# Patient Record
Sex: FEMALE | Race: WHITE | Employment: OTHER | URBAN - METROPOLITAN AREA
[De-identification: names, ages, dates, MRNs, and addresses within clinical notes are randomized per-mention and may not be internally consistent; named-entity substitution may affect disease eponyms.]

---

## 2017-05-11 ENCOUNTER — HOSPITAL ENCOUNTER (OUTPATIENT)
Age: 62
Discharge: HOME | End: 2017-05-11
Payer: MEDICAID

## 2017-05-11 LAB
ABSOLUTE EOS #: 0.1 K/UL (ref 0–0.4)
ABSOLUTE LYMPH #: 2.3 K/UL (ref 1–4.8)
ABSOLUTE MONO #: 0.4 K/UL (ref 0.2–0.8)
ALBUMIN SERPL-MCNC: 4.3 G/DL (ref 3.5–5.2)
ALBUMIN/GLOBULIN RATIO: ABNORMAL (ref 1–2.5)
ALP BLD-CCNC: 65 U/L (ref 35–104)
ALT SERPL-CCNC: 13 U/L (ref 5–33)
ANION GAP SERPL CALCULATED.3IONS-SCNC: 15 MMOL/L (ref 9–17)
AST SERPL-CCNC: 16 U/L
BASOPHILS # BLD: 1 %
BASOPHILS ABSOLUTE: 0.1 K/UL (ref 0–0.2)
BILIRUB SERPL-MCNC: 0.56 MG/DL (ref 0.3–1.2)
BUN BLDV-MCNC: 13 MG/DL (ref 6–20)
BUN/CREAT BLD: 21 (ref 9–20)
CALCIUM SERPL-MCNC: 9.4 MG/DL (ref 8.6–10.4)
CHLORIDE BLD-SCNC: 99 MMOL/L (ref 98–107)
CHOLESTEROL, FASTING: 238 MG/DL
CHOLESTEROL/HDL RATIO: 3.8
CO2: 26 MMOL/L (ref 20–31)
CREAT SERPL-MCNC: 0.62 MG/DL (ref 0.5–0.9)
DIFFERENTIAL TYPE: ABNORMAL
EOSINOPHILS RELATIVE PERCENT: 1 %
GFR AFRICAN AMERICAN: >60 ML/MIN
GFR NON-AFRICAN AMERICAN: >60 ML/MIN
GFR SERPL CREATININE-BSD FRML MDRD: ABNORMAL ML/MIN/{1.73_M2}
GFR SERPL CREATININE-BSD FRML MDRD: ABNORMAL ML/MIN/{1.73_M2}
GLUCOSE FASTING: 110 MG/DL (ref 70–99)
HCT VFR BLD CALC: 46.5 % (ref 36–46)
HDLC SERPL-MCNC: 63 MG/DL
HEMOGLOBIN: 15.8 G/DL (ref 12–16)
LDL CHOLESTEROL: 141 MG/DL (ref 0–130)
LYMPHOCYTES # BLD: 32 %
MCH RBC QN AUTO: 30.8 PG (ref 26–34)
MCHC RBC AUTO-ENTMCNC: 34 G/DL (ref 31–37)
MCV RBC AUTO: 90.6 FL (ref 80–100)
MONOCYTES # BLD: 6 %
PDW BLD-RTO: 12.9 % (ref 11.5–14.5)
PLATELET # BLD: 258 K/UL (ref 130–400)
PLATELET ESTIMATE: ABNORMAL
PMV BLD AUTO: ABNORMAL FL (ref 6–12)
POTASSIUM SERPL-SCNC: 4 MMOL/L (ref 3.7–5.3)
RBC # BLD: 5.13 M/UL (ref 4–5.2)
RBC # BLD: ABNORMAL 10*6/UL
SEG NEUTROPHILS: 60 %
SEGMENTED NEUTROPHILS ABSOLUTE COUNT: 4.3 K/UL (ref 1.8–7.7)
SODIUM BLD-SCNC: 140 MMOL/L (ref 135–144)
TOTAL PROTEIN: 7.1 G/DL (ref 6.4–8.3)
TRIGLYCERIDE, FASTING: 169 MG/DL
VLDLC SERPL CALC-MCNC: ABNORMAL MG/DL (ref 1–30)
WBC # BLD: 7.2 K/UL (ref 3.5–11)
WBC # BLD: ABNORMAL 10*3/UL

## 2017-05-11 PROCEDURE — 80061 LIPID PANEL: CPT

## 2017-05-11 PROCEDURE — 85025 COMPLETE CBC W/AUTO DIFF WBC: CPT

## 2017-05-11 PROCEDURE — 80053 COMPREHEN METABOLIC PANEL: CPT

## 2017-05-11 PROCEDURE — 36415 COLL VENOUS BLD VENIPUNCTURE: CPT

## 2018-03-06 ENCOUNTER — APPOINTMENT (OUTPATIENT)
Dept: GENERAL RADIOLOGY | Facility: CLINIC | Age: 63
End: 2018-03-06
Payer: MEDICAID

## 2018-03-06 ENCOUNTER — HOSPITAL ENCOUNTER (EMERGENCY)
Facility: CLINIC | Age: 63
Discharge: HOME | End: 2018-03-06
Attending: EMERGENCY MEDICINE
Payer: MEDICAID

## 2018-03-06 VITALS
WEIGHT: 148 LBS | HEART RATE: 68 BPM | BODY MASS INDEX: 29.84 KG/M2 | OXYGEN SATURATION: 95 % | SYSTOLIC BLOOD PRESSURE: 137 MMHG | RESPIRATION RATE: 18 BRPM | DIASTOLIC BLOOD PRESSURE: 79 MMHG | TEMPERATURE: 98.5 F | HEIGHT: 59 IN

## 2018-03-06 DIAGNOSIS — J40 BRONCHITIS: Primary | ICD-10-CM

## 2018-03-06 DIAGNOSIS — R91.1 LUNG NODULE: ICD-10-CM

## 2018-03-06 DIAGNOSIS — K12.0 APHTHOUS STOMATITIS: ICD-10-CM

## 2018-03-06 PROCEDURE — 71046 X-RAY EXAM CHEST 2 VIEWS: CPT

## 2018-03-06 PROCEDURE — 99283 EMERGENCY DEPT VISIT LOW MDM: CPT

## 2018-03-06 RX ORDER — AZITHROMYCIN 250 MG/1
TABLET, FILM COATED ORAL
Qty: 1 PACKET | Refills: 0 | Status: SHIPPED | OUTPATIENT
Start: 2018-03-06 | End: 2018-09-25 | Stop reason: ALTCHOICE

## 2018-03-06 RX ORDER — ALBUTEROL SULFATE 90 UG/1
2 AEROSOL, METERED RESPIRATORY (INHALATION) 4 TIMES DAILY
Qty: 1 INHALER | Refills: 0 | Status: SHIPPED | OUTPATIENT
Start: 2018-03-06 | End: 2018-10-31 | Stop reason: SDUPTHER

## 2018-03-06 RX ORDER — LISINOPRIL 2.5 MG/1
2.5 TABLET ORAL DAILY
COMMUNITY
End: 2018-09-25 | Stop reason: ALTCHOICE

## 2018-03-06 RX ORDER — LOVASTATIN 10 MG/1
10 TABLET ORAL NIGHTLY
COMMUNITY
End: 2019-07-16 | Stop reason: SDUPTHER

## 2018-03-06 NOTE — ED NOTES
To ED c/o cough x approx 1 week. Denies chest pain, shortness of breath. Denies fever, vomiting. Also relates to noting while patch in her mouth. Denies any sore throat. Is alert, oriented. Skin warm, dry, pink. Resp nonlabored, reg. Harsh, loose cough noted. Small ulceration noted inner lower lip at gum line. Lungs with faint scattered wheezing throughout.        Margaret Schmidt, CHRIS  03/06/18 [id number]

## 2018-03-06 NOTE — ED PROVIDER NOTES
lungs 4 times daily    AZITHROMYCIN (ZITHROMAX) 250 MG TABLET    Take 2 tablets (500 mg) on Day 1, followed by 1 tablet (250 mg) once daily on Days 2 through 5.  3/6/2018       (Please note that portions of this note were completed with a voice recognition program.  Efforts were made to edit the dictations but occasionally words are mis-transcribed.)    Herbert DO  Attending Emergency Physician       Gerard Alexander DO  03/06/18 [id number]

## 2018-06-28 ENCOUNTER — HOSPITAL ENCOUNTER (OUTPATIENT)
Age: 63
Discharge: HOME | End: 2018-06-28
Payer: MEDICAID

## 2018-06-28 LAB
ABSOLUTE EOS #: 0 K/UL (ref 0–0.4)
ABSOLUTE IMMATURE GRANULOCYTE: NORMAL K/UL (ref 0–0.3)
ABSOLUTE LYMPH #: 2 K/UL (ref 1–4.8)
ABSOLUTE MONO #: 0.3 K/UL (ref 0.2–0.8)
ALBUMIN SERPL-MCNC: 4.1 G/DL (ref 3.5–5.2)
ALBUMIN/GLOBULIN RATIO: ABNORMAL (ref 1–2.5)
ALP BLD-CCNC: 61 U/L (ref 35–104)
ALT SERPL-CCNC: 15 U/L (ref 5–33)
ANION GAP SERPL CALCULATED.3IONS-SCNC: 12 MMOL/L (ref 9–17)
AST SERPL-CCNC: 15 U/L
BASOPHILS # BLD: 1 % (ref 0–2)
BASOPHILS ABSOLUTE: 0 K/UL (ref 0–0.2)
BILIRUB SERPL-MCNC: 0.42 MG/DL (ref 0.3–1.2)
BUN BLDV-MCNC: 16 MG/DL (ref 8–23)
BUN/CREAT BLD: 24 (ref 9–20)
CALCIUM SERPL-MCNC: 9.1 MG/DL (ref 8.6–10.4)
CHLORIDE BLD-SCNC: 103 MMOL/L (ref 98–107)
CHOLESTEROL, FASTING: 191 MG/DL
CHOLESTEROL/HDL RATIO: 2.7
CO2: 26 MMOL/L (ref 20–31)
CREAT SERPL-MCNC: 0.67 MG/DL (ref 0.5–0.9)
DIFFERENTIAL TYPE: NORMAL
EOSINOPHILS RELATIVE PERCENT: 1 % (ref 1–4)
ESTIMATED AVERAGE GLUCOSE: 97 MG/DL
GFR AFRICAN AMERICAN: >60 ML/MIN
GFR NON-AFRICAN AMERICAN: >60 ML/MIN
GFR SERPL CREATININE-BSD FRML MDRD: ABNORMAL ML/MIN/{1.73_M2}
GFR SERPL CREATININE-BSD FRML MDRD: ABNORMAL ML/MIN/{1.73_M2}
GLUCOSE BLD-MCNC: 99 MG/DL (ref 70–99)
HBA1C MFR BLD: 5 % (ref 4–6)
HCT VFR BLD CALC: 42 % (ref 36–46)
HDLC SERPL-MCNC: 72 MG/DL
HEMOGLOBIN: 14.3 G/DL (ref 12–16)
IMMATURE GRANULOCYTES: NORMAL %
LDL CHOLESTEROL: 102 MG/DL (ref 0–130)
LYMPHOCYTES # BLD: 34 % (ref 24–44)
MCH RBC QN AUTO: 31 PG (ref 26–34)
MCHC RBC AUTO-ENTMCNC: 34.1 G/DL (ref 31–37)
MCV RBC AUTO: 90.9 FL (ref 80–100)
MONOCYTES # BLD: 4 % (ref 1–7)
NRBC AUTOMATED: NORMAL PER 100 WBC
PDW BLD-RTO: 13.1 % (ref 11.5–14.5)
PLATELET # BLD: 223 K/UL (ref 130–400)
PLATELET ESTIMATE: NORMAL
PMV BLD AUTO: 8.6 FL (ref 6–12)
POTASSIUM SERPL-SCNC: 4.1 MMOL/L (ref 3.7–5.3)
RBC # BLD: 4.62 M/UL (ref 4–5.2)
RBC # BLD: NORMAL 10*6/UL
SEG NEUTROPHILS: 60 % (ref 36–66)
SEGMENTED NEUTROPHILS ABSOLUTE COUNT: 3.5 K/UL (ref 1.8–7.7)
SODIUM BLD-SCNC: 141 MMOL/L (ref 135–144)
TOTAL PROTEIN: 6.9 G/DL (ref 6.4–8.3)
TRIGLYCERIDE, FASTING: 87 MG/DL
VLDLC SERPL CALC-MCNC: NORMAL MG/DL (ref 1–30)
WBC # BLD: 5.8 K/UL (ref 3.5–11)
WBC # BLD: NORMAL 10*3/UL

## 2018-06-28 PROCEDURE — 83036 HEMOGLOBIN GLYCOSYLATED A1C: CPT

## 2018-06-28 PROCEDURE — 36415 COLL VENOUS BLD VENIPUNCTURE: CPT

## 2018-06-28 PROCEDURE — 85025 COMPLETE CBC W/AUTO DIFF WBC: CPT

## 2018-06-28 PROCEDURE — 80061 LIPID PANEL: CPT

## 2018-06-28 PROCEDURE — 80053 COMPREHEN METABOLIC PANEL: CPT

## 2018-07-09 ENCOUNTER — HOSPITAL ENCOUNTER (OUTPATIENT)
Dept: CT IMAGING | Age: 63
Discharge: HOME | End: 2018-07-11
Payer: MEDICAID

## 2018-07-09 DIAGNOSIS — R91.1 LUNG NODULE: ICD-10-CM

## 2018-07-09 PROCEDURE — 71260 CT THORAX DX C+: CPT

## 2018-07-09 PROCEDURE — 6360000004 HC RX CONTRAST MEDICATION: Performed by: INTERNAL MEDICINE

## 2018-07-09 PROCEDURE — 2580000003 HC RX 258: Performed by: INTERNAL MEDICINE

## 2018-07-09 RX ORDER — SODIUM CHLORIDE 0.9 % (FLUSH) 0.9 %
10 SYRINGE (ML) INJECTION
Status: COMPLETED | OUTPATIENT
Start: 2018-07-09 | End: 2018-07-09

## 2018-07-09 RX ORDER — 0.9 % SODIUM CHLORIDE 0.9 %
50 INTRAVENOUS SOLUTION INTRAVENOUS ONCE
Status: COMPLETED | OUTPATIENT
Start: 2018-07-09 | End: 2018-07-09

## 2018-07-09 RX ADMIN — SODIUM CHLORIDE 80 ML: 9 INJECTION, SOLUTION INTRAVENOUS at 08:31

## 2018-07-09 RX ADMIN — IOPAMIDOL 75 ML: 755 INJECTION, SOLUTION INTRAVENOUS at 08:32

## 2018-07-09 RX ADMIN — Medication 10 ML: at 08:32

## 2018-07-25 ENCOUNTER — APPOINTMENT (OUTPATIENT)
Dept: CT IMAGING | Age: 63
End: 2018-07-25
Payer: MEDICAID

## 2018-07-25 ENCOUNTER — HOSPITAL ENCOUNTER (OUTPATIENT)
Dept: ULTRASOUND IMAGING | Age: 63
Discharge: HOME | End: 2018-07-27
Payer: MEDICAID

## 2018-07-25 DIAGNOSIS — K76.89 LIVER NODULE: ICD-10-CM

## 2018-07-25 PROCEDURE — 76705 ECHO EXAM OF ABDOMEN: CPT

## 2018-07-27 ENCOUNTER — HOSPITAL ENCOUNTER (OUTPATIENT)
Dept: NUCLEAR MEDICINE | Age: 63
Discharge: HOME | End: 2018-07-29
Payer: MEDICAID

## 2018-07-27 DIAGNOSIS — R91.1 LUNG NODULE: ICD-10-CM

## 2018-07-27 PROCEDURE — 3430000000 HC RX DIAGNOSTIC RADIOPHARMACEUTICAL: Performed by: INTERNAL MEDICINE

## 2018-07-27 PROCEDURE — 78815 PET IMAGE W/CT SKULL-THIGH: CPT

## 2018-07-27 PROCEDURE — A9552 F18 FDG: HCPCS | Performed by: INTERNAL MEDICINE

## 2018-07-27 RX ORDER — FLUDEOXYGLUCOSE F 18 200 MCI/ML
17.54 INJECTION, SOLUTION INTRAVENOUS
Status: COMPLETED | OUTPATIENT
Start: 2018-07-27 | End: 2018-07-27

## 2018-07-27 RX ADMIN — FLUDEOXYGLUCOSE F 18 17.54 MILLICURIE: 200 INJECTION, SOLUTION INTRAVENOUS at 07:35

## 2018-08-03 ENCOUNTER — HOSPITAL ENCOUNTER (OUTPATIENT)
Dept: CT IMAGING | Age: 63
Discharge: HOME | End: 2018-08-05
Payer: MEDICAID

## 2018-08-03 ENCOUNTER — APPOINTMENT (OUTPATIENT)
Dept: CT IMAGING | Age: 63
End: 2018-08-03
Payer: MEDICAID

## 2018-08-03 ENCOUNTER — HOSPITAL ENCOUNTER (OUTPATIENT)
Dept: NUCLEAR MEDICINE | Age: 63
Discharge: HOME | End: 2018-08-05
Payer: MEDICAID

## 2018-08-03 DIAGNOSIS — R91.1 COIN LESION: ICD-10-CM

## 2018-08-03 DIAGNOSIS — R91.1 PULMONARY NODULE, RIGHT: ICD-10-CM

## 2018-08-03 LAB
CREAT SERPL-MCNC: 0.6 MG/DL (ref 0.5–0.9)
GFR AFRICAN AMERICAN: >60 ML/MIN
GFR NON-AFRICAN AMERICAN: >60 ML/MIN
GFR SERPL CREATININE-BSD FRML MDRD: NORMAL ML/MIN/{1.73_M2}
GFR SERPL CREATININE-BSD FRML MDRD: NORMAL ML/MIN/{1.73_M2}

## 2018-08-03 PROCEDURE — 78306 BONE IMAGING WHOLE BODY: CPT

## 2018-08-03 PROCEDURE — 6360000004 HC RX CONTRAST MEDICATION: Performed by: INTERNAL MEDICINE

## 2018-08-03 PROCEDURE — 36415 COLL VENOUS BLD VENIPUNCTURE: CPT

## 2018-08-03 PROCEDURE — 2580000003 HC RX 258: Performed by: INTERNAL MEDICINE

## 2018-08-03 PROCEDURE — 3430000000 HC RX DIAGNOSTIC RADIOPHARMACEUTICAL: Performed by: INTERNAL MEDICINE

## 2018-08-03 PROCEDURE — 70470 CT HEAD/BRAIN W/O & W/DYE: CPT

## 2018-08-03 PROCEDURE — 82565 ASSAY OF CREATININE: CPT

## 2018-08-03 PROCEDURE — A9503 TC99M MEDRONATE: HCPCS | Performed by: INTERNAL MEDICINE

## 2018-08-03 RX ORDER — SODIUM CHLORIDE 0.9 % (FLUSH) 0.9 %
10 SYRINGE (ML) INJECTION
Status: COMPLETED | OUTPATIENT
Start: 2018-08-03 | End: 2018-08-03

## 2018-08-03 RX ORDER — TC 99M MEDRONATE 20 MG/10ML
25 INJECTION, POWDER, LYOPHILIZED, FOR SOLUTION INTRAVENOUS
Status: COMPLETED | OUTPATIENT
Start: 2018-08-03 | End: 2018-08-03

## 2018-08-03 RX ORDER — 0.9 % SODIUM CHLORIDE 0.9 %
80 INTRAVENOUS SOLUTION INTRAVENOUS ONCE
Status: COMPLETED | OUTPATIENT
Start: 2018-08-03 | End: 2018-08-03

## 2018-08-03 RX ADMIN — Medication 10 ML: at 08:17

## 2018-08-03 RX ADMIN — Medication 24.6 MILLICURIE: at 07:25

## 2018-08-03 RX ADMIN — SODIUM CHLORIDE 80 ML: 9 INJECTION, SOLUTION INTRAVENOUS at 08:16

## 2018-08-03 RX ADMIN — IOPAMIDOL 75 ML: 755 INJECTION, SOLUTION INTRAVENOUS at 08:03

## 2018-08-21 RX ORDER — SODIUM CHLORIDE 9 MG/ML
INJECTION, SOLUTION INTRAVENOUS CONTINUOUS
Status: CANCELLED | OUTPATIENT
Start: 2018-08-21 | End: 2019-08-21

## 2018-08-22 ENCOUNTER — HOSPITAL ENCOUNTER (OUTPATIENT)
Dept: GENERAL RADIOLOGY | Age: 63
Discharge: HOME | End: 2018-08-24
Payer: MEDICAID

## 2018-08-22 ENCOUNTER — HOSPITAL ENCOUNTER (OUTPATIENT)
Dept: CT IMAGING | Age: 63
Discharge: HOME | End: 2018-08-24
Payer: MEDICAID

## 2018-08-22 VITALS
HEART RATE: 72 BPM | BODY MASS INDEX: 31.25 KG/M2 | WEIGHT: 155 LBS | SYSTOLIC BLOOD PRESSURE: 89 MMHG | TEMPERATURE: 96.4 F | HEIGHT: 59 IN | OXYGEN SATURATION: 99 % | RESPIRATION RATE: 18 BRPM | DIASTOLIC BLOOD PRESSURE: 70 MMHG

## 2018-08-22 DIAGNOSIS — R91.1 LUNG NODULE: ICD-10-CM

## 2018-08-22 LAB
INR BLD: 1
PARTIAL THROMBOPLASTIN TIME: 24.1 SEC (ref 20.5–30.5)
PLATELET # BLD: 215 K/UL (ref 138–453)
PROTHROMBIN TIME: 10.5 SEC (ref 9–12)

## 2018-08-22 PROCEDURE — 85730 THROMBOPLASTIN TIME PARTIAL: CPT

## 2018-08-22 PROCEDURE — 2580000003 HC RX 258: Performed by: RADIOLOGY

## 2018-08-22 PROCEDURE — 71045 X-RAY EXAM CHEST 1 VIEW: CPT

## 2018-08-22 PROCEDURE — 85049 AUTOMATED PLATELET COUNT: CPT

## 2018-08-22 PROCEDURE — 88305 TISSUE EXAM BY PATHOLOGIST: CPT

## 2018-08-22 PROCEDURE — 6360000002 HC RX W HCPCS: Performed by: RADIOLOGY

## 2018-08-22 PROCEDURE — 85610 PROTHROMBIN TIME: CPT

## 2018-08-22 PROCEDURE — 7100000011 HC PHASE II RECOVERY - ADDTL 15 MIN

## 2018-08-22 PROCEDURE — 88342 IMHCHEM/IMCYTCHM 1ST ANTB: CPT

## 2018-08-22 PROCEDURE — 32405 CT NEEDLE BIOPSY LUNG PERCUTANEOUS: CPT

## 2018-08-22 PROCEDURE — 2709999900 CT GUIDED NEEDLE PLACEMENT

## 2018-08-22 PROCEDURE — 88333 PATH CONSLTJ SURG CYTO XM 1: CPT

## 2018-08-22 PROCEDURE — 7100000010 HC PHASE II RECOVERY - FIRST 15 MIN

## 2018-08-22 RX ORDER — SODIUM CHLORIDE 9 MG/ML
INJECTION, SOLUTION INTRAVENOUS CONTINUOUS
Status: DISCONTINUED | OUTPATIENT
Start: 2018-08-22 | End: 2018-08-25 | Stop reason: HOSPADM

## 2018-08-22 RX ORDER — MIDAZOLAM HYDROCHLORIDE 1 MG/ML
INJECTION INTRAMUSCULAR; INTRAVENOUS
Status: COMPLETED | OUTPATIENT
Start: 2018-08-22 | End: 2018-08-22

## 2018-08-22 RX ORDER — FENTANYL CITRATE 50 UG/ML
INJECTION, SOLUTION INTRAMUSCULAR; INTRAVENOUS
Status: COMPLETED | OUTPATIENT
Start: 2018-08-22 | End: 2018-08-22

## 2018-08-22 RX ORDER — ALPRAZOLAM 0.25 MG/1
0.25 TABLET ORAL 3 TIMES DAILY PRN
COMMUNITY
End: 2018-10-31 | Stop reason: SDUPTHER

## 2018-08-22 RX ORDER — ACETAMINOPHEN 325 MG/1
650 TABLET ORAL EVERY 4 HOURS PRN
Status: DISCONTINUED | OUTPATIENT
Start: 2018-08-22 | End: 2018-08-25 | Stop reason: HOSPADM

## 2018-08-22 RX ADMIN — MIDAZOLAM HYDROCHLORIDE 0.5 MG: 1 INJECTION, SOLUTION INTRAMUSCULAR; INTRAVENOUS at 09:31

## 2018-08-22 RX ADMIN — MIDAZOLAM HYDROCHLORIDE 1 MG: 1 INJECTION, SOLUTION INTRAMUSCULAR; INTRAVENOUS at 09:17

## 2018-08-22 RX ADMIN — SODIUM CHLORIDE 20 ML: 9 INJECTION, SOLUTION INTRAVENOUS at 07:55

## 2018-08-22 RX ADMIN — FENTANYL CITRATE 50 MCG: 50 INJECTION INTRAMUSCULAR; INTRAVENOUS at 09:23

## 2018-08-22 RX ADMIN — FENTANYL CITRATE 50 MCG: 50 INJECTION INTRAMUSCULAR; INTRAVENOUS at 09:52

## 2018-08-22 ASSESSMENT — PAIN DESCRIPTION - LOCATION
LOCATION: CHEST

## 2018-08-22 ASSESSMENT — PAIN SCALES - GENERAL
PAINLEVEL_OUTOF10: 2
PAINLEVEL_OUTOF10: 3
PAINLEVEL_OUTOF10: 0
PAINLEVEL_OUTOF10: 0
PAINLEVEL_OUTOF10: 3
PAINLEVEL_OUTOF10: 3
PAINLEVEL_OUTOF10: 1

## 2018-08-22 ASSESSMENT — PAIN DESCRIPTION - ORIENTATION
ORIENTATION: LEFT

## 2018-08-22 ASSESSMENT — PAIN DESCRIPTION - ONSET
ONSET: ON-GOING

## 2018-08-22 ASSESSMENT — PAIN DESCRIPTION - FREQUENCY
FREQUENCY: INTERMITTENT
FREQUENCY: INTERMITTENT
FREQUENCY: CONTINUOUS
FREQUENCY: INTERMITTENT
FREQUENCY: CONTINUOUS

## 2018-08-22 ASSESSMENT — PAIN DESCRIPTION - PAIN TYPE
TYPE: ACUTE PAIN

## 2018-08-22 ASSESSMENT — PAIN DESCRIPTION - DESCRIPTORS
DESCRIPTORS: ACHING

## 2018-08-22 ASSESSMENT — PAIN - FUNCTIONAL ASSESSMENT: PAIN_FUNCTIONAL_ASSESSMENT: 0-10

## 2018-08-22 NOTE — H&P
History and Physical    Pt Name: Davonte Weiner  MRN: [id number]  YOB: 1955  Date of evaluation: 8/22/2018    SUBJECTIVE:   History of Chief Complaint:    Patient complains of lung nodule. She says that she had bronchitis in March, had a CXR that had a new nodule. She has been scheduled for lung biopsy today. She was a smoker of 1-2 PPD for 30 years, prior to quitting in 2000. Past Medical History    has a past medical history of Anxiety; Benign polyp of large intestine; Hyperlipidemia; Hypertension; Liver hemangioma; Lung nodule; Snores; Uterine fibroid; and Wears glasses. Past Surgical History   has a past surgical history that includes Hysterectomy and hernia repair. Medications  Prior to Admission medications    Medication Sig Start Date End Date Taking? Authorizing Provider   ALPRAZolam (XANAX) 0.25 MG tablet Take 0.25 mg by mouth 3 times daily as needed for Sleep. .   Yes Historical Provider, MD   lisinopril (PRINIVIL;ZESTRIL) 2.5 MG tablet Take 2.5 mg by mouth daily   Yes Historical Provider, MD   lovastatin (MEVACOR) 10 MG tablet Take 10 mg by mouth nightly   Yes Historical Provider, MD   albuterol sulfate HFA (VENTOLIN HFA) 108 (90 Base) MCG/ACT inhaler Inhale 2 puffs into the lungs 4 times daily 3/6/18  Yes Kenneth Alfredo DO   azithromycin (ZITHROMAX) 250 MG tablet Take 2 tablets (500 mg) on Day 1, followed by 1 tablet (250 mg) once daily on Days 2 through 5.  3/6/2018 3/6/18   Kenneth Alfredo DO     Allergies  is allergic to codeine. Family History  family history includes Cancer in her mother and sister. Social History   reports that she has quit smoking. She has never used smokeless tobacco.  1-2 PPD for 30 years, quit 2000. reports that she drinks alcohol. Occasionally. reports that she does not use drugs. Marital Status   Occupation retired    OBJECTIVE:   VITALS:  height is 4' 11\" (1.499 m) and weight is 155 lb (70.3 kg). Her oral temperature is 97.5 °F (36.4 °C).

## 2018-08-22 NOTE — PROGRESS NOTES
1300 called by Dr Cyn Ragland aware pt w/o c/o chest pain, bx site pain and no shortness of breath.  Informed ok to discharge pt Leif PEREZ

## 2018-08-22 NOTE — PROGRESS NOTES
1020  Am  left chest dressing clean, dry and intact. c/o pain 3 out 10, ache, c/o shortness of breath, o2 at 2 liters continued,  sao2 98 %, rr 8-12, hr 66-58. skin warm and dry to touch.  Marjorie Goodpasture RN

## 2018-08-22 NOTE — BRIEF OP NOTE
Brief Postoperative Note    Bertrand Lo  YOB: 1955  [id number]    Pre-operative Diagnosis: BARAK nodule    Post-operative Diagnosis: Same    Procedure: CT guided BARAK nodule core biopsy    Anesthesia: Moderate Sedation    Surgeons/Assistants: Jamel    Estimated Blood Loss: less than 50     Complications: Other: tiny nonexpanding left pneumothorax    Specimens: Was Obtained: 3 core samples using 20 G needle    Findings: Nonexpanding postprocedural pneumothorax. This will be followed with serial CXRs.     Electronically signed by Perla Reynolds MD on 8/22/2018 at 10:30 AM

## 2018-08-22 NOTE — PRE SEDATION
Sedation Pre-Procedure Note    Patient Name: Duc Hardy   YOB: 1955  Room/Bed: Room/bed info not found  Medical Record Number: [id number]  Date: 8/22/2018   Time: 9:12 AM       Indication:  BARAK nodule    Consent: I have discussed with the patient and/or the patient representative the indication, alternatives, and the possible risks and/or complications of the planned procedure and the anesthesia methods. The patient and/or patient representative appear to understand and agree to proceed. Vital Signs:   Vitals:    08/22/18 0742   BP: 130/69   Pulse: 85   Resp: 13   Temp: 97.5 °F (36.4 °C)   SpO2: 96%       Past Medical History:   has a past medical history of Anxiety; Benign polyp of large intestine; Hyperlipidemia; Hypertension; Liver hemangioma; Lung nodule; Snores; Uterine fibroid; and Wears glasses. Past Surgical History:   has a past surgical history that includes Hysterectomy and hernia repair. Medications:   Scheduled Meds:   Continuous Infusions:    sodium chloride 20 mL (08/22/18 0755)     PRN Meds:   Home Meds:   Prior to Admission medications    Medication Sig Start Date End Date Taking? Authorizing Provider   ALPRAZolam (XANAX) 0.25 MG tablet Take 0.25 mg by mouth 3 times daily as needed for Sleep. .   Yes Historical Provider, MD   lisinopril (PRINIVIL;ZESTRIL) 2.5 MG tablet Take 2.5 mg by mouth daily   Yes Historical Provider, MD   lovastatin (MEVACOR) 10 MG tablet Take 10 mg by mouth nightly   Yes Historical Provider, MD   albuterol sulfate HFA (VENTOLIN HFA) 108 (90 Base) MCG/ACT inhaler Inhale 2 puffs into the lungs 4 times daily 3/6/18  Yes Kenneth Alfredo DO   azithromycin (ZITHROMAX) 250 MG tablet Take 2 tablets (500 mg) on Day 1, followed by 1 tablet (250 mg) once daily on Days 2 through 5.  3/6/2018 3/6/18   Kenneth Alfredo DO     Coumadin Use Last 7 Days:  no  Antiplatelet drug therapy use last 7 days: no  Other anticoagulant use last 7 days: no  Additional Medication Information:  See [facility]      Pre-Sedation Documentation and Exam:   I have reviewed the patient's history and review of systems.     Mallampati Airway Assessment:  Mallampati Class II - (soft palate, fauces & uvula are visible)    Prior History of Anesthesia Complications:   none    ASA Classification:  Class 2 - A normal healthy patient with mild systemic disease    Sedation/ Anesthesia Plan:   intravenous sedation    Medications Planned:   midazolam (Versed) intravenously and fentanyl intravenously    Patient is an appropriate candidate for plan of sedation: yes    Electronically signed by Leonor Dejesus MD on 8/22/2018 at 9:12 AM

## 2018-08-23 LAB — SURGICAL PATHOLOGY REPORT: NORMAL

## 2018-09-05 ENCOUNTER — INITIAL CONSULT (OUTPATIENT)
Dept: CARDIOTHORACIC SURGERY | Age: 63
End: 2018-09-05
Payer: MEDICAID

## 2018-09-05 ENCOUNTER — HOSPITAL ENCOUNTER (OUTPATIENT)
Dept: NON INVASIVE DIAGNOSTICS | Age: 63
Discharge: HOME | End: 2018-09-05
Payer: MEDICAID

## 2018-09-05 VITALS
BODY MASS INDEX: 30.84 KG/M2 | HEIGHT: 59 IN | HEART RATE: 92 BPM | OXYGEN SATURATION: 95 % | SYSTOLIC BLOOD PRESSURE: 126 MMHG | TEMPERATURE: 97.9 F | WEIGHT: 153 LBS | DIASTOLIC BLOOD PRESSURE: 85 MMHG

## 2018-09-05 DIAGNOSIS — R06.02 SOB (SHORTNESS OF BREATH): ICD-10-CM

## 2018-09-05 DIAGNOSIS — Z01.818 PRE-OP TESTING: ICD-10-CM

## 2018-09-05 DIAGNOSIS — R91.8 LUNG MASS: Primary | ICD-10-CM

## 2018-09-05 DIAGNOSIS — C34.90 MALIGNANT NEOPLASM OF LUNG, UNSPECIFIED LATERALITY, UNSPECIFIED PART OF LUNG (HCC): ICD-10-CM

## 2018-09-05 DIAGNOSIS — Z01.818 PRE-OP TESTING: Primary | ICD-10-CM

## 2018-09-05 LAB
LV EF: 55 %
LVEF MODALITY: NORMAL

## 2018-09-05 PROCEDURE — 3017F COLORECTAL CA SCREEN DOC REV: CPT | Performed by: THORACIC SURGERY (CARDIOTHORACIC VASCULAR SURGERY)

## 2018-09-05 PROCEDURE — G8427 DOCREV CUR MEDS BY ELIG CLIN: HCPCS | Performed by: THORACIC SURGERY (CARDIOTHORACIC VASCULAR SURGERY)

## 2018-09-05 PROCEDURE — G8417 CALC BMI ABV UP PARAM F/U: HCPCS | Performed by: THORACIC SURGERY (CARDIOTHORACIC VASCULAR SURGERY)

## 2018-09-05 PROCEDURE — 1036F TOBACCO NON-USER: CPT | Performed by: THORACIC SURGERY (CARDIOTHORACIC VASCULAR SURGERY)

## 2018-09-05 PROCEDURE — 99205 OFFICE O/P NEW HI 60 MIN: CPT | Performed by: PHYSICIAN ASSISTANT

## 2018-09-05 PROCEDURE — 93306 TTE W/DOPPLER COMPLETE: CPT

## 2018-09-05 NOTE — PROGRESS NOTES
[facility]  Consultation Note                                 Surgeon: Ulisses Morris MD  Reffering: Randolph Arzate MD      CC: SOB  Reason for evaluation: lung mass    HISTORY OF PRESENT ILLNESS:  Jono Dallas is a 66y.o. year old, ,  female recently diagnosed with LLL lung cancer. Complained of nonproductive cough starting in March of this year. Denied hemoptysis or color to sputum. CXR showed suspicious nodule left lung. Follow up CT showed 2.1cm spiculated mass subsequent PET positive. Had stress test 5 years ago which was negative. Denies weight loss, hoarse voice, recent travel or environmental exposures. We've been asked to evaluate for possible left lower lobectomy. I'm seeing the patient in consult with Dr. Ulisses Morris. All films and records available have been reviewed. Past Medical History:        Diagnosis Date    Anxiety     Benign polyp of large intestine     Hyperlipidemia     Hypertension     Liver hemangioma     Lung nodule     LLL Nodule    Snores     not tested for apnea    Uterine fibroid 09/2010    Wears glasses      Past Surgical History:        Procedure Laterality Date    HERNIA REPAIR      HYSTERECTOMY       Medications:   Current Outpatient Prescriptions on File Prior to Visit   Medication Sig Dispense Refill    ALPRAZolam (XANAX) 0.25 MG tablet Take 0.25 mg by mouth 3 times daily as needed for Sleep. [place] lisinopril (PRINIVIL;ZESTRIL) 2.5 MG tablet Take 2.5 mg by mouth daily      lovastatin (MEVACOR) 10 MG tablet Take 10 mg by mouth nightly      albuterol sulfate HFA (VENTOLIN HFA) 108 (90 Base) MCG/ACT inhaler Inhale 2 puffs into the lungs 4 times daily 1 Inhaler 0    azithromycin (ZITHROMAX) 250 MG tablet Take 2 tablets (500 mg) on Day 1, followed by 1 tablet (250 mg) once daily on Days 2 through 5.  3/6/2018 1 packet 0     No current facility-administered medications on file prior to visit.       Allergies:  Codeine    Social History: History   Smoking Status    Former Smoker   Smokeless Tobacco    Never Used     Comment: Quit 18 years ago     History   Alcohol Use    Yes     Comment: Occassionally     History   Drug Use No      Marital status:  single    Occupation:  Retired, Physic professor    Family History:    Family History   Problem Relation Age of Onset    Cancer Mother     Cancer Sister        REVIEW OF SYSTEMS:  11 PROS negative except for what's mentioned in HPI and PMH    PHYSICAL EXAM:  General: no acute distress, alert, oriented x 3, looks stated age, well nourished, ANNXXXIOUSS  VITALS:  /85 (Site: Left Arm, Position: Sitting, Cuff Size: Medium Adult)   Pulse 92   Temp 97.9 °F (36.6 °C) (Oral)   Ht 4' 11\" (1.499 m)   Wt 153 lb (69.4 kg)   SpO2 95%   BMI 30.90 kg/m²   Eyes:  Pupils equal round reactive to light and accomodation. Extraocular movement intact. Anicteric. Glasses  Head/ENT:  Atraumatic, normocephalic. Hearing grossly intact. Good dentition  Neck: Supple, nontender. Trachea midline. No thyromegaly. No bruits  Lymphatics: cervical no lymphadenopathy, clavicular no lymphadenopathy  Lungs: clear to auscultation posteriorly no wheezing rhinchi or rales  Cardiovascular: Normal S1, S2,  No murmur  Abdomen:  Soft, non-tender, normal bowel sounds. No bruits, organomegaly or masses. Musculoskeletal:  5/5 muscle strength throughout. Extremities: none edema  PV:  peripheral pulses 2+ and symmetric  Skin: No jaundice or eczema. Tan  Neurological: Normal sensation throughout. Moves all extremities to command  Psychiatric: Oriented to person place and time, no evidence of depression.     Data:  CBC:   Lab Results   Component Value Date    WBC 5.8 06/28/2018    RBC 4.62 06/28/2018    HGB 14.3 06/28/2018    HCT 42.0 06/28/2018    MCV 90.9 06/28/2018    MCH 31.0 06/28/2018    MCHC 34.1 06/28/2018    RDW 13.1 06/28/2018     08/22/2018    MPV 8.6 06/28/2018     BMP:    Lab Results   Component Value Date Xanax provided for anxiety. Will call once timing of surgery is confirmed.       Fred Confer

## 2018-09-25 ENCOUNTER — HOSPITAL ENCOUNTER (OUTPATIENT)
Dept: PREADMISSION TESTING | Age: 63
Discharge: HOME | DRG: 164 | End: 2018-09-29
Payer: COMMERCIAL

## 2018-09-25 ENCOUNTER — HOSPITAL ENCOUNTER (OUTPATIENT)
Dept: GENERAL RADIOLOGY | Age: 63
Discharge: HOME | DRG: 164 | End: 2018-09-27
Attending: THORACIC SURGERY (CARDIOTHORACIC VASCULAR SURGERY)
Payer: COMMERCIAL

## 2018-09-25 VITALS
WEIGHT: 152.2 LBS | SYSTOLIC BLOOD PRESSURE: 117 MMHG | OXYGEN SATURATION: 97 % | TEMPERATURE: 98.1 F | DIASTOLIC BLOOD PRESSURE: 72 MMHG | HEIGHT: 59 IN | HEART RATE: 75 BPM | BODY MASS INDEX: 30.68 KG/M2 | RESPIRATION RATE: 20 BRPM

## 2018-09-25 LAB
ABSOLUTE EOS #: 0.07 K/UL (ref 0–0.44)
ABSOLUTE IMMATURE GRANULOCYTE: <0.03 K/UL (ref 0–0.3)
ABSOLUTE LYMPH #: 2.53 K/UL (ref 1.1–3.7)
ABSOLUTE MONO #: 0.39 K/UL (ref 0.1–1.2)
ALBUMIN SERPL-MCNC: 4.3 G/DL (ref 3.5–5.2)
ALBUMIN/GLOBULIN RATIO: 1.5 (ref 1–2.5)
ALLEN TEST: POSITIVE
ALP BLD-CCNC: 77 U/L (ref 35–104)
ALT SERPL-CCNC: 16 U/L (ref 5–33)
ANION GAP SERPL CALCULATED.3IONS-SCNC: 14 MMOL/L (ref 9–17)
AST SERPL-CCNC: 16 U/L
BASOPHILS # BLD: 1 % (ref 0–2)
BASOPHILS ABSOLUTE: 0.05 K/UL (ref 0–0.2)
BILIRUB SERPL-MCNC: 0.28 MG/DL (ref 0.3–1.2)
BILIRUBIN URINE: NEGATIVE
BUN BLDV-MCNC: 13 MG/DL (ref 8–23)
BUN/CREAT BLD: ABNORMAL (ref 9–20)
CALCIUM SERPL-MCNC: 9.3 MG/DL (ref 8.6–10.4)
CHLORIDE BLD-SCNC: 101 MMOL/L (ref 98–107)
CO2: 25 MMOL/L (ref 20–31)
COLOR: YELLOW
COMMENT UA: NORMAL
CREAT SERPL-MCNC: 0.58 MG/DL (ref 0.5–0.9)
DIFFERENTIAL TYPE: NORMAL
EOSINOPHILS RELATIVE PERCENT: 1 % (ref 1–4)
FIO2: ABNORMAL
GFR AFRICAN AMERICAN: >60 ML/MIN
GFR NON-AFRICAN AMERICAN: >60 ML/MIN
GFR SERPL CREATININE-BSD FRML MDRD: ABNORMAL ML/MIN/{1.73_M2}
GFR SERPL CREATININE-BSD FRML MDRD: ABNORMAL ML/MIN/{1.73_M2}
GLUCOSE BLD-MCNC: 89 MG/DL (ref 70–99)
GLUCOSE URINE: NEGATIVE
HCT VFR BLD CALC: 44.5 % (ref 36.3–47.1)
HEMOGLOBIN: 14.3 G/DL (ref 11.9–15.1)
IMMATURE GRANULOCYTES: 0 %
INR BLD: 0.9
KETONES, URINE: NEGATIVE
LEUKOCYTE ESTERASE, URINE: NEGATIVE
LYMPHOCYTES # BLD: 37 % (ref 24–43)
MCH RBC QN AUTO: 31 PG (ref 25.2–33.5)
MCHC RBC AUTO-ENTMCNC: 32.1 G/DL (ref 28.4–34.8)
MCV RBC AUTO: 96.3 FL (ref 82.6–102.9)
MODE: ABNORMAL
MONOCYTES # BLD: 6 % (ref 3–12)
MRSA, DNA, NASAL: NORMAL
NEGATIVE BASE EXCESS, ART: ABNORMAL (ref 0–2)
NITRITE, URINE: NEGATIVE
NRBC AUTOMATED: 0 PER 100 WBC
O2 DEVICE/FLOW/%: ABNORMAL
PARTIAL THROMBOPLASTIN TIME: 25.1 SEC (ref 20.5–30.5)
PATIENT TEMP: ABNORMAL
PDW BLD-RTO: 12.5 % (ref 11.8–14.4)
PH UA: 7.5 (ref 5–8)
PLATELET # BLD: 255 K/UL (ref 138–453)
PLATELET ESTIMATE: NORMAL
PMV BLD AUTO: 10.6 FL (ref 8.1–13.5)
POC HCO3: 24.5 MMOL/L (ref 21–28)
POC O2 SATURATION: 97 % (ref 94–98)
POC PCO2 TEMP: ABNORMAL MM HG
POC PCO2: 35 MM HG (ref 35–48)
POC PH TEMP: ABNORMAL
POC PH: 7.45 (ref 7.35–7.45)
POC PO2 TEMP: ABNORMAL MM HG
POC PO2: 84.8 MM HG (ref 83–108)
POSITIVE BASE EXCESS, ART: 1 (ref 0–3)
POTASSIUM SERPL-SCNC: 3.9 MMOL/L (ref 3.7–5.3)
PROTEIN UA: NEGATIVE
PROTHROMBIN TIME: 10.2 SEC (ref 9–12)
RBC # BLD: 4.62 M/UL (ref 3.95–5.11)
RBC # BLD: NORMAL 10*6/UL
SAMPLE SITE: ABNORMAL
SEG NEUTROPHILS: 55 % (ref 36–65)
SEGMENTED NEUTROPHILS ABSOLUTE COUNT: 3.75 K/UL (ref 1.5–8.1)
SODIUM BLD-SCNC: 140 MMOL/L (ref 135–144)
SPECIFIC GRAVITY UA: 1.01 (ref 1–1.03)
SPECIMEN DESCRIPTION: NORMAL
TCO2 (CALC), ART: 26 MMOL/L (ref 22–29)
TOTAL PROTEIN: 7.2 G/DL (ref 6.4–8.3)
TURBIDITY: CLEAR
URINE HGB: NEGATIVE
UROBILINOGEN, URINE: NORMAL
WBC # BLD: 6.8 K/UL (ref 3.5–11.3)
WBC # BLD: NORMAL 10*3/UL

## 2018-09-25 PROCEDURE — 93005 ELECTROCARDIOGRAM TRACING: CPT

## 2018-09-25 PROCEDURE — 86901 BLOOD TYPING SEROLOGIC RH(D): CPT

## 2018-09-25 PROCEDURE — 71046 X-RAY EXAM CHEST 2 VIEWS: CPT

## 2018-09-25 PROCEDURE — 36600 WITHDRAWAL OF ARTERIAL BLOOD: CPT

## 2018-09-25 PROCEDURE — 81003 URINALYSIS AUTO W/O SCOPE: CPT

## 2018-09-25 PROCEDURE — 85610 PROTHROMBIN TIME: CPT

## 2018-09-25 PROCEDURE — 85025 COMPLETE CBC W/AUTO DIFF WBC: CPT

## 2018-09-25 PROCEDURE — 86900 BLOOD TYPING SEROLOGIC ABO: CPT

## 2018-09-25 PROCEDURE — 87086 URINE CULTURE/COLONY COUNT: CPT

## 2018-09-25 PROCEDURE — 87641 MR-STAPH DNA AMP PROBE: CPT

## 2018-09-25 PROCEDURE — 86850 RBC ANTIBODY SCREEN: CPT

## 2018-09-25 PROCEDURE — 36415 COLL VENOUS BLD VENIPUNCTURE: CPT

## 2018-09-25 PROCEDURE — 82803 BLOOD GASES ANY COMBINATION: CPT

## 2018-09-25 PROCEDURE — 85730 THROMBOPLASTIN TIME PARTIAL: CPT

## 2018-09-25 PROCEDURE — 80053 COMPREHEN METABOLIC PANEL: CPT

## 2018-09-25 RX ORDER — SODIUM CHLORIDE, SODIUM LACTATE, POTASSIUM CHLORIDE, CALCIUM CHLORIDE 600; 310; 30; 20 MG/100ML; MG/100ML; MG/100ML; MG/100ML
1000 INJECTION, SOLUTION INTRAVENOUS CONTINUOUS
Status: CANCELLED | OUTPATIENT
Start: 2018-09-25

## 2018-09-25 RX ORDER — LISINOPRIL AND HYDROCHLOROTHIAZIDE 12.5; 1 MG/1; MG/1
1 TABLET ORAL DAILY
COMMUNITY
End: 2018-11-09 | Stop reason: SDUPTHER

## 2018-09-25 RX ORDER — CHLORAL HYDRATE 500 MG
1000 CAPSULE ORAL DAILY
COMMUNITY
End: 2018-11-14

## 2018-09-25 RX ORDER — BIOTIN 1 MG
1 TABLET ORAL DAILY
COMMUNITY
End: 2018-11-14

## 2018-09-26 LAB
CULTURE: NORMAL
EKG ATRIAL RATE: 74 BPM
EKG P AXIS: 59 DEGREES
EKG P-R INTERVAL: 164 MS
EKG Q-T INTERVAL: 398 MS
EKG QRS DURATION: 84 MS
EKG QTC CALCULATION (BAZETT): 441 MS
EKG R AXIS: 7 DEGREES
EKG T AXIS: 49 DEGREES
EKG VENTRICULAR RATE: 74 BPM
Lab: NORMAL
SPECIMEN DESCRIPTION: NORMAL
STATUS: NORMAL

## 2018-09-27 ENCOUNTER — ANESTHESIA EVENT (OUTPATIENT)
Dept: OPERATING ROOM | Age: 63
DRG: 164 | End: 2018-09-27
Payer: COMMERCIAL

## 2018-09-28 ENCOUNTER — APPOINTMENT (OUTPATIENT)
Dept: GENERAL RADIOLOGY | Age: 63
DRG: 164 | End: 2018-09-28
Attending: THORACIC SURGERY (CARDIOTHORACIC VASCULAR SURGERY)
Payer: COMMERCIAL

## 2018-09-28 ENCOUNTER — HOSPITAL ENCOUNTER (INPATIENT)
Age: 63
LOS: 5 days | Discharge: HOME | DRG: 164 | End: 2018-10-03
Attending: THORACIC SURGERY (CARDIOTHORACIC VASCULAR SURGERY) | Admitting: THORACIC SURGERY (CARDIOTHORACIC VASCULAR SURGERY)
Payer: COMMERCIAL

## 2018-09-28 ENCOUNTER — ANESTHESIA (OUTPATIENT)
Dept: OPERATING ROOM | Age: 63
DRG: 164 | End: 2018-09-28
Payer: COMMERCIAL

## 2018-09-28 VITALS — SYSTOLIC BLOOD PRESSURE: 101 MMHG | TEMPERATURE: 96.4 F | OXYGEN SATURATION: 98 % | DIASTOLIC BLOOD PRESSURE: 62 MMHG

## 2018-09-28 DIAGNOSIS — Z90.2 STATUS POST LOBECTOMY OF LUNG: Primary | ICD-10-CM

## 2018-09-28 LAB
ALLEN TEST: ABNORMAL
ALLEN TEST: ABNORMAL
CARBOXYHEMOGLOBIN: 1.4 % (ref 0–5)
CARBOXYHEMOGLOBIN: 1.5 % (ref 0–5)
CHLORIDE, WHOLE BLOOD: 109 MMOL/L (ref 98–110)
FIBRINOGEN: 195 MG/DL (ref 140–420)
FIO2: ABNORMAL
FIO2: ABNORMAL
GLUCOSE BLD-MCNC: 160 MG/DL (ref 65–105)
HCO3 ARTERIAL: 23.1 MMOL/L (ref 22–27)
HCO3 ARTERIAL: 24.2 MMOL/L (ref 22–27)
HCT VFR BLD CALC: 27.8 % (ref 36.3–47.1)
HCT VFR BLD CALC: 28.9 % (ref 36.3–47.1)
HCT VFR BLD CALC: 33.4 % (ref 36.3–47.1)
HCT VFR BLD CALC: 38.8 %
HEMOGLOBIN: 11.1 G/DL (ref 11.9–15.1)
HEMOGLOBIN: 12.6 GM/DL
HEMOGLOBIN: 9.3 G/DL (ref 11.9–15.1)
HEMOGLOBIN: 9.8 G/DL (ref 11.9–15.1)
INR BLD: 1.1
METHEMOGLOBIN: ABNORMAL % (ref 0–1.5)
METHEMOGLOBIN: ABNORMAL % (ref 0–1.5)
MODE: ABNORMAL
MODE: ABNORMAL
NEGATIVE BASE EXCESS, ART: 0.3 MMOL/L (ref 0–2)
NEGATIVE BASE EXCESS, ART: 0.7 MMOL/L (ref 0–2)
NOTIFICATION TIME: ABNORMAL
NOTIFICATION TIME: ABNORMAL
NOTIFICATION: ABNORMAL
NOTIFICATION: ABNORMAL
O2 DEVICE/FLOW/%: ABNORMAL
O2 DEVICE/FLOW/%: ABNORMAL
O2 SAT, ARTERIAL: 98.7 % (ref 94–100)
O2 SAT, ARTERIAL: 99.5 % (ref 94–100)
OXYHEMOGLOBIN: ABNORMAL % (ref 95–98)
OXYHEMOGLOBIN: ABNORMAL % (ref 95–98)
PARTIAL THROMBOPLASTIN TIME: 22.2 SEC (ref 20.5–30.5)
PARTIAL THROMBOPLASTIN TIME: 22.2 SEC (ref 20.5–30.5)
PATIENT TEMP: 37
PATIENT TEMP: 37
PCO2 ARTERIAL: 37.1 MMHG (ref 32–45)
PCO2 ARTERIAL: 41.2 MMHG (ref 32–45)
PCO2, ART, TEMP ADJ: ABNORMAL (ref 32–45)
PCO2, ART, TEMP ADJ: ABNORMAL (ref 32–45)
PEEP/CPAP: ABNORMAL
PEEP/CPAP: ABNORMAL
PH ARTERIAL: 7.39 (ref 7.35–7.45)
PH ARTERIAL: 7.41 (ref 7.35–7.45)
PH, ART, TEMP ADJ: ABNORMAL (ref 7.35–7.45)
PH, ART, TEMP ADJ: ABNORMAL (ref 7.35–7.45)
PLATELET # BLD: 241 K/UL (ref 138–453)
PO2 ARTERIAL: 112 MMHG (ref 75–95)
PO2 ARTERIAL: 328 MMHG (ref 75–95)
PO2, ART, TEMP ADJ: ABNORMAL MMHG (ref 75–95)
PO2, ART, TEMP ADJ: ABNORMAL MMHG (ref 75–95)
POC POTASSIUM: 3.9 MMOL/L (ref 3.5–4.5)
POSITIVE BASE EXCESS, ART: ABNORMAL MMOL/L (ref 0–2)
POSITIVE BASE EXCESS, ART: ABNORMAL MMOL/L (ref 0–2)
POTASSIUM, WHOLE BLOOD: 3.5 MMOL/L (ref 3.6–5)
PROTHROMBIN TIME: 11.3 SEC (ref 9–12)
PSV: ABNORMAL
PSV: ABNORMAL
PT. POSITION: ABNORMAL
PT. POSITION: ABNORMAL
RESPIRATORY RATE: ABNORMAL
RESPIRATORY RATE: ABNORMAL
SAMPLE SITE: ABNORMAL
SAMPLE SITE: ABNORMAL
SET RATE: ABNORMAL
SET RATE: ABNORMAL
SODIUM, WHOLE BLOOD: 139 MMOL/L (ref 136–145)
TEXT FOR RESPIRATORY: ABNORMAL
TEXT FOR RESPIRATORY: ABNORMAL
TOTAL HB: ABNORMAL G/DL (ref 12–16)
TOTAL HB: ABNORMAL G/DL (ref 12–16)
TOTAL RATE: ABNORMAL
TOTAL RATE: ABNORMAL
VT: ABNORMAL
VT: ABNORMAL

## 2018-09-28 PROCEDURE — C9290 INJ, BUPIVACAINE LIPOSOME: HCPCS | Performed by: THORACIC SURGERY (CARDIOTHORACIC VASCULAR SURGERY)

## 2018-09-28 PROCEDURE — 85014 HEMATOCRIT: CPT

## 2018-09-28 PROCEDURE — 3700000000 HC ANESTHESIA ATTENDED CARE: Performed by: THORACIC SURGERY (CARDIOTHORACIC VASCULAR SURGERY)

## 2018-09-28 PROCEDURE — 2720000010 HC SURG SUPPLY STERILE: Performed by: THORACIC SURGERY (CARDIOTHORACIC VASCULAR SURGERY)

## 2018-09-28 PROCEDURE — 2500000003 HC RX 250 WO HCPCS: Performed by: NURSE ANESTHETIST, CERTIFIED REGISTERED

## 2018-09-28 PROCEDURE — 36415 COLL VENOUS BLD VENIPUNCTURE: CPT

## 2018-09-28 PROCEDURE — 84295 ASSAY OF SERUM SODIUM: CPT

## 2018-09-28 PROCEDURE — 2780000010 HC IMPLANT OTHER: Performed by: THORACIC SURGERY (CARDIOTHORACIC VASCULAR SURGERY)

## 2018-09-28 PROCEDURE — 7100000000 HC PACU RECOVERY - FIRST 15 MIN: Performed by: THORACIC SURGERY (CARDIOTHORACIC VASCULAR SURGERY)

## 2018-09-28 PROCEDURE — 85018 HEMOGLOBIN: CPT

## 2018-09-28 PROCEDURE — 6360000002 HC RX W HCPCS: Performed by: NURSE ANESTHETIST, CERTIFIED REGISTERED

## 2018-09-28 PROCEDURE — 88307 TISSUE EXAM BY PATHOLOGIST: CPT

## 2018-09-28 PROCEDURE — 71045 X-RAY EXAM CHEST 1 VIEW: CPT

## 2018-09-28 PROCEDURE — 85730 THROMBOPLASTIN TIME PARTIAL: CPT

## 2018-09-28 PROCEDURE — 6360000002 HC RX W HCPCS: Performed by: ANESTHESIOLOGY

## 2018-09-28 PROCEDURE — 82435 ASSAY OF BLOOD CHLORIDE: CPT

## 2018-09-28 PROCEDURE — 6360000002 HC RX W HCPCS

## 2018-09-28 PROCEDURE — 84132 ASSAY OF SERUM POTASSIUM: CPT

## 2018-09-28 PROCEDURE — 3700000001 HC ADD 15 MINUTES (ANESTHESIA): Performed by: THORACIC SURGERY (CARDIOTHORACIC VASCULAR SURGERY)

## 2018-09-28 PROCEDURE — 32674 THORACOSCOPY LYMPH NODE EXC: CPT | Performed by: PHYSICIAN ASSISTANT

## 2018-09-28 PROCEDURE — 2580000003 HC RX 258: Performed by: ANESTHESIOLOGY

## 2018-09-28 PROCEDURE — 32663 THORACOSCOPY W/LOBECTOMY: CPT | Performed by: THORACIC SURGERY (CARDIOTHORACIC VASCULAR SURGERY)

## 2018-09-28 PROCEDURE — 88309 TISSUE EXAM BY PATHOLOGIST: CPT

## 2018-09-28 PROCEDURE — 6360000002 HC RX W HCPCS: Performed by: PHYSICIAN ASSISTANT

## 2018-09-28 PROCEDURE — 85049 AUTOMATED PLATELET COUNT: CPT

## 2018-09-28 PROCEDURE — 2000000000 HC ICU R&B

## 2018-09-28 PROCEDURE — 85610 PROTHROMBIN TIME: CPT

## 2018-09-28 PROCEDURE — 82962 GLUCOSE BLOOD TEST: CPT

## 2018-09-28 PROCEDURE — 88305 TISSUE EXAM BY PATHOLOGIST: CPT

## 2018-09-28 PROCEDURE — 6360000002 HC RX W HCPCS: Performed by: THORACIC SURGERY (CARDIOTHORACIC VASCULAR SURGERY)

## 2018-09-28 PROCEDURE — 6370000000 HC RX 637 (ALT 250 FOR IP): Performed by: PHYSICIAN ASSISTANT

## 2018-09-28 PROCEDURE — 87086 URINE CULTURE/COLONY COUNT: CPT

## 2018-09-28 PROCEDURE — P9045 ALBUMIN (HUMAN), 5%, 250 ML: HCPCS

## 2018-09-28 PROCEDURE — 3E0T3BZ INTRODUCTION OF ANESTHETIC AGENT INTO PERIPHERAL NERVES AND PLEXI, PERCUTANEOUS APPROACH: ICD-10-PCS | Performed by: THORACIC SURGERY (CARDIOTHORACIC VASCULAR SURGERY)

## 2018-09-28 PROCEDURE — 2709999900 HC NON-CHARGEABLE SUPPLY: Performed by: THORACIC SURGERY (CARDIOTHORACIC VASCULAR SURGERY)

## 2018-09-28 PROCEDURE — 2580000003 HC RX 258: Performed by: PHYSICIAN ASSISTANT

## 2018-09-28 PROCEDURE — 88313 SPECIAL STAINS GROUP 2: CPT

## 2018-09-28 PROCEDURE — C1773 RET DEV, INSERTABLE: HCPCS | Performed by: THORACIC SURGERY (CARDIOTHORACIC VASCULAR SURGERY)

## 2018-09-28 PROCEDURE — 32663 THORACOSCOPY W/LOBECTOMY: CPT | Performed by: PHYSICIAN ASSISTANT

## 2018-09-28 PROCEDURE — 2580000003 HC RX 258: Performed by: NURSE ANESTHETIST, CERTIFIED REGISTERED

## 2018-09-28 PROCEDURE — 2580000003 HC RX 258: Performed by: THORACIC SURGERY (CARDIOTHORACIC VASCULAR SURGERY)

## 2018-09-28 PROCEDURE — 3600000019 HC SURGERY ROBOT ADDTL 15MIN: Performed by: THORACIC SURGERY (CARDIOTHORACIC VASCULAR SURGERY)

## 2018-09-28 PROCEDURE — 0BTG0ZZ RESECTION OF LEFT UPPER LUNG LOBE, OPEN APPROACH: ICD-10-PCS | Performed by: THORACIC SURGERY (CARDIOTHORACIC VASCULAR SURGERY)

## 2018-09-28 PROCEDURE — 2700000000 HC OXYGEN THERAPY PER DAY

## 2018-09-28 PROCEDURE — 85384 FIBRINOGEN ACTIVITY: CPT

## 2018-09-28 PROCEDURE — S2900 ROBOTIC SURGICAL SYSTEM: HCPCS | Performed by: THORACIC SURGERY (CARDIOTHORACIC VASCULAR SURGERY)

## 2018-09-28 PROCEDURE — C1729 CATH, DRAINAGE: HCPCS | Performed by: THORACIC SURGERY (CARDIOTHORACIC VASCULAR SURGERY)

## 2018-09-28 PROCEDURE — 8E0W0CZ ROBOTIC ASSISTED PROCEDURE OF TRUNK REGION, OPEN APPROACH: ICD-10-PCS | Performed by: THORACIC SURGERY (CARDIOTHORACIC VASCULAR SURGERY)

## 2018-09-28 PROCEDURE — 82805 BLOOD GASES W/O2 SATURATION: CPT

## 2018-09-28 PROCEDURE — 07B70ZZ EXCISION OF THORAX LYMPHATIC, OPEN APPROACH: ICD-10-PCS | Performed by: THORACIC SURGERY (CARDIOTHORACIC VASCULAR SURGERY)

## 2018-09-28 PROCEDURE — 7100000001 HC PACU RECOVERY - ADDTL 15 MIN: Performed by: THORACIC SURGERY (CARDIOTHORACIC VASCULAR SURGERY)

## 2018-09-28 PROCEDURE — 3600000009 HC SURGERY ROBOT BASE: Performed by: THORACIC SURGERY (CARDIOTHORACIC VASCULAR SURGERY)

## 2018-09-28 PROCEDURE — 32674 THORACOSCOPY LYMPH NODE EXC: CPT | Performed by: THORACIC SURGERY (CARDIOTHORACIC VASCULAR SURGERY)

## 2018-09-28 RX ORDER — SODIUM CHLORIDE 0.9 % (FLUSH) 0.9 %
10 SYRINGE (ML) INJECTION PRN
Status: DISCONTINUED | OUTPATIENT
Start: 2018-09-28 | End: 2018-10-03 | Stop reason: HOSPADM

## 2018-09-28 RX ORDER — FENTANYL CITRATE 50 UG/ML
50 INJECTION, SOLUTION INTRAMUSCULAR; INTRAVENOUS EVERY 5 MIN PRN
Status: DISCONTINUED | OUTPATIENT
Start: 2018-09-28 | End: 2018-09-28 | Stop reason: HOSPADM

## 2018-09-28 RX ORDER — ACETAMINOPHEN 10 MG/ML
INJECTION, SOLUTION INTRAVENOUS PRN
Status: DISCONTINUED | OUTPATIENT
Start: 2018-09-28 | End: 2018-09-28 | Stop reason: SDUPTHER

## 2018-09-28 RX ORDER — LIDOCAINE HYDROCHLORIDE 10 MG/ML
INJECTION, SOLUTION EPIDURAL; INFILTRATION; INTRACAUDAL; PERINEURAL PRN
Status: DISCONTINUED | OUTPATIENT
Start: 2018-09-28 | End: 2018-09-28 | Stop reason: SDUPTHER

## 2018-09-28 RX ORDER — ALBUMIN, HUMAN INJ 5% 5 %
SOLUTION INTRAVENOUS
Status: COMPLETED
Start: 2018-09-28 | End: 2018-09-28

## 2018-09-28 RX ORDER — ACETAMINOPHEN 325 MG/1
650 TABLET ORAL EVERY 4 HOURS PRN
Status: DISCONTINUED | OUTPATIENT
Start: 2018-09-28 | End: 2018-10-03 | Stop reason: HOSPADM

## 2018-09-28 RX ORDER — CEFAZOLIN SODIUM 1 G/50ML
1 INJECTION, SOLUTION INTRAVENOUS EVERY 8 HOURS
Status: DISCONTINUED | OUTPATIENT
Start: 2018-09-28 | End: 2018-09-28

## 2018-09-28 RX ORDER — SODIUM CHLORIDE, SODIUM LACTATE, POTASSIUM CHLORIDE, CALCIUM CHLORIDE 600; 310; 30; 20 MG/100ML; MG/100ML; MG/100ML; MG/100ML
INJECTION, SOLUTION INTRAVENOUS CONTINUOUS PRN
Status: DISCONTINUED | OUTPATIENT
Start: 2018-09-28 | End: 2018-09-28 | Stop reason: SDUPTHER

## 2018-09-28 RX ORDER — DEXAMETHASONE SODIUM PHOSPHATE 10 MG/ML
INJECTION INTRAMUSCULAR; INTRAVENOUS PRN
Status: DISCONTINUED | OUTPATIENT
Start: 2018-09-28 | End: 2018-09-28 | Stop reason: SDUPTHER

## 2018-09-28 RX ORDER — FENTANYL CITRATE 50 UG/ML
25 INJECTION, SOLUTION INTRAMUSCULAR; INTRAVENOUS EVERY 5 MIN PRN
Status: DISCONTINUED | OUTPATIENT
Start: 2018-09-28 | End: 2018-09-28 | Stop reason: HOSPADM

## 2018-09-28 RX ORDER — ALBUMIN, HUMAN INJ 5% 5 %
12.5 SOLUTION INTRAVENOUS ONCE
Status: COMPLETED | OUTPATIENT
Start: 2018-09-28 | End: 2018-09-28

## 2018-09-28 RX ORDER — DOCUSATE SODIUM 100 MG/1
100 CAPSULE, LIQUID FILLED ORAL 2 TIMES DAILY
Status: DISCONTINUED | OUTPATIENT
Start: 2018-09-28 | End: 2018-10-03 | Stop reason: HOSPADM

## 2018-09-28 RX ORDER — MIDAZOLAM HYDROCHLORIDE 1 MG/ML
INJECTION INTRAMUSCULAR; INTRAVENOUS PRN
Status: DISCONTINUED | OUTPATIENT
Start: 2018-09-28 | End: 2018-09-28 | Stop reason: SDUPTHER

## 2018-09-28 RX ORDER — SODIUM CHLORIDE 9 MG/ML
INJECTION, SOLUTION INTRAVENOUS CONTINUOUS
Status: DISCONTINUED | OUTPATIENT
Start: 2018-09-28 | End: 2018-09-30

## 2018-09-28 RX ORDER — KETOROLAC TROMETHAMINE 30 MG/ML
INJECTION, SOLUTION INTRAMUSCULAR; INTRAVENOUS PRN
Status: DISCONTINUED | OUTPATIENT
Start: 2018-09-28 | End: 2018-09-28 | Stop reason: SDUPTHER

## 2018-09-28 RX ORDER — CEFAZOLIN SODIUM 1 G/50ML
1 INJECTION, SOLUTION INTRAVENOUS EVERY 8 HOURS
Status: COMPLETED | OUTPATIENT
Start: 2018-09-29 | End: 2018-09-30

## 2018-09-28 RX ORDER — OXYCODONE HYDROCHLORIDE AND ACETAMINOPHEN 5; 325 MG/1; MG/1
2 TABLET ORAL EVERY 4 HOURS PRN
Status: DISCONTINUED | OUTPATIENT
Start: 2018-09-28 | End: 2018-10-03 | Stop reason: HOSPADM

## 2018-09-28 RX ORDER — MEPERIDINE HYDROCHLORIDE 50 MG/ML
12.5 INJECTION INTRAMUSCULAR; INTRAVENOUS; SUBCUTANEOUS EVERY 5 MIN PRN
Status: DISCONTINUED | OUTPATIENT
Start: 2018-09-28 | End: 2018-09-28 | Stop reason: HOSPADM

## 2018-09-28 RX ORDER — POLYETHYLENE GLYCOL 3350 17 G/17G
17 POWDER, FOR SOLUTION ORAL DAILY
Status: DISCONTINUED | OUTPATIENT
Start: 2018-09-28 | End: 2018-10-03 | Stop reason: HOSPADM

## 2018-09-28 RX ORDER — OXYCODONE HYDROCHLORIDE AND ACETAMINOPHEN 5; 325 MG/1; MG/1
1 TABLET ORAL EVERY 4 HOURS PRN
Status: DISCONTINUED | OUTPATIENT
Start: 2018-09-28 | End: 2018-10-03 | Stop reason: HOSPADM

## 2018-09-28 RX ORDER — ONDANSETRON 2 MG/ML
4 INJECTION INTRAMUSCULAR; INTRAVENOUS
Status: DISCONTINUED | OUTPATIENT
Start: 2018-09-28 | End: 2018-09-28 | Stop reason: HOSPADM

## 2018-09-28 RX ORDER — SODIUM CHLORIDE 0.9 % (FLUSH) 0.9 %
10 SYRINGE (ML) INJECTION EVERY 12 HOURS SCHEDULED
Status: DISCONTINUED | OUTPATIENT
Start: 2018-09-28 | End: 2018-10-03 | Stop reason: HOSPADM

## 2018-09-28 RX ORDER — HYDROCHLOROTHIAZIDE 25 MG/1
12.5 TABLET ORAL DAILY
Status: DISCONTINUED | OUTPATIENT
Start: 2018-09-28 | End: 2018-10-03 | Stop reason: HOSPADM

## 2018-09-28 RX ORDER — FENTANYL CITRATE 50 UG/ML
INJECTION, SOLUTION INTRAMUSCULAR; INTRAVENOUS PRN
Status: DISCONTINUED | OUTPATIENT
Start: 2018-09-28 | End: 2018-09-28 | Stop reason: SDUPTHER

## 2018-09-28 RX ORDER — PROPOFOL 10 MG/ML
INJECTION, EMULSION INTRAVENOUS PRN
Status: DISCONTINUED | OUTPATIENT
Start: 2018-09-28 | End: 2018-09-28 | Stop reason: SDUPTHER

## 2018-09-28 RX ORDER — LISINOPRIL 10 MG/1
10 TABLET ORAL DAILY
Status: DISCONTINUED | OUTPATIENT
Start: 2018-09-28 | End: 2018-10-03 | Stop reason: HOSPADM

## 2018-09-28 RX ORDER — LISINOPRIL AND HYDROCHLOROTHIAZIDE 12.5; 1 MG/1; MG/1
1 TABLET ORAL DAILY
Status: DISCONTINUED | OUTPATIENT
Start: 2018-09-28 | End: 2018-09-28

## 2018-09-28 RX ORDER — MORPHINE SULFATE 4 MG/ML
2 INJECTION, SOLUTION INTRAMUSCULAR; INTRAVENOUS
Status: DISCONTINUED | OUTPATIENT
Start: 2018-09-28 | End: 2018-10-03

## 2018-09-28 RX ORDER — ROCURONIUM BROMIDE 10 MG/ML
INJECTION, SOLUTION INTRAVENOUS PRN
Status: DISCONTINUED | OUTPATIENT
Start: 2018-09-28 | End: 2018-09-28 | Stop reason: SDUPTHER

## 2018-09-28 RX ORDER — ONDANSETRON 2 MG/ML
4 INJECTION INTRAMUSCULAR; INTRAVENOUS EVERY 6 HOURS PRN
Status: DISCONTINUED | OUTPATIENT
Start: 2018-09-28 | End: 2018-10-03 | Stop reason: HOSPADM

## 2018-09-28 RX ORDER — CEFAZOLIN SODIUM 1 G/3ML
INJECTION, POWDER, FOR SOLUTION INTRAMUSCULAR; INTRAVENOUS PRN
Status: DISCONTINUED | OUTPATIENT
Start: 2018-09-28 | End: 2018-09-28 | Stop reason: SDUPTHER

## 2018-09-28 RX ORDER — 0.9 % SODIUM CHLORIDE 0.9 %
VIAL (ML) INJECTION PRN
Status: DISCONTINUED | OUTPATIENT
Start: 2018-09-28 | End: 2018-09-28 | Stop reason: HOSPADM

## 2018-09-28 RX ORDER — GLYCOPYRROLATE 1 MG/5 ML
SYRINGE (ML) INTRAVENOUS PRN
Status: DISCONTINUED | OUTPATIENT
Start: 2018-09-28 | End: 2018-09-28 | Stop reason: SDUPTHER

## 2018-09-28 RX ORDER — MORPHINE SULFATE 4 MG/ML
4 INJECTION, SOLUTION INTRAMUSCULAR; INTRAVENOUS
Status: DISCONTINUED | OUTPATIENT
Start: 2018-09-28 | End: 2018-10-03

## 2018-09-28 RX ORDER — SODIUM CHLORIDE, SODIUM LACTATE, POTASSIUM CHLORIDE, CALCIUM CHLORIDE 600; 310; 30; 20 MG/100ML; MG/100ML; MG/100ML; MG/100ML
1000 INJECTION, SOLUTION INTRAVENOUS CONTINUOUS
Status: DISCONTINUED | OUTPATIENT
Start: 2018-09-28 | End: 2018-09-28

## 2018-09-28 RX ORDER — ALPRAZOLAM 0.25 MG/1
0.25 TABLET ORAL 3 TIMES DAILY PRN
Status: DISCONTINUED | OUTPATIENT
Start: 2018-09-28 | End: 2018-10-03 | Stop reason: HOSPADM

## 2018-09-28 RX ORDER — ONDANSETRON 2 MG/ML
INJECTION INTRAMUSCULAR; INTRAVENOUS PRN
Status: DISCONTINUED | OUTPATIENT
Start: 2018-09-28 | End: 2018-09-28 | Stop reason: SDUPTHER

## 2018-09-28 RX ADMIN — Medication 0.1 MG: at 08:28

## 2018-09-28 RX ADMIN — OXYCODONE HYDROCHLORIDE AND ACETAMINOPHEN 2 TABLET: 5; 325 TABLET ORAL at 16:36

## 2018-09-28 RX ADMIN — PHENYLEPHRINE HYDROCHLORIDE 100 MCG: 10 INJECTION INTRAVENOUS at 09:37

## 2018-09-28 RX ADMIN — ROCURONIUM BROMIDE 20 MG: 10 INJECTION INTRAVENOUS at 11:12

## 2018-09-28 RX ADMIN — FENTANYL CITRATE 100 MCG: 50 INJECTION INTRAMUSCULAR; INTRAVENOUS at 13:42

## 2018-09-28 RX ADMIN — MORPHINE SULFATE 4 MG: 4 INJECTION INTRAVENOUS at 18:59

## 2018-09-28 RX ADMIN — ALBUMIN, HUMAN INJ 5% 12.5 G: 5 SOLUTION at 17:17

## 2018-09-28 RX ADMIN — SODIUM CHLORIDE, POTASSIUM CHLORIDE, SODIUM LACTATE AND CALCIUM CHLORIDE: 600; 310; 30; 20 INJECTION, SOLUTION INTRAVENOUS at 12:59

## 2018-09-28 RX ADMIN — MORPHINE SULFATE 2 MG: 4 INJECTION INTRAVENOUS at 22:36

## 2018-09-28 RX ADMIN — FENTANYL CITRATE 150 MCG: 50 INJECTION INTRAMUSCULAR; INTRAVENOUS at 08:21

## 2018-09-28 RX ADMIN — ROCURONIUM BROMIDE 50 MG: 10 INJECTION INTRAVENOUS at 08:21

## 2018-09-28 RX ADMIN — ROCURONIUM BROMIDE 10 MG: 10 INJECTION INTRAVENOUS at 11:24

## 2018-09-28 RX ADMIN — ONDANSETRON 4 MG: 2 INJECTION, SOLUTION INTRAMUSCULAR; INTRAVENOUS at 13:20

## 2018-09-28 RX ADMIN — ROCURONIUM BROMIDE 10 MG: 10 INJECTION INTRAVENOUS at 12:20

## 2018-09-28 RX ADMIN — SODIUM CHLORIDE, POTASSIUM CHLORIDE, SODIUM LACTATE AND CALCIUM CHLORIDE: 600; 310; 30; 20 INJECTION, SOLUTION INTRAVENOUS at 08:38

## 2018-09-28 RX ADMIN — MIDAZOLAM HYDROCHLORIDE 2 MG: 1 INJECTION, SOLUTION INTRAMUSCULAR; INTRAVENOUS at 08:16

## 2018-09-28 RX ADMIN — FENTANYL CITRATE 50 MCG: 50 INJECTION INTRAMUSCULAR; INTRAVENOUS at 13:56

## 2018-09-28 RX ADMIN — ROCURONIUM BROMIDE 20 MG: 10 INJECTION INTRAVENOUS at 09:52

## 2018-09-28 RX ADMIN — ROCURONIUM BROMIDE 10 MG: 10 INJECTION INTRAVENOUS at 10:50

## 2018-09-28 RX ADMIN — CEFAZOLIN 2000 MG: 330 INJECTION, POWDER, FOR SOLUTION INTRAMUSCULAR; INTRAVENOUS at 13:23

## 2018-09-28 RX ADMIN — DEXAMETHASONE SODIUM PHOSPHATE 10 MG: 10 INJECTION INTRAMUSCULAR; INTRAVENOUS at 08:25

## 2018-09-28 RX ADMIN — ONDANSETRON 4 MG: 2 INJECTION INTRAMUSCULAR; INTRAVENOUS at 17:18

## 2018-09-28 RX ADMIN — FENTANYL CITRATE 100 MCG: 50 INJECTION INTRAMUSCULAR; INTRAVENOUS at 08:29

## 2018-09-28 RX ADMIN — ALBUMIN (HUMAN) 12.5 G: 12.5 INJECTION, SOLUTION INTRAVENOUS at 17:17

## 2018-09-28 RX ADMIN — KETOROLAC TROMETHAMINE 30 MG: 30 INJECTION, SOLUTION INTRAMUSCULAR at 13:14

## 2018-09-28 RX ADMIN — PHENYLEPHRINE HYDROCHLORIDE 25 MCG/MIN: 10 INJECTION INTRAVENOUS at 09:38

## 2018-09-28 RX ADMIN — SUGAMMADEX 200 MG: 100 INJECTION, SOLUTION INTRAVENOUS at 13:37

## 2018-09-28 RX ADMIN — FENTANYL CITRATE 25 MCG: 50 INJECTION, SOLUTION INTRAMUSCULAR; INTRAVENOUS at 15:32

## 2018-09-28 RX ADMIN — CEFAZOLIN 2000 MG: 330 INJECTION, POWDER, FOR SOLUTION INTRAMUSCULAR; INTRAVENOUS at 09:18

## 2018-09-28 RX ADMIN — ROCURONIUM BROMIDE 20 MG: 10 INJECTION INTRAVENOUS at 09:34

## 2018-09-28 RX ADMIN — SODIUM CHLORIDE, POTASSIUM CHLORIDE, SODIUM LACTATE AND CALCIUM CHLORIDE: 600; 310; 30; 20 INJECTION, SOLUTION INTRAVENOUS at 09:41

## 2018-09-28 RX ADMIN — PHENYLEPHRINE HYDROCHLORIDE 50 MCG: 10 INJECTION INTRAVENOUS at 09:14

## 2018-09-28 RX ADMIN — FENTANYL CITRATE 50 MCG: 50 INJECTION INTRAMUSCULAR; INTRAVENOUS at 13:27

## 2018-09-28 RX ADMIN — PROPOFOL 200 MG: 10 INJECTION, EMULSION INTRAVENOUS at 08:21

## 2018-09-28 RX ADMIN — ROCURONIUM BROMIDE 10 MG: 10 INJECTION INTRAVENOUS at 12:55

## 2018-09-28 RX ADMIN — SODIUM CHLORIDE: 9 INJECTION, SOLUTION INTRAVENOUS at 16:33

## 2018-09-28 RX ADMIN — FENTANYL CITRATE 50 MCG: 50 INJECTION INTRAMUSCULAR; INTRAVENOUS at 09:55

## 2018-09-28 RX ADMIN — ACETAMINOPHEN 1000 MG: 10 INJECTION, SOLUTION INTRAVENOUS at 13:21

## 2018-09-28 RX ADMIN — FENTANYL CITRATE 50 MCG: 50 INJECTION INTRAMUSCULAR; INTRAVENOUS at 13:48

## 2018-09-28 RX ADMIN — SODIUM CHLORIDE, POTASSIUM CHLORIDE, SODIUM LACTATE AND CALCIUM CHLORIDE 1000 ML: 600; 310; 30; 20 INJECTION, SOLUTION INTRAVENOUS at 06:33

## 2018-09-28 RX ADMIN — FENTANYL CITRATE 25 MCG: 50 INJECTION, SOLUTION INTRAMUSCULAR; INTRAVENOUS at 15:25

## 2018-09-28 RX ADMIN — Medication 0.2 MG: at 10:03

## 2018-09-28 RX ADMIN — PROPOFOL 50 MG: 10 INJECTION, EMULSION INTRAVENOUS at 08:28

## 2018-09-28 RX ADMIN — LIDOCAINE HYDROCHLORIDE 50 MG: 10 INJECTION, SOLUTION EPIDURAL; INFILTRATION; INTRACAUDAL; PERINEURAL at 08:21

## 2018-09-28 RX ADMIN — PHENYLEPHRINE HYDROCHLORIDE 50 MCG: 10 INJECTION INTRAVENOUS at 09:32

## 2018-09-28 RX ADMIN — FENTANYL CITRATE 50 MCG: 50 INJECTION INTRAMUSCULAR; INTRAVENOUS at 09:11

## 2018-09-28 ASSESSMENT — PULMONARY FUNCTION TESTS
PIF_VALUE: 30
PIF_VALUE: 35
PIF_VALUE: 20
PIF_VALUE: 34
PIF_VALUE: 26
PIF_VALUE: 23
PIF_VALUE: 20
PIF_VALUE: 22
PIF_VALUE: 30
PIF_VALUE: 29
PIF_VALUE: 30
PIF_VALUE: 26
PIF_VALUE: 29
PIF_VALUE: 30
PIF_VALUE: 30
PIF_VALUE: 35
PIF_VALUE: 28
PIF_VALUE: 30
PIF_VALUE: 30
PIF_VALUE: 29
PIF_VALUE: 22
PIF_VALUE: 30
PIF_VALUE: 29
PIF_VALUE: 28
PIF_VALUE: 21
PIF_VALUE: 25
PIF_VALUE: 29
PIF_VALUE: 26
PIF_VALUE: 28
PIF_VALUE: 35
PIF_VALUE: 26
PIF_VALUE: 11
PIF_VALUE: 35
PIF_VALUE: 29
PIF_VALUE: 30
PIF_VALUE: 30
PIF_VALUE: 35
PIF_VALUE: 28
PIF_VALUE: 21
PIF_VALUE: 19
PIF_VALUE: 30
PIF_VALUE: 29
PIF_VALUE: 30
PIF_VALUE: 29
PIF_VALUE: 30
PIF_VALUE: 30
PIF_VALUE: 34
PIF_VALUE: 30
PIF_VALUE: 28
PIF_VALUE: 30
PIF_VALUE: 30
PIF_VALUE: 35
PIF_VALUE: 19
PIF_VALUE: 22
PIF_VALUE: 1
PIF_VALUE: 25
PIF_VALUE: 28
PIF_VALUE: 29
PIF_VALUE: 23
PIF_VALUE: 32
PIF_VALUE: 30
PIF_VALUE: 33
PIF_VALUE: 30
PIF_VALUE: 29
PIF_VALUE: 30
PIF_VALUE: 1
PIF_VALUE: 25
PIF_VALUE: 30
PIF_VALUE: 26
PIF_VALUE: 22
PIF_VALUE: 35
PIF_VALUE: 23
PIF_VALUE: 8
PIF_VALUE: 28
PIF_VALUE: 39
PIF_VALUE: 33
PIF_VALUE: 29
PIF_VALUE: 22
PIF_VALUE: 30
PIF_VALUE: 29
PIF_VALUE: 26
PIF_VALUE: 29
PIF_VALUE: 29
PIF_VALUE: 28
PIF_VALUE: 35
PIF_VALUE: 29
PIF_VALUE: 30
PIF_VALUE: 30
PIF_VALUE: 20
PIF_VALUE: 29
PIF_VALUE: 19
PIF_VALUE: 30
PIF_VALUE: 22
PIF_VALUE: 1
PIF_VALUE: 22
PIF_VALUE: 32
PIF_VALUE: 28
PIF_VALUE: 28
PIF_VALUE: 20
PIF_VALUE: 24
PIF_VALUE: 30
PIF_VALUE: 20
PIF_VALUE: 30
PIF_VALUE: 29
PIF_VALUE: 39
PIF_VALUE: 14
PIF_VALUE: 24
PIF_VALUE: 26
PIF_VALUE: 26
PIF_VALUE: 29
PIF_VALUE: 19
PIF_VALUE: 29
PIF_VALUE: 30
PIF_VALUE: 37
PIF_VALUE: 1
PIF_VALUE: 39
PIF_VALUE: 26
PIF_VALUE: 30
PIF_VALUE: 30
PIF_VALUE: 20
PIF_VALUE: 30
PIF_VALUE: 29
PIF_VALUE: 20
PIF_VALUE: 24
PIF_VALUE: 30
PIF_VALUE: 21
PIF_VALUE: 35
PIF_VALUE: 29
PIF_VALUE: 19
PIF_VALUE: 6
PIF_VALUE: 29
PIF_VALUE: 30
PIF_VALUE: 34
PIF_VALUE: 20
PIF_VALUE: 29
PIF_VALUE: 29
PIF_VALUE: 28
PIF_VALUE: 30
PIF_VALUE: 29
PIF_VALUE: 30
PIF_VALUE: 33
PIF_VALUE: 26
PIF_VALUE: 35
PIF_VALUE: 26
PIF_VALUE: 16
PIF_VALUE: 29
PIF_VALUE: 20
PIF_VALUE: 30
PIF_VALUE: 23
PIF_VALUE: 28
PIF_VALUE: 35
PIF_VALUE: 30
PIF_VALUE: 26
PIF_VALUE: 24
PIF_VALUE: 30
PIF_VALUE: 30
PIF_VALUE: 20
PIF_VALUE: 7
PIF_VALUE: 26
PIF_VALUE: 30
PIF_VALUE: 32
PIF_VALUE: 30
PIF_VALUE: 18
PIF_VALUE: 21
PIF_VALUE: 26
PIF_VALUE: 37
PIF_VALUE: 32
PIF_VALUE: 11
PIF_VALUE: 19
PIF_VALUE: 28
PIF_VALUE: 23
PIF_VALUE: 19
PIF_VALUE: 28
PIF_VALUE: 1
PIF_VALUE: 30
PIF_VALUE: 26
PIF_VALUE: 30
PIF_VALUE: 19
PIF_VALUE: 26
PIF_VALUE: 3
PIF_VALUE: 32
PIF_VALUE: 28
PIF_VALUE: 1
PIF_VALUE: 1
PIF_VALUE: 22
PIF_VALUE: 30
PIF_VALUE: 30
PIF_VALUE: 29
PIF_VALUE: 32
PIF_VALUE: 30
PIF_VALUE: 30
PIF_VALUE: 28
PIF_VALUE: 30
PIF_VALUE: 30
PIF_VALUE: 29
PIF_VALUE: 30
PIF_VALUE: 26
PIF_VALUE: 31
PIF_VALUE: 39
PIF_VALUE: 30
PIF_VALUE: 18
PIF_VALUE: 29
PIF_VALUE: 28
PIF_VALUE: 29
PIF_VALUE: 26
PIF_VALUE: 39
PIF_VALUE: 30
PIF_VALUE: 28
PIF_VALUE: 20
PIF_VALUE: 29
PIF_VALUE: 3
PIF_VALUE: 19
PIF_VALUE: 26
PIF_VALUE: 30
PIF_VALUE: 2
PIF_VALUE: 39
PIF_VALUE: 19
PIF_VALUE: 19
PIF_VALUE: 29
PIF_VALUE: 19
PIF_VALUE: 30
PIF_VALUE: 29
PIF_VALUE: 19
PIF_VALUE: 22
PIF_VALUE: 26
PIF_VALUE: 1
PIF_VALUE: 22
PIF_VALUE: 30
PIF_VALUE: 22
PIF_VALUE: 30
PIF_VALUE: 28
PIF_VALUE: 24
PIF_VALUE: 30
PIF_VALUE: 1
PIF_VALUE: 35
PIF_VALUE: 25
PIF_VALUE: 9
PIF_VALUE: 32
PIF_VALUE: 2
PIF_VALUE: 20
PIF_VALUE: 28
PIF_VALUE: 28
PIF_VALUE: 30
PIF_VALUE: 30
PIF_VALUE: 40
PIF_VALUE: 0
PIF_VALUE: 30
PIF_VALUE: 19
PIF_VALUE: 22
PIF_VALUE: 30
PIF_VALUE: 29
PIF_VALUE: 22
PIF_VALUE: 22
PIF_VALUE: 19
PIF_VALUE: 22
PIF_VALUE: 26
PIF_VALUE: 30
PIF_VALUE: 27
PIF_VALUE: 28
PIF_VALUE: 30
PIF_VALUE: 26
PIF_VALUE: 30
PIF_VALUE: 20
PIF_VALUE: 19
PIF_VALUE: 30
PIF_VALUE: 30
PIF_VALUE: 28
PIF_VALUE: 9
PIF_VALUE: 29
PIF_VALUE: 30
PIF_VALUE: 26
PIF_VALUE: 30
PIF_VALUE: 35
PIF_VALUE: 28
PIF_VALUE: 10
PIF_VALUE: 30
PIF_VALUE: 26
PIF_VALUE: 23
PIF_VALUE: 30
PIF_VALUE: 28
PIF_VALUE: 29
PIF_VALUE: 30
PIF_VALUE: 28
PIF_VALUE: 30
PIF_VALUE: 29
PIF_VALUE: 30
PIF_VALUE: 26
PIF_VALUE: 18
PIF_VALUE: 9
PIF_VALUE: 19
PIF_VALUE: 29
PIF_VALUE: 28
PIF_VALUE: 20
PIF_VALUE: 22
PIF_VALUE: 28
PIF_VALUE: 30
PIF_VALUE: 28
PIF_VALUE: 28
PIF_VALUE: 39
PIF_VALUE: 30
PIF_VALUE: 22
PIF_VALUE: 30
PIF_VALUE: 26
PIF_VALUE: 23
PIF_VALUE: 30
PIF_VALUE: 26
PIF_VALUE: 26
PIF_VALUE: 20
PIF_VALUE: 26
PIF_VALUE: 18
PIF_VALUE: 1
PIF_VALUE: 23
PIF_VALUE: 28
PIF_VALUE: 20
PIF_VALUE: 29
PIF_VALUE: 19
PIF_VALUE: 29
PIF_VALUE: 28
PIF_VALUE: 29
PIF_VALUE: 29
PIF_VALUE: 35
PIF_VALUE: 29
PIF_VALUE: 22
PIF_VALUE: 30

## 2018-09-28 ASSESSMENT — PAIN SCALES - GENERAL
PAINLEVEL_OUTOF10: 3
PAINLEVEL_OUTOF10: 3
PAINLEVEL_OUTOF10: 2
PAINLEVEL_OUTOF10: 6
PAINLEVEL_OUTOF10: 0
PAINLEVEL_OUTOF10: 2
PAINLEVEL_OUTOF10: 8
PAINLEVEL_OUTOF10: 3
PAINLEVEL_OUTOF10: 2
PAINLEVEL_OUTOF10: 5
PAINLEVEL_OUTOF10: 9

## 2018-09-28 ASSESSMENT — PAIN DESCRIPTION - PAIN TYPE
TYPE: SURGICAL PAIN
TYPE: SURGICAL PAIN

## 2018-09-28 ASSESSMENT — PAIN - FUNCTIONAL ASSESSMENT: PAIN_FUNCTIONAL_ASSESSMENT: 0-10

## 2018-09-28 ASSESSMENT — PAIN DESCRIPTION - DESCRIPTORS: DESCRIPTORS: ACHING;SHARP

## 2018-09-28 ASSESSMENT — PAIN DESCRIPTION - LOCATION: LOCATION: CHEST

## 2018-09-28 ASSESSMENT — PAIN DESCRIPTION - ORIENTATION: ORIENTATION: LEFT

## 2018-09-28 NOTE — ANESTHESIA PRE PROCEDURE
09/25/2018    CALCIUM 9.3 09/25/2018    BILITOT 0.28 09/25/2018    ALKPHOS 77 09/25/2018    AST 16 09/25/2018    ALT 16 09/25/2018       POC Tests:   Recent Labs      09/28/18   0630   POCK  3.9       Coags:   Lab Results   Component Value Date    PROTIME 10.2 09/25/2018    INR 0.9 09/25/2018    APTT 25.1 09/25/2018       HCG (If Applicable): No results found for: PREGTESTUR, PREGSERUM, HCG, HCGQUANT     ABGs: No results found for: PHART, PO2ART, HLW3RDA, LSQ9KTG, BEART, V9LBUSOZ     Type & Screen (If Applicable):  No results found for: LABABO, [address]      TTE 9/5/2018  Normal left ventricle size, wall thickness and function with an estimated EF  > 55%. Grade I mild diastolic dysfunction. Mild mitral regurgitation. Trivial tricuspid regurgitation. Anesthesia Evaluation  Patient summary reviewed and Nursing notes reviewed  Airway: Mallampati: III  TM distance: >3 FB   Neck ROM: full  Mouth opening: > = 3 FB Dental: normal exam         Pulmonary:   (+) COPD:  decreased breath sounds,                             Cardiovascular:    (+) hypertension:,                   Neuro/Psych:   Negative Neuro/Psych ROS              GI/Hepatic/Renal:   (+) liver disease:,           Endo/Other: Negative Endo/Other ROS                    Abdominal:           Vascular: negative vascular ROS. Anesthesia Plan      general     ASA 3     (ASA 3 for COPD. Anticipate WAYNE placement, 1LV  Anesthesia consent obtained from patient)  Induction: intravenous. arterial line, central line and CVP  MIPS: Postoperative opioids intended. Anesthetic plan and risks discussed with patient. Plan discussed with CRNA.                   Luana Chiang MD   9/28/2018

## 2018-09-28 NOTE — H&P
Pt Name: Ellen Barnes  MRN: [id number]  [place]: 1955  Date of evaluation: 9/28/2018    I have reviewed the patient's consult note completed on 9/6/18 by Diedre Cowden, PA that meets H&P criteria Original copied below) and her history from PAT visit on 9/25/18. No changes to history or on examination today, unless noted below. None. She presents today for robotic assisted left upper lobectomy. VIJI PACHECO CNP  9/28/18  7:26 AM     [facility]  Consultation Note                                   Surgeon: Goldy Pro MD  Reffering: Harris Kumar MD      CC: SOB  Reason for evaluation: lung mass     HISTORY OF PRESENT ILLNESS:  Ellen Barnes is a 66y.o. year old, ,  female recently diagnosed with LLL lung cancer. Complained of nonproductive cough starting in March of this year. Denied hemoptysis or color to sputum. CXR showed suspicious nodule left lung. Follow up CT showed 2.1cm spiculated mass subsequent PET positive. Had stress test 5 years ago which was negative. Denies weight loss, hoarse voice, recent travel or environmental exposures. We've been asked to evaluate for possible left lower lobectomy. I'm seeing the patient in consult with Dr. Godly Pro. All films and records available have been reviewed.     Past Medical History:    Past Medical History             Diagnosis Date    Anxiety      Benign polyp of large intestine      Hyperlipidemia      Hypertension      Liver hemangioma      Lung nodule       LLL Nodule    Snores       not tested for apnea    Uterine fibroid 09/2010    Wears glasses           Past Surgical History:    Past Surgical History             Procedure Laterality Date    HERNIA REPAIR        HYSTERECTOMY             Medications:          Current Outpatient Prescriptions on File Prior to Visit   Medication Sig Dispense Refill    ALPRAZolam (XANAX) 0.25 MG tablet Take 0.25 mg by mouth 3 times daily as needed for Sleep. .

## 2018-09-29 ENCOUNTER — APPOINTMENT (OUTPATIENT)
Dept: GENERAL RADIOLOGY | Age: 63
DRG: 164 | End: 2018-09-29
Attending: THORACIC SURGERY (CARDIOTHORACIC VASCULAR SURGERY)
Payer: COMMERCIAL

## 2018-09-29 LAB
ABO/RH: NORMAL
ABSOLUTE EOS #: <0.03 K/UL (ref 0–0.44)
ABSOLUTE IMMATURE GRANULOCYTE: 0.04 K/UL (ref 0–0.3)
ABSOLUTE LYMPH #: 1.39 K/UL (ref 1.1–3.7)
ABSOLUTE MONO #: 0.69 K/UL (ref 0.1–1.2)
ANION GAP SERPL CALCULATED.3IONS-SCNC: 9 MMOL/L (ref 9–17)
ANTIBODY SCREEN: NEGATIVE
ARM BAND NUMBER: NORMAL
BASOPHILS # BLD: 0 % (ref 0–2)
BASOPHILS ABSOLUTE: <0.03 K/UL (ref 0–0.2)
BLD PROD TYP BPU: NORMAL
BLD PROD TYP BPU: NORMAL
BUN BLDV-MCNC: 11 MG/DL (ref 8–23)
BUN/CREAT BLD: ABNORMAL (ref 9–20)
CALCIUM SERPL-MCNC: 8 MG/DL (ref 8.6–10.4)
CHLORIDE BLD-SCNC: 107 MMOL/L (ref 98–107)
CO2: 24 MMOL/L (ref 20–31)
CREAT SERPL-MCNC: 0.58 MG/DL (ref 0.5–0.9)
CROSSMATCH RESULT: NORMAL
CROSSMATCH RESULT: NORMAL
CULTURE: NO GROWTH
DIFFERENTIAL TYPE: ABNORMAL
DISPENSE STATUS BLOOD BANK: NORMAL
DISPENSE STATUS BLOOD BANK: NORMAL
EOSINOPHILS RELATIVE PERCENT: 0 % (ref 1–4)
EXPIRATION DATE: NORMAL
GFR AFRICAN AMERICAN: >60 ML/MIN
GFR NON-AFRICAN AMERICAN: >60 ML/MIN
GFR SERPL CREATININE-BSD FRML MDRD: ABNORMAL ML/MIN/{1.73_M2}
GFR SERPL CREATININE-BSD FRML MDRD: ABNORMAL ML/MIN/{1.73_M2}
GLUCOSE BLD-MCNC: 111 MG/DL (ref 70–99)
HCT VFR BLD CALC: 26.3 % (ref 36.3–47.1)
HEMOGLOBIN: 8.6 G/DL (ref 11.9–15.1)
IMMATURE GRANULOCYTES: 0 %
LYMPHOCYTES # BLD: 15 % (ref 24–43)
Lab: NORMAL
MCH RBC QN AUTO: 30.8 PG (ref 25.2–33.5)
MCHC RBC AUTO-ENTMCNC: 32.7 G/DL (ref 28.4–34.8)
MCV RBC AUTO: 94.3 FL (ref 82.6–102.9)
MONOCYTES # BLD: 7 % (ref 3–12)
NRBC AUTOMATED: 0 PER 100 WBC
PDW BLD-RTO: 12.8 % (ref 11.8–14.4)
PLATELET # BLD: 214 K/UL (ref 138–453)
PLATELET ESTIMATE: ABNORMAL
PMV BLD AUTO: 11 FL (ref 8.1–13.5)
POTASSIUM SERPL-SCNC: 4 MMOL/L (ref 3.7–5.3)
RBC # BLD: 2.79 M/UL (ref 3.95–5.11)
RBC # BLD: ABNORMAL 10*6/UL
SEG NEUTROPHILS: 78 % (ref 36–65)
SEGMENTED NEUTROPHILS ABSOLUTE COUNT: 7.28 K/UL (ref 1.5–8.1)
SODIUM BLD-SCNC: 140 MMOL/L (ref 135–144)
SPECIMEN DESCRIPTION: NORMAL
STATUS: NORMAL
TRANSFUSION STATUS: NORMAL
TRANSFUSION STATUS: NORMAL
UNIT DIVISION: 0
UNIT DIVISION: 0
UNIT NUMBER: NORMAL
UNIT NUMBER: NORMAL
WBC # BLD: 9.4 K/UL (ref 3.5–11.3)
WBC # BLD: ABNORMAL 10*3/UL

## 2018-09-29 PROCEDURE — G8979 MOBILITY GOAL STATUS: HCPCS

## 2018-09-29 PROCEDURE — 85025 COMPLETE CBC W/AUTO DIFF WBC: CPT

## 2018-09-29 PROCEDURE — 97110 THERAPEUTIC EXERCISES: CPT

## 2018-09-29 PROCEDURE — 97162 PT EVAL MOD COMPLEX 30 MIN: CPT

## 2018-09-29 PROCEDURE — 6360000002 HC RX W HCPCS: Performed by: THORACIC SURGERY (CARDIOTHORACIC VASCULAR SURGERY)

## 2018-09-29 PROCEDURE — 2580000003 HC RX 258: Performed by: PHYSICIAN ASSISTANT

## 2018-09-29 PROCEDURE — 6370000000 HC RX 637 (ALT 250 FOR IP): Performed by: THORACIC SURGERY (CARDIOTHORACIC VASCULAR SURGERY)

## 2018-09-29 PROCEDURE — 97530 THERAPEUTIC ACTIVITIES: CPT

## 2018-09-29 PROCEDURE — 94640 AIRWAY INHALATION TREATMENT: CPT

## 2018-09-29 PROCEDURE — 2140000001 HC CVICU INTERMEDIATE R&B

## 2018-09-29 PROCEDURE — G8988 SELF CARE GOAL STATUS: HCPCS

## 2018-09-29 PROCEDURE — 2700000000 HC OXYGEN THERAPY PER DAY

## 2018-09-29 PROCEDURE — G8978 MOBILITY CURRENT STATUS: HCPCS

## 2018-09-29 PROCEDURE — 80048 BASIC METABOLIC PNL TOTAL CA: CPT

## 2018-09-29 PROCEDURE — 71045 X-RAY EXAM CHEST 1 VIEW: CPT

## 2018-09-29 PROCEDURE — 97166 OT EVAL MOD COMPLEX 45 MIN: CPT

## 2018-09-29 PROCEDURE — G8987 SELF CARE CURRENT STATUS: HCPCS

## 2018-09-29 PROCEDURE — 36415 COLL VENOUS BLD VENIPUNCTURE: CPT

## 2018-09-29 PROCEDURE — 6370000000 HC RX 637 (ALT 250 FOR IP): Performed by: PHYSICIAN ASSISTANT

## 2018-09-29 PROCEDURE — 6360000002 HC RX W HCPCS: Performed by: PHYSICIAN ASSISTANT

## 2018-09-29 RX ORDER — ALBUTEROL SULFATE 2.5 MG/3ML
2.5 SOLUTION RESPIRATORY (INHALATION) EVERY 6 HOURS PRN
Status: DISCONTINUED | OUTPATIENT
Start: 2018-09-29 | End: 2018-10-03 | Stop reason: HOSPADM

## 2018-09-29 RX ADMIN — LISINOPRIL 10 MG: 10 TABLET ORAL at 09:13

## 2018-09-29 RX ADMIN — MORPHINE SULFATE 2 MG: 4 INJECTION INTRAVENOUS at 05:14

## 2018-09-29 RX ADMIN — OXYCODONE HYDROCHLORIDE AND ACETAMINOPHEN 2 TABLET: 5; 325 TABLET ORAL at 11:59

## 2018-09-29 RX ADMIN — DOCUSATE SODIUM 100 MG: 100 CAPSULE, LIQUID FILLED ORAL at 20:22

## 2018-09-29 RX ADMIN — MORPHINE SULFATE 2 MG: 4 INJECTION INTRAVENOUS at 01:02

## 2018-09-29 RX ADMIN — ALBUTEROL SULFATE 2.5 MG: 2.5 SOLUTION RESPIRATORY (INHALATION) at 18:46

## 2018-09-29 RX ADMIN — DOCUSATE SODIUM 100 MG: 100 CAPSULE, LIQUID FILLED ORAL at 09:12

## 2018-09-29 RX ADMIN — OXYCODONE HYDROCHLORIDE AND ACETAMINOPHEN 2 TABLET: 5; 325 TABLET ORAL at 16:18

## 2018-09-29 RX ADMIN — POLYETHYLENE GLYCOL 3350 17 G: 17 POWDER, FOR SOLUTION ORAL at 09:12

## 2018-09-29 RX ADMIN — OXYCODONE HYDROCHLORIDE AND ACETAMINOPHEN 2 TABLET: 5; 325 TABLET ORAL at 07:57

## 2018-09-29 RX ADMIN — CEFAZOLIN SODIUM 1 G: 1 INJECTION, SOLUTION INTRAVENOUS at 20:30

## 2018-09-29 RX ADMIN — HYDROCHLOROTHIAZIDE 12.5 MG: 25 TABLET ORAL at 09:12

## 2018-09-29 RX ADMIN — ENOXAPARIN SODIUM 40 MG: 40 INJECTION SUBCUTANEOUS at 09:12

## 2018-09-29 RX ADMIN — OXYCODONE HYDROCHLORIDE AND ACETAMINOPHEN 2 TABLET: 5; 325 TABLET ORAL at 20:22

## 2018-09-29 RX ADMIN — BENZOCAINE, MENTHOL 1 LOZENGE: 15; 3.6 LOZENGE ORAL at 05:06

## 2018-09-29 RX ADMIN — Medication 10 ML: at 20:23

## 2018-09-29 RX ADMIN — BENZOCAINE, MENTHOL 1 LOZENGE: 15; 3.6 LOZENGE ORAL at 20:22

## 2018-09-29 RX ADMIN — Medication 10 ML: at 09:15

## 2018-09-29 ASSESSMENT — PAIN DESCRIPTION - PAIN TYPE
TYPE: SURGICAL PAIN

## 2018-09-29 ASSESSMENT — PAIN SCALES - GENERAL
PAINLEVEL_OUTOF10: 4
PAINLEVEL_OUTOF10: 0
PAINLEVEL_OUTOF10: 7
PAINLEVEL_OUTOF10: 5
PAINLEVEL_OUTOF10: 5
PAINLEVEL_OUTOF10: 3
PAINLEVEL_OUTOF10: 4
PAINLEVEL_OUTOF10: 5
PAINLEVEL_OUTOF10: 4
PAINLEVEL_OUTOF10: 5
PAINLEVEL_OUTOF10: 5

## 2018-09-29 ASSESSMENT — PAIN DESCRIPTION - LOCATION
LOCATION: INCISION;CHEST
LOCATION: INCISION;CHEST
LOCATION: CHEST
LOCATION: CHEST;INCISION
LOCATION: INCISION
LOCATION: INCISION;CHEST

## 2018-09-29 ASSESSMENT — PAIN DESCRIPTION - ORIENTATION
ORIENTATION: LEFT
ORIENTATION: LEFT
ORIENTATION: LEFT;POSTERIOR
ORIENTATION: LEFT;DISTAL

## 2018-09-29 ASSESSMENT — PAIN DESCRIPTION - DESCRIPTORS: DESCRIPTORS: ACHING;SHARP

## 2018-09-29 ASSESSMENT — PAIN DESCRIPTION - FREQUENCY: FREQUENCY: CONTINUOUS

## 2018-09-29 ASSESSMENT — PAIN DESCRIPTION - PROGRESSION: CLINICAL_PROGRESSION: NOT CHANGED

## 2018-09-29 ASSESSMENT — PAIN DESCRIPTION - ONSET: ONSET: ON-GOING

## 2018-09-29 NOTE — PROGRESS NOTES
Aching; Sharp  Pain Frequency: Continuous  Pain Onset: On-going  Clinical Progression: Not changed  Vital Signs  Patient Currently in Pain: Yes  Pre Treatment Pain Screening  Intervention List: Patient able to continue with treatment    Orientation  Orientation  Overall Orientation Status: Within Normal Limits    Social/Functional History  Social/Functional History  Lives With: Significant other  Type of Home: House  Home Layout: Two level  Home Access: Stairs to enter without rails  Entrance Stairs - Number of Steps: 1  ADL Assistance: Independent  Homemaking Assistance: Independent  Homemaking Responsibilities: Yes  Ambulation Assistance: Independent  Transfer Assistance: Independent  Active : Yes  Mode of Transportation: Car  Occupation: Retired  Additional Comments: spouse is also retired and able to assist prn  Objective     Observation/Palpation  Posture: Good    AROM RLE (degrees)  RLE AROM: WFL  AROM LLE (degrees)  LLE AROM : WFL  AROM RUE (degrees)  RUE AROM : WFL  AROM LUE (degrees)  LUE AROM : WFL  Strength RLE  Strength RLE: WFL  Strength LLE  Strength LLE: WFL  Strength RUE  Strength RUE: WFL  Strength LUE  Strength LUE: WFL  Tone RLE  RLE Tone: Normotonic  Tone LLE  LLE Tone: Normotonic  Motor Control  Gross Motor?: WFL  Sensation  Overall Sensation Status: WFL  Bed mobility  Comment: pt up in chair upon PT arrival and retired to chair at end of PT session; assess next treatment  Transfers  Sit to Stand: Stand by assistance  Stand to sit: Stand by assistance  Stand Pivot Transfers: Contact guard assistance  Lateral Transfers: Contact guard assistance  Ambulation  Ambulation?: Yes  Ambulation 1  Surface: level tile  Device: No Device  Assistance: Contact guard assistance  Quality of Gait: wide RENEE, small, choppy steps  Distance: 120'x1; 90'x1 seated rest break (~5 minutes) in between  B[place]ir [place]?: No     Balance  Posture: Good  Sitting - Static: Good  Sitting - Dynamic: Good  Standing -

## 2018-09-29 NOTE — PROGRESS NOTES
Frequency: Continuous  Clinical Progression: Not changed  Pain Intervention(s): Ambulation/Increased activity, Rest, Distraction, Repositioned, Elevation  Response to Pain Intervention: Asleep with RR greater than 10, Patient Satisfied        Social/Functional History  Social/Functional History  Lives With: Significant other  Type of Home: House  Home Layout: Two level  Home Access: Stairs to enter without rails  Entrance Stairs - Number of Steps: 1  Bathroom Shower/Tub: Tub/Shower unit  Bathroom Toilet: Standard (sink by toilet )  Bathroom Equipment:  (none)  Home Equipment:  (none)  ADL Assistance: Independent  Homemaking Assistance: Independent  Homemaking Responsibilities: Yes  Ambulation Assistance: Independent  Transfer Assistance: Independent  Active : Yes  Mode of Transportation: Car  Occupation: Retired  Additional Comments: spouse is also retired and able to assist prn        Objective   Observation/Palpation  Posture: Good  Balance  Sitting Balance: Independent (seated in chair )  Standing Balance: Contact guard assistance (no device)  Standing Balance  Sit to stand: Stand by assistance  Stand to sit: Stand by assistance  Functional Mobility  Functional - Mobility Device: No device  Activity: To/from bathroom  Assist Level: Contact guard assistance  Functional Mobility Comments: No LOB or SOB noted. Toilet Transfers  Toilet - Technique: Ambulating  Equipment Used: Standard toilet  Toilet Transfer: Supervision  Toilet Transfers Comments: No LOB or SOB.  Pt completed transfer with no use of grab bar  ADL  Feeding: Independent  Grooming: Stand by assistance;Setup (to wash hands standing sinkisde)  UE Bathing: Setup;Stand by assistance  LE Bathing: Minimal assistance;Setup  UE Dressing: Setup  LE Dressing: Minimal assistance;Setup  Toileting: Supervision (to complete toileting in bathroom)  Additional Comments: Pt seated in chair on arrival. Pt completed sit > stand transfer and functional mobility into bathroom for toileting, Pt completed stand > sit transfer onto toilet, toileting and pericare following toileting. Pt transferred to sink to wash hands. Pt compeleted functional mobility back to chair, remained in chair, call light in reach and RN notified on therapist exit. Per RN, pt already completed ADLs this AM  Tone RUE  RUE Tone: Normotonic  Tone LUE  LUE Tone: Normotonic  Coordination  Movements Are Fluid And Coordinated: Yes     Bed mobility  Comment: Pt up in chair on arrival, returned to chair on exit   Transfers  Stand Step Transfers: Contact guard assistance  Sit to stand: Stand by assistance  Stand to sit: Stand by assistance  Transfer Comments: No device, no LOB or SOB     Cognition  Overall Cognitive Status: WFL  Perception  Overall Perceptual Status: WFL     Sensation  Overall Sensation Status: WFL (pt denies numbness/ tingling )        LUE AROM (degrees)  LUE AROM : WFL  RUE AROM (degrees)  RUE AROM : WFL  LUE Strength  Gross LUE Strength: WFL  RUE Strength  Gross RUE Strength: WFL       Assessment   Performance deficits / Impairments: Decreased functional mobility ; Decreased ADL status; Decreased endurance;Decreased high-level IADLs  Assessment: Pt would benefit from continued acute care OT to address minimal deficits in ADL/ functional activities, endurance and functional mobility following sx.  Pt required O2 on this date  Treatment Diagnosis: ROBOTIC ASSISTED LEFT UPPER LOBECTOMY MULTIPLE LYMPH NODE BIOPSY  Prognosis: Good  Decision Making: Medium Complexity  Patient Education: OT POC, discharge planning, safety   REQUIRES OT FOLLOW UP: Yes  Activity Tolerance  Activity Tolerance: Patient Tolerated treatment well  Safety Devices  Safety Devices in place: Yes  Type of devices: Left in chair;Nurse notified;Call light within reach  Restraints  Initially in place: No         Plan   Plan  Times per week: 2-3 total sessions  Times per day: Daily  Current Treatment Recommendations: Functional Mobility Training, Endurance Training, Safety Education & Training, Self-Care / ADL, Patient/Caregiver Education & Training, Equipment Evaluation, Education, & procurement    G-Code  OT G-codes  Functional Assessment Tool Used: [facility]  Score: 19/24  Functional Limitation: Self care  Self Care Current Status (): At least 40 percent but less than 60 percent impaired, limited or restricted  Self Care Goal Status (): At least 20 percent but less than 40 percent impaired, limited or restricted    AM-PAC Score  AM-PAC Inpatient Daily Activity Raw Score: 19  AM-PAC Inpatient ADL T-Scale Score : 40.22  ADL Inpatient CMS 0-100% Score: 42.8  ADL Inpatient CMS G-Code Modifier : CK  How much help from another person does the pt currently need? Unable A Lot A Little None   1. Putting on and taking off regular lower body clothing? 1      2      3       4   2. Bathing (including washing, rinsing, drying)? 1      2      3      4   3. Toileting, which includes using toilet, bedpan, or urinal?      1      2        3      4   4. Putting on and taking off regular upper body clothing? 1      2      3       4   5. Taking care of personal grooming such as brushing teeth? 1      2      3      4   6. Eating meals? 1      2       3       4     1. Unable = Total/Dependent Assist  2. A Lot = Maximum/Moderate Assist  3. A Little = Minimum/Contact Guard Assist/Supervision  4.  None= Modified [place]/Independent    Raw Score Scale Score Scale Score Standard Error Approximate Degree of Functional Impairment     6 17.07 3.74 100%   7 20.13 3.68 92%   8 22.86 3.43 86%   9 25.33 3.17 80%   10 27.31 2.96 75%   11 29.04 2.79 70%   12 30.60 2.68 67%   13 32.03 2.62 63%   14 33.39 2.61 60%   15 34.69 2.65 56%   16 35.96 2.71 53%   17 37.26 2.82 50%   18 38.66 2.97 47%   19 40.22 3.20 43%   20 42.03 3.55 38%   21 44.27 4.08 33%   22 47.10 4.81 26%   23 51.12 5.88 16%   24 57.54 7.36 0%     Goals  Short term goals  Time Frame for Short term goals: by discharge, pt will  Short term goal 1: demo I in UE ADL activities   Short term goal 2: demo MI/I in LE ADL activities with AD as needed  Short term goal 3: demo understanding and I use of EC/WS and fall prevention tech during functional activities   Short term goal 4: demo increased activity tolerance of 40+ min to assist with ADL/ functional activities   Short term goal 5: demo I in functional transfers/ mobility to assist with ADL/ functional activities with LRD  Patient Goals   Patient goals : to go home       Therapy Time   Individual Concurrent Group Co-treatment   Time In 1404         Time Out 1414         Minutes 10          See above for LOF. RN reports patient is medically stable for therapy treatment this date. Chart reviewed prior to treatment and patient is agreeable for therapy. All lines intact and patient positioned comfortably at end of treatment. All patient needs addressed prior to ending therapy session.          Denver Oz, OTR/L

## 2018-09-29 NOTE — ANESTHESIA POSTPROCEDURE EVALUATION
Department of Anesthesiology  Postprocedure Note    Patient: Michael President  MRN: [id number]  YOB: 1955  Date of evaluation: 9/28/2018  Time:  8:51 PM     Procedure Summary     Date:  09/28/18 Room / Location:  [place] [address] / [facility] OR    Anesthesia Start:  0816 Anesthesia Stop:  8368    Procedure:  XI ROBOTIC ASSISTED LEFT UPPER LOBECTOMY MULTIPLE LYMPH NODE BIOPSY, INTERCOSTAL  NERVE BLOCK ABLATION W/EXPAREL (Left ) Diagnosis:  (LEFT UPPER LOBE MASS)    Surgeon:  Hilaria Nolan MD Responsible Provider:  Brock Piper MD    Anesthesia Type:  general ASA Status:  3          Anesthesia Type: general    Palma Phase I: Palma Score: 9    Palma Phase II:      Last vitals: Reviewed and per EMR flowsheets.      POST-OP ANESTHESIA NOTE       BP (!) 102/57   Pulse 84   Temp 97 °F (36.1 °C) (Axillary)   Resp 18   Ht 5' (1.524 m)   Wt 152 lb (68.9 kg)   SpO2 97%   BMI 29.69 kg/m²    Pain Assessment: 0-10  Pain Level: 8       Anesthesia Post Evaluation    Patient location during evaluation: ICU  Patient participation: complete - patient participated  Level of consciousness: awake  Pain score: 8  Airway patency: patent  Nausea & Vomiting: nausea and no vomiting  Complications: no  Cardiovascular status: hemodynamically stable  Respiratory status: acceptable  Hydration status: stable

## 2018-09-29 NOTE — PLAN OF CARE
Problem: Falls - Risk of:  Goal: Will remain free from falls  Will remain free from falls   Outcome: Ongoing  No falls this shift. Problem: Pain:  Goal: Control of acute pain  Control of acute pain   Outcome: Ongoing  Medicated for incisional pain, as ordered.

## 2018-09-30 ENCOUNTER — APPOINTMENT (OUTPATIENT)
Dept: GENERAL RADIOLOGY | Age: 63
DRG: 164 | End: 2018-09-30
Attending: THORACIC SURGERY (CARDIOTHORACIC VASCULAR SURGERY)
Payer: COMMERCIAL

## 2018-09-30 LAB
HCT VFR BLD CALC: 23.9 % (ref 36.3–47.1)
HEMOGLOBIN: 7.9 G/DL (ref 11.9–15.1)
MCH RBC QN AUTO: 31.6 PG (ref 25.2–33.5)
MCHC RBC AUTO-ENTMCNC: 33.1 G/DL (ref 28.4–34.8)
MCV RBC AUTO: 95.6 FL (ref 82.6–102.9)
NRBC AUTOMATED: 0 PER 100 WBC
PDW BLD-RTO: 12.9 % (ref 11.8–14.4)
PLATELET # BLD: 174 K/UL (ref 138–453)
PMV BLD AUTO: 10.9 FL (ref 8.1–13.5)
RBC # BLD: 2.5 M/UL (ref 3.95–5.11)
WBC # BLD: 6.7 K/UL (ref 3.5–11.3)

## 2018-09-30 PROCEDURE — 6370000000 HC RX 637 (ALT 250 FOR IP): Performed by: THORACIC SURGERY (CARDIOTHORACIC VASCULAR SURGERY)

## 2018-09-30 PROCEDURE — 2140000001 HC CVICU INTERMEDIATE R&B

## 2018-09-30 PROCEDURE — 36415 COLL VENOUS BLD VENIPUNCTURE: CPT

## 2018-09-30 PROCEDURE — 6370000000 HC RX 637 (ALT 250 FOR IP): Performed by: PHYSICIAN ASSISTANT

## 2018-09-30 PROCEDURE — 97116 GAIT TRAINING THERAPY: CPT

## 2018-09-30 PROCEDURE — 2580000003 HC RX 258: Performed by: PHYSICIAN ASSISTANT

## 2018-09-30 PROCEDURE — 71045 X-RAY EXAM CHEST 1 VIEW: CPT

## 2018-09-30 PROCEDURE — 97530 THERAPEUTIC ACTIVITIES: CPT

## 2018-09-30 PROCEDURE — 6360000002 HC RX W HCPCS: Performed by: PHYSICIAN ASSISTANT

## 2018-09-30 PROCEDURE — 94640 AIRWAY INHALATION TREATMENT: CPT

## 2018-09-30 PROCEDURE — 6360000002 HC RX W HCPCS: Performed by: THORACIC SURGERY (CARDIOTHORACIC VASCULAR SURGERY)

## 2018-09-30 PROCEDURE — 85027 COMPLETE CBC AUTOMATED: CPT

## 2018-09-30 PROCEDURE — 2700000000 HC OXYGEN THERAPY PER DAY

## 2018-09-30 RX ADMIN — BENZOCAINE, MENTHOL 1 LOZENGE: 15; 3.6 LOZENGE ORAL at 08:56

## 2018-09-30 RX ADMIN — LISINOPRIL 10 MG: 10 TABLET ORAL at 08:54

## 2018-09-30 RX ADMIN — OXYCODONE HYDROCHLORIDE AND ACETAMINOPHEN 2 TABLET: 5; 325 TABLET ORAL at 13:05

## 2018-09-30 RX ADMIN — OXYCODONE HYDROCHLORIDE AND ACETAMINOPHEN 2 TABLET: 5; 325 TABLET ORAL at 00:25

## 2018-09-30 RX ADMIN — OXYCODONE HYDROCHLORIDE AND ACETAMINOPHEN 2 TABLET: 5; 325 TABLET ORAL at 21:13

## 2018-09-30 RX ADMIN — ALBUTEROL SULFATE 2.5 MG: 2.5 SOLUTION RESPIRATORY (INHALATION) at 00:43

## 2018-09-30 RX ADMIN — OXYCODONE HYDROCHLORIDE AND ACETAMINOPHEN 2 TABLET: 5; 325 TABLET ORAL at 04:32

## 2018-09-30 RX ADMIN — ONDANSETRON 4 MG: 2 INJECTION INTRAMUSCULAR; INTRAVENOUS at 03:43

## 2018-09-30 RX ADMIN — OXYCODONE HYDROCHLORIDE AND ACETAMINOPHEN 2 TABLET: 5; 325 TABLET ORAL at 08:51

## 2018-09-30 RX ADMIN — CEFAZOLIN SODIUM 1 G: 1 INJECTION, SOLUTION INTRAVENOUS at 06:41

## 2018-09-30 RX ADMIN — DOCUSATE SODIUM 100 MG: 100 CAPSULE, LIQUID FILLED ORAL at 21:13

## 2018-09-30 RX ADMIN — HYDROCHLOROTHIAZIDE 12.5 MG: 25 TABLET ORAL at 08:54

## 2018-09-30 RX ADMIN — BENZOCAINE, MENTHOL 1 LOZENGE: 15; 3.6 LOZENGE ORAL at 00:34

## 2018-09-30 RX ADMIN — Medication 10 ML: at 21:13

## 2018-09-30 RX ADMIN — Medication 10 ML: at 08:57

## 2018-09-30 RX ADMIN — ENOXAPARIN SODIUM 40 MG: 40 INJECTION SUBCUTANEOUS at 08:56

## 2018-09-30 RX ADMIN — OXYCODONE HYDROCHLORIDE AND ACETAMINOPHEN 2 TABLET: 5; 325 TABLET ORAL at 16:57

## 2018-09-30 RX ADMIN — POLYETHYLENE GLYCOL 3350 17 G: 17 POWDER, FOR SOLUTION ORAL at 08:56

## 2018-09-30 RX ADMIN — DOCUSATE SODIUM 100 MG: 100 CAPSULE, LIQUID FILLED ORAL at 08:54

## 2018-09-30 ASSESSMENT — PAIN SCALES - GENERAL
PAINLEVEL_OUTOF10: 3
PAINLEVEL_OUTOF10: 6
PAINLEVEL_OUTOF10: 4
PAINLEVEL_OUTOF10: 4
PAINLEVEL_OUTOF10: 3
PAINLEVEL_OUTOF10: 3
PAINLEVEL_OUTOF10: 2
PAINLEVEL_OUTOF10: 4

## 2018-09-30 ASSESSMENT — PAIN DESCRIPTION - PAIN TYPE
TYPE: SURGICAL PAIN

## 2018-09-30 ASSESSMENT — PAIN DESCRIPTION - LOCATION
LOCATION: INCISION

## 2018-09-30 ASSESSMENT — PAIN DESCRIPTION - ORIENTATION
ORIENTATION: LEFT;POSTERIOR

## 2018-10-01 ENCOUNTER — APPOINTMENT (OUTPATIENT)
Dept: GENERAL RADIOLOGY | Age: 63
DRG: 164 | End: 2018-10-01
Attending: THORACIC SURGERY (CARDIOTHORACIC VASCULAR SURGERY)
Payer: COMMERCIAL

## 2018-10-01 LAB
ANION GAP SERPL CALCULATED.3IONS-SCNC: 10 MMOL/L (ref 9–17)
BUN BLDV-MCNC: 4 MG/DL (ref 8–23)
BUN/CREAT BLD: ABNORMAL (ref 9–20)
CALCIUM SERPL-MCNC: 8.6 MG/DL (ref 8.6–10.4)
CHLORIDE BLD-SCNC: 104 MMOL/L (ref 98–107)
CO2: 26 MMOL/L (ref 20–31)
CREAT SERPL-MCNC: 0.46 MG/DL (ref 0.5–0.9)
GFR AFRICAN AMERICAN: >60 ML/MIN
GFR NON-AFRICAN AMERICAN: >60 ML/MIN
GFR SERPL CREATININE-BSD FRML MDRD: ABNORMAL ML/MIN/{1.73_M2}
GFR SERPL CREATININE-BSD FRML MDRD: ABNORMAL ML/MIN/{1.73_M2}
GLUCOSE BLD-MCNC: 96 MG/DL (ref 70–99)
HCT VFR BLD CALC: 27.5 % (ref 36.3–47.1)
HEMOGLOBIN: 8.7 G/DL (ref 11.9–15.1)
MCH RBC QN AUTO: 30.9 PG (ref 25.2–33.5)
MCHC RBC AUTO-ENTMCNC: 31.6 G/DL (ref 28.4–34.8)
MCV RBC AUTO: 97.5 FL (ref 82.6–102.9)
NRBC AUTOMATED: 0 PER 100 WBC
PDW BLD-RTO: 12.6 % (ref 11.8–14.4)
PLATELET # BLD: 201 K/UL (ref 138–453)
PMV BLD AUTO: 10.5 FL (ref 8.1–13.5)
POTASSIUM SERPL-SCNC: 3.8 MMOL/L (ref 3.7–5.3)
RBC # BLD: 2.82 M/UL (ref 3.95–5.11)
SODIUM BLD-SCNC: 140 MMOL/L (ref 135–144)
WBC # BLD: 5.7 K/UL (ref 3.5–11.3)

## 2018-10-01 PROCEDURE — 71045 X-RAY EXAM CHEST 1 VIEW: CPT

## 2018-10-01 PROCEDURE — 6360000002 HC RX W HCPCS: Performed by: INTERNAL MEDICINE

## 2018-10-01 PROCEDURE — 0BC88ZZ EXTIRPATION OF MATTER FROM LEFT UPPER LOBE BRONCHUS, VIA NATURAL OR ARTIFICIAL OPENING ENDOSCOPIC: ICD-10-PCS | Performed by: INTERNAL MEDICINE

## 2018-10-01 PROCEDURE — 36415 COLL VENOUS BLD VENIPUNCTURE: CPT

## 2018-10-01 PROCEDURE — 2700000000 HC OXYGEN THERAPY PER DAY

## 2018-10-01 PROCEDURE — 85027 COMPLETE CBC AUTOMATED: CPT

## 2018-10-01 PROCEDURE — 2140000001 HC CVICU INTERMEDIATE R&B

## 2018-10-01 PROCEDURE — 6370000000 HC RX 637 (ALT 250 FOR IP): Performed by: PHYSICIAN ASSISTANT

## 2018-10-01 PROCEDURE — 3609027000 HC BRONCHOSCOPY: Performed by: INTERNAL MEDICINE

## 2018-10-01 PROCEDURE — 99254 IP/OBS CNSLTJ NEW/EST MOD 60: CPT | Performed by: INTERNAL MEDICINE

## 2018-10-01 PROCEDURE — 2580000003 HC RX 258: Performed by: PHYSICIAN ASSISTANT

## 2018-10-01 PROCEDURE — 31622 DX BRONCHOSCOPE/WASH: CPT | Performed by: INTERNAL MEDICINE

## 2018-10-01 PROCEDURE — 2500000003 HC RX 250 WO HCPCS: Performed by: INTERNAL MEDICINE

## 2018-10-01 PROCEDURE — 6360000002 HC RX W HCPCS: Performed by: PHYSICIAN ASSISTANT

## 2018-10-01 PROCEDURE — 99024 POSTOP FOLLOW-UP VISIT: CPT | Performed by: PHYSICIAN ASSISTANT

## 2018-10-01 PROCEDURE — 80048 BASIC METABOLIC PNL TOTAL CA: CPT

## 2018-10-01 PROCEDURE — 6370000000 HC RX 637 (ALT 250 FOR IP): Performed by: THORACIC SURGERY (CARDIOTHORACIC VASCULAR SURGERY)

## 2018-10-01 RX ORDER — LIDOCAINE HYDROCHLORIDE 10 MG/ML
INJECTION, SOLUTION EPIDURAL; INFILTRATION; INTRACAUDAL; PERINEURAL PRN
Status: DISCONTINUED | OUTPATIENT
Start: 2018-10-01 | End: 2018-10-02

## 2018-10-01 RX ORDER — MIDAZOLAM HYDROCHLORIDE 1 MG/ML
5 INJECTION INTRAMUSCULAR; INTRAVENOUS
Status: COMPLETED | OUTPATIENT
Start: 2018-10-01 | End: 2018-10-01

## 2018-10-01 RX ORDER — LIDOCAINE HYDROCHLORIDE 40 MG/ML
4 INJECTION, SOLUTION RETROBULBAR; TOPICAL ONCE
Status: COMPLETED | OUTPATIENT
Start: 2018-10-01 | End: 2018-10-01

## 2018-10-01 RX ORDER — FENTANYL CITRATE 50 UG/ML
50 INJECTION, SOLUTION INTRAMUSCULAR; INTRAVENOUS EVERY 10 MIN PRN
Status: DISCONTINUED | OUTPATIENT
Start: 2018-10-01 | End: 2018-10-02

## 2018-10-01 RX ADMIN — ENOXAPARIN SODIUM 40 MG: 40 INJECTION SUBCUTANEOUS at 17:29

## 2018-10-01 RX ADMIN — OXYCODONE HYDROCHLORIDE AND ACETAMINOPHEN 2 TABLET: 5; 325 TABLET ORAL at 01:17

## 2018-10-01 RX ADMIN — OXYCODONE HYDROCHLORIDE AND ACETAMINOPHEN 2 TABLET: 5; 325 TABLET ORAL at 14:30

## 2018-10-01 RX ADMIN — POLYETHYLENE GLYCOL 3350 17 G: 17 POWDER, FOR SOLUTION ORAL at 21:37

## 2018-10-01 RX ADMIN — Medication 10 ML: at 21:47

## 2018-10-01 RX ADMIN — ALPRAZOLAM 0.25 MG: 0.25 TABLET ORAL at 21:37

## 2018-10-01 RX ADMIN — OXYCODONE HYDROCHLORIDE AND ACETAMINOPHEN 2 TABLET: 5; 325 TABLET ORAL at 19:01

## 2018-10-01 RX ADMIN — ONDANSETRON 4 MG: 2 INJECTION INTRAMUSCULAR; INTRAVENOUS at 07:15

## 2018-10-01 RX ADMIN — OXYCODONE HYDROCHLORIDE AND ACETAMINOPHEN 2 TABLET: 5; 325 TABLET ORAL at 10:04

## 2018-10-01 RX ADMIN — BENZOCAINE, MENTHOL 1 LOZENGE: 15; 3.6 LOZENGE ORAL at 05:27

## 2018-10-01 RX ADMIN — LIDOCAINE HYDROCHLORIDE 4 ML: 40 SOLUTION RETROBULBAR; TOPICAL at 15:30

## 2018-10-01 RX ADMIN — DOCUSATE SODIUM 100 MG: 100 CAPSULE, LIQUID FILLED ORAL at 21:37

## 2018-10-01 RX ADMIN — OXYCODONE HYDROCHLORIDE AND ACETAMINOPHEN 1 TABLET: 5; 325 TABLET ORAL at 23:04

## 2018-10-01 RX ADMIN — LISINOPRIL 10 MG: 10 TABLET ORAL at 09:59

## 2018-10-01 RX ADMIN — OXYCODONE HYDROCHLORIDE AND ACETAMINOPHEN 2 TABLET: 5; 325 TABLET ORAL at 05:26

## 2018-10-01 RX ADMIN — MIDAZOLAM HYDROCHLORIDE 2 MG: 1 INJECTION, SOLUTION INTRAMUSCULAR; INTRAVENOUS at 15:43

## 2018-10-01 RX ADMIN — HYDROCHLOROTHIAZIDE 12.5 MG: 25 TABLET ORAL at 09:58

## 2018-10-01 RX ADMIN — Medication 10 ML: at 09:59

## 2018-10-01 RX ADMIN — DOCUSATE SODIUM 100 MG: 100 CAPSULE, LIQUID FILLED ORAL at 09:58

## 2018-10-01 RX ADMIN — FENTANYL CITRATE 50 MCG: 50 INJECTION INTRAMUSCULAR; INTRAVENOUS at 15:41

## 2018-10-01 ASSESSMENT — PAIN DESCRIPTION - PAIN TYPE
TYPE: SURGICAL PAIN;ACUTE PAIN

## 2018-10-01 ASSESSMENT — PAIN SCALES - GENERAL
PAINLEVEL_OUTOF10: 3
PAINLEVEL_OUTOF10: 4
PAINLEVEL_OUTOF10: 6
PAINLEVEL_OUTOF10: 4
PAINLEVEL_OUTOF10: 2
PAINLEVEL_OUTOF10: 4
PAINLEVEL_OUTOF10: 0

## 2018-10-01 ASSESSMENT — PAIN DESCRIPTION - ORIENTATION
ORIENTATION: LEFT;POSTERIOR

## 2018-10-01 ASSESSMENT — PAIN DESCRIPTION - ONSET
ONSET: PROGRESSIVE
ONSET: PROGRESSIVE

## 2018-10-01 ASSESSMENT — PAIN DESCRIPTION - FREQUENCY
FREQUENCY: CONTINUOUS

## 2018-10-01 ASSESSMENT — PAIN DESCRIPTION - DESCRIPTORS
DESCRIPTORS: SHARP;STABBING
DESCRIPTORS: SHARP;STABBING;THROBBING;BURNING
DESCRIPTORS: SHARP;STABBING
DESCRIPTORS: SHARP;STABBING;THROBBING;BURNING

## 2018-10-01 ASSESSMENT — PAIN DESCRIPTION - LOCATION
LOCATION: CHEST;INCISION
LOCATION: BACK;CHEST
LOCATION: CHEST;BACK;INCISION
LOCATION: CHEST;INCISION;BACK

## 2018-10-01 ASSESSMENT — PAIN DESCRIPTION - PROGRESSION
CLINICAL_PROGRESSION: GRADUALLY WORSENING

## 2018-10-01 NOTE — CONSULTS
 BREAST BIOPSY Left 1983    COLONOSCOPY      HYSTERECTOMY  2010    LUNG BIOPSY      LUNG REMOVAL, PARTIAL Right 09/28/2018    Robotic assisted left lobectomy, upper with lymph node biopsy    ID RMVL LUNG OTHER THAN PNEUMONECTOMY 1 LOBE LOBECT Left 9/28/2018    XI ROBOTIC ASSISTED LEFT UPPER LOBECTOMY MULTIPLE LYMPH NODE BIOPSY, INTERCOSTAL  NERVE BLOCK ABLATION W/EXPAREL performed by Bob Clark MD at [address]:   Family History   Problem Relation Age of Onset    Cancer Mother         LUNG    Cancer Sister         LUNG       SOCIAL HISTORY:   TOBACCO:   reports that she quit smoking about 18 years ago. Her smoking use included Cigarettes. She has a 45.00 pack-year smoking history. She has never used smokeless tobacco.  ETOH:  reports that she drinks alcohol. DRUGS: reports that she does not use drugs. AVOCATION/OCCUPATIONAL EXPOSURE HISTORY:    There is no  history of exposure to asbestos or silica dust.  The patient denies coal, foundry, quarry or [employer] exposure. The patient does not have any new pet dogs, cats, turtles or exotic birds at home. There is no history of TB or TB exposure. There is no exposure to sick contacts. Travel history is not significant history of risk factors for pulmonary disease. ALLERGIES:    Allergies   Allergen Reactions    Codeine Nausea And Vomiting         HOME MEDICATIONS:  Prior to Admission medications    Medication Sig Start Date End Date Taking? Authorizing Provider   lisinopril-hydrochlorothiazide (PRINZIDE;ZESTORETIC) 10-12.5 MG per tablet Take 1 tablet by mouth daily   Yes Historical Provider, MD   ALPRAZolam (XANAX) 0.25 MG tablet Take 0.25 mg by mouth 3 times daily as needed for Sleep. .   Yes Historical Provider, MD   lovastatin (MEVACOR) 10 MG tablet Take 10 mg by mouth nightly   Yes Historical Provider, MD   Garlic 10 MG CAPS Take by mouth daily    Historical Provider, MD   Biotin 1000 MCG TABS Take 1 tablet by mouth daily Historical Provider, MD   Omega-3 Fatty Acids (FISH OIL) 1000 MG CAPS Take 1,000 mg by mouth daily    Historical Provider, MD   aspirin 81 MG tablet 1 tablet    Historical Provider, MD   albuterol sulfate HFA (VENTOLIN HFA) 108 (90 Base) MCG/ACT inhaler Inhale 2 puffs into the lungs 4 times daily  Patient taking differently: Inhale 2 puffs into the lungs every 6 hours as needed  3/6/18   Kenneth Alfredo DO       IMMUNIZATIONS:      There is no immunization history on file for this patient.     REVIEW OF SYSTEMS:   A 10 point review of system was performed and was negative except that described in HPI    PHYSICAL EXAMINATION     VITAL SIGNS:   BP (!) 154/70   Pulse 109   Temp 99.1 °F (37.3 °C) (Oral)   Resp 19   Ht 5' (1.524 m)   Wt 165 lb 2 oz (74.9 kg)   SpO2 100%   BMI 32.25 kg/m²     SYSTEMIC EXAMINATION:   General appearance - alert, well appearing, and in no distress  Mental status - alert, oriented to person, place, and time  Eyes - pupils equal and reactive, extraocular eye movements intact  Mouth - mucous membranes moist, pharynx normal without lesions  Neck - supple, no significant adenopathy  Chest - no tachypnea, retractions or cyanosis, left-sided chest tube in situ, airleak present  Heart - normal rate, regular rhythm, normal S1, S2, no murmurs, rubs, clicks or gallops  Abdomen - soft, nontender, nondistended, no masses or organomegaly  Neurological - alert, oriented, normal speech, no focal findings or movement disorder noted  Extremities - peripheral pulses normal, no pedal edema, no clubbing or cyanosis  Skin - normal coloration and turgor, no rashes, no suspicious skin lesions noted     DATA REVIEW     Medications: Current Inpatient  Scheduled Meds:   sodium chloride flush  10 mL Intravenous 2 times per day    docusate sodium  100 mg Oral BID    polyethylene glycol  17 g Oral Daily    enoxaparin  40 mg Subcutaneous Daily    lisinopril  10 mg Oral Daily    And    hydrochlorothiazide  12.5

## 2018-10-01 NOTE — PROGRESS NOTES
alert and oriented to person, place and time, well-developed and well-nourished, in no acute distress  Heart:Normal S1 and S2.  Regular rhythm. No murmurs, gallops, or rubs. , Pacing wires  No  Lungs: clear to auscultation bilaterally and diminished breath sounds BARAK Equal and symmetric expansion with respiration. Chest tubes to suction +ve leak  Abdomen: soft, non tender, non distended, BSx4  Extremities: Trace edema, intact pulses in all four extremities  Musculoskeletal:  Intact range of motion of peripheral joints. grossly normal  Neurologic:  Non-focal sensory deficits on exam.  Psychiatric: Mood and affect are appropriate. Wounds: clean and dry, healing appropriately, dressing in place       Assessment/Plan:   Patient Active Problem List   Diagnosis    Status post lobectomy of lung       1. S/p robotic LLLobectomy   POD 3  +Ct leak. 160ml ON  Pneumo on CXR - possible mucus plug  Pulmonary consult - STAT, Dr Leach Payment contact  NPO  ABLA - stable hb 8.7    DVT ppx: Lovenox & SCD's while stationary  Plan for discharge home with home care    The above recommendations including medications and orders were discussed and agreed upon with Dexter Lozada the attending on service for the cardiothoracic surgery group today.     Electronically signed by CHUCKY Muniz on 10/1/2018 at 8:55 AM

## 2018-10-02 ENCOUNTER — APPOINTMENT (OUTPATIENT)
Dept: GENERAL RADIOLOGY | Age: 63
DRG: 164 | End: 2018-10-02
Attending: THORACIC SURGERY (CARDIOTHORACIC VASCULAR SURGERY)
Payer: COMMERCIAL

## 2018-10-02 LAB — SURGICAL PATHOLOGY REPORT: NORMAL

## 2018-10-02 PROCEDURE — 94762 N-INVAS EAR/PLS OXIMTRY CONT: CPT

## 2018-10-02 PROCEDURE — 6360000002 HC RX W HCPCS: Performed by: PHYSICIAN ASSISTANT

## 2018-10-02 PROCEDURE — 2140000001 HC CVICU INTERMEDIATE R&B

## 2018-10-02 PROCEDURE — 6370000000 HC RX 637 (ALT 250 FOR IP): Performed by: PHYSICIAN ASSISTANT

## 2018-10-02 PROCEDURE — 2580000003 HC RX 258: Performed by: PHYSICIAN ASSISTANT

## 2018-10-02 PROCEDURE — 2700000000 HC OXYGEN THERAPY PER DAY

## 2018-10-02 PROCEDURE — 99233 SBSQ HOSP IP/OBS HIGH 50: CPT | Performed by: INTERNAL MEDICINE

## 2018-10-02 PROCEDURE — 97110 THERAPEUTIC EXERCISES: CPT

## 2018-10-02 PROCEDURE — 99024 POSTOP FOLLOW-UP VISIT: CPT | Performed by: NURSE PRACTITIONER

## 2018-10-02 PROCEDURE — 71045 X-RAY EXAM CHEST 1 VIEW: CPT

## 2018-10-02 RX ADMIN — LISINOPRIL 10 MG: 10 TABLET ORAL at 09:12

## 2018-10-02 RX ADMIN — OXYCODONE HYDROCHLORIDE AND ACETAMINOPHEN 1 TABLET: 5; 325 TABLET ORAL at 16:15

## 2018-10-02 RX ADMIN — HYDROCHLOROTHIAZIDE 12.5 MG: 25 TABLET ORAL at 09:12

## 2018-10-02 RX ADMIN — POLYETHYLENE GLYCOL 3350 17 G: 17 POWDER, FOR SOLUTION ORAL at 09:12

## 2018-10-02 RX ADMIN — OXYCODONE HYDROCHLORIDE AND ACETAMINOPHEN 2 TABLET: 5; 325 TABLET ORAL at 03:20

## 2018-10-02 RX ADMIN — DOCUSATE SODIUM 100 MG: 100 CAPSULE, LIQUID FILLED ORAL at 09:12

## 2018-10-02 RX ADMIN — OXYCODONE HYDROCHLORIDE AND ACETAMINOPHEN 1 TABLET: 5; 325 TABLET ORAL at 07:38

## 2018-10-02 RX ADMIN — ALPRAZOLAM 0.25 MG: 0.25 TABLET ORAL at 20:23

## 2018-10-02 RX ADMIN — OXYCODONE HYDROCHLORIDE AND ACETAMINOPHEN 1 TABLET: 5; 325 TABLET ORAL at 18:26

## 2018-10-02 RX ADMIN — Medication 10 ML: at 20:28

## 2018-10-02 RX ADMIN — OXYCODONE HYDROCHLORIDE AND ACETAMINOPHEN 2 TABLET: 5; 325 TABLET ORAL at 20:23

## 2018-10-02 RX ADMIN — DOCUSATE SODIUM 100 MG: 100 CAPSULE, LIQUID FILLED ORAL at 20:22

## 2018-10-02 RX ADMIN — ALPRAZOLAM 0.25 MG: 0.25 TABLET ORAL at 09:16

## 2018-10-02 RX ADMIN — OXYCODONE HYDROCHLORIDE AND ACETAMINOPHEN 1 TABLET: 5; 325 TABLET ORAL at 09:16

## 2018-10-02 RX ADMIN — OXYCODONE HYDROCHLORIDE AND ACETAMINOPHEN 1 TABLET: 5; 325 TABLET ORAL at 11:53

## 2018-10-02 RX ADMIN — Medication 10 ML: at 09:14

## 2018-10-02 RX ADMIN — ENOXAPARIN SODIUM 40 MG: 40 INJECTION SUBCUTANEOUS at 09:12

## 2018-10-02 ASSESSMENT — PAIN SCALES - GENERAL
PAINLEVEL_OUTOF10: 5
PAINLEVEL_OUTOF10: 7
PAINLEVEL_OUTOF10: 2
PAINLEVEL_OUTOF10: 7
PAINLEVEL_OUTOF10: 4
PAINLEVEL_OUTOF10: 5
PAINLEVEL_OUTOF10: 7
PAINLEVEL_OUTOF10: 5
PAINLEVEL_OUTOF10: 5
PAINLEVEL_OUTOF10: 4
PAINLEVEL_OUTOF10: 5
PAINLEVEL_OUTOF10: 3
PAINLEVEL_OUTOF10: 5
PAINLEVEL_OUTOF10: 4
PAINLEVEL_OUTOF10: 4

## 2018-10-02 ASSESSMENT — PAIN DESCRIPTION - PROGRESSION
CLINICAL_PROGRESSION: NOT CHANGED
CLINICAL_PROGRESSION: NOT CHANGED
CLINICAL_PROGRESSION: GRADUALLY WORSENING
CLINICAL_PROGRESSION: NOT CHANGED

## 2018-10-02 ASSESSMENT — PAIN DESCRIPTION - FREQUENCY
FREQUENCY: CONTINUOUS

## 2018-10-02 ASSESSMENT — PAIN DESCRIPTION - ONSET
ONSET: ON-GOING

## 2018-10-02 ASSESSMENT — PAIN DESCRIPTION - ORIENTATION
ORIENTATION: LEFT
ORIENTATION: LEFT

## 2018-10-02 ASSESSMENT — PAIN DESCRIPTION - LOCATION
LOCATION: CHEST
LOCATION: CHEST;INCISION

## 2018-10-02 ASSESSMENT — PAIN DESCRIPTION - PAIN TYPE
TYPE: SURGICAL PAIN
TYPE: ACUTE PAIN;SURGICAL PAIN
TYPE: ACUTE PAIN;SURGICAL PAIN

## 2018-10-02 ASSESSMENT — PAIN DESCRIPTION - DESCRIPTORS
DESCRIPTORS: CONSTANT;DISCOMFORT
DESCRIPTORS: DISCOMFORT
DESCRIPTORS: ACHING;CONSTANT

## 2018-10-02 NOTE — PROGRESS NOTES
Physical Therapy  Facility/Department: [facility]  Daily Treatment Note  NAME: Kassidy Agarwal  : 1955  MRN: [id number]    Date of Service: 10/2/2018    Discharge Recommendations:  Home with assist PRN   PT Equipment Recommendations  Equipment Needed: No    Patient Diagnosis(es): There were no encounter diagnoses. has a past medical history of Anxiety; Benign polyp of large intestine; Cancer [facility]); COPD (chronic obstructive pulmonary disease) ([facility] [address] Hx of blood clots; Hyperlipidemia; Hypertension; Liver hemangioma; Lung nodule; Snores; Uterine fibroid; and Wears glasses. has a past surgical history that includes Hysterectomy (); Abdominal hernia repair (); Colonoscopy; Lung biopsy; Breast biopsy (Left, ); Lung removal, partial (Right, 2018); and pr rmvl lung other than pneumonectomy 1 lobe lobect (Left, 2018). Restrictions  Restrictions/Precautions  Restrictions/Precautions: General Precautions, Surgical Protocols, Fall Risk  Position Activity Restriction  Other position/activity restrictions: Ambulate pt   Subjective   General  Chart Reviewed: Yes  Response To Previous Treatment: Patient with no complaints from previous session. Family / Caregiver Present: Yes ()  Subjective  Subjective: Pt seated in chair upon arrival with Dr present in room. Dr states pt chest tube must remain to suction at this time. Pt agreeable to seated exs. Pleasant and cooperative  General Comment  Comments: Pt left seated in chair with call light within reach  Pain Screening  Patient Currently in Pain: Yes  Pain Assessment  Pain Assessment: 0-10  Pain Level: 3  Pain Type: Acute pain;Surgical pain  Pain Location: Chest (at chest tube site)  Pain Orientation: Left  Pain Descriptors: Discomfort  Pain Frequency: Continuous  Pain Onset: On-going  Clinical Progression: Not changed  Pain Intervention(s): Ambulation/Increased activity; Therapeutic presence  Response to Pain Intervention: Patient

## 2018-10-02 NOTE — PROGRESS NOTES
[facility]  Daily Progress Note    Surgeon:  Dr. Evangelist Davis  S/P :  Shilpi Albright Left upper lobectomy   POD#: 4      Subjective:  Ms. Roc Davidson feels well today with no acute complaints. Pain is controlled. OOBTC and ambulating. Physical Exam  Vital Signs: BP (!) 143/77   Pulse 95   Temp 98.6 °F (37 °C) (Oral)   Resp 21   Ht 5' (1.524 m)   Wt 161 lb 6 oz (73.2 kg)   SpO2 98%   BMI 31.52 kg/m²  O2 Flow Rate (L/min): 2 L/min   Admit Weight: Weight: 152 lb (68.9 kg)   WEIGHTWeight: 161 lb 6 oz (73.2 kg)     General: alert and oriented to person, place and time. Up in chair, No apparent distress. Heart:Normal S1 and S2.  Regular rhythm. No murmurs, gallops, or rubs. Pacing Wires No   Lungs: clear to auscultation bilaterally  Abdomen: soft, non tender, non distended, BSx4  Extremities: negative  Wounds: clean and dry, healing appropriately. Scheduled Meds:    sodium chloride flush  10 mL Intravenous 2 times per day    docusate sodium  100 mg Oral BID    polyethylene glycol  17 g Oral Daily    enoxaparin  40 mg Subcutaneous Daily    lisinopril  10 mg Oral Daily    And    hydrochlorothiazide  12.5 mg Oral Daily     Continuous Infusions:     Data:  CBC:   Recent Labs      09/30/18   1017  10/01/18   0555   WBC  6.7  5.7   HGB  7.9*  8.7*   HCT  23.9*  27.5*   MCV  95.6  97.5   PLT  174  201     BMP:   Recent Labs      10/01/18   0555   NA  140   K  3.8   CL  104   CO2  26   BUN  4*   CREATININE  0.46*     PT/INR: No results for input(s): PROTIME, INR in the last 72 hours. APTT: No results for input(s): APTT in the last 72 hours. Chest X-Ray: pending    I/O:  I/O last 3 completed shifts: In: 0 [P.O.:1050]  Out: 1200 [Urine:900; Chest Tube:300]      Assessment & Plan:   Patient Active Problem List   Diagnosis    Status post lobectomy of lung    Pulmonary atelectasis    Pneumothorax     1. S/p Davinci left upper lobectomy   POD 4   CT output 110 overnight.  Will obtain

## 2018-10-03 ENCOUNTER — APPOINTMENT (OUTPATIENT)
Dept: GENERAL RADIOLOGY | Age: 63
DRG: 164 | End: 2018-10-03
Attending: THORACIC SURGERY (CARDIOTHORACIC VASCULAR SURGERY)
Payer: COMMERCIAL

## 2018-10-03 VITALS
TEMPERATURE: 98.8 F | RESPIRATION RATE: 14 BRPM | WEIGHT: 161.38 LBS | DIASTOLIC BLOOD PRESSURE: 69 MMHG | BODY MASS INDEX: 31.68 KG/M2 | HEIGHT: 60 IN | OXYGEN SATURATION: 99 % | HEART RATE: 101 BPM | SYSTOLIC BLOOD PRESSURE: 139 MMHG

## 2018-10-03 PROCEDURE — 71045 X-RAY EXAM CHEST 1 VIEW: CPT

## 2018-10-03 PROCEDURE — 99024 POSTOP FOLLOW-UP VISIT: CPT | Performed by: PHYSICIAN ASSISTANT

## 2018-10-03 PROCEDURE — 99255 IP/OBS CONSLTJ NEW/EST HI 80: CPT | Performed by: INTERNAL MEDICINE

## 2018-10-03 PROCEDURE — 2700000000 HC OXYGEN THERAPY PER DAY

## 2018-10-03 PROCEDURE — 6360000002 HC RX W HCPCS: Performed by: PHYSICIAN ASSISTANT

## 2018-10-03 PROCEDURE — 99233 SBSQ HOSP IP/OBS HIGH 50: CPT | Performed by: INTERNAL MEDICINE

## 2018-10-03 PROCEDURE — 6370000000 HC RX 637 (ALT 250 FOR IP): Performed by: PHYSICIAN ASSISTANT

## 2018-10-03 PROCEDURE — 2580000003 HC RX 258: Performed by: PHYSICIAN ASSISTANT

## 2018-10-03 RX ORDER — PSEUDOEPHEDRINE HCL 30 MG
100 TABLET ORAL 2 TIMES DAILY
Qty: 60 CAPSULE | Refills: 0 | Status: SHIPPED | OUTPATIENT
Start: 2018-10-03 | End: 2018-11-21 | Stop reason: SDUPTHER

## 2018-10-03 RX ORDER — OXYCODONE HYDROCHLORIDE AND ACETAMINOPHEN 5; 325 MG/1; MG/1
1 TABLET ORAL EVERY 6 HOURS PRN
Qty: 28 TABLET | Refills: 0 | Status: SHIPPED | OUTPATIENT
Start: 2018-10-03 | End: 2018-10-10 | Stop reason: SDUPTHER

## 2018-10-03 RX ADMIN — Medication 10 ML: at 09:13

## 2018-10-03 RX ADMIN — OXYCODONE HYDROCHLORIDE AND ACETAMINOPHEN 2 TABLET: 5; 325 TABLET ORAL at 00:28

## 2018-10-03 RX ADMIN — OXYCODONE HYDROCHLORIDE AND ACETAMINOPHEN 2 TABLET: 5; 325 TABLET ORAL at 14:30

## 2018-10-03 RX ADMIN — OXYCODONE HYDROCHLORIDE AND ACETAMINOPHEN 2 TABLET: 5; 325 TABLET ORAL at 18:23

## 2018-10-03 RX ADMIN — DOCUSATE SODIUM 100 MG: 100 CAPSULE, LIQUID FILLED ORAL at 09:11

## 2018-10-03 RX ADMIN — ONDANSETRON 4 MG: 2 INJECTION INTRAMUSCULAR; INTRAVENOUS at 07:01

## 2018-10-03 RX ADMIN — POLYETHYLENE GLYCOL 3350 17 G: 17 POWDER, FOR SOLUTION ORAL at 09:12

## 2018-10-03 RX ADMIN — HYDROCHLOROTHIAZIDE 12.5 MG: 25 TABLET ORAL at 09:11

## 2018-10-03 RX ADMIN — OXYCODONE HYDROCHLORIDE AND ACETAMINOPHEN 2 TABLET: 5; 325 TABLET ORAL at 10:18

## 2018-10-03 RX ADMIN — OXYCODONE HYDROCHLORIDE AND ACETAMINOPHEN 2 TABLET: 5; 325 TABLET ORAL at 05:57

## 2018-10-03 RX ADMIN — LISINOPRIL 10 MG: 10 TABLET ORAL at 09:11

## 2018-10-03 RX ADMIN — ENOXAPARIN SODIUM 40 MG: 40 INJECTION SUBCUTANEOUS at 09:11

## 2018-10-03 ASSESSMENT — PAIN DESCRIPTION - ONSET
ONSET: ON-GOING
ONSET: ON-GOING

## 2018-10-03 ASSESSMENT — PAIN DESCRIPTION - PROGRESSION
CLINICAL_PROGRESSION: GRADUALLY WORSENING
CLINICAL_PROGRESSION: GRADUALLY IMPROVING
CLINICAL_PROGRESSION: GRADUALLY WORSENING

## 2018-10-03 ASSESSMENT — PAIN SCALES - GENERAL
PAINLEVEL_OUTOF10: 7
PAINLEVEL_OUTOF10: 6

## 2018-10-03 ASSESSMENT — PAIN DESCRIPTION - FREQUENCY
FREQUENCY: CONTINUOUS
FREQUENCY: CONTINUOUS

## 2018-10-03 ASSESSMENT — PAIN DESCRIPTION - PAIN TYPE
TYPE: ACUTE PAIN;SURGICAL PAIN
TYPE: ACUTE PAIN;SURGICAL PAIN

## 2018-10-03 ASSESSMENT — PAIN DESCRIPTION - ORIENTATION
ORIENTATION: LEFT
ORIENTATION: LEFT

## 2018-10-03 ASSESSMENT — PAIN DESCRIPTION - DESCRIPTORS
DESCRIPTORS: ACHING;CONSTANT
DESCRIPTORS: ACHING;CONSTANT

## 2018-10-03 ASSESSMENT — PAIN DESCRIPTION - LOCATION
LOCATION: CHEST;INCISION
LOCATION: CHEST;INCISION

## 2018-10-03 NOTE — CONSULTS
Today's Date: 10/3/2018  Patient Name: Manolo Soler  Date of admission: 9/28/2018  5:13 AM  Patient's age: 66 y.o., 1955  Admission Dx: Status post lobectomy of lung [Z90.2]    Reason for Consult: management recommendations  Requesting Physician: Gonzalo Jett MD    CHIEF COMPLAINT:  Left upper lobe invasive lung adenocarcinoma, anemia. Pathologic stage: pT1c, pN2, R1. History Obtained From:  patient, electronic medical record    HISTORY OF PRESENT ILLNESS:      The patient is a 66 y.o.  female who is admitted to the hospital for Left upper lobe mass. Pt has a significant past medical history of Uterine fibroids requiring total abdominal hysterectomy, liver hemangioma, HTN, Hyperlipidemia, DVT Left leg, COPD, and anxiety. Pt was found to have a left upper lung adenocarcinoma and presented to the hospital on 9/28/2018 for a scheduled robotic-assisted left upper lobe lobectomy. Pathology is positive for metastatic adenocarcinoma. There are some lymph nodes positive and some evidence of invasion to surrounding structures seen during surgery. Surgical team is planning on discharging patient this evening from the hospital.  Likely need some adjunctive chemotherapy in the outpatient setting. Past Medical History:   has a past medical history of Anxiety; Benign polyp of large intestine; Cancer [facility]); COPD (chronic obstructive pulmonary disease) ([facility] [address] Hx of blood clots; Hyperlipidemia; Hypertension; Liver hemangioma; Lung nodule; Snores; Uterine fibroid; and Wears glasses. Past Surgical History:   has a past surgical history that includes Hysterectomy (2010); Abdominal hernia repair (2012); Colonoscopy; Lung biopsy; Breast biopsy (Left, 1983); Lung removal, partial (Right, 09/28/2018); pr rmvl lung other than pneumonectomy 1 lobe lobect (Left, 9/28/2018); and bronchoscopy (10/1/2018). Medications:    Prior to Admission medications    Medication Sig Start Date End Date Taking?

## 2018-10-03 NOTE — PROGRESS NOTES
diminished breath sounds BARAK Equal and symmetric expansion with respiration. Chest tubes water seal +ve leak  Abdomen: soft, non tender, non distended, BSx4  Extremities: Trace edema, intact pulses in all four extremities  Musculoskeletal:  Intact range of motion of peripheral joints. grossly normal  Neurologic:  Non-focal sensory deficits on exam.  Psychiatric: Mood and affect are appropriate. Wounds: clean and dry, healing appropriately, dressing in place       Assessment/Plan:   Patient Active Problem List   Diagnosis    Status post lobectomy of lung    Pulmonary atelectasis    Pneumothorax       1. S/p robotic LLLobectomy   POD 5  +Ct leak. 90ml ON WS, CXR at 2pm  Oncology consult   Possible need for port prior to d/c   Patho  -- Diagnosis --   1.  LYMPH NODE, LEVEL 9, BIOPSY:       -  ONE LYMPH NODE, NEGATIVE FOR MALIGNANCY (0/1). 2.  LYMPH NODE, LEVEL 11, DISSECTION:       -  ONE OF 2 LYMPH NODES POSITIVE FOR METASTATIC CARCINOMA (1/2). 3.  LYMPH NODE, LEVEL 5, DISSECTION:       -  ONE OF 2 LYMPH NODES POSITIVE FOR METASTATIC CARCINOMA (1/2).     -  CARCINOMA INVADES LARGE PERIPHERAL NERVE BRANCHES. 4.  LUNG, LEFT UPPER LOBE, LOBECTOMY:            -  WELL-DIFFERENTIATED INVASIVE ADENOCARCINOMA, 2.9 CM   GREATEST DIMENSION.     -  NEGATIVE FOR VISCERAL PLEURAL INVASION.          -  TUMOR INVOLVES SOFT TISSUE OF BRONCHIAL, VASCULAR AND   PARENCHYMAL MARGINS.     -  5 PERIBRONCHIOLAR LYMPH NODES, POSITIVE FOR METASTATIC   ADENOCARCINOMA (5/5).     -  SEPARATE SMALL INTRALOBAR FOCUS ATYPICAL ADENOMATOUS   HYPERPLASIA.           -  PATHOLOGIC STAGE:  pT1c, pN2, R1.     5.  LYMPH NODES, LEVEL 10, DISSECTION:       -  ONE OF 2 LYMPH NODES POSITIVE FOR METASTATIC CARCINOMA (1/2).     DVT ppx: Lovenox & SCD's while stationary  Plan for discharge home with home care    The above recommendations including medications and orders were discussed and agreed upon with Sona Syed the attending on

## 2018-10-03 NOTE — PROGRESS NOTES
findings or movement disorder noted}  Extremities - peripheral pulses normal, no pedal edema, no clubbing or cyanosis  Skin - normal coloration and turgor, no rashes, no suspicious skin lesions noted     DATA REVIEW     Allergies:     Allergies   Allergen Reactions    Codeine Nausea And Vomiting       Medications:  Scheduled Meds:   sodium chloride flush  10 mL Intravenous 2 times per day    docusate sodium  100 mg Oral BID    polyethylene glycol  17 g Oral Daily    enoxaparin  40 mg Subcutaneous Daily    lisinopril  10 mg Oral Daily    And    hydrochlorothiazide  12.5 mg Oral Daily     Continuous Infusions:    LABS:-  ABGs:   Lab Results   Component Value Date    PHART 7.411 09/28/2018    PO2ART 112.0 09/28/2018    PPV2XWK 37.1 09/28/2018    OHM8YOU 23.1 09/28/2018    G5PHIRCK 98.7 09/28/2018     CBC:   Lab Results   Component Value Date    WBC 5.7 10/01/2018    HGB 8.7 (L) 10/01/2018    HCT 27.5 (L) 10/01/2018    MCV 97.5 10/01/2018     10/01/2018    LYMPHOPCT 15 (L) 09/29/2018    RBC 2.82 (L) 10/01/2018    MCH 30.9 10/01/2018    MCHC 31.6 10/01/2018    RDW 12.6 10/01/2018     BMP:   Lab Results   Component Value Date     10/01/2018    K 3.8 10/01/2018     10/01/2018    CO2 26 10/01/2018    BUN 4 10/01/2018    CREATININE 0.46 10/01/2018    GLUCOSE 96 10/01/2018    CALCIUM 8.6 10/01/2018       Liver Function Test:   Lab Results   Component Value Date    ALT 16 09/25/2018    AST 16 09/25/2018    ALKPHOS 77 09/25/2018    BILITOT 0.28 (L) 09/25/2018     Coagulation Profile:   Lab Results   Component Value Date    INR 1.1 09/28/2018    PROTIME 11.3 09/28/2018    APTT 22.2 09/28/2018     Input/Output:    Intake/Output Summary (Last 24 hours) at 10/03/18 1704  Last data filed at 10/03/18 1449   Gross per 24 hour   Intake                0 ml   Output             2420 ml   Net            -2420 ml     MICRO:   no growth to date     PATHOLOGY:  Patient Name: Hayley Valles: [id number]

## 2018-10-04 ENCOUNTER — TELEPHONE (OUTPATIENT)
Dept: ONCOLOGY | Age: 63
End: 2018-10-04

## 2018-10-05 ENCOUNTER — HOSPITAL ENCOUNTER (OUTPATIENT)
Facility: MEDICAL CENTER | Age: 63
End: 2018-10-05
Payer: COMMERCIAL

## 2018-10-08 ASSESSMENT — ENCOUNTER SYMPTOMS
CONSTIPATION: 0
TROUBLE SWALLOWING: 0
CHEST TIGHTNESS: 0
VOMITING: 0
COUGH: 0
NAUSEA: 0
EYE DISCHARGE: 0
EYE REDNESS: 0
SHORTNESS OF BREATH: 0
ABDOMINAL PAIN: 0

## 2018-10-08 NOTE — DISCHARGE SUMMARY
ASSISTED LEFT UPPER LOBECTOMY MULTIPLE LYMPH NODE BIOPSY, INTERCOSTAL  NERVE BLOCK ABLATION W/EXPAREL performed by Bob Clark MD at [facility]   Allergen Reactions    Codeine Nausea And Vomiting     Family History   Problem Relation Age of Onset    Cancer Mother         LUNG    Cancer Sister         LUNG     Social History     Social History    Marital status:      Spouse name: N/A    Number of children: N/A    Years of education: N/A     Occupational History    Not on file. Social History Main Topics    Smoking status: Former Smoker     Packs/day: 1.50     Years: 30.00     Types: Cigarettes     Quit date: 2000    Smokeless tobacco: Never Used    Alcohol use Yes      Comment: Occassionally    Drug use: No    Sexual activity: Not on file     Other Topics Concern    Not on file     Social History Narrative    No narrative on file          Medication List      START taking these medications    docusate 100 MG Caps  Commonly known as:  COLACE, DULCOLAX  Take 100 mg by mouth 2 times daily     oxyCODONE-acetaminophen 5-325 MG per tablet  Commonly known as:  PERCOCET  Take 1 tablet by mouth every 6 hours as needed for Pain for up to 7 days. Opal Belts         CHANGE how you take these medications    albuterol sulfate  (90 Base) MCG/ACT inhaler  Commonly known as:  VENTOLIN HFA  Inhale 2 puffs into the lungs 4 times daily  What changed:  · when to take this  · reasons to take this        CONTINUE taking these medications    ALPRAZolam 0.25 MG tablet  Commonly known as:  XANAX     aspirin 81 MG tablet     Biotin 1000 MCG Tabs     fish oil 3537 MG Caps     Garlic 10 MG Caps     lisinopril-hydrochlorothiazide 10-12.5 MG per tablet  Commonly known as:  PRINZIDE;ZESTORETIC     lovastatin 10 MG tablet  Commonly known as:  MEVACOR           Where to Get Your Medications      These medications were sent to [address] [place] [address] 3, [address] - F [phone] [address]    Phone:  [phone]   · docusate 100 MG Caps  · oxyCODONE-acetaminophen 5-325 MG per tablet           Data:    CBC: No results for input(s): WBC, HGB, HCT, MCV, PLT in the last 72 hours. BMP: No results for input(s): NA, K, CL, CO2, PHOS, BUN, CREATININE, MG in the last 72 hours. Invalid input(s): CA  Accucheck Glucoses:   No results for input(s): POCGLU in the last 72 hours. Cardiac Enzymes: No results for input(s): CKTOTAL, CKMB, CKMBINDEX, TROPONINI in the last 72 hours. PTT/PT/INR:   No results for input(s): PROTIME, INR in the last 72 hours. No results for input(s): APTT in the last 72 hours. Liver Profile:  Lab Results   Component Value Date    AST 16 09/25/2018    ALT 16 09/25/2018    BILITOT 0.28 09/25/2018    ALKPHOS 77 09/25/2018     Lab Results   Component Value Date    CHOL 242 05/11/2016    HDL 72 06/28/2018    TRIG 172 05/11/2016     TSH:   Lab Results   Component Value Date    TSH 1.77 05/11/2016     UA:   Lab Results   Component Value Date    COLORU YELLOW 09/25/2018    PHUR 7.5 09/25/2018    SPECGRAV 1.009 09/25/2018    LEUKOCYTESUR NEGATIVE 09/25/2018    UROBILINOGEN Normal 09/25/2018    BILIRUBINUR NEGATIVE 09/25/2018    GLUCOSEU NEGATIVE 09/25/2018       Problem List Items Addressed This Visit     Status post lobectomy of lung - Primary    Relevant Medications    oxyCODONE-acetaminophen (PERCOCET) 5-325 MG per tablet            Discharge Plan:  Follow up with CT Surgery  in 2 week. Call office at [phone] for any problems. Follow up with PCP  in 1-2 weeks.            Scooby Mills CNP  Phone: [phone]

## 2018-10-10 ENCOUNTER — OFFICE VISIT (OUTPATIENT)
Dept: ONCOLOGY | Age: 63
End: 2018-10-10
Payer: COMMERCIAL

## 2018-10-10 ENCOUNTER — TELEPHONE (OUTPATIENT)
Dept: ONCOLOGY | Age: 63
End: 2018-10-10

## 2018-10-10 VITALS
WEIGHT: 154.8 LBS | RESPIRATION RATE: 18 BRPM | SYSTOLIC BLOOD PRESSURE: 136 MMHG | TEMPERATURE: 98 F | BODY MASS INDEX: 30.23 KG/M2 | HEART RATE: 108 BPM | DIASTOLIC BLOOD PRESSURE: 76 MMHG

## 2018-10-10 DIAGNOSIS — Z90.2 STATUS POST LOBECTOMY OF LUNG: ICD-10-CM

## 2018-10-10 DIAGNOSIS — C34.92 NON-SMALL CELL CANCER OF LEFT LUNG (HCC): Primary | ICD-10-CM

## 2018-10-10 PROCEDURE — G8484 FLU IMMUNIZE NO ADMIN: HCPCS | Performed by: INTERNAL MEDICINE

## 2018-10-10 PROCEDURE — G8417 CALC BMI ABV UP PARAM F/U: HCPCS | Performed by: INTERNAL MEDICINE

## 2018-10-10 PROCEDURE — G8427 DOCREV CUR MEDS BY ELIG CLIN: HCPCS | Performed by: INTERNAL MEDICINE

## 2018-10-10 PROCEDURE — 3017F COLORECTAL CA SCREEN DOC REV: CPT | Performed by: INTERNAL MEDICINE

## 2018-10-10 PROCEDURE — 1111F DSCHRG MED/CURRENT MED MERGE: CPT | Performed by: INTERNAL MEDICINE

## 2018-10-10 PROCEDURE — 99211 OFF/OP EST MAY X REQ PHY/QHP: CPT

## 2018-10-10 PROCEDURE — 1036F TOBACCO NON-USER: CPT | Performed by: INTERNAL MEDICINE

## 2018-10-10 PROCEDURE — 99215 OFFICE O/P EST HI 40 MIN: CPT | Performed by: INTERNAL MEDICINE

## 2018-10-10 RX ORDER — OXYCODONE HYDROCHLORIDE AND ACETAMINOPHEN 5; 325 MG/1; MG/1
1 TABLET ORAL EVERY 6 HOURS PRN
Qty: 60 TABLET | Refills: 0 | Status: SHIPPED | OUTPATIENT
Start: 2018-10-10 | End: 2018-10-25

## 2018-10-10 NOTE — PROGRESS NOTES
Patient ID: Jennifer Hackett, 1955, [id number], 66 y.o. Reason for consultation: Hospital follow up  Left upper lobe invasive lung adenocarcinoma, Status post lobectomy 9/28/18, Pathologic stage: pT1c, pN2, stage IIIa R1 with positive margin,    HISTORY OF PRESENT ILLNESS:    Oncologic History:  The patient is a 66 y.o.  female who is admitted to the hospital for Left upper lobe mass. Pt has a significant past medical history of Uterine fibroids requiring total abdominal hysterectomy, liver hemangioma, HTN, Hyperlipidemia, DVT Left leg, COPD, and anxiety. Pt was found to have a left upper lung adenocarcinoma and presented to the hospital on 9/28/2018 for a scheduled robotic-assisted left upper lobe lobectomy. Pathology is positive for metastatic adenocarcinoma. There are some lymph nodes positive and some evidence of invasion to surrounding structures seen during surgery. Surgical team is planning on discharging patient this evening from the hospital.  She was discharged from the hospital with plan to follow with oncology as outpatient. Interval history:   Patient is returning for follow-up visit. She complains of off and on pain in her left chest and left upper back. She has some numbness and tingling that her. She denied any shortness of breath. No fever or chills. She is here to discuss the pathology results and further recommendations.   During this visit patient's allergy, social, medical, surgical history and medications were reviewed and updated.        Past Medical History:   Diagnosis Date    Anxiety     Benign polyp of large intestine     Cancer ([address]     LT UPPER LOBE    COPD (chronic obstructive pulmonary disease) ([address]     Hx of blood clots     DVT LT LEG    Hyperlipidemia     Hypertension     Liver hemangioma     Lung nodule     BARAK  Nodule    Snores     not tested for apnea    Uterine fibroid 09/2010    Wears glasses        Past Surgical History: Procedure Laterality Date    ABDOMINAL HERNIA REPAIR  2012    BREAST BIOPSY Left 1983    BRONCHOSCOPY  10/1/2018    BRONCHOSCOPY performed by Sylvia Olivo MD at [facility]  2010    LUNG BIOPSY      LUNG REMOVAL, PARTIAL Right 09/28/2018    Robotic assisted left lobectomy, upper with lymph node biopsy    MS RMVL LUNG OTHER THAN PNEUMONECTOMY 1 LOBE LOBECT Left 9/28/2018    XI ROBOTIC ASSISTED LEFT UPPER LOBECTOMY MULTIPLE LYMPH NODE BIOPSY, INTERCOSTAL  NERVE BLOCK ABLATION W/EXPAREL performed by Bhavna Cook MD at [address] [place] [address]   Allergen Reactions    Codeine Nausea And Vomiting       Current Outpatient Prescriptions   Medication Sig Dispense Refill    oxyCODONE-acetaminophen (PERCOCET) 5-325 MG per tablet Take 1 tablet by mouth every 6 hours as needed for Pain for up to 15 days. . 60 tablet 0    docusate sodium (COLACE, DULCOLAX) 100 MG CAPS Take 100 mg by mouth 2 times daily 60 capsule 0    lisinopril-hydrochlorothiazide (PRINZIDE;ZESTORETIC) 10-12.5 MG per tablet Take 1 tablet by mouth daily      Garlic 10 MG CAPS Take by mouth daily      Biotin 1000 MCG TABS Take 1 tablet by mouth daily      Omega-3 Fatty Acids (FISH OIL) 1000 MG CAPS Take 1,000 mg by mouth daily      aspirin 81 MG tablet 1 tablet      ALPRAZolam (XANAX) 0.25 MG tablet Take 0.25 mg by mouth 3 times daily as needed for Sleep. Douglas Stephens lovastatin (MEVACOR) 10 MG tablet Take 10 mg by mouth nightly      albuterol sulfate HFA (VENTOLIN HFA) 108 (90 Base) MCG/ACT inhaler Inhale 2 puffs into the lungs 4 times daily (Patient taking differently: Inhale 2 puffs into the lungs every 6 hours as needed ) 1 Inhaler 0     No current facility-administered medications for this visit. Social History     Social History    Marital status:      Spouse name: N/A    Number of children: N/A    Years of education: N/A     Occupational History    Not on file.      Social History Main NEGATIVE FOR MALIGNANCY (0/1). 2.  LYMPH NODE, LEVEL 11, DISSECTION:       -  ONE OF 2 LYMPH NODES POSITIVE FOR METASTATIC CARCINOMA (1/2). 3.  LYMPH NODE, LEVEL 5, DISSECTION:       -  ONE OF 2 LYMPH NODES POSITIVE FOR METASTATIC CARCINOMA (1/2).     -  CARCINOMA INVADES LARGE PERIPHERAL NERVE BRANCHES. 4.  LUNG, LEFT UPPER LOBE, LOBECTOMY:            -  WELL-DIFFERENTIATED INVASIVE ADENOCARCINOMA, 2.9 CM   GREATEST DIMENSION.     -  NEGATIVE FOR VISCERAL PLEURAL INVASION.          -  TUMOR INVOLVES SOFT TISSUE OF BRONCHIAL, VASCULAR AND PARENCHYMAL MARGINS.     -  5 PERIBRONCHIOLAR LYMPH NODES, POSITIVE FOR METASTATIC   ADENOCARCINOMA (5/5).     -  SEPARATE SMALL INTRALOBAR FOCUS ATYPICAL ADENOMATOUS   HYPERPLASIA.           -  PATHOLOGIC STAGE:  pT1c, pN2, R1.     5.  LYMPH NODES, LEVEL 10, DISSECTION:       -  ONE OF 2 LYMPH NODES POSITIVE FOR METASTATIC CARCINOMA (1/2). IMAGING DATA:    PET/CT 7/27/18:  [address]  Result Impression   1. Re- demonstration of irregular spiculated 2.1 cm nodule in the lingula  which is FDG avid most consistent with primary lung malignancy. 2. Anterior left hilar 8 mm lymph node which is mildly FDG avid suggesting  metastatic disease. 3. Multiple incidental/chronic findings as above including re- demonstration  of right hepatic lobe hemangioma and cyst.   . CCT ([facility]) 7/19/18  *A 2.6 x 2.1 cm spiculated nodule is identified involving the lingula  correlating to the abnormality seen on chest x-ray.  Malignancy is suspected  and further evaluation with tissue sampling and/or PET CT is recommended. *Partially calcified subpleural nodules identified involving the left lower  lobe along the diaphragmatic surface measuring 1.4 cm in greatest axial  dimension.  Finding can also be further characterized by PET-CT. *Centrilobular/paraseptal emphysematous changes with large bulla involving  the right lung apex.   *Multiple low attenuating lesions are identified within the visualized liver  parenchyma, incompletely characterized on today's study, largest measuring  4.1 cm within segment 8 of the liver, most likely representing an hemangioma  given the peripheral puddling of contrast.  Given the lung nodule, if  complete evaluation is needed, CT or MRI utilizing liver mass protocol is  recommended. CT Head 8/3/18:  1. No acute intracranial abnormality or abnormal postcontrast enhancement. 2. Few scattered well-circumscribed subcentimeter lucent areas in the  calvarium largest in the right frontal lesion as measured above which are  favored to be benign however given history of malignancy recommend  correlation with bone scan to assess for possible metastasis. ASSESSMENT:    Valencia Ybarra Is a very pleasant 59-year-old  female with newly diagnosed left upper lobe non-small cell lung cancer, adenocarcinoma, status post Robotically assisted BARAK lobectomy with LN dissection and Intercostal nerve block with experal on 9/28/18. I discussed the surgical pathology, diagnosis, prognosis and treatment options with patient. She has P2sW0B1 disease, stage IIIA, she had R1 disease with positive margins. I discussed the NCCN guidelines with patient. And the recommendation is either reresection if possible or concurrent chemoradiation. Regardless further resection is possible or not she will need systemic chemotherapy. I discussed the risk and benefits. I reviewed the pathology report with thoracic surgery Dr Goldy Pro. He is planning to see patient next week and possible plan for bronchoscopy to see if further resection is possible. I will also refer her to radiation oncology in the meantime and get restaging PET scan as her scan was done in July. Additionally her CT head in August did show some well-circumscribed lucent areas in the calvarium and therefore I will get MRI of brain to evaluate these further.   During today's visit, the patient and the family had

## 2018-10-11 ENCOUNTER — TELEPHONE (OUTPATIENT)
Dept: CASE MANAGEMENT | Age: 63
End: 2018-10-11

## 2018-10-12 DIAGNOSIS — Z90.2 STATUS POST LOBECTOMY OF LUNG: Primary | ICD-10-CM

## 2018-10-15 ENCOUNTER — OFFICE VISIT (OUTPATIENT)
Dept: CARDIOTHORACIC SURGERY | Age: 63
End: 2018-10-15

## 2018-10-15 ENCOUNTER — HOSPITAL ENCOUNTER (OUTPATIENT)
Age: 63
Discharge: HOME | End: 2018-10-17
Payer: COMMERCIAL

## 2018-10-15 ENCOUNTER — HOSPITAL ENCOUNTER (OUTPATIENT)
Dept: GENERAL RADIOLOGY | Age: 63
Discharge: HOME | End: 2018-10-17
Payer: COMMERCIAL

## 2018-10-15 VITALS
HEART RATE: 109 BPM | OXYGEN SATURATION: 97 % | SYSTOLIC BLOOD PRESSURE: 129 MMHG | HEIGHT: 59 IN | BODY MASS INDEX: 31.25 KG/M2 | DIASTOLIC BLOOD PRESSURE: 82 MMHG | RESPIRATION RATE: 18 BRPM | TEMPERATURE: 98.1 F | WEIGHT: 155 LBS

## 2018-10-15 DIAGNOSIS — Z90.2 STATUS POST LOBECTOMY OF LUNG: Primary | ICD-10-CM

## 2018-10-15 DIAGNOSIS — Z90.2 STATUS POST LOBECTOMY OF LUNG: ICD-10-CM

## 2018-10-15 PROCEDURE — 71046 X-RAY EXAM CHEST 2 VIEWS: CPT

## 2018-10-15 PROCEDURE — 99024 POSTOP FOLLOW-UP VISIT: CPT | Performed by: PHYSICIAN ASSISTANT

## 2018-10-16 ENCOUNTER — HOSPITAL ENCOUNTER (OUTPATIENT)
Dept: RADIATION ONCOLOGY | Facility: MEDICAL CENTER | Age: 63
Discharge: HOME | End: 2018-10-16
Payer: COMMERCIAL

## 2018-10-16 PROCEDURE — 99213 OFFICE O/P EST LOW 20 MIN: CPT | Performed by: RADIOLOGY

## 2018-10-18 ENCOUNTER — TELEPHONE (OUTPATIENT)
Dept: CARDIOTHORACIC SURGERY | Age: 63
End: 2018-10-18

## 2018-10-23 ENCOUNTER — TELEPHONE (OUTPATIENT)
Dept: CARDIOTHORACIC SURGERY | Age: 63
End: 2018-10-23

## 2018-10-23 ENCOUNTER — HOSPITAL ENCOUNTER (OUTPATIENT)
Facility: MEDICAL CENTER | Age: 63
End: 2018-10-23
Payer: COMMERCIAL

## 2018-10-24 ENCOUNTER — HOSPITAL ENCOUNTER (OUTPATIENT)
Dept: MRI IMAGING | Age: 63
Discharge: HOME | End: 2018-10-26
Payer: COMMERCIAL

## 2018-10-24 DIAGNOSIS — C34.92 NON-SMALL CELL CANCER OF LEFT LUNG (HCC): ICD-10-CM

## 2018-10-24 PROCEDURE — 70553 MRI BRAIN STEM W/O & W/DYE: CPT

## 2018-10-24 PROCEDURE — 6360000004 HC RX CONTRAST MEDICATION: Performed by: INTERNAL MEDICINE

## 2018-10-24 PROCEDURE — A9579 GAD-BASE MR CONTRAST NOS,1ML: HCPCS | Performed by: INTERNAL MEDICINE

## 2018-10-24 RX ADMIN — GADOTERIDOL 15 ML: 279.3 INJECTION, SOLUTION INTRAVENOUS at 07:35

## 2018-10-24 NOTE — PROGRESS NOTES
Subjective:     Amador Calero returns today status post left robotic upper lobectomy on 9/28/18. She reutrns today to discuss final pathology results. No respiratory or cardiac complaints. Incision minimal discomfort no neuropathic type symptoms. Objective:     /82 (Site: Right Upper Arm, Position: Sitting, Cuff Size: Medium Adult)   Pulse 109   Temp 98.1 °F (36.7 °C) (Oral)   Resp 18   Ht 4' 11\" (1.499 m)   Wt 155 lb (70.3 kg)   SpO2 97%   BMI 31.31 kg/m²   Chest: incision healed   CV: Normal S1S2  Lungs: clear to IPPA posteriorly  Lower extremities: no edema    Medications:     Current Outpatient Prescriptions on File Prior to Visit   Medication Sig Dispense Refill    oxyCODONE-acetaminophen (PERCOCET) 5-325 MG per tablet Take 1 tablet by mouth every 6 hours as needed for Pain for up to 15 days. . 60 tablet 0    docusate sodium (COLACE, DULCOLAX) 100 MG CAPS Take 100 mg by mouth 2 times daily 60 capsule 0    lisinopril-hydrochlorothiazide (PRINZIDE;ZESTORETIC) 10-12.5 MG per tablet Take 1 tablet by mouth daily      ALPRAZolam (XANAX) 0.25 MG tablet Take 0.25 mg by mouth 3 times daily as needed for Sleep. Ova Walton lovastatin (MEVACOR) 10 MG tablet Take 10 mg by mouth nightly      albuterol sulfate HFA (VENTOLIN HFA) 108 (90 Base) MCG/ACT inhaler Inhale 2 puffs into the lungs 4 times daily (Patient taking differently: Inhale 2 puffs into the lungs every 6 hours as needed ) 1 Inhaler 0    Garlic 10 MG CAPS Take by mouth daily      Biotin 1000 MCG TABS Take 1 tablet by mouth daily      Omega-3 Fatty Acids (FISH OIL) 1000 MG CAPS Take 1,000 mg by mouth daily      aspirin 81 MG tablet 1 tablet       No current facility-administered medications on file prior to visit. Assessment:     S/P left robotic left upper lobectomy  1. LYMPH NODE, LEVEL 9, BIOPSY:       -  ONE LYMPH NODE, NEGATIVE FOR MALIGNANCY (0/1).      2.  LYMPH NODE, LEVEL 11, DISSECTION:       -  ONE OF 2 LYMPH NODES POSITIVE FOR METASTATIC CARCINOMA (1/2). 3.  LYMPH NODE, LEVEL 5, DISSECTION:       -  ONE OF 2 LYMPH NODES POSITIVE FOR METASTATIC CARCINOMA (1/2). -  CARCINOMA INVADES LARGE PERIPHERAL NERVE BRANCHES. 4.  LUNG, LEFT UPPER LOBE, LOBECTOMY:            -  WELL-DIFFERENTIATED INVASIVE ADENOCARCINOMA, 2.9 CM   GREATEST DIMENSION. -  NEGATIVE FOR VISCERAL PLEURAL INVASION. -  TUMOR INVOLVES SOFT TISSUE OF BRONCHIAL, VASCULAR AND   PARENCHYMAL MARGINS. -  5 PERIBRONCHIOLAR LYMPH NODES, POSITIVE FOR METASTATIC   ADENOCARCINOMA (5/5). -  SEPARATE SMALL INTRALOBAR FOCUS ATYPICAL ADENOMATOUS   HYPERPLASIA. -  PATHOLOGIC STAGE:  pT1c, pN2, R1.     5.  LYMPH NODES, LEVEL 10, DISSECTION:       -  ONE OF 2 LYMPH NODES POSITIVE FOR METASTATIC CARCINOMA (1/2). Plan:     Referral for bronchoscopy to measure bronchial size. F/U with our office post bronch for possible resection of positive bronchial margins. Continue with Oncology.     Demario Peacock

## 2018-10-29 ENCOUNTER — HOSPITAL ENCOUNTER (OUTPATIENT)
Age: 63
Setting detail: OUTPATIENT SURGERY
Discharge: HOME | End: 2018-10-29
Attending: INTERNAL MEDICINE | Admitting: INTERNAL MEDICINE
Payer: COMMERCIAL

## 2018-10-29 VITALS
SYSTOLIC BLOOD PRESSURE: 130 MMHG | HEART RATE: 107 BPM | OXYGEN SATURATION: 92 % | WEIGHT: 155 LBS | DIASTOLIC BLOOD PRESSURE: 72 MMHG | RESPIRATION RATE: 20 BRPM | BODY MASS INDEX: 31.25 KG/M2 | HEIGHT: 59 IN | TEMPERATURE: 97.7 F

## 2018-10-29 PROCEDURE — 7100000010 HC PHASE II RECOVERY - FIRST 15 MIN: Performed by: INTERNAL MEDICINE

## 2018-10-29 PROCEDURE — 2500000003 HC RX 250 WO HCPCS: Performed by: INTERNAL MEDICINE

## 2018-10-29 PROCEDURE — 99153 MOD SED SAME PHYS/QHP EA: CPT | Performed by: INTERNAL MEDICINE

## 2018-10-29 PROCEDURE — 6360000002 HC RX W HCPCS: Performed by: INTERNAL MEDICINE

## 2018-10-29 PROCEDURE — 2709999900 HC NON-CHARGEABLE SUPPLY: Performed by: INTERNAL MEDICINE

## 2018-10-29 PROCEDURE — 94760 N-INVAS EAR/PLS OXIMETRY 1: CPT

## 2018-10-29 PROCEDURE — 99152 MOD SED SAME PHYS/QHP 5/>YRS: CPT | Performed by: INTERNAL MEDICINE

## 2018-10-29 PROCEDURE — 6370000000 HC RX 637 (ALT 250 FOR IP): Performed by: INTERNAL MEDICINE

## 2018-10-29 PROCEDURE — 2580000003 HC RX 258: Performed by: INTERNAL MEDICINE

## 2018-10-29 PROCEDURE — 3609027000 HC BRONCHOSCOPY: Performed by: INTERNAL MEDICINE

## 2018-10-29 PROCEDURE — 7100000011 HC PHASE II RECOVERY - ADDTL 15 MIN: Performed by: INTERNAL MEDICINE

## 2018-10-29 PROCEDURE — 94640 AIRWAY INHALATION TREATMENT: CPT

## 2018-10-29 RX ORDER — LIDOCAINE HYDROCHLORIDE 40 MG/ML
4 INJECTION, SOLUTION RETROBULBAR; TOPICAL ONCE
Status: COMPLETED | OUTPATIENT
Start: 2018-10-29 | End: 2018-10-29

## 2018-10-29 RX ORDER — MIDAZOLAM HYDROCHLORIDE 1 MG/ML
INJECTION INTRAMUSCULAR; INTRAVENOUS PRN
Status: DISCONTINUED | OUTPATIENT
Start: 2018-10-29 | End: 2018-10-29 | Stop reason: HOSPADM

## 2018-10-29 RX ORDER — SODIUM CHLORIDE, SODIUM LACTATE, POTASSIUM CHLORIDE, CALCIUM CHLORIDE 600; 310; 30; 20 MG/100ML; MG/100ML; MG/100ML; MG/100ML
INJECTION, SOLUTION INTRAVENOUS CONTINUOUS
Status: DISCONTINUED | OUTPATIENT
Start: 2018-10-29 | End: 2018-10-29 | Stop reason: HOSPADM

## 2018-10-29 RX ORDER — OXYCODONE HYDROCHLORIDE AND ACETAMINOPHEN 5; 325 MG/1; MG/1
1 TABLET ORAL EVERY 4 HOURS PRN
COMMUNITY
End: 2018-10-31 | Stop reason: SDUPTHER

## 2018-10-29 RX ORDER — ALBUTEROL SULFATE 2.5 MG/3ML
2.5 SOLUTION RESPIRATORY (INHALATION) ONCE
Status: COMPLETED | OUTPATIENT
Start: 2018-10-29 | End: 2018-10-29

## 2018-10-29 RX ORDER — FENTANYL CITRATE 50 UG/ML
INJECTION, SOLUTION INTRAMUSCULAR; INTRAVENOUS PRN
Status: DISCONTINUED | OUTPATIENT
Start: 2018-10-29 | End: 2018-10-29 | Stop reason: HOSPADM

## 2018-10-29 RX ORDER — LIDOCAINE HYDROCHLORIDE 10 MG/ML
INJECTION, SOLUTION INFILTRATION; PERINEURAL PRN
Status: DISCONTINUED | OUTPATIENT
Start: 2018-10-29 | End: 2018-10-29 | Stop reason: HOSPADM

## 2018-10-29 RX ADMIN — SODIUM CHLORIDE, POTASSIUM CHLORIDE, SODIUM LACTATE AND CALCIUM CHLORIDE: 600; 310; 30; 20 INJECTION, SOLUTION INTRAVENOUS at 11:57

## 2018-10-29 RX ADMIN — LIDOCAINE HYDROCHLORIDE 4 ML: 40 INJECTION, SOLUTION RETROBULBAR; TOPICAL at 12:41

## 2018-10-29 RX ADMIN — ALBUTEROL SULFATE 2.5 MG: 2.5 SOLUTION RESPIRATORY (INHALATION) at 12:41

## 2018-10-29 ASSESSMENT — PAIN DESCRIPTION - PAIN TYPE: TYPE: CHRONIC PAIN

## 2018-10-29 ASSESSMENT — PAIN - FUNCTIONAL ASSESSMENT: PAIN_FUNCTIONAL_ASSESSMENT: 0-10

## 2018-10-29 ASSESSMENT — ENCOUNTER SYMPTOMS
ABDOMINAL PAIN: 0
SPUTUM PRODUCTION: 0
BLURRED VISION: 0
NAUSEA: 0
STRIDOR: 0
WHEEZING: 0
SINUS PAIN: 0

## 2018-10-29 ASSESSMENT — PAIN DESCRIPTION - PROGRESSION: CLINICAL_PROGRESSION: GRADUALLY IMPROVING

## 2018-10-29 ASSESSMENT — PAIN DESCRIPTION - LOCATION: LOCATION: RIB CAGE

## 2018-10-29 ASSESSMENT — PAIN SCALES - GENERAL: PAINLEVEL_OUTOF10: 3

## 2018-10-29 ASSESSMENT — PAIN DESCRIPTION - ORIENTATION: ORIENTATION: LEFT

## 2018-10-29 NOTE — H&P
HISTORY and Danielle Reddy [id number]       NAME:  Manolo Soler  MRN: [id number]   YOB: 1955   Date: 10/29/2018   Age: 66 y.o. Gender: female       COMPLAINT AND PRESENT HISTORY:   Kelly Meng is a 66 yr old having a Bronchoscopy today with Dr Gypsy Arguelles. She had a left  upprer lobectomy done a month ago and still has pain in the left chest wall,     She tearfully states she has been told she has left lower lobe cancer and the she got a report which states it is left upper lobe cancer and she is not certain which she has and she has imagined that the cancer is growing very fast daily while she waits to find out what is true and what the next step is, She expresses how terrified she is. She has pain in the surgical site left chest wall,     PAST MEDICAL HISTORY     Past Medical History:   Diagnosis Date    Anxiety     Benign polyp of large intestine     Cancer (HCC)     LT UPPER LOBE    [facility]) [facility]     Hx of blood clots     DVT LT LEG    Hyperlipidemia     Hypertension     Liver hemangioma     Lung nodule     BARAK  Nodule    Snores     not tested for apnea    Uterine fibroid 09/2010    Wears glasses        Pt denies any history of Diabetes mellitus type 2, hypertension, stroke, heart disease, COPD, Asthma, GERD, HLD, Cancer, Seizures,Thyroid disease, Kidney Disease, Hepatitis, TB, Psychiatric Disorders or Substance abuse.     SURGICAL HISTORY       Past Surgical History:   Procedure Laterality Date    ABDOMINAL HERNIA REPAIR  2012    BREAST BIOPSY Left 1983    BRONCHOSCOPY  10/1/2018    BRONCHOSCOPY performed by Sony Newman MD at [facility]  2010    LUNG BIOPSY      LUNG REMOVAL, PARTIAL Right 09/28/2018    Robotic assisted left lobectomy, upper with lymph node biopsy    [place] RMVL LUNG OTHER THAN PNEUMONECTOMY 1 LOBE LOBECT Left 9/28/2018    XI ROBOTIC ASSISTED LEFT UPPER LOBECTOMY

## 2018-10-31 ENCOUNTER — OFFICE VISIT (OUTPATIENT)
Dept: ONCOLOGY | Age: 63
End: 2018-10-31
Payer: COMMERCIAL

## 2018-10-31 ENCOUNTER — TELEPHONE (OUTPATIENT)
Dept: ONCOLOGY | Age: 63
End: 2018-10-31

## 2018-10-31 ENCOUNTER — TELEPHONE (OUTPATIENT)
Dept: CASE MANAGEMENT | Age: 63
End: 2018-10-31

## 2018-10-31 VITALS
SYSTOLIC BLOOD PRESSURE: 114 MMHG | BODY MASS INDEX: 31.93 KG/M2 | WEIGHT: 158.4 LBS | HEIGHT: 59 IN | HEART RATE: 90 BPM | DIASTOLIC BLOOD PRESSURE: 75 MMHG | RESPIRATION RATE: 18 BRPM | TEMPERATURE: 98.6 F

## 2018-10-31 DIAGNOSIS — C34.92 NON-SMALL CELL CANCER OF LEFT LUNG (HCC): Primary | ICD-10-CM

## 2018-10-31 PROCEDURE — G8417 CALC BMI ABV UP PARAM F/U: HCPCS | Performed by: INTERNAL MEDICINE

## 2018-10-31 PROCEDURE — 1111F DSCHRG MED/CURRENT MED MERGE: CPT | Performed by: INTERNAL MEDICINE

## 2018-10-31 PROCEDURE — G8427 DOCREV CUR MEDS BY ELIG CLIN: HCPCS | Performed by: INTERNAL MEDICINE

## 2018-10-31 PROCEDURE — 99212 OFFICE O/P EST SF 10 MIN: CPT | Performed by: INTERNAL MEDICINE

## 2018-10-31 PROCEDURE — 99215 OFFICE O/P EST HI 40 MIN: CPT | Performed by: INTERNAL MEDICINE

## 2018-10-31 PROCEDURE — 3017F COLORECTAL CA SCREEN DOC REV: CPT | Performed by: INTERNAL MEDICINE

## 2018-10-31 PROCEDURE — 1036F TOBACCO NON-USER: CPT | Performed by: INTERNAL MEDICINE

## 2018-10-31 PROCEDURE — G8484 FLU IMMUNIZE NO ADMIN: HCPCS | Performed by: INTERNAL MEDICINE

## 2018-10-31 RX ORDER — ALBUTEROL SULFATE 90 UG/1
2 AEROSOL, METERED RESPIRATORY (INHALATION) 4 TIMES DAILY
Qty: 1 INHALER | Refills: 0 | Status: SHIPPED | OUTPATIENT
Start: 2018-10-31 | End: 2019-04-03 | Stop reason: SDUPTHER

## 2018-10-31 RX ORDER — OXYCODONE HYDROCHLORIDE AND ACETAMINOPHEN 5; 325 MG/1; MG/1
1 TABLET ORAL EVERY 8 HOURS PRN
Qty: 90 TABLET | Refills: 0 | Status: SHIPPED | OUTPATIENT
Start: 2018-10-31 | End: 2018-11-30

## 2018-10-31 RX ORDER — ALPRAZOLAM 0.25 MG/1
0.25 TABLET ORAL 3 TIMES DAILY PRN
Qty: 90 TABLET | Refills: 0 | Status: SHIPPED | OUTPATIENT
Start: 2018-10-31 | End: 2018-11-30

## 2018-10-31 NOTE — TELEPHONE ENCOUNTER
Navigator met face to face with pt. And POC yet to be discussed. Contact number and packet given to pt. Orders received post visit for port and chemo to begin soon. No chemotherapy orders placed at this time. No additional assistance at this time needed.

## 2018-11-01 ENCOUNTER — TELEPHONE (OUTPATIENT)
Dept: ONCOLOGY | Age: 63
End: 2018-11-01

## 2018-11-01 DIAGNOSIS — C34.92 NON-SMALL CELL CANCER OF LEFT LUNG (HCC): Primary | ICD-10-CM

## 2018-11-01 NOTE — PROGRESS NOTES
Patient ID: Manuel Mota, 1955, [id number], 66 y.o. Reason for consultation: Hospital follow up  Left upper lobe invasive lung adenocarcinoma, Status post lobectomy 9/28/18, Pathologic stage: pT1c, pN2, stage IIIa R1 with positive margin,    Plan for chemo in 1-2 weeks  HISTORY OF PRESENT ILLNESS:    Oncologic History:  The patient is a 66 y.o.  female who is admitted to the hospital for Left upper lobe mass. Pt has a significant past medical history of Uterine fibroids requiring total abdominal hysterectomy, liver hemangioma, HTN, Hyperlipidemia, DVT Left leg, COPD, and anxiety. Pt was found to have a left upper lung adenocarcinoma and presented to the hospital on 9/28/2018 for a scheduled robotic-assisted left upper lobe lobectomy. Pathology is positive for metastatic adenocarcinoma. There are some lymph nodes positive and some evidence of invasion to surrounding structures seen during surgery. Surgical team is planning on discharging patient this evening from the hospital.  She was discharged from the hospital with plan to follow with oncology as outpatient. Interval history:   Patient is returning for follow-up visit. Her pain is better. She is here to discuss the results of MR brain which showed no mets. She has some numbness and tingling that her. She denied any shortness of breath. No fever or chills. During this visit patient's allergy, social, medical, surgical history and medications were reviewed and updated. Current Outpatient Prescriptions   Medication Sig Dispense Refill    oxyCODONE-acetaminophen (PERCOCET) 5-325 MG per tablet Take 1 tablet by mouth every 8 hours as needed for Pain for up to 30 days. . 90 tablet 0    ALPRAZolam (XANAX) 0.25 MG tablet Take 1 tablet by mouth 3 times daily as needed for Sleep for up to 30 days. . 90 tablet 0    albuterol sulfate HFA (VENTOLIN HFA) 108 (90 Base) MCG/ACT inhaler Inhale 2 puffs into the lungs 4 times daily 1 minutes examining, evaluating, reviewing data and counseling the patient. Greater than 50% of that time was spent face-to-face with the patient in counseling and coordinating her care. This note is created with the assistance of a speech recognition program.  While intending to generate a document that actually reflects the content of the visit, the document can still have some errors including those of syntax and sound a like substitutions which may escape proof reading. It such instances, actual meaning can be extrapolated by contextual diversion.

## 2018-11-02 RX ORDER — SODIUM CHLORIDE 9 MG/ML
INJECTION, SOLUTION INTRAVENOUS CONTINUOUS
Status: CANCELLED | OUTPATIENT
Start: 2018-11-02

## 2018-11-05 ENCOUNTER — HOSPITAL ENCOUNTER (OUTPATIENT)
Dept: INTERVENTIONAL RADIOLOGY/VASCULAR | Age: 63
Discharge: HOME | End: 2018-11-07
Payer: COMMERCIAL

## 2018-11-05 ENCOUNTER — TELEPHONE (OUTPATIENT)
Dept: CASE MANAGEMENT | Age: 63
End: 2018-11-05

## 2018-11-05 VITALS
TEMPERATURE: 97.3 F | OXYGEN SATURATION: 97 % | DIASTOLIC BLOOD PRESSURE: 68 MMHG | BODY MASS INDEX: 32 KG/M2 | HEART RATE: 81 BPM | HEIGHT: 59 IN | WEIGHT: 158.73 LBS | SYSTOLIC BLOOD PRESSURE: 142 MMHG | RESPIRATION RATE: 19 BRPM

## 2018-11-05 DIAGNOSIS — C34.92 NON-SMALL CELL CANCER OF LEFT LUNG (HCC): ICD-10-CM

## 2018-11-05 LAB
INR BLD: 1
PARTIAL THROMBOPLASTIN TIME: 28.5 SEC (ref 23–31)
PLATELET # BLD: 356 K/UL (ref 130–400)
PROTHROMBIN TIME: 10.6 SEC (ref 9.7–11.6)

## 2018-11-05 PROCEDURE — 2580000003 HC RX 258: Performed by: RADIOLOGY

## 2018-11-05 PROCEDURE — 6360000002 HC RX W HCPCS: Performed by: RADIOLOGY

## 2018-11-05 PROCEDURE — 7100000011 HC PHASE II RECOVERY - ADDTL 15 MIN

## 2018-11-05 PROCEDURE — C1788 PORT, INDWELLING, IMP: HCPCS

## 2018-11-05 PROCEDURE — 99152 MOD SED SAME PHYS/QHP 5/>YRS: CPT

## 2018-11-05 PROCEDURE — 7100000010 HC PHASE II RECOVERY - FIRST 15 MIN

## 2018-11-05 PROCEDURE — 85730 THROMBOPLASTIN TIME PARTIAL: CPT

## 2018-11-05 PROCEDURE — 77001 FLUOROGUIDE FOR VEIN DEVICE: CPT

## 2018-11-05 PROCEDURE — 99153 MOD SED SAME PHYS/QHP EA: CPT

## 2018-11-05 PROCEDURE — 36561 INSERT TUNNELED CV CATH: CPT

## 2018-11-05 PROCEDURE — 85610 PROTHROMBIN TIME: CPT

## 2018-11-05 PROCEDURE — 85049 AUTOMATED PLATELET COUNT: CPT

## 2018-11-05 PROCEDURE — 76937 US GUIDE VASCULAR ACCESS: CPT

## 2018-11-05 RX ORDER — SODIUM CHLORIDE 9 MG/ML
INJECTION, SOLUTION INTRAVENOUS CONTINUOUS
Status: DISCONTINUED | OUTPATIENT
Start: 2018-11-05 | End: 2018-11-08 | Stop reason: HOSPADM

## 2018-11-05 RX ORDER — FENTANYL CITRATE 50 UG/ML
INJECTION, SOLUTION INTRAMUSCULAR; INTRAVENOUS
Status: COMPLETED | OUTPATIENT
Start: 2018-11-05 | End: 2018-11-05

## 2018-11-05 RX ORDER — MIDAZOLAM HYDROCHLORIDE 1 MG/ML
INJECTION INTRAMUSCULAR; INTRAVENOUS
Status: COMPLETED | OUTPATIENT
Start: 2018-11-05 | End: 2018-11-05

## 2018-11-05 RX ORDER — ACETAMINOPHEN 325 MG/1
650 TABLET ORAL EVERY 4 HOURS PRN
Status: DISCONTINUED | OUTPATIENT
Start: 2018-11-05 | End: 2018-11-08 | Stop reason: HOSPADM

## 2018-11-05 RX ORDER — CEFAZOLIN SODIUM 1 G/50ML
1 INJECTION, SOLUTION INTRAVENOUS
Status: COMPLETED | OUTPATIENT
Start: 2018-11-05 | End: 2018-11-05

## 2018-11-05 RX ADMIN — FENTANYL CITRATE 50 MCG: 50 INJECTION, SOLUTION INTRAMUSCULAR; INTRAVENOUS at 09:33

## 2018-11-05 RX ADMIN — MIDAZOLAM 0.5 MG: 1 INJECTION INTRAMUSCULAR; INTRAVENOUS at 09:40

## 2018-11-05 RX ADMIN — MIDAZOLAM 1 MG: 1 INJECTION INTRAMUSCULAR; INTRAVENOUS at 09:33

## 2018-11-05 RX ADMIN — Medication 500 UNITS: at 09:49

## 2018-11-05 RX ADMIN — SODIUM CHLORIDE: 9 INJECTION, SOLUTION INTRAVENOUS at 08:10

## 2018-11-05 RX ADMIN — CEFAZOLIN SODIUM 1 G: 1 INJECTION, SOLUTION INTRAVENOUS at 09:23

## 2018-11-05 RX ADMIN — FENTANYL CITRATE 50 MCG: 50 INJECTION, SOLUTION INTRAMUSCULAR; INTRAVENOUS at 09:37

## 2018-11-05 ASSESSMENT — PAIN SCALES - GENERAL
PAINLEVEL_OUTOF10: 2
PAINLEVEL_OUTOF10: 0
PAINLEVEL_OUTOF10: 2
PAINLEVEL_OUTOF10: 1
PAINLEVEL_OUTOF10: 0
PAINLEVEL_OUTOF10: 2
PAINLEVEL_OUTOF10: 0

## 2018-11-05 ASSESSMENT — PAIN DESCRIPTION - ORIENTATION
ORIENTATION: RIGHT

## 2018-11-05 ASSESSMENT — PAIN DESCRIPTION - PAIN TYPE
TYPE: SURGICAL PAIN

## 2018-11-05 ASSESSMENT — PAIN DESCRIPTION - DESCRIPTORS
DESCRIPTORS: TENDER;SORE
DESCRIPTORS: TENDER;SORE
DESCRIPTORS: DISCOMFORT
DESCRIPTORS: DISCOMFORT
DESCRIPTORS: TENDER;SORE
DESCRIPTORS: DISCOMFORT

## 2018-11-05 ASSESSMENT — PAIN DESCRIPTION - LOCATION
LOCATION: INCISION

## 2018-11-05 NOTE — PRE SEDATION
Sedation Pre-Procedure Note    Patient Name: Kassidy Agarwal   YOB: 1955  Room/Bed: Room/bed info not found  Medical Record Number: [id number]  Date: 11/5/2018   Time: 9:15 AM       Indication:  Left lung cancer    Consent: I have discussed with the patient and/or the patient representative the indication, alternatives, and the possible risks and/or complications of the planned procedure and the anesthesia methods. The patient and/or patient representative appear to understand and agree to proceed. Vital Signs:   Vitals:    11/05/18 0742   BP: (!) 141/60   Pulse: 80   Resp: 16   Temp: 98.6 °F (37 °C)   SpO2: 97%       Past Medical History:   has a past medical history of Anxiety; Benign polyp of large intestine; Cancer [facility]); COPD (chronic obstructive pulmonary disease) ([facility] Hx of blood clots; Hyperlipidemia; Hypertension; Liver hemangioma; Lung nodule; Snores; Uterine fibroid; and Wears glasses. Past Surgical History:   has a past surgical history that includes Hysterectomy (2010); Abdominal hernia repair (2012); Colonoscopy; Lung biopsy; Breast biopsy (Left, 1983); Lung removal, partial (Right, 09/28/2018); pr rmvl lung other than pneumonectomy 1 lobe lobect (Left, 9/28/2018); bronchoscopy (10/1/2018); and pr [facility] incl fluor gdnce dx w/cell washg spx (N/A, 10/29/2018). Medications:   Scheduled Meds:    ceFAZolin  1 g Intravenous On Call     Continuous Infusions:    sodium chloride 20 mL/hr at 11/05/18 0810     PRN Meds:   Home Meds:   Prior to Admission medications    Medication Sig Start Date End Date Taking? Authorizing Provider   oxyCODONE-acetaminophen (PERCOCET) 5-325 MG per tablet Take 1 tablet by mouth every 8 hours as needed for Pain for up to 30 days. . 10/31/18 11/30/18 Yes Yandel Morgan MD   ALPRAZolam (XANAX) 0.25 MG tablet Take 1 tablet by mouth 3 times daily as needed for Sleep for up to 30 days. . 10/31/18 11/30/18 Yes Yandel Morgan MD   albuterol sulfate HFA

## 2018-11-05 NOTE — H&P
Historical Provider, MD   lovastatin (MEVACOR) 10 MG tablet Take 10 mg by mouth nightly    Historical Provider, MD       This is a 66 y.o. female who is pleasant, cooperative, alert and oriented x3, in no acute distress. Heart: Heart sounds are normal.  HR regular rate and rhythm without murmur, gallop or rub. Lungs: Normal respiratory effort with good air exchange ON THE RIGHT, , unlabored and clear to auscultation without wheezes or rales bilaterally , LEFT LUNG IS VERY DIMINISHED AND WITHOUT SOUND IN THE LEFT BASE. Abdomen: soft, nontender, nondistended with bowel sounds   PEDAL PULSES + 2 BILATERALLY. Labs:  No results for input(s): HGB, HCT, WBC, MCV, PLT, NA, K, CL, CO2, BUN, CREATININE, GLUCOSE, INR, PROTIME, APTT, AST, ALT, LABALBU, HCG in the last 720 hours. JESUS Donovan  Electronically signed 11/5/2018 at 7:42 AM         close encounter   Progress Notes Encounter Date: 10/31/2018     Related encounter: Office Visit from 10/31/2018 in [address]   Copied          Patient ID: Evie Argueta, 1955, [id number], 66 y.o. Reason for consultation: Hospital follow up  Left upper lobe invasive lung adenocarcinoma, Status post lobectomy 9/28/18, Pathologic stage: pT1c, pN2, stage IIIa R1 with positive margin,    HISTORY OF PRESENT ILLNESS:    Oncologic History:  The patient is a 66 y.o.  female who is admitted to the hospital for Left upper lobe mass. Pt has a significant past medical history of Uterine fibroids requiring total abdominal hysterectomy, liver hemangioma, HTN, Hyperlipidemia, DVT Left leg, COPD, and anxiety. Pt was found to have a left upper lung adenocarcinoma and presented to the hospital on 9/28/2018 for a scheduled robotic-assisted left upper lobe lobectomy. Pathology is positive for metastatic adenocarcinoma.   There are some lymph nodes positive and some evidence of invasion to surrounding structures seen during surgery. Surgical team is planning on discharging patient this evening from the hospital.  She was discharged from the hospital with plan to follow with oncology as outpatient. Interval history:   Patient is returning for follow-up visit. She complains of off and on pain in her left chest and left upper back. She has some numbness and tingling that her. She denied any shortness of breath. No fever or chills. She is here to discuss the pathology results and further recommendations. During this visit patient's allergy, social, medical, surgical history and medications were reviewed and updated.         Past Medical History        Past Medical History:   Diagnosis Date    Anxiety      Benign polyp of large intestine      Cancer (HCC)       LT UPPER LOBE    [facility] obstructive [facility] disease) [facility] [address] [address]      Hx of blood clots       DVT LT LEG    Hyperlipidemia      Hypertension      Liver hemangioma      Lung nodule       BARAK  Nodule    Snores       not tested for apnea    Uterine fibroid 09/2010    Wears glasses              Past Surgical History         Past Surgical History:   Procedure Laterality Date    ABDOMINAL HERNIA REPAIR   2012    BREAST BIOPSY Left 1983    BRONCHOSCOPY   10/1/2018     BRONCHOSCOPY performed by Sylvia Olivo MD at [facility]   2010    LUNG BIOPSY        LUNG REMOVAL, PARTIAL Right 09/28/2018     Robotic assisted left lobectomy, upper with lymph node biopsy    [address] INCL FLUOR GDNCE DX W/CELL WASHG SPX N/A 10/29/2018     BRONCHOSCOPY performed by Eliane Curling, MD at [facility] 1 LOBE LOBECT Left 9/28/2018     XI ROBOTIC ASSISTED LEFT UPPER LOBECTOMY MULTIPLE LYMPH NODE BIOPSY, INTERCOSTAL  NERVE BLOCK ABLATION W/EXPAREL performed by Bhavna Cook MD at [facility]   Allergen Reactions    Codeine Nausea And Vomiting deformity or swelling   Extremities - peripheral pulses normal, no pedal edema, no clubbing or cyanosis   Skin - normal coloration and turgor, no rashes, no suspicious skin lesions noted ,  LABORATORY DATA:            Lab Results   Component Value Date     WBC 5.7 10/01/2018     HGB 8.7 (L) 10/01/2018     HCT 27.5 (L) 10/01/2018     MCV 97.5 10/01/2018      10/01/2018     LYMPHOPCT 15 (L) 09/29/2018     RBC 2.82 (L) 10/01/2018     MCH 30.9 10/01/2018     MCHC 31.6 10/01/2018     RDW 12.6 10/01/2018     MONOPCT 7 09/29/2018     BASOPCT 0 09/29/2018     NEUTROABS 7.28 09/29/2018     LYMPHSABS 1.39 09/29/2018     MONOSABS 0.69 09/29/2018     EOSABS <0.03 09/29/2018     BASOSABS <0.03 09/29/2018        Chemistry               Component Value Date/Time      10/01/2018 0555     K 3.8 10/01/2018 0555      10/01/2018 0555     CO2 26 10/01/2018 0555     BUN 4 (L) 10/01/2018 0555     CREATININE 0.46 (L) 10/01/2018 0555               Component Value Date/Time     CALCIUM 8.6 10/01/2018 0555     ALKPHOS 77 09/25/2018 1250     AST 16 09/25/2018 1250     ALT 16 09/25/2018 1250     BILITOT 0.28 (L) 09/25/2018 1250          PATHOLOGY DATA:   Pathology:  CT Guided Biopsy ([facility]) 8/22/18:   LEFT LUNG, UPPER LOBE, CT GUIDED CORE NEEDLE BIOPSIES:  - INVASIVE ADENOCARCINOMA, CONSISTENT WITH LUNG PRIMARY. Collected: 9/28/2018   Received: 10/1/2018   Reported: 10/2/2018 17:47     -- Diagnosis --   1.  LYMPH NODE, LEVEL 9, BIOPSY:       -  ONE LYMPH NODE, NEGATIVE FOR MALIGNANCY (0/1). 2.  LYMPH NODE, LEVEL 11, DISSECTION:       -  ONE OF 2 LYMPH NODES POSITIVE FOR METASTATIC CARCINOMA (1/2). 3.  LYMPH NODE, LEVEL 5, DISSECTION:       -  ONE OF 2 LYMPH NODES POSITIVE FOR METASTATIC CARCINOMA (1/2). -  CARCINOMA INVADES LARGE PERIPHERAL NERVE BRANCHES. 4.  LUNG, LEFT UPPER LOBE, LOBECTOMY:            -  WELL-DIFFERENTIATED INVASIVE ADENOCARCINOMA, 2.9 CM   GREATEST DIMENSION.        -  NEGATIVE FOR

## 2018-11-08 ENCOUNTER — HOSPITAL ENCOUNTER (OUTPATIENT)
Dept: RADIATION ONCOLOGY | Facility: MEDICAL CENTER | Age: 63
Discharge: HOME | End: 2018-11-08
Payer: COMMERCIAL

## 2018-11-08 ENCOUNTER — TELEPHONE (OUTPATIENT)
Dept: INFUSION THERAPY | Facility: MEDICAL CENTER | Age: 63
End: 2018-11-08

## 2018-11-08 PROCEDURE — 99212 OFFICE O/P EST SF 10 MIN: CPT | Performed by: RADIOLOGY

## 2018-11-08 RX ORDER — DEXAMETHASONE 4 MG/1
20 TABLET ORAL PRN
Qty: 40 TABLET | Refills: 0 | OUTPATIENT
Start: 2018-11-08 | End: 2019-06-26 | Stop reason: ALTCHOICE

## 2018-11-08 RX ORDER — PROCHLORPERAZINE MALEATE 10 MG
10 TABLET ORAL EVERY 6 HOURS PRN
Qty: 120 TABLET | Refills: 3 | OUTPATIENT
Start: 2018-11-08 | End: 2019-07-16

## 2018-11-08 RX ORDER — LIDOCAINE AND PRILOCAINE 25; 25 MG/G; MG/G
CREAM TOPICAL
Qty: 1 TUBE | Refills: 3 | Status: SHIPPED | OUTPATIENT
Start: 2018-11-08 | End: 2019-09-25 | Stop reason: ALTCHOICE

## 2018-11-08 RX ORDER — ONDANSETRON 4 MG/1
4 TABLET, FILM COATED ORAL EVERY 8 HOURS PRN
Qty: 60 TABLET | Refills: 3 | OUTPATIENT
Start: 2018-11-08 | End: 2019-07-16

## 2018-11-09 ENCOUNTER — TELEPHONE (OUTPATIENT)
Dept: ONCOLOGY | Age: 63
End: 2018-11-09

## 2018-11-09 ENCOUNTER — HOSPITAL ENCOUNTER (OUTPATIENT)
Facility: MEDICAL CENTER | Age: 63
End: 2018-11-09
Payer: COMMERCIAL

## 2018-11-09 ENCOUNTER — HOSPITAL ENCOUNTER (OUTPATIENT)
Dept: NUCLEAR MEDICINE | Age: 63
Discharge: HOME | End: 2018-11-11
Payer: COMMERCIAL

## 2018-11-09 DIAGNOSIS — C34.92 NON-SMALL CELL CANCER OF LEFT LUNG (HCC): ICD-10-CM

## 2018-11-09 DIAGNOSIS — C34.92 NON-SMALL CELL CANCER OF LEFT LUNG (HCC): Primary | ICD-10-CM

## 2018-11-09 PROCEDURE — 78815 PET IMAGE W/CT SKULL-THIGH: CPT

## 2018-11-09 PROCEDURE — 3430000000 HC RX DIAGNOSTIC RADIOPHARMACEUTICAL: Performed by: INTERNAL MEDICINE

## 2018-11-09 PROCEDURE — A9552 F18 FDG: HCPCS | Performed by: INTERNAL MEDICINE

## 2018-11-09 RX ORDER — LISINOPRIL AND HYDROCHLOROTHIAZIDE 12.5; 1 MG/1; MG/1
1 TABLET ORAL DAILY
Qty: 30 TABLET | Refills: 0 | Status: SHIPPED | OUTPATIENT
Start: 2018-11-09 | End: 2019-07-16 | Stop reason: SDUPTHER

## 2018-11-09 RX ADMIN — FLUDEOXYGLUCOSE F 18 11.54 MILLICURIE: 200 INJECTION, SOLUTION INTRAVENOUS at 16:34

## 2018-11-10 RX ORDER — FLUDEOXYGLUCOSE F 18 200 MCI/ML
11.54 INJECTION, SOLUTION INTRAVENOUS
Status: COMPLETED | OUTPATIENT
Start: 2018-11-10 | End: 2018-11-09

## 2018-11-12 ENCOUNTER — TELEPHONE (OUTPATIENT)
Dept: CASE MANAGEMENT | Age: 63
End: 2018-11-12

## 2018-11-12 ENCOUNTER — TELEPHONE (OUTPATIENT)
Dept: ONCOLOGY | Age: 63
End: 2018-11-12

## 2018-11-14 ENCOUNTER — HOSPITAL ENCOUNTER (OUTPATIENT)
Dept: INFUSION THERAPY | Facility: MEDICAL CENTER | Age: 63
Discharge: HOME | End: 2018-11-14
Payer: COMMERCIAL

## 2018-11-14 ENCOUNTER — TELEPHONE (OUTPATIENT)
Dept: ONCOLOGY | Age: 63
End: 2018-11-14

## 2018-11-14 ENCOUNTER — TELEPHONE (OUTPATIENT)
Dept: CASE MANAGEMENT | Age: 63
End: 2018-11-14

## 2018-11-14 ENCOUNTER — OFFICE VISIT (OUTPATIENT)
Dept: ONCOLOGY | Age: 63
End: 2018-11-14
Payer: COMMERCIAL

## 2018-11-14 VITALS
TEMPERATURE: 98.2 F | BODY MASS INDEX: 32.62 KG/M2 | WEIGHT: 161.5 LBS | HEART RATE: 102 BPM | SYSTOLIC BLOOD PRESSURE: 180 MMHG | DIASTOLIC BLOOD PRESSURE: 103 MMHG | RESPIRATION RATE: 20 BRPM

## 2018-11-14 VITALS
SYSTOLIC BLOOD PRESSURE: 180 MMHG | RESPIRATION RATE: 18 BRPM | HEART RATE: 102 BPM | TEMPERATURE: 98 F | HEIGHT: 59 IN | DIASTOLIC BLOOD PRESSURE: 103 MMHG | WEIGHT: 161.5 LBS | BODY MASS INDEX: 32.56 KG/M2

## 2018-11-14 DIAGNOSIS — C34.92 NON-SMALL CELL CANCER OF LEFT LUNG (HCC): Primary | ICD-10-CM

## 2018-11-14 DIAGNOSIS — C34.92 NON-SMALL CELL CANCER OF LEFT LUNG (HCC): ICD-10-CM

## 2018-11-14 LAB
ABSOLUTE EOS #: 0 K/UL (ref 0–0.4)
ABSOLUTE IMMATURE GRANULOCYTE: ABNORMAL K/UL (ref 0–0.3)
ABSOLUTE LYMPH #: 0.5 K/UL (ref 1–4.8)
ABSOLUTE MONO #: 0.1 K/UL (ref 0.2–0.8)
ALBUMIN SERPL-MCNC: 4.4 G/DL (ref 3.5–5.2)
ALBUMIN/GLOBULIN RATIO: ABNORMAL (ref 1–2.5)
ALP BLD-CCNC: 134 U/L (ref 35–104)
ALT SERPL-CCNC: 19 U/L (ref 5–33)
ANION GAP SERPL CALCULATED.3IONS-SCNC: 14 MMOL/L (ref 9–17)
AST SERPL-CCNC: 18 U/L
BASOPHILS # BLD: 0 % (ref 0–2)
BASOPHILS ABSOLUTE: 0 K/UL (ref 0–0.2)
BILIRUB SERPL-MCNC: 0.19 MG/DL (ref 0.3–1.2)
BUN BLDV-MCNC: 10 MG/DL (ref 8–23)
BUN/CREAT BLD: 20 (ref 9–20)
CALCIUM SERPL-MCNC: 9.5 MG/DL (ref 8.6–10.4)
CHLORIDE BLD-SCNC: 102 MMOL/L (ref 98–107)
CO2: 24 MMOL/L (ref 20–31)
CREAT SERPL-MCNC: 0.49 MG/DL (ref 0.5–0.9)
DIFFERENTIAL TYPE: ABNORMAL
EOSINOPHILS RELATIVE PERCENT: 0 % (ref 1–4)
GFR AFRICAN AMERICAN: >60 ML/MIN
GFR NON-AFRICAN AMERICAN: >60 ML/MIN
GFR SERPL CREATININE-BSD FRML MDRD: ABNORMAL ML/MIN/{1.73_M2}
GFR SERPL CREATININE-BSD FRML MDRD: ABNORMAL ML/MIN/{1.73_M2}
GLUCOSE BLD-MCNC: 145 MG/DL (ref 70–99)
HCT VFR BLD CALC: 34.6 % (ref 36–46)
HEMOGLOBIN: 11.6 G/DL (ref 12–16)
IMMATURE GRANULOCYTES: ABNORMAL %
LYMPHOCYTES # BLD: 7 % (ref 24–44)
MCH RBC QN AUTO: 28.8 PG (ref 26–34)
MCHC RBC AUTO-ENTMCNC: 33.5 G/DL (ref 31–37)
MCV RBC AUTO: 85.8 FL (ref 80–100)
MONOCYTES # BLD: 1 % (ref 1–7)
NRBC AUTOMATED: ABNORMAL PER 100 WBC
PDW BLD-RTO: 14.2 % (ref 11.5–14.5)
PLATELET # BLD: 347 K/UL (ref 130–400)
PLATELET ESTIMATE: ABNORMAL
PMV BLD AUTO: 8.3 FL (ref 6–12)
POTASSIUM SERPL-SCNC: 4.3 MMOL/L (ref 3.7–5.3)
RBC # BLD: 4.03 M/UL (ref 4–5.2)
RBC # BLD: ABNORMAL 10*6/UL
SEG NEUTROPHILS: 92 % (ref 36–66)
SEGMENTED NEUTROPHILS ABSOLUTE COUNT: 6.6 K/UL (ref 1.8–7.7)
SODIUM BLD-SCNC: 140 MMOL/L (ref 135–144)
TOTAL PROTEIN: 7.5 G/DL (ref 6.4–8.3)
WBC # BLD: 7.2 K/UL (ref 3.5–11)
WBC # BLD: ABNORMAL 10*3/UL

## 2018-11-14 PROCEDURE — 96367 TX/PROPH/DG ADDL SEQ IV INF: CPT

## 2018-11-14 PROCEDURE — 2500000003 HC RX 250 WO HCPCS: Performed by: INTERNAL MEDICINE

## 2018-11-14 PROCEDURE — 3017F COLORECTAL CA SCREEN DOC REV: CPT | Performed by: INTERNAL MEDICINE

## 2018-11-14 PROCEDURE — 96415 CHEMO IV INFUSION ADDL HR: CPT

## 2018-11-14 PROCEDURE — 99215 OFFICE O/P EST HI 40 MIN: CPT | Performed by: INTERNAL MEDICINE

## 2018-11-14 PROCEDURE — 99211 OFF/OP EST MAY X REQ PHY/QHP: CPT

## 2018-11-14 PROCEDURE — 2580000003 HC RX 258: Performed by: INTERNAL MEDICINE

## 2018-11-14 PROCEDURE — G8417 CALC BMI ABV UP PARAM F/U: HCPCS | Performed by: INTERNAL MEDICINE

## 2018-11-14 PROCEDURE — G8484 FLU IMMUNIZE NO ADMIN: HCPCS | Performed by: INTERNAL MEDICINE

## 2018-11-14 PROCEDURE — 6360000002 HC RX W HCPCS: Performed by: INTERNAL MEDICINE

## 2018-11-14 PROCEDURE — G8427 DOCREV CUR MEDS BY ELIG CLIN: HCPCS | Performed by: INTERNAL MEDICINE

## 2018-11-14 PROCEDURE — 1036F TOBACCO NON-USER: CPT | Performed by: INTERNAL MEDICINE

## 2018-11-14 PROCEDURE — 96374 THER/PROPH/DIAG INJ IV PUSH: CPT

## 2018-11-14 PROCEDURE — 80053 COMPREHEN METABOLIC PANEL: CPT

## 2018-11-14 PROCEDURE — 96417 CHEMO IV INFUS EACH ADDL SEQ: CPT

## 2018-11-14 PROCEDURE — 96413 CHEMO IV INFUSION 1 HR: CPT

## 2018-11-14 PROCEDURE — S0028 INJECTION, FAMOTIDINE, 20 MG: HCPCS | Performed by: INTERNAL MEDICINE

## 2018-11-14 PROCEDURE — 36591 DRAW BLOOD OFF VENOUS DEVICE: CPT

## 2018-11-14 PROCEDURE — 85025 COMPLETE CBC W/AUTO DIFF WBC: CPT

## 2018-11-14 PROCEDURE — 96375 TX/PRO/DX INJ NEW DRUG ADDON: CPT

## 2018-11-14 RX ORDER — SODIUM CHLORIDE 9 MG/ML
INJECTION, SOLUTION INTRAVENOUS ONCE
Status: DISCONTINUED | OUTPATIENT
Start: 2018-11-14 | End: 2018-11-15 | Stop reason: HOSPADM

## 2018-11-14 RX ORDER — SODIUM CHLORIDE 0.9 % (FLUSH) 0.9 %
5 SYRINGE (ML) INJECTION PRN
Status: CANCELLED | OUTPATIENT
Start: 2018-11-14

## 2018-11-14 RX ORDER — DIPHENHYDRAMINE HYDROCHLORIDE 50 MG/ML
50 INJECTION INTRAMUSCULAR; INTRAVENOUS ONCE
Status: CANCELLED | OUTPATIENT
Start: 2018-11-14 | End: 2018-11-14

## 2018-11-14 RX ORDER — SODIUM CHLORIDE 9 MG/ML
INJECTION, SOLUTION INTRAVENOUS CONTINUOUS
Status: CANCELLED | OUTPATIENT
Start: 2018-11-14

## 2018-11-14 RX ORDER — HEPARIN SODIUM (PORCINE) LOCK FLUSH IV SOLN 100 UNIT/ML 100 UNIT/ML
500 SOLUTION INTRAVENOUS PRN
Status: CANCELLED | OUTPATIENT
Start: 2018-11-14

## 2018-11-14 RX ORDER — METHYLPREDNISOLONE SODIUM SUCCINATE 125 MG/2ML
125 INJECTION, POWDER, LYOPHILIZED, FOR SOLUTION INTRAMUSCULAR; INTRAVENOUS ONCE
Status: CANCELLED | OUTPATIENT
Start: 2018-11-14 | End: 2018-11-14

## 2018-11-14 RX ORDER — MEPERIDINE HYDROCHLORIDE 50 MG/ML
12.5 INJECTION INTRAMUSCULAR; INTRAVENOUS; SUBCUTANEOUS ONCE
Status: CANCELLED | OUTPATIENT
Start: 2018-11-14 | End: 2018-11-14

## 2018-11-14 RX ORDER — 0.9 % SODIUM CHLORIDE 0.9 %
10 VIAL (ML) INJECTION ONCE
Status: CANCELLED | OUTPATIENT
Start: 2018-11-14 | End: 2018-11-14

## 2018-11-14 RX ORDER — 0.9 % SODIUM CHLORIDE 0.9 %
10 VIAL (ML) INJECTION ONCE
Status: DISCONTINUED | OUTPATIENT
Start: 2018-11-14 | End: 2018-11-15 | Stop reason: HOSPADM

## 2018-11-14 RX ORDER — PALONOSETRON 0.05 MG/ML
0.25 INJECTION, SOLUTION INTRAVENOUS ONCE
Status: COMPLETED | OUTPATIENT
Start: 2018-11-14 | End: 2018-11-14

## 2018-11-14 RX ORDER — SODIUM CHLORIDE 0.9 % (FLUSH) 0.9 %
10 SYRINGE (ML) INJECTION PRN
Status: CANCELLED | OUTPATIENT
Start: 2018-11-14

## 2018-11-14 RX ORDER — SODIUM CHLORIDE 0.9 % (FLUSH) 0.9 %
10 SYRINGE (ML) INJECTION PRN
Status: DISCONTINUED | OUTPATIENT
Start: 2018-11-14 | End: 2018-11-15 | Stop reason: HOSPADM

## 2018-11-14 RX ORDER — DIPHENHYDRAMINE HYDROCHLORIDE 50 MG/ML
50 INJECTION INTRAMUSCULAR; INTRAVENOUS ONCE
Status: COMPLETED | OUTPATIENT
Start: 2018-11-14 | End: 2018-11-14

## 2018-11-14 RX ORDER — HEPARIN SODIUM (PORCINE) LOCK FLUSH IV SOLN 100 UNIT/ML 100 UNIT/ML
500 SOLUTION INTRAVENOUS PRN
Status: DISCONTINUED | OUTPATIENT
Start: 2018-11-14 | End: 2018-11-15 | Stop reason: HOSPADM

## 2018-11-14 RX ADMIN — PACLITAXEL 348 MG: 6 INJECTION, SOLUTION INTRAVENOUS at 12:05

## 2018-11-14 RX ADMIN — PALONOSETRON 0.25 MG: 0.05 INJECTION, SOLUTION INTRAVENOUS at 11:44

## 2018-11-14 RX ADMIN — DEXAMETHASONE SODIUM PHOSPHATE 20 MG: 10 INJECTION, SOLUTION INTRAMUSCULAR; INTRAVENOUS at 10:14

## 2018-11-14 RX ADMIN — DIPHENHYDRAMINE HYDROCHLORIDE 50 MG: 50 INJECTION INTRAMUSCULAR; INTRAVENOUS at 11:48

## 2018-11-14 RX ADMIN — SODIUM CHLORIDE 150 MG: 900 INJECTION, SOLUTION INTRAVENOUS at 10:55

## 2018-11-14 RX ADMIN — FAMOTIDINE 20 MG: 10 INJECTION, SOLUTION INTRAVENOUS at 10:08

## 2018-11-14 RX ADMIN — SODIUM CHLORIDE, PRESERVATIVE FREE 500 UNITS: 5 INJECTION INTRAVENOUS at 16:32

## 2018-11-14 RX ADMIN — CARBOPLATIN 650 MG: 10 INJECTION INTRAVENOUS at 15:22

## 2018-11-14 RX ADMIN — SODIUM CHLORIDE: 9 INJECTION, SOLUTION INTRAVENOUS at 10:08

## 2018-11-14 NOTE — PROGRESS NOTES
Patient here for MD visit,labs and chemo. Feels well today. Has not have BP in a few weeks. MD moved and failed to refill her med. Going to  today. Vitals obtained. Weight checked. Port accessed per policy and labs drawn and sent. Labs reviewed. Seen by MD.  Gordan Snellen for treatment today. Premeds given per [facility] Chemo education done by [address] throughout time here today. Drugs receiving, side effects review in detail per checklist.  Questions answered throughout the treatment as she thought of them. Paperwork with binder completed along with survey and needs list.  Tommie Coho given to her to take home. Chemo hung per MAR. No complaints. Sleeping on and off all day. Chemo completed. Tolerated well. Port flushed per policy and gripper removed intact, band aid to site. Has a return visit scheduled. Discharged ambulatory with family.

## 2018-11-14 NOTE — PROGRESS NOTES
Musculoskeletal: negative for myalgias, arthralgias, pain, joint swelling,and bone pain   Neurological: negative for headaches, dizziness, seizures, weakness, numbness       OBJECTIVE:         Vitals:    11/14/18 0837   BP: (!) 180/103   Pulse: 102   Resp: 18   Temp: 98 °F (36.7 °C)   PHYSICAL EXAM:   General appearance - well appearing, no in pain or distress   Mental status - alert and cooperative   Eyes - pupils equal and reactive, extraocular eye movements intact   Ears - bilateral TM's and external ear canals normal   Mouth - mucous membranes moist, pharynx normal without lesions   Neck - supple, no significant adenopathy   Lymphatics - no palpable lymphadenopathy, no hepatosplenomegaly   Chest - clear to auscultation, no wheezes, rales or rhonchi, symmetric air entry   Left chest surgical scar healing well  Heart - normal rate, regular rhythm, normal S1, S2, no murmurs, rubs, clicks or gallops   Abdomen - soft, nontender, nondistended, no masses or organomegaly   Neurological - alert, oriented, normal speech, no focal findings or movement disorder noted   Musculoskeletal - no joint tenderness, deformity or swelling   Extremities - peripheral pulses normal, no pedal edema, no clubbing or cyanosis   Skin - normal coloration and turgor, no rashes, no suspicious skin lesions noted ,  LABORATORY DATA:     Lab Results   Component Value Date    WBC 7.2 11/14/2018    HGB 11.6 (L) 11/14/2018    HCT 34.6 (L) 11/14/2018    MCV 85.8 11/14/2018     11/14/2018    LYMPHOPCT 7 (L) 11/14/2018    RBC 4.03 11/14/2018    MCH 28.8 11/14/2018    MCHC 33.5 11/14/2018    RDW 14.2 11/14/2018    MONOPCT 1 11/14/2018    BASOPCT 0 11/14/2018    NEUTROABS 6.60 11/14/2018    LYMPHSABS 0.50 (L) 11/14/2018    MONOSABS 0.10 (L) 11/14/2018    EOSABS 0.00 11/14/2018    BASOSABS 0.00 11/14/2018       Chemistry        Component Value Date/Time     11/14/2018 0840    K 4.3 11/14/2018 0840     11/14/2018 0840    CO2 24 recognition program.  While intending to generate a document that actually reflects the content of the visit, the document can still have some errors including those of syntax and sound a like substitutions which may escape proof reading. It such instances, actual meaning can be extrapolated by contextual diversion.

## 2018-11-14 NOTE — FLOWSHEET NOTE
Assessment:    greeted and introduced herself to Pt and Pt's partner, Helen Richardson. Pt shared that this was her first day of treatment and that she had surgery a month ago. Pt smiled and expressed desire to get started. Pt identified her partner, her daughter and her friends as sources of support. When asked about her personal strengths, Pt replied that she did not know. Pt laughed and agreed that she has a sense of humor. Pt told  that she is not affiliated with a [facility]. Partner was reading his book during the visit except when addressed by  and patient. Intervention:  Supportive presence, exploration of coping, needs, and resources, active listening, and affirmation. Outcome:  Patient and Partner indicated that they had gotten through the \"hard part,\" meaning Pt's surgery and that they were coping. Pt and Partner thanked  for visit. Plan:   is available for follow up visits and to provide emotional and spiritual support to Pt while she is receiving treatment. 11/14/18 0940   Encounter Summary   Services provided to: Patient and family together   Referral/Consult From: [facility] Family members;Friends/neighbors   Continue Visiting (11/14/18)   Complexity of Encounter Low   Length of Encounter 15 minutes   Routine   Type Initial   Assessment Calm; Approachable;Coping; Hopeful   Intervention Active listening;Explored feelings, thoughts, concerns;Explored coping resources;Sustaining presence/ Ministry of presence   Outcome Expressed gratitude;Coping     Electronically signed by Jon Kahn, Oncology Outpatient [address], [address] [facility] Radiation Oncology  11/14/2018  9:42 AM

## 2018-11-15 ENCOUNTER — HOSPITAL ENCOUNTER (OUTPATIENT)
Facility: MEDICAL CENTER | Age: 63
End: 2018-11-15
Payer: COMMERCIAL

## 2018-11-15 ENCOUNTER — TELEPHONE (OUTPATIENT)
Dept: ONCOLOGY | Age: 63
End: 2018-11-15

## 2018-11-15 NOTE — TELEPHONE ENCOUNTER
BEA reviewed patient's psychosocial form and he has no needs at this time. Laura Wilson.  Ambrose Freemanr

## 2018-11-21 ENCOUNTER — TELEPHONE (OUTPATIENT)
Dept: ONCOLOGY | Age: 63
End: 2018-11-21

## 2018-11-21 ENCOUNTER — OFFICE VISIT (OUTPATIENT)
Dept: ONCOLOGY | Age: 63
End: 2018-11-21
Payer: COMMERCIAL

## 2018-11-21 ENCOUNTER — HOSPITAL ENCOUNTER (OUTPATIENT)
Dept: INFUSION THERAPY | Facility: MEDICAL CENTER | Age: 63
Discharge: HOME | End: 2018-11-21
Payer: COMMERCIAL

## 2018-11-21 VITALS
WEIGHT: 161.4 LBS | TEMPERATURE: 98.1 F | DIASTOLIC BLOOD PRESSURE: 57 MMHG | SYSTOLIC BLOOD PRESSURE: 98 MMHG | HEIGHT: 59 IN | BODY MASS INDEX: 32.54 KG/M2 | RESPIRATION RATE: 18 BRPM | HEART RATE: 72 BPM

## 2018-11-21 VITALS
WEIGHT: 161.4 LBS | BODY MASS INDEX: 32.6 KG/M2 | DIASTOLIC BLOOD PRESSURE: 57 MMHG | HEART RATE: 72 BPM | RESPIRATION RATE: 18 BRPM | TEMPERATURE: 98.1 F | SYSTOLIC BLOOD PRESSURE: 98 MMHG

## 2018-11-21 DIAGNOSIS — C34.92 NON-SMALL CELL CANCER OF LEFT LUNG (HCC): Primary | ICD-10-CM

## 2018-11-21 DIAGNOSIS — C34.92 NON-SMALL CELL CANCER OF LEFT LUNG (HCC): ICD-10-CM

## 2018-11-21 LAB
ABSOLUTE EOS #: 0 K/UL (ref 0–0.4)
ABSOLUTE IMMATURE GRANULOCYTE: ABNORMAL K/UL (ref 0–0.3)
ABSOLUTE LYMPH #: 1.3 K/UL (ref 1–4.8)
ABSOLUTE MONO #: 0.2 K/UL (ref 0.2–0.8)
ALBUMIN SERPL-MCNC: 3.9 G/DL (ref 3.5–5.2)
ALBUMIN/GLOBULIN RATIO: ABNORMAL (ref 1–2.5)
ALP BLD-CCNC: 88 U/L (ref 35–104)
ALT SERPL-CCNC: 18 U/L (ref 5–33)
ANION GAP SERPL CALCULATED.3IONS-SCNC: 11 MMOL/L (ref 9–17)
AST SERPL-CCNC: 15 U/L
BASOPHILS # BLD: 1 % (ref 0–2)
BASOPHILS ABSOLUTE: 0 K/UL (ref 0–0.2)
BILIRUB SERPL-MCNC: 0.1 MG/DL (ref 0.3–1.2)
BUN BLDV-MCNC: 14 MG/DL (ref 8–23)
BUN/CREAT BLD: 25 (ref 9–20)
CALCIUM SERPL-MCNC: 8.8 MG/DL (ref 8.6–10.4)
CHLORIDE BLD-SCNC: 97 MMOL/L (ref 98–107)
CO2: 28 MMOL/L (ref 20–31)
CREAT SERPL-MCNC: 0.57 MG/DL (ref 0.5–0.9)
DIFFERENTIAL TYPE: ABNORMAL
EOSINOPHILS RELATIVE PERCENT: 1 % (ref 1–4)
GFR AFRICAN AMERICAN: >60 ML/MIN
GFR NON-AFRICAN AMERICAN: >60 ML/MIN
GFR SERPL CREATININE-BSD FRML MDRD: ABNORMAL ML/MIN/{1.73_M2}
GFR SERPL CREATININE-BSD FRML MDRD: ABNORMAL ML/MIN/{1.73_M2}
GLUCOSE BLD-MCNC: 99 MG/DL (ref 70–99)
HCT VFR BLD CALC: 31.9 % (ref 36–46)
HEMOGLOBIN: 10.3 G/DL (ref 12–16)
IMMATURE GRANULOCYTES: ABNORMAL %
LYMPHOCYTES # BLD: 35 % (ref 24–44)
MCH RBC QN AUTO: 27.7 PG (ref 26–34)
MCHC RBC AUTO-ENTMCNC: 32.2 G/DL (ref 31–37)
MCV RBC AUTO: 85.9 FL (ref 80–100)
MONOCYTES # BLD: 5 % (ref 1–7)
NRBC AUTOMATED: ABNORMAL PER 100 WBC
PDW BLD-RTO: 13.2 % (ref 11.5–14.5)
PLATELET # BLD: 234 K/UL (ref 130–400)
PLATELET ESTIMATE: ABNORMAL
PMV BLD AUTO: ABNORMAL FL (ref 6–12)
POTASSIUM SERPL-SCNC: 3.8 MMOL/L (ref 3.7–5.3)
RBC # BLD: 3.71 M/UL (ref 4–5.2)
RBC # BLD: ABNORMAL 10*6/UL
SEG NEUTROPHILS: 58 % (ref 36–66)
SEGMENTED NEUTROPHILS ABSOLUTE COUNT: 2.3 K/UL (ref 1.8–7.7)
SODIUM BLD-SCNC: 136 MMOL/L (ref 135–144)
TOTAL PROTEIN: 6.4 G/DL (ref 6.4–8.3)
WBC # BLD: 3.8 K/UL (ref 3.5–11)
WBC # BLD: ABNORMAL 10*3/UL

## 2018-11-21 PROCEDURE — 99215 OFFICE O/P EST HI 40 MIN: CPT | Performed by: INTERNAL MEDICINE

## 2018-11-21 PROCEDURE — 80053 COMPREHEN METABOLIC PANEL: CPT

## 2018-11-21 PROCEDURE — 96361 HYDRATE IV INFUSION ADD-ON: CPT

## 2018-11-21 PROCEDURE — 2580000003 HC RX 258: Performed by: INTERNAL MEDICINE

## 2018-11-21 PROCEDURE — 6360000002 HC RX W HCPCS: Performed by: INTERNAL MEDICINE

## 2018-11-21 PROCEDURE — 96360 HYDRATION IV INFUSION INIT: CPT

## 2018-11-21 PROCEDURE — G8484 FLU IMMUNIZE NO ADMIN: HCPCS | Performed by: INTERNAL MEDICINE

## 2018-11-21 PROCEDURE — 99212 OFFICE O/P EST SF 10 MIN: CPT | Performed by: INTERNAL MEDICINE

## 2018-11-21 PROCEDURE — 3017F COLORECTAL CA SCREEN DOC REV: CPT | Performed by: INTERNAL MEDICINE

## 2018-11-21 PROCEDURE — 36591 DRAW BLOOD OFF VENOUS DEVICE: CPT

## 2018-11-21 PROCEDURE — G8417 CALC BMI ABV UP PARAM F/U: HCPCS | Performed by: INTERNAL MEDICINE

## 2018-11-21 PROCEDURE — 1036F TOBACCO NON-USER: CPT | Performed by: INTERNAL MEDICINE

## 2018-11-21 PROCEDURE — G8427 DOCREV CUR MEDS BY ELIG CLIN: HCPCS | Performed by: INTERNAL MEDICINE

## 2018-11-21 PROCEDURE — 85025 COMPLETE CBC W/AUTO DIFF WBC: CPT

## 2018-11-21 RX ORDER — SODIUM CHLORIDE 0.9 % (FLUSH) 0.9 %
20 SYRINGE (ML) INJECTION PRN
Status: ACTIVE | OUTPATIENT
Start: 2018-11-21 | End: 2018-11-21

## 2018-11-21 RX ORDER — 0.9 % SODIUM CHLORIDE 0.9 %
1000 INTRAVENOUS SOLUTION INTRAVENOUS ONCE
Status: COMPLETED | OUTPATIENT
Start: 2018-11-21 | End: 2018-11-21

## 2018-11-21 RX ORDER — HEPARIN SODIUM (PORCINE) LOCK FLUSH IV SOLN 100 UNIT/ML 100 UNIT/ML
500 SOLUTION INTRAVENOUS PRN
Status: DISPENSED | OUTPATIENT
Start: 2018-11-21 | End: 2018-11-21

## 2018-11-21 RX ORDER — PSEUDOEPHEDRINE HCL 30 MG
100 TABLET ORAL 2 TIMES DAILY
Qty: 60 CAPSULE | Refills: 0 | Status: SHIPPED | OUTPATIENT
Start: 2018-11-21 | End: 2018-12-27 | Stop reason: SDUPTHER

## 2018-11-21 RX ADMIN — SODIUM CHLORIDE, PRESERVATIVE FREE 500 UNITS: 5 INJECTION INTRAVENOUS at 13:41

## 2018-11-21 RX ADMIN — SODIUM CHLORIDE 1000 ML: 9 INJECTION, SOLUTION INTRAVENOUS at 12:45

## 2018-11-21 RX ADMIN — Medication 20 ML: at 13:41

## 2018-11-21 ASSESSMENT — PAIN DESCRIPTION - LOCATION: LOCATION: INCISION

## 2018-11-21 ASSESSMENT — PAIN DESCRIPTION - PAIN TYPE: TYPE: CHRONIC PAIN;ACUTE PAIN

## 2018-11-21 ASSESSMENT — PAIN SCALES - GENERAL: PAINLEVEL_OUTOF10: 2

## 2018-11-21 ASSESSMENT — PAIN DESCRIPTION - DESCRIPTORS: DESCRIPTORS: DISCOMFORT

## 2018-11-21 ASSESSMENT — PAIN DESCRIPTION - ORIENTATION: ORIENTATION: LEFT

## 2018-11-21 NOTE — PROGRESS NOTES
Patient arrive ambulatory with  for RN lab draw from port and physician visit. Patient states she has continued pain to left side post surgery but is reluctant to believe is still surgical pain; this pain is level 2. Further states has pain which is intermittent to mid abdomen and when pain is present is a pain level of 4; states is also constipated. Vitals as charted. Port accessed with brisk blood return; specimen collected. Labs reviewed. Physician met with patient; verbal order to writer for liter of saline to be infused. Patient tolerated infusion well. Port flushed and heparinized with intact kay needle removed per protocol. Patient ambulate off unit with  at discharge; stopping at  to get discharge paperwork/schedule for further testing as instructed.

## 2018-11-21 NOTE — FLOWSHEET NOTE
rounding on unit. Assessment: Patient was present with significant other. Patient was pleasant and stated that she was \"just here for hydration\" and \"I've only had none chemo treatment so far\". SO said, \"We are fine but we'll take all the prayer we can get. \"      Intervention and Outcome:  provided a supportive presence. Patient and SO thanked  for visit. Plan: Chaplains will remain available to offer spiritual and emotional support as needed. 11/21/18 1321   Encounter Summary   Services provided to: Patient and family together   Referral/Consult From: [address] [facility] Significant other   Continue Visiting (11/21/18)   Complexity of Encounter Low   Length of Encounter 15 minutes   Spiritual/Shinto   Type Spiritual support   Assessment Calm; Approachable   Intervention Active listening   Outcome Expressed gratitude;Expressed feelings/needs/concerns     Electronically signed by Alisa Sofia on 11/21/2018 at 1:32 PM

## 2018-11-21 NOTE — PROGRESS NOTES
Hyperlipidemia     Hypertension     Liver hemangioma     Lung nodule     BARAK  Nodule    Snores     not tested for apnea    Uterine fibroid 09/2010    Wears glasses        Past Surgical History:   Procedure Laterality Date    ABDOMINAL HERNIA REPAIR  2012    BREAST BIOPSY Left 1983    BRONCHOSCOPY  10/1/2018    BRONCHOSCOPY performed by Bunny Kehr, MD at \[facility]"" Endoscopy    COLONOSCOPY      HYSTERECTOMY  2010    LUNG BIOPSY      LUNG REMOVAL, PARTIAL Right 09/28/2018    Robotic assisted left lobectomy, upper with lymph node biopsy    OTHER SURGICAL HISTORY Right 11/05/2018    IR PORT PLACEMENT EQUAL OR GREATER THAN 5 YEARS    [address] INCL FLUOR GDNCE DX W/CELL WASHG SPX N/A 10/29/2018    [facility] LOBE LOBECT Left 9/28/2018    XI ROBOTIC ASSISTED LEFT UPPER LOBECTOMY MULTIPLE LYMPH NODE BIOPSY, INTERCOSTAL  NERVE BLOCK ABLATION W/EXPAREL performed by Kaylin Mayers MD at [address]   Allergen Reactions    Codeine Nausea And Vomiting       Current Outpatient Prescriptions   Medication Sig Dispense Refill    lisinopril-hydrochlorothiazide (PRINZIDE;ZESTORETIC) 10-12.5 MG per tablet Take 1 tablet by mouth daily 30 tablet 0    lidocaine-prilocaine (EMLA) 2.5-2.5 % cream Apply topically as needed prior to mediport access. Do not apply more than twice per day 1 Tube 3    dexamethasone (DECADRON) 4 MG tablet Take 5 tablets by mouth as needed (pre Taxol) Take 20 mg or 5 tablets orally 12 hours and repeat 6 hours before each Taxol infusion. 40 tablet 0    ALPRAZolam (XANAX) 0.25 MG tablet Take 1 tablet by mouth 3 times daily as needed for Sleep for up to 30 days. . 90 tablet 0    albuterol sulfate HFA (VENTOLIN HFA) 108 (90 Base) MCG/ACT inhaler Inhale 2 puffs into the lungs 4 times daily 1 Inhaler 0    lovastatin (MEVACOR) 10 MG tablet Take 10 mg by mouth nightly      prochlorperazine may be inflammatory in etiology. Attention to on follow-up is recommended.       Small mildly FDG avid subcarinal lymph node, which also may be reactive. Attention to on follow-up is recommended.       Redemonstration of non FDG avid liver lesions in the right hepatic lobe,   previously characterized as a hemangioma in segment 8 and with findings of a   cyst in segment 5. MRI brain 10/24/18  Impression   1. No convincing evidence of intracranial metastatic disease. 2. No acute intracranial abnormality.  No acute infarct. 3. There is an extra-axial enhancing mass along the right parietal convexity   measuring up to 6 mm in size, which correlates to a partially calcified   lesion seen on the prior CT.  This is most suggestive of a meningioma. 4. The larger lucent lesions within the calvarium on the prior CT demonstrate   intrinsic T1 hyperintensity most suggestive of intraosseous hemangiomas. ASSESSMENT:    Valencia Ybarra Is a very pleasant 59-year-old  female with newly diagnosed left upper lobe non-small cell lung cancer, adenocarcinoma, status post Robotically assisted BARAK lobectomy with LN dissection and Intercostal nerve block with experal on 9/28/18. I discussed the surgical pathology, diagnosis, prognosis and treatment options with patient. She has [id number] disease, stage IIIA, she had R1 disease with positive margins. I discussed the NCCN guidelines with patient. She will be receiving sequential chemoradiation. I discussed the risk and benefits. I reviewed the pathology report with thoracic surgery Dr Goldy Pro. She had received port placement. She received 1 cycle of chemotherapy and tolerated well so far. I will get CT scans to alert the etiology of 4 pain on the left side of the chest/upper abdomen. During today's visit, the patient and the family had a number of reasonable questions which were answered to their satisfaction.   They verbalized understanding of the information

## 2018-11-26 ENCOUNTER — TELEPHONE (OUTPATIENT)
Dept: CASE MANAGEMENT | Age: 63
End: 2018-11-26

## 2018-11-27 ENCOUNTER — HOSPITAL ENCOUNTER (OUTPATIENT)
Dept: CT IMAGING | Facility: CLINIC | Age: 63
Discharge: HOME | End: 2018-11-29
Payer: COMMERCIAL

## 2018-11-27 DIAGNOSIS — C34.92 NON-SMALL CELL CANCER OF LEFT LUNG (HCC): ICD-10-CM

## 2018-11-27 PROCEDURE — 71260 CT THORAX DX C+: CPT

## 2018-11-27 PROCEDURE — 2580000003 HC RX 258: Performed by: INTERNAL MEDICINE

## 2018-11-27 PROCEDURE — 6360000004 HC RX CONTRAST MEDICATION: Performed by: INTERNAL MEDICINE

## 2018-11-27 PROCEDURE — 74177 CT ABD & PELVIS W/CONTRAST: CPT

## 2018-11-27 RX ORDER — 0.9 % SODIUM CHLORIDE 0.9 %
70 INTRAVENOUS SOLUTION INTRAVENOUS ONCE
Status: COMPLETED | OUTPATIENT
Start: 2018-11-27 | End: 2018-11-27

## 2018-11-27 RX ORDER — SODIUM CHLORIDE 0.9 % (FLUSH) 0.9 %
10 SYRINGE (ML) INJECTION PRN
Status: DISCONTINUED | OUTPATIENT
Start: 2018-11-27 | End: 2018-11-30 | Stop reason: HOSPADM

## 2018-11-27 RX ADMIN — SODIUM CHLORIDE 70 ML: 9 INJECTION, SOLUTION INTRAVENOUS at 13:38

## 2018-11-27 RX ADMIN — Medication 10 ML: at 13:35

## 2018-11-27 RX ADMIN — IOHEXOL 50 ML: 240 INJECTION, SOLUTION INTRATHECAL; INTRAVASCULAR; INTRAVENOUS; ORAL at 12:35

## 2018-11-27 RX ADMIN — IOPAMIDOL 100 ML: 755 INJECTION, SOLUTION INTRAVENOUS at 13:38

## 2018-11-28 ENCOUNTER — HOSPITAL ENCOUNTER (OUTPATIENT)
Facility: MEDICAL CENTER | Age: 63
End: 2018-11-28
Payer: COMMERCIAL

## 2018-11-29 ENCOUNTER — TELEPHONE (OUTPATIENT)
Dept: ONCOLOGY | Age: 63
End: 2018-11-29

## 2018-12-05 ENCOUNTER — OFFICE VISIT (OUTPATIENT)
Dept: ONCOLOGY | Age: 63
End: 2018-12-05
Payer: COMMERCIAL

## 2018-12-05 ENCOUNTER — TELEPHONE (OUTPATIENT)
Dept: ONCOLOGY | Age: 63
End: 2018-12-05

## 2018-12-05 ENCOUNTER — HOSPITAL ENCOUNTER (OUTPATIENT)
Dept: INFUSION THERAPY | Facility: MEDICAL CENTER | Age: 63
Discharge: HOME | End: 2018-12-05
Payer: COMMERCIAL

## 2018-12-05 VITALS
TEMPERATURE: 97.9 F | WEIGHT: 161.3 LBS | DIASTOLIC BLOOD PRESSURE: 77 MMHG | BODY MASS INDEX: 32.52 KG/M2 | RESPIRATION RATE: 18 BRPM | SYSTOLIC BLOOD PRESSURE: 132 MMHG | HEART RATE: 96 BPM | HEIGHT: 59 IN

## 2018-12-05 VITALS
TEMPERATURE: 97.9 F | SYSTOLIC BLOOD PRESSURE: 132 MMHG | WEIGHT: 161.3 LBS | HEART RATE: 96 BPM | DIASTOLIC BLOOD PRESSURE: 77 MMHG | RESPIRATION RATE: 18 BRPM | BODY MASS INDEX: 32.58 KG/M2

## 2018-12-05 DIAGNOSIS — C34.92 NON-SMALL CELL CANCER OF LEFT LUNG (HCC): ICD-10-CM

## 2018-12-05 LAB
ABSOLUTE EOS #: 0.1 K/UL (ref 0–0.4)
ABSOLUTE IMMATURE GRANULOCYTE: ABNORMAL K/UL (ref 0–0.3)
ABSOLUTE LYMPH #: 0.6 K/UL (ref 1–4.8)
ABSOLUTE MONO #: 0 K/UL (ref 0.2–0.8)
ALBUMIN SERPL-MCNC: 4.3 G/DL (ref 3.5–5.2)
ALBUMIN/GLOBULIN RATIO: ABNORMAL (ref 1–2.5)
ALP BLD-CCNC: 120 U/L (ref 35–104)
ALT SERPL-CCNC: 23 U/L (ref 5–33)
ANION GAP SERPL CALCULATED.3IONS-SCNC: 16 MMOL/L (ref 9–17)
AST SERPL-CCNC: 17 U/L
BASOPHILS # BLD: 0 % (ref 0–2)
BASOPHILS ABSOLUTE: 0 K/UL (ref 0–0.2)
BILIRUB SERPL-MCNC: 0.17 MG/DL (ref 0.3–1.2)
BUN BLDV-MCNC: 14 MG/DL (ref 8–23)
BUN/CREAT BLD: 24 (ref 9–20)
CALCIUM SERPL-MCNC: 9.5 MG/DL (ref 8.6–10.4)
CHLORIDE BLD-SCNC: 97 MMOL/L (ref 98–107)
CO2: 24 MMOL/L (ref 20–31)
CREAT SERPL-MCNC: 0.58 MG/DL (ref 0.5–0.9)
DIFFERENTIAL TYPE: ABNORMAL
EOSINOPHILS RELATIVE PERCENT: 1 % (ref 1–4)
GFR AFRICAN AMERICAN: >60 ML/MIN
GFR NON-AFRICAN AMERICAN: >60 ML/MIN
GFR SERPL CREATININE-BSD FRML MDRD: ABNORMAL ML/MIN/{1.73_M2}
GFR SERPL CREATININE-BSD FRML MDRD: ABNORMAL ML/MIN/{1.73_M2}
GLUCOSE BLD-MCNC: 221 MG/DL (ref 70–99)
HCT VFR BLD CALC: 35.1 % (ref 36–46)
HEMOGLOBIN: 11.9 G/DL (ref 12–16)
IMMATURE GRANULOCYTES: ABNORMAL %
LYMPHOCYTES # BLD: 10 % (ref 24–44)
MCH RBC QN AUTO: 28.6 PG (ref 26–34)
MCHC RBC AUTO-ENTMCNC: 33.7 G/DL (ref 31–37)
MCV RBC AUTO: 84.9 FL (ref 80–100)
MONOCYTES # BLD: 1 % (ref 1–7)
NRBC AUTOMATED: ABNORMAL PER 100 WBC
PDW BLD-RTO: 15.9 % (ref 11.5–14.5)
PLATELET # BLD: 296 K/UL (ref 130–400)
PLATELET ESTIMATE: ABNORMAL
PMV BLD AUTO: 7.5 FL (ref 6–12)
POTASSIUM SERPL-SCNC: 3.8 MMOL/L (ref 3.7–5.3)
RBC # BLD: 4.14 M/UL (ref 4–5.2)
RBC # BLD: ABNORMAL 10*6/UL
SEG NEUTROPHILS: 88 % (ref 36–66)
SEGMENTED NEUTROPHILS ABSOLUTE COUNT: 5.1 K/UL (ref 1.8–7.7)
SODIUM BLD-SCNC: 137 MMOL/L (ref 135–144)
TOTAL PROTEIN: 7.2 G/DL (ref 6.4–8.3)
WBC # BLD: 5.8 K/UL (ref 3.5–11)
WBC # BLD: ABNORMAL 10*3/UL

## 2018-12-05 PROCEDURE — 96415 CHEMO IV INFUSION ADDL HR: CPT

## 2018-12-05 PROCEDURE — 96367 TX/PROPH/DG ADDL SEQ IV INF: CPT

## 2018-12-05 PROCEDURE — 96375 TX/PRO/DX INJ NEW DRUG ADDON: CPT

## 2018-12-05 PROCEDURE — 99211 OFF/OP EST MAY X REQ PHY/QHP: CPT | Performed by: INTERNAL MEDICINE

## 2018-12-05 PROCEDURE — 6360000002 HC RX W HCPCS: Performed by: INTERNAL MEDICINE

## 2018-12-05 PROCEDURE — 1036F TOBACCO NON-USER: CPT | Performed by: INTERNAL MEDICINE

## 2018-12-05 PROCEDURE — G8484 FLU IMMUNIZE NO ADMIN: HCPCS | Performed by: INTERNAL MEDICINE

## 2018-12-05 PROCEDURE — 2580000003 HC RX 258: Performed by: INTERNAL MEDICINE

## 2018-12-05 PROCEDURE — 96417 CHEMO IV INFUS EACH ADDL SEQ: CPT

## 2018-12-05 PROCEDURE — 36591 DRAW BLOOD OFF VENOUS DEVICE: CPT

## 2018-12-05 PROCEDURE — 3017F COLORECTAL CA SCREEN DOC REV: CPT | Performed by: INTERNAL MEDICINE

## 2018-12-05 PROCEDURE — S0028 INJECTION, FAMOTIDINE, 20 MG: HCPCS | Performed by: INTERNAL MEDICINE

## 2018-12-05 PROCEDURE — 96413 CHEMO IV INFUSION 1 HR: CPT

## 2018-12-05 PROCEDURE — 99215 OFFICE O/P EST HI 40 MIN: CPT | Performed by: INTERNAL MEDICINE

## 2018-12-05 PROCEDURE — G8427 DOCREV CUR MEDS BY ELIG CLIN: HCPCS | Performed by: INTERNAL MEDICINE

## 2018-12-05 PROCEDURE — G8417 CALC BMI ABV UP PARAM F/U: HCPCS | Performed by: INTERNAL MEDICINE

## 2018-12-05 PROCEDURE — 80053 COMPREHEN METABOLIC PANEL: CPT

## 2018-12-05 PROCEDURE — 85025 COMPLETE CBC W/AUTO DIFF WBC: CPT

## 2018-12-05 PROCEDURE — 2500000003 HC RX 250 WO HCPCS: Performed by: INTERNAL MEDICINE

## 2018-12-05 RX ORDER — 0.9 % SODIUM CHLORIDE 0.9 %
10 VIAL (ML) INJECTION ONCE
Status: CANCELLED | OUTPATIENT
Start: 2018-12-26

## 2018-12-05 RX ORDER — DEXAMETHASONE SODIUM PHOSPHATE 10 MG/ML
10 INJECTION INTRAMUSCULAR; INTRAVENOUS ONCE
Status: COMPLETED | OUTPATIENT
Start: 2018-12-05 | End: 2018-12-05

## 2018-12-05 RX ORDER — 0.9 % SODIUM CHLORIDE 0.9 %
10 VIAL (ML) INJECTION ONCE
Status: CANCELLED | OUTPATIENT
Start: 2018-12-26 | End: 2018-12-26

## 2018-12-05 RX ORDER — METHYLPREDNISOLONE SODIUM SUCCINATE 125 MG/2ML
125 INJECTION, POWDER, LYOPHILIZED, FOR SOLUTION INTRAMUSCULAR; INTRAVENOUS ONCE
Status: CANCELLED | OUTPATIENT
Start: 2018-12-05 | End: 2018-12-05

## 2018-12-05 RX ORDER — DIPHENHYDRAMINE HYDROCHLORIDE 50 MG/ML
50 INJECTION INTRAMUSCULAR; INTRAVENOUS ONCE
Status: COMPLETED | OUTPATIENT
Start: 2018-12-05 | End: 2018-12-05

## 2018-12-05 RX ORDER — SODIUM CHLORIDE 0.9 % (FLUSH) 0.9 %
5 SYRINGE (ML) INJECTION PRN
Status: CANCELLED | OUTPATIENT
Start: 2018-12-26

## 2018-12-05 RX ORDER — SODIUM CHLORIDE 0.9 % (FLUSH) 0.9 %
5 SYRINGE (ML) INJECTION PRN
Status: CANCELLED | OUTPATIENT
Start: 2018-12-05

## 2018-12-05 RX ORDER — DIPHENHYDRAMINE HYDROCHLORIDE 50 MG/ML
50 INJECTION INTRAMUSCULAR; INTRAVENOUS ONCE
Status: CANCELLED | OUTPATIENT
Start: 2018-12-05

## 2018-12-05 RX ORDER — SODIUM CHLORIDE 9 MG/ML
INJECTION, SOLUTION INTRAVENOUS ONCE
Status: COMPLETED | OUTPATIENT
Start: 2018-12-05 | End: 2018-12-05

## 2018-12-05 RX ORDER — DIPHENHYDRAMINE HYDROCHLORIDE 50 MG/ML
50 INJECTION INTRAMUSCULAR; INTRAVENOUS ONCE
Status: CANCELLED | OUTPATIENT
Start: 2018-12-05 | End: 2018-12-05

## 2018-12-05 RX ORDER — PALONOSETRON 0.05 MG/ML
0.25 INJECTION, SOLUTION INTRAVENOUS ONCE
Status: CANCELLED | OUTPATIENT
Start: 2018-12-05

## 2018-12-05 RX ORDER — MEPERIDINE HYDROCHLORIDE 50 MG/ML
12.5 INJECTION INTRAMUSCULAR; INTRAVENOUS; SUBCUTANEOUS ONCE
Status: CANCELLED | OUTPATIENT
Start: 2018-12-26 | End: 2018-12-26

## 2018-12-05 RX ORDER — SODIUM CHLORIDE 9 MG/ML
INJECTION, SOLUTION INTRAVENOUS CONTINUOUS
Status: CANCELLED | OUTPATIENT
Start: 2018-12-26

## 2018-12-05 RX ORDER — 0.9 % SODIUM CHLORIDE 0.9 %
10 VIAL (ML) INJECTION ONCE
Status: COMPLETED | OUTPATIENT
Start: 2018-12-05 | End: 2018-12-05

## 2018-12-05 RX ORDER — SODIUM CHLORIDE 9 MG/ML
INJECTION, SOLUTION INTRAVENOUS ONCE
Status: CANCELLED | OUTPATIENT
Start: 2018-12-26 | End: 2018-12-26

## 2018-12-05 RX ORDER — 0.9 % SODIUM CHLORIDE 0.9 %
10 VIAL (ML) INJECTION ONCE
Status: CANCELLED | OUTPATIENT
Start: 2018-12-05

## 2018-12-05 RX ORDER — SODIUM CHLORIDE 0.9 % (FLUSH) 0.9 %
10 SYRINGE (ML) INJECTION PRN
Status: CANCELLED | OUTPATIENT
Start: 2018-12-26

## 2018-12-05 RX ORDER — HEPARIN SODIUM (PORCINE) LOCK FLUSH IV SOLN 100 UNIT/ML 100 UNIT/ML
500 SOLUTION INTRAVENOUS PRN
Status: CANCELLED | OUTPATIENT
Start: 2018-12-26

## 2018-12-05 RX ORDER — DIPHENHYDRAMINE HYDROCHLORIDE 50 MG/ML
50 INJECTION INTRAMUSCULAR; INTRAVENOUS ONCE
Status: CANCELLED | OUTPATIENT
Start: 2018-12-26

## 2018-12-05 RX ORDER — DIPHENHYDRAMINE HYDROCHLORIDE 50 MG/ML
50 INJECTION INTRAMUSCULAR; INTRAVENOUS ONCE
Status: CANCELLED | OUTPATIENT
Start: 2018-12-26 | End: 2018-12-26

## 2018-12-05 RX ORDER — HEPARIN SODIUM (PORCINE) LOCK FLUSH IV SOLN 100 UNIT/ML 100 UNIT/ML
500 SOLUTION INTRAVENOUS PRN
Status: DISCONTINUED | OUTPATIENT
Start: 2018-12-05 | End: 2018-12-06 | Stop reason: HOSPADM

## 2018-12-05 RX ORDER — MEPERIDINE HYDROCHLORIDE 50 MG/ML
12.5 INJECTION INTRAMUSCULAR; INTRAVENOUS; SUBCUTANEOUS ONCE
Status: CANCELLED | OUTPATIENT
Start: 2018-12-05 | End: 2018-12-05

## 2018-12-05 RX ORDER — METHYLPREDNISOLONE SODIUM SUCCINATE 125 MG/2ML
125 INJECTION, POWDER, LYOPHILIZED, FOR SOLUTION INTRAMUSCULAR; INTRAVENOUS ONCE
Status: CANCELLED | OUTPATIENT
Start: 2018-12-26 | End: 2018-12-26

## 2018-12-05 RX ORDER — SODIUM CHLORIDE 0.9 % (FLUSH) 0.9 %
10 SYRINGE (ML) INJECTION PRN
Status: DISCONTINUED | OUTPATIENT
Start: 2018-12-05 | End: 2018-12-06 | Stop reason: HOSPADM

## 2018-12-05 RX ORDER — PALONOSETRON 0.05 MG/ML
0.25 INJECTION, SOLUTION INTRAVENOUS ONCE
Status: COMPLETED | OUTPATIENT
Start: 2018-12-05 | End: 2018-12-05

## 2018-12-05 RX ORDER — PALONOSETRON 0.05 MG/ML
0.25 INJECTION, SOLUTION INTRAVENOUS ONCE
Status: CANCELLED | OUTPATIENT
Start: 2018-12-26

## 2018-12-05 RX ORDER — SODIUM CHLORIDE 9 MG/ML
INJECTION, SOLUTION INTRAVENOUS CONTINUOUS
Status: CANCELLED | OUTPATIENT
Start: 2018-12-05

## 2018-12-05 RX ORDER — 0.9 % SODIUM CHLORIDE 0.9 %
10 VIAL (ML) INJECTION ONCE
Status: CANCELLED | OUTPATIENT
Start: 2018-12-05 | End: 2018-12-05

## 2018-12-05 RX ADMIN — PALONOSETRON 0.25 MG: 0.05 INJECTION, SOLUTION INTRAVENOUS at 09:53

## 2018-12-05 RX ADMIN — DIPHENHYDRAMINE HYDROCHLORIDE 50 MG: 50 INJECTION INTRAMUSCULAR; INTRAVENOUS at 09:56

## 2018-12-05 RX ADMIN — PACLITAXEL 348 MG: 6 INJECTION, SOLUTION INTRAVENOUS at 10:49

## 2018-12-05 RX ADMIN — Medication 10 ML: at 15:25

## 2018-12-05 RX ADMIN — Medication 10 ML: at 09:53

## 2018-12-05 RX ADMIN — SODIUM CHLORIDE: 9 INJECTION, SOLUTION INTRAVENOUS at 08:24

## 2018-12-05 RX ADMIN — SODIUM CHLORIDE, PRESERVATIVE FREE 500 UNITS: 5 INJECTION INTRAVENOUS at 15:25

## 2018-12-05 RX ADMIN — SODIUM CHLORIDE 150 MG: 900 INJECTION, SOLUTION INTRAVENOUS at 10:02

## 2018-12-05 RX ADMIN — CARBOPLATIN 650 MG: 10 INJECTION, SOLUTION INTRAVENOUS at 14:11

## 2018-12-05 RX ADMIN — DEXAMETHASONE SODIUM PHOSPHATE 10 MG: 10 INJECTION INTRAMUSCULAR; INTRAVENOUS at 09:58

## 2018-12-05 RX ADMIN — FAMOTIDINE 20 MG: 10 INJECTION, SOLUTION INTRAVENOUS at 09:53

## 2018-12-05 ASSESSMENT — PAIN SCALES - GENERAL: PAINLEVEL_OUTOF10: 0

## 2018-12-05 NOTE — PROGRESS NOTES
7780:  Pt arrives ambulatory accompanied by her  for RN draw, MD visit and C2D1. Orders reviewed. Toxicity assessment completed. VS and weight obtained. 1616:  Labs drawn and sent to lab pending and then IVF's 0.9% NaCl begun at 20 ml/hr for med line to flush before, during and after any pre meds and chemotherapy drugs. Lab results reviewed. 8801:  Dr. Viridiana Luna in treatment area to see patient. Labs reviewed. MD visit note orders reviewed. Okay to proceed with today's chemotherapy treatment as planned. See MAR for pre-medications given to the patient. 1049:   Taxol 348 mg IV hung. Rate initiated at 50 ml/hr for the first 10 minutes. Pt tolerated well without complaints. No s/s adverse reactions noted. Rate increased to 100 ml/hr for the next 10 minutes. Pt tolerated well without complaints. No s/s adverse reactions noted. Rate increased to and maintained at 186 ml/hr until completion time of 1406. Pt tolerated Taxol well without complaints. No s/s adverse reactions noted. Pt tolerated Carboplatin well without complaints. No s/s adverse reactions noted. Appointment calendar and AVS given to the patient. RTC on 12/26/2018. Pt discharged home ambulatory accompanied by her  without complaints and/or no new concerns voiced.

## 2018-12-06 NOTE — PROGRESS NOTES
Patient ID: Timoteo Toribio, 1955, [id number], 66 y.o. Diagnosis:   Left upper lobe invasive lung adenocarcinoma, Status post lobectomy 9/28/18, Pathologic stage: pT1c, pN2, stage IIIa R1 with positive margin,    Will start chemotherapy followed By RT  Carbo taxol cycle 1 on 11/14/18  HISTORY OF PRESENT ILLNESS:    Oncologic History:  The patient is a 66 y.o.  female who is admitted to the hospital for Left upper lobe mass. Pt has a significant past medical history of Uterine fibroids requiring total abdominal hysterectomy, liver hemangioma, HTN, Hyperlipidemia, DVT Left leg, COPD, and anxiety. Pt was found to have a left upper lung adenocarcinoma and presented to the hospital on 9/28/2018 for a scheduled robotic-assisted left upper lobe lobectomy. Pathology is positive for metastatic adenocarcinoma. There are some lymph nodes positive and some evidence of invasion to surrounding structures seen during surgery. Surgical team is planning on discharging patient this evening from the hospital.  She was discharged from the hospital with plan to follow with oncology as outpatient. Interval history:   Patient is returning for follow-up visit. She is tolerating chemo well. She denied any nausea vomiting. She feels tired and fatigued. She denied any diarrhea, fever chills. She complains of pain in her left side near the surgical scar and also radiating to the left upper quadrant and epigastric region. She is here to discuss the pathology results and further recommendations.   During this visit patient's allergy, social, medical, surgical history and medications were reviewed and updated.        Past Medical History:   Diagnosis Date    Anxiety     Benign polyp of large intestine     Cancer ([facility] [address]     LT UPPER LOBE    COPD (chronic obstructive pulmonary disease) ([facility] [address] [facility]     Hx of blood clots     DVT LT LEG    Hyperlipidemia     Hypertension     Liver hemangioma     Lung nodule suggesting  metastatic disease. 3. Multiple incidental/chronic findings as above including re- demonstration  of right hepatic lobe hemangioma and cyst.   . CCT ([facility]) 7/19/18  *A 2.6 x 2.1 cm spiculated nodule is identified involving the lingula  correlating to the abnormality seen on chest x-ray.  Malignancy is suspected  and further evaluation with tissue sampling and/or PET CT is recommended. *Partially calcified subpleural nodules identified involving the left lower  lobe along the diaphragmatic surface measuring 1.4 cm in greatest axial  dimension.  Finding can also be further characterized by PET-CT. *Centrilobular/paraseptal emphysematous changes with large bulla involving  the right lung apex. *Multiple low attenuating lesions are identified within the visualized liver  parenchyma, incompletely characterized on today's study, largest measuring  4.1 cm within segment 8 of the liver, most likely representing an hemangioma  given the peripheral puddling of contrast.  Given the lung nodule, if  complete evaluation is needed, CT or MRI utilizing liver mass protocol is  recommended. CT Head 8/3/18:  1. No acute intracranial abnormality or abnormal postcontrast enhancement. 2. Few scattered well-circumscribed subcentimeter lucent areas in the  calvarium largest in the right frontal lesion as measured above which are  favored to be benign however given history of malignancy recommend  correlation with bone scan to assess for possible metastasis. PET scan 11/10/18  Impression   Status post left upper lobectomy.  Small left effusion with pleural   thickening that is mildly FDG avid.  This may be inflammatory in etiology. Attention to on follow-up is recommended.       Small mildly FDG avid subcarinal lymph node, which also may be reactive.    Attention to on follow-up is recommended.       Redemonstration of non FDG avid liver lesions in the right hepatic lobe,   previously characterized as a hemangioma

## 2018-12-07 DIAGNOSIS — C34.90 NON-SMALL CELL LUNG CANCER, UNSPECIFIED LATERALITY (HCC): Primary | ICD-10-CM

## 2018-12-09 RX ORDER — ALPRAZOLAM 0.25 MG/1
0.25 TABLET ORAL 3 TIMES DAILY PRN
Qty: 90 TABLET | Refills: 0 | Status: SHIPPED | OUTPATIENT
Start: 2018-12-09 | End: 2019-01-08

## 2018-12-12 ENCOUNTER — HOSPITAL ENCOUNTER (OUTPATIENT)
Facility: MEDICAL CENTER | Age: 63
End: 2018-12-12
Payer: COMMERCIAL

## 2018-12-26 ENCOUNTER — TELEPHONE (OUTPATIENT)
Dept: CASE MANAGEMENT | Age: 63
End: 2018-12-26

## 2018-12-26 ENCOUNTER — HOSPITAL ENCOUNTER (OUTPATIENT)
Dept: INFUSION THERAPY | Facility: MEDICAL CENTER | Age: 63
Discharge: HOME | End: 2018-12-26
Payer: COMMERCIAL

## 2018-12-26 VITALS
TEMPERATURE: 98.1 F | RESPIRATION RATE: 18 BRPM | SYSTOLIC BLOOD PRESSURE: 117 MMHG | DIASTOLIC BLOOD PRESSURE: 75 MMHG | HEART RATE: 92 BPM

## 2018-12-26 DIAGNOSIS — C34.92 NON-SMALL CELL CANCER OF LEFT LUNG (HCC): ICD-10-CM

## 2018-12-26 LAB
ABSOLUTE EOS #: 0 K/UL (ref 0–0.4)
ABSOLUTE IMMATURE GRANULOCYTE: ABNORMAL K/UL (ref 0–0.3)
ABSOLUTE LYMPH #: 0.6 K/UL (ref 1–4.8)
ABSOLUTE MONO #: 0 K/UL (ref 0.2–0.8)
ALBUMIN SERPL-MCNC: 4.2 G/DL (ref 3.5–5.2)
ALBUMIN/GLOBULIN RATIO: ABNORMAL (ref 1–2.5)
ALP BLD-CCNC: 88 U/L (ref 35–104)
ALT SERPL-CCNC: 20 U/L (ref 5–33)
ANION GAP SERPL CALCULATED.3IONS-SCNC: 15 MMOL/L (ref 9–17)
AST SERPL-CCNC: 19 U/L
BASOPHILS # BLD: 0 % (ref 0–2)
BASOPHILS ABSOLUTE: 0 K/UL (ref 0–0.2)
BILIRUB SERPL-MCNC: 0.13 MG/DL (ref 0.3–1.2)
BUN BLDV-MCNC: 13 MG/DL (ref 8–23)
BUN/CREAT BLD: 22 (ref 9–20)
CALCIUM SERPL-MCNC: 9.4 MG/DL (ref 8.6–10.4)
CHLORIDE BLD-SCNC: 101 MMOL/L (ref 98–107)
CO2: 24 MMOL/L (ref 20–31)
CREAT SERPL-MCNC: 0.59 MG/DL (ref 0.5–0.9)
DIFFERENTIAL TYPE: ABNORMAL
EOSINOPHILS RELATIVE PERCENT: 1 % (ref 1–4)
GFR AFRICAN AMERICAN: >60 ML/MIN
GFR NON-AFRICAN AMERICAN: >60 ML/MIN
GFR SERPL CREATININE-BSD FRML MDRD: ABNORMAL ML/MIN/{1.73_M2}
GFR SERPL CREATININE-BSD FRML MDRD: ABNORMAL ML/MIN/{1.73_M2}
GLUCOSE BLD-MCNC: 193 MG/DL (ref 70–99)
HCT VFR BLD CALC: 33.5 % (ref 36–46)
HEMOGLOBIN: 11.6 G/DL (ref 12–16)
IMMATURE GRANULOCYTES: ABNORMAL %
LYMPHOCYTES # BLD: 14 % (ref 24–44)
MCH RBC QN AUTO: 28.8 PG (ref 26–34)
MCHC RBC AUTO-ENTMCNC: 34.5 G/DL (ref 31–37)
MCV RBC AUTO: 83.6 FL (ref 80–100)
MONOCYTES # BLD: 0 % (ref 1–7)
NRBC AUTOMATED: ABNORMAL PER 100 WBC
PDW BLD-RTO: 17.3 % (ref 11.5–14.5)
PLATELET # BLD: 235 K/UL (ref 130–400)
PLATELET ESTIMATE: ABNORMAL
PMV BLD AUTO: 7.1 FL (ref 6–12)
POTASSIUM SERPL-SCNC: 4 MMOL/L (ref 3.7–5.3)
RBC # BLD: 4.02 M/UL (ref 4–5.2)
RBC # BLD: ABNORMAL 10*6/UL
SEG NEUTROPHILS: 85 % (ref 36–66)
SEGMENTED NEUTROPHILS ABSOLUTE COUNT: 3.5 K/UL (ref 1.8–7.7)
SODIUM BLD-SCNC: 140 MMOL/L (ref 135–144)
TOTAL PROTEIN: 7.1 G/DL (ref 6.4–8.3)
WBC # BLD: 4.2 K/UL (ref 3.5–11)
WBC # BLD: ABNORMAL 10*3/UL

## 2018-12-26 PROCEDURE — 85025 COMPLETE CBC W/AUTO DIFF WBC: CPT

## 2018-12-26 PROCEDURE — 36591 DRAW BLOOD OFF VENOUS DEVICE: CPT

## 2018-12-26 PROCEDURE — 96417 CHEMO IV INFUS EACH ADDL SEQ: CPT

## 2018-12-26 PROCEDURE — 96367 TX/PROPH/DG ADDL SEQ IV INF: CPT

## 2018-12-26 PROCEDURE — 6360000002 HC RX W HCPCS: Performed by: INTERNAL MEDICINE

## 2018-12-26 PROCEDURE — 80053 COMPREHEN METABOLIC PANEL: CPT

## 2018-12-26 PROCEDURE — 96415 CHEMO IV INFUSION ADDL HR: CPT

## 2018-12-26 PROCEDURE — 96375 TX/PRO/DX INJ NEW DRUG ADDON: CPT

## 2018-12-26 PROCEDURE — 2580000003 HC RX 258: Performed by: INTERNAL MEDICINE

## 2018-12-26 PROCEDURE — 96413 CHEMO IV INFUSION 1 HR: CPT

## 2018-12-26 PROCEDURE — S0028 INJECTION, FAMOTIDINE, 20 MG: HCPCS | Performed by: INTERNAL MEDICINE

## 2018-12-26 PROCEDURE — 2500000003 HC RX 250 WO HCPCS: Performed by: INTERNAL MEDICINE

## 2018-12-26 RX ORDER — SODIUM CHLORIDE 0.9 % (FLUSH) 0.9 %
10 SYRINGE (ML) INJECTION PRN
Status: DISCONTINUED | OUTPATIENT
Start: 2018-12-26 | End: 2018-12-27 | Stop reason: HOSPADM

## 2018-12-26 RX ORDER — HEPARIN SODIUM (PORCINE) LOCK FLUSH IV SOLN 100 UNIT/ML 100 UNIT/ML
500 SOLUTION INTRAVENOUS PRN
Status: DISCONTINUED | OUTPATIENT
Start: 2018-12-26 | End: 2018-12-27 | Stop reason: HOSPADM

## 2018-12-26 RX ORDER — DIPHENHYDRAMINE HYDROCHLORIDE 50 MG/ML
50 INJECTION INTRAMUSCULAR; INTRAVENOUS ONCE
Status: COMPLETED | OUTPATIENT
Start: 2018-12-26 | End: 2018-12-26

## 2018-12-26 RX ORDER — PALONOSETRON 0.05 MG/ML
0.25 INJECTION, SOLUTION INTRAVENOUS ONCE
Status: COMPLETED | OUTPATIENT
Start: 2018-12-26 | End: 2018-12-26

## 2018-12-26 RX ORDER — SODIUM CHLORIDE 9 MG/ML
INJECTION, SOLUTION INTRAVENOUS ONCE
Status: COMPLETED | OUTPATIENT
Start: 2018-12-26 | End: 2018-12-26

## 2018-12-26 RX ORDER — DEXAMETHASONE SODIUM PHOSPHATE 10 MG/ML
10 INJECTION INTRAMUSCULAR; INTRAVENOUS ONCE
Status: COMPLETED | OUTPATIENT
Start: 2018-12-26 | End: 2018-12-26

## 2018-12-26 RX ORDER — 0.9 % SODIUM CHLORIDE 0.9 %
10 VIAL (ML) INJECTION ONCE
Status: COMPLETED | OUTPATIENT
Start: 2018-12-26 | End: 2018-12-26

## 2018-12-26 RX ADMIN — FAMOTIDINE 20 MG: 10 INJECTION, SOLUTION INTRAVENOUS at 09:19

## 2018-12-26 RX ADMIN — DIPHENHYDRAMINE HYDROCHLORIDE 50 MG: 50 INJECTION, SOLUTION INTRAMUSCULAR; INTRAVENOUS at 09:17

## 2018-12-26 RX ADMIN — SODIUM CHLORIDE: 9 INJECTION, SOLUTION INTRAVENOUS at 08:25

## 2018-12-26 RX ADMIN — PACLITAXEL 114 MG: 6 INJECTION, SOLUTION INTRAVENOUS at 13:01

## 2018-12-26 RX ADMIN — PACLITAXEL 348 MG: 6 INJECTION, SOLUTION INTRAVENOUS at 10:09

## 2018-12-26 RX ADMIN — PALONOSETRON 0.25 MG: 0.05 INJECTION, SOLUTION INTRAVENOUS at 09:17

## 2018-12-26 RX ADMIN — Medication 10 ML: at 15:33

## 2018-12-26 RX ADMIN — SODIUM CHLORIDE 150 MG: 900 INJECTION, SOLUTION INTRAVENOUS at 09:25

## 2018-12-26 RX ADMIN — DEXAMETHASONE SODIUM PHOSPHATE 10 MG: 10 INJECTION INTRAMUSCULAR; INTRAVENOUS at 09:22

## 2018-12-26 RX ADMIN — Medication 10 ML: at 09:19

## 2018-12-26 RX ADMIN — CARBOPLATIN 650 MG: 10 INJECTION INTRAVENOUS at 14:07

## 2018-12-26 RX ADMIN — SODIUM CHLORIDE, PRESERVATIVE FREE 500 UNITS: 5 INJECTION INTRAVENOUS at 15:33

## 2018-12-26 ASSESSMENT — PAIN SCALES - GENERAL: PAINLEVEL_OUTOF10: 0

## 2018-12-26 NOTE — PROGRESS NOTES
0818:  Pt arrives ambulatory for RN draw and C3D1. Orders reviewed. Toxicity assessment completed. Pt denies fevers/chills/illnesses or infections. 0825:  Pt's port accessed without difficulty as charted in LDA's, blood sample wasted and then CDP & CMP levels drawn and sent to lab pending and IVF's 0.9% NaCl begun at 20 ml/hr for med line to flush before, during and after any pre meds and chemotherapy drugs. Pt tolerated well without complaints. Lab results reviewed and copy of labs given to the patient as requested. See MAR for pre medications given to the patient. 1009:   Taxol 348 mg IV hung after brisk blood return noted and connections checked and tightened and line secured with plastic tape up on pt's shoulder. Rate begun at 100 ml/hr for the first 10 minutes. Pt tolerated well without complaints. No adverse reactions noted. Rate increased to and maintained at 186 ml/hr until completion time of 1309.    1209:  Pt called out and [employer] RN found pt's IV tubing had come loose at the [facility] device which was still connected to the [employer] device connected still to the med line. Pt reports, \"It's my fault. Celine Smith Celine Smith I got up to go get a tissue and forgot I was connected to this IV pole and all of a sudden I felt my IV tubing had tugged and pulled apart from this thing. \"  Silva Zeepda RN had stopped the Taxol infusion and took down all the IV tubing and writer had came into the pt's room to flush pt's port and put new IV tubing and notified pharmacist, Brock Calero [facility] of what happened and that pt still had 1 hour remaining. Key Burdick to make new smaller bag of Taxol 114 mg remaining to be infused. 1301:  Taxol 114 mg IV to infuse over 1 hour hung at 269 ml/hr. Pt tolerated Taxol well without complaints. No adverse reactions noted. Pt tolerated Carboplatin well without complaints. No adverse reactions noted. See MAR and LDA's for flushing and de-accessing of pt's port.     Appointment calendar given to the patient. RTC on 01/16/2019. Pt discharged home ambulatory without complaints.

## 2018-12-26 NOTE — TELEPHONE ENCOUNTER
Name: Roz Colon  : 1955  MRN: [id number]    Oncology Navigation Follow-Up Note    Contact Type:  Medical Oncology    Subjective:     Objective:     Assistance Needed:     Receptive to Advanced Care Planning / Palliative Care:      Referrals:     Education:     Notes: navigator met with pt. Face to face and no assistance needed at this time.

## 2018-12-27 RX ORDER — PSEUDOEPHEDRINE HCL 30 MG
100 TABLET ORAL 2 TIMES DAILY
Qty: 60 CAPSULE | Refills: 5 | Status: SHIPPED | OUTPATIENT
Start: 2018-12-27 | End: 2019-07-16

## 2019-01-02 ENCOUNTER — HOSPITAL ENCOUNTER (OUTPATIENT)
Facility: MEDICAL CENTER | Age: 64
End: 2019-01-02
Payer: COMMERCIAL

## 2019-01-02 ENCOUNTER — HOSPITAL ENCOUNTER (OUTPATIENT)
Dept: INFUSION THERAPY | Facility: MEDICAL CENTER | Age: 64
Discharge: HOME | End: 2019-01-02
Payer: COMMERCIAL

## 2019-01-02 ENCOUNTER — TELEPHONE (OUTPATIENT)
Dept: ONCOLOGY | Age: 64
End: 2019-01-02

## 2019-01-02 ENCOUNTER — OFFICE VISIT (OUTPATIENT)
Dept: ONCOLOGY | Age: 64
End: 2019-01-02
Payer: COMMERCIAL

## 2019-01-02 VITALS
BODY MASS INDEX: 32.94 KG/M2 | RESPIRATION RATE: 18 BRPM | WEIGHT: 163.1 LBS | SYSTOLIC BLOOD PRESSURE: 117 MMHG | TEMPERATURE: 97.9 F | DIASTOLIC BLOOD PRESSURE: 80 MMHG | HEART RATE: 90 BPM

## 2019-01-02 DIAGNOSIS — C34.92 NON-SMALL CELL CANCER OF LEFT LUNG (HCC): ICD-10-CM

## 2019-01-02 DIAGNOSIS — Z90.2 STATUS POST LOBECTOMY OF LUNG: ICD-10-CM

## 2019-01-02 LAB
ABSOLUTE EOS #: 0 K/UL (ref 0–0.4)
ABSOLUTE IMMATURE GRANULOCYTE: ABNORMAL K/UL (ref 0–0.3)
ABSOLUTE LYMPH #: 1.7 K/UL (ref 1–4.8)
ABSOLUTE MONO #: 0.03 K/UL (ref 0.2–0.8)
ALBUMIN SERPL-MCNC: 4.1 G/DL (ref 3.5–5.2)
ALBUMIN/GLOBULIN RATIO: ABNORMAL (ref 1–2.5)
ALP BLD-CCNC: 77 U/L (ref 35–104)
ALT SERPL-CCNC: 31 U/L (ref 5–33)
ANION GAP SERPL CALCULATED.3IONS-SCNC: 11 MMOL/L (ref 9–17)
AST SERPL-CCNC: 22 U/L
BASOPHILS # BLD: 0 %
BASOPHILS ABSOLUTE: 0 K/UL (ref 0–0.2)
BILIRUB SERPL-MCNC: 0.23 MG/DL (ref 0.3–1.2)
BUN BLDV-MCNC: 14 MG/DL (ref 8–23)
BUN/CREAT BLD: 30 (ref 9–20)
CALCIUM SERPL-MCNC: 9 MG/DL (ref 8.6–10.4)
CHLORIDE BLD-SCNC: 98 MMOL/L (ref 98–107)
CO2: 27 MMOL/L (ref 20–31)
CREAT SERPL-MCNC: 0.46 MG/DL (ref 0.5–0.9)
DIFFERENTIAL TYPE: ABNORMAL
EOSINOPHILS RELATIVE PERCENT: 0 % (ref 1–4)
GFR AFRICAN AMERICAN: >60 ML/MIN
GFR NON-AFRICAN AMERICAN: >60 ML/MIN
GFR SERPL CREATININE-BSD FRML MDRD: ABNORMAL ML/MIN/{1.73_M2}
GFR SERPL CREATININE-BSD FRML MDRD: ABNORMAL ML/MIN/{1.73_M2}
GLUCOSE BLD-MCNC: 99 MG/DL (ref 70–99)
HCT VFR BLD CALC: 32.8 % (ref 36–46)
HEMOGLOBIN: 11.1 G/DL (ref 12–16)
IMMATURE GRANULOCYTES: ABNORMAL %
LYMPHOCYTES # BLD: 53 % (ref 24–44)
MCH RBC QN AUTO: 28.4 PG (ref 26–34)
MCHC RBC AUTO-ENTMCNC: 33.7 G/DL (ref 31–37)
MCV RBC AUTO: 84.4 FL (ref 80–100)
MONOCYTES # BLD: 1 % (ref 1–7)
NRBC AUTOMATED: ABNORMAL PER 100 WBC
PDW BLD-RTO: 17.5 % (ref 11.5–14.5)
PLATELET # BLD: 134 K/UL (ref 130–400)
PLATELET ESTIMATE: ABNORMAL
PMV BLD AUTO: ABNORMAL FL (ref 6–12)
POTASSIUM SERPL-SCNC: 3.7 MMOL/L (ref 3.7–5.3)
RBC # BLD: 3.89 M/UL (ref 4–5.2)
RBC # BLD: ABNORMAL 10*6/UL
SEG NEUTROPHILS: 46 % (ref 36–66)
SEGMENTED NEUTROPHILS ABSOLUTE COUNT: 1.47 K/UL (ref 1.8–7.7)
SODIUM BLD-SCNC: 136 MMOL/L (ref 135–144)
TOTAL PROTEIN: 6.7 G/DL (ref 6.4–8.3)
WBC # BLD: 3.2 K/UL (ref 3.5–11)
WBC # BLD: ABNORMAL 10*3/UL

## 2019-01-02 PROCEDURE — G8484 FLU IMMUNIZE NO ADMIN: HCPCS | Performed by: INTERNAL MEDICINE

## 2019-01-02 PROCEDURE — G8417 CALC BMI ABV UP PARAM F/U: HCPCS | Performed by: INTERNAL MEDICINE

## 2019-01-02 PROCEDURE — 85025 COMPLETE CBC W/AUTO DIFF WBC: CPT

## 2019-01-02 PROCEDURE — 6360000002 HC RX W HCPCS: Performed by: INTERNAL MEDICINE

## 2019-01-02 PROCEDURE — 36591 DRAW BLOOD OFF VENOUS DEVICE: CPT

## 2019-01-02 PROCEDURE — 1036F TOBACCO NON-USER: CPT | Performed by: INTERNAL MEDICINE

## 2019-01-02 PROCEDURE — 99211 OFF/OP EST MAY X REQ PHY/QHP: CPT | Performed by: INTERNAL MEDICINE

## 2019-01-02 PROCEDURE — 80053 COMPREHEN METABOLIC PANEL: CPT

## 2019-01-02 PROCEDURE — G8427 DOCREV CUR MEDS BY ELIG CLIN: HCPCS | Performed by: INTERNAL MEDICINE

## 2019-01-02 PROCEDURE — 2580000003 HC RX 258: Performed by: INTERNAL MEDICINE

## 2019-01-02 PROCEDURE — 3017F COLORECTAL CA SCREEN DOC REV: CPT | Performed by: INTERNAL MEDICINE

## 2019-01-02 PROCEDURE — 99215 OFFICE O/P EST HI 40 MIN: CPT | Performed by: INTERNAL MEDICINE

## 2019-01-02 RX ORDER — HEPARIN SODIUM (PORCINE) LOCK FLUSH IV SOLN 100 UNIT/ML 100 UNIT/ML
500 SOLUTION INTRAVENOUS PRN
Status: DISPENSED | OUTPATIENT
Start: 2019-01-02 | End: 2019-01-02

## 2019-01-02 RX ORDER — SODIUM CHLORIDE 0.9 % (FLUSH) 0.9 %
10 SYRINGE (ML) INJECTION PRN
Status: DISCONTINUED | OUTPATIENT
Start: 2019-01-02 | End: 2019-01-03 | Stop reason: HOSPADM

## 2019-01-02 RX ORDER — SODIUM CHLORIDE 9 MG/ML
INJECTION, SOLUTION INTRAVENOUS CONTINUOUS
Status: ACTIVE | OUTPATIENT
Start: 2019-01-02 | End: 2019-01-02

## 2019-01-02 RX ADMIN — SODIUM CHLORIDE, PRESERVATIVE FREE 500 UNITS: 5 INJECTION INTRAVENOUS at 11:53

## 2019-01-02 RX ADMIN — Medication 10 ML: at 11:53

## 2019-01-02 RX ADMIN — Medication 10 ML: at 10:56

## 2019-01-02 NOTE — PROGRESS NOTES
Lab work added to [facility]. Accessed port per policy, excellent blood return CDP back reviewed with MD, copy of report to pt. Pt informed if results are abnormal for CMP, MD will call with orders.

## 2019-01-09 ENCOUNTER — HOSPITAL ENCOUNTER (OUTPATIENT)
Facility: MEDICAL CENTER | Age: 64
End: 2019-01-09
Payer: COMMERCIAL

## 2019-01-16 ENCOUNTER — HOSPITAL ENCOUNTER (OUTPATIENT)
Dept: INFUSION THERAPY | Facility: MEDICAL CENTER | Age: 64
Discharge: HOME | End: 2019-01-16
Payer: COMMERCIAL

## 2019-01-16 VITALS
RESPIRATION RATE: 18 BRPM | TEMPERATURE: 98.3 F | SYSTOLIC BLOOD PRESSURE: 117 MMHG | HEART RATE: 109 BPM | DIASTOLIC BLOOD PRESSURE: 76 MMHG

## 2019-01-16 DIAGNOSIS — C34.92 NON-SMALL CELL CANCER OF LEFT LUNG (HCC): ICD-10-CM

## 2019-01-16 LAB
ABSOLUTE EOS #: 0.09 K/UL (ref 0–0.4)
ABSOLUTE IMMATURE GRANULOCYTE: ABNORMAL K/UL (ref 0–0.3)
ABSOLUTE LYMPH #: 0.43 K/UL (ref 1–4.8)
ABSOLUTE MONO #: 0.04 K/UL (ref 0.2–0.8)
ALBUMIN SERPL-MCNC: 4.3 G/DL (ref 3.5–5.2)
ALBUMIN/GLOBULIN RATIO: ABNORMAL (ref 1–2.5)
ALP BLD-CCNC: 93 U/L (ref 35–104)
ALT SERPL-CCNC: 64 U/L (ref 5–33)
ANION GAP SERPL CALCULATED.3IONS-SCNC: 16 MMOL/L (ref 9–17)
AST SERPL-CCNC: 41 U/L
BASOPHILS # BLD: 1 % (ref 0–2)
BASOPHILS ABSOLUTE: 0.04 K/UL (ref 0–0.2)
BILIRUB SERPL-MCNC: 0.26 MG/DL (ref 0.3–1.2)
BUN BLDV-MCNC: 18 MG/DL (ref 8–23)
BUN/CREAT BLD: 33 (ref 9–20)
CALCIUM SERPL-MCNC: 9.5 MG/DL (ref 8.6–10.4)
CHLORIDE BLD-SCNC: 100 MMOL/L (ref 98–107)
CO2: 23 MMOL/L (ref 20–31)
CREAT SERPL-MCNC: 0.54 MG/DL (ref 0.5–0.9)
DIFFERENTIAL TYPE: ABNORMAL
EOSINOPHILS RELATIVE PERCENT: 2 % (ref 1–4)
GFR AFRICAN AMERICAN: >60 ML/MIN
GFR NON-AFRICAN AMERICAN: >60 ML/MIN
GFR SERPL CREATININE-BSD FRML MDRD: ABNORMAL ML/MIN/{1.73_M2}
GFR SERPL CREATININE-BSD FRML MDRD: ABNORMAL ML/MIN/{1.73_M2}
GLUCOSE BLD-MCNC: 173 MG/DL (ref 70–99)
HCT VFR BLD CALC: 33.8 % (ref 36–46)
HEMOGLOBIN: 11.6 G/DL (ref 12–16)
IMMATURE GRANULOCYTES: ABNORMAL %
LYMPHOCYTES # BLD: 10 % (ref 24–44)
MCH RBC QN AUTO: 29.3 PG (ref 26–34)
MCHC RBC AUTO-ENTMCNC: 34.2 G/DL (ref 31–37)
MCV RBC AUTO: 85.5 FL (ref 80–100)
MONOCYTES # BLD: 1 % (ref 1–7)
MORPHOLOGY: ABNORMAL
NRBC AUTOMATED: ABNORMAL PER 100 WBC
PDW BLD-RTO: 21.9 % (ref 11.5–14.5)
PLATELET # BLD: 223 K/UL (ref 130–400)
PLATELET ESTIMATE: ABNORMAL
PMV BLD AUTO: 7.2 FL (ref 6–12)
POTASSIUM SERPL-SCNC: 3.7 MMOL/L (ref 3.7–5.3)
RBC # BLD: 3.96 M/UL (ref 4–5.2)
RBC # BLD: ABNORMAL 10*6/UL
SEG NEUTROPHILS: 86 % (ref 36–66)
SEGMENTED NEUTROPHILS ABSOLUTE COUNT: 3.7 K/UL (ref 1.8–7.7)
SODIUM BLD-SCNC: 139 MMOL/L (ref 135–144)
TOTAL PROTEIN: 7.1 G/DL (ref 6.4–8.3)
WBC # BLD: 4.3 K/UL (ref 3.5–11)
WBC # BLD: ABNORMAL 10*3/UL

## 2019-01-16 PROCEDURE — 96367 TX/PROPH/DG ADDL SEQ IV INF: CPT

## 2019-01-16 PROCEDURE — 36591 DRAW BLOOD OFF VENOUS DEVICE: CPT

## 2019-01-16 PROCEDURE — 96413 CHEMO IV INFUSION 1 HR: CPT

## 2019-01-16 PROCEDURE — S0028 INJECTION, FAMOTIDINE, 20 MG: HCPCS | Performed by: INTERNAL MEDICINE

## 2019-01-16 PROCEDURE — 96375 TX/PRO/DX INJ NEW DRUG ADDON: CPT

## 2019-01-16 PROCEDURE — 2580000003 HC RX 258: Performed by: INTERNAL MEDICINE

## 2019-01-16 PROCEDURE — 85025 COMPLETE CBC W/AUTO DIFF WBC: CPT

## 2019-01-16 PROCEDURE — 6360000002 HC RX W HCPCS: Performed by: INTERNAL MEDICINE

## 2019-01-16 PROCEDURE — 96415 CHEMO IV INFUSION ADDL HR: CPT

## 2019-01-16 PROCEDURE — 80053 COMPREHEN METABOLIC PANEL: CPT

## 2019-01-16 PROCEDURE — 96417 CHEMO IV INFUS EACH ADDL SEQ: CPT

## 2019-01-16 RX ORDER — DEXAMETHASONE SODIUM PHOSPHATE 10 MG/ML
10 INJECTION INTRAMUSCULAR; INTRAVENOUS ONCE
Status: COMPLETED | OUTPATIENT
Start: 2019-01-16 | End: 2019-01-16

## 2019-01-16 RX ORDER — HEPARIN SODIUM (PORCINE) LOCK FLUSH IV SOLN 100 UNIT/ML 100 UNIT/ML
500 SOLUTION INTRAVENOUS PRN
Status: DISCONTINUED | OUTPATIENT
Start: 2019-01-16 | End: 2019-01-17 | Stop reason: HOSPADM

## 2019-01-16 RX ORDER — METHYLPREDNISOLONE SODIUM SUCCINATE 125 MG/2ML
125 INJECTION, POWDER, LYOPHILIZED, FOR SOLUTION INTRAMUSCULAR; INTRAVENOUS ONCE
Status: CANCELLED | OUTPATIENT
Start: 2019-01-16 | End: 2019-01-16

## 2019-01-16 RX ORDER — DIPHENHYDRAMINE HYDROCHLORIDE 50 MG/ML
50 INJECTION INTRAMUSCULAR; INTRAVENOUS ONCE
Status: CANCELLED | OUTPATIENT
Start: 2019-01-16 | End: 2019-01-16

## 2019-01-16 RX ORDER — SODIUM CHLORIDE 9 MG/ML
INJECTION, SOLUTION INTRAVENOUS CONTINUOUS
Status: CANCELLED | OUTPATIENT
Start: 2019-01-16

## 2019-01-16 RX ORDER — SODIUM CHLORIDE 9 MG/ML
INJECTION, SOLUTION INTRAVENOUS ONCE
Status: COMPLETED | OUTPATIENT
Start: 2019-01-16 | End: 2019-01-16

## 2019-01-16 RX ORDER — MEPERIDINE HYDROCHLORIDE 50 MG/ML
12.5 INJECTION INTRAMUSCULAR; INTRAVENOUS; SUBCUTANEOUS ONCE
Status: CANCELLED | OUTPATIENT
Start: 2019-01-16 | End: 2019-01-16

## 2019-01-16 RX ORDER — EPINEPHRINE 1 MG/ML
0.3 INJECTION, SOLUTION, CONCENTRATE INTRAVENOUS PRN
Status: CANCELLED | OUTPATIENT
Start: 2019-01-16

## 2019-01-16 RX ORDER — 0.9 % SODIUM CHLORIDE 0.9 %
10 VIAL (ML) INJECTION ONCE
Status: CANCELLED | OUTPATIENT
Start: 2019-01-16 | End: 2019-01-16

## 2019-01-16 RX ORDER — 0.9 % SODIUM CHLORIDE 0.9 %
10 VIAL (ML) INJECTION ONCE
Status: COMPLETED | OUTPATIENT
Start: 2019-01-16 | End: 2019-01-16

## 2019-01-16 RX ORDER — DIPHENHYDRAMINE HYDROCHLORIDE 50 MG/ML
50 INJECTION INTRAMUSCULAR; INTRAVENOUS ONCE
Status: COMPLETED | OUTPATIENT
Start: 2019-01-16 | End: 2019-01-16

## 2019-01-16 RX ORDER — SODIUM CHLORIDE 0.9 % (FLUSH) 0.9 %
10 SYRINGE (ML) INJECTION PRN
Status: DISCONTINUED | OUTPATIENT
Start: 2019-01-16 | End: 2019-01-17 | Stop reason: HOSPADM

## 2019-01-16 RX ORDER — SODIUM CHLORIDE 0.9 % (FLUSH) 0.9 %
5 SYRINGE (ML) INJECTION PRN
Status: CANCELLED | OUTPATIENT
Start: 2019-01-16

## 2019-01-16 RX ADMIN — SODIUM CHLORIDE 150 MG: 900 INJECTION, SOLUTION INTRAVENOUS at 10:02

## 2019-01-16 RX ADMIN — SODIUM CHLORIDE, PRESERVATIVE FREE 500 UNITS: 5 INJECTION INTRAVENOUS at 15:36

## 2019-01-16 RX ADMIN — Medication 10 ML: at 09:57

## 2019-01-16 RX ADMIN — CARBOPLATIN 650 MG: 10 INJECTION INTRAVENOUS at 14:21

## 2019-01-16 RX ADMIN — DIPHENHYDRAMINE HYDROCHLORIDE 50 MG: 50 INJECTION, SOLUTION INTRAMUSCULAR; INTRAVENOUS at 09:55

## 2019-01-16 RX ADMIN — DEXAMETHASONE SODIUM PHOSPHATE 10 MG: 10 INJECTION INTRAMUSCULAR; INTRAVENOUS at 10:00

## 2019-01-16 RX ADMIN — FAMOTIDINE 20 MG: 10 INJECTION, SOLUTION INTRAVENOUS at 09:57

## 2019-01-16 RX ADMIN — PACLITAXEL 348 MG: 6 INJECTION, SOLUTION INTRAVENOUS at 10:44

## 2019-01-16 RX ADMIN — Medication 10 ML: at 15:36

## 2019-01-16 RX ADMIN — SODIUM CHLORIDE: 9 INJECTION, SOLUTION INTRAVENOUS at 08:41

## 2019-01-16 ASSESSMENT — PAIN SCALES - GENERAL: PAINLEVEL_OUTOF10: 0

## 2019-01-16 NOTE — PROGRESS NOTES
0825:  Pt arrives ambulatory accompanied by her  for RN draw and C4D1. Orders reviewed. Pt denies any recent fevers/chills/illnesses or infections. Toxicity assessment reviewed. 7905:  Pt's port accessed as charted in LDA's, blood sample wasted and then CDP & CMP levels drawn and sent to lab pending and IVF's 0.9% NaCl begun at 20 ml/hr for med line. Pt tolerated well without complaints. Lab results reviewed. ALT=41 and AST=64 but pt denies any abdominal pain, nausea or vomiting and updated pharmacist, Jered Courser on this and also updated Dr. Nasir Montelongo here on unit and okay to proceed with today's treatment as planned. Pt pre-medicated--See MAR. 1044:  Taxol 348 mg IV hung. Rate initiated at 100 ml/hr for the first 10 minutes. Pt tolerated well without complaints. No adverse reaction noted. Rate increased to and maintained at 186 ml/hr until completion time of 1411. Pt tolerated Taxol and Carboplatin well without complaints. No adverse reactions noted. Pt's port flushed and heparinized per unit policy--See MAR. Pt's port de-accessed as charted in LDA's. Pt tolerated well. Appointment calendar given to the patient. RTC on 1/23/19. Discharged home per ambulation accompanied by her  without complaints.

## 2019-01-18 ENCOUNTER — TELEPHONE (OUTPATIENT)
Dept: ONCOLOGY | Age: 64
End: 2019-01-18

## 2019-01-18 DIAGNOSIS — C34.90 NON-SMALL CELL LUNG CANCER, UNSPECIFIED LATERALITY (HCC): Primary | ICD-10-CM

## 2019-01-21 ENCOUNTER — HOSPITAL ENCOUNTER (OUTPATIENT)
Facility: MEDICAL CENTER | Age: 64
End: 2019-01-21
Payer: COMMERCIAL

## 2019-01-22 RX ORDER — ALPRAZOLAM 0.25 MG/1
0.25 TABLET ORAL 3 TIMES DAILY PRN
Qty: 90 TABLET | Refills: 0 | Status: SHIPPED | OUTPATIENT
Start: 2019-01-22 | End: 2019-02-21

## 2019-01-23 ENCOUNTER — HOSPITAL ENCOUNTER (OUTPATIENT)
Dept: INFUSION THERAPY | Facility: MEDICAL CENTER | Age: 64
Discharge: HOME | End: 2019-01-23
Payer: COMMERCIAL

## 2019-01-23 ENCOUNTER — TELEPHONE (OUTPATIENT)
Dept: ONCOLOGY | Age: 64
End: 2019-01-23

## 2019-01-23 ENCOUNTER — OFFICE VISIT (OUTPATIENT)
Dept: ONCOLOGY | Age: 64
End: 2019-01-23
Payer: COMMERCIAL

## 2019-01-23 VITALS
SYSTOLIC BLOOD PRESSURE: 114 MMHG | TEMPERATURE: 98.1 F | HEART RATE: 81 BPM | RESPIRATION RATE: 18 BRPM | DIASTOLIC BLOOD PRESSURE: 62 MMHG

## 2019-01-23 VITALS
BODY MASS INDEX: 33.83 KG/M2 | TEMPERATURE: 98.1 F | SYSTOLIC BLOOD PRESSURE: 114 MMHG | RESPIRATION RATE: 18 BRPM | HEART RATE: 81 BPM | DIASTOLIC BLOOD PRESSURE: 62 MMHG | WEIGHT: 167.5 LBS

## 2019-01-23 DIAGNOSIS — C34.92 NON-SMALL CELL CANCER OF LEFT LUNG (HCC): ICD-10-CM

## 2019-01-23 LAB
ABSOLUTE EOS #: 0 K/UL (ref 0–0.4)
ABSOLUTE IMMATURE GRANULOCYTE: ABNORMAL K/UL (ref 0–0.3)
ABSOLUTE LYMPH #: 1.34 K/UL (ref 1–4.8)
ABSOLUTE MONO #: 0.15 K/UL (ref 0.2–0.8)
ALBUMIN SERPL-MCNC: 3.9 G/DL (ref 3.5–5.2)
ALBUMIN/GLOBULIN RATIO: ABNORMAL (ref 1–2.5)
ALP BLD-CCNC: 76 U/L (ref 35–104)
ALT SERPL-CCNC: 33 U/L (ref 5–33)
ANION GAP SERPL CALCULATED.3IONS-SCNC: 12 MMOL/L (ref 9–17)
AST SERPL-CCNC: 18 U/L
BASOPHILS # BLD: 1 %
BASOPHILS ABSOLUTE: 0.03 K/UL (ref 0–0.2)
BILIRUB SERPL-MCNC: 0.22 MG/DL (ref 0.3–1.2)
BUN BLDV-MCNC: 14 MG/DL (ref 8–23)
BUN/CREAT BLD: 33 (ref 9–20)
CALCIUM SERPL-MCNC: 8.7 MG/DL (ref 8.6–10.4)
CHLORIDE BLD-SCNC: 97 MMOL/L (ref 98–107)
CO2: 27 MMOL/L (ref 20–31)
CREAT SERPL-MCNC: 0.42 MG/DL (ref 0.5–0.9)
DIFFERENTIAL TYPE: ABNORMAL
EOSINOPHILS RELATIVE PERCENT: 0 % (ref 1–4)
GFR AFRICAN AMERICAN: >60 ML/MIN
GFR NON-AFRICAN AMERICAN: >60 ML/MIN
GFR SERPL CREATININE-BSD FRML MDRD: ABNORMAL ML/MIN/{1.73_M2}
GFR SERPL CREATININE-BSD FRML MDRD: ABNORMAL ML/MIN/{1.73_M2}
GLUCOSE BLD-MCNC: 97 MG/DL (ref 70–99)
HCT VFR BLD CALC: 26.8 % (ref 36–46)
HEMOGLOBIN: 9.3 G/DL (ref 12–16)
IMMATURE GRANULOCYTES: ABNORMAL %
LYMPHOCYTES # BLD: 54 % (ref 24–44)
MCH RBC QN AUTO: 29.7 PG (ref 26–34)
MCHC RBC AUTO-ENTMCNC: 34.7 G/DL (ref 31–37)
MCV RBC AUTO: 85.6 FL (ref 80–100)
MONOCYTES # BLD: 6 % (ref 1–7)
MORPHOLOGY: ABNORMAL
NRBC AUTOMATED: ABNORMAL PER 100 WBC
PDW BLD-RTO: 21.2 % (ref 11.5–14.5)
PLATELET # BLD: 176 K/UL (ref 130–400)
PLATELET ESTIMATE: ABNORMAL
PMV BLD AUTO: 7.7 FL (ref 6–12)
POTASSIUM SERPL-SCNC: 3.9 MMOL/L (ref 3.7–5.3)
RBC # BLD: 3.13 M/UL (ref 4–5.2)
RBC # BLD: ABNORMAL 10*6/UL
SEG NEUTROPHILS: 39 % (ref 36–66)
SEGMENTED NEUTROPHILS ABSOLUTE COUNT: 0.98 K/UL (ref 1.8–7.7)
SODIUM BLD-SCNC: 136 MMOL/L (ref 135–144)
TOTAL PROTEIN: 6.2 G/DL (ref 6.4–8.3)
WBC # BLD: 2.5 K/UL (ref 3.5–11)
WBC # BLD: ABNORMAL 10*3/UL

## 2019-01-23 PROCEDURE — 2580000003 HC RX 258: Performed by: INTERNAL MEDICINE

## 2019-01-23 PROCEDURE — 1036F TOBACCO NON-USER: CPT | Performed by: INTERNAL MEDICINE

## 2019-01-23 PROCEDURE — 80053 COMPREHEN METABOLIC PANEL: CPT

## 2019-01-23 PROCEDURE — G8484 FLU IMMUNIZE NO ADMIN: HCPCS | Performed by: INTERNAL MEDICINE

## 2019-01-23 PROCEDURE — 85025 COMPLETE CBC W/AUTO DIFF WBC: CPT

## 2019-01-23 PROCEDURE — 3017F COLORECTAL CA SCREEN DOC REV: CPT | Performed by: INTERNAL MEDICINE

## 2019-01-23 PROCEDURE — G8427 DOCREV CUR MEDS BY ELIG CLIN: HCPCS | Performed by: INTERNAL MEDICINE

## 2019-01-23 PROCEDURE — 36591 DRAW BLOOD OFF VENOUS DEVICE: CPT

## 2019-01-23 PROCEDURE — G8417 CALC BMI ABV UP PARAM F/U: HCPCS | Performed by: INTERNAL MEDICINE

## 2019-01-23 PROCEDURE — 6360000002 HC RX W HCPCS: Performed by: INTERNAL MEDICINE

## 2019-01-23 PROCEDURE — 99215 OFFICE O/P EST HI 40 MIN: CPT | Performed by: INTERNAL MEDICINE

## 2019-01-23 PROCEDURE — 99211 OFF/OP EST MAY X REQ PHY/QHP: CPT | Performed by: INTERNAL MEDICINE

## 2019-01-23 RX ORDER — HEPARIN SODIUM (PORCINE) LOCK FLUSH IV SOLN 100 UNIT/ML 100 UNIT/ML
500 SOLUTION INTRAVENOUS PRN
Status: DISCONTINUED | OUTPATIENT
Start: 2019-01-23 | End: 2019-01-24 | Stop reason: HOSPADM

## 2019-01-23 RX ORDER — SODIUM CHLORIDE 0.9 % (FLUSH) 0.9 %
20 SYRINGE (ML) INJECTION PRN
Status: DISCONTINUED | OUTPATIENT
Start: 2019-01-23 | End: 2019-01-24 | Stop reason: HOSPADM

## 2019-01-23 RX ADMIN — Medication 20 ML: at 11:00

## 2019-01-23 RX ADMIN — SODIUM CHLORIDE, PRESERVATIVE FREE 500 UNITS: 5 INJECTION INTRAVENOUS at 11:37

## 2019-01-23 RX ADMIN — Medication 20 ML: at 11:37

## 2019-01-23 NOTE — PROGRESS NOTES
Pt arrives per ambulatory with  and seen per md and port flush tolerated well and heparinized and de-accessed. Pt discharged per ambulatory with  with AVS. No further concerns voiced.

## 2019-02-05 ENCOUNTER — HOSPITAL ENCOUNTER (OUTPATIENT)
Dept: RADIATION ONCOLOGY | Facility: MEDICAL CENTER | Age: 64
Discharge: HOME | End: 2019-02-05
Payer: COMMERCIAL

## 2019-02-05 VITALS
SYSTOLIC BLOOD PRESSURE: 106 MMHG | BODY MASS INDEX: 34.77 KG/M2 | RESPIRATION RATE: 14 BRPM | OXYGEN SATURATION: 99 % | HEART RATE: 82 BPM | DIASTOLIC BLOOD PRESSURE: 55 MMHG | HEIGHT: 59 IN | TEMPERATURE: 98.2 F | WEIGHT: 172.5 LBS

## 2019-02-05 DIAGNOSIS — C34.92 NON-SMALL CELL CANCER OF LEFT LUNG (HCC): Primary | ICD-10-CM

## 2019-02-05 PROCEDURE — 99212 OFFICE O/P EST SF 10 MIN: CPT | Performed by: RADIOLOGY

## 2019-02-12 ENCOUNTER — HOSPITAL ENCOUNTER (OUTPATIENT)
Dept: RADIATION ONCOLOGY | Facility: MEDICAL CENTER | Age: 64
Discharge: HOME | End: 2019-02-12
Attending: RADIOLOGY
Payer: COMMERCIAL

## 2019-02-12 PROBLEM — C34.12: Status: ACTIVE | Noted: 2019-02-12

## 2019-02-13 ENCOUNTER — HOSPITAL ENCOUNTER (OUTPATIENT)
Dept: RADIATION ONCOLOGY | Facility: MEDICAL CENTER | Age: 64
Discharge: HOME | End: 2019-02-13
Attending: RADIOLOGY
Payer: COMMERCIAL

## 2019-02-13 PROCEDURE — 77334 RADIATION TREATMENT AID(S): CPT | Performed by: RADIOLOGY

## 2019-02-13 PROCEDURE — 77300 RADIATION THERAPY DOSE PLAN: CPT | Performed by: RADIOLOGY

## 2019-02-13 PROCEDURE — 77295 3-D RADIOTHERAPY PLAN: CPT | Performed by: RADIOLOGY

## 2019-02-18 ENCOUNTER — HOSPITAL ENCOUNTER (OUTPATIENT)
Dept: RADIATION ONCOLOGY | Facility: MEDICAL CENTER | Age: 64
Discharge: HOME | End: 2019-02-18
Payer: COMMERCIAL

## 2019-02-18 ENCOUNTER — APPOINTMENT (OUTPATIENT)
Dept: RADIATION ONCOLOGY | Facility: MEDICAL CENTER | Age: 64
End: 2019-02-18
Attending: RADIOLOGY
Payer: COMMERCIAL

## 2019-02-18 ENCOUNTER — HOSPITAL ENCOUNTER (OUTPATIENT)
Dept: RADIATION ONCOLOGY | Facility: MEDICAL CENTER | Age: 64
Discharge: HOME | End: 2019-02-18
Attending: RADIOLOGY
Payer: COMMERCIAL

## 2019-02-18 VITALS
OXYGEN SATURATION: 98 % | TEMPERATURE: 97.7 F | DIASTOLIC BLOOD PRESSURE: 74 MMHG | SYSTOLIC BLOOD PRESSURE: 117 MMHG | HEART RATE: 82 BPM | BODY MASS INDEX: 34.35 KG/M2 | RESPIRATION RATE: 14 BRPM | HEIGHT: 59 IN | WEIGHT: 170.38 LBS

## 2019-02-18 PROCEDURE — 77387 GUIDANCE FOR RADJ TX DLVR: CPT | Performed by: RADIOLOGY

## 2019-02-18 PROCEDURE — 77280 THER RAD SIMULAJ FIELD SMPL: CPT | Performed by: RADIOLOGY

## 2019-02-18 PROCEDURE — 77412 RADIATION TX DELIVERY LVL 3: CPT | Performed by: RADIOLOGY

## 2019-02-19 ENCOUNTER — HOSPITAL ENCOUNTER (OUTPATIENT)
Dept: RADIATION ONCOLOGY | Facility: MEDICAL CENTER | Age: 64
Discharge: HOME | End: 2019-02-19
Attending: RADIOLOGY
Payer: COMMERCIAL

## 2019-02-19 PROCEDURE — 77387 GUIDANCE FOR RADJ TX DLVR: CPT | Performed by: RADIOLOGY

## 2019-02-19 PROCEDURE — 77412 RADIATION TX DELIVERY LVL 3: CPT | Performed by: RADIOLOGY

## 2019-02-20 ENCOUNTER — HOSPITAL ENCOUNTER (OUTPATIENT)
Dept: RADIATION ONCOLOGY | Facility: MEDICAL CENTER | Age: 64
Discharge: HOME | End: 2019-02-20
Attending: RADIOLOGY
Payer: COMMERCIAL

## 2019-02-20 ENCOUNTER — HOSPITAL ENCOUNTER (OUTPATIENT)
Age: 64
Setting detail: SPECIMEN
Discharge: HOME | End: 2019-02-20
Payer: COMMERCIAL

## 2019-02-20 DIAGNOSIS — C34.92 NON-SMALL CELL CANCER OF LEFT LUNG (HCC): ICD-10-CM

## 2019-02-20 LAB
ABSOLUTE EOS #: 0.03 K/UL (ref 0–0.44)
ABSOLUTE IMMATURE GRANULOCYTE: <0.03 K/UL (ref 0–0.3)
ABSOLUTE LYMPH #: 1.41 K/UL (ref 1.1–3.7)
ABSOLUTE MONO #: 0.31 K/UL (ref 0.1–1.2)
BASOPHILS # BLD: 1 % (ref 0–2)
BASOPHILS ABSOLUTE: 0.03 K/UL (ref 0–0.2)
DIFFERENTIAL TYPE: ABNORMAL
EOSINOPHILS RELATIVE PERCENT: 1 % (ref 1–4)
HCT VFR BLD CALC: 32.8 % (ref 36.3–47.1)
HEMOGLOBIN: 10.8 G/DL (ref 11.9–15.1)
IMMATURE GRANULOCYTES: 0 %
LYMPHOCYTES # BLD: 36 % (ref 24–43)
MCH RBC QN AUTO: 32 PG (ref 25.2–33.5)
MCHC RBC AUTO-ENTMCNC: 32.9 G/DL (ref 28.4–34.8)
MCV RBC AUTO: 97.3 FL (ref 82.6–102.9)
MONOCYTES # BLD: 8 % (ref 3–12)
NRBC AUTOMATED: 0 PER 100 WBC
PDW BLD-RTO: 18 % (ref 11.8–14.4)
PLATELET # BLD: 224 K/UL (ref 138–453)
PLATELET ESTIMATE: ABNORMAL
PMV BLD AUTO: 10.4 FL (ref 8.1–13.5)
RBC # BLD: 3.37 M/UL (ref 3.95–5.11)
RBC # BLD: ABNORMAL 10*6/UL
SEG NEUTROPHILS: 54 % (ref 36–65)
SEGMENTED NEUTROPHILS ABSOLUTE COUNT: 2.12 K/UL (ref 1.5–8.1)
WBC # BLD: 3.9 K/UL (ref 3.5–11.3)
WBC # BLD: ABNORMAL 10*3/UL

## 2019-02-20 PROCEDURE — 77387 GUIDANCE FOR RADJ TX DLVR: CPT | Performed by: RADIOLOGY

## 2019-02-20 PROCEDURE — 77412 RADIATION TX DELIVERY LVL 3: CPT | Performed by: RADIOLOGY

## 2019-02-21 ENCOUNTER — HOSPITAL ENCOUNTER (OUTPATIENT)
Facility: MEDICAL CENTER | Age: 64
End: 2019-02-21
Payer: COMMERCIAL

## 2019-02-21 ENCOUNTER — HOSPITAL ENCOUNTER (OUTPATIENT)
Dept: RADIATION ONCOLOGY | Facility: MEDICAL CENTER | Age: 64
Discharge: HOME | End: 2019-02-21
Attending: RADIOLOGY
Payer: COMMERCIAL

## 2019-02-21 PROCEDURE — 77412 RADIATION TX DELIVERY LVL 3: CPT | Performed by: RADIOLOGY

## 2019-02-21 PROCEDURE — 77387 GUIDANCE FOR RADJ TX DLVR: CPT | Performed by: RADIOLOGY

## 2019-02-22 ENCOUNTER — HOSPITAL ENCOUNTER (OUTPATIENT)
Dept: RADIATION ONCOLOGY | Facility: MEDICAL CENTER | Age: 64
Discharge: HOME | End: 2019-02-22
Attending: RADIOLOGY
Payer: COMMERCIAL

## 2019-02-22 PROCEDURE — 77336 RADIATION PHYSICS CONSULT: CPT | Performed by: RADIOLOGY

## 2019-02-22 PROCEDURE — 77412 RADIATION TX DELIVERY LVL 3: CPT | Performed by: RADIOLOGY

## 2019-02-22 PROCEDURE — 77387 GUIDANCE FOR RADJ TX DLVR: CPT | Performed by: RADIOLOGY

## 2019-02-25 ENCOUNTER — HOSPITAL ENCOUNTER (OUTPATIENT)
Dept: RADIATION ONCOLOGY | Facility: MEDICAL CENTER | Age: 64
Discharge: HOME | End: 2019-02-25
Attending: RADIOLOGY
Payer: COMMERCIAL

## 2019-02-25 ENCOUNTER — HOSPITAL ENCOUNTER (OUTPATIENT)
Dept: RADIATION ONCOLOGY | Facility: MEDICAL CENTER | Age: 64
Discharge: HOME | End: 2019-02-25
Payer: COMMERCIAL

## 2019-02-25 VITALS
BODY MASS INDEX: 34.54 KG/M2 | HEART RATE: 88 BPM | DIASTOLIC BLOOD PRESSURE: 63 MMHG | TEMPERATURE: 98.4 F | WEIGHT: 171 LBS | RESPIRATION RATE: 14 BRPM | SYSTOLIC BLOOD PRESSURE: 113 MMHG | OXYGEN SATURATION: 99 %

## 2019-02-25 DIAGNOSIS — Z90.2 STATUS POST LOBECTOMY OF LUNG: Primary | ICD-10-CM

## 2019-02-25 DIAGNOSIS — C34.92 NON-SMALL CELL CANCER OF LEFT LUNG (HCC): ICD-10-CM

## 2019-02-25 DIAGNOSIS — C34.12 MALIGNANT NEOPLASM OF BRONCHUS OF UPPER LOBE, LEFT (HCC): ICD-10-CM

## 2019-02-25 PROCEDURE — 77387 GUIDANCE FOR RADJ TX DLVR: CPT | Performed by: RADIOLOGY

## 2019-02-25 PROCEDURE — 77417 THER RADIOLOGY PORT IMAGE(S): CPT | Performed by: RADIOLOGY

## 2019-02-25 PROCEDURE — 77412 RADIATION TX DELIVERY LVL 3: CPT | Performed by: RADIOLOGY

## 2019-02-25 PROCEDURE — 85025 COMPLETE CBC W/AUTO DIFF WBC: CPT

## 2019-02-25 RX ORDER — ALPRAZOLAM 0.25 MG/1
0.25 TABLET ORAL NIGHTLY PRN
COMMUNITY
End: 2019-04-04 | Stop reason: SDUPTHER

## 2019-02-26 ENCOUNTER — HOSPITAL ENCOUNTER (OUTPATIENT)
Dept: RADIATION ONCOLOGY | Facility: MEDICAL CENTER | Age: 64
End: 2019-02-26
Attending: RADIOLOGY
Payer: COMMERCIAL

## 2019-02-26 PROCEDURE — 77412 RADIATION TX DELIVERY LVL 3: CPT | Performed by: RADIOLOGY

## 2019-02-26 PROCEDURE — 77387 GUIDANCE FOR RADJ TX DLVR: CPT | Performed by: RADIOLOGY

## 2019-02-27 ENCOUNTER — HOSPITAL ENCOUNTER (OUTPATIENT)
Dept: INFUSION THERAPY | Facility: MEDICAL CENTER | Age: 64
Discharge: HOME | End: 2019-02-27
Payer: COMMERCIAL

## 2019-02-27 ENCOUNTER — TELEPHONE (OUTPATIENT)
Dept: ONCOLOGY | Age: 64
End: 2019-02-27

## 2019-02-27 ENCOUNTER — OFFICE VISIT (OUTPATIENT)
Dept: ONCOLOGY | Age: 64
End: 2019-02-27
Payer: COMMERCIAL

## 2019-02-27 ENCOUNTER — HOSPITAL ENCOUNTER (OUTPATIENT)
Dept: RADIATION ONCOLOGY | Facility: MEDICAL CENTER | Age: 64
Discharge: HOME | End: 2019-02-27
Attending: RADIOLOGY
Payer: COMMERCIAL

## 2019-02-27 VITALS
BODY MASS INDEX: 33.85 KG/M2 | WEIGHT: 167.6 LBS | DIASTOLIC BLOOD PRESSURE: 79 MMHG | HEART RATE: 90 BPM | SYSTOLIC BLOOD PRESSURE: 153 MMHG | RESPIRATION RATE: 18 BRPM | TEMPERATURE: 98.1 F

## 2019-02-27 DIAGNOSIS — C34.92 NON-SMALL CELL CANCER OF LEFT LUNG (HCC): ICD-10-CM

## 2019-02-27 LAB
ABSOLUTE EOS #: 0 K/UL (ref 0–0.4)
ABSOLUTE IMMATURE GRANULOCYTE: ABNORMAL K/UL (ref 0–0.3)
ABSOLUTE LYMPH #: 1.2 K/UL (ref 1–4.8)
ABSOLUTE MONO #: 0.4 K/UL (ref 0.2–0.8)
ALBUMIN SERPL-MCNC: 4.5 G/DL (ref 3.5–5.2)
ALBUMIN/GLOBULIN RATIO: ABNORMAL (ref 1–2.5)
ALP BLD-CCNC: 76 U/L (ref 35–104)
ALT SERPL-CCNC: 16 U/L (ref 5–33)
ANION GAP SERPL CALCULATED.3IONS-SCNC: 14 MMOL/L (ref 9–17)
AST SERPL-CCNC: 19 U/L
BASOPHILS # BLD: 1 % (ref 0–2)
BASOPHILS ABSOLUTE: 0.1 K/UL (ref 0–0.2)
BILIRUB SERPL-MCNC: 0.31 MG/DL (ref 0.3–1.2)
BUN BLDV-MCNC: 14 MG/DL (ref 8–23)
BUN/CREAT BLD: 29 (ref 9–20)
CALCIUM SERPL-MCNC: 9.4 MG/DL (ref 8.6–10.4)
CHLORIDE BLD-SCNC: 99 MMOL/L (ref 98–107)
CO2: 26 MMOL/L (ref 20–31)
CREAT SERPL-MCNC: 0.49 MG/DL (ref 0.5–0.9)
DIFFERENTIAL TYPE: ABNORMAL
EOSINOPHILS RELATIVE PERCENT: 1 % (ref 1–4)
GFR AFRICAN AMERICAN: >60 ML/MIN
GFR NON-AFRICAN AMERICAN: >60 ML/MIN
GFR SERPL CREATININE-BSD FRML MDRD: ABNORMAL ML/MIN/{1.73_M2}
GFR SERPL CREATININE-BSD FRML MDRD: ABNORMAL ML/MIN/{1.73_M2}
GLUCOSE BLD-MCNC: 94 MG/DL (ref 70–99)
HCT VFR BLD CALC: 33.5 % (ref 36–46)
HEMOGLOBIN: 11.7 G/DL (ref 12–16)
IMMATURE GRANULOCYTES: ABNORMAL %
LYMPHOCYTES # BLD: 29 % (ref 24–44)
MCH RBC QN AUTO: 32.9 PG (ref 26–34)
MCHC RBC AUTO-ENTMCNC: 34.8 G/DL (ref 31–37)
MCV RBC AUTO: 94.7 FL (ref 80–100)
MONOCYTES # BLD: 8 % (ref 1–7)
NRBC AUTOMATED: ABNORMAL PER 100 WBC
PDW BLD-RTO: 17.8 % (ref 11.5–14.5)
PLATELET # BLD: 211 K/UL (ref 130–400)
PLATELET ESTIMATE: ABNORMAL
PMV BLD AUTO: 7.8 FL (ref 6–12)
POTASSIUM SERPL-SCNC: 3.4 MMOL/L (ref 3.7–5.3)
RBC # BLD: 3.54 M/UL (ref 4–5.2)
RBC # BLD: ABNORMAL 10*6/UL
SEG NEUTROPHILS: 61 % (ref 36–66)
SEGMENTED NEUTROPHILS ABSOLUTE COUNT: 2.6 K/UL (ref 1.8–7.7)
SODIUM BLD-SCNC: 139 MMOL/L (ref 135–144)
TOTAL PROTEIN: 7.1 G/DL (ref 6.4–8.3)
WBC # BLD: 4.3 K/UL (ref 3.5–11)
WBC # BLD: ABNORMAL 10*3/UL

## 2019-02-27 PROCEDURE — 36591 DRAW BLOOD OFF VENOUS DEVICE: CPT

## 2019-02-27 PROCEDURE — 99215 OFFICE O/P EST HI 40 MIN: CPT | Performed by: INTERNAL MEDICINE

## 2019-02-27 PROCEDURE — 77412 RADIATION TX DELIVERY LVL 3: CPT | Performed by: RADIOLOGY

## 2019-02-27 PROCEDURE — 6360000002 HC RX W HCPCS: Performed by: INTERNAL MEDICINE

## 2019-02-27 PROCEDURE — G8484 FLU IMMUNIZE NO ADMIN: HCPCS | Performed by: INTERNAL MEDICINE

## 2019-02-27 PROCEDURE — 80053 COMPREHEN METABOLIC PANEL: CPT

## 2019-02-27 PROCEDURE — 3017F COLORECTAL CA SCREEN DOC REV: CPT | Performed by: INTERNAL MEDICINE

## 2019-02-27 PROCEDURE — 2580000003 HC RX 258: Performed by: INTERNAL MEDICINE

## 2019-02-27 PROCEDURE — 1036F TOBACCO NON-USER: CPT | Performed by: INTERNAL MEDICINE

## 2019-02-27 PROCEDURE — 85025 COMPLETE CBC W/AUTO DIFF WBC: CPT

## 2019-02-27 PROCEDURE — 99211 OFF/OP EST MAY X REQ PHY/QHP: CPT | Performed by: INTERNAL MEDICINE

## 2019-02-27 PROCEDURE — 77387 GUIDANCE FOR RADJ TX DLVR: CPT | Performed by: RADIOLOGY

## 2019-02-27 PROCEDURE — G8427 DOCREV CUR MEDS BY ELIG CLIN: HCPCS | Performed by: INTERNAL MEDICINE

## 2019-02-27 PROCEDURE — G8417 CALC BMI ABV UP PARAM F/U: HCPCS | Performed by: INTERNAL MEDICINE

## 2019-02-27 RX ORDER — ALPRAZOLAM 0.25 MG/1
0.25 TABLET ORAL NIGHTLY PRN
Status: CANCELLED | OUTPATIENT
Start: 2019-02-27

## 2019-02-27 RX ORDER — SODIUM CHLORIDE 9 MG/ML
INJECTION, SOLUTION INTRAVENOUS CONTINUOUS
Status: DISCONTINUED | OUTPATIENT
Start: 2019-02-27 | End: 2019-02-28 | Stop reason: HOSPADM

## 2019-02-27 RX ORDER — HEPARIN SODIUM (PORCINE) LOCK FLUSH IV SOLN 100 UNIT/ML 100 UNIT/ML
500 SOLUTION INTRAVENOUS PRN
Status: DISCONTINUED | OUTPATIENT
Start: 2019-02-27 | End: 2019-02-28 | Stop reason: HOSPADM

## 2019-02-27 RX ORDER — SODIUM CHLORIDE 0.9 % (FLUSH) 0.9 %
10 SYRINGE (ML) INJECTION PRN
Status: DISCONTINUED | OUTPATIENT
Start: 2019-02-27 | End: 2019-02-28 | Stop reason: HOSPADM

## 2019-02-27 RX ADMIN — Medication 10 ML: at 15:31

## 2019-02-27 RX ADMIN — Medication 500 UNITS: at 15:31

## 2019-02-28 ENCOUNTER — HOSPITAL ENCOUNTER (OUTPATIENT)
Dept: RADIATION ONCOLOGY | Facility: MEDICAL CENTER | Age: 64
Discharge: HOME | End: 2019-02-28
Attending: RADIOLOGY
Payer: COMMERCIAL

## 2019-02-28 PROCEDURE — 77387 GUIDANCE FOR RADJ TX DLVR: CPT | Performed by: RADIOLOGY

## 2019-02-28 PROCEDURE — 77412 RADIATION TX DELIVERY LVL 3: CPT | Performed by: RADIOLOGY

## 2019-02-28 NOTE — PROGRESS NOTES
Patient ID: Carlos Eckert, 1955, [id number], 66 y.o. Diagnosis:   Left upper lobe invasive lung adenocarcinoma, Status post lobectomy 9/28/18, Pathologic stage: pT1c, pN2, stage IIIa R1 with positive margin,    Will start chemotherapy followed By RT  Carbo taxol cycle 1 on 11/14/18  Cycle #4 completed on 1/16/19  Started on radiation on 2/18/19  HISTORY OF PRESENT ILLNESS:    Oncologic History:  The patient is a 66 y.o.  female who is admitted to the hospital for Left upper lobe mass. Pt has a significant past medical history of Uterine fibroids requiring total abdominal hysterectomy, liver hemangioma, HTN, Hyperlipidemia, DVT Left leg, COPD, and anxiety. Pt was found to have a left upper lung adenocarcinoma and presented to the hospital on 9/28/2018 for a scheduled robotic-assisted left upper lobe lobectomy. Pathology is positive for metastatic adenocarcinoma. There are some lymph nodes positive and some evidence of invasion to surrounding structures seen during surgery. Surgical team is planning on discharging patient this evening from the hospital.  She was discharged from the hospital with plan to follow with oncology as outpatient. Interval history:   Patient is returning for follow-up visit. She is here to review labs and further recommendations. She is tolerating radiation well. She denied any NV. No fever chills. She has mild chest pain. No tingling or numbness. During this visit patient's allergy, social, medical, surgical history and medications were reviewed and updated. Current Outpatient Prescriptions   Medication Sig Dispense Refill    ALPRAZolam (XANAX) 0.25 MG tablet Take 0.25 mg by mouth nightly as needed for Sleep. Leanora Care docusate (COLACE, DULCOLAX) 100 MG CAPS Take 100 mg by mouth 2 times daily 60 capsule 5    lisinopril-hydrochlorothiazide (PRINZIDE;ZESTORETIC) 10-12.5 MG per tablet Take 1 tablet by mouth daily 30 tablet 0    prochlorperazine (COMPAZINE) 10 MG tablet Take 1 tablet by mouth every 6 hours as needed (nausea / vomiting) 120 tablet 3    ondansetron (ZOFRAN) 4 MG tablet Take 1 tablet by mouth every 8 hours as needed for Nausea or Vomiting 60 tablet 3    lidocaine-prilocaine (EMLA) 2.5-2.5 % cream Apply topically as needed prior to mediport access. Do not apply more than twice per day 1 Tube 3    dexamethasone (DECADRON) 4 MG tablet Take 5 tablets by mouth as needed (pre Taxol) Take 20 mg or 5 tablets orally 12 hours and repeat 6 hours before each Taxol infusion. 40 tablet 0    albuterol sulfate HFA (VENTOLIN HFA) 108 (90 Base) MCG/ACT inhaler Inhale 2 puffs into the lungs 4 times daily 1 Inhaler 0    lovastatin (MEVACOR) 10 MG tablet Take 10 mg by mouth nightly       No current facility-administered medications for this visit. Facility-Administered Medications Ordered in Other Visits   Medication Dose Route Frequency Provider Last Rate Last Dose    0.9 % sodium chloride infusion   Intravenous Continuous Jovanny Avila MD        heparin flush 100 UNIT/ML injection 500 Units  500 Units Intercatheter PRN Jovanny Avila MD   500 Units at 02/27/19 1531    sodium chloride flush 0.9 % injection 10 mL  10 mL Intravenous PRN Jovanny Avila MD   10 mL at 02/27/19 1531       Social History     Social History    Marital status:      Spouse name: N/A    Number of children: N/A    Years of education: N/A     Occupational History    Not on file.      Social History Main Topics    Smoking status: Former Smoker     Packs/day: 1.50     Years: 30.00     Types: Cigarettes     Quit date: 2000    Smokeless tobacco: Never Used    Alcohol use Yes      Comment: Occassionally    Drug use: No    Sexual activity: Not on file     Other Topics Concern    Not on file     Social History Narrative    No narrative on file       Family History   Problem Relation Age of Onset    Cancer Mother         LUNG    Cancer Sister         LUNG REVIEW OF SYSTEM:     Constitutional: No fever or chills. No night sweats, no weight loss   Eyes: No eye discharge, double vision, or eye pain   HEENT: negative for sore mouth, sore throat, hoarseness and voice change   Respiratory: negative for cough , sputum, dyspnea, wheezing, hemoptysis, chest pain   Cardiovascular: negative for chest pain, dyspnea, palpitations, orthopnea, PND   Gastrointestinal: negative for nausea, vomiting, diarrhea, constipation, abdominal pain, Dysphagia, hematemesis and hematochezia   Genitourinary: negative for frequency, dysuria, nocturia, urinary incontinence, and hematuria   Integument: negative for rash, skin lesions, bruises.    Hematologic/Lymphatic: negative for easy bruising, bleeding, lymphadenopathy, petechiae and swelling/edema   Endocrine: negative for heat or cold intolerance, tremor, weight changes, change in bowel habits and hair loss   Musculoskeletal: negative for myalgias, arthralgias, pain, joint swelling,and bone pain   Neurological: negative for headaches, dizziness, seizures, weakness, numbness       OBJECTIVE:         Vitals:    02/27/19 1508   BP: (!) 153/79   Pulse: 90   Resp: 18   Temp: 98.1 °F (36.7 °C)   PHYSICAL EXAM:   General appearance - well appearing, no in pain or distress   Mental status - alert and cooperative   Eyes - pupils equal and reactive, extraocular eye movements intact   Ears - bilateral TM's and external ear canals normal   Mouth - mucous membranes moist, pharynx normal without lesions   Neck - supple, no significant adenopathy   Lymphatics - no palpable lymphadenopathy, no hepatosplenomegaly   Chest - clear to auscultation, no wheezes, rales or rhonchi, symmetric air entry   Left chest surgical scar healing well  Heart - normal rate, regular rhythm, normal S1, S2, no murmurs, rubs, clicks or gallops   Abdomen - soft, nontender, nondistended, no masses or organomegaly   Neurological - alert, oriented, normal speech, no focal findings or movement disorder noted   Musculoskeletal - no joint tenderness, deformity or swelling   Extremities - peripheral pulses normal, no pedal edema, no clubbing or cyanosis   Skin - normal coloration and turgor, no rashes, no suspicious skin lesions noted ,  LABORATORY DATA:     Lab Results   Component Value Date    WBC 4.3 02/27/2019    HGB 11.7 (L) 02/27/2019    HCT 33.5 (L) 02/27/2019    MCV 94.7 02/27/2019     02/27/2019    LYMPHOPCT 29 02/27/2019    RBC 3.54 (L) 02/27/2019    MCH 32.9 02/27/2019    MCHC 34.8 02/27/2019    RDW 17.8 (H) 02/27/2019    MONOPCT 8 (H) 02/27/2019    BASOPCT 1 02/27/2019    NEUTROABS 2.60 02/27/2019    LYMPHSABS 1.20 02/27/2019    MONOSABS 0.40 02/27/2019    EOSABS 0.00 02/27/2019    BASOSABS 0.10 02/27/2019       Chemistry        Component Value Date/Time     02/27/2019 1530    K 3.4 (L) 02/27/2019 1530    CL 99 02/27/2019 1530    CO2 26 02/27/2019 1530    BUN 14 02/27/2019 1530    CREATININE 0.49 (L) 02/27/2019 1530        Component Value Date/Time    CALCIUM 9.4 02/27/2019 1530    ALKPHOS 76 02/27/2019 1530    AST 19 02/27/2019 1530    ALT 16 02/27/2019 1530    BILITOT 0.31 02/27/2019 1530        PATHOLOGY DATA:   Pathology:  CT Guided Biopsy ([facility]) 8/22/18:   LEFT LUNG, UPPER LOBE, CT GUIDED CORE NEEDLE BIOPSIES:  - INVASIVE ADENOCARCINOMA, CONSISTENT WITH LUNG PRIMARY. Collected: 9/28/2018   Received: 10/1/2018   Reported: 10/2/2018 17:47     -- Diagnosis --   1.  LYMPH NODE, LEVEL 9, BIOPSY:       -  ONE LYMPH NODE, NEGATIVE FOR MALIGNANCY (0/1). 2.  LYMPH NODE, LEVEL 11, DISSECTION:       -  ONE OF 2 LYMPH NODES POSITIVE FOR METASTATIC CARCINOMA (1/2). 3.  LYMPH NODE, LEVEL 5, DISSECTION:       -  ONE OF 2 LYMPH NODES POSITIVE FOR METASTATIC CARCINOMA (1/2).     -  CARCINOMA INVADES LARGE PERIPHERAL NERVE BRANCHES. 4.  LUNG, LEFT UPPER LOBE, LOBECTOMY:            -  WELL-DIFFERENTIATED INVASIVE ADENOCARCINOMA, 2.9 CM   GREATEST DIMENSION.        -  NEGATIVE FOR VISCERAL PLEURAL INVASION.          -  TUMOR INVOLVES SOFT TISSUE OF BRONCHIAL, VASCULAR AND PARENCHYMAL MARGINS.     -  5 PERIBRONCHIOLAR LYMPH NODES, POSITIVE FOR METASTATIC   ADENOCARCINOMA (5/5).     -  SEPARATE SMALL INTRALOBAR FOCUS ATYPICAL ADENOMATOUS   HYPERPLASIA.           -  PATHOLOGIC STAGE:  pT1c, pN2, R1.     5.  LYMPH NODES, LEVEL 10, DISSECTION:       -  ONE OF 2 LYMPH NODES POSITIVE FOR METASTATIC CARCINOMA (1/2). IMAGING DATA:    PET/CT 7/27/18:  [facility]  Result Impression   1. Re- demonstration of irregular spiculated 2.1 cm nodule in the lingula  which is FDG avid most consistent with primary lung malignancy. 2. Anterior left hilar 8 mm lymph node which is mildly FDG avid suggesting  metastatic disease. 3. Multiple incidental/chronic findings as above including re- demonstration  of right hepatic lobe hemangioma and cyst.   . CCT ([facility]) 7/19/18  *A 2.6 x 2.1 cm spiculated nodule is identified involving the lingula  correlating to the abnormality seen on chest x-ray.  Malignancy is suspected  and further evaluation with tissue sampling and/or PET CT is recommended. *Partially calcified subpleural nodules identified involving the left lower  lobe along the diaphragmatic surface measuring 1.4 cm in greatest axial  dimension.  Finding can also be further characterized by PET-CT. *Centrilobular/paraseptal emphysematous changes with large bulla involving  the right lung apex. *Multiple low attenuating lesions are identified within the visualized liver  parenchyma, incompletely characterized on today's study, largest measuring  4.1 cm within segment 8 of the liver, most likely representing an hemangioma  given the peripheral puddling of contrast.  Given the lung nodule, if  complete evaluation is needed, CT or MRI utilizing liver mass protocol is  recommended. CT Head 8/3/18:  1. No acute intracranial abnormality or abnormal postcontrast enhancement.   2. Few scattered well-circumscribed subcentimeter lucent areas in the  calvarium largest in the right frontal lesion as measured above which are  favored to be benign however given history of malignancy recommend  correlation with bone scan to assess for possible metastasis. PET scan 11/10/18  Impression   Status post left upper lobectomy.  Small left effusion with pleural   thickening that is mildly FDG avid.  This may be inflammatory in etiology. Attention to on follow-up is recommended.       Small mildly FDG avid subcarinal lymph node, which also may be reactive. Attention to on follow-up is recommended.       Redemonstration of non FDG avid liver lesions in the right hepatic lobe,   previously characterized as a hemangioma in segment 8 and with findings of a   cyst in segment 5. MRI brain 10/24/18  Impression   1. No convincing evidence of intracranial metastatic disease. 2. No acute intracranial abnormality.  No acute infarct. 3. There is an extra-axial enhancing mass along the right parietal convexity   measuring up to 6 mm in size, which correlates to a partially calcified   lesion seen on the prior CT.  This is most suggestive of a meningioma. 4. The larger lucent lesions within the calvarium on the prior CT demonstrate   intrinsic T1 hyperintensity most suggestive of intraosseous hemangiomas. ASSESSMENT:    Noemí Galarza Is a very pleasant 79-year-old  female with newly diagnosed left upper lobe non-small cell lung cancer, adenocarcinoma, status post Robotically assisted BARAK lobectomy with LN dissection and Intercostal nerve block with experal on 9/28/18. I discussed the surgical pathology, diagnosis, prognosis and treatment options with patient. She has [id number] disease, stage IIIA, she had R1 disease with positive margins. I discussed the NCCN guidelines with patient. She will be receiving sequential chemoradiation. She is tolerating chemo well. She completed C4 on 1/16.   Receiving radiation and so far tolerated well. During today's visit, the patient and the family had a number of reasonable questions which were answered to their satisfaction. They verbalized understanding of the information provided and they agreed to proceed as outlined above. PLAN:   She is started on RT 2/18/19  Return to clinic in 4-5 weeks    Jose Teixeira MD  Hematologist/Medical Oncologist    I spent more than 40 minutes examining, evaluating, reviewing data and counseling the patient. Greater than 50% of that time was spent face-to-face with the patient in counseling and coordinating her care. This note is created with the assistance of a speech recognition program.  While intending to generate a document that actually reflects the content of the visit, the document can still have some errors including those of syntax and sound a like substitutions which may escape proof reading. It such instances, actual meaning can be extrapolated by contextual diversion.

## 2019-03-01 ENCOUNTER — HOSPITAL ENCOUNTER (OUTPATIENT)
Dept: RADIATION ONCOLOGY | Facility: MEDICAL CENTER | Age: 64
Discharge: HOME | End: 2019-03-01
Attending: RADIOLOGY
Payer: COMMERCIAL

## 2019-03-01 DIAGNOSIS — C34.12 MALIGNANT NEOPLASM OF BRONCHUS OF UPPER LOBE, LEFT (HCC): Primary | ICD-10-CM

## 2019-03-01 PROCEDURE — 77412 RADIATION TX DELIVERY LVL 3: CPT | Performed by: RADIOLOGY

## 2019-03-01 PROCEDURE — 77336 RADIATION PHYSICS CONSULT: CPT | Performed by: RADIOLOGY

## 2019-03-01 PROCEDURE — 77387 GUIDANCE FOR RADJ TX DLVR: CPT | Performed by: RADIOLOGY

## 2019-03-01 RX ORDER — ALPRAZOLAM 0.25 MG/1
0.25 TABLET ORAL 3 TIMES DAILY PRN
Qty: 90 TABLET | Refills: 0 | Status: SHIPPED | OUTPATIENT
Start: 2019-03-01 | End: 2019-03-31

## 2019-03-04 ENCOUNTER — HOSPITAL ENCOUNTER (OUTPATIENT)
Dept: RADIATION ONCOLOGY | Facility: MEDICAL CENTER | Age: 64
Discharge: HOME | End: 2019-03-04
Attending: RADIOLOGY
Payer: COMMERCIAL

## 2019-03-04 ENCOUNTER — HOSPITAL ENCOUNTER (OUTPATIENT)
Dept: RADIATION ONCOLOGY | Facility: MEDICAL CENTER | Age: 64
Discharge: HOME | End: 2019-03-04
Payer: COMMERCIAL

## 2019-03-04 VITALS
OXYGEN SATURATION: 100 % | HEART RATE: 90 BPM | BODY MASS INDEX: 33.98 KG/M2 | TEMPERATURE: 98.1 F | DIASTOLIC BLOOD PRESSURE: 83 MMHG | SYSTOLIC BLOOD PRESSURE: 141 MMHG | RESPIRATION RATE: 14 BRPM | WEIGHT: 168.25 LBS

## 2019-03-04 PROCEDURE — 77387 GUIDANCE FOR RADJ TX DLVR: CPT | Performed by: RADIOLOGY

## 2019-03-04 PROCEDURE — 77417 THER RADIOLOGY PORT IMAGE(S): CPT | Performed by: RADIOLOGY

## 2019-03-04 PROCEDURE — 77412 RADIATION TX DELIVERY LVL 3: CPT | Performed by: RADIOLOGY

## 2019-03-04 RX ORDER — ASPIRIN 81 MG/1
81 TABLET, CHEWABLE ORAL DAILY
COMMUNITY
End: 2019-11-06 | Stop reason: ALTCHOICE

## 2019-03-05 ENCOUNTER — HOSPITAL ENCOUNTER (OUTPATIENT)
Dept: RADIATION ONCOLOGY | Facility: MEDICAL CENTER | Age: 64
Discharge: HOME | End: 2019-03-05
Attending: RADIOLOGY
Payer: COMMERCIAL

## 2019-03-05 PROCEDURE — 77412 RADIATION TX DELIVERY LVL 3: CPT | Performed by: RADIOLOGY

## 2019-03-05 PROCEDURE — 77387 GUIDANCE FOR RADJ TX DLVR: CPT | Performed by: RADIOLOGY

## 2019-03-06 ENCOUNTER — HOSPITAL ENCOUNTER (OUTPATIENT)
Dept: RADIATION ONCOLOGY | Facility: MEDICAL CENTER | Age: 64
End: 2019-03-06
Attending: RADIOLOGY
Payer: COMMERCIAL

## 2019-03-06 PROCEDURE — 77387 GUIDANCE FOR RADJ TX DLVR: CPT | Performed by: RADIOLOGY

## 2019-03-06 PROCEDURE — 77412 RADIATION TX DELIVERY LVL 3: CPT | Performed by: RADIOLOGY

## 2019-03-07 ENCOUNTER — HOSPITAL ENCOUNTER (OUTPATIENT)
Dept: RADIATION ONCOLOGY | Facility: MEDICAL CENTER | Age: 64
Discharge: HOME | End: 2019-03-07
Attending: RADIOLOGY
Payer: COMMERCIAL

## 2019-03-07 ENCOUNTER — TELEPHONE (OUTPATIENT)
Dept: ONCOLOGY | Age: 64
End: 2019-03-07

## 2019-03-07 PROCEDURE — 77387 GUIDANCE FOR RADJ TX DLVR: CPT | Performed by: RADIOLOGY

## 2019-03-07 PROCEDURE — 77412 RADIATION TX DELIVERY LVL 3: CPT | Performed by: RADIOLOGY

## 2019-03-08 ENCOUNTER — HOSPITAL ENCOUNTER (OUTPATIENT)
Dept: RADIATION ONCOLOGY | Facility: MEDICAL CENTER | Age: 64
Discharge: HOME | End: 2019-03-08
Attending: RADIOLOGY
Payer: COMMERCIAL

## 2019-03-08 PROCEDURE — 77387 GUIDANCE FOR RADJ TX DLVR: CPT | Performed by: RADIOLOGY

## 2019-03-08 PROCEDURE — 77412 RADIATION TX DELIVERY LVL 3: CPT | Performed by: RADIOLOGY

## 2019-03-08 PROCEDURE — 77336 RADIATION PHYSICS CONSULT: CPT | Performed by: RADIOLOGY

## 2019-03-11 ENCOUNTER — HOSPITAL ENCOUNTER (OUTPATIENT)
Dept: RADIATION ONCOLOGY | Facility: MEDICAL CENTER | Age: 64
Discharge: HOME | End: 2019-03-11
Attending: RADIOLOGY
Payer: COMMERCIAL

## 2019-03-11 ENCOUNTER — HOSPITAL ENCOUNTER (OUTPATIENT)
Dept: RADIATION ONCOLOGY | Facility: MEDICAL CENTER | Age: 64
Discharge: HOME | End: 2019-03-11
Payer: COMMERCIAL

## 2019-03-11 VITALS
BODY MASS INDEX: 34.59 KG/M2 | DIASTOLIC BLOOD PRESSURE: 74 MMHG | WEIGHT: 171.25 LBS | OXYGEN SATURATION: 100 % | SYSTOLIC BLOOD PRESSURE: 128 MMHG | HEART RATE: 79 BPM | TEMPERATURE: 98.4 F | RESPIRATION RATE: 16 BRPM

## 2019-03-11 PROCEDURE — 77417 THER RADIOLOGY PORT IMAGE(S): CPT | Performed by: RADIOLOGY

## 2019-03-11 PROCEDURE — 77387 GUIDANCE FOR RADJ TX DLVR: CPT | Performed by: RADIOLOGY

## 2019-03-11 PROCEDURE — 77412 RADIATION TX DELIVERY LVL 3: CPT | Performed by: RADIOLOGY

## 2019-03-12 ENCOUNTER — HOSPITAL ENCOUNTER (OUTPATIENT)
Dept: RADIATION ONCOLOGY | Facility: MEDICAL CENTER | Age: 64
Discharge: HOME | End: 2019-03-12
Attending: RADIOLOGY
Payer: COMMERCIAL

## 2019-03-12 PROCEDURE — 77412 RADIATION TX DELIVERY LVL 3: CPT | Performed by: RADIOLOGY

## 2019-03-12 PROCEDURE — 77387 GUIDANCE FOR RADJ TX DLVR: CPT | Performed by: RADIOLOGY

## 2019-03-13 ENCOUNTER — HOSPITAL ENCOUNTER (OUTPATIENT)
Dept: RADIATION ONCOLOGY | Facility: MEDICAL CENTER | Age: 64
Discharge: HOME | End: 2019-03-13
Attending: RADIOLOGY
Payer: COMMERCIAL

## 2019-03-13 PROCEDURE — 77412 RADIATION TX DELIVERY LVL 3: CPT | Performed by: RADIOLOGY

## 2019-03-13 PROCEDURE — 77387 GUIDANCE FOR RADJ TX DLVR: CPT | Performed by: RADIOLOGY

## 2019-03-14 ENCOUNTER — HOSPITAL ENCOUNTER (OUTPATIENT)
Dept: RADIATION ONCOLOGY | Facility: MEDICAL CENTER | Age: 64
Discharge: HOME | End: 2019-03-14
Attending: RADIOLOGY
Payer: COMMERCIAL

## 2019-03-14 ENCOUNTER — HOSPITAL ENCOUNTER (OUTPATIENT)
Age: 64
Setting detail: SPECIMEN
Discharge: HOME | End: 2019-03-14
Payer: COMMERCIAL

## 2019-03-14 DIAGNOSIS — C34.92 NON-SMALL CELL CANCER OF LEFT LUNG (HCC): ICD-10-CM

## 2019-03-14 DIAGNOSIS — C34.12 MALIGNANT NEOPLASM OF BRONCHUS OF UPPER LOBE, LEFT (HCC): ICD-10-CM

## 2019-03-14 DIAGNOSIS — Z90.2 STATUS POST LOBECTOMY OF LUNG: ICD-10-CM

## 2019-03-14 DIAGNOSIS — Z90.2 STATUS POST LOBECTOMY OF LUNG: Primary | ICD-10-CM

## 2019-03-14 LAB
ABSOLUTE EOS #: 0.03 K/UL (ref 0–0.44)
ABSOLUTE IMMATURE GRANULOCYTE: <0.03 K/UL (ref 0–0.3)
ABSOLUTE LYMPH #: 0.93 K/UL (ref 1.1–3.7)
ABSOLUTE MONO #: 0.34 K/UL (ref 0.1–1.2)
BASOPHILS # BLD: 1 % (ref 0–2)
BASOPHILS ABSOLUTE: <0.03 K/UL (ref 0–0.2)
DIFFERENTIAL TYPE: ABNORMAL
EOSINOPHILS RELATIVE PERCENT: 1 % (ref 1–4)
HCT VFR BLD CALC: 37 % (ref 36.3–47.1)
HEMOGLOBIN: 12.1 G/DL (ref 11.9–15.1)
IMMATURE GRANULOCYTES: 1 %
LYMPHOCYTES # BLD: 25 % (ref 24–43)
MCH RBC QN AUTO: 33.1 PG (ref 25.2–33.5)
MCHC RBC AUTO-ENTMCNC: 32.7 G/DL (ref 28.4–34.8)
MCV RBC AUTO: 101.1 FL (ref 82.6–102.9)
MONOCYTES # BLD: 9 % (ref 3–12)
NRBC AUTOMATED: 0 PER 100 WBC
PDW BLD-RTO: 13.3 % (ref 11.8–14.4)
PLATELET # BLD: 199 K/UL (ref 138–453)
PLATELET ESTIMATE: ABNORMAL
PMV BLD AUTO: 10.2 FL (ref 8.1–13.5)
RBC # BLD: 3.66 M/UL (ref 3.95–5.11)
RBC # BLD: ABNORMAL 10*6/UL
SEG NEUTROPHILS: 63 % (ref 36–65)
SEGMENTED NEUTROPHILS ABSOLUTE COUNT: 2.44 K/UL (ref 1.5–8.1)
WBC # BLD: 3.8 K/UL (ref 3.5–11.3)
WBC # BLD: ABNORMAL 10*3/UL

## 2019-03-14 PROCEDURE — 77387 GUIDANCE FOR RADJ TX DLVR: CPT | Performed by: RADIOLOGY

## 2019-03-14 PROCEDURE — 77412 RADIATION TX DELIVERY LVL 3: CPT | Performed by: RADIOLOGY

## 2019-03-15 ENCOUNTER — HOSPITAL ENCOUNTER (OUTPATIENT)
Dept: RADIATION ONCOLOGY | Facility: MEDICAL CENTER | Age: 64
Discharge: HOME | End: 2019-03-15
Attending: RADIOLOGY
Payer: COMMERCIAL

## 2019-03-15 PROCEDURE — 77336 RADIATION PHYSICS CONSULT: CPT | Performed by: RADIOLOGY

## 2019-03-15 PROCEDURE — 77412 RADIATION TX DELIVERY LVL 3: CPT | Performed by: RADIOLOGY

## 2019-03-15 PROCEDURE — 77387 GUIDANCE FOR RADJ TX DLVR: CPT | Performed by: RADIOLOGY

## 2019-03-18 ENCOUNTER — HOSPITAL ENCOUNTER (OUTPATIENT)
Dept: RADIATION ONCOLOGY | Facility: MEDICAL CENTER | Age: 64
Discharge: HOME | End: 2019-03-18
Attending: RADIOLOGY
Payer: COMMERCIAL

## 2019-03-18 ENCOUNTER — HOSPITAL ENCOUNTER (OUTPATIENT)
Dept: RADIATION ONCOLOGY | Facility: MEDICAL CENTER | Age: 64
Discharge: HOME | End: 2019-03-18
Payer: COMMERCIAL

## 2019-03-18 VITALS
BODY MASS INDEX: 34.36 KG/M2 | OXYGEN SATURATION: 98 % | SYSTOLIC BLOOD PRESSURE: 115 MMHG | DIASTOLIC BLOOD PRESSURE: 71 MMHG | WEIGHT: 170.13 LBS | HEART RATE: 85 BPM | TEMPERATURE: 98.6 F | RESPIRATION RATE: 14 BRPM

## 2019-03-18 PROCEDURE — 77412 RADIATION TX DELIVERY LVL 3: CPT | Performed by: RADIOLOGY

## 2019-03-18 PROCEDURE — 77387 GUIDANCE FOR RADJ TX DLVR: CPT | Performed by: RADIOLOGY

## 2019-03-18 PROCEDURE — 77417 THER RADIOLOGY PORT IMAGE(S): CPT | Performed by: RADIOLOGY

## 2019-03-19 ENCOUNTER — HOSPITAL ENCOUNTER (OUTPATIENT)
Dept: RADIATION ONCOLOGY | Facility: MEDICAL CENTER | Age: 64
Discharge: HOME | End: 2019-03-19
Attending: RADIOLOGY
Payer: COMMERCIAL

## 2019-03-19 PROCEDURE — 77387 GUIDANCE FOR RADJ TX DLVR: CPT | Performed by: RADIOLOGY

## 2019-03-19 PROCEDURE — 77412 RADIATION TX DELIVERY LVL 3: CPT | Performed by: RADIOLOGY

## 2019-03-20 ENCOUNTER — HOSPITAL ENCOUNTER (OUTPATIENT)
Dept: RADIATION ONCOLOGY | Facility: MEDICAL CENTER | Age: 64
Discharge: HOME | End: 2019-03-20
Attending: RADIOLOGY
Payer: COMMERCIAL

## 2019-03-20 PROCEDURE — 77387 GUIDANCE FOR RADJ TX DLVR: CPT | Performed by: RADIOLOGY

## 2019-03-20 PROCEDURE — 77412 RADIATION TX DELIVERY LVL 3: CPT | Performed by: RADIOLOGY

## 2019-03-21 ENCOUNTER — HOSPITAL ENCOUNTER (OUTPATIENT)
Dept: RADIATION ONCOLOGY | Facility: MEDICAL CENTER | Age: 64
Discharge: HOME | End: 2019-03-21
Attending: RADIOLOGY
Payer: COMMERCIAL

## 2019-03-21 PROCEDURE — 77387 GUIDANCE FOR RADJ TX DLVR: CPT | Performed by: RADIOLOGY

## 2019-03-21 PROCEDURE — 77412 RADIATION TX DELIVERY LVL 3: CPT | Performed by: RADIOLOGY

## 2019-03-22 ENCOUNTER — HOSPITAL ENCOUNTER (OUTPATIENT)
Dept: RADIATION ONCOLOGY | Facility: MEDICAL CENTER | Age: 64
Discharge: HOME | End: 2019-03-22
Attending: RADIOLOGY
Payer: COMMERCIAL

## 2019-03-22 PROCEDURE — 77412 RADIATION TX DELIVERY LVL 3: CPT | Performed by: RADIOLOGY

## 2019-03-22 PROCEDURE — 77387 GUIDANCE FOR RADJ TX DLVR: CPT | Performed by: RADIOLOGY

## 2019-03-22 PROCEDURE — 77336 RADIATION PHYSICS CONSULT: CPT | Performed by: RADIOLOGY

## 2019-03-25 ENCOUNTER — HOSPITAL ENCOUNTER (OUTPATIENT)
Dept: RADIATION ONCOLOGY | Facility: MEDICAL CENTER | Age: 64
Discharge: HOME | End: 2019-03-25
Payer: COMMERCIAL

## 2019-03-25 ENCOUNTER — HOSPITAL ENCOUNTER (OUTPATIENT)
Dept: RADIATION ONCOLOGY | Facility: MEDICAL CENTER | Age: 64
Discharge: HOME | End: 2019-03-25
Attending: RADIOLOGY
Payer: COMMERCIAL

## 2019-03-25 VITALS
BODY MASS INDEX: 34.56 KG/M2 | TEMPERATURE: 98.2 F | OXYGEN SATURATION: 100 % | SYSTOLIC BLOOD PRESSURE: 107 MMHG | DIASTOLIC BLOOD PRESSURE: 61 MMHG | RESPIRATION RATE: 14 BRPM | HEART RATE: 73 BPM | WEIGHT: 171.13 LBS

## 2019-03-25 PROCEDURE — 77412 RADIATION TX DELIVERY LVL 3: CPT | Performed by: RADIOLOGY

## 2019-03-25 PROCEDURE — 77387 GUIDANCE FOR RADJ TX DLVR: CPT | Performed by: RADIOLOGY

## 2019-03-25 PROCEDURE — 77417 THER RADIOLOGY PORT IMAGE(S): CPT | Performed by: RADIOLOGY

## 2019-03-26 ENCOUNTER — HOSPITAL ENCOUNTER (OUTPATIENT)
Dept: RADIATION ONCOLOGY | Facility: MEDICAL CENTER | Age: 64
Discharge: HOME | End: 2019-03-26
Attending: RADIOLOGY
Payer: COMMERCIAL

## 2019-03-26 PROCEDURE — 77387 GUIDANCE FOR RADJ TX DLVR: CPT | Performed by: RADIOLOGY

## 2019-03-26 PROCEDURE — 77412 RADIATION TX DELIVERY LVL 3: CPT | Performed by: RADIOLOGY

## 2019-03-27 ENCOUNTER — HOSPITAL ENCOUNTER (OUTPATIENT)
Dept: RADIATION ONCOLOGY | Facility: MEDICAL CENTER | Age: 64
Discharge: HOME | End: 2019-03-27
Attending: RADIOLOGY
Payer: COMMERCIAL

## 2019-03-27 PROCEDURE — 77387 GUIDANCE FOR RADJ TX DLVR: CPT | Performed by: RADIOLOGY

## 2019-03-27 PROCEDURE — 77412 RADIATION TX DELIVERY LVL 3: CPT | Performed by: RADIOLOGY

## 2019-03-28 ENCOUNTER — HOSPITAL ENCOUNTER (OUTPATIENT)
Dept: RADIATION ONCOLOGY | Facility: MEDICAL CENTER | Age: 64
Discharge: HOME | End: 2019-03-28
Attending: RADIOLOGY
Payer: COMMERCIAL

## 2019-03-28 PROCEDURE — 77412 RADIATION TX DELIVERY LVL 3: CPT | Performed by: RADIOLOGY

## 2019-03-28 PROCEDURE — 77387 GUIDANCE FOR RADJ TX DLVR: CPT | Performed by: RADIOLOGY

## 2019-03-29 ENCOUNTER — HOSPITAL ENCOUNTER (OUTPATIENT)
Facility: MEDICAL CENTER | Age: 64
End: 2019-03-29
Payer: COMMERCIAL

## 2019-03-29 ENCOUNTER — HOSPITAL ENCOUNTER (OUTPATIENT)
Dept: RADIATION ONCOLOGY | Facility: MEDICAL CENTER | Age: 64
Discharge: HOME | End: 2019-03-29
Attending: RADIOLOGY
Payer: COMMERCIAL

## 2019-03-29 VITALS
RESPIRATION RATE: 16 BRPM | DIASTOLIC BLOOD PRESSURE: 86 MMHG | WEIGHT: 171 LBS | BODY MASS INDEX: 34.54 KG/M2 | HEART RATE: 89 BPM | OXYGEN SATURATION: 98 % | TEMPERATURE: 98.6 F | SYSTOLIC BLOOD PRESSURE: 136 MMHG

## 2019-03-29 PROCEDURE — 77412 RADIATION TX DELIVERY LVL 3: CPT | Performed by: RADIOLOGY

## 2019-03-29 PROCEDURE — 77387 GUIDANCE FOR RADJ TX DLVR: CPT | Performed by: RADIOLOGY

## 2019-03-29 PROCEDURE — 77336 RADIATION PHYSICS CONSULT: CPT | Performed by: RADIOLOGY

## 2019-04-01 ENCOUNTER — TELEPHONE (OUTPATIENT)
Dept: RADIATION ONCOLOGY | Facility: MEDICAL CENTER | Age: 64
End: 2019-04-01

## 2019-04-01 NOTE — TELEPHONE ENCOUNTER
I called Walter Munoz to schedule post treatment follow up. I scheduled her on 8/1/19 @ 11:15am. Appointment card mailed.

## 2019-04-03 ENCOUNTER — HOSPITAL ENCOUNTER (OUTPATIENT)
Dept: INFUSION THERAPY | Facility: MEDICAL CENTER | Age: 64
Discharge: HOME | End: 2019-04-03
Payer: COMMERCIAL

## 2019-04-03 ENCOUNTER — TELEPHONE (OUTPATIENT)
Dept: ONCOLOGY | Age: 64
End: 2019-04-03

## 2019-04-03 ENCOUNTER — OFFICE VISIT (OUTPATIENT)
Dept: ONCOLOGY | Age: 64
End: 2019-04-03
Payer: COMMERCIAL

## 2019-04-03 VITALS
HEART RATE: 74 BPM | WEIGHT: 169.6 LBS | SYSTOLIC BLOOD PRESSURE: 116 MMHG | RESPIRATION RATE: 18 BRPM | DIASTOLIC BLOOD PRESSURE: 72 MMHG | TEMPERATURE: 97.5 F | BODY MASS INDEX: 34.26 KG/M2

## 2019-04-03 DIAGNOSIS — C34.92 NON-SMALL CELL CANCER OF LEFT LUNG (HCC): ICD-10-CM

## 2019-04-03 DIAGNOSIS — C34.92 NON-SMALL CELL CANCER OF LEFT LUNG (HCC): Primary | ICD-10-CM

## 2019-04-03 LAB
ABSOLUTE EOS #: 0 K/UL (ref 0–0.4)
ABSOLUTE IMMATURE GRANULOCYTE: ABNORMAL K/UL (ref 0–0.3)
ABSOLUTE LYMPH #: 0.7 K/UL (ref 1–4.8)
ABSOLUTE MONO #: 0.3 K/UL (ref 0.2–0.8)
ALBUMIN SERPL-MCNC: 4.2 G/DL (ref 3.5–5.2)
ALBUMIN/GLOBULIN RATIO: ABNORMAL (ref 1–2.5)
ALP BLD-CCNC: 63 U/L (ref 35–104)
ALT SERPL-CCNC: 13 U/L (ref 5–33)
ANION GAP SERPL CALCULATED.3IONS-SCNC: 11 MMOL/L (ref 9–17)
AST SERPL-CCNC: 15 U/L
BASOPHILS # BLD: 1 % (ref 0–2)
BASOPHILS ABSOLUTE: 0 K/UL (ref 0–0.2)
BILIRUB SERPL-MCNC: 0.19 MG/DL (ref 0.3–1.2)
BUN BLDV-MCNC: 10 MG/DL (ref 8–23)
BUN/CREAT BLD: 21 (ref 9–20)
CALCIUM SERPL-MCNC: 9 MG/DL (ref 8.6–10.4)
CHLORIDE BLD-SCNC: 100 MMOL/L (ref 98–107)
CO2: 27 MMOL/L (ref 20–31)
CREAT SERPL-MCNC: 0.48 MG/DL (ref 0.5–0.9)
DIFFERENTIAL TYPE: ABNORMAL
EOSINOPHILS RELATIVE PERCENT: 1 % (ref 1–4)
GFR AFRICAN AMERICAN: >60 ML/MIN
GFR NON-AFRICAN AMERICAN: >60 ML/MIN
GFR SERPL CREATININE-BSD FRML MDRD: ABNORMAL ML/MIN/{1.73_M2}
GFR SERPL CREATININE-BSD FRML MDRD: ABNORMAL ML/MIN/{1.73_M2}
GLUCOSE BLD-MCNC: 94 MG/DL (ref 70–99)
HCT VFR BLD CALC: 31.2 % (ref 36–46)
HEMOGLOBIN: 11.1 G/DL (ref 12–16)
IMMATURE GRANULOCYTES: ABNORMAL %
LYMPHOCYTES # BLD: 22 % (ref 24–44)
MCH RBC QN AUTO: 33.7 PG (ref 26–34)
MCHC RBC AUTO-ENTMCNC: 35.4 G/DL (ref 31–37)
MCV RBC AUTO: 95 FL (ref 80–100)
MONOCYTES # BLD: 10 % (ref 1–7)
NRBC AUTOMATED: ABNORMAL PER 100 WBC
PDW BLD-RTO: 12.9 % (ref 11.5–14.5)
PLATELET # BLD: 188 K/UL (ref 130–400)
PLATELET ESTIMATE: ABNORMAL
PMV BLD AUTO: 7.4 FL (ref 6–12)
POTASSIUM SERPL-SCNC: 3.5 MMOL/L (ref 3.7–5.3)
RBC # BLD: 3.28 M/UL (ref 4–5.2)
RBC # BLD: ABNORMAL 10*6/UL
SEG NEUTROPHILS: 66 % (ref 36–66)
SEGMENTED NEUTROPHILS ABSOLUTE COUNT: 2.1 K/UL (ref 1.8–7.7)
SODIUM BLD-SCNC: 138 MMOL/L (ref 135–144)
TOTAL PROTEIN: 6.7 G/DL (ref 6.4–8.3)
WBC # BLD: 3.2 K/UL (ref 3.5–11)
WBC # BLD: ABNORMAL 10*3/UL

## 2019-04-03 PROCEDURE — 1036F TOBACCO NON-USER: CPT | Performed by: INTERNAL MEDICINE

## 2019-04-03 PROCEDURE — G8417 CALC BMI ABV UP PARAM F/U: HCPCS | Performed by: INTERNAL MEDICINE

## 2019-04-03 PROCEDURE — 6360000002 HC RX W HCPCS: Performed by: INTERNAL MEDICINE

## 2019-04-03 PROCEDURE — 99215 OFFICE O/P EST HI 40 MIN: CPT | Performed by: INTERNAL MEDICINE

## 2019-04-03 PROCEDURE — 3017F COLORECTAL CA SCREEN DOC REV: CPT | Performed by: INTERNAL MEDICINE

## 2019-04-03 PROCEDURE — 80053 COMPREHEN METABOLIC PANEL: CPT

## 2019-04-03 PROCEDURE — 36591 DRAW BLOOD OFF VENOUS DEVICE: CPT

## 2019-04-03 PROCEDURE — 85025 COMPLETE CBC W/AUTO DIFF WBC: CPT

## 2019-04-03 PROCEDURE — 99211 OFF/OP EST MAY X REQ PHY/QHP: CPT | Performed by: INTERNAL MEDICINE

## 2019-04-03 PROCEDURE — G8427 DOCREV CUR MEDS BY ELIG CLIN: HCPCS | Performed by: INTERNAL MEDICINE

## 2019-04-03 PROCEDURE — 2580000003 HC RX 258: Performed by: INTERNAL MEDICINE

## 2019-04-03 RX ORDER — ALBUTEROL SULFATE 90 UG/1
2 AEROSOL, METERED RESPIRATORY (INHALATION) 4 TIMES DAILY
Qty: 1 INHALER | Refills: 0 | Status: SHIPPED | OUTPATIENT
Start: 2019-04-03 | End: 2019-05-15 | Stop reason: SDUPTHER

## 2019-04-03 RX ORDER — SODIUM CHLORIDE 0.9 % (FLUSH) 0.9 %
20 SYRINGE (ML) INJECTION PRN
Status: CANCELLED | OUTPATIENT
Start: 2019-04-03

## 2019-04-03 RX ORDER — SODIUM CHLORIDE 0.9 % (FLUSH) 0.9 %
10 SYRINGE (ML) INJECTION PRN
Status: CANCELLED | OUTPATIENT
Start: 2019-04-03

## 2019-04-03 RX ORDER — HEPARIN SODIUM (PORCINE) LOCK FLUSH IV SOLN 100 UNIT/ML 100 UNIT/ML
500 SOLUTION INTRAVENOUS PRN
Status: DISCONTINUED | OUTPATIENT
Start: 2019-04-03 | End: 2019-04-04 | Stop reason: HOSPADM

## 2019-04-03 RX ORDER — HEPARIN SODIUM (PORCINE) LOCK FLUSH IV SOLN 100 UNIT/ML 100 UNIT/ML
500 SOLUTION INTRAVENOUS PRN
Status: CANCELLED | OUTPATIENT
Start: 2019-04-03

## 2019-04-03 RX ORDER — SODIUM CHLORIDE 0.9 % (FLUSH) 0.9 %
20 SYRINGE (ML) INJECTION PRN
Status: DISCONTINUED | OUTPATIENT
Start: 2019-04-03 | End: 2019-04-04 | Stop reason: HOSPADM

## 2019-04-03 RX ADMIN — Medication 500 UNITS: at 15:08

## 2019-04-03 RX ADMIN — Medication 20 ML: at 15:08

## 2019-04-03 NOTE — PROGRESS NOTES
Patient ID: Po Frazier, 1955, [id number], 66 y.o. Diagnosis:   Left upper lobe invasive lung adenocarcinoma, Status post lobectomy 9/28/18, Pathologic stage: pT1c, pN2, stage IIIa R1 with positive margin,    Will start chemotherapy followed By RT  Carbo taxol cycle 1 on 11/14/18  HISTORY OF PRESENT ILLNESS:    Oncologic History:  The patient is a 66 y.o.  female who is admitted to the hospital for Left upper lobe mass. Pt has a significant past medical history of Uterine fibroids requiring total abdominal hysterectomy, liver hemangioma, HTN, Hyperlipidemia, DVT Left leg, COPD, and anxiety. Pt was found to have a left upper lung adenocarcinoma and presented to the hospital on 9/28/2018 for a scheduled robotic-assisted left upper lobe lobectomy. Pathology is positive for metastatic adenocarcinoma. There are some lymph nodes positive and some evidence of invasion to surrounding structures seen during surgery. Surgical team is planning on discharging patient this evening from the hospital.  She was discharged from the hospital with plan to follow with oncology as outpatient. Interval history:   Patient is returning for follow-up visit. She is here to review labs and further recommendations. She has completed radiation therapy a week ago. She reports esophagitis symptoms. No fever chills. She has mild chest pain. No tingling or numbness. During this visit patient's allergy, social, medical, surgical history and medications were reviewed and updated. Current Outpatient Medications   Medication Sig Dispense Refill    albuterol sulfate HFA (VENTOLIN HFA) 108 (90 Base) MCG/ACT inhaler Inhale 2 puffs into the lungs 4 times daily 1 Inhaler 0    aspirin 81 MG chewable tablet Take 81 mg by mouth daily      ALPRAZolam (XANAX) 0.25 MG tablet Take 0.25 mg by mouth nightly as needed for Sleep. Sandre Rucks docusate (COLACE, DULCOLAX) 100 MG CAPS Take 100 mg by mouth 2 times daily 60 capsule 5 club or organization: Not on file     Attends meetings of clubs or organizations: Not on file     Relationship status: Not on file    Intimate partner violence:     Fear of current or ex partner: Not on file     Emotionally abused: Not on file     Physically abused: Not on file     Forced sexual activity: Not on file   Other Topics Concern    Not on file   Social History Narrative    Not on file       Family History   Problem Relation Age of Onset    Cancer Mother         LUNG    Cancer Sister         LUNG        REVIEW OF SYSTEM:     Constitutional: No fever or chills. No night sweats, no weight loss   Eyes: No eye discharge, double vision, or eye pain   HEENT: negative for sore mouth, sore throat, hoarseness and voice change   Respiratory: negative for cough , sputum, dyspnea, wheezing, hemoptysis, chest pain   Cardiovascular: negative for chest pain, dyspnea, palpitations, orthopnea, PND   Gastrointestinal: negative for nausea, vomiting, diarrhea, constipation, abdominal pain, Dysphagia, hematemesis and hematochezia   Genitourinary: negative for frequency, dysuria, nocturia, urinary incontinence, and hematuria   Integument: negative for rash, skin lesions, bruises.    Hematologic/Lymphatic: negative for easy bruising, bleeding, lymphadenopathy, petechiae and swelling/edema   Endocrine: negative for heat or cold intolerance, tremor, weight changes, change in bowel habits and hair loss   Musculoskeletal: negative for myalgias, arthralgias, pain, joint swelling,and bone pain   Neurological: negative for headaches, dizziness, seizures, weakness, numbness       OBJECTIVE:         Vitals:    04/03/19 1412   BP: 116/72   Pulse: 74   Resp: 18   Temp: 97.5 °F (36.4 °C)   PHYSICAL EXAM:   General appearance - well appearing, no in pain or distress   Mental status - alert and cooperative   Eyes - pupils equal and reactive, extraocular eye movements intact   Ears - bilateral TM's and external ear canals normal   Mouth - mucous membranes moist, pharynx normal without lesions   Neck - supple, no significant adenopathy   Lymphatics - no palpable lymphadenopathy, no hepatosplenomegaly   Chest - clear to auscultation, no wheezes, rales or rhonchi, symmetric air entry   Left chest surgical scar healing well  Heart - normal rate, regular rhythm, normal S1, S2, no murmurs, rubs, clicks or gallops   Abdomen - soft, nontender, nondistended, no masses or organomegaly   Neurological - alert, oriented, normal speech, no focal findings or movement disorder noted   Musculoskeletal - no joint tenderness, deformity or swelling   Extremities - peripheral pulses normal, no pedal edema, no clubbing or cyanosis   Skin - normal coloration and turgor, no rashes, no suspicious skin lesions noted ,  LABORATORY DATA:     Lab Results   Component Value Date    WBC 3.2 (L) 04/03/2019    HGB 11.1 (L) 04/03/2019    HCT 31.2 (L) 04/03/2019    MCV 95.0 04/03/2019     04/03/2019    LYMPHOPCT 22 (L) 04/03/2019    RBC 3.28 (L) 04/03/2019    MCH 33.7 04/03/2019    MCHC 35.4 04/03/2019    RDW 12.9 04/03/2019    MONOPCT 10 (H) 04/03/2019    BASOPCT 1 04/03/2019    NEUTROABS 2.10 04/03/2019    LYMPHSABS 0.70 (L) 04/03/2019    MONOSABS 0.30 04/03/2019    EOSABS 0.00 04/03/2019    BASOSABS 0.00 04/03/2019       Chemistry        Component Value Date/Time     04/03/2019 1415    K 3.5 (L) 04/03/2019 1415     04/03/2019 1415    CO2 27 04/03/2019 1415    BUN 10 04/03/2019 1415    CREATININE 0.48 (L) 04/03/2019 1415        Component Value Date/Time    CALCIUM 9.0 04/03/2019 1415    ALKPHOS 63 04/03/2019 1415    AST 15 04/03/2019 1415    ALT 13 04/03/2019 1415    BILITOT 0.19 (L) 04/03/2019 1415        PATHOLOGY DATA:   Pathology:  CT Guided Biopsy ([facility]) 8/22/18:   LEFT LUNG, UPPER LOBE, CT GUIDED CORE NEEDLE BIOPSIES:  - INVASIVE ADENOCARCINOMA, CONSISTENT WITH LUNG PRIMARY.   Collected: 9/28/2018   Received: 10/1/2018   Reported: 10/2/2018 17:47     -- Diagnosis --   1.  LYMPH NODE, LEVEL 9, BIOPSY:       -  ONE LYMPH NODE, NEGATIVE FOR MALIGNANCY (0/1). 2.  LYMPH NODE, LEVEL 11, DISSECTION:       -  ONE OF 2 LYMPH NODES POSITIVE FOR METASTATIC CARCINOMA (1/2). 3.  LYMPH NODE, LEVEL 5, DISSECTION:       -  ONE OF 2 LYMPH NODES POSITIVE FOR METASTATIC CARCINOMA (1/2).     -  CARCINOMA INVADES LARGE PERIPHERAL NERVE BRANCHES. 4.  LUNG, LEFT UPPER LOBE, LOBECTOMY:            -  WELL-DIFFERENTIATED INVASIVE ADENOCARCINOMA, 2.9 CM   GREATEST DIMENSION.     -  NEGATIVE FOR VISCERAL PLEURAL INVASION.          -  TUMOR INVOLVES SOFT TISSUE OF BRONCHIAL, VASCULAR AND PARENCHYMAL MARGINS.     -  5 PERIBRONCHIOLAR LYMPH NODES, POSITIVE FOR METASTATIC   ADENOCARCINOMA (5/5).     -  SEPARATE SMALL INTRALOBAR FOCUS ATYPICAL ADENOMATOUS   HYPERPLASIA.           -  PATHOLOGIC STAGE:  pT1c, pN2, R1.     5.  LYMPH NODES, LEVEL 10, DISSECTION:       -  ONE OF 2 LYMPH NODES POSITIVE FOR METASTATIC CARCINOMA (1/2). IMAGING DATA:    PET/CT 7/27/18:  [facility] [address]  Result Impression   1. Re- demonstration of irregular spiculated 2.1 cm nodule in the lingula  which is FDG avid most consistent with primary lung malignancy. 2. Anterior left hilar 8 mm lymph node which is mildly FDG avid suggesting  metastatic disease. 3. Multiple incidental/chronic findings as above including re- demonstration  of right hepatic lobe hemangioma and cyst.   . CCT ([facility]) 7/19/18  *A 2.6 x 2.1 cm spiculated nodule is identified involving the lingula  correlating to the abnormality seen on chest x-ray.  Malignancy is suspected  and further evaluation with tissue sampling and/or PET CT is recommended. *Partially calcified subpleural nodules identified involving the left lower  lobe along the diaphragmatic surface measuring 1.4 cm in greatest axial  dimension.  Finding can also be further characterized by PET-CT.   *Centrilobular/paraseptal emphysematous changes with large bulla involving  the right lung apex. *Multiple low attenuating lesions are identified within the visualized liver  parenchyma, incompletely characterized on today's study, largest measuring  4.1 cm within segment 8 of the liver, most likely representing an hemangioma  given the peripheral puddling of contrast.  Given the lung nodule, if  complete evaluation is needed, CT or MRI utilizing liver mass protocol is  recommended. CT Head 8/3/18:  1. No acute intracranial abnormality or abnormal postcontrast enhancement. 2. Few scattered well-circumscribed subcentimeter lucent areas in the  calvarium largest in the right frontal lesion as measured above which are  favored to be benign however given history of malignancy recommend  correlation with bone scan to assess for possible metastasis. PET scan 11/10/18  Impression   Status post left upper lobectomy.  Small left effusion with pleural   thickening that is mildly FDG avid.  This may be inflammatory in etiology. Attention to on follow-up is recommended.       Small mildly FDG avid subcarinal lymph node, which also may be reactive. Attention to on follow-up is recommended.       Redemonstration of non FDG avid liver lesions in the right hepatic lobe,   previously characterized as a hemangioma in segment 8 and with findings of a   cyst in segment 5. MRI brain 10/24/18  Impression   1. No convincing evidence of intracranial metastatic disease. 2. No acute intracranial abnormality.  No acute infarct. 3. There is an extra-axial enhancing mass along the right parietal convexity   measuring up to 6 mm in size, which correlates to a partially calcified   lesion seen on the prior CT.  This is most suggestive of a meningioma. 4. The larger lucent lesions within the calvarium on the prior CT demonstrate   intrinsic T1 hyperintensity most suggestive of intraosseous hemangiomas.      ASSESSMENT:    Sakshi Vernon Is a very pleasant 71-year-old  female with newly diagnosed left upper lobe non-small cell lung cancer, adenocarcinoma, status post Robotically assisted BARAK lobectomy with LN dissection and Intercostal nerve block with experal on 9/28/18. I discussed the surgical pathology, diagnosis, prognosis and treatment options with patient. She has [id number] disease, stage IIIA, she had R1 disease with positive margins. I discussed the NCCN guidelines with patient. She completed chemotherapy and now has completed RT well. During today's visit, the patient and the family had a number of reasonable questions which were answered to their satisfaction. They verbalized understanding of the information provided and they agreed to proceed as outlined above. PLAN:   She completed 4 cycle of chemo on 1/16/19  She completed adjuvant RT a week ago  Return to clinic in 6 weeks    Jose Walker MD  Hematologist/Medical Oncologist    I spent more than 40 minutes examining, evaluating, reviewing data and counseling the patient. Greater than 50% of that time was spent face-to-face with the patient in counseling and coordinating her care. This note is created with the assistance of a speech recognition program.  While intending to generate a document that actually reflects the content of the visit, the document can still have some errors including those of syntax and sound a like substitutions which may escape proof reading. It such instances, actual meaning can be extrapolated by contextual diversion.

## 2019-04-03 NOTE — TELEPHONE ENCOUNTER
Zainab Sears FOR MD VISIT  DR Chiang Furnjohnny IN TO SEE PATIENT ORDER RECEIVED  RV 6 WEEKS W/ LABS  SCRIPTS SENT TO PATIENT'S PHARMACY  RN DRAW CDP CMP 5/15/19 @ 1:30PM  MD VISIT 5/15/19 @ 1:40PM  AVS PRINTED AND GIVEN TO PATIENT W/ INSTRUCTIONS  PATIENT DISCHARGED AMBULATORY

## 2019-04-03 NOTE — PROGRESS NOTES
Patient here for labs and MD visit. No complaints today. Vitals obtained. Port accessed per policy and labs drawn and sent. Seen by MD.  Sakshi Reno flushed per policy and gripper removed intact, band aid to site. Has a return visit scheduled. Discharged ambulatory per self.

## 2019-04-04 DIAGNOSIS — C34.92 NON-SMALL CELL CANCER OF LEFT LUNG (HCC): Primary | ICD-10-CM

## 2019-04-04 NOTE — TELEPHONE ENCOUNTER
RECEIVED MESSAGE INTO TRIAGE FROM RAMÍREZ,  SHE IS NOT VERY HAPPY THAT SHE WAS IN FOR MD EXAM 04/03/19 AND HER XANAX WERE NOT FILLED. \"THIS IS NOT EASY AND IT IS GETTING HARDER EVERY DAY. I HAVE A FEW PILLS LEFT\"  LAST SCRIPT WRITTEN 03/01/19. PENDED TO MD FOR REVIEW.

## 2019-04-05 RX ORDER — ALPRAZOLAM 0.25 MG/1
0.25 TABLET ORAL 3 TIMES DAILY PRN
Qty: 90 TABLET | Refills: 0 | Status: SHIPPED | OUTPATIENT
Start: 2019-04-05 | End: 2019-05-15 | Stop reason: SDUPTHER

## 2019-05-15 ENCOUNTER — TELEPHONE (OUTPATIENT)
Dept: ONCOLOGY | Age: 64
End: 2019-05-15

## 2019-05-15 ENCOUNTER — OFFICE VISIT (OUTPATIENT)
Dept: ONCOLOGY | Age: 64
End: 2019-05-15
Payer: COMMERCIAL

## 2019-05-15 ENCOUNTER — HOSPITAL ENCOUNTER (OUTPATIENT)
Dept: INFUSION THERAPY | Facility: MEDICAL CENTER | Age: 64
Discharge: HOME | End: 2019-05-15
Payer: COMMERCIAL

## 2019-05-15 VITALS
SYSTOLIC BLOOD PRESSURE: 119 MMHG | DIASTOLIC BLOOD PRESSURE: 71 MMHG | HEART RATE: 91 BPM | BODY MASS INDEX: 34.34 KG/M2 | WEIGHT: 170 LBS

## 2019-05-15 DIAGNOSIS — C34.92 NON-SMALL CELL CANCER OF LEFT LUNG (HCC): ICD-10-CM

## 2019-05-15 DIAGNOSIS — C34.92 NON-SMALL CELL CANCER OF LEFT LUNG (HCC): Primary | ICD-10-CM

## 2019-05-15 LAB
ABSOLUTE EOS #: 0.04 K/UL (ref 0–0.44)
ABSOLUTE IMMATURE GRANULOCYTE: 0.02 K/UL (ref 0–0.3)
ABSOLUTE LYMPH #: 0.72 K/UL (ref 1.1–3.7)
ABSOLUTE MONO #: 0.41 K/UL (ref 0.1–1.2)
ALBUMIN SERPL-MCNC: 4.2 G/DL (ref 3.5–5.2)
ALBUMIN/GLOBULIN RATIO: ABNORMAL (ref 1–2.5)
ALP BLD-CCNC: 96 U/L (ref 35–104)
ALT SERPL-CCNC: 15 U/L (ref 5–33)
ANION GAP SERPL CALCULATED.3IONS-SCNC: 14 MMOL/L (ref 9–17)
AST SERPL-CCNC: 17 U/L
BASOPHILS # BLD: 1 % (ref 0–2)
BASOPHILS ABSOLUTE: <0.03 K/UL (ref 0–0.2)
BILIRUB SERPL-MCNC: 0.31 MG/DL (ref 0.3–1.2)
BUN BLDV-MCNC: 11 MG/DL (ref 8–23)
BUN/CREAT BLD: 21 (ref 9–20)
CALCIUM SERPL-MCNC: 9.2 MG/DL (ref 8.6–10.4)
CHLORIDE BLD-SCNC: 100 MMOL/L (ref 98–107)
CO2: 24 MMOL/L (ref 20–31)
CREAT SERPL-MCNC: 0.52 MG/DL (ref 0.5–0.9)
DIFFERENTIAL TYPE: ABNORMAL
EOSINOPHILS RELATIVE PERCENT: 1 % (ref 1–4)
GFR AFRICAN AMERICAN: >60 ML/MIN
GFR NON-AFRICAN AMERICAN: >60 ML/MIN
GFR SERPL CREATININE-BSD FRML MDRD: ABNORMAL ML/MIN/{1.73_M2}
GFR SERPL CREATININE-BSD FRML MDRD: ABNORMAL ML/MIN/{1.73_M2}
GLUCOSE BLD-MCNC: 111 MG/DL (ref 70–99)
HCT VFR BLD CALC: 35 % (ref 36.3–47.1)
HEMOGLOBIN: 11.6 G/DL (ref 11.9–15.1)
IMMATURE GRANULOCYTES: 1 %
LYMPHOCYTES # BLD: 17 % (ref 24–43)
MCH RBC QN AUTO: 30.9 PG (ref 25.2–33.5)
MCHC RBC AUTO-ENTMCNC: 33.1 G/DL (ref 28.4–34.8)
MCV RBC AUTO: 93.1 FL (ref 82.6–102.9)
MONOCYTES # BLD: 10 % (ref 3–12)
NRBC AUTOMATED: 0 PER 100 WBC
PDW BLD-RTO: 11.9 % (ref 11.8–14.4)
PLATELET # BLD: 246 K/UL (ref 138–453)
PLATELET ESTIMATE: ABNORMAL
PMV BLD AUTO: 9.8 FL (ref 8.1–13.5)
POTASSIUM SERPL-SCNC: 3.5 MMOL/L (ref 3.7–5.3)
RBC # BLD: 3.76 M/UL (ref 3.95–5.11)
RBC # BLD: ABNORMAL 10*6/UL
SEG NEUTROPHILS: 70 % (ref 36–65)
SEGMENTED NEUTROPHILS ABSOLUTE COUNT: 3.02 K/UL (ref 1.5–8.1)
SODIUM BLD-SCNC: 138 MMOL/L (ref 135–144)
TOTAL PROTEIN: 7.1 G/DL (ref 6.4–8.3)
WBC # BLD: 4.2 K/UL (ref 3.5–11.3)
WBC # BLD: ABNORMAL 10*3/UL

## 2019-05-15 PROCEDURE — 6360000002 HC RX W HCPCS: Performed by: INTERNAL MEDICINE

## 2019-05-15 PROCEDURE — 99211 OFF/OP EST MAY X REQ PHY/QHP: CPT

## 2019-05-15 PROCEDURE — 99211 OFF/OP EST MAY X REQ PHY/QHP: CPT | Performed by: INTERNAL MEDICINE

## 2019-05-15 PROCEDURE — 80053 COMPREHEN METABOLIC PANEL: CPT

## 2019-05-15 PROCEDURE — 3017F COLORECTAL CA SCREEN DOC REV: CPT | Performed by: INTERNAL MEDICINE

## 2019-05-15 PROCEDURE — G8417 CALC BMI ABV UP PARAM F/U: HCPCS | Performed by: INTERNAL MEDICINE

## 2019-05-15 PROCEDURE — 85025 COMPLETE CBC W/AUTO DIFF WBC: CPT

## 2019-05-15 PROCEDURE — 36591 DRAW BLOOD OFF VENOUS DEVICE: CPT

## 2019-05-15 PROCEDURE — 99214 OFFICE O/P EST MOD 30 MIN: CPT | Performed by: INTERNAL MEDICINE

## 2019-05-15 PROCEDURE — 2580000003 HC RX 258: Performed by: INTERNAL MEDICINE

## 2019-05-15 PROCEDURE — G0463 HOSPITAL OUTPT CLINIC VISIT: HCPCS

## 2019-05-15 PROCEDURE — G8427 DOCREV CUR MEDS BY ELIG CLIN: HCPCS | Performed by: INTERNAL MEDICINE

## 2019-05-15 PROCEDURE — G0463 HOSPITAL OUTPT CLINIC VISIT: HCPCS | Performed by: INTERNAL MEDICINE

## 2019-05-15 PROCEDURE — 1036F TOBACCO NON-USER: CPT | Performed by: INTERNAL MEDICINE

## 2019-05-15 RX ORDER — SODIUM CHLORIDE 0.9 % (FLUSH) 0.9 %
20 SYRINGE (ML) INJECTION PRN
Status: CANCELLED | OUTPATIENT
Start: 2019-05-15

## 2019-05-15 RX ORDER — SODIUM CHLORIDE 0.9 % (FLUSH) 0.9 %
10 SYRINGE (ML) INJECTION PRN
Status: CANCELLED | OUTPATIENT
Start: 2019-05-15

## 2019-05-15 RX ORDER — HEPARIN SODIUM (PORCINE) LOCK FLUSH IV SOLN 100 UNIT/ML 100 UNIT/ML
500 SOLUTION INTRAVENOUS PRN
Status: CANCELLED | OUTPATIENT
Start: 2019-05-15

## 2019-05-15 RX ORDER — SODIUM CHLORIDE 0.9 % (FLUSH) 0.9 %
20 SYRINGE (ML) INJECTION PRN
Status: DISCONTINUED | OUTPATIENT
Start: 2019-05-15 | End: 2019-05-16 | Stop reason: HOSPADM

## 2019-05-15 RX ORDER — ALBUTEROL SULFATE 90 UG/1
2 AEROSOL, METERED RESPIRATORY (INHALATION) 4 TIMES DAILY
Qty: 1 INHALER | Refills: 0 | Status: SHIPPED | OUTPATIENT
Start: 2019-05-15 | End: 2019-06-26 | Stop reason: SDUPTHER

## 2019-05-15 RX ORDER — ALPRAZOLAM 0.25 MG/1
0.25 TABLET ORAL 3 TIMES DAILY PRN
Qty: 90 TABLET | Refills: 0 | Status: SHIPPED | OUTPATIENT
Start: 2019-05-15 | End: 2019-06-26 | Stop reason: SDUPTHER

## 2019-05-15 RX ORDER — HEPARIN SODIUM (PORCINE) LOCK FLUSH IV SOLN 100 UNIT/ML 100 UNIT/ML
500 SOLUTION INTRAVENOUS PRN
Status: DISCONTINUED | OUTPATIENT
Start: 2019-05-15 | End: 2019-05-16 | Stop reason: HOSPADM

## 2019-05-15 RX ADMIN — Medication 500 UNITS: at 13:31

## 2019-05-15 RX ADMIN — Medication 20 ML: at 13:31

## 2019-05-15 NOTE — PROGRESS NOTES
Patient ID: Buddy Esparza, 1955, [id number], 66 y.o. Diagnosis:   Left upper lobe invasive lung adenocarcinoma, Status post lobectomy 9/28/18, Pathologic stage: pT1c, pN2, stage IIIa R1 with positive margin,    Will start chemotherapy followed By RT  Carbo taxol cycle 1 on 11/14/18  RT completed on 3/29/19  HISTORY OF PRESENT ILLNESS:    Oncologic History:  The patient is a 66 y.o.  female who is admitted to the hospital for Left upper lobe mass. Pt has a significant past medical history of Uterine fibroids requiring total abdominal hysterectomy, liver hemangioma, HTN, Hyperlipidemia, DVT Left leg, COPD, and anxiety. Pt was found to have a left upper lung adenocarcinoma and presented to the hospital on 9/28/2018 for a scheduled robotic-assisted left upper lobe lobectomy. Pathology is positive for metastatic adenocarcinoma. There are some lymph nodes positive and some evidence of invasion to surrounding structures seen during surgery. Surgical team is planning on discharging patient this evening from the hospital.  She was discharged from the hospital with plan to follow with oncology as outpatient. Interval history:   Patient is returning for follow-up visit. She is here to review labs and further recommendations. She denied any new chest pain, shortness of breath or fever chills. She is eating and drinking well. No tingling or numbness. During this visit patient's allergy, social, medical, surgical history and medications were reviewed and updated.      Current Outpatient Medications   Medication Sig Dispense Refill    albuterol sulfate HFA (VENTOLIN HFA) 108 (90 Base) MCG/ACT inhaler Inhale 2 puffs into the lungs 4 times daily 1 Inhaler 0    aspirin 81 MG chewable tablet Take 81 mg by mouth daily      docusate (COLACE, DULCOLAX) 100 MG CAPS Take 100 mg by mouth 2 times daily 60 capsule 5    lisinopril-hydrochlorothiazide (PRINZIDE;ZESTORETIC) 10-12.5 MG per tablet Take 1 tablet by mouth daily 30 tablet 0    prochlorperazine (COMPAZINE) 10 MG tablet Take 1 tablet by mouth every 6 hours as needed (nausea / vomiting) 120 tablet 3    ondansetron (ZOFRAN) 4 MG tablet Take 1 tablet by mouth every 8 hours as needed for Nausea or Vomiting 60 tablet 3    lidocaine-prilocaine (EMLA) 2.5-2.5 % cream Apply topically as needed prior to mediport access. Do not apply more than twice per day 1 Tube 3    dexamethasone (DECADRON) 4 MG tablet Take 5 tablets by mouth as needed (pre Taxol) Take 20 mg or 5 tablets orally 12 hours and repeat 6 hours before each Taxol infusion. 40 tablet 0    lovastatin (MEVACOR) 10 MG tablet Take 10 mg by mouth nightly       No current facility-administered medications for this visit.       Facility-Administered Medications Ordered in Other Visits   Medication Dose Route Frequency Provider Last Rate Last Dose    sodium chloride flush 0.9 % injection 20 mL  20 mL Intravenous PRN Evelyn Barnett MD   20 mL at 05/15/19 1331    heparin flush 100 UNIT/ML injection 500 Units  500 Units Intercatheter PRN Evelyn Barnett MD   500 Units at 05/15/19 1331       Social History     Socioeconomic History    Marital status:      Spouse name: Not on file    Number of children: Not on file    Years of education: Not on file    Highest education level: Not on file   Occupational History    Not on file   Social Needs    Financial resource strain: Not on file    Food insecurity:     Worry: Not on file     Inability: Not on file    Transportation needs:     Medical: Not on file     Non-medical: Not on file   Tobacco Use    Smoking status: Former Smoker     Packs/day: 1.50     Years: 30.00     Pack years: 45.00     Types: Cigarettes     Last attempt to quit: 2000     Years since quittin.3    Smokeless tobacco: Never Used   Substance and Sexual Activity    Alcohol use: Yes     Comment: Occassionally    Drug use: No    Sexual activity: Not on file   Lifestyle    Physical activity:     Days per week: Not on file     Minutes per session: Not on file    Stress: Not on file   Relationships    Social connections:     Talks on phone: Not on file     Gets together: Not on file     Attends Anabaptist service: Not on file     Active member of club or organization: Not on file     Attends meetings of clubs or organizations: Not on file     Relationship status: Not on file    Intimate partner violence:     Fear of current or ex partner: Not on file     Emotionally abused: Not on file     Physically abused: Not on file     Forced sexual activity: Not on file   Other Topics Concern    Not on file   Social History Narrative    Not on file       Family History   Problem Relation Age of Onset    Cancer Mother         LUNG    Cancer Sister         LUNG        REVIEW OF SYSTEM:     Constitutional: No fever or chills. No night sweats, no weight loss   Eyes: No eye discharge, double vision, or eye pain   HEENT: negative for sore mouth, sore throat, hoarseness and voice change   Respiratory: negative for cough , sputum, dyspnea, wheezing, hemoptysis, chest pain   Cardiovascular: negative for chest pain, dyspnea, palpitations, orthopnea, PND   Gastrointestinal: negative for nausea, vomiting, diarrhea, constipation, abdominal pain, Dysphagia, hematemesis and hematochezia   Genitourinary: negative for frequency, dysuria, nocturia, urinary incontinence, and hematuria   Integument: negative for rash, skin lesions, bruises.    Hematologic/Lymphatic: negative for easy bruising, bleeding, lymphadenopathy, petechiae and swelling/edema   Endocrine: negative for heat or cold intolerance, tremor, weight changes, change in bowel habits and hair loss   Musculoskeletal: negative for myalgias, arthralgias, pain, joint swelling,and bone pain   Neurological: negative for headaches, dizziness, seizures, weakness, numbness       OBJECTIVE:         Vitals:    05/15/19 1320   BP: 119/71   Pulse: 91   PHYSICAL EXAM:   General appearance - well appearing, no in pain or distress   Mental status - alert and cooperative   Eyes - pupils equal and reactive, extraocular eye movements intact   Ears - bilateral TM's and external ear canals normal   Mouth - mucous membranes moist, pharynx normal without lesions   Neck - supple, no significant adenopathy   Lymphatics - no palpable lymphadenopathy, no hepatosplenomegaly   Chest - clear to auscultation, no wheezes, rales or rhonchi, symmetric air entry   Left chest surgical scar healing well  Heart - normal rate, regular rhythm, normal S1, S2, no murmurs, rubs, clicks or gallops   Abdomen - soft, nontender, nondistended, no masses or organomegaly   Neurological - alert, oriented, normal speech, no focal findings or movement disorder noted   Musculoskeletal - no joint tenderness, deformity or swelling   Extremities - peripheral pulses normal, no pedal edema, no clubbing or cyanosis   Skin - normal coloration and turgor, no rashes, no suspicious skin lesions noted ,  LABORATORY DATA:     Lab Results   Component Value Date    WBC 4.2 05/15/2019    HGB 11.6 (L) 05/15/2019    HCT 35.0 (L) 05/15/2019    MCV 93.1 05/15/2019     05/15/2019    LYMPHOPCT 17 (L) 05/15/2019    RBC 3.76 (L) 05/15/2019    MCH 30.9 05/15/2019    MCHC 33.1 05/15/2019    RDW 11.9 05/15/2019    MONOPCT 10 05/15/2019    BASOPCT 1 05/15/2019    NEUTROABS 3.02 05/15/2019    LYMPHSABS 0.72 (L) 05/15/2019    MONOSABS 0.41 05/15/2019    EOSABS 0.04 05/15/2019    BASOSABS <0.03 05/15/2019       Chemistry        Component Value Date/Time     04/03/2019 1415    K 3.5 (L) 04/03/2019 1415     04/03/2019 1415    CO2 27 04/03/2019 1415    BUN 10 04/03/2019 1415    CREATININE 0.48 (L) 04/03/2019 1415        Component Value Date/Time    CALCIUM 9.0 04/03/2019 1415    ALKPHOS 63 04/03/2019 1415    AST 15 04/03/2019 1415    ALT 13 04/03/2019 1415    BILITOT 0.19 (L) 04/03/2019 1415        PATHOLOGY DATA: Pathology:  CT Guided Biopsy ([facility]) 8/22/18:   LEFT LUNG, UPPER LOBE, CT GUIDED CORE NEEDLE BIOPSIES:  - INVASIVE ADENOCARCINOMA, CONSISTENT WITH LUNG PRIMARY. Collected: 9/28/2018   Received: 10/1/2018   Reported: 10/2/2018 17:47     -- Diagnosis --   1.  LYMPH NODE, LEVEL 9, BIOPSY:       -  ONE LYMPH NODE, NEGATIVE FOR MALIGNANCY (0/1). 2.  LYMPH NODE, LEVEL 11, DISSECTION:       -  ONE OF 2 LYMPH NODES POSITIVE FOR METASTATIC CARCINOMA (1/2). 3.  LYMPH NODE, LEVEL 5, DISSECTION:       -  ONE OF 2 LYMPH NODES POSITIVE FOR METASTATIC CARCINOMA (1/2).     -  CARCINOMA INVADES LARGE PERIPHERAL NERVE BRANCHES. 4.  LUNG, LEFT UPPER LOBE, LOBECTOMY:            -  WELL-DIFFERENTIATED INVASIVE ADENOCARCINOMA, 2.9 CM   GREATEST DIMENSION.     -  NEGATIVE FOR VISCERAL PLEURAL INVASION.          -  TUMOR INVOLVES SOFT TISSUE OF BRONCHIAL, VASCULAR AND PARENCHYMAL MARGINS.     -  5 PERIBRONCHIOLAR LYMPH NODES, POSITIVE FOR METASTATIC   ADENOCARCINOMA (5/5).     -  SEPARATE SMALL INTRALOBAR FOCUS ATYPICAL ADENOMATOUS   HYPERPLASIA.           -  PATHOLOGIC STAGE:  pT1c, pN2, R1.     5.  LYMPH NODES, LEVEL 10, DISSECTION:       -  ONE OF 2 LYMPH NODES POSITIVE FOR METASTATIC CARCINOMA (1/2). IMAGING DATA:    PET/CT 7/27/18:  [address]  Result Impression   1. Re- demonstration of irregular spiculated 2.1 cm nodule in the lingula  which is FDG avid most consistent with primary lung malignancy. 2. Anterior left hilar 8 mm lymph node which is mildly FDG avid suggesting  metastatic disease. 3. Multiple incidental/chronic findings as above including re- demonstration  of right hepatic lobe hemangioma and cyst.   . CCT ([facility]) 7/19/18  *A 2.6 x 2.1 cm spiculated nodule is identified involving the lingula  correlating to the abnormality seen on chest x-ray.  Malignancy is suspected  and further evaluation with tissue sampling and/or PET CT is recommended.   *Partially calcified subpleural

## 2019-06-07 ENCOUNTER — TELEPHONE (OUTPATIENT)
Dept: RADIATION ONCOLOGY | Facility: MEDICAL CENTER | Age: 64
End: 2019-06-07

## 2019-06-17 ENCOUNTER — HOSPITAL ENCOUNTER (OUTPATIENT)
Dept: CT IMAGING | Age: 64
Discharge: HOME | End: 2019-06-19
Payer: COMMERCIAL

## 2019-06-17 DIAGNOSIS — C34.92 NON-SMALL CELL CANCER OF LEFT LUNG (HCC): ICD-10-CM

## 2019-06-17 LAB
CREAT SERPL-MCNC: 0.59 MG/DL (ref 0.5–0.9)
GFR AFRICAN AMERICAN: >60 ML/MIN
GFR NON-AFRICAN AMERICAN: >60 ML/MIN
GFR SERPL CREATININE-BSD FRML MDRD: NORMAL ML/MIN/{1.73_M2}
GFR SERPL CREATININE-BSD FRML MDRD: NORMAL ML/MIN/{1.73_M2}

## 2019-06-17 PROCEDURE — 71260 CT THORAX DX C+: CPT

## 2019-06-17 PROCEDURE — 2580000003 HC RX 258: Performed by: INTERNAL MEDICINE

## 2019-06-17 PROCEDURE — 82565 ASSAY OF CREATININE: CPT

## 2019-06-17 PROCEDURE — 74177 CT ABD & PELVIS W/CONTRAST: CPT

## 2019-06-17 PROCEDURE — 36415 COLL VENOUS BLD VENIPUNCTURE: CPT

## 2019-06-17 PROCEDURE — 6360000004 HC RX CONTRAST MEDICATION: Performed by: INTERNAL MEDICINE

## 2019-06-17 RX ORDER — SODIUM CHLORIDE 0.9 % (FLUSH) 0.9 %
10 SYRINGE (ML) INJECTION PRN
Status: DISCONTINUED | OUTPATIENT
Start: 2019-06-17 | End: 2019-06-20 | Stop reason: HOSPADM

## 2019-06-17 RX ORDER — 0.9 % SODIUM CHLORIDE 0.9 %
80 INTRAVENOUS SOLUTION INTRAVENOUS ONCE
Status: COMPLETED | OUTPATIENT
Start: 2019-06-17 | End: 2019-06-17

## 2019-06-17 RX ADMIN — Medication 10 ML: at 11:15

## 2019-06-17 RX ADMIN — IOHEXOL 50 ML: 300 INJECTION, SOLUTION INTRAVENOUS at 11:14

## 2019-06-17 RX ADMIN — IOPAMIDOL 100 ML: 755 INJECTION, SOLUTION INTRAVENOUS at 11:14

## 2019-06-17 RX ADMIN — SODIUM CHLORIDE 80 ML: 9 INJECTION, SOLUTION INTRAVENOUS at 11:15

## 2019-06-24 ENCOUNTER — HOSPITAL ENCOUNTER (OUTPATIENT)
Facility: MEDICAL CENTER | Age: 64
End: 2019-06-24
Payer: COMMERCIAL

## 2019-06-26 ENCOUNTER — TELEPHONE (OUTPATIENT)
Dept: INFUSION THERAPY | Facility: MEDICAL CENTER | Age: 64
End: 2019-06-26

## 2019-06-26 ENCOUNTER — OFFICE VISIT (OUTPATIENT)
Dept: ONCOLOGY | Age: 64
End: 2019-06-26
Payer: COMMERCIAL

## 2019-06-26 VITALS
HEART RATE: 77 BPM | DIASTOLIC BLOOD PRESSURE: 70 MMHG | RESPIRATION RATE: 18 BRPM | SYSTOLIC BLOOD PRESSURE: 115 MMHG | WEIGHT: 170.1 LBS | BODY MASS INDEX: 34.36 KG/M2 | TEMPERATURE: 97.8 F

## 2019-06-26 DIAGNOSIS — C34.92 NON-SMALL CELL CANCER OF LEFT LUNG (HCC): ICD-10-CM

## 2019-06-26 PROCEDURE — 3017F COLORECTAL CA SCREEN DOC REV: CPT | Performed by: INTERNAL MEDICINE

## 2019-06-26 PROCEDURE — 99212 OFFICE O/P EST SF 10 MIN: CPT | Performed by: INTERNAL MEDICINE

## 2019-06-26 PROCEDURE — 99214 OFFICE O/P EST MOD 30 MIN: CPT | Performed by: INTERNAL MEDICINE

## 2019-06-26 PROCEDURE — G8417 CALC BMI ABV UP PARAM F/U: HCPCS | Performed by: INTERNAL MEDICINE

## 2019-06-26 PROCEDURE — G8427 DOCREV CUR MEDS BY ELIG CLIN: HCPCS | Performed by: INTERNAL MEDICINE

## 2019-06-26 PROCEDURE — 1036F TOBACCO NON-USER: CPT | Performed by: INTERNAL MEDICINE

## 2019-06-26 RX ORDER — ALPRAZOLAM 0.25 MG/1
0.25 TABLET ORAL 3 TIMES DAILY PRN
Qty: 90 TABLET | Refills: 0 | Status: SHIPPED | OUTPATIENT
Start: 2019-06-26 | End: 2019-07-26

## 2019-06-26 RX ORDER — ALBUTEROL SULFATE 90 UG/1
2 AEROSOL, METERED RESPIRATORY (INHALATION) 4 TIMES DAILY
Qty: 1 INHALER | Refills: 0 | Status: SHIPPED | OUTPATIENT
Start: 2019-06-26 | End: 2019-08-09 | Stop reason: SDUPTHER

## 2019-06-26 NOTE — PROGRESS NOTES
1 tablet by mouth daily 30 tablet 0    prochlorperazine (COMPAZINE) 10 MG tablet Take 1 tablet by mouth every 6 hours as needed (nausea / vomiting) 120 tablet 3    ondansetron (ZOFRAN) 4 MG tablet Take 1 tablet by mouth every 8 hours as needed for Nausea or Vomiting 60 tablet 3    lidocaine-prilocaine (EMLA) 2.5-2.5 % cream Apply topically as needed prior to mediport access. Do not apply more than twice per day 1 Tube 3    lovastatin (MEVACOR) 10 MG tablet Take 10 mg by mouth nightly       No current facility-administered medications for this visit.         Social History     Socioeconomic History    Marital status:      Spouse name: Not on file    Number of children: Not on file    Years of education: Not on file    Highest education level: Not on file   Occupational History    Not on file   Social Needs    Financial resource strain: Not on file    Food insecurity:     Worry: Not on file     Inability: Not on file    Transportation needs:     Medical: Not on file     Non-medical: Not on file   Tobacco Use    Smoking status: Former Smoker     Packs/day: 1.50     Years: 30.00     Pack years: 45.00     Types: Cigarettes     Last attempt to quit: 2000     Years since quittin.4    Smokeless tobacco: Never Used   Substance and Sexual Activity    Alcohol use: Yes     Comment: Occassionally    Drug use: No    Sexual activity: Not on file   Lifestyle    Physical activity:     Days per week: Not on file     Minutes per session: Not on file    Stress: Not on file   Relationships    Social connections:     Talks on phone: Not on file     Gets together: Not on file     Attends Sabianist service: Not on file     Active member of club or organization: Not on file     Attends meetings of clubs or organizations: Not on file     Relationship status: Not on file    Intimate partner violence:     Fear of current or ex partner: Not on file     Emotionally abused: Not on file     Physically abused: Not on file     Forced sexual activity: Not on file   Other Topics Concern    Not on file   Social History Narrative    Not on file       Family History   Problem Relation Age of Onset    Cancer Mother         LUNG    Cancer Sister         LUNG        REVIEW OF SYSTEM:     Constitutional: No fever or chills. No night sweats, no weight loss   Eyes: No eye discharge, double vision, or eye pain   HEENT: negative for sore mouth, sore throat, hoarseness and voice change   Respiratory: negative for cough , sputum, dyspnea, wheezing, hemoptysis, chest pain   Cardiovascular: negative for chest pain, dyspnea, palpitations, orthopnea, PND   Gastrointestinal: negative for nausea, vomiting, diarrhea, constipation, abdominal pain, Dysphagia, hematemesis and hematochezia   Genitourinary: negative for frequency, dysuria, nocturia, urinary incontinence, and hematuria   Integument: negative for rash, skin lesions, bruises.    Hematologic/Lymphatic: negative for easy bruising, bleeding, lymphadenopathy, petechiae and swelling/edema   Endocrine: negative for heat or cold intolerance, tremor, weight changes, change in bowel habits and hair loss   Musculoskeletal: negative for myalgias, arthralgias, pain, joint swelling,and bone pain   Neurological: negative for headaches, dizziness, seizures, weakness, numbness       OBJECTIVE:         Vitals:    06/26/19 0952   BP: 115/70   Pulse: 77   Resp: 18   Temp: 97.8 °F (36.6 °C)   PHYSICAL EXAM:   General appearance - well appearing, no in pain or distress   Mental status - alert and cooperative   Eyes - pupils equal and reactive, extraocular eye movements intact   Ears - bilateral TM's and external ear canals normal   Mouth - mucous membranes moist, pharynx normal without lesions   Neck - supple, no significant adenopathy   Lymphatics - no palpable lymphadenopathy, no hepatosplenomegaly   Chest - clear to auscultation, no wheezes, rales or rhonchi, symmetric air entry   Left chest surgical scar healing well  Heart - normal rate, regular rhythm, normal S1, S2, no murmurs, rubs, clicks or gallops   Abdomen - soft, nontender, nondistended, no masses or organomegaly   Neurological - alert, oriented, normal speech, no focal findings or movement disorder noted   Musculoskeletal - no joint tenderness, deformity or swelling   Extremities - peripheral pulses normal, no pedal edema, no clubbing or cyanosis   Skin - normal coloration and turgor, no rashes, no suspicious skin lesions noted ,  LABORATORY DATA:     Lab Results   Component Value Date    WBC 4.2 05/15/2019    HGB 11.6 (L) 05/15/2019    HCT 35.0 (L) 05/15/2019    MCV 93.1 05/15/2019     05/15/2019    LYMPHOPCT 17 (L) 05/15/2019    RBC 3.76 (L) 05/15/2019    MCH 30.9 05/15/2019    MCHC 33.1 05/15/2019    RDW 11.9 05/15/2019    MONOPCT 10 05/15/2019    BASOPCT 1 05/15/2019    NEUTROABS 3.02 05/15/2019    LYMPHSABS 0.72 (L) 05/15/2019    MONOSABS 0.41 05/15/2019    EOSABS 0.04 05/15/2019    BASOSABS <0.03 05/15/2019       Chemistry        Component Value Date/Time     05/15/2019 1330    K 3.5 (L) 05/15/2019 1330     05/15/2019 1330    CO2 24 05/15/2019 1330    BUN 11 05/15/2019 1330    CREATININE 0.59 06/17/2019 1005        Component Value Date/Time    CALCIUM 9.2 05/15/2019 1330    ALKPHOS 96 05/15/2019 1330    AST 17 05/15/2019 1330    ALT 15 05/15/2019 1330    BILITOT 0.31 05/15/2019 1330        PATHOLOGY DATA:   Pathology:  CT Guided Biopsy ([facility]) 8/22/18:   LEFT LUNG, UPPER LOBE, CT GUIDED CORE NEEDLE BIOPSIES:  - INVASIVE ADENOCARCINOMA, CONSISTENT WITH LUNG PRIMARY. Collected: 9/28/2018   Received: 10/1/2018   Reported: 10/2/2018 17:47     -- Diagnosis --   1.  LYMPH NODE, LEVEL 9, BIOPSY:       -  ONE LYMPH NODE, NEGATIVE FOR MALIGNANCY (0/1). 2.  LYMPH NODE, LEVEL 11, DISSECTION:       -  ONE OF 2 LYMPH NODES POSITIVE FOR METASTATIC CARCINOMA (1/2).      3.  LYMPH NODE, LEVEL 5, DISSECTION:       -  ONE OF 2 LYMPH NODES POSITIVE FOR METASTATIC CARCINOMA (1/2).     -  CARCINOMA INVADES LARGE PERIPHERAL NERVE BRANCHES. 4.  LUNG, LEFT UPPER LOBE, LOBECTOMY:            -  WELL-DIFFERENTIATED INVASIVE ADENOCARCINOMA, 2.9 CM   GREATEST DIMENSION.     -  NEGATIVE FOR VISCERAL PLEURAL INVASION.          -  TUMOR INVOLVES SOFT TISSUE OF BRONCHIAL, VASCULAR AND PARENCHYMAL MARGINS.     -  5 PERIBRONCHIOLAR LYMPH NODES, POSITIVE FOR METASTATIC   ADENOCARCINOMA (5/5).     -  SEPARATE SMALL INTRALOBAR FOCUS ATYPICAL ADENOMATOUS   HYPERPLASIA.           -  PATHOLOGIC STAGE:  pT1c, pN2, R1.     5.  LYMPH NODES, LEVEL 10, DISSECTION:       -  ONE OF 2 LYMPH NODES POSITIVE FOR METASTATIC CARCINOMA (1/2). IMAGING DATA:    CT chest abd pelvis 6/17/19  Impression   1. Status post left upper lobectomy.  Interval development of patchy   consolidative extensive parenchymal densities in the central portion of the   residual left lower lobe extending cephalad into the lung base with some   peripheral ground-glass areas. Zach Osier is underlying bronchiolectasis. Findings may represent radiation fibrosis/pneumonitis with underlying   traction bronchiectasis in the appropriate clinical setting.  Component of   underlying disease would be difficult to exclude on the basis of this study. Close follow-up recommended with repeat exam in 3 months. 2. Near complete resolution of previously noted left pleural effusion and   central rim enhancing 4 cm component. 3. Hepatic benign hypodensities representing hemangioma and cysts again noted.       RECOMMENDATIONS:   Follow-up chest CT in 3 months to re-evaluate new left lung findings   described above. ASSESSMENT:    Gil Crowe Is a very pleasant 47-year-old  female with newly diagnosed left upper lobe non-small cell lung cancer, adenocarcinoma, status post Robotically assisted BARAK lobectomy with LN dissection and Intercostal nerve block with experal on 9/28/18.    I discussed the surgical pathology, diagnosis, prognosis and treatment options with patient. She has [id number] disease, stage IIIA, she had R1 disease with positive margins. I discussed the NCCN guidelines with patient. She completed chemotherapy and has completed RT as well. CT scans showed no evidence of recurrence. During today's visit, the patient and the family had a number of reasonable questions which were answered to their satisfaction. They verbalized understanding of the information provided and they agreed to proceed as outlined above. PLAN:   I reviewed recent CT scans. No e/o recurrence  CT chest showing development of patchy consolidative changes possible radiation fibrosis or pneumonitis. Will get CT chest in 3 months  Return to clinic in 3 months with CT chest    Jose Queen MD  Hematologist/Medical Oncologist    I spent more than 40 minutes examining, evaluating, reviewing data and counseling the patient. Greater than 50% of that time was spent face-to-face with the patient in counseling and coordinating her care. This note is created with the assistance of a speech recognition program.  While intending to generate a document that actually reflects the content of the visit, the document can still have some errors including those of syntax and sound a like substitutions which may escape proof reading. It such instances, actual meaning can be extrapolated by contextual diversion.

## 2019-07-08 ENCOUNTER — TELEPHONE (OUTPATIENT)
Dept: ONCOLOGY | Age: 64
End: 2019-07-08

## 2019-07-12 ENCOUNTER — HOSPITAL ENCOUNTER (OUTPATIENT)
Dept: INFUSION THERAPY | Facility: MEDICAL CENTER | Age: 64
Discharge: HOME | End: 2019-07-12
Payer: COMMERCIAL

## 2019-07-12 VITALS
SYSTOLIC BLOOD PRESSURE: 117 MMHG | HEART RATE: 78 BPM | TEMPERATURE: 98.2 F | RESPIRATION RATE: 18 BRPM | DIASTOLIC BLOOD PRESSURE: 66 MMHG

## 2019-07-12 DIAGNOSIS — C34.92 NON-SMALL CELL CANCER OF LEFT LUNG (HCC): Primary | ICD-10-CM

## 2019-07-12 PROCEDURE — 2580000003 HC RX 258: Performed by: INTERNAL MEDICINE

## 2019-07-12 PROCEDURE — 96523 IRRIG DRUG DELIVERY DEVICE: CPT

## 2019-07-12 PROCEDURE — 6360000002 HC RX W HCPCS: Performed by: INTERNAL MEDICINE

## 2019-07-12 RX ORDER — SODIUM CHLORIDE 0.9 % (FLUSH) 0.9 %
20 SYRINGE (ML) INJECTION PRN
Status: DISCONTINUED | OUTPATIENT
Start: 2019-07-12 | End: 2019-07-13 | Stop reason: HOSPADM

## 2019-07-12 RX ORDER — SODIUM CHLORIDE 0.9 % (FLUSH) 0.9 %
20 SYRINGE (ML) INJECTION PRN
Status: CANCELLED | OUTPATIENT
Start: 2019-07-12

## 2019-07-12 RX ORDER — SODIUM CHLORIDE 0.9 % (FLUSH) 0.9 %
10 SYRINGE (ML) INJECTION PRN
Status: CANCELLED | OUTPATIENT
Start: 2019-07-12

## 2019-07-12 RX ORDER — HEPARIN SODIUM (PORCINE) LOCK FLUSH IV SOLN 100 UNIT/ML 100 UNIT/ML
500 SOLUTION INTRAVENOUS PRN
Status: DISCONTINUED | OUTPATIENT
Start: 2019-07-12 | End: 2019-07-13 | Stop reason: HOSPADM

## 2019-07-12 RX ORDER — HEPARIN SODIUM (PORCINE) LOCK FLUSH IV SOLN 100 UNIT/ML 100 UNIT/ML
500 SOLUTION INTRAVENOUS PRN
Status: CANCELLED | OUTPATIENT
Start: 2019-07-12

## 2019-07-12 RX ADMIN — Medication 20 ML: at 10:08

## 2019-07-12 RX ADMIN — Medication 500 UNITS: at 10:08

## 2019-07-12 RX ADMIN — Medication 20 ML: at 10:01

## 2019-07-12 NOTE — PROGRESS NOTES
Pt arrives per ambulatory per self and tolerated port flush well and no problems. Pt given calendar with next appts and pt discharged per ambulatory per self.

## 2019-07-16 ENCOUNTER — OFFICE VISIT (OUTPATIENT)
Dept: FAMILY MEDICINE CLINIC | Age: 64
End: 2019-07-16
Payer: COMMERCIAL

## 2019-07-16 VITALS
OXYGEN SATURATION: 98 % | SYSTOLIC BLOOD PRESSURE: 130 MMHG | WEIGHT: 173.4 LBS | HEART RATE: 96 BPM | DIASTOLIC BLOOD PRESSURE: 78 MMHG | HEIGHT: 59 IN | BODY MASS INDEX: 34.96 KG/M2

## 2019-07-16 DIAGNOSIS — E78.5 HYPERLIPIDEMIA, UNSPECIFIED HYPERLIPIDEMIA TYPE: ICD-10-CM

## 2019-07-16 DIAGNOSIS — Z00.00 ROUTINE ADULT HEALTH MAINTENANCE: ICD-10-CM

## 2019-07-16 DIAGNOSIS — Z12.39 SCREENING FOR BREAST CANCER: ICD-10-CM

## 2019-07-16 DIAGNOSIS — C34.92 NON-SMALL CELL CANCER OF LEFT LUNG (HCC): ICD-10-CM

## 2019-07-16 DIAGNOSIS — I10 ESSENTIAL HYPERTENSION: Primary | ICD-10-CM

## 2019-07-16 PROCEDURE — 3017F COLORECTAL CA SCREEN DOC REV: CPT | Performed by: FAMILY MEDICINE

## 2019-07-16 PROCEDURE — 99203 OFFICE O/P NEW LOW 30 MIN: CPT | Performed by: FAMILY MEDICINE

## 2019-07-16 PROCEDURE — 1036F TOBACCO NON-USER: CPT | Performed by: FAMILY MEDICINE

## 2019-07-16 PROCEDURE — G8417 CALC BMI ABV UP PARAM F/U: HCPCS | Performed by: FAMILY MEDICINE

## 2019-07-16 PROCEDURE — G8427 DOCREV CUR MEDS BY ELIG CLIN: HCPCS | Performed by: FAMILY MEDICINE

## 2019-07-16 RX ORDER — LOVASTATIN 10 MG/1
10 TABLET ORAL NIGHTLY
Qty: 30 TABLET | Refills: 11 | Status: SHIPPED | OUTPATIENT
Start: 2019-07-16 | End: 2020-06-23 | Stop reason: SDUPTHER

## 2019-07-16 RX ORDER — LISINOPRIL AND HYDROCHLOROTHIAZIDE 12.5; 1 MG/1; MG/1
1 TABLET ORAL DAILY
Qty: 30 TABLET | Refills: 11 | Status: SHIPPED | OUTPATIENT
Start: 2019-07-16 | End: 2020-06-23 | Stop reason: SDUPTHER

## 2019-07-16 ASSESSMENT — PATIENT HEALTH QUESTIONNAIRE - PHQ9
SUM OF ALL RESPONSES TO PHQ9 QUESTIONS 1 & 2: 0
1. LITTLE INTEREST OR PLEASURE IN DOING THINGS: 0
SUM OF ALL RESPONSES TO PHQ QUESTIONS 1-9: 0
2. FEELING DOWN, DEPRESSED OR HOPELESS: 0
SUM OF ALL RESPONSES TO PHQ QUESTIONS 1-9: 0

## 2019-07-16 NOTE — PROGRESS NOTES
Subjective:      Patient ID: Efrain Sanchez is a 60 y.o. female. HPI     Here to establish care as a new patient    No new concerns today    Hx HTN, on lisinopril/HCTZ  Hx HLD on mevacor  Hx COPD  She notes a history of an 8 lb tumor (fibroid)  Hx Non-small cell cancer left lung  Hx Blood clot (DVT left leg)  S/p Hysterectomy  Colon March 18    The patient's medical history, surgical history, social history, family history and medications were reviewed    Review of Systems   Except as noted in HPI, ROS negative    Objective:   Physical Exam   Constitutional: She is oriented to person, place, and time. She appears well-developed and well-nourished. No distress. HENT:   Head: Normocephalic and atraumatic. Eyes: Conjunctivae are normal.   Cardiovascular: Normal rate, regular rhythm and normal heart sounds. No murmur heard. Pulmonary/Chest: Effort normal and breath sounds normal. She has no wheezes. Musculoskeletal: She exhibits no edema. Neurological: She is alert and oriented to person, place, and time. She exhibits normal muscle tone. Coordination normal.   Skin: Skin is warm and dry. Psychiatric: She has a normal mood and affect. Her behavior is normal. Judgment and thought content normal.   Vitals reviewed. Assessment:      1. Essential hypertension    2. Hyperlipidemia, unspecified hyperlipidemia type    3. Non-small cell cancer of left lung ([address]    4. Routine adult health maintenance    5.  Screening for breast cancer          Plan:      Well controlled, continue current medications  Check labs  Mammo  FU 6 mo or sooner PRN        Angie Wade, DO

## 2019-07-25 ENCOUNTER — HOSPITAL ENCOUNTER (OUTPATIENT)
Dept: MAMMOGRAPHY | Age: 64
Discharge: HOME | End: 2019-07-27
Payer: COMMERCIAL

## 2019-07-25 DIAGNOSIS — Z12.39 SCREENING FOR BREAST CANCER: ICD-10-CM

## 2019-07-25 PROCEDURE — 77063 BREAST TOMOSYNTHESIS BI: CPT

## 2019-08-01 ENCOUNTER — HOSPITAL ENCOUNTER (OUTPATIENT)
Dept: RADIATION ONCOLOGY | Facility: MEDICAL CENTER | Age: 64
Discharge: HOME | End: 2019-08-01
Attending: RADIOLOGY
Payer: COMMERCIAL

## 2019-08-01 VITALS
RESPIRATION RATE: 16 BRPM | OXYGEN SATURATION: 97 % | SYSTOLIC BLOOD PRESSURE: 131 MMHG | HEART RATE: 79 BPM | WEIGHT: 172.25 LBS | DIASTOLIC BLOOD PRESSURE: 75 MMHG | BODY MASS INDEX: 34.79 KG/M2 | TEMPERATURE: 97.7 F

## 2019-08-01 DIAGNOSIS — C34.12 MALIGNANT NEOPLASM OF BRONCHUS OF UPPER LOBE, LEFT (HCC): Primary | ICD-10-CM

## 2019-08-01 PROCEDURE — 99212 OFFICE O/P EST SF 10 MIN: CPT | Performed by: RADIOLOGY

## 2019-08-01 PROCEDURE — G0463 HOSPITAL OUTPT CLINIC VISIT: HCPCS | Performed by: RADIOLOGY

## 2019-08-01 RX ORDER — ALPRAZOLAM 0.25 MG/1
0.25 TABLET ORAL 3 TIMES DAILY PRN
COMMUNITY
End: 2019-08-09 | Stop reason: SDUPTHER

## 2019-08-01 NOTE — PROGRESS NOTES
Date of Service: 2019     Location:  [facility],   [address], [place], [address]   [address]    Patient ID:   Yulissa Levin  : 1955   MRN: [id number]    DIAGNOSIS:  Cancer Staging  Malignant neoplasm of bronchus of upper lobe, left [facility])  Staging form: Lung, AJCC 8th Edition  - Pathologic stage from 2018: Stage IIIA (pT1b, pN2, cM0) - Signed by Werner Bullock MD on 2019  Staging comments: Left upper lobe biopsy 2018 positive for invasive adenocarcinoma from a lung primary. Left upper lobectomy/mediastinal node dissection 2018 revealed 2.9 cm grade 1 adenocarcinoma with positive bronchial/vascular/parenchymal margins, 1/2 level 5 node positive, 5/5 peribronchial nodes positive, one level 9 node negative, 1/2 level 10 node positive, one level 11 node negative, no lymphovascular invasion. Non-small cell cancer of left lung [facility])  Staging form: Lung, AJCC 8th Edition  - Clinical stage from 10/10/2018: Stage IIIA (ycT1c, cN2, cM0) - Signed by Velasquez Gould MD on 10/10/2018      Patient Active Problem List   Diagnosis    Status post lobectomy of lung    Non-small cell cancer of left lung (HCC)    Malignant neoplasm of bronchus of upper lobe, [facility])         INTERVAL HISTORY:   The patient is a 60year-old female who is seen today in follow-up on 19. the patient has a diagnosis of stage IIIa, T1b N2 M0 adenocarcinoma of the left upper lobe. The patient was treated with left upper lobectomy and lymph node dissection on 18. The patient was found to have positive margins in the bronchial and vascular soft tissue regions. The patient also had mediastinal node involvement. She then received chemotherapy consisting of carboplatin and Taxol under the care of Dr. Marcelo Santoro.   After chemotherapy, she then received radiation therapy to the mediastinum and left upper lobe 3.5 - 11.3 k/uL Final     Hemoglobin   Date Value Ref Range Status   05/15/2019 11.6 (L) 11.9 - 15.1 g/dL Final     Platelets   Date Value Ref Range Status   05/15/2019 246 138 - 453 k/uL Final    No results found for: PSA    REVIEW OF SYSTEMS:  Otherwise negative      PHYSICAL EXAMINATION:    CHAPERONE: Declined    ECO Asymptomatic    VITAL SIGNS: /75   Pulse 79   Temp 97.7 °F (36.5 °C) (Oral)   Resp 16   Wt 172 lb 4 oz (78.1 kg)   SpO2 97%   BMI 34.79 kg/m²   Wt Readings from Last 3 Encounters:   19 172 lb 4 oz (78.1 kg)   19 173 lb 6.4 oz (78.7 kg)   19 170 lb 1.6 oz (77.2 kg)     EOMI. PERRLA. Examination of the oral cavity and oropharynx is unremarkable. There is no palpable cervical, supraclavicular, infraclavicular, axillary, inguinal, or femoral lymphadenopathy. S1 and S2 are present. No murmurs are heard. Regular, rate, and rhythm. The lungs are clear to auscultation. No wheezing or stridor is audible. The abdomen is soft and nontender. There are no palpable masses. There is no rebound tenderness or guarding noted. There is no palpable enlargement of the liver or spleen. No suprapubic tenderness is noted. No peripheral edema is noted. Alert and oriented ×3. Cranial nerves II through XII are intact. Strength and sensation are grossly intact. Gait is intact. Pain score: 0. Pain plan: None. ASSESSMENT AND PLAN:  The patient is a 60year-old female who has a diagnosis of lung cancer as noted above. She was treated with surgery followed by chemotherapy and then post operative radiation therapy. She completed the radiation therapy on 3/29/19. Clinically and radiographically, the patient shows no evidence of recurrent disease. The patient had a CT scan of the chest on 19 that was negative for recurrence. I did review these images with the patient. There are are changes in the left lung that are consistent with radiation treatment.   These areas

## 2019-08-01 NOTE — PROGRESS NOTES
due:   Aromatase Inhibitors []   Medication name:   N/A:  [x]         FALLS RISK SCREEN  Instructions:  Assess the patient and enter the appropriate indicators that are present for fall risk identification. Total the numbers entered and assign a fall risk score from Table 2.  Reassess patient at a minimum every 12 weeks or with status change. Assessment   Date  8/1/2019     1. Mental Ability: confusion/cognitively impaired 0     2. Elimination Issues: incontinence, frequency 0       3. Ambulatory: use of assistive devices (walker, cane, off-loading devices),        attached to equipment (IV pole, oxygen) 0     4. Sensory Limitations: dizziness, vertigo, impaired vision 0     5. Age less than 65        0     6. Age 72 or greater 0     7. Medication: diuretics, strong analgesics, hypnotics, sedatives,        antihypertensive agents 3   8. Falls:  recent history of falls within the last 3 months (not to include slipping or        tripping) 0   TOTAL 3    If score of 4 or greater was education given? No           TABLE 2   Risk Score Risk Level Plan of Care   0-3 Little or  No Risk 1. Provide assistance as indicated for ambulation activities  2. Reorient confused/cognitively impaired patient  3. Chair/bed in low position, stretcher/bed with siderails up except when performing patient care activities  5. Educate patient/family/caregiver on falls prevention  6.  Reassess in 12 weeks or with any noted change in patient condition which places them at a risk for a fall   4-6 Moderate Risk 1. Provide assistance as indicated for ambulation activities  2. Reorient confused/cognitively impaired patient  3. Chair/bed in low position, stretcher/bed with siderails up except when performing patient care activities  4. Educate patient/family/caregiver on falls prevention     7 or   Higher High Risk 1. Place patient in easily observable treatment room  2.   Patient attended at all times by family member or

## 2019-08-09 DIAGNOSIS — C34.92 NON-SMALL CELL CANCER OF LEFT LUNG (HCC): ICD-10-CM

## 2019-08-09 RX ORDER — ALPRAZOLAM 0.25 MG/1
0.25 TABLET ORAL 3 TIMES DAILY PRN
Qty: 90 TABLET | Refills: 0 | Status: SHIPPED | OUTPATIENT
Start: 2019-08-09 | End: 2019-09-25 | Stop reason: SDUPTHER

## 2019-08-09 RX ORDER — ALBUTEROL SULFATE 90 UG/1
2 AEROSOL, METERED RESPIRATORY (INHALATION) 4 TIMES DAILY
Qty: 1 INHALER | Refills: 3 | Status: SHIPPED | OUTPATIENT
Start: 2019-08-09 | End: 2020-01-08

## 2019-08-23 ENCOUNTER — HOSPITAL ENCOUNTER (OUTPATIENT)
Dept: INFUSION THERAPY | Facility: MEDICAL CENTER | Age: 64
Discharge: HOME | End: 2019-08-23
Payer: COMMERCIAL

## 2019-08-23 VITALS
HEART RATE: 76 BPM | RESPIRATION RATE: 20 BRPM | SYSTOLIC BLOOD PRESSURE: 111 MMHG | DIASTOLIC BLOOD PRESSURE: 54 MMHG | TEMPERATURE: 98.7 F

## 2019-08-23 PROCEDURE — 96523 IRRIG DRUG DELIVERY DEVICE: CPT

## 2019-08-23 PROCEDURE — 6360000002 HC RX W HCPCS: Performed by: INTERNAL MEDICINE

## 2019-08-23 PROCEDURE — 2580000003 HC RX 258: Performed by: INTERNAL MEDICINE

## 2019-08-23 RX ORDER — HEPARIN SODIUM (PORCINE) LOCK FLUSH IV SOLN 100 UNIT/ML 100 UNIT/ML
500 SOLUTION INTRAVENOUS PRN
Status: DISCONTINUED | OUTPATIENT
Start: 2019-08-23 | End: 2019-08-24 | Stop reason: HOSPADM

## 2019-08-23 RX ORDER — SODIUM CHLORIDE 0.9 % (FLUSH) 0.9 %
20 SYRINGE (ML) INJECTION PRN
Status: DISCONTINUED | OUTPATIENT
Start: 2019-08-23 | End: 2019-08-24 | Stop reason: HOSPADM

## 2019-08-23 RX ADMIN — Medication 20 ML: at 09:59

## 2019-08-23 RX ADMIN — Medication 500 UNITS: at 09:59

## 2019-08-23 NOTE — PROGRESS NOTES
Patient here for port flush,  No complaints today. Vitals obtained. Port accessed per policy. Port flushed per policy and gripper removed intact, band aid to site. Has a return visit scheduled. Discharged ambulatory per self.

## 2019-09-17 ENCOUNTER — HOSPITAL ENCOUNTER (OUTPATIENT)
Dept: CT IMAGING | Age: 64
Discharge: HOME | End: 2019-09-19
Payer: COMMERCIAL

## 2019-09-17 DIAGNOSIS — C34.92 NON-SMALL CELL CANCER OF LEFT LUNG (HCC): ICD-10-CM

## 2019-09-17 LAB
CREAT SERPL-MCNC: 0.66 MG/DL (ref 0.5–0.9)
GFR AFRICAN AMERICAN: >60 ML/MIN
GFR NON-AFRICAN AMERICAN: >60 ML/MIN
GFR SERPL CREATININE-BSD FRML MDRD: NORMAL ML/MIN/{1.73_M2}
GFR SERPL CREATININE-BSD FRML MDRD: NORMAL ML/MIN/{1.73_M2}

## 2019-09-17 PROCEDURE — 36415 COLL VENOUS BLD VENIPUNCTURE: CPT

## 2019-09-17 PROCEDURE — 2580000003 HC RX 258: Performed by: INTERNAL MEDICINE

## 2019-09-17 PROCEDURE — 71260 CT THORAX DX C+: CPT

## 2019-09-17 PROCEDURE — 82565 ASSAY OF CREATININE: CPT

## 2019-09-17 PROCEDURE — 6360000004 HC RX CONTRAST MEDICATION: Performed by: INTERNAL MEDICINE

## 2019-09-17 PROCEDURE — 6360000002 HC RX W HCPCS: Performed by: INTERNAL MEDICINE

## 2019-09-17 RX ORDER — SODIUM CHLORIDE 0.9 % (FLUSH) 0.9 %
10 SYRINGE (ML) INJECTION
Status: COMPLETED | OUTPATIENT
Start: 2019-09-17 | End: 2019-09-17

## 2019-09-17 RX ORDER — 0.9 % SODIUM CHLORIDE 0.9 %
80 INTRAVENOUS SOLUTION INTRAVENOUS ONCE
Status: COMPLETED | OUTPATIENT
Start: 2019-09-17 | End: 2019-09-17

## 2019-09-17 RX ADMIN — Medication 500 UNITS: at 08:50

## 2019-09-17 RX ADMIN — Medication 10 ML: at 08:45

## 2019-09-17 RX ADMIN — SODIUM CHLORIDE 80 ML: 9 INJECTION, SOLUTION INTRAVENOUS at 08:45

## 2019-09-17 RX ADMIN — IOPAMIDOL 75 ML: 755 INJECTION, SOLUTION INTRAVENOUS at 08:45

## 2019-09-19 ENCOUNTER — HOSPITAL ENCOUNTER (OUTPATIENT)
Facility: MEDICAL CENTER | Age: 64
End: 2019-09-19
Payer: COMMERCIAL

## 2019-09-25 ENCOUNTER — OFFICE VISIT (OUTPATIENT)
Dept: ONCOLOGY | Age: 64
End: 2019-09-25
Payer: COMMERCIAL

## 2019-09-25 ENCOUNTER — TELEPHONE (OUTPATIENT)
Dept: ONCOLOGY | Age: 64
End: 2019-09-25

## 2019-09-25 VITALS
DIASTOLIC BLOOD PRESSURE: 69 MMHG | SYSTOLIC BLOOD PRESSURE: 110 MMHG | RESPIRATION RATE: 19 BRPM | WEIGHT: 173.1 LBS | TEMPERATURE: 98.1 F | HEART RATE: 43 BPM | BODY MASS INDEX: 34.96 KG/M2

## 2019-09-25 DIAGNOSIS — C34.92 NON-SMALL CELL CANCER OF LEFT LUNG (HCC): ICD-10-CM

## 2019-09-25 PROCEDURE — 99214 OFFICE O/P EST MOD 30 MIN: CPT | Performed by: INTERNAL MEDICINE

## 2019-09-25 PROCEDURE — 99211 OFF/OP EST MAY X REQ PHY/QHP: CPT | Performed by: INTERNAL MEDICINE

## 2019-09-25 PROCEDURE — 3017F COLORECTAL CA SCREEN DOC REV: CPT | Performed by: INTERNAL MEDICINE

## 2019-09-25 PROCEDURE — G8427 DOCREV CUR MEDS BY ELIG CLIN: HCPCS | Performed by: INTERNAL MEDICINE

## 2019-09-25 PROCEDURE — 1036F TOBACCO NON-USER: CPT | Performed by: INTERNAL MEDICINE

## 2019-09-25 PROCEDURE — G8417 CALC BMI ABV UP PARAM F/U: HCPCS | Performed by: INTERNAL MEDICINE

## 2019-09-25 RX ORDER — ALPRAZOLAM 0.25 MG/1
0.25 TABLET ORAL 3 TIMES DAILY PRN
Qty: 90 TABLET | Refills: 0 | Status: SHIPPED | OUTPATIENT
Start: 2019-09-25 | End: 2019-10-25

## 2019-09-25 RX ORDER — ALPRAZOLAM 0.25 MG/1
0.25 TABLET ORAL 3 TIMES DAILY PRN
Qty: 90 TABLET | Refills: 0 | Status: CANCELLED | OUTPATIENT
Start: 2019-09-25 | End: 2019-11-11

## 2019-09-25 NOTE — PROGRESS NOTES
(PRINZIDE;ZESTORETIC) 10-12.5 MG per tablet Take 1 tablet by mouth daily 30 tablet 11    lovastatin (MEVACOR) 10 MG tablet Take 1 tablet by mouth nightly 30 tablet 11    aspirin 81 MG chewable tablet Take 81 mg by mouth daily       No current facility-administered medications for this visit.         Social History     Socioeconomic History    Marital status:      Spouse name: Not on file    Number of children: Not on file    Years of education: Not on file    Highest education level: Not on file   Occupational History    Not on file   Social Needs    Financial resource strain: Not on file    Food insecurity:     Worry: Not on file     Inability: Not on file    Transportation needs:     Medical: Not on file     Non-medical: Not on file   Tobacco Use    Smoking status: Former Smoker     Packs/day: 1.50     Years: 30.00     Pack years: 45.00     Types: Cigarettes     Last attempt to quit: 2000     Years since quittin.7    Smokeless tobacco: Never Used   Substance and Sexual Activity    Alcohol use: Yes     Comment: Occassionally    Drug use: No    Sexual activity: Not on file   Lifestyle    Physical activity:     Days per week: Not on file     Minutes per session: Not on file    Stress: Not on file   Relationships    Social connections:     Talks on phone: Not on file     Gets together: Not on file     Attends [employer] service: Not on file     Active member of club or organization: Not on file     Attends meetings of clubs or organizations: Not on file     Relationship status: Not on file    Intimate partner violence:     Fear of current or ex partner: Not on file     Emotionally abused: Not on file     Physically abused: Not on file     Forced sexual activity: Not on file   Other Topics Concern    Not on file   Social History Narrative    Not on file       Family History   Problem Relation Age of Onset    Cancer Mother         LUNG    Cancer Sister         LUNG        REVIEW OF SYSTEM:     Constitutional: No fever or chills. No night sweats, no weight loss   Eyes: No eye discharge, double vision, or eye pain   HEENT: negative for sore mouth, sore throat, hoarseness and voice change   Respiratory: negative for cough , sputum, dyspnea, wheezing, hemoptysis, chest pain   Cardiovascular: negative for chest pain, dyspnea, palpitations, orthopnea, PND   Gastrointestinal: negative for nausea, vomiting, diarrhea, constipation, abdominal pain, Dysphagia, hematemesis and hematochezia   Genitourinary: negative for frequency, dysuria, nocturia, urinary incontinence, and hematuria   Integument: negative for rash, skin lesions, bruises.    Hematologic/Lymphatic: negative for easy bruising, bleeding, lymphadenopathy, petechiae and swelling/edema   Endocrine: negative for heat or cold intolerance, tremor, weight changes, change in bowel habits and hair loss   Musculoskeletal: negative for myalgias, arthralgias, pain, joint swelling,and bone pain   Neurological: negative for headaches, dizziness, seizures, weakness, numbness       OBJECTIVE:         Vitals:    09/25/19 1050   BP: 110/69   Pulse: (!) 43   Resp: 19   Temp: 98.1 °F (36.7 °C)   PHYSICAL EXAM:   General appearance - well appearing, no in pain or distress   Mental status - alert and cooperative   Eyes - pupils equal and reactive, extraocular eye movements intact   Ears - bilateral TM's and external ear canals normal   Mouth - mucous membranes moist, pharynx normal without lesions   Neck - supple, no significant adenopathy   Lymphatics - no palpable lymphadenopathy, no hepatosplenomegaly   Chest - clear to auscultation, no wheezes, rales or rhonchi, symmetric air entry   Left chest surgical scar healing well  Heart - normal rate, regular rhythm, normal S1, S2, no murmurs, rubs, clicks or gallops   Abdomen - soft, nontender, nondistended, no masses or organomegaly   Neurological - alert, oriented, normal speech, no focal findings or movement disorder noted   Musculoskeletal - no joint tenderness, deformity or swelling   Extremities - peripheral pulses normal, no pedal edema, no clubbing or cyanosis   Skin - normal coloration and turgor, no rashes, no suspicious skin lesions noted ,  LABORATORY DATA:     Lab Results   Component Value Date    WBC 4.2 05/15/2019    HGB 11.6 (L) 05/15/2019    HCT 35.0 (L) 05/15/2019    MCV 93.1 05/15/2019     05/15/2019    LYMPHOPCT 17 (L) 05/15/2019    RBC 3.76 (L) 05/15/2019    MCH 30.9 05/15/2019    MCHC 33.1 05/15/2019    RDW 11.9 05/15/2019    MONOPCT 10 05/15/2019    BASOPCT 1 05/15/2019    NEUTROABS 3.02 05/15/2019    LYMPHSABS 0.72 (L) 05/15/2019    MONOSABS 0.41 05/15/2019    EOSABS 0.04 05/15/2019    BASOSABS <0.03 05/15/2019       Chemistry        Component Value Date/Time     05/15/2019 1330    K 3.5 (L) 05/15/2019 1330     05/15/2019 1330    CO2 24 05/15/2019 1330    BUN 11 05/15/2019 1330    CREATININE 0.66 09/17/2019 0727        Component Value Date/Time    CALCIUM 9.2 05/15/2019 1330    ALKPHOS 96 05/15/2019 1330    AST 17 05/15/2019 1330    ALT 15 05/15/2019 1330    BILITOT 0.31 05/15/2019 1330        PATHOLOGY DATA:   Pathology:  CT Guided Biopsy ([facility]) 8/22/18:   LEFT LUNG, UPPER LOBE, CT GUIDED CORE NEEDLE BIOPSIES:  - INVASIVE ADENOCARCINOMA, CONSISTENT WITH LUNG PRIMARY. Collected: 9/28/2018   Received: 10/1/2018   Reported: 10/2/2018 17:47     -- Diagnosis --   1.  LYMPH NODE, LEVEL 9, BIOPSY:       -  ONE LYMPH NODE, NEGATIVE FOR MALIGNANCY (0/1). 2.  LYMPH NODE, LEVEL 11, DISSECTION:       -  ONE OF 2 LYMPH NODES POSITIVE FOR METASTATIC CARCINOMA (1/2). 3.  LYMPH NODE, LEVEL 5, DISSECTION:       -  ONE OF 2 LYMPH NODES POSITIVE FOR METASTATIC CARCINOMA (1/2).     -  CARCINOMA INVADES LARGE PERIPHERAL NERVE BRANCHES. 4.  LUNG, LEFT UPPER LOBE, LOBECTOMY:            -  WELL-DIFFERENTIATED INVASIVE ADENOCARCINOMA, 2.9 CM   GREATEST DIMENSION.        -  NEGATIVE FOR

## 2019-11-06 ENCOUNTER — HOSPITAL ENCOUNTER (OUTPATIENT)
Dept: INFUSION THERAPY | Facility: MEDICAL CENTER | Age: 64
Discharge: HOME | End: 2019-11-06
Payer: COMMERCIAL

## 2019-11-06 VITALS
DIASTOLIC BLOOD PRESSURE: 54 MMHG | HEART RATE: 80 BPM | TEMPERATURE: 98.6 F | RESPIRATION RATE: 16 BRPM | SYSTOLIC BLOOD PRESSURE: 104 MMHG

## 2019-11-06 PROCEDURE — 2580000003 HC RX 258: Performed by: FAMILY MEDICINE

## 2019-11-06 PROCEDURE — 96523 IRRIG DRUG DELIVERY DEVICE: CPT

## 2019-11-06 PROCEDURE — 6360000002 HC RX W HCPCS: Performed by: FAMILY MEDICINE

## 2019-11-06 RX ORDER — SODIUM CHLORIDE 0.9 % (FLUSH) 0.9 %
10 SYRINGE (ML) INJECTION PRN
Status: ACTIVE | OUTPATIENT
Start: 2019-11-06 | End: 2019-11-06

## 2019-11-06 RX ORDER — HEPARIN SODIUM (PORCINE) LOCK FLUSH IV SOLN 100 UNIT/ML 100 UNIT/ML
500 SOLUTION INTRAVENOUS PRN
Status: DISPENSED | OUTPATIENT
Start: 2019-11-06 | End: 2019-11-06

## 2019-11-06 RX ADMIN — HEPARIN 500 UNITS: 100 SYRINGE at 09:48

## 2019-11-06 RX ADMIN — Medication 10 ML: at 09:48

## 2019-11-06 NOTE — PROGRESS NOTES
Prieto Avila arrives ambulatory. Order reviewed for port flush. Right port accessed per policy with 00R 5/4P Aguilera needle. Brisk blood return noted. Port flushed and heparin locked. Aguilera needle removed intact. Band aid applied. Discharged ambulatory.

## 2019-11-13 DIAGNOSIS — F41.9 ANXIETY: Primary | ICD-10-CM

## 2019-11-15 RX ORDER — ALPRAZOLAM 0.25 MG/1
0.25 TABLET ORAL 3 TIMES DAILY PRN
Qty: 90 TABLET | Refills: 0 | Status: SHIPPED | OUTPATIENT
Start: 2019-11-15 | End: 2019-12-15

## 2019-11-26 ENCOUNTER — OFFICE VISIT (OUTPATIENT)
Dept: FAMILY MEDICINE CLINIC | Age: 64
End: 2019-11-26
Payer: COMMERCIAL

## 2019-11-26 VITALS
DIASTOLIC BLOOD PRESSURE: 89 MMHG | OXYGEN SATURATION: 95 % | HEART RATE: 71 BPM | WEIGHT: 174.8 LBS | SYSTOLIC BLOOD PRESSURE: 137 MMHG | BODY MASS INDEX: 35.31 KG/M2 | TEMPERATURE: 97.4 F

## 2019-11-26 DIAGNOSIS — I10 ESSENTIAL HYPERTENSION: ICD-10-CM

## 2019-11-26 DIAGNOSIS — Z76.89 ENCOUNTER TO ESTABLISH CARE WITH NEW DOCTOR: Primary | ICD-10-CM

## 2019-11-26 DIAGNOSIS — Z23 NEED FOR SHINGLES VACCINE: ICD-10-CM

## 2019-11-26 PROCEDURE — 1036F TOBACCO NON-USER: CPT | Performed by: FAMILY MEDICINE

## 2019-11-26 PROCEDURE — 99213 OFFICE O/P EST LOW 20 MIN: CPT | Performed by: FAMILY MEDICINE

## 2019-11-26 PROCEDURE — G8417 CALC BMI ABV UP PARAM F/U: HCPCS | Performed by: FAMILY MEDICINE

## 2019-11-26 PROCEDURE — G8427 DOCREV CUR MEDS BY ELIG CLIN: HCPCS | Performed by: FAMILY MEDICINE

## 2019-11-26 PROCEDURE — G8484 FLU IMMUNIZE NO ADMIN: HCPCS | Performed by: FAMILY MEDICINE

## 2019-11-26 PROCEDURE — 3017F COLORECTAL CA SCREEN DOC REV: CPT | Performed by: FAMILY MEDICINE

## 2019-12-16 ENCOUNTER — TELEPHONE (OUTPATIENT)
Dept: ONCOLOGY | Age: 64
End: 2019-12-16

## 2019-12-18 ENCOUNTER — HOSPITAL ENCOUNTER (OUTPATIENT)
Dept: INFUSION THERAPY | Facility: MEDICAL CENTER | Age: 64
Discharge: HOME | End: 2019-12-18
Payer: COMMERCIAL

## 2019-12-18 VITALS
TEMPERATURE: 97.8 F | DIASTOLIC BLOOD PRESSURE: 59 MMHG | HEART RATE: 70 BPM | SYSTOLIC BLOOD PRESSURE: 111 MMHG | RESPIRATION RATE: 18 BRPM

## 2019-12-18 DIAGNOSIS — C34.92 NON-SMALL CELL CANCER OF LEFT LUNG (HCC): Primary | ICD-10-CM

## 2019-12-18 PROCEDURE — 2580000003 HC RX 258: Performed by: INTERNAL MEDICINE

## 2019-12-18 PROCEDURE — 96523 IRRIG DRUG DELIVERY DEVICE: CPT

## 2019-12-18 PROCEDURE — 6360000002 HC RX W HCPCS: Performed by: INTERNAL MEDICINE

## 2019-12-18 RX ORDER — HEPARIN SODIUM (PORCINE) LOCK FLUSH IV SOLN 100 UNIT/ML 100 UNIT/ML
500 SOLUTION INTRAVENOUS PRN
Status: CANCELLED | OUTPATIENT
Start: 2019-12-18

## 2019-12-18 RX ORDER — SODIUM CHLORIDE 0.9 % (FLUSH) 0.9 %
20 SYRINGE (ML) INJECTION PRN
Status: CANCELLED | OUTPATIENT
Start: 2019-12-18

## 2019-12-18 RX ORDER — SODIUM CHLORIDE 0.9 % (FLUSH) 0.9 %
10 SYRINGE (ML) INJECTION PRN
Status: CANCELLED | OUTPATIENT
Start: 2019-12-18

## 2019-12-18 RX ORDER — SODIUM CHLORIDE 0.9 % (FLUSH) 0.9 %
20 SYRINGE (ML) INJECTION PRN
Status: DISCONTINUED | OUTPATIENT
Start: 2019-12-18 | End: 2019-12-19 | Stop reason: HOSPADM

## 2019-12-18 RX ORDER — HEPARIN SODIUM (PORCINE) LOCK FLUSH IV SOLN 100 UNIT/ML 100 UNIT/ML
500 SOLUTION INTRAVENOUS PRN
Status: DISCONTINUED | OUTPATIENT
Start: 2019-12-18 | End: 2019-12-19 | Stop reason: HOSPADM

## 2019-12-18 RX ADMIN — Medication 20 ML: at 08:32

## 2019-12-18 RX ADMIN — HEPARIN 500 UNITS: 100 SYRINGE at 08:32

## 2019-12-18 NOTE — PROGRESS NOTES
Pt arrives per ambulatory per self and pt state has next appts ant pt tolerated port flush well with very good blood return. Pt discharged per ambulatory per self.

## 2019-12-18 NOTE — PROGRESS NOTES
Pt arrives per ambulatory per self and pt here for port flush and pt has next appts no reactions or complaints and very good blood return present. Pt discharged per ambulatory per self.

## 2020-01-15 ENCOUNTER — HOSPITAL ENCOUNTER (OUTPATIENT)
Facility: MEDICAL CENTER | Age: 65
End: 2020-01-15
Payer: COMMERCIAL

## 2020-01-17 ENCOUNTER — TELEPHONE (OUTPATIENT)
Dept: ONCOLOGY | Age: 65
End: 2020-01-17

## 2020-01-17 ENCOUNTER — OFFICE VISIT (OUTPATIENT)
Dept: ONCOLOGY | Age: 65
End: 2020-01-17
Payer: COMMERCIAL

## 2020-01-17 ENCOUNTER — TELEPHONE (OUTPATIENT)
Dept: RADIATION ONCOLOGY | Facility: MEDICAL CENTER | Age: 65
End: 2020-01-17

## 2020-01-17 ENCOUNTER — HOSPITAL ENCOUNTER (OUTPATIENT)
Dept: INFUSION THERAPY | Facility: MEDICAL CENTER | Age: 65
Discharge: HOME | End: 2020-01-17
Payer: COMMERCIAL

## 2020-01-17 VITALS
SYSTOLIC BLOOD PRESSURE: 142 MMHG | RESPIRATION RATE: 18 BRPM | HEART RATE: 95 BPM | DIASTOLIC BLOOD PRESSURE: 82 MMHG | TEMPERATURE: 98.4 F

## 2020-01-17 VITALS
DIASTOLIC BLOOD PRESSURE: 82 MMHG | BODY MASS INDEX: 35.16 KG/M2 | SYSTOLIC BLOOD PRESSURE: 142 MMHG | RESPIRATION RATE: 18 BRPM | TEMPERATURE: 98.4 F | HEART RATE: 95 BPM | WEIGHT: 174.1 LBS

## 2020-01-17 DIAGNOSIS — C34.92 NON-SMALL CELL CANCER OF LEFT LUNG (HCC): Primary | ICD-10-CM

## 2020-01-17 LAB
ABSOLUTE EOS #: 0.06 K/UL (ref 0–0.44)
ABSOLUTE IMMATURE GRANULOCYTE: 0.01 K/UL (ref 0–0.3)
ABSOLUTE LYMPH #: 1.25 K/UL (ref 1.1–3.7)
ABSOLUTE MONO #: 0.38 K/UL (ref 0.1–1.2)
ALBUMIN SERPL-MCNC: 4.3 G/DL (ref 3.5–5.2)
ALBUMIN/GLOBULIN RATIO: ABNORMAL (ref 1–2.5)
ALP BLD-CCNC: 78 U/L (ref 35–104)
ALT SERPL-CCNC: 14 U/L (ref 5–33)
ANION GAP SERPL CALCULATED.3IONS-SCNC: 11 MMOL/L (ref 9–17)
AST SERPL-CCNC: 16 U/L
BASOPHILS # BLD: 1 % (ref 0–2)
BASOPHILS ABSOLUTE: 0.04 K/UL (ref 0–0.2)
BILIRUB SERPL-MCNC: 0.35 MG/DL (ref 0.3–1.2)
BUN BLDV-MCNC: 15 MG/DL (ref 8–23)
BUN/CREAT BLD: 25 (ref 9–20)
CALCIUM SERPL-MCNC: 9.7 MG/DL (ref 8.6–10.4)
CHLORIDE BLD-SCNC: 100 MMOL/L (ref 98–107)
CO2: 27 MMOL/L (ref 20–31)
CREAT SERPL-MCNC: 0.59 MG/DL (ref 0.5–0.9)
DIFFERENTIAL TYPE: ABNORMAL
EOSINOPHILS RELATIVE PERCENT: 1 % (ref 1–4)
GFR AFRICAN AMERICAN: >60 ML/MIN
GFR NON-AFRICAN AMERICAN: >60 ML/MIN
GFR SERPL CREATININE-BSD FRML MDRD: ABNORMAL ML/MIN/{1.73_M2}
GFR SERPL CREATININE-BSD FRML MDRD: ABNORMAL ML/MIN/{1.73_M2}
GLUCOSE BLD-MCNC: 114 MG/DL (ref 70–99)
HCT VFR BLD CALC: 40.3 % (ref 36.3–47.1)
HEMOGLOBIN: 13.3 G/DL (ref 11.9–15.1)
IMMATURE GRANULOCYTES: 0 %
LYMPHOCYTES # BLD: 24 % (ref 24–43)
MCH RBC QN AUTO: 30.2 PG (ref 25.2–33.5)
MCHC RBC AUTO-ENTMCNC: 33 G/DL (ref 28.4–34.8)
MCV RBC AUTO: 91.6 FL (ref 82.6–102.9)
MONOCYTES # BLD: 7 % (ref 3–12)
NRBC AUTOMATED: 0 PER 100 WBC
PDW BLD-RTO: 12.7 % (ref 11.8–14.4)
PLATELET # BLD: 230 K/UL (ref 138–453)
PLATELET ESTIMATE: ABNORMAL
PMV BLD AUTO: 9.9 FL (ref 8.1–13.5)
POTASSIUM SERPL-SCNC: 3.8 MMOL/L (ref 3.7–5.3)
RBC # BLD: 4.4 M/UL (ref 3.95–5.11)
RBC # BLD: ABNORMAL 10*6/UL
SEG NEUTROPHILS: 67 % (ref 36–65)
SEGMENTED NEUTROPHILS ABSOLUTE COUNT: 3.52 K/UL (ref 1.5–8.1)
SODIUM BLD-SCNC: 138 MMOL/L (ref 135–144)
TOTAL PROTEIN: 7.3 G/DL (ref 6.4–8.3)
WBC # BLD: 5.3 K/UL (ref 3.5–11.3)
WBC # BLD: ABNORMAL 10*3/UL

## 2020-01-17 PROCEDURE — 2580000003 HC RX 258: Performed by: INTERNAL MEDICINE

## 2020-01-17 PROCEDURE — G8427 DOCREV CUR MEDS BY ELIG CLIN: HCPCS | Performed by: INTERNAL MEDICINE

## 2020-01-17 PROCEDURE — 1036F TOBACCO NON-USER: CPT | Performed by: INTERNAL MEDICINE

## 2020-01-17 PROCEDURE — G8482 FLU IMMUNIZE ORDER/ADMIN: HCPCS | Performed by: INTERNAL MEDICINE

## 2020-01-17 PROCEDURE — 36591 DRAW BLOOD OFF VENOUS DEVICE: CPT

## 2020-01-17 PROCEDURE — 6360000002 HC RX W HCPCS: Performed by: INTERNAL MEDICINE

## 2020-01-17 PROCEDURE — 3017F COLORECTAL CA SCREEN DOC REV: CPT | Performed by: INTERNAL MEDICINE

## 2020-01-17 PROCEDURE — 80053 COMPREHEN METABOLIC PANEL: CPT

## 2020-01-17 PROCEDURE — 85025 COMPLETE CBC W/AUTO DIFF WBC: CPT

## 2020-01-17 PROCEDURE — 99212 OFFICE O/P EST SF 10 MIN: CPT | Performed by: INTERNAL MEDICINE

## 2020-01-17 PROCEDURE — G8417 CALC BMI ABV UP PARAM F/U: HCPCS | Performed by: INTERNAL MEDICINE

## 2020-01-17 PROCEDURE — 99214 OFFICE O/P EST MOD 30 MIN: CPT | Performed by: INTERNAL MEDICINE

## 2020-01-17 RX ORDER — HEPARIN SODIUM (PORCINE) LOCK FLUSH IV SOLN 100 UNIT/ML 100 UNIT/ML
500 SOLUTION INTRAVENOUS PRN
Status: CANCELLED | OUTPATIENT
Start: 2020-01-17

## 2020-01-17 RX ORDER — ALPRAZOLAM 0.25 MG/1
0.25 TABLET ORAL NIGHTLY PRN
COMMUNITY
End: 2020-01-17 | Stop reason: SDUPTHER

## 2020-01-17 RX ORDER — SODIUM CHLORIDE 0.9 % (FLUSH) 0.9 %
20 SYRINGE (ML) INJECTION PRN
Status: DISCONTINUED | OUTPATIENT
Start: 2020-01-17 | End: 2020-01-18 | Stop reason: HOSPADM

## 2020-01-17 RX ORDER — SODIUM CHLORIDE 0.9 % (FLUSH) 0.9 %
10 SYRINGE (ML) INJECTION PRN
Status: CANCELLED | OUTPATIENT
Start: 2020-01-17

## 2020-01-17 RX ORDER — SODIUM CHLORIDE 0.9 % (FLUSH) 0.9 %
20 SYRINGE (ML) INJECTION PRN
Status: CANCELLED | OUTPATIENT
Start: 2020-01-17

## 2020-01-17 RX ORDER — ALPRAZOLAM 0.25 MG/1
0.25 TABLET ORAL NIGHTLY PRN
Qty: 30 TABLET | Refills: 0 | Status: SHIPPED | OUTPATIENT
Start: 2020-01-17 | End: 2020-04-22 | Stop reason: SDUPTHER

## 2020-01-17 RX ORDER — HEPARIN SODIUM (PORCINE) LOCK FLUSH IV SOLN 100 UNIT/ML 100 UNIT/ML
500 SOLUTION INTRAVENOUS PRN
Status: DISCONTINUED | OUTPATIENT
Start: 2020-01-17 | End: 2020-01-18 | Stop reason: HOSPADM

## 2020-01-17 RX ADMIN — HEPARIN 500 UNITS: 100 SYRINGE at 09:21

## 2020-01-17 RX ADMIN — Medication 20 ML: at 09:20

## 2020-01-17 RX ADMIN — Medication 20 ML: at 08:35

## 2020-01-17 NOTE — PROGRESS NOTES
Pt arrives per ambulatory per self and pt seen per md and then to  for AVS and pt tolerated port draw, port flush well and pt discharged per ambulatory per self.

## 2020-01-17 NOTE — PROGRESS NOTES
file       Family History   Problem Relation Age of Onset    Cancer Mother         LUNG    Cancer Sister         LUNG        REVIEW OF SYSTEM:     Constitutional: No fever or chills. No night sweats, no weight loss   Eyes: No eye discharge, double vision, or eye pain   HEENT: negative for sore mouth, sore throat, hoarseness and voice change   Respiratory: negative for cough , sputum, dyspnea, wheezing, hemoptysis, chest pain   Cardiovascular: negative for chest pain, dyspnea, palpitations, orthopnea, PND   Gastrointestinal: negative for nausea, vomiting, diarrhea, constipation, abdominal pain, Dysphagia, hematemesis and hematochezia   Genitourinary: negative for frequency, dysuria, nocturia, urinary incontinence, and hematuria   Integument: negative for rash, skin lesions, bruises.    Hematologic/Lymphatic: negative for easy bruising, bleeding, lymphadenopathy, petechiae and swelling/edema   Endocrine: negative for heat or cold intolerance, tremor, weight changes, change in bowel habits and hair loss   Musculoskeletal: negative for myalgias, arthralgias, pain, joint swelling,and bone pain   Neurological: negative for headaches, dizziness, seizures, weakness, numbness       OBJECTIVE:         Vitals:    01/17/20 0845   BP: (!) 142/82   Pulse: 95   Resp: 18   Temp: 98.4 °F (36.9 °C)   PHYSICAL EXAM:   General appearance - well appearing, no in pain or distress   Mental status - alert and cooperative   Eyes - pupils equal and reactive, extraocular eye movements intact   Ears - bilateral TM's and external ear canals normal   Mouth - mucous membranes moist, pharynx normal without lesions   Neck - supple, no significant adenopathy   Lymphatics - no palpable lymphadenopathy, no hepatosplenomegaly   Chest - clear to auscultation, no wheezes, rales or rhonchi, symmetric air entry   Left chest surgical scar healing well  Heart - normal rate, regular rhythm, normal S1, S2, no murmurs, rubs, clicks or gallops   Abdomen - soft,

## 2020-01-17 NOTE — TELEPHONE ENCOUNTER
Haylie Miguel called to cancel her follow up on 2/6/2020. She states she is going to follow with [facility] only.
Home

## 2020-01-17 NOTE — PROGRESS NOTES
Pt arrives per ambulatory per self and labs sent and pt seen per md. Port flushed and heparinized with very good blood return. Pt discharged per ambulatory per self with AVS with next appts.

## 2020-02-28 ENCOUNTER — HOSPITAL ENCOUNTER (OUTPATIENT)
Dept: INFUSION THERAPY | Facility: MEDICAL CENTER | Age: 65
Discharge: HOME | End: 2020-02-28
Payer: COMMERCIAL

## 2020-02-28 VITALS
RESPIRATION RATE: 18 BRPM | SYSTOLIC BLOOD PRESSURE: 103 MMHG | TEMPERATURE: 98 F | DIASTOLIC BLOOD PRESSURE: 65 MMHG | HEART RATE: 79 BPM

## 2020-02-28 PROCEDURE — 2580000003 HC RX 258: Performed by: INTERNAL MEDICINE

## 2020-02-28 PROCEDURE — 96523 IRRIG DRUG DELIVERY DEVICE: CPT

## 2020-02-28 PROCEDURE — 6360000002 HC RX W HCPCS: Performed by: INTERNAL MEDICINE

## 2020-02-28 RX ORDER — ALBUTEROL SULFATE 90 UG/1
2 AEROSOL, METERED RESPIRATORY (INHALATION) 4 TIMES DAILY
Qty: 8.5 G | Refills: 1 | Status: SHIPPED | OUTPATIENT
Start: 2020-02-28 | End: 2020-06-11 | Stop reason: SDUPTHER

## 2020-02-28 RX ORDER — SODIUM CHLORIDE 0.9 % (FLUSH) 0.9 %
10 SYRINGE (ML) INJECTION PRN
Status: ACTIVE | OUTPATIENT
Start: 2020-02-28 | End: 2020-02-28

## 2020-02-28 RX ORDER — HEPARIN SODIUM (PORCINE) LOCK FLUSH IV SOLN 100 UNIT/ML 100 UNIT/ML
500 SOLUTION INTRAVENOUS PRN
Status: DISPENSED | OUTPATIENT
Start: 2020-02-28 | End: 2020-02-28

## 2020-02-28 RX ORDER — ALPRAZOLAM 0.25 MG/1
0.25 TABLET ORAL NIGHTLY PRN
Qty: 30 TABLET | Refills: 0 | Status: SHIPPED | OUTPATIENT
Start: 2020-02-28 | End: 2020-03-29

## 2020-02-28 RX ADMIN — SODIUM CHLORIDE, PRESERVATIVE FREE 10 ML: 5 INJECTION INTRAVENOUS at 08:45

## 2020-02-28 RX ADMIN — HEPARIN 500 UNITS: 100 SYRINGE at 08:45

## 2020-02-28 NOTE — PROGRESS NOTES
Patient arrive ambulatory with  for port flush . Denies complaint or concern. Pt also requested refills for Proair and Xanax, , prescriptions requested and will pend to Dr. Gildardo Hernandez . Port accessed without difficulty  Port flushed and heparinized with intact kay needle removed per protocol. Patient ambulate off unit per self at discharge.

## 2020-03-23 ENCOUNTER — TELEPHONE (OUTPATIENT)
Dept: ONCOLOGY | Age: 65
End: 2020-03-23

## 2020-04-13 ENCOUNTER — HOSPITAL ENCOUNTER (OUTPATIENT)
Dept: CT IMAGING | Age: 65
Discharge: HOME | End: 2020-04-15
Payer: COMMERCIAL

## 2020-04-13 ENCOUNTER — HOSPITAL ENCOUNTER (OUTPATIENT)
Dept: INFUSION THERAPY | Facility: MEDICAL CENTER | Age: 65
Discharge: HOME | End: 2020-04-13
Payer: COMMERCIAL

## 2020-04-13 ENCOUNTER — HOSPITAL ENCOUNTER (OUTPATIENT)
Facility: MEDICAL CENTER | Age: 65
Discharge: HOME | End: 2020-04-13
Payer: COMMERCIAL

## 2020-04-13 VITALS
RESPIRATION RATE: 20 BRPM | HEART RATE: 84 BPM | SYSTOLIC BLOOD PRESSURE: 131 MMHG | TEMPERATURE: 99.2 F | DIASTOLIC BLOOD PRESSURE: 77 MMHG

## 2020-04-13 DIAGNOSIS — C34.92 NON-SMALL CELL CANCER OF LEFT LUNG (HCC): ICD-10-CM

## 2020-04-13 LAB
ABSOLUTE EOS #: 0.04 K/UL (ref 0–0.44)
ABSOLUTE IMMATURE GRANULOCYTE: 0.01 K/UL (ref 0–0.3)
ABSOLUTE LYMPH #: 1.14 K/UL (ref 1.1–3.7)
ABSOLUTE MONO #: 0.31 K/UL (ref 0.1–1.2)
ALBUMIN SERPL-MCNC: 4.3 G/DL (ref 3.5–5.2)
ALBUMIN/GLOBULIN RATIO: ABNORMAL (ref 1–2.5)
ALP BLD-CCNC: 71 U/L (ref 35–104)
ALT SERPL-CCNC: 14 U/L (ref 5–33)
ANION GAP SERPL CALCULATED.3IONS-SCNC: 11 MMOL/L (ref 9–17)
AST SERPL-CCNC: 19 U/L
BASOPHILS # BLD: 1 % (ref 0–2)
BASOPHILS ABSOLUTE: 0.04 K/UL (ref 0–0.2)
BILIRUB SERPL-MCNC: 0.27 MG/DL (ref 0.3–1.2)
BUN BLDV-MCNC: 11 MG/DL (ref 8–23)
BUN/CREAT BLD: 18 (ref 9–20)
CALCIUM SERPL-MCNC: 9.3 MG/DL (ref 8.6–10.4)
CHLORIDE BLD-SCNC: 102 MMOL/L (ref 98–107)
CO2: 27 MMOL/L (ref 20–31)
CREAT SERPL-MCNC: 0.62 MG/DL (ref 0.5–0.9)
CREAT SERPL-MCNC: 0.67 MG/DL (ref 0.5–0.9)
DIFFERENTIAL TYPE: ABNORMAL
EOSINOPHILS RELATIVE PERCENT: 1 % (ref 1–4)
GFR AFRICAN AMERICAN: >60 ML/MIN
GFR AFRICAN AMERICAN: >60 ML/MIN
GFR NON-AFRICAN AMERICAN: >60 ML/MIN
GFR NON-AFRICAN AMERICAN: >60 ML/MIN
GFR SERPL CREATININE-BSD FRML MDRD: ABNORMAL ML/MIN/{1.73_M2}
GFR SERPL CREATININE-BSD FRML MDRD: ABNORMAL ML/MIN/{1.73_M2}
GFR SERPL CREATININE-BSD FRML MDRD: NORMAL ML/MIN/{1.73_M2}
GFR SERPL CREATININE-BSD FRML MDRD: NORMAL ML/MIN/{1.73_M2}
GLUCOSE BLD-MCNC: 105 MG/DL (ref 70–99)
HCT VFR BLD CALC: 37.5 % (ref 36.3–47.1)
HEMOGLOBIN: 12.6 G/DL (ref 11.9–15.1)
IMMATURE GRANULOCYTES: 0 %
LYMPHOCYTES # BLD: 20 % (ref 24–43)
MCH RBC QN AUTO: 31.3 PG (ref 25.2–33.5)
MCHC RBC AUTO-ENTMCNC: 33.6 G/DL (ref 28.4–34.8)
MCV RBC AUTO: 93.1 FL (ref 82.6–102.9)
MONOCYTES # BLD: 5 % (ref 3–12)
NRBC AUTOMATED: 0 PER 100 WBC
PDW BLD-RTO: 13.2 % (ref 11.8–14.4)
PLATELET # BLD: 209 K/UL (ref 138–453)
PLATELET ESTIMATE: ABNORMAL
PMV BLD AUTO: 10.1 FL (ref 8.1–13.5)
POTASSIUM SERPL-SCNC: 3.9 MMOL/L (ref 3.7–5.3)
RBC # BLD: 4.03 M/UL (ref 3.95–5.11)
RBC # BLD: ABNORMAL 10*6/UL
SEG NEUTROPHILS: 73 % (ref 36–65)
SEGMENTED NEUTROPHILS ABSOLUTE COUNT: 4.16 K/UL (ref 1.5–8.1)
SODIUM BLD-SCNC: 140 MMOL/L (ref 135–144)
TOTAL PROTEIN: 6.9 G/DL (ref 6.4–8.3)
WBC # BLD: 5.7 K/UL (ref 3.5–11.3)
WBC # BLD: ABNORMAL 10*3/UL

## 2020-04-13 PROCEDURE — 6360000002 HC RX W HCPCS: Performed by: INTERNAL MEDICINE

## 2020-04-13 PROCEDURE — 2580000003 HC RX 258: Performed by: INTERNAL MEDICINE

## 2020-04-13 PROCEDURE — 71260 CT THORAX DX C+: CPT

## 2020-04-13 PROCEDURE — 36591 DRAW BLOOD OFF VENOUS DEVICE: CPT

## 2020-04-13 PROCEDURE — 82565 ASSAY OF CREATININE: CPT

## 2020-04-13 PROCEDURE — 80053 COMPREHEN METABOLIC PANEL: CPT

## 2020-04-13 PROCEDURE — 85025 COMPLETE CBC W/AUTO DIFF WBC: CPT

## 2020-04-13 PROCEDURE — 6360000004 HC RX CONTRAST MEDICATION: Performed by: INTERNAL MEDICINE

## 2020-04-13 RX ORDER — HEPARIN SODIUM (PORCINE) LOCK FLUSH IV SOLN 100 UNIT/ML 100 UNIT/ML
500 SOLUTION INTRAVENOUS PRN
Status: DISCONTINUED | OUTPATIENT
Start: 2020-04-13 | End: 2020-04-14 | Stop reason: HOSPADM

## 2020-04-13 RX ORDER — 0.9 % SODIUM CHLORIDE 0.9 %
80 INTRAVENOUS SOLUTION INTRAVENOUS ONCE
Status: COMPLETED | OUTPATIENT
Start: 2020-04-13 | End: 2020-04-13

## 2020-04-13 RX ORDER — SODIUM CHLORIDE 0.9 % (FLUSH) 0.9 %
10 SYRINGE (ML) INJECTION ONCE
Status: COMPLETED | OUTPATIENT
Start: 2020-04-13 | End: 2020-04-13

## 2020-04-13 RX ORDER — SODIUM CHLORIDE 0.9 % (FLUSH) 0.9 %
10 SYRINGE (ML) INJECTION PRN
Status: DISCONTINUED | OUTPATIENT
Start: 2020-04-13 | End: 2020-04-14 | Stop reason: HOSPADM

## 2020-04-13 RX ADMIN — Medication 10 ML: at 10:48

## 2020-04-13 RX ADMIN — SODIUM CHLORIDE, PRESERVATIVE FREE 10 ML: 5 INJECTION INTRAVENOUS at 10:53

## 2020-04-13 RX ADMIN — HEPARIN 500 UNITS: 100 SYRINGE at 10:53

## 2020-04-13 RX ADMIN — SODIUM CHLORIDE 80 ML: 9 INJECTION, SOLUTION INTRAVENOUS at 10:48

## 2020-04-13 RX ADMIN — IOPAMIDOL 75 ML: 755 INJECTION, SOLUTION INTRAVENOUS at 10:48

## 2020-04-13 NOTE — PROGRESS NOTES
Patient here for labs, access for CT scan and port flush. No complaints today,  Vitals obtained. Port accessed per policy and labs drawn and sent,  Port flushed and line capped. Sent to CT. Back from CT. Port flushed per policy and gripper removed intact, band aid to site  Has a return visit scheduled. Discharged ambulatory per self.

## 2020-04-21 ENCOUNTER — HOSPITAL ENCOUNTER (OUTPATIENT)
Facility: MEDICAL CENTER | Age: 65
End: 2020-04-21
Payer: COMMERCIAL

## 2020-04-22 ENCOUNTER — TELEPHONE (OUTPATIENT)
Dept: ONCOLOGY | Age: 65
End: 2020-04-22

## 2020-04-22 ENCOUNTER — OFFICE VISIT (OUTPATIENT)
Dept: ONCOLOGY | Age: 65
End: 2020-04-22
Payer: COMMERCIAL

## 2020-04-22 VITALS
HEART RATE: 102 BPM | DIASTOLIC BLOOD PRESSURE: 90 MMHG | WEIGHT: 174.1 LBS | SYSTOLIC BLOOD PRESSURE: 143 MMHG | BODY MASS INDEX: 35.16 KG/M2 | TEMPERATURE: 97.9 F

## 2020-04-22 PROCEDURE — 3017F COLORECTAL CA SCREEN DOC REV: CPT | Performed by: INTERNAL MEDICINE

## 2020-04-22 PROCEDURE — G8427 DOCREV CUR MEDS BY ELIG CLIN: HCPCS | Performed by: INTERNAL MEDICINE

## 2020-04-22 PROCEDURE — 99214 OFFICE O/P EST MOD 30 MIN: CPT | Performed by: INTERNAL MEDICINE

## 2020-04-22 PROCEDURE — 99211 OFF/OP EST MAY X REQ PHY/QHP: CPT | Performed by: INTERNAL MEDICINE

## 2020-04-22 PROCEDURE — G8417 CALC BMI ABV UP PARAM F/U: HCPCS | Performed by: INTERNAL MEDICINE

## 2020-04-22 PROCEDURE — 1036F TOBACCO NON-USER: CPT | Performed by: INTERNAL MEDICINE

## 2020-04-22 RX ORDER — ALPRAZOLAM 0.25 MG/1
0.25 TABLET ORAL NIGHTLY PRN
Qty: 30 TABLET | Refills: 0 | Status: SHIPPED | OUTPATIENT
Start: 2020-04-22 | End: 2020-07-09

## 2020-04-22 NOTE — PROGRESS NOTES
MG tablet Take 1 tablet by mouth nightly 30 tablet 11     No current facility-administered medications for this visit. Social History     Socioeconomic History    Marital status:      Spouse name: Not on file    Number of children: Not on file    Years of education: Not on file    Highest education level: Not on file   Occupational History    Not on file   Social Needs    Financial resource strain: Not on file    Food insecurity     Worry: Not on file     Inability: Not on file    Transportation needs     Medical: Not on file     Non-medical: Not on file   Tobacco Use    Smoking status: Former Smoker     Packs/day: 1.50     Years: 30.00     Pack years: 45.00     Types: Cigarettes     Last attempt to quit: 2000     Years since quittin.3    Smokeless tobacco: Never Used   Substance and Sexual Activity    Alcohol use: Yes     Comment: Occassionally    Drug use: No    Sexual activity: Not on file   Lifestyle    Physical activity     Days per week: Not on file     Minutes per session: Not on file    Stress: Not on file   Relationships    Social connections     Talks on phone: Not on file     Gets together: Not on file     Attends [employer] service: Not on file     Active member of club or organization: Not on file     Attends meetings of clubs or organizations: Not on file     Relationship status: Not on file    Intimate partner violence     Fear of current or ex partner: Not on file     Emotionally abused: Not on file     Physically abused: Not on file     Forced sexual activity: Not on file   Other Topics Concern    Not on file   Social History Narrative    Not on file       Family History   Problem Relation Age of Onset    Cancer Mother         LUNG    Cancer Sister         LUNG        REVIEW OF SYSTEM:     Constitutional: No fever or chills.  No night sweats, no weight loss   Eyes: No eye discharge, double vision, or eye pain   HEENT: negative for sore mouth, sore throat, hoarseness and voice change   Respiratory: negative for cough , sputum, dyspnea, wheezing, hemoptysis, chest pain   Cardiovascular: negative for chest pain, dyspnea, palpitations, orthopnea, PND   Gastrointestinal: negative for nausea, vomiting, diarrhea, constipation, abdominal pain, Dysphagia, hematemesis and hematochezia   Genitourinary: negative for frequency, dysuria, nocturia, urinary incontinence, and hematuria   Integument: negative for rash, skin lesions, bruises. Hematologic/Lymphatic: negative for easy bruising, bleeding, lymphadenopathy, petechiae and swelling/edema   Endocrine: negative for heat or cold intolerance, tremor, weight changes, change in bowel habits and hair loss   Musculoskeletal: negative for myalgias, arthralgias, pain, joint swelling,and bone pain   Neurological: negative for headaches, dizziness, seizures, weakness, numbness       OBJECTIVE:         There were no vitals filed for this visit. PHYSICAL EXAM:   General appearance - well appearing, no in pain or distress   Mental status - alert and cooperative   Eyes - pupils equal and reactive, extraocular eye movements intact   Ears - bilateral TM's and external ear canals normal   Mouth - mucous membranes moist, pharynx normal without lesions   Neck - supple, no significant adenopathy   Lymphatics - no palpable lymphadenopathy, no hepatosplenomegaly   Chest - clear to auscultation, no wheezes, rales or rhonchi, symmetric air entry   Left chest surgical scar healing well  Heart - normal rate, regular rhythm, normal S1, S2, no murmurs, rubs, clicks or gallops   Abdomen - soft, nontender, nondistended, no masses or organomegaly   Neurological - alert, oriented, normal speech, no focal findings or movement disorder noted   Musculoskeletal - no joint tenderness, deformity or swelling   Extremities - peripheral pulses normal, no pedal edema, no clubbing or cyanosis   Skin - normal coloration and turgor, no rashes, no suspicious skin lesions noted ,  LABORATORY DATA:     Lab Results   Component Value Date    WBC 5.7 04/13/2020    HGB 12.6 04/13/2020    HCT 37.5 04/13/2020    MCV 93.1 04/13/2020     04/13/2020    LYMPHOPCT 20 (L) 04/13/2020    RBC 4.03 04/13/2020    MCH 31.3 04/13/2020    MCHC 33.6 04/13/2020    RDW 13.2 04/13/2020    MONOPCT 5 04/13/2020    BASOPCT 1 04/13/2020    NEUTROABS 4.16 04/13/2020    LYMPHSABS 1.14 04/13/2020    MONOSABS 0.31 04/13/2020    EOSABS 0.04 04/13/2020    BASOSABS 0.04 04/13/2020       Chemistry        Component Value Date/Time     04/13/2020 0940    K 3.9 04/13/2020 0940     04/13/2020 0940    CO2 27 04/13/2020 0940    BUN 11 04/13/2020 0940    CREATININE 0.62 04/13/2020 0940    CREATININE 0.67 04/13/2020 0940        Component Value Date/Time    CALCIUM 9.3 04/13/2020 0940    ALKPHOS 71 04/13/2020 0940    AST 19 04/13/2020 0940    ALT 14 04/13/2020 0940    BILITOT 0.27 (L) 04/13/2020 0940        PATHOLOGY DATA:   Pathology:  CT Guided Biopsy ([facility]) 8/22/18:   LEFT LUNG, UPPER LOBE, CT GUIDED CORE NEEDLE BIOPSIES:  - INVASIVE ADENOCARCINOMA, CONSISTENT WITH LUNG PRIMARY. Collected: 9/28/2018   Received: 10/1/2018   Reported: 10/2/2018 17:47     -- Diagnosis --   1.  LYMPH NODE, LEVEL 9, BIOPSY:       -  ONE LYMPH NODE, NEGATIVE FOR MALIGNANCY (0/1). 2.  LYMPH NODE, LEVEL 11, DISSECTION:       -  ONE OF 2 LYMPH NODES POSITIVE FOR METASTATIC CARCINOMA (1/2). 3.  LYMPH NODE, LEVEL 5, DISSECTION:       -  ONE OF 2 LYMPH NODES POSITIVE FOR METASTATIC CARCINOMA (1/2).     -  CARCINOMA INVADES LARGE PERIPHERAL NERVE BRANCHES. 4.  LUNG, LEFT UPPER LOBE, LOBECTOMY:            -  WELL-DIFFERENTIATED INVASIVE ADENOCARCINOMA, 2.9 CM   GREATEST DIMENSION.     -  NEGATIVE FOR VISCERAL PLEURAL INVASION.          -  TUMOR INVOLVES SOFT TISSUE OF BRONCHIAL, VASCULAR AND PARENCHYMAL MARGINS.     -  5 PERIBRONCHIOLAR LYMPH NODES, POSITIVE FOR METASTATIC   ADENOCARCINOMA (5/5).        -  SEPARATE

## 2020-04-22 NOTE — TELEPHONE ENCOUNTER
Kisha Tavarez MD VISIT  DR Ana Cristina Torres IN TO SEE PATIENT  ORDERS RECEIVED  REFERRAL TO IR FOR PORT REMOVAL IN 3 MONTHS  RV 3 MONTHS W/ LABS  LABS CDP CMP 7/22/20  MD VISIT 7/22/20 @ 8AM  PORT FLUSH 6/8/20  REFERRAL TO IR FAXED FOR THEM TO CALL PATIENT TO SCHEDULE WHEN SCHEDULE OPENS UP D/T COVID-19  AVS PRINTED AND GIVEN TO PATIENT W/ INSTRUCTIONS  PATIENT DISCHARGED AMBULATORY

## 2020-05-19 ENCOUNTER — TELEPHONE (OUTPATIENT)
Dept: INTERVENTIONAL RADIOLOGY/VASCULAR | Age: 65
End: 2020-05-19

## 2020-05-28 ENCOUNTER — HOSPITAL ENCOUNTER (OUTPATIENT)
Dept: PREADMISSION TESTING | Age: 65
Setting detail: SPECIMEN
Discharge: HOME | End: 2020-06-01
Payer: COMMERCIAL

## 2020-05-28 PROCEDURE — U0004 COV-19 TEST NON-CDC HGH THRU: HCPCS

## 2020-05-29 LAB
SARS-COV-2, PCR: NOT DETECTED
SARS-COV-2, RAPID: NORMAL
SARS-COV-2: NORMAL
SOURCE: NORMAL

## 2020-06-01 ENCOUNTER — HOSPITAL ENCOUNTER (OUTPATIENT)
Dept: INTERVENTIONAL RADIOLOGY/VASCULAR | Age: 65
Discharge: HOME | End: 2020-06-03
Payer: COMMERCIAL

## 2020-06-01 VITALS
HEART RATE: 81 BPM | OXYGEN SATURATION: 98 % | RESPIRATION RATE: 16 BRPM | DIASTOLIC BLOOD PRESSURE: 70 MMHG | SYSTOLIC BLOOD PRESSURE: 120 MMHG

## 2020-06-01 PROCEDURE — 2709999900 IR REMOVE TUNNELED CVAD WO SQ PORT/PUMP

## 2020-06-01 PROCEDURE — 77001 FLUOROGUIDE FOR VEIN DEVICE: CPT | Performed by: RADIOLOGY

## 2020-06-01 PROCEDURE — 36590 REMOVAL TUNNELED CV CATH: CPT | Performed by: RADIOLOGY

## 2020-06-12 RX ORDER — ALBUTEROL SULFATE 90 UG/1
2 AEROSOL, METERED RESPIRATORY (INHALATION) 4 TIMES DAILY
Qty: 8.5 G | Refills: 3 | Status: SHIPPED | OUTPATIENT
Start: 2020-06-12 | End: 2020-10-21 | Stop reason: SDUPTHER

## 2020-06-23 ENCOUNTER — OFFICE VISIT (OUTPATIENT)
Dept: FAMILY MEDICINE CLINIC | Age: 65
End: 2020-06-23
Payer: COMMERCIAL

## 2020-06-23 ENCOUNTER — TELEPHONE (OUTPATIENT)
Dept: FAMILY MEDICINE CLINIC | Age: 65
End: 2020-06-23

## 2020-06-23 VITALS
TEMPERATURE: 97.1 F | WEIGHT: 172 LBS | OXYGEN SATURATION: 98 % | BODY MASS INDEX: 34.74 KG/M2 | HEART RATE: 85 BPM | DIASTOLIC BLOOD PRESSURE: 80 MMHG | SYSTOLIC BLOOD PRESSURE: 132 MMHG

## 2020-06-23 PROBLEM — F41.9 ANXIETY: Status: ACTIVE | Noted: 2020-06-23

## 2020-06-23 PROBLEM — I10 ESSENTIAL HYPERTENSION: Status: ACTIVE | Noted: 2020-06-23

## 2020-06-23 PROCEDURE — 99213 OFFICE O/P EST LOW 20 MIN: CPT | Performed by: FAMILY MEDICINE

## 2020-06-23 PROCEDURE — G8427 DOCREV CUR MEDS BY ELIG CLIN: HCPCS | Performed by: FAMILY MEDICINE

## 2020-06-23 PROCEDURE — G8417 CALC BMI ABV UP PARAM F/U: HCPCS | Performed by: FAMILY MEDICINE

## 2020-06-23 PROCEDURE — 3017F COLORECTAL CA SCREEN DOC REV: CPT | Performed by: FAMILY MEDICINE

## 2020-06-23 PROCEDURE — 1036F TOBACCO NON-USER: CPT | Performed by: FAMILY MEDICINE

## 2020-06-23 RX ORDER — LISINOPRIL AND HYDROCHLOROTHIAZIDE 12.5; 1 MG/1; MG/1
1 TABLET ORAL DAILY
Qty: 30 TABLET | Refills: 11 | Status: SHIPPED | OUTPATIENT
Start: 2020-06-23 | End: 2021-05-03 | Stop reason: SDUPTHER

## 2020-06-23 RX ORDER — LOVASTATIN 10 MG/1
10 TABLET ORAL NIGHTLY
Qty: 30 TABLET | Refills: 11 | Status: SHIPPED | OUTPATIENT
Start: 2020-06-23 | End: 2021-05-03 | Stop reason: SDUPTHER

## 2020-06-23 ASSESSMENT — PATIENT HEALTH QUESTIONNAIRE - PHQ9
SUM OF ALL RESPONSES TO PHQ QUESTIONS 1-9: 1
SUM OF ALL RESPONSES TO PHQ QUESTIONS 1-9: 1
SUM OF ALL RESPONSES TO PHQ9 QUESTIONS 1 & 2: 1
1. LITTLE INTEREST OR PLEASURE IN DOING THINGS: 0
2. FEELING DOWN, DEPRESSED OR HOPELESS: 1

## 2020-06-23 NOTE — PROGRESS NOTES
General FM note    Morteza Gonzales is a 60 y.o. female who presents today for follow up on her  medical conditions as noted below. Morteza Gonzales is c/o of No chief complaint on file.       Patient Active Problem List:     Status post lobectomy of lung     Non-small cell cancer of left lung ([facility]     Malignant neoplasm of bronchus of upper lobe, left ([address]     Past Medical History:   Diagnosis Date    Anxiety     Benign polyp of large intestine     Cancer ([facility] [facility]     LT UPPER LOBE    COPD (chronic obstructive pulmonary disease) ([facility] [address] [facility]     Hx of blood clots     DVT LT LEG    Hyperlipidemia     Hypertension     Liver hemangioma     Lung nodule     BARAK  Nodule    Snores     not tested for apnea    Uterine fibroid 09/2010    Wears glasses       Past Surgical History:   Procedure Laterality Date    ABDOMINAL HERNIA REPAIR  2012    BREAST BIOPSY Left 1983    BRONCHOSCOPY  10/1/2018    BRONCHOSCOPY performed by Marianna Fulton MD at [address] [facility]  2010    HYSTERECTOMY, TOTAL ABDOMINAL      LUNG BIOPSY      LUNG REMOVAL, PARTIAL Right 09/28/2018    Robotic assisted left lobectomy, upper with lymph node biopsy    OTHER SURGICAL HISTORY Right 11/05/2018    IR PORT PLACEMENT EQUAL OR GREATER THAN 5 YEARS    [facility] [address] INCL FLUOR GDNCE DX W/CELL WASHG SPX N/A 10/29/2018    BRONCHOSCOPY performed by Pamela Pillai MD at [facility] 1 LOBE LOBECT Left 9/28/2018    XI ROBOTIC ASSISTED LEFT UPPER LOBECTOMY MULTIPLE LYMPH NODE BIOPSY, INTERCOSTAL  NERVE BLOCK ABLATION W/EXPAREL performed by Miryam Price MD at [address] [facility] History   Problem Relation Age of Onset    Cancer Mother         LUNG    Cancer Sister         LUNG     Current Outpatient Medications   Medication Sig Dispense Refill    lisinopril-hydroCHLOROthiazide (PRINZIDE;ZESTORETIC) 10-12.5 MG per tablet Take 1 tablet by mouth daily 30 tablet 11    lovastatin Gastrointestinal: Negative for diarrhea, constipation and blood in stool. Musculoskeletal: Negative for back pain and gait problem. Skin: Negative for color change and rash. Objective:   Physical Exam  Constitutional: VS (see above). General appearance: normal development, habitus and attention, no deformities. No distress. Eyes: normal conjunctiva and lids. CAV: RRR, no RMG. No edema lower extremities. Pulmo: CTA bilateral, no CWR. Decreased breathing sounds left. Skin: no rashes, lesions or ulcers. Musculoskeletal: normal gait. Nails: no clubbing or cyanosis. Psychiatric: alert and oriented to place, time and person. Normal mood and affect. Assessment:       Diagnosis Orders   1. Essential hypertension     2. Non-small cell cancer of left lung (HCC)  lisinopril-hydroCHLOROthiazide (PRINZIDE;ZESTORETIC) 10-12.5 MG per tablet   3. Anxiety         Plan:   Blood pressure well controlled. We will continue current care. Follow-up with a oncologist as indicated. Call for any changes. Call or return to clinic prn if these symptoms worsen or fail to improve as anticipated. I have reviewed the instructions with the patient, answering all questions to her satisfaction. No follow-ups on file. No orders of the defined types were placed in this encounter. Orders Placed This Encounter   Medications    lisinopril-hydroCHLOROthiazide (PRINZIDE;ZESTORETIC) 10-12.5 MG per tablet     Sig: Take 1 tablet by mouth daily     Dispense:  30 tablet     Refill:  11    lovastatin (MEVACOR) 10 MG tablet     Sig: Take 1 tablet by mouth nightly     Dispense:  30 tablet     Refill:  6       Cristiane Maloney received counseling on the following healthy behaviors: nutrition, exercise and medication adherence  Reviewed prior labs and health maintenance. Continue current medications, diet and exercise. Discussed use, benefit, and side effects of prescribed medications. Barriers to medication compliance addressed.    Patient

## 2020-07-09 NOTE — TELEPHONE ENCOUNTER
RECEIVED E REQUEST FROM [employer] FOR REFILL OF XANAX. LAST WRITTEN #[phone] 04/22/2020. CURRENT ON APPOINTMENT WITH MD FOLLOW UP 07/22/2020  PENDED FOR REVIEW.

## 2020-07-10 RX ORDER — ALPRAZOLAM 0.25 MG/1
TABLET ORAL
Qty: 30 TABLET | Refills: 0 | Status: SHIPPED | OUTPATIENT
Start: 2020-07-10 | End: 2020-09-09 | Stop reason: SDUPTHER

## 2020-07-16 ENCOUNTER — HOSPITAL ENCOUNTER (OUTPATIENT)
Facility: MEDICAL CENTER | Age: 65
End: 2020-07-16
Payer: COMMERCIAL

## 2020-07-22 ENCOUNTER — TELEPHONE (OUTPATIENT)
Dept: ONCOLOGY | Age: 65
End: 2020-07-22

## 2020-07-22 ENCOUNTER — HOSPITAL ENCOUNTER (OUTPATIENT)
Facility: MEDICAL CENTER | Age: 65
Discharge: HOME | End: 2020-07-22
Payer: COMMERCIAL

## 2020-07-22 ENCOUNTER — OFFICE VISIT (OUTPATIENT)
Dept: ONCOLOGY | Age: 65
End: 2020-07-22
Payer: COMMERCIAL

## 2020-07-22 VITALS
TEMPERATURE: 98.5 F | HEIGHT: 59 IN | HEART RATE: 86 BPM | WEIGHT: 178.8 LBS | BODY MASS INDEX: 36.04 KG/M2 | SYSTOLIC BLOOD PRESSURE: 142 MMHG | DIASTOLIC BLOOD PRESSURE: 81 MMHG

## 2020-07-22 DIAGNOSIS — C34.92 NON-SMALL CELL CANCER OF LEFT LUNG (HCC): ICD-10-CM

## 2020-07-22 LAB
ABSOLUTE EOS #: 0.05 K/UL (ref 0–0.44)
ABSOLUTE IMMATURE GRANULOCYTE: 0.02 K/UL (ref 0–0.3)
ABSOLUTE LYMPH #: 1.49 K/UL (ref 1.1–3.7)
ABSOLUTE MONO #: 0.35 K/UL (ref 0.1–1.2)
ALBUMIN SERPL-MCNC: 4.3 G/DL (ref 3.5–5.2)
ALBUMIN/GLOBULIN RATIO: ABNORMAL (ref 1–2.5)
ALP BLD-CCNC: 73 U/L (ref 35–104)
ALT SERPL-CCNC: 13 U/L (ref 5–33)
ANION GAP SERPL CALCULATED.3IONS-SCNC: 13 MMOL/L (ref 9–17)
AST SERPL-CCNC: 17 U/L
BASOPHILS # BLD: 1 % (ref 0–2)
BASOPHILS ABSOLUTE: 0.04 K/UL (ref 0–0.2)
BILIRUB SERPL-MCNC: 0.34 MG/DL (ref 0.3–1.2)
BUN BLDV-MCNC: 11 MG/DL (ref 8–23)
BUN/CREAT BLD: 15 (ref 9–20)
CALCIUM SERPL-MCNC: 9.6 MG/DL (ref 8.6–10.4)
CHLORIDE BLD-SCNC: 100 MMOL/L (ref 98–107)
CO2: 26 MMOL/L (ref 20–31)
CREAT SERPL-MCNC: 0.72 MG/DL (ref 0.5–0.9)
DIFFERENTIAL TYPE: NORMAL
EOSINOPHILS RELATIVE PERCENT: 1 % (ref 1–4)
GFR AFRICAN AMERICAN: >60 ML/MIN
GFR NON-AFRICAN AMERICAN: >60 ML/MIN
GFR SERPL CREATININE-BSD FRML MDRD: ABNORMAL ML/MIN/{1.73_M2}
GFR SERPL CREATININE-BSD FRML MDRD: ABNORMAL ML/MIN/{1.73_M2}
GLUCOSE BLD-MCNC: 115 MG/DL (ref 70–99)
HCT VFR BLD CALC: 40.8 % (ref 36.3–47.1)
HEMOGLOBIN: 13.7 G/DL (ref 11.9–15.1)
IMMATURE GRANULOCYTES: 0 %
LYMPHOCYTES # BLD: 28 % (ref 24–43)
MCH RBC QN AUTO: 31.1 PG (ref 25.2–33.5)
MCHC RBC AUTO-ENTMCNC: 33.6 G/DL (ref 28.4–34.8)
MCV RBC AUTO: 92.5 FL (ref 82.6–102.9)
MONOCYTES # BLD: 7 % (ref 3–12)
NRBC AUTOMATED: 0 PER 100 WBC
PDW BLD-RTO: 12.7 % (ref 11.8–14.4)
PLATELET # BLD: 240 K/UL (ref 138–453)
PLATELET ESTIMATE: NORMAL
PMV BLD AUTO: 10.1 FL (ref 8.1–13.5)
POTASSIUM SERPL-SCNC: 3.9 MMOL/L (ref 3.7–5.3)
RBC # BLD: 4.41 M/UL (ref 3.95–5.11)
RBC # BLD: NORMAL 10*6/UL
SEG NEUTROPHILS: 63 % (ref 36–65)
SEGMENTED NEUTROPHILS ABSOLUTE COUNT: 3.45 K/UL (ref 1.5–8.1)
SODIUM BLD-SCNC: 139 MMOL/L (ref 135–144)
TOTAL PROTEIN: 7.1 G/DL (ref 6.4–8.3)
WBC # BLD: 5.4 K/UL (ref 3.5–11.3)
WBC # BLD: NORMAL 10*3/UL

## 2020-07-22 PROCEDURE — 99212 OFFICE O/P EST SF 10 MIN: CPT | Performed by: INTERNAL MEDICINE

## 2020-07-22 PROCEDURE — 3017F COLORECTAL CA SCREEN DOC REV: CPT | Performed by: INTERNAL MEDICINE

## 2020-07-22 PROCEDURE — G8417 CALC BMI ABV UP PARAM F/U: HCPCS | Performed by: INTERNAL MEDICINE

## 2020-07-22 PROCEDURE — 85025 COMPLETE CBC W/AUTO DIFF WBC: CPT

## 2020-07-22 PROCEDURE — 80053 COMPREHEN METABOLIC PANEL: CPT

## 2020-07-22 PROCEDURE — G8427 DOCREV CUR MEDS BY ELIG CLIN: HCPCS | Performed by: INTERNAL MEDICINE

## 2020-07-22 PROCEDURE — 99214 OFFICE O/P EST MOD 30 MIN: CPT | Performed by: INTERNAL MEDICINE

## 2020-07-22 PROCEDURE — 1036F TOBACCO NON-USER: CPT | Performed by: INTERNAL MEDICINE

## 2020-07-22 PROCEDURE — 36415 COLL VENOUS BLD VENIPUNCTURE: CPT

## 2020-07-22 NOTE — PROGRESS NOTES
mouth, sore throat, hoarseness and voice change   Respiratory: negative for cough , sputum, dyspnea, wheezing, hemoptysis, chest pain   Cardiovascular: negative for chest pain, dyspnea, palpitations, orthopnea, PND   Gastrointestinal: negative for nausea, vomiting, diarrhea, constipation, abdominal pain, Dysphagia, hematemesis and hematochezia   Genitourinary: negative for frequency, dysuria, nocturia, urinary incontinence, and hematuria   Integument: negative for rash, skin lesions, bruises. Hematologic/Lymphatic: negative for easy bruising, bleeding, lymphadenopathy, petechiae and swelling/edema   Endocrine: negative for heat or cold intolerance, tremor, weight changes, change in bowel habits and hair loss   Musculoskeletal: negative for myalgias, arthralgias, pain, joint swelling,and bone pain   Neurological: negative for headaches, dizziness, seizures, weakness, numbness       OBJECTIVE:         There were no vitals filed for this visit. PHYSICAL EXAM:   General appearance - well appearing, no in pain or distress   Mental status - alert and cooperative   Eyes - pupils equal and reactive, extraocular eye movements intact   Ears - bilateral TM's and external ear canals normal   Mouth - mucous membranes moist, pharynx normal without lesions   Neck - supple, no significant adenopathy   Lymphatics - no palpable lymphadenopathy, no hepatosplenomegaly   Chest - clear to auscultation, no wheezes, rales or rhonchi, symmetric air entry   Left chest surgical scar healing well  Heart - normal rate, regular rhythm, normal S1, S2, no murmurs, rubs, clicks or gallops   Abdomen - soft, nontender, nondistended, no masses or organomegaly   Neurological - alert, oriented, normal speech, no focal findings or movement disorder noted   Musculoskeletal - no joint tenderness, deformity or swelling   Extremities - peripheral pulses normal, no pedal edema, no clubbing or cyanosis   Skin - normal coloration and turgor, no rashes, no suspicious skin lesions noted ,  LABORATORY DATA:     Lab Results   Component Value Date    WBC 5.4 07/22/2020    HGB 13.7 07/22/2020    HCT 40.8 07/22/2020    MCV 92.5 07/22/2020     07/22/2020    LYMPHOPCT 28 07/22/2020    RBC 4.41 07/22/2020    MCH 31.1 07/22/2020    MCHC 33.6 07/22/2020    RDW 12.7 07/22/2020    MONOPCT 7 07/22/2020    BASOPCT 1 07/22/2020    NEUTROABS 3.45 07/22/2020    LYMPHSABS 1.49 07/22/2020    MONOSABS 0.35 07/22/2020    EOSABS 0.05 07/22/2020    BASOSABS 0.04 07/22/2020       Chemistry        Component Value Date/Time     04/13/2020 0940    K 3.9 04/13/2020 0940     04/13/2020 0940    CO2 27 04/13/2020 0940    BUN 11 04/13/2020 0940    CREATININE 0.62 04/13/2020 0940    CREATININE 0.67 04/13/2020 0940        Component Value Date/Time    CALCIUM 9.3 04/13/2020 0940    ALKPHOS 71 04/13/2020 0940    AST 19 04/13/2020 0940    ALT 14 04/13/2020 0940    BILITOT 0.27 (L) 04/13/2020 0940        PATHOLOGY DATA:   Pathology:  CT Guided Biopsy ([facility]) 8/22/18:   LEFT LUNG, UPPER LOBE, CT GUIDED CORE NEEDLE BIOPSIES:  - INVASIVE ADENOCARCINOMA, CONSISTENT WITH LUNG PRIMARY. Collected: 9/28/2018   Received: 10/1/2018   Reported: 10/2/2018 17:47     -- Diagnosis --   1.  LYMPH NODE, LEVEL 9, BIOPSY:       -  ONE LYMPH NODE, NEGATIVE FOR MALIGNANCY (0/1). 2.  LYMPH NODE, LEVEL 11, DISSECTION:       -  ONE OF 2 LYMPH NODES POSITIVE FOR METASTATIC CARCINOMA (1/2). 3.  LYMPH NODE, LEVEL 5, DISSECTION:       -  ONE OF 2 LYMPH NODES POSITIVE FOR METASTATIC CARCINOMA (1/2).     -  CARCINOMA INVADES LARGE PERIPHERAL NERVE BRANCHES. 4.  LUNG, LEFT UPPER LOBE, LOBECTOMY:            -  WELL-DIFFERENTIATED INVASIVE ADENOCARCINOMA, 2.9 CM   GREATEST DIMENSION.     -  NEGATIVE FOR VISCERAL PLEURAL INVASION.          -  TUMOR INVOLVES SOFT TISSUE OF BRONCHIAL, VASCULAR AND PARENCHYMAL MARGINS.     -  5 PERIBRONCHIOLAR LYMPH NODES, POSITIVE FOR METASTATIC   ADENOCARCINOMA (5/5).     -  SEPARATE SMALL INTRALOBAR FOCUS ATYPICAL ADENOMATOUS   HYPERPLASIA.           -  PATHOLOGIC STAGE:  pT1c, pN2, R1.     5.  LYMPH NODES, LEVEL 10, DISSECTION:       -  ONE OF 2 LYMPH NODES POSITIVE FOR METASTATIC CARCINOMA (1/2). IMAGING DATA:    CT chest 4/13/2020  FINDINGS:   Chest wall: No axillary adenopathy.  Right chest wall Port-A-Cath with the   tip near the cavoatrial junction.       Upper Abdomen: No adrenal nodule.  Dominant right hepatic hemangioma.  Stable   liver cysts.       Mediastinum: Mediastinal shift towards the left is unchanged.  No   cardiomegaly.  No pericardial effusion.  No right hilar adenopathy.  No   mediastinal adenopathy.  Postsurgical changes extend to the left hilum.       Lungs: Large bleb within the right upper lobe is unchanged.  Emphysematous   changes.  No pleural effusions.       Stable 4 mm sub solid nodule within the right upper lobe series 4, image 21.       Stable 4 mm right lower lobe nodule series 4, image 69.       Postsurgical changes within the left lung from left upper lobectomy.  Post   treatment changes along the left hilum are similar to the previous   examination.       Bones: Thoracic spine degenerative changes.  No suspicious osseous lesion.           Impression   Stable examination. ASSESSMENT:    Shabnam Oseguera Is a very pleasant 61-year-old  female with newly diagnosed left upper lobe non-small cell lung cancer, adenocarcinoma, status post Robotically assisted BARAK lobectomy with LN dissection and Intercostal nerve block with experal on 9/28/18. I discussed the surgical pathology, diagnosis, prognosis and treatment options with patient. She has [id number] disease, stage IIIA, she had R1 disease with positive margins. I discussed the NCCN guidelines with patient. She completed chemotherapy and has completed RT as well. CT scans showed no evidence of recurrence.   During today's visit, the patient and the family had a number of reasonable questions which were answered to their satisfaction. They verbalized understanding of the information provided and they agreed to proceed as outlined above. PLAN:   I reviewed the results of recent lab work  Based on Labs, physical examination, No e/o recurrence  Return to clinic in 3 months with CT scans and labs prior  With ongoing COVID pandemic, I will hold off removal of port at this time and will schedule in about 3 months    Jose Khoury MD  Hematologist/Medical Oncologist    I spent more than 25 minutes examining, evaluating, reviewing data and counseling the patient. Greater than 50% of that time was spent face-to-face with the patient in counseling and coordinating her care. This note is created with the assistance of a speech recognition program.  While intending to generate a document that actually reflects the content of the visit, the document can still have some errors including those of syntax and sound a like substitutions which may escape proof reading. It such instances, actual meaning can be extrapolated by contextual diversion.

## 2020-07-24 ENCOUNTER — TELEPHONE (OUTPATIENT)
Dept: ONCOLOGY | Age: 65
End: 2020-07-24

## 2020-07-24 NOTE — TELEPHONE ENCOUNTER
Name: Eve Weiss  : 1955  MRN: [id number]    Oncology Navigation Follow-Up Note  Navigator reviewing chart and discussing survivorship needs with [place], tavaresr will place referral.                                      Electronically signed by Óscar Jesus RN on 2020 at 1:34 PM

## 2020-07-30 ENCOUNTER — TELEPHONE (OUTPATIENT)
Dept: ONCOLOGY | Age: 65
End: 2020-07-30

## 2020-07-30 NOTE — TELEPHONE ENCOUNTER
Attempted to call patient in regard to survivorship referral. No answer. Left voicemail asking for return call.

## 2020-07-30 NOTE — TELEPHONE ENCOUNTER
Spoke with patient in regard to survivorship referral. At this time she is declining a visit but will call back to schedule if she decides she would like a survivorship visit in the future. Provided patient with survivorship navigator contact.

## 2020-09-10 RX ORDER — ALPRAZOLAM 0.25 MG/1
TABLET ORAL
Qty: 30 TABLET | Refills: 0 | Status: SHIPPED | OUTPATIENT
Start: 2020-09-10 | End: 2020-10-21 | Stop reason: SDUPTHER

## 2020-10-13 ENCOUNTER — HOSPITAL ENCOUNTER (OUTPATIENT)
Dept: CT IMAGING | Age: 65
Discharge: HOME | End: 2020-10-15
Payer: COMMERCIAL

## 2020-10-13 ENCOUNTER — HOSPITAL ENCOUNTER (OUTPATIENT)
Facility: MEDICAL CENTER | Age: 65
Discharge: HOME | End: 2020-10-13
Payer: COMMERCIAL

## 2020-10-13 DIAGNOSIS — C34.92 NON-SMALL CELL CANCER OF LEFT LUNG (HCC): ICD-10-CM

## 2020-10-13 LAB
ABSOLUTE EOS #: 0.08 K/UL (ref 0–0.44)
ABSOLUTE IMMATURE GRANULOCYTE: 0.02 K/UL (ref 0–0.3)
ABSOLUTE LYMPH #: 1.45 K/UL (ref 1.1–3.7)
ABSOLUTE MONO #: 0.32 K/UL (ref 0.1–1.2)
ALBUMIN SERPL-MCNC: 4.1 G/DL (ref 3.5–5.2)
ALBUMIN/GLOBULIN RATIO: NORMAL (ref 1–2.5)
ALP BLD-CCNC: 82 U/L (ref 35–104)
ALT SERPL-CCNC: 13 U/L (ref 5–33)
ANION GAP SERPL CALCULATED.3IONS-SCNC: 9 MMOL/L (ref 9–17)
AST SERPL-CCNC: 16 U/L
BASOPHILS # BLD: 1 % (ref 0–2)
BASOPHILS ABSOLUTE: 0.04 K/UL (ref 0–0.2)
BILIRUB SERPL-MCNC: 0.32 MG/DL (ref 0.3–1.2)
BUN BLDV-MCNC: 13 MG/DL (ref 8–23)
BUN/CREAT BLD: 18 (ref 9–20)
CALCIUM SERPL-MCNC: 9.5 MG/DL (ref 8.6–10.4)
CHLORIDE BLD-SCNC: 101 MMOL/L (ref 98–107)
CO2: 30 MMOL/L (ref 20–31)
CREAT SERPL-MCNC: 0.74 MG/DL (ref 0.5–0.9)
DIFFERENTIAL TYPE: NORMAL
EOSINOPHILS RELATIVE PERCENT: 2 % (ref 1–4)
GFR AFRICAN AMERICAN: >60 ML/MIN
GFR NON-AFRICAN AMERICAN: >60 ML/MIN
GFR SERPL CREATININE-BSD FRML MDRD: NORMAL ML/MIN/{1.73_M2}
GFR SERPL CREATININE-BSD FRML MDRD: NORMAL ML/MIN/{1.73_M2}
GLUCOSE BLD-MCNC: 85 MG/DL (ref 70–99)
HCT VFR BLD CALC: 39.9 % (ref 36.3–47.1)
HEMOGLOBIN: 13.5 G/DL (ref 11.9–15.1)
IMMATURE GRANULOCYTES: 0 %
LYMPHOCYTES # BLD: 27 % (ref 24–43)
MCH RBC QN AUTO: 31.5 PG (ref 25.2–33.5)
MCHC RBC AUTO-ENTMCNC: 33.8 G/DL (ref 28.4–34.8)
MCV RBC AUTO: 93.2 FL (ref 82.6–102.9)
MONOCYTES # BLD: 6 % (ref 3–12)
NRBC AUTOMATED: 0 PER 100 WBC
PDW BLD-RTO: 13 % (ref 11.8–14.4)
PLATELET # BLD: 253 K/UL (ref 138–453)
PLATELET ESTIMATE: NORMAL
PMV BLD AUTO: 9.8 FL (ref 8.1–13.5)
POTASSIUM SERPL-SCNC: 4.2 MMOL/L (ref 3.7–5.3)
RBC # BLD: 4.28 M/UL (ref 3.95–5.11)
RBC # BLD: NORMAL 10*6/UL
SEG NEUTROPHILS: 64 % (ref 36–65)
SEGMENTED NEUTROPHILS ABSOLUTE COUNT: 3.53 K/UL (ref 1.5–8.1)
SODIUM BLD-SCNC: 140 MMOL/L (ref 135–144)
TOTAL PROTEIN: 7.1 G/DL (ref 6.4–8.3)
WBC # BLD: 5.4 K/UL (ref 3.5–11.3)
WBC # BLD: NORMAL 10*3/UL

## 2020-10-13 PROCEDURE — 2580000003 HC RX 258: Performed by: INTERNAL MEDICINE

## 2020-10-13 PROCEDURE — 36415 COLL VENOUS BLD VENIPUNCTURE: CPT

## 2020-10-13 PROCEDURE — 74177 CT ABD & PELVIS W/CONTRAST: CPT

## 2020-10-13 PROCEDURE — 6360000004 HC RX CONTRAST MEDICATION: Performed by: INTERNAL MEDICINE

## 2020-10-13 PROCEDURE — 85025 COMPLETE CBC W/AUTO DIFF WBC: CPT

## 2020-10-13 PROCEDURE — 80053 COMPREHEN METABOLIC PANEL: CPT

## 2020-10-13 RX ORDER — 0.9 % SODIUM CHLORIDE 0.9 %
80 INTRAVENOUS SOLUTION INTRAVENOUS ONCE
Status: COMPLETED | OUTPATIENT
Start: 2020-10-13 | End: 2020-10-13

## 2020-10-13 RX ORDER — SODIUM CHLORIDE 0.9 % (FLUSH) 0.9 %
10 SYRINGE (ML) INJECTION ONCE
Status: COMPLETED | OUTPATIENT
Start: 2020-10-13 | End: 2020-10-13

## 2020-10-13 RX ADMIN — IOHEXOL 30 ML: 300 INJECTION, SOLUTION INTRAVENOUS at 09:32

## 2020-10-13 RX ADMIN — IOPAMIDOL 100 ML: 755 INJECTION, SOLUTION INTRAVENOUS at 09:32

## 2020-10-13 RX ADMIN — SODIUM CHLORIDE 80 ML: 9 INJECTION, SOLUTION INTRAVENOUS at 09:32

## 2020-10-13 RX ADMIN — Medication 10 ML: at 09:33

## 2020-10-14 ENCOUNTER — HOSPITAL ENCOUNTER (OUTPATIENT)
Facility: MEDICAL CENTER | Age: 65
End: 2020-10-14
Payer: COMMERCIAL

## 2020-10-21 ENCOUNTER — TELEPHONE (OUTPATIENT)
Dept: ONCOLOGY | Age: 65
End: 2020-10-21

## 2020-10-21 ENCOUNTER — OFFICE VISIT (OUTPATIENT)
Dept: ONCOLOGY | Age: 65
End: 2020-10-21
Payer: COMMERCIAL

## 2020-10-21 VITALS
WEIGHT: 167.5 LBS | DIASTOLIC BLOOD PRESSURE: 63 MMHG | TEMPERATURE: 97.5 F | BODY MASS INDEX: 33.83 KG/M2 | HEART RATE: 87 BPM | SYSTOLIC BLOOD PRESSURE: 117 MMHG | RESPIRATION RATE: 18 BRPM

## 2020-10-21 PROCEDURE — 99211 OFF/OP EST MAY X REQ PHY/QHP: CPT | Performed by: INTERNAL MEDICINE

## 2020-10-21 PROCEDURE — 99214 OFFICE O/P EST MOD 30 MIN: CPT | Performed by: INTERNAL MEDICINE

## 2020-10-21 PROCEDURE — G8427 DOCREV CUR MEDS BY ELIG CLIN: HCPCS | Performed by: INTERNAL MEDICINE

## 2020-10-21 PROCEDURE — 1036F TOBACCO NON-USER: CPT | Performed by: INTERNAL MEDICINE

## 2020-10-21 PROCEDURE — G8417 CALC BMI ABV UP PARAM F/U: HCPCS | Performed by: INTERNAL MEDICINE

## 2020-10-21 PROCEDURE — 99212 OFFICE O/P EST SF 10 MIN: CPT

## 2020-10-21 PROCEDURE — G8484 FLU IMMUNIZE NO ADMIN: HCPCS | Performed by: INTERNAL MEDICINE

## 2020-10-21 PROCEDURE — 3017F COLORECTAL CA SCREEN DOC REV: CPT | Performed by: INTERNAL MEDICINE

## 2020-10-21 RX ORDER — ALBUTEROL SULFATE 90 UG/1
2 AEROSOL, METERED RESPIRATORY (INHALATION) 4 TIMES DAILY
Qty: 8.5 G | Refills: 3 | Status: SHIPPED | OUTPATIENT
Start: 2020-10-21 | End: 2021-09-22

## 2020-10-21 RX ORDER — ALPRAZOLAM 0.25 MG/1
TABLET ORAL
Qty: 30 TABLET | Refills: 0 | Status: SHIPPED | OUTPATIENT
Start: 2020-10-21 | End: 2021-01-18 | Stop reason: SDUPTHER

## 2020-10-21 NOTE — TELEPHONE ENCOUNTER
Francisca Vargsa MD VISIT   DR. DOSHI IN TO SEE PT   ORDERS RECEIVED  RV IN 6 MONTHS W/ CT PRIOR  CT SCAN  SCHEDULED CT WITH CÉSAR ON 4/5/21   @ 9:00 AM ARRIVE BY 8:00 AM @ [facility]  LABS CDP CMP ARE DONE SAME DAY 4/5/21 WITH SCAN  MD VISIT 4/21/21 @ 8 AM  SCRIPTS SENT TO PT PHARMACY  AVS PRINTED WITH INSTRUCTIONS  GIVEN TO PT   PT DISCHARGED AMBULATORY

## 2020-10-21 NOTE — PROGRESS NOTES
Patient ID: Nona Friday, 1955, [id number], 63 y.o. Diagnosis:   Left upper lobe invasive lung adenocarcinoma, Status post lobectomy 9/28/18, Pathologic stage: pT1c, pN2, stage IIIa R1 with positive margin,    Will start chemotherapy followed By RT  Carbo taxol cycle 1 on 11/14/18  RT completed on 3/29/19  HISTORY OF PRESENT ILLNESS:    Oncologic History:  The patient is a 66 y.o.  female who is admitted to the hospital for Left upper lobe mass. Pt has a significant past medical history of Uterine fibroids requiring total abdominal hysterectomy, liver hemangioma, HTN, Hyperlipidemia, DVT Left leg, COPD, and anxiety. Pt was found to have a left upper lung adenocarcinoma and presented to the hospital on 9/28/2018 for a scheduled robotic-assisted left upper lobe lobectomy. Pathology is positive for metastatic adenocarcinoma. There are some lymph nodes positive and some evidence of invasion to surrounding structures seen during surgery. Surgical team is planning on discharging patient this evening from the hospital.  She was discharged from the hospital with plan to follow with oncology as outpatient. Interval history:   Patient is returning for follow-up visit. She is here to discuss the CT scan  results and further recommendations. She has mild TORO. She is taking inhalers. She denies any shortness of breath, drenching night sweats, fever chills. She complains of off-and-on pain at the surgical site. During this visit patient's allergy, social, medical, surgical history and medications were reviewed and updated.      Current Outpatient Medications   Medication Sig Dispense Refill    ALPRAZolam (XANAX) 0.25 MG tablet take 1 tablet by mouth nightly if needed for sleep 30 tablet 0    lisinopril-hydroCHLOROthiazide (PRINZIDE;ZESTORETIC) 10-12.5 MG per tablet Take 1 tablet by mouth daily 30 tablet 11    lovastatin (MEVACOR) 10 MG tablet Take 1 tablet by mouth nightly 30 tablet 11  albuterol sulfate HFA (PROAIR HFA) 108 (90 Base) MCG/ACT inhaler Inhale 2 puffs into the lungs 4 times daily 8.5 g 3     No current facility-administered medications for this visit. Social History     Socioeconomic History    Marital status:      Spouse name: Not on file    Number of children: Not on file    Years of education: Not on file    Highest education level: Not on file   Occupational History    Not on file   Social Needs    Financial resource strain: Not on file    Food insecurity     Worry: Not on file     Inability: Not on file    Transportation needs     Medical: Not on file     Non-medical: Not on file   Tobacco Use    Smoking status: Former Smoker     Packs/day: 1.50     Years: 30.00     Pack years: 45.00     Types: Cigarettes     Last attempt to quit: 2000     Years since quittin.8    Smokeless tobacco: Never Used   Substance and Sexual Activity    Alcohol use: Yes     Comment: Occassionally    Drug use: No    Sexual activity: Not on file   Lifestyle    Physical activity     Days per week: Not on file     Minutes per session: Not on file    Stress: Not on file   Relationships    Social connections     Talks on phone: Not on file     Gets together: Not on file     Attends Restorationist service: Not on file     Active member of club or organization: Not on file     Attends meetings of clubs or organizations: Not on file     Relationship status: Not on file    Intimate partner violence     Fear of current or ex partner: Not on file     Emotionally abused: Not on file     Physically abused: Not on file     Forced sexual activity: Not on file   Other Topics Concern    Not on file   Social History Narrative    Not on file       Family History   Problem Relation Age of Onset    Cancer Mother         LUNG    Cancer Sister         LUNG        REVIEW OF SYSTEM:     Constitutional: No fever or chills.  No night sweats, no weight loss   Eyes: No eye discharge, double vision, or eye pain   HEENT: negative for sore mouth, sore throat, hoarseness and voice change   Respiratory: negative for cough , sputum, dyspnea, wheezing, hemoptysis, chest pain   Cardiovascular: negative for chest pain, dyspnea, palpitations, orthopnea, PND   Gastrointestinal: negative for nausea, vomiting, diarrhea, constipation, abdominal pain, Dysphagia, hematemesis and hematochezia   Genitourinary: negative for frequency, dysuria, nocturia, urinary incontinence, and hematuria   Integument: negative for rash, skin lesions, bruises.    Hematologic/Lymphatic: negative for easy bruising, bleeding, lymphadenopathy, petechiae and swelling/edema   Endocrine: negative for heat or cold intolerance, tremor, weight changes, change in bowel habits and hair loss   Musculoskeletal: negative for myalgias, arthralgias, pain, joint swelling,and bone pain   Neurological: negative for headaches, dizziness, seizures, weakness, numbness       OBJECTIVE:         Vitals:    10/21/20 0816   BP: 117/63   Pulse: 87   Resp: 18   Temp: 97.5 °F (36.4 °C)   PHYSICAL EXAM:   General appearance - well appearing, no in pain or distress   Mental status - alert and cooperative   Eyes - pupils equal and reactive, extraocular eye movements intact   Ears - bilateral TM's and external ear canals normal   Mouth - mucous membranes moist, pharynx normal without lesions   Neck - supple, no significant adenopathy   Lymphatics - no palpable lymphadenopathy, no hepatosplenomegaly   Chest - clear to auscultation, no wheezes, rales or rhonchi, symmetric air entry   Left chest surgical scar healing well  Heart - normal rate, regular rhythm, normal S1, S2, no murmurs, rubs, clicks or gallops   Abdomen - soft, nontender, nondistended, no masses or organomegaly   Neurological - alert, oriented, normal speech, no focal findings or movement disorder noted   Musculoskeletal - no joint tenderness, deformity or swelling   Extremities - peripheral pulses normal, no pedal edema, no clubbing or cyanosis   Skin - normal coloration and turgor, no rashes, no suspicious skin lesions noted ,  LABORATORY DATA:     Lab Results   Component Value Date    WBC 5.4 10/13/2020    HGB 13.5 10/13/2020    HCT 39.9 10/13/2020    MCV 93.2 10/13/2020     10/13/2020    LYMPHOPCT 27 10/13/2020    RBC 4.28 10/13/2020    MCH 31.5 10/13/2020    MCHC 33.8 10/13/2020    RDW 13.0 10/13/2020    MONOPCT 6 10/13/2020    BASOPCT 1 10/13/2020    NEUTROABS 3.53 10/13/2020    LYMPHSABS 1.45 10/13/2020    MONOSABS 0.32 10/13/2020    EOSABS 0.08 10/13/2020    BASOSABS 0.04 10/13/2020       Chemistry        Component Value Date/Time     10/13/2020 0808    K 4.2 10/13/2020 0808     10/13/2020 0808    CO2 30 10/13/2020 0808    BUN 13 10/13/2020 0808    CREATININE 0.74 10/13/2020 0808        Component Value Date/Time    CALCIUM 9.5 10/13/2020 0808    ALKPHOS 82 10/13/2020 0808    AST 16 10/13/2020 0808    ALT 13 10/13/2020 0808    BILITOT 0.32 10/13/2020 0808        PATHOLOGY DATA:   Pathology:  CT Guided Biopsy ([facility]) 8/22/18:   LEFT LUNG, UPPER LOBE, CT GUIDED CORE NEEDLE BIOPSIES:  - INVASIVE ADENOCARCINOMA, CONSISTENT WITH LUNG PRIMARY. Collected: 9/28/2018   -- Diagnosis --   1.  LYMPH NODE, LEVEL 9, BIOPSY:       -  ONE LYMPH NODE, NEGATIVE FOR MALIGNANCY (0/1). 2.  LYMPH NODE, LEVEL 11, DISSECTION:       -  ONE OF 2 LYMPH NODES POSITIVE FOR METASTATIC CARCINOMA (1/2). 3.  LYMPH NODE, LEVEL 5, DISSECTION:       -  ONE OF 2 LYMPH NODES POSITIVE FOR METASTATIC CARCINOMA (1/2).     -  CARCINOMA INVADES LARGE PERIPHERAL NERVE BRANCHES. 4.  LUNG, LEFT UPPER LOBE, LOBECTOMY:            -  WELL-DIFFERENTIATED INVASIVE ADENOCARCINOMA, 2.9 CM   GREATEST DIMENSION.     -  NEGATIVE FOR VISCERAL PLEURAL INVASION.          -  TUMOR INVOLVES SOFT TISSUE OF BRONCHIAL, VASCULAR AND PARENCHYMAL MARGINS.     -  5 PERIBRONCHIOLAR LYMPH NODES, POSITIVE FOR METASTATIC   ADENOCARCINOMA (5/5).        -  SEPARATE SMALL INTRALOBAR FOCUS ATYPICAL ADENOMATOUS   HYPERPLASIA.           -  PATHOLOGIC STAGE:  pT1c, pN2, R1.     5.  LYMPH NODES, LEVEL 10, DISSECTION:       -  ONE OF 2 LYMPH NODES POSITIVE FOR METASTATIC CARCINOMA (1/2). IMAGING DATA:    CT chest 4/13/2020  FINDINGS:   Chest wall: No axillary adenopathy.  Right chest wall Port-A-Cath with the   tip near the cavoatrial junction.       Upper Abdomen: No adrenal nodule.  Dominant right hepatic hemangioma.  Stable   liver cysts.       Mediastinum: Mediastinal shift towards the left is unchanged.  No   cardiomegaly.  No pericardial effusion.  No right hilar adenopathy.  No   mediastinal adenopathy.  Postsurgical changes extend to the left hilum.       Lungs: Large bleb within the right upper lobe is unchanged.  Emphysematous   changes.  No pleural effusions.       Stable 4 mm sub solid nodule within the right upper lobe series 4, image 21.       Stable 4 mm right lower lobe nodule series 4, image 69.       Postsurgical changes within the left lung from left upper lobectomy.  Post   treatment changes along the left hilum are similar to the previous   examination.       Bones: Thoracic spine degenerative changes.  No suspicious osseous lesion.           Impression   Stable examination. CT chest abd 10/13/20:  Impression    Stable chest CT.  Stable post lobectomy changes and post radiation changes on    the left.  A few punctate pulmonary nodules on the right are similar.         Stable CT abdomen and pelvis        ASSESSMENT:    Olimpia Izquierdo Is a very pleasant 82-year-old  female with newly diagnosed left upper lobe non-small cell lung cancer, adenocarcinoma, status post Robotically assisted BARAK lobectomy with LN dissection and Intercostal nerve block with experal on 9/28/18. I discussed the surgical pathology, diagnosis, prognosis and treatment options with patient. She has [id number] disease, stage IIIA, she had R1 disease with positive margins.  I discussed the NCCN guidelines with patient. She completed chemotherapy and has completed RT as well. CT scans showed no evidence of recurrence. During today's visit, the patient and the family had a number of reasonable questions which were answered to their satisfaction. They verbalized understanding of the information provided and they agreed to proceed as outlined above. PLAN:   I reviewed the results of recent CT scan with patient  No e/o recurrence, small right lung nodule appears stable  I advised her to follow with her PCP for her Inhalers prescriptions and further management of her asthma. She requested a refill today, so went ahead and filled one but she will follow with her PCP to adjust the medication in future  Return to clinic in 6 months with CT scans and labs prior      Jose Crawford MD  Hematologist/Medical Oncologist    I spent more than 25 minutes examining, evaluating, reviewing data and counseling the patient. Greater than 50% of that time was spent face-to-face with the patient in counseling and coordinating her care. This note is created with the assistance of a speech recognition program.  While intending to generate a document that actually reflects the content of the visit, the document can still have some errors including those of syntax and sound a like substitutions which may escape proof reading. It such instances, actual meaning can be extrapolated by contextual diversion.

## 2021-01-07 ENCOUNTER — TELEPHONE (OUTPATIENT)
Dept: FAMILY MEDICINE CLINIC | Age: 66
End: 2021-01-07

## 2021-01-07 ENCOUNTER — TELEPHONE (OUTPATIENT)
Dept: ONCOLOGY | Age: 66
End: 2021-01-07

## 2021-01-07 DIAGNOSIS — C34.92 NON-SMALL CELL CANCER OF LEFT LUNG (HCC): ICD-10-CM

## 2021-01-07 NOTE — TELEPHONE ENCOUNTER
SPOKE [address] PHONE. SHE IS REQUESTING REFILL ON XANAX. REVIEWED MD NOTES AND PT IS IN REMISSION AND BEING FOLLOWED FOR SURVEILLANCE ONLY. PT TO REQUEST THROUGH PCP. [address] [place] [address] Pkwy AND REQUEST WRITER PHONE OFFICE. WRITER PHONED AND EXPLAINED ABOVE. OFFICE WILL CHECK WITH DR Tim Pimentel TO DETERMINE IF SHE WILL FOLLOW FOR INHALER AND XANAX OR WHAT SHE FEELS IS APPROPRIATE.

## 2021-01-07 NOTE — TELEPHONE ENCOUNTER
Please let the oncologist know that I will write for Xanax. Thank you so much for his care for the patient. Thank you.

## 2021-01-18 RX ORDER — ALPRAZOLAM 0.25 MG/1
TABLET ORAL
Qty: 30 TABLET | Refills: 0 | Status: SHIPPED | OUTPATIENT
Start: 2021-01-18 | End: 2021-04-08

## 2021-01-18 NOTE — TELEPHONE ENCOUNTER
I called in the medication to her pharmacy. Please make sure the patient will see me back in 3 months for follow-up due to the medication. Thank you.

## 2021-04-05 ENCOUNTER — HOSPITAL ENCOUNTER (OUTPATIENT)
Dept: CT IMAGING | Age: 66
Discharge: HOME | End: 2021-04-07
Payer: COMMERCIAL

## 2021-04-05 ENCOUNTER — HOSPITAL ENCOUNTER (OUTPATIENT)
Facility: MEDICAL CENTER | Age: 66
Discharge: HOME | End: 2021-04-05
Payer: COMMERCIAL

## 2021-04-05 DIAGNOSIS — C34.92 NON-SMALL CELL CANCER OF LEFT LUNG (HCC): ICD-10-CM

## 2021-04-05 LAB
ABSOLUTE EOS #: 0.07 K/UL (ref 0–0.44)
ABSOLUTE IMMATURE GRANULOCYTE: 0.01 K/UL (ref 0–0.3)
ABSOLUTE LYMPH #: 1.41 K/UL (ref 1.1–3.7)
ABSOLUTE MONO #: 0.36 K/UL (ref 0.1–1.2)
ALBUMIN SERPL-MCNC: 4.2 G/DL (ref 3.5–5.2)
ALBUMIN/GLOBULIN RATIO: ABNORMAL (ref 1–2.5)
ALP BLD-CCNC: 72 U/L (ref 35–104)
ALT SERPL-CCNC: 17 U/L (ref 5–33)
ANION GAP SERPL CALCULATED.3IONS-SCNC: 9 MMOL/L (ref 9–17)
AST SERPL-CCNC: 15 U/L
BASOPHILS # BLD: 1 % (ref 0–2)
BASOPHILS ABSOLUTE: 0.04 K/UL (ref 0–0.2)
BILIRUB SERPL-MCNC: 0.47 MG/DL (ref 0.3–1.2)
BUN BLDV-MCNC: 14 MG/DL (ref 8–23)
BUN/CREAT BLD: 19 (ref 9–20)
CALCIUM SERPL-MCNC: 9 MG/DL (ref 8.6–10.4)
CHLORIDE BLD-SCNC: 102 MMOL/L (ref 98–107)
CO2: 25 MMOL/L (ref 20–31)
CREAT SERPL-MCNC: 0.75 MG/DL (ref 0.5–0.9)
DIFFERENTIAL TYPE: NORMAL
EOSINOPHILS RELATIVE PERCENT: 1 % (ref 1–4)
GFR AFRICAN AMERICAN: >60 ML/MIN
GFR NON-AFRICAN AMERICAN: >60 ML/MIN
GFR SERPL CREATININE-BSD FRML MDRD: ABNORMAL ML/MIN/{1.73_M2}
GFR SERPL CREATININE-BSD FRML MDRD: ABNORMAL ML/MIN/{1.73_M2}
GLUCOSE BLD-MCNC: 108 MG/DL (ref 70–99)
HCT VFR BLD CALC: 38.3 % (ref 36.3–47.1)
HEMOGLOBIN: 13 G/DL (ref 11.9–15.1)
IMMATURE GRANULOCYTES: 0 %
LYMPHOCYTES # BLD: 27 % (ref 24–43)
MCH RBC QN AUTO: 31.8 PG (ref 25.2–33.5)
MCHC RBC AUTO-ENTMCNC: 33.9 G/DL (ref 28.4–34.8)
MCV RBC AUTO: 93.6 FL (ref 82.6–102.9)
MONOCYTES # BLD: 7 % (ref 3–12)
NRBC AUTOMATED: 0 PER 100 WBC
PDW BLD-RTO: 12.4 % (ref 11.8–14.4)
PLATELET # BLD: 219 K/UL (ref 138–453)
PLATELET ESTIMATE: NORMAL
PMV BLD AUTO: 10 FL (ref 8.1–13.5)
POTASSIUM SERPL-SCNC: 3.9 MMOL/L (ref 3.7–5.3)
RBC # BLD: 4.09 M/UL (ref 3.95–5.11)
RBC # BLD: NORMAL 10*6/UL
SEG NEUTROPHILS: 64 % (ref 36–65)
SEGMENTED NEUTROPHILS ABSOLUTE COUNT: 3.4 K/UL (ref 1.5–8.1)
SODIUM BLD-SCNC: 136 MMOL/L (ref 135–144)
TOTAL PROTEIN: 6.6 G/DL (ref 6.4–8.3)
WBC # BLD: 5.3 K/UL (ref 3.5–11.3)
WBC # BLD: NORMAL 10*3/UL

## 2021-04-05 PROCEDURE — 2580000003 HC RX 258: Performed by: INTERNAL MEDICINE

## 2021-04-05 PROCEDURE — 71260 CT THORAX DX C+: CPT

## 2021-04-05 PROCEDURE — 85025 COMPLETE CBC W/AUTO DIFF WBC: CPT

## 2021-04-05 PROCEDURE — 80053 COMPREHEN METABOLIC PANEL: CPT

## 2021-04-05 PROCEDURE — 6360000004 HC RX CONTRAST MEDICATION: Performed by: INTERNAL MEDICINE

## 2021-04-05 PROCEDURE — 36415 COLL VENOUS BLD VENIPUNCTURE: CPT

## 2021-04-05 RX ORDER — SODIUM CHLORIDE 0.9 % (FLUSH) 0.9 %
10 SYRINGE (ML) INJECTION ONCE
Status: COMPLETED | OUTPATIENT
Start: 2021-04-05 | End: 2021-04-05

## 2021-04-05 RX ORDER — 0.9 % SODIUM CHLORIDE 0.9 %
80 INTRAVENOUS SOLUTION INTRAVENOUS ONCE
Status: COMPLETED | OUTPATIENT
Start: 2021-04-05 | End: 2021-04-05

## 2021-04-05 RX ADMIN — SODIUM CHLORIDE, PRESERVATIVE FREE 10 ML: 5 INJECTION INTRAVENOUS at 09:39

## 2021-04-05 RX ADMIN — SODIUM CHLORIDE 80 ML: 9 INJECTION, SOLUTION INTRAVENOUS at 09:39

## 2021-04-05 RX ADMIN — IOPAMIDOL 75 ML: 755 INJECTION, SOLUTION INTRAVENOUS at 09:38

## 2021-04-08 DIAGNOSIS — C34.92 NON-SMALL CELL CANCER OF LEFT LUNG (HCC): ICD-10-CM

## 2021-04-08 RX ORDER — ALPRAZOLAM 0.25 MG/1
TABLET ORAL
Qty: 30 TABLET | Refills: 0 | Status: SHIPPED | OUTPATIENT
Start: 2021-04-08 | End: 2021-06-18

## 2021-04-08 NOTE — TELEPHONE ENCOUNTER
Evon Cisneros is calling to request a refill on the following medication(s):    Last Visit Date (If Applicable):  4/90/7902    Next Visit Date:    5/3/2021    Medication Request:  Requested Prescriptions     Pending Prescriptions Disp Refills    ALPRAZolam (XANAX) 0.25 MG tablet [Pharmacy Med Name: ALPRAZOLAM 0.25 MG TABLET] 30 tablet      Sig: take 1 tablet by mouth nightly if needed for sleep for 30 DAYS

## 2021-04-14 ENCOUNTER — HOSPITAL ENCOUNTER (OUTPATIENT)
Facility: MEDICAL CENTER | Age: 66
End: 2021-04-14
Payer: COMMERCIAL

## 2021-04-21 ENCOUNTER — OFFICE VISIT (OUTPATIENT)
Dept: ONCOLOGY | Age: 66
End: 2021-04-21
Payer: COMMERCIAL

## 2021-04-21 ENCOUNTER — TELEPHONE (OUTPATIENT)
Dept: ONCOLOGY | Age: 66
End: 2021-04-21

## 2021-04-21 VITALS
SYSTOLIC BLOOD PRESSURE: 121 MMHG | HEART RATE: 104 BPM | TEMPERATURE: 97.8 F | DIASTOLIC BLOOD PRESSURE: 84 MMHG | WEIGHT: 164.5 LBS | RESPIRATION RATE: 16 BRPM | BODY MASS INDEX: 33.22 KG/M2

## 2021-04-21 DIAGNOSIS — C34.92 NON-SMALL CELL CANCER OF LEFT LUNG (HCC): ICD-10-CM

## 2021-04-21 PROCEDURE — 99214 OFFICE O/P EST MOD 30 MIN: CPT | Performed by: INTERNAL MEDICINE

## 2021-04-21 PROCEDURE — 3017F COLORECTAL CA SCREEN DOC REV: CPT | Performed by: INTERNAL MEDICINE

## 2021-04-21 PROCEDURE — 1036F TOBACCO NON-USER: CPT | Performed by: INTERNAL MEDICINE

## 2021-04-21 PROCEDURE — 99211 OFF/OP EST MAY X REQ PHY/QHP: CPT | Performed by: INTERNAL MEDICINE

## 2021-04-21 PROCEDURE — G8417 CALC BMI ABV UP PARAM F/U: HCPCS | Performed by: INTERNAL MEDICINE

## 2021-04-21 PROCEDURE — G8427 DOCREV CUR MEDS BY ELIG CLIN: HCPCS | Performed by: INTERNAL MEDICINE

## 2021-04-21 NOTE — TELEPHONE ENCOUNTER
Juancarlos Alfaro MD VISIT  RV IN 6 MTHS W/LABS  LABS CDP CMP 10/20/21  MD VISIT 10/20/21 @ 8AM  AVS PRINTED W/ INSTRUCTIONS AND GIVEN TO PT ON EXIT

## 2021-04-21 NOTE — PROGRESS NOTES
Patient ID: Kim Carrero, 1955, [id number], 63 y.o. Diagnosis:   Left upper lobe invasive lung adenocarcinoma, Status post lobectomy 9/28/18, Pathologic stage: pT1c, pN2, stage IIIa R1 with positive margin,    Will start chemotherapy followed By RT  Carbo taxol cycle 1 on 11/14/18  RT completed on 3/29/19  HISTORY OF PRESENT ILLNESS:    Oncologic History:  The patient is a 66 y.o.  female who is admitted to the hospital for Left upper lobe mass. Pt has a significant past medical history of Uterine fibroids requiring total abdominal hysterectomy, liver hemangioma, HTN, Hyperlipidemia, DVT Left leg, COPD, and anxiety. Pt was found to have a left upper lung adenocarcinoma and presented to the hospital on 9/28/2018 for a scheduled robotic-assisted left upper lobe lobectomy. Pathology is positive for metastatic adenocarcinoma. There are some lymph nodes positive and some evidence of invasion to surrounding structures seen during surgery. Surgical team is planning on discharging patient this evening from the hospital.  She was discharged from the hospital with plan to follow with oncology as outpatient. Interval history:   Patient is returning for follow-up visit and to discuss lab results, CT scan results and further recommendations. Clinically she denies any chest pain, shortness of breath, unintentional weight loss, drenching night sweats or fever chills. Her CT scan showed stable findings. Her lung nodules are stable. During this visit patient's allergy, social, medical, surgical history and medications were reviewed and updated.      Current Outpatient Medications   Medication Sig Dispense Refill    ALPRAZolam (XANAX) 0.25 MG tablet take 1 tablet by mouth nightly if needed for sleep for 30 DAYS 30 tablet 0    albuterol sulfate HFA (PROAIR HFA) 108 (90 Base) MCG/ACT inhaler Inhale 2 puffs into the lungs 4 times daily 8.5 g 3    lisinopril-hydroCHLOROthiazide (PRINZIDE;ZESTORETIC) 10-12.5 MG per tablet Take 1 tablet by mouth daily 30 tablet 11    lovastatin (MEVACOR) 10 MG tablet Take 1 tablet by mouth nightly 30 tablet 11     No current facility-administered medications for this visit. Social History     Socioeconomic History    Marital status:      Spouse name: Not on file    Number of children: Not on file    Years of education: Not on file    Highest education level: Not on file   Occupational History    Not on file   Social Needs    Financial resource strain: Not on file    Food insecurity     Worry: Not on file     Inability: Not on file    Transportation needs     Medical: Not on file     Non-medical: Not on file   Tobacco Use    Smoking status: Former Smoker     Packs/day: 1.50     Years: 30.00     Pack years: 45.00     Types: Cigarettes     Quit date:      Years since quittin.3    Smokeless tobacco: Never Used   Substance and Sexual Activity    Alcohol use: Yes     Comment: Occassionally    Drug use: No    Sexual activity: Not on file   Lifestyle    Physical activity     Days per week: Not on file     Minutes per session: Not on file    Stress: Not on file   Relationships    Social connections     Talks on phone: Not on file     Gets together: Not on file     Attends [employer] service: Not on file     Active member of club or organization: Not on file     Attends meetings of clubs or organizations: Not on file     Relationship status: Not on file    Intimate partner violence     Fear of current or ex partner: Not on file     Emotionally abused: Not on file     Physically abused: Not on file     Forced sexual activity: Not on file   Other Topics Concern    Not on file   Social History Narrative    Not on file       Family History   Problem Relation Age of Onset    Cancer Mother         LUNG    Cancer Sister         LUNG        REVIEW OF SYSTEM:     Constitutional: No fever or chills.  No night sweats, no weight loss Eyes: No eye discharge, double vision, or eye pain   HEENT: negative for sore mouth, sore throat, hoarseness and voice change   Respiratory: negative for cough , sputum, dyspnea, wheezing, hemoptysis, chest pain   Cardiovascular: negative for chest pain, dyspnea, palpitations, orthopnea, PND   Gastrointestinal: negative for nausea, vomiting, diarrhea, constipation, abdominal pain, Dysphagia, hematemesis and hematochezia   Genitourinary: negative for frequency, dysuria, nocturia, urinary incontinence, and hematuria   Integument: negative for rash, skin lesions, bruises.    Hematologic/Lymphatic: negative for easy bruising, bleeding, lymphadenopathy, petechiae and swelling/edema   Endocrine: negative for heat or cold intolerance, tremor, weight changes, change in bowel habits and hair loss   Musculoskeletal: negative for myalgias, arthralgias, pain, joint swelling,and bone pain   Neurological: negative for headaches, dizziness, seizures, weakness, numbness       OBJECTIVE:         Vitals:    04/21/21 0818   BP: 121/84   Pulse: 104   Resp: 16   Temp: 97.8 °F (36.6 °C)   PHYSICAL EXAM:   General appearance - well appearing, no in pain or distress   Mental status - alert and cooperative   Eyes - pupils equal and reactive, extraocular eye movements intact   Ears - bilateral TM's and external ear canals normal   Mouth - mucous membranes moist, pharynx normal without lesions   Neck - supple, no significant adenopathy   Lymphatics - no palpable lymphadenopathy, no hepatosplenomegaly   Chest - clear to auscultation, no wheezes, rales or rhonchi, symmetric air entry   Left chest surgical scar healing well  Heart - normal rate, regular rhythm, normal S1, S2, no murmurs, rubs, clicks or gallops   Abdomen - soft, nontender, nondistended, no masses or organomegaly   Neurological - alert, oriented, normal speech, no focal findings or movement disorder noted   Musculoskeletal - no joint tenderness, deformity or swelling Extremities - peripheral pulses normal, no pedal edema, no clubbing or cyanosis   Skin - normal coloration and turgor, no rashes, no suspicious skin lesions noted ,  LABORATORY DATA:     Lab Results   Component Value Date    WBC 5.3 04/05/2021    HGB 13.0 04/05/2021    HCT 38.3 04/05/2021    MCV 93.6 04/05/2021     04/05/2021    LYMPHOPCT 27 04/05/2021    RBC 4.09 04/05/2021    MCH 31.8 04/05/2021    MCHC 33.9 04/05/2021    RDW 12.4 04/05/2021    MONOPCT 7 04/05/2021    BASOPCT 1 04/05/2021    NEUTROABS 3.40 04/05/2021    LYMPHSABS 1.41 04/05/2021    MONOSABS 0.36 04/05/2021    EOSABS 0.07 04/05/2021    BASOSABS 0.04 04/05/2021       Chemistry        Component Value Date/Time     04/05/2021 0823    K 3.9 04/05/2021 0823     04/05/2021 0823    CO2 25 04/05/2021 0823    BUN 14 04/05/2021 0823    CREATININE 0.75 04/05/2021 0823        Component Value Date/Time    CALCIUM 9.0 04/05/2021 0823    ALKPHOS 72 04/05/2021 0823    AST 15 04/05/2021 0823    ALT 17 04/05/2021 0823    BILITOT 0.47 04/05/2021 0823        PATHOLOGY DATA:   Pathology:  CT Guided Biopsy ([facility]) 8/22/18:   LEFT LUNG, UPPER LOBE, CT GUIDED CORE NEEDLE BIOPSIES:  - INVASIVE ADENOCARCINOMA, CONSISTENT WITH LUNG PRIMARY. Collected: 9/28/2018   -- Diagnosis --   1.  LYMPH NODE, LEVEL 9, BIOPSY:       -  ONE LYMPH NODE, NEGATIVE FOR MALIGNANCY (0/1). 2.  LYMPH NODE, LEVEL 11, DISSECTION:       -  ONE OF 2 LYMPH NODES POSITIVE FOR METASTATIC CARCINOMA (1/2). 3.  LYMPH NODE, LEVEL 5, DISSECTION:       -  ONE OF 2 LYMPH NODES POSITIVE FOR METASTATIC CARCINOMA (1/2).     -  CARCINOMA INVADES LARGE PERIPHERAL NERVE BRANCHES. 4.  LUNG, LEFT UPPER LOBE, LOBECTOMY:            -  WELL-DIFFERENTIATED INVASIVE ADENOCARCINOMA, 2.9 CM   GREATEST DIMENSION.     -  NEGATIVE FOR VISCERAL PLEURAL INVASION.          -  TUMOR INVOLVES SOFT TISSUE OF BRONCHIAL, VASCULAR AND PARENCHYMAL MARGINS.        -  5 PERIBRONCHIOLAR LYMPH NODES, POSITIVE FOR METASTATIC   ADENOCARCINOMA (5/5).     -  SEPARATE SMALL INTRALOBAR FOCUS ATYPICAL ADENOMATOUS   HYPERPLASIA.           -  PATHOLOGIC STAGE:  pT1c, pN2, R1.     5.  LYMPH NODES, LEVEL 10, DISSECTION:       -  ONE OF 2 LYMPH NODES POSITIVE FOR METASTATIC CARCINOMA (1/2). IMAGING DATA:    CT chest 4/13/2020  FINDINGS:   Chest wall: No axillary adenopathy.  Right chest wall Port-A-Cath with the   tip near the cavoatrial junction.       Upper Abdomen: No adrenal nodule.  Dominant right hepatic hemangioma.  Stable   liver cysts.       Mediastinum: Mediastinal shift towards the left is unchanged.  No   cardiomegaly.  No pericardial effusion.  No right hilar adenopathy.  No   mediastinal adenopathy.  Postsurgical changes extend to the left hilum.       Lungs: Large bleb within the right upper lobe is unchanged.  Emphysematous   changes.  No pleural effusions.       Stable 4 mm sub solid nodule within the right upper lobe series 4, image 21.       Stable 4 mm right lower lobe nodule series 4, image 69.       Postsurgical changes within the left lung from left upper lobectomy.  Post   treatment changes along the left hilum are similar to the previous   examination.       Bones: Thoracic spine degenerative changes.  No suspicious osseous lesion.           Impression   Stable examination. CT chest abd 10/13/20:  Impression    Stable chest CT.  Stable post lobectomy changes and post radiation changes on    the left.  A few punctate pulmonary nodules on the right are similar.         Stable CT abdomen and pelvis        ASSESSMENT:    Austyn Cowan Is a very pleasant 63 y.o.  female with newly diagnosed left upper lobe non-small cell lung cancer, adenocarcinoma, status post Robotically assisted BARAK lobectomy with LN dissection and Intercostal nerve block with experal on 9/28/18. I discussed the surgical pathology, diagnosis, prognosis and treatment options with patient.   She has [id number] disease, stage IIIA, she had R1 disease with positive margins. I discussed the NCCN guidelines with patient. She completed chemotherapy and has completed RT as well. CT scans showed no evidence of recurrence. During today's visit, the patient and the family had a number of reasonable questions which were answered to their satisfaction. They verbalized understanding of the information provided and they agreed to proceed as outlined above. PLAN:   I reviewed the results of recent CT scan, laboratory data and discuss further recommendations with patient. No evidence of recurrence based on the scans. Clinically she is doing well   Return to clinic in 6 months with labs prior       Jose Liz MD  Hematologist/Medical Oncologist    I spent more than 25 minutes examining, evaluating, reviewing data and counseling the patient. Greater than 50% of that time was spent face-to-face with the patient in counseling and coordinating her care. This note is created with the assistance of a speech recognition program.  While intending to generate a document that actually reflects the content of the visit, the document can still have some errors including those of syntax and sound a like substitutions which may escape proof reading. It such instances, actual meaning can be extrapolated by contextual diversion.

## 2021-05-03 ENCOUNTER — OFFICE VISIT (OUTPATIENT)
Dept: FAMILY MEDICINE CLINIC | Age: 66
End: 2021-05-03
Payer: COMMERCIAL

## 2021-05-03 VITALS
OXYGEN SATURATION: 96 % | BODY MASS INDEX: 33 KG/M2 | HEART RATE: 93 BPM | WEIGHT: 163.4 LBS | TEMPERATURE: 97.3 F | DIASTOLIC BLOOD PRESSURE: 81 MMHG | SYSTOLIC BLOOD PRESSURE: 138 MMHG

## 2021-05-03 DIAGNOSIS — Z12.11 COLON CANCER SCREENING: ICD-10-CM

## 2021-05-03 DIAGNOSIS — K63.5 POLYP OF COLON, UNSPECIFIED PART OF COLON, UNSPECIFIED TYPE: Primary | ICD-10-CM

## 2021-05-03 DIAGNOSIS — C34.92 NON-SMALL CELL CANCER OF LEFT LUNG (HCC): ICD-10-CM

## 2021-05-03 DIAGNOSIS — Z12.31 ENCOUNTER FOR SCREENING MAMMOGRAM FOR MALIGNANT NEOPLASM OF BREAST: ICD-10-CM

## 2021-05-03 DIAGNOSIS — Z11.59 NEED FOR HEPATITIS C SCREENING TEST: ICD-10-CM

## 2021-05-03 DIAGNOSIS — I10 ESSENTIAL HYPERTENSION: ICD-10-CM

## 2021-05-03 DIAGNOSIS — Z13.6 ENCOUNTER FOR LIPID SCREENING FOR CARDIOVASCULAR DISEASE: ICD-10-CM

## 2021-05-03 DIAGNOSIS — Z13.220 ENCOUNTER FOR LIPID SCREENING FOR CARDIOVASCULAR DISEASE: ICD-10-CM

## 2021-05-03 PROCEDURE — 3017F COLORECTAL CA SCREEN DOC REV: CPT | Performed by: FAMILY MEDICINE

## 2021-05-03 PROCEDURE — G8417 CALC BMI ABV UP PARAM F/U: HCPCS | Performed by: FAMILY MEDICINE

## 2021-05-03 PROCEDURE — 99213 OFFICE O/P EST LOW 20 MIN: CPT | Performed by: FAMILY MEDICINE

## 2021-05-03 PROCEDURE — 1036F TOBACCO NON-USER: CPT | Performed by: FAMILY MEDICINE

## 2021-05-03 PROCEDURE — G8427 DOCREV CUR MEDS BY ELIG CLIN: HCPCS | Performed by: FAMILY MEDICINE

## 2021-05-03 RX ORDER — LOVASTATIN 10 MG/1
10 TABLET ORAL NIGHTLY
Qty: 30 TABLET | Refills: 11 | Status: SHIPPED | OUTPATIENT
Start: 2021-05-03 | End: 2022-04-21 | Stop reason: SDUPTHER

## 2021-05-03 RX ORDER — LISINOPRIL AND HYDROCHLOROTHIAZIDE 12.5; 1 MG/1; MG/1
1 TABLET ORAL DAILY
Qty: 30 TABLET | Refills: 11 | Status: SHIPPED | OUTPATIENT
Start: 2021-05-03 | End: 2022-04-21 | Stop reason: SDUPTHER

## 2021-05-03 ASSESSMENT — PATIENT HEALTH QUESTIONNAIRE - PHQ9
SUM OF ALL RESPONSES TO PHQ QUESTIONS 1-9: 0
SUM OF ALL RESPONSES TO PHQ9 QUESTIONS 1 & 2: 0

## 2021-05-03 NOTE — PROGRESS NOTES
General FM note    Gela Dietrich is a 63 y.o. female who presents today for follow up on her  medical conditions as noted below.   Gela Dietrich is c/o of   Chief Complaint   Patient presents with    Annual Exam       Patient Active Problem List:     Status post lobectomy of lung     Non-small cell cancer of left lung ([facility] [address]     Malignant neoplasm of bronchus of upper lobe, left ([address]     Essential hypertension     Anxiety     Past Medical History:   Diagnosis Date    Anxiety     Benign polyp of large intestine     Cancer ([facility] [address]     LT UPPER LOBE    COPD (chronic obstructive pulmonary disease) ([facility] [address]     Hx of blood clots     DVT LT LEG    Hyperlipidemia     Hypertension     Liver hemangioma     Lung nodule     BARAK  Nodule    Snores     not tested for apnea    Uterine fibroid 09/2010    Wears glasses       Past Surgical History:   Procedure Laterality Date    ABDOMINAL HERNIA REPAIR  2012    BREAST BIOPSY Left 1983    BRONCHOSCOPY  10/1/2018    BRONCHOSCOPY performed by Brie Crabtree MD at [facility]  2010    HYSTERECTOMY, TOTAL ABDOMINAL      LUNG BIOPSY      LUNG REMOVAL, PARTIAL Right 09/28/2018    Robotic assisted left lobectomy, upper with lymph node biopsy    OTHER SURGICAL HISTORY Right 11/05/2018    IR PORT PLACEMENT EQUAL OR GREATER THAN 5 YEARS    [place] [address] INCL FLUOR GDNCE DX W/CELL WASHG SPX N/A 10/29/2018    BRONCHOSCOPY performed by Lizeth Mendoza MD at [facility] 1 LOBE LOBECT Left 9/28/2018    XI ROBOTIC ASSISTED LEFT UPPER LOBECTOMY MULTIPLE LYMPH NODE BIOPSY, INTERCOSTAL  NERVE BLOCK ABLATION W/EXPAREL performed by Farhad Hubbard MD at [address] [facility] History   Problem Relation Age of Onset    Cancer Mother         LUNG    Cancer Sister         LUNG     Current Outpatient Medications   Medication Sig Dispense Refill    lisinopril-hydroCHLOROthiazide (PRINZIDE;ZESTORETIC) 10-12.5 MG per tablet Take 1 tablet by mouth daily 30 tablet 11    lovastatin (MEVACOR) 10 MG tablet Take 1 tablet by mouth nightly 30 tablet 11    ALPRAZolam (XANAX) 0.25 MG tablet take 1 tablet by mouth nightly if needed for sleep for 30 DAYS 30 tablet 0    albuterol sulfate HFA (PROAIR HFA) 108 (90 Base) MCG/ACT inhaler Inhale 2 puffs into the lungs 4 times daily 8.5 g 3     No current facility-administered medications for this visit. ALLERGIES:    Allergies   Allergen Reactions    Codeine Nausea And Vomiting       Social History     Tobacco Use    Smoking status: Former Smoker     Packs/day: 1.50     Years: 30.00     Pack years: 45.00     Types: Cigarettes     Quit date:      Years since quittin.3    Smokeless tobacco: Never Used   Substance Use Topics    Alcohol use: Yes     Comment: Occassionally      Body mass index is 33 kg/m². /81   Pulse 93   Temp 97.3 °F (36.3 °C)   Wt 163 lb 6.4 oz (74.1 kg)   SpO2 96%   BMI 33.00 kg/m²     Subjective:      HPI    62 yo female coming today for follow-up. Overall she has been doing well. No concerns. She did have a physical exam/telehealth visit in January. She was mother verified. She is status post lung cancer treated in 2018. She says she has been doing well. Has follow-up of her oncologist every 6-month. SP hsyterectomy. Has been using Xanax on and off for anxiety. Really does not use it daily. Needs to have a mammogram she also needs to have a repeat colon cancer screening test due to polyps, 3 years ago. Needs have some blood work done. Review of Systems   Constitutional: Negative for fever and unexpected weight change. Pertinent items are noted in HPI. Objective:   Physical Exam  Constitutional: VS (see above). General appearance: normal development, habitus and attention, no deformities. No distress. Eyes: normal conjunctiva and lids. CAV: RRR, no RMG. No edema lower extremities. Pulmo: CTA bilateral, no CWR.   Skin: no rashes, lesions or ulcers. Musculoskeletal: normal gait. Nails: no clubbing or cyanosis. Psychiatric: alert and oriented to place, time and person. Normal mood and affect. Assessment:       Diagnosis Orders   1. Polyp of colon, unspecified part of colon, unspecified type  JOSEFINA Medel MD, Gastroenterology, [facility]   2. Colon cancer screening  JOSEFINA Medel MD, Gastroenterology, [facility]   3. Encounter for screening mammogram for malignant neoplasm of breast  IAN DIGITAL SCREEN W OR WO CAD BILATERAL   4. Need for hepatitis C screening test  Hepatitis C Antibody   5. Encounter for lipid screening for cardiovascular disease  Lipid Panel   6. Essential hypertension  Lipid Panel    lisinopril-hydroCHLOROthiazide (PRINZIDE;ZESTORETIC) 10-12.5 MG per tablet    lovastatin (MEVACOR) 10 MG tablet   7. Non-small cell cancer of left lung [facility])         Plan:   Patient will call for any changes. Medications refilled. Follow-up with specialist as indicated. Return in about 9 months (around 2/3/2022), or if symptoms worsen or fail to improve, for [facility].   Orders Placed This Encounter   Procedures    IAN DIGITAL SCREEN W OR WO CAD BILATERAL     Standing Status:   Future     Standing Expiration Date:   7/3/2022    Hepatitis C Antibody     Standing Status:   Future     Standing Expiration Date:   5/3/2022    Lipid Panel     Standing Status:   Future     Standing Expiration Date:   5/3/2022     Order Specific Question:   Is Patient Fasting?/# of Hours     Answer:   yes   Pasha Sofia MD, Gastroenterology, [facility]     Referral Priority:   Routine     Referral Type:   Eval and Treat     Referral Reason:   Specialty Services Required     Referred to Provider:   Jocelin Steen MD     Requested Specialty:   Gastroenterology     Number of Visits Requested:   1     Orders Placed This Encounter   Medications    lisinopril-hydroCHLOROthiazide (PRINZIDE;ZESTORETIC) 10-12.5 MG per tablet     Sig: Take 1 tablet by mouth daily     Dispense:  30 tablet     Refill:  11    lovastatin (MEVACOR) 10 MG tablet     Sig: Take 1 tablet by mouth nightly     Dispense:  30 tablet     Refill:  11       Call or return to clinic prn if these symptoms worsen or fail to improve as anticipated. I have reviewed the instructions with the patient, answering all questions to her satisfaction. Wm Kaufman received counseling on the following healthy behaviors: nutrition, exercise and medication adherence  Reviewed prior labs and health maintenance. Continue current medications, diet and exercise. Discussed use, benefit, and side effects of prescribed medications. Barriers to medication compliance addressed. Patient given educational materials - see patient instructions. All patient questions answered. Patient voiced understanding.       Electronically signed by Gissel Motta MD on 5/3/2021 at 9:26 AM       (Please note that portions of this note were completed with a voice recognition program. Efforts were made to edit the dictations but occasionally words are mis-transcribed.)

## 2021-06-18 DIAGNOSIS — C34.92 NON-SMALL CELL CANCER OF LEFT LUNG (HCC): ICD-10-CM

## 2021-06-18 RX ORDER — ALPRAZOLAM 0.25 MG/1
TABLET ORAL
Qty: 30 TABLET | Refills: 0 | Status: SHIPPED | OUTPATIENT
Start: 2021-06-18 | End: 2021-09-21

## 2021-06-18 NOTE — TELEPHONE ENCOUNTER
Bill Saeed is calling to request a refill on the following medication(s):    Last Visit Date (If Applicable):  0/3/4520    Next Visit Date:    Visit date not found    Medication Request:  Requested Prescriptions     Pending Prescriptions Disp Refills    ALPRAZolam (XANAX) 0.25 MG tablet [Pharmacy Med Name: ALPRAZOLAM 0.25 MG TABLET] 30 tablet      Sig: take 1 tablet by mouth nightly if needed for sleep for 30 DAYS

## 2021-09-01 ENCOUNTER — HOSPITAL ENCOUNTER (OUTPATIENT)
Dept: MAMMOGRAPHY | Age: 66
Discharge: HOME | End: 2021-09-03
Payer: COMMERCIAL

## 2021-09-01 DIAGNOSIS — Z12.31 ENCOUNTER FOR SCREENING MAMMOGRAM FOR MALIGNANT NEOPLASM OF BREAST: ICD-10-CM

## 2021-09-01 PROCEDURE — 77067 SCR MAMMO BI INCL CAD: CPT

## 2021-09-21 DIAGNOSIS — C34.92 NON-SMALL CELL CANCER OF LEFT LUNG (HCC): ICD-10-CM

## 2021-09-21 RX ORDER — ALPRAZOLAM 0.25 MG/1
TABLET ORAL
Qty: 30 TABLET | Refills: 0 | Status: SHIPPED | OUTPATIENT
Start: 2021-09-21 | End: 2022-01-03

## 2021-09-22 NOTE — TELEPHONE ENCOUNTER
RECEIVED INTERFACE REQUEST FROM [facility] FOR REFILL OF ALBUTEROL INHALER. MD FOLLOW UP 10/20/21  PEND TO MD FOR REVIEW.

## 2021-09-28 RX ORDER — ALBUTEROL SULFATE 90 UG/1
AEROSOL, METERED RESPIRATORY (INHALATION)
Qty: 18 G | Refills: 11 | Status: SHIPPED | OUTPATIENT
Start: 2021-09-28

## 2021-10-12 ENCOUNTER — HOSPITAL ENCOUNTER (OUTPATIENT)
Facility: MEDICAL CENTER | Age: 66
End: 2021-10-12
Payer: COMMERCIAL

## 2021-10-12 ENCOUNTER — HOSPITAL ENCOUNTER (OUTPATIENT)
Age: 66
Discharge: HOME | End: 2021-10-12
Payer: COMMERCIAL

## 2021-10-12 DIAGNOSIS — Z11.59 NEED FOR HEPATITIS C SCREENING TEST: ICD-10-CM

## 2021-10-12 DIAGNOSIS — C34.92 NON-SMALL CELL CANCER OF LEFT LUNG (HCC): ICD-10-CM

## 2021-10-12 DIAGNOSIS — I10 ESSENTIAL HYPERTENSION: ICD-10-CM

## 2021-10-12 DIAGNOSIS — Z13.6 ENCOUNTER FOR LIPID SCREENING FOR CARDIOVASCULAR DISEASE: ICD-10-CM

## 2021-10-12 DIAGNOSIS — Z13.220 ENCOUNTER FOR LIPID SCREENING FOR CARDIOVASCULAR DISEASE: ICD-10-CM

## 2021-10-12 LAB
ABSOLUTE EOS #: 0.06 K/UL (ref 0–0.44)
ABSOLUTE IMMATURE GRANULOCYTE: <0.03 K/UL (ref 0–0.3)
ABSOLUTE LYMPH #: 1.31 K/UL (ref 1.1–3.7)
ABSOLUTE MONO #: 0.36 K/UL (ref 0.1–1.2)
ALBUMIN SERPL-MCNC: 4.5 G/DL (ref 3.5–5.2)
ALBUMIN/GLOBULIN RATIO: 2 (ref 1–2.5)
ALP BLD-CCNC: 74 U/L (ref 35–104)
ALT SERPL-CCNC: 14 U/L (ref 5–33)
ANION GAP SERPL CALCULATED.3IONS-SCNC: 13 MMOL/L (ref 9–17)
AST SERPL-CCNC: 16 U/L
BASOPHILS # BLD: 1 % (ref 0–2)
BASOPHILS ABSOLUTE: 0.05 K/UL (ref 0–0.2)
BILIRUB SERPL-MCNC: 0.51 MG/DL (ref 0.3–1.2)
BUN BLDV-MCNC: 12 MG/DL (ref 8–23)
BUN/CREAT BLD: ABNORMAL (ref 9–20)
CALCIUM SERPL-MCNC: 9.4 MG/DL (ref 8.6–10.4)
CHLORIDE BLD-SCNC: 102 MMOL/L (ref 98–107)
CHOLESTEROL/HDL RATIO: 3
CHOLESTEROL: 232 MG/DL
CO2: 25 MMOL/L (ref 20–31)
CREAT SERPL-MCNC: 0.66 MG/DL (ref 0.5–0.9)
DIFFERENTIAL TYPE: NORMAL
EOSINOPHILS RELATIVE PERCENT: 1 % (ref 1–4)
GFR AFRICAN AMERICAN: >60 ML/MIN
GFR NON-AFRICAN AMERICAN: >60 ML/MIN
GFR SERPL CREATININE-BSD FRML MDRD: ABNORMAL ML/MIN/{1.73_M2}
GFR SERPL CREATININE-BSD FRML MDRD: ABNORMAL ML/MIN/{1.73_M2}
GLUCOSE BLD-MCNC: 100 MG/DL (ref 70–99)
HCT VFR BLD CALC: 40.8 % (ref 36.3–47.1)
HDLC SERPL-MCNC: 77 MG/DL
HEMOGLOBIN: 13.8 G/DL (ref 11.9–15.1)
HEPATITIS C ANTIBODY: NONREACTIVE
IMMATURE GRANULOCYTES: 0 %
LDL CHOLESTEROL: 127 MG/DL (ref 0–130)
LYMPHOCYTES # BLD: 26 % (ref 24–43)
MCH RBC QN AUTO: 31.2 PG (ref 25.2–33.5)
MCHC RBC AUTO-ENTMCNC: 33.8 G/DL (ref 28.4–34.8)
MCV RBC AUTO: 92.1 FL (ref 82.6–102.9)
MONOCYTES # BLD: 7 % (ref 3–12)
NRBC AUTOMATED: 0 PER 100 WBC
PDW BLD-RTO: 12.5 % (ref 11.8–14.4)
PLATELET # BLD: 253 K/UL (ref 138–453)
PLATELET ESTIMATE: NORMAL
PMV BLD AUTO: 10.4 FL (ref 8.1–13.5)
POTASSIUM SERPL-SCNC: 4.3 MMOL/L (ref 3.7–5.3)
RBC # BLD: 4.43 M/UL (ref 3.95–5.11)
RBC # BLD: NORMAL 10*6/UL
SEG NEUTROPHILS: 65 % (ref 36–65)
SEGMENTED NEUTROPHILS ABSOLUTE COUNT: 3.23 K/UL (ref 1.5–8.1)
SODIUM BLD-SCNC: 140 MMOL/L (ref 135–144)
TOTAL PROTEIN: 6.8 G/DL (ref 6.4–8.3)
TRIGL SERPL-MCNC: 140 MG/DL
VLDLC SERPL CALC-MCNC: ABNORMAL MG/DL (ref 1–30)
WBC # BLD: 5 K/UL (ref 3.5–11.3)
WBC # BLD: NORMAL 10*3/UL

## 2021-10-12 PROCEDURE — 85025 COMPLETE CBC W/AUTO DIFF WBC: CPT

## 2021-10-12 PROCEDURE — 86803 HEPATITIS C AB TEST: CPT

## 2021-10-12 PROCEDURE — 80061 LIPID PANEL: CPT

## 2021-10-12 PROCEDURE — 80053 COMPREHEN METABOLIC PANEL: CPT

## 2021-10-12 PROCEDURE — 36415 COLL VENOUS BLD VENIPUNCTURE: CPT

## 2021-10-19 DIAGNOSIS — C34.92 NON-SMALL CELL CANCER OF LEFT LUNG (HCC): Primary | ICD-10-CM

## 2021-10-20 ENCOUNTER — OFFICE VISIT (OUTPATIENT)
Dept: ONCOLOGY | Age: 66
End: 2021-10-20
Payer: COMMERCIAL

## 2021-10-20 ENCOUNTER — TELEPHONE (OUTPATIENT)
Dept: ONCOLOGY | Age: 66
End: 2021-10-20

## 2021-10-20 VITALS
BODY MASS INDEX: 32.88 KG/M2 | HEART RATE: 92 BPM | WEIGHT: 162.8 LBS | DIASTOLIC BLOOD PRESSURE: 79 MMHG | TEMPERATURE: 97.4 F | SYSTOLIC BLOOD PRESSURE: 133 MMHG | RESPIRATION RATE: 18 BRPM

## 2021-10-20 DIAGNOSIS — R22.32 LUMP OF SKIN OF LEFT UPPER EXTREMITY: Primary | ICD-10-CM

## 2021-10-20 DIAGNOSIS — C34.92 NON-SMALL CELL CANCER OF LEFT LUNG (HCC): ICD-10-CM

## 2021-10-20 PROCEDURE — G8427 DOCREV CUR MEDS BY ELIG CLIN: HCPCS | Performed by: INTERNAL MEDICINE

## 2021-10-20 PROCEDURE — G8484 FLU IMMUNIZE NO ADMIN: HCPCS | Performed by: INTERNAL MEDICINE

## 2021-10-20 PROCEDURE — 3017F COLORECTAL CA SCREEN DOC REV: CPT | Performed by: INTERNAL MEDICINE

## 2021-10-20 PROCEDURE — G8417 CALC BMI ABV UP PARAM F/U: HCPCS | Performed by: INTERNAL MEDICINE

## 2021-10-20 PROCEDURE — 1036F TOBACCO NON-USER: CPT | Performed by: INTERNAL MEDICINE

## 2021-10-20 PROCEDURE — 99211 OFF/OP EST MAY X REQ PHY/QHP: CPT | Performed by: INTERNAL MEDICINE

## 2021-10-20 PROCEDURE — 99214 OFFICE O/P EST MOD 30 MIN: CPT | Performed by: INTERNAL MEDICINE

## 2021-10-20 NOTE — PROGRESS NOTES
Patient ID: Leticia Spann, 1955, [id number], 61 y.o. Diagnosis:   Left upper lobe invasive lung adenocarcinoma, Status post lobectomy 9/28/18, Pathologic stage: pT1c, pN2, stage IIIa R1 with positive margin,    Will start chemotherapy followed By RT  Carbo taxol cycle 1 on 11/14/18  Radiation Therapy completed on 3/29/19  HISTORY OF PRESENT ILLNESS:    Oncologic History:  The patient is a 66 y.o.  female who is admitted to the hospital for Left upper lobe mass. Pt has a significant past medical history of Uterine fibroids requiring total abdominal hysterectomy, liver hemangioma, HTN, Hyperlipidemia, DVT Left leg, COPD, and anxiety. Pt was found to have a left upper lung adenocarcinoma and presented to the hospital on 9/28/2018 for a scheduled robotic-assisted left upper lobe lobectomy. Pathology is positive for metastatic adenocarcinoma. There are some lymph nodes positive and some evidence of invasion to surrounding structures seen during surgery. Surgical team is planning on discharging patient this evening from the hospital.  She was discharged from the hospital with plan to follow with oncology as outpatient. Interval history:   Patient is return for follow-up visit and to discuss lab results and further recommendations. She denies any chest pain, shortness of breath, cough and hemoptysis. She does have mild dyspnea on exertion especially with flight of stairs. She reports small lump in her forearm and also arm which are not painful but seems legs been there for some time. She denies any unintentional weight loss, drenching night sweats fever chills. During this visit patient's allergy, social, medical, surgical history and medications were reviewed and updated.      Current Outpatient Medications   Medication Sig Dispense Refill    albuterol sulfate  (90 Base) MCG/ACT inhaler inhale 2 puffs by mouth four times a day 18 g 11    ALPRAZolam (XANAX) 0.25 MG tablet take 1 tablet by mouth nightly if needed for sleep for 30 DAYS 30 tablet 0    lisinopril-hydroCHLOROthiazide (PRINZIDE;ZESTORETIC) 10-12.5 MG per tablet Take 1 tablet by mouth daily 30 tablet 11    lovastatin (MEVACOR) 10 MG tablet Take 1 tablet by mouth nightly 30 tablet 11     No current facility-administered medications for this visit. Social History     Socioeconomic History    Marital status:      Spouse name: Not on file    Number of children: Not on file    Years of education: Not on file    Highest education level: Not on file   Occupational History    Not on file   Tobacco Use    Smoking status: Former Smoker     Packs/day: 1.50     Years: 30.00     Pack years: 45.00     Types: Cigarettes     Quit date:      Years since quittin.    Smokeless tobacco: Never Used   Vaping Use    Vaping Use: Never used   Substance and Sexual Activity    Alcohol use: Yes     Comment: Occassionally    Drug use: No    Sexual activity: Not on file   Other Topics Concern    Not on file   Social History Narrative    Not on file     Social Determinants of Health     Financial Resource Strain:     Difficulty of Paying Living Expenses:    Food Insecurity:     Worried About [facility] in the Last Year:     [facility] in the Last Year:    Transportation Needs:     Lack of Transportation (Medical):      Lack of Transportation (Non-Medical):    Physical Activity:     Days of Exercise per Week:     Minutes of Exercise per Session:    Stress:     Feeling of Stress :    Social Connections:     Frequency of Communication with Friends and Family:     Frequency of Social Gatherings with Friends and Family:     Attends Nondenominational Services:     Active Member of Clubs or Organizations:     Attends Club or Organization Meetings:     Marital Status:    Intimate Partner Violence:     Fear of Current or Ex-Partner:     Emotionally Abused:     Physically Abused:     Sexually Abused: Family History   Problem Relation Age of Onset    Cancer Mother         LUNG    Cancer Sister         LUNG        REVIEW OF SYSTEM:     Constitutional: No fever or chills. No night sweats, no weight loss   Eyes: No eye discharge, double vision, or eye pain   HEENT: negative for sore mouth, sore throat, hoarseness and voice change   Respiratory: negative for cough , sputum, dyspnea, wheezing, hemoptysis, chest pain   Cardiovascular: negative for chest pain, dyspnea, palpitations, orthopnea, PND   Gastrointestinal: negative for nausea, vomiting, diarrhea, constipation, abdominal pain, Dysphagia, hematemesis and hematochezia   Genitourinary: negative for frequency, dysuria, nocturia, urinary incontinence, and hematuria   Integument: negative for rash, skin lesions, bruises.    Hematologic/Lymphatic: negative for easy bruising, bleeding, lymphadenopathy, petechiae and swelling/edema   Endocrine: negative for heat or cold intolerance, tremor, weight changes, change in bowel habits and hair loss   Musculoskeletal: negative for myalgias, arthralgias, pain, joint swelling,and bone pain   Neurological: negative for headaches, dizziness, seizures, weakness, numbness       OBJECTIVE:         Vitals:    10/20/21 0813   BP: 133/79   Pulse: 92   Resp: 18   Temp: 97.4 °F (36.3 °C)   PHYSICAL EXAM:   General appearance - well appearing, no in pain or distress   Mental status - alert and cooperative   Eyes - pupils equal and reactive, extraocular eye movements intact   Ears - bilateral TM's and external ear canals normal   Mouth - mucous membranes moist, pharynx normal without lesions   Neck - supple, no significant adenopathy   Lymphatics - no palpable lymphadenopathy, no hepatosplenomegaly   Chest - clear to auscultation, no wheezes, rales or rhonchi, symmetric air entry   Left chest surgical scar healing well  Heart - normal rate, regular rhythm, normal S1, S2, no murmurs, rubs, clicks or gallops   Abdomen - soft, nontender, nondistended, no masses or organomegaly   Neurological - alert, oriented, normal speech, no focal findings or movement disorder noted   Musculoskeletal - no joint tenderness, deformity or swelling   Extremities - peripheral pulses normal, no pedal edema, no clubbing or cyanosis   Skin - normal coloration and turgor, no rashes, no suspicious skin lesions noted ,  LABORATORY DATA:     Lab Results   Component Value Date    WBC 5.0 10/12/2021    HGB 13.8 10/12/2021    HCT 40.8 10/12/2021    MCV 92.1 10/12/2021     10/12/2021    LYMPHOPCT 26 10/12/2021    RBC 4.43 10/12/2021    MCH 31.2 10/12/2021    MCHC 33.8 10/12/2021    RDW 12.5 10/12/2021    MONOPCT 7 10/12/2021    BASOPCT 1 10/12/2021    NEUTROABS 3.23 10/12/2021    LYMPHSABS 1.31 10/12/2021    MONOSABS 0.36 10/12/2021    EOSABS 0.06 10/12/2021    BASOSABS 0.05 10/12/2021       Chemistry        Component Value Date/Time     10/12/2021 0832    K 4.3 10/12/2021 0832     10/12/2021 0832    CO2 25 10/12/2021 0832    BUN 12 10/12/2021 0832    CREATININE 0.66 10/12/2021 0832        Component Value Date/Time    CALCIUM 9.4 10/12/2021 0832    ALKPHOS 74 10/12/2021 0832    AST 16 10/12/2021 0832    ALT 14 10/12/2021 0832    BILITOT 0.51 10/12/2021 0832        PATHOLOGY DATA:   Pathology:  CT Guided Biopsy ([facility]) 8/22/18:   LEFT LUNG, UPPER LOBE, CT GUIDED CORE NEEDLE BIOPSIES:  - INVASIVE ADENOCARCINOMA, CONSISTENT WITH LUNG PRIMARY. Collected: 9/28/2018   -- Diagnosis --   1.  LYMPH NODE, LEVEL 9, BIOPSY:       -  ONE LYMPH NODE, NEGATIVE FOR MALIGNANCY (0/1). 2.  LYMPH NODE, LEVEL 11, DISSECTION:       -  ONE OF 2 LYMPH NODES POSITIVE FOR METASTATIC CARCINOMA (1/2). 3.  LYMPH NODE, LEVEL 5, DISSECTION:       -  ONE OF 2 LYMPH NODES POSITIVE FOR METASTATIC CARCINOMA (1/2).     -  CARCINOMA INVADES LARGE PERIPHERAL NERVE BRANCHES.      4.  LUNG, LEFT UPPER LOBE, LOBECTOMY:            -  WELL-DIFFERENTIATED INVASIVE ADENOCARCINOMA, 2.9 CM GREATEST DIMENSION.     -  NEGATIVE FOR VISCERAL PLEURAL INVASION.          -  TUMOR INVOLVES SOFT TISSUE OF BRONCHIAL, VASCULAR AND PARENCHYMAL MARGINS.     -  5 PERIBRONCHIOLAR LYMPH NODES, POSITIVE FOR METASTATIC   ADENOCARCINOMA (5/5).     -  SEPARATE SMALL INTRALOBAR FOCUS ATYPICAL ADENOMATOUS   HYPERPLASIA.           -  PATHOLOGIC STAGE:  pT1c, pN2, R1.     5.  LYMPH NODES, LEVEL 10, DISSECTION:       -  ONE OF 2 LYMPH NODES POSITIVE FOR METASTATIC CARCINOMA (1/2). IMAGING DATA:    CT chest 4/13/2020  FINDINGS:   Chest wall: No axillary adenopathy.  Right chest wall Port-A-Cath with the   tip near the cavoatrial junction.       Upper Abdomen: No adrenal nodule.  Dominant right hepatic hemangioma.  Stable   liver cysts.       Mediastinum: Mediastinal shift towards the left is unchanged.  No   cardiomegaly.  No pericardial effusion.  No right hilar adenopathy.  No   mediastinal adenopathy.  Postsurgical changes extend to the left hilum.       Lungs: Large bleb within the right upper lobe is unchanged.  Emphysematous   changes.  No pleural effusions.       Stable 4 mm sub solid nodule within the right upper lobe series 4, image 21.       Stable 4 mm right lower lobe nodule series 4, image 69.       Postsurgical changes within the left lung from left upper lobectomy.  Post   treatment changes along the left hilum are similar to the previous   examination.       Bones: Thoracic spine degenerative changes.  No suspicious osseous lesion.           Impression   Stable examination. CT chest abd 10/13/20:  Impression    Stable chest CT.  Stable post lobectomy changes and post radiation changes on    the left.  A few punctate pulmonary nodules on the right are similar.         Stable CT abdomen and pelvis        ASSESSMENT:    Neida Sweeney Is a very pleasant 61 y.o.   female with newly diagnosed left upper lobe non-small cell lung cancer, adenocarcinoma, status post Robotically assisted BARAK lobectomy with LN dissection and Intercostal nerve block with experal on 9/28/18. I discussed the surgical pathology, diagnosis, prognosis and treatment options with patient. She has [id number] disease, stage IIIA, she had R1 disease with positive margins. I discussed the NCCN guidelines with patient. She completed chemotherapy and has completed RT as well. CT scans showed no evidence of recurrence. Small lump/skin nodules on forearm and arm  During today's visit, the patient and the family had a number of reasonable questions which were answered to their satisfaction. They verbalized understanding of the information provided and they agreed to proceed as outlined above. PLAN:   I reviewed the results of recent lab work and based on history, physical examination and lab work no evidence of recurrence   I will repeat CT of the chest in 6 months   I will also get CT of the her left upper extremity to evaluate skin nodules which are suspicious for possible fibroma   Return to clinic in 6 months with labs and CT scans prior      Jose Xie MD  Hematologist/Medical Oncologist    I spent more than 25 minutes examining, evaluating, reviewing data and counseling the patient. Greater than 50% of that time was spent face-to-face with the patient in counseling and coordinating her care. This note is created with the assistance of a speech recognition program.  While intending to generate a document that actually reflects the content of the visit, the document can still have some errors including those of syntax and sound a like substitutions which may escape proof reading. It such instances, actual meaning can be extrapolated by contextual diversion.

## 2021-10-20 NOTE — TELEPHONE ENCOUNTER
Carrol Tejeda MD VISIT  RV IN 6 MTHS W/ LABS & SCANS  CT SCANS PT WILL CALL AND HAVE IT SCHEDULED BEFORE RV  LABS ARELIS CMP  MD VISIT 4/13/22 @ 8AM  AVS PRINTED W/ INSTRUCTIONS AND GIVEN TO PT ON EXIT

## 2021-10-21 ENCOUNTER — OFFICE VISIT (OUTPATIENT)
Dept: FAMILY MEDICINE CLINIC | Age: 66
End: 2021-10-21
Payer: COMMERCIAL

## 2021-10-21 VITALS
WEIGHT: 161.4 LBS | TEMPERATURE: 97.9 F | HEART RATE: 90 BPM | OXYGEN SATURATION: 97 % | HEIGHT: 59 IN | SYSTOLIC BLOOD PRESSURE: 116 MMHG | DIASTOLIC BLOOD PRESSURE: 74 MMHG | BODY MASS INDEX: 32.54 KG/M2

## 2021-10-21 DIAGNOSIS — I10 ESSENTIAL HYPERTENSION: Primary | ICD-10-CM

## 2021-10-21 DIAGNOSIS — Z23 NEED FOR PNEUMOCOCCAL VACCINATION: ICD-10-CM

## 2021-10-21 DIAGNOSIS — Z23 NEED FOR INFLUENZA VACCINATION: ICD-10-CM

## 2021-10-21 PROCEDURE — G8417 CALC BMI ABV UP PARAM F/U: HCPCS | Performed by: FAMILY MEDICINE

## 2021-10-21 PROCEDURE — 1036F TOBACCO NON-USER: CPT | Performed by: FAMILY MEDICINE

## 2021-10-21 PROCEDURE — 3017F COLORECTAL CA SCREEN DOC REV: CPT | Performed by: FAMILY MEDICINE

## 2021-10-21 PROCEDURE — 90472 IMMUNIZATION ADMIN EACH ADD: CPT | Performed by: FAMILY MEDICINE

## 2021-10-21 PROCEDURE — G8482 FLU IMMUNIZE ORDER/ADMIN: HCPCS | Performed by: FAMILY MEDICINE

## 2021-10-21 PROCEDURE — 99213 OFFICE O/P EST LOW 20 MIN: CPT | Performed by: FAMILY MEDICINE

## 2021-10-21 PROCEDURE — 90732 PPSV23 VACC 2 YRS+ SUBQ/IM: CPT | Performed by: FAMILY MEDICINE

## 2021-10-21 PROCEDURE — 90471 IMMUNIZATION ADMIN: CPT | Performed by: FAMILY MEDICINE

## 2021-10-21 PROCEDURE — 90686 IIV4 VACC NO PRSV 0.5 ML IM: CPT | Performed by: FAMILY MEDICINE

## 2021-10-21 PROCEDURE — G8427 DOCREV CUR MEDS BY ELIG CLIN: HCPCS | Performed by: FAMILY MEDICINE

## 2021-10-21 SDOH — ECONOMIC STABILITY: FOOD INSECURITY: WITHIN THE PAST 12 MONTHS, YOU WORRIED THAT YOUR FOOD WOULD RUN OUT BEFORE YOU GOT MONEY TO BUY MORE.: NEVER TRUE

## 2021-10-21 SDOH — ECONOMIC STABILITY: FOOD INSECURITY: WITHIN THE PAST 12 MONTHS, THE FOOD YOU BOUGHT JUST DIDN'T LAST AND YOU DIDN'T HAVE MONEY TO GET MORE.: NEVER TRUE

## 2021-10-21 ASSESSMENT — PATIENT HEALTH QUESTIONNAIRE - PHQ9
SUM OF ALL RESPONSES TO PHQ QUESTIONS 1-9: 0
1. LITTLE INTEREST OR PLEASURE IN DOING THINGS: 0
2. FEELING DOWN, DEPRESSED OR HOPELESS: 0
SUM OF ALL RESPONSES TO PHQ9 QUESTIONS 1 & 2: 0

## 2021-10-21 ASSESSMENT — SOCIAL DETERMINANTS OF HEALTH (SDOH): HOW HARD IS IT FOR YOU TO PAY FOR THE VERY BASICS LIKE FOOD, HOUSING, MEDICAL CARE, AND HEATING?: NOT HARD AT ALL

## 2021-10-21 NOTE — PROGRESS NOTES
General FM note    Fern Campbell is a 61 y.o. female who presents today for follow up on her  medical conditions as noted below.   Fern Campbell is c/o of   Chief Complaint   Patient presents with    Other     follow up       Patient Active Problem List:     Status post lobectomy of lung     Non-small cell cancer of left lung ([facility]     Malignant neoplasm of bronchus of upper lobe, left ([facility]     Essential hypertension     Anxiety     Past Medical History:   Diagnosis Date    Anxiety     Benign polyp of large intestine     Cancer ([facility]     LT UPPER LOBE    COPD (chronic obstructive pulmonary disease) ([facility]     Hx of blood clots     DVT LT LEG    Hyperlipidemia     Hypertension     Liver hemangioma     Lung nodule     BARAK  Nodule    Snores     not tested for apnea    Uterine fibroid 09/2010    Wears glasses       Past Surgical History:   Procedure Laterality Date    ABDOMINAL HERNIA REPAIR  2012    BREAST BIOPSY Left 1983    BRONCHOSCOPY  10/1/2018    BRONCHOSCOPY performed by Andrea Guevara MD at  [place] [address] COLONOSCOPY      HYSTERECTOMY  2010    HYSTERECTOMY, TOTAL ABDOMINAL      LUNG BIOPSY      LUNG REMOVAL, PARTIAL Right 09/28/2018    Robotic assisted left lobectomy, upper with lymph node biopsy    OTHER SURGICAL HISTORY Right 11/05/2018    IR PORT PLACEMENT EQUAL OR GREATER THAN 5 YEARS    ME [address] INCL FLUOR GDNCE DX W/CELL WASHG SPX N/A 10/29/2018    BRONCHOSCOPY performed by Concepcion Gotti [facility] at [facility] 1 LOBE LOBECT Left 9/28/2018    XI ROBOTIC ASSISTED LEFT UPPER LOBECTOMY MULTIPLE LYMPH NODE BIOPSY, INTERCOSTAL  NERVE BLOCK ABLATION W/EXPAREL performed by Kezia Grossman MD at [address] [place] [address] History   Problem Relation Age of Onset    Cancer Mother         LUNG    Cancer Sister         LUNG     Current Outpatient Medications   Medication Sig Dispense Refill    albuterol sulfate  (90 Base) MCG/ACT inhaler inhale 2 puffs by mouth four times a day 18 g 11    ALPRAZolam (XANAX) 0.25 MG tablet take 1 tablet by mouth nightly if needed for sleep for 30 DAYS 30 tablet 0    lisinopril-hydroCHLOROthiazide (PRINZIDE;ZESTORETIC) 10-12.5 MG per tablet Take 1 tablet by mouth daily 30 tablet 11    lovastatin (MEVACOR) 10 MG tablet Take 1 tablet by mouth nightly 30 tablet 11     No current facility-administered medications for this visit. ALLERGIES:    Allergies   Allergen Reactions    Codeine Nausea And Vomiting       Social History     Tobacco Use    Smoking status: Former Smoker     Packs/day: 1.50     Years: 30.00     Pack years: 45.00     Types: Cigarettes     Quit date:      Years since quittin.8    Smokeless tobacco: Never Used   Substance Use Topics    Alcohol use: Yes     Comment: Occassionally      Body mass index is 32.6 kg/m². /74   Pulse 90   Temp 97.9 °F (36.6 °C)   Ht 4' 11\" (1.499 m)   Wt 161 lb 6.4 oz (73.2 kg)   SpO2 97%   BMI 32.60 kg/m²     Subjective:      HPI    61 y.o. female coming in today for follow-up. Patient states that she is doing quite well. She really does not have any concerns. Has been using Xanax as needed. Has been following up with the oncologist due to lung cancer. Patient is status post lobectomy left 2018. Review of Systems   Constitutional: Negative for fever and unexpected weight change. Pertinent items are noted in HPI. Objective:   Physical Exam  Constitutional: VS (see above). General appearance: normal development, habitus and attention, no deformities. No distress. Eyes: normal conjunctiva and lids. CAV: RRR, no RMG. No edema lower extremities. Pulmo: CTA bilateral, no CWR. Skin: no rashes, lesions or ulcers. Musculoskeletal: normal gait. Nails: no clubbing or cyanosis. Psychiatric: alert and oriented to place, time and person. Normal mood and affect. Assessment:       Diagnosis Orders   1.  Essential hypertension 2. Need for influenza vaccination  INFLUENZA, QUADV, 3 YRS AND OLDER, IM PF, PREFILL SYR OR SDV, 0.5ML (AFLURIA QUADV, PF)   3. Need for pneumococcal vaccination  Pneumococcal polysaccharide vaccine 23-valent greater than or equal to 1yo subcutaneous/IM       Plan:   Blood pressure looks good. Patient overall makes a very healthy impression. Vaccines provided. Return in about 6 months (around 4/21/2022), or if symptoms worsen or fail to improve. Orders Placed This Encounter   Procedures    INFLUENZA, QUADV, 3 YRS AND OLDER, IM PF, PREFILL SYR OR SDV, 0.5ML (AFLURIA QUADV, PF)    Pneumococcal polysaccharide vaccine 23-valent greater than or equal to 1yo subcutaneous/IM     No orders of the defined types were placed in this encounter. Call or return to clinic prn if these symptoms worsen or fail to improve as anticipated. I have reviewed the instructions with the patient, answering all questions to patient's satisfaction. Mainor Randhawa received counseling on the following healthy behaviors: nutrition, exercise, and medication adherence  Reviewed prior labs and health maintenance. Continue current medications, diet and exercise. Discussed use, benefit, and side effects of prescribed medications. Barriers to medication compliance addressed. Patient given educational materials - see patient instructions. All patient questions answered. Patient voiced understanding.       Electronically signed by Anika Melchor MD on 10/21/2021 at 1:07 PM       (Please note that portions of this note were completed with a voice recognition program. Efforts were made to edit the dictations but occasionally words are mis-transcribed.)

## 2021-11-03 ENCOUNTER — TELEPHONE (OUTPATIENT)
Dept: ONCOLOGY | Age: 66
End: 2021-11-03

## 2021-11-03 NOTE — TELEPHONE ENCOUNTER
PT LAST SEEN PER DR Katie Hart 10/20/2021  ORDER FOR CT CHEST AND HUMERUS WITH CONTRAST, BUT THE RADIUS/ ULNA IS WITHOUT     SCHEDULING AT (P) [phone]  ASKING IF THIS IS CORRECT BEFROE SCHEDULING PT     NOTE TO MD TO ADVISE    SPOKE WITH DR Katie Hart AND MD STATES CAN BE CHANGED TO WITH CONTRAST, WRITER LEFT MESSAGE WITH CENTRAL SCHEDULING --TO LET OFFICE KNOW IF ORDER NEEDS CHANGED IN Epic. TO LET [facility] KNOW.

## 2021-11-16 DIAGNOSIS — C34.92 NON-SMALL CELL CANCER OF LEFT LUNG (HCC): ICD-10-CM

## 2021-11-16 DIAGNOSIS — R22.32 LUMP OF SKIN OF LEFT UPPER EXTREMITY: Primary | ICD-10-CM

## 2022-01-01 ENCOUNTER — TELEPHONE (OUTPATIENT)
Dept: CASE MANAGEMENT | Age: 67
End: 2022-01-01

## 2022-03-17 DIAGNOSIS — C34.92 NON-SMALL CELL CANCER OF LEFT LUNG (HCC): ICD-10-CM

## 2022-03-17 RX ORDER — ALPRAZOLAM 0.25 MG/1
TABLET ORAL
Qty: 30 TABLET | Refills: 0 | Status: SHIPPED | OUTPATIENT
Start: 2022-03-17 | End: 2022-04-17

## 2022-03-25 ENCOUNTER — HOSPITAL ENCOUNTER (INPATIENT)
Age: 67
LOS: 6 days | Discharge: HOME | DRG: 025 | End: 2022-03-31
Attending: INTERNAL MEDICINE | Admitting: FAMILY MEDICINE
Payer: COMMERCIAL

## 2022-03-25 DIAGNOSIS — R22.0 MASS OF OCCIPITAL REGION: Primary | ICD-10-CM

## 2022-03-25 PROBLEM — R56.9 NEW ONSET SEIZURE (HCC): Status: ACTIVE | Noted: 2022-01-01

## 2022-03-25 PROBLEM — I61.9 INTRAPARENCHYMAL HEMORRHAGE OF BRAIN (HCC): Status: ACTIVE | Noted: 2022-03-25

## 2022-03-25 PROCEDURE — 6370000000 HC RX 637 (ALT 250 FOR IP): Performed by: NURSE PRACTITIONER

## 2022-03-25 PROCEDURE — 2060000000 HC ICU INTERMEDIATE R&B

## 2022-03-25 PROCEDURE — 99222 1ST HOSP IP/OBS MODERATE 55: CPT | Performed by: NURSE PRACTITIONER

## 2022-03-25 PROCEDURE — 2580000003 HC RX 258: Performed by: INTERNAL MEDICINE

## 2022-03-25 PROCEDURE — 6360000002 HC RX W HCPCS: Performed by: INTERNAL MEDICINE

## 2022-03-25 RX ORDER — POLYETHYLENE GLYCOL 3350 17 G/17G
17 POWDER, FOR SOLUTION ORAL DAILY PRN
Status: DISCONTINUED | OUTPATIENT
Start: 2022-03-25 | End: 2022-03-31 | Stop reason: HOSPADM

## 2022-03-25 RX ORDER — ALPRAZOLAM 0.25 MG/1
0.25 TABLET ORAL NIGHTLY PRN
Status: DISCONTINUED | OUTPATIENT
Start: 2022-03-25 | End: 2022-03-26

## 2022-03-25 RX ORDER — SODIUM CHLORIDE 0.9 % (FLUSH) 0.9 %
5-40 SYRINGE (ML) INJECTION EVERY 12 HOURS SCHEDULED
Status: DISCONTINUED | OUTPATIENT
Start: 2022-03-25 | End: 2022-03-31 | Stop reason: HOSPADM

## 2022-03-25 RX ORDER — SODIUM CHLORIDE 9 MG/ML
25 INJECTION, SOLUTION INTRAVENOUS PRN
Status: DISCONTINUED | OUTPATIENT
Start: 2022-03-25 | End: 2022-03-31 | Stop reason: HOSPADM

## 2022-03-25 RX ORDER — DEXAMETHASONE SODIUM PHOSPHATE 4 MG/ML
4 INJECTION, SOLUTION INTRA-ARTICULAR; INTRALESIONAL; INTRAMUSCULAR; INTRAVENOUS; SOFT TISSUE EVERY 6 HOURS
Status: DISCONTINUED | OUTPATIENT
Start: 2022-03-25 | End: 2022-03-30

## 2022-03-25 RX ORDER — ONDANSETRON 4 MG/1
4 TABLET, ORALLY DISINTEGRATING ORAL EVERY 8 HOURS PRN
Status: DISCONTINUED | OUTPATIENT
Start: 2022-03-25 | End: 2022-03-31 | Stop reason: HOSPADM

## 2022-03-25 RX ORDER — ONDANSETRON 2 MG/ML
4 INJECTION INTRAMUSCULAR; INTRAVENOUS EVERY 6 HOURS PRN
Status: DISCONTINUED | OUTPATIENT
Start: 2022-03-25 | End: 2022-03-31 | Stop reason: HOSPADM

## 2022-03-25 RX ORDER — MAGNESIUM SULFATE 1 G/100ML
1000 INJECTION INTRAVENOUS PRN
Status: DISCONTINUED | OUTPATIENT
Start: 2022-03-25 | End: 2022-03-31 | Stop reason: HOSPADM

## 2022-03-25 RX ORDER — ACETAMINOPHEN 325 MG/1
650 TABLET ORAL EVERY 6 HOURS PRN
Status: DISCONTINUED | OUTPATIENT
Start: 2022-03-25 | End: 2022-03-31 | Stop reason: HOSPADM

## 2022-03-25 RX ORDER — POTASSIUM CHLORIDE 7.45 MG/ML
10 INJECTION INTRAVENOUS PRN
Status: DISCONTINUED | OUTPATIENT
Start: 2022-03-25 | End: 2022-03-31 | Stop reason: HOSPADM

## 2022-03-25 RX ORDER — POTASSIUM CHLORIDE 20 MEQ/1
40 TABLET, EXTENDED RELEASE ORAL PRN
Status: DISCONTINUED | OUTPATIENT
Start: 2022-03-25 | End: 2022-03-31 | Stop reason: HOSPADM

## 2022-03-25 RX ORDER — SODIUM CHLORIDE 0.9 % (FLUSH) 0.9 %
10 SYRINGE (ML) INJECTION PRN
Status: DISCONTINUED | OUTPATIENT
Start: 2022-03-25 | End: 2022-03-31 | Stop reason: HOSPADM

## 2022-03-25 RX ORDER — ACETAMINOPHEN 650 MG/1
650 SUPPOSITORY RECTAL EVERY 6 HOURS PRN
Status: DISCONTINUED | OUTPATIENT
Start: 2022-03-25 | End: 2022-03-31 | Stop reason: HOSPADM

## 2022-03-25 RX ADMIN — SODIUM CHLORIDE, PRESERVATIVE FREE 10 ML: 5 INJECTION INTRAVENOUS at 22:59

## 2022-03-25 RX ADMIN — DEXAMETHASONE SODIUM PHOSPHATE 4 MG: 4 INJECTION, SOLUTION INTRA-ARTICULAR; INTRALESIONAL; INTRAMUSCULAR; INTRAVENOUS; SOFT TISSUE at 23:12

## 2022-03-25 RX ADMIN — ALPRAZOLAM 0.25 MG: 0.25 TABLET ORAL at 22:55

## 2022-03-25 ASSESSMENT — PAIN SCALES - GENERAL: PAINLEVEL_OUTOF10: 0

## 2022-03-26 ENCOUNTER — APPOINTMENT (OUTPATIENT)
Dept: MRI IMAGING | Age: 67
DRG: 025 | End: 2022-03-26
Attending: INTERNAL MEDICINE
Payer: COMMERCIAL

## 2022-03-26 ENCOUNTER — APPOINTMENT (OUTPATIENT)
Dept: CT IMAGING | Age: 67
DRG: 025 | End: 2022-03-26
Attending: INTERNAL MEDICINE
Payer: COMMERCIAL

## 2022-03-26 PROBLEM — R22.0 MASS OF OCCIPITAL REGION: Status: ACTIVE | Noted: 2022-01-01

## 2022-03-26 PROBLEM — R73.9 HYPERGLYCEMIA: Status: ACTIVE | Noted: 2022-03-26

## 2022-03-26 PROBLEM — E87.6 HYPOKALEMIA: Status: ACTIVE | Noted: 2022-01-01

## 2022-03-26 PROBLEM — E27.8 ADRENAL MASS (HCC): Status: ACTIVE | Noted: 2022-01-01

## 2022-03-26 PROBLEM — G93.6 VASOGENIC EDEMA (HCC): Status: ACTIVE | Noted: 2022-01-01

## 2022-03-26 PROBLEM — C79.31 BRAIN METASTASES (HCC): Status: ACTIVE | Noted: 2022-03-26

## 2022-03-26 LAB
ABSOLUTE EOS #: 0 K/UL (ref 0–0.44)
ABSOLUTE IMMATURE GRANULOCYTE: 0.08 K/UL (ref 0–0.3)
ABSOLUTE LYMPH #: 0.54 K/UL (ref 1.1–3.7)
ABSOLUTE MONO #: 0.15 K/UL (ref 0.1–1.2)
ANION GAP SERPL CALCULATED.3IONS-SCNC: 16 MMOL/L (ref 9–17)
BASOPHILS # BLD: 0 % (ref 0–2)
BASOPHILS ABSOLUTE: 0 K/UL (ref 0–0.2)
BUN BLDV-MCNC: 13 MG/DL (ref 8–23)
CALCIUM SERPL-MCNC: 9.3 MG/DL (ref 8.6–10.4)
CHLORIDE BLD-SCNC: 102 MMOL/L (ref 98–107)
CO2: 21 MMOL/L (ref 20–31)
CREAT SERPL-MCNC: 0.71 MG/DL (ref 0.5–0.9)
EOSINOPHILS RELATIVE PERCENT: 0 % (ref 1–4)
GFR AFRICAN AMERICAN: >60 ML/MIN
GFR NON-AFRICAN AMERICAN: >60 ML/MIN
GFR SERPL CREATININE-BSD FRML MDRD: ABNORMAL ML/MIN/{1.73_M2}
GLUCOSE BLD-MCNC: 198 MG/DL (ref 70–99)
HCT VFR BLD CALC: 36.8 % (ref 36.3–47.1)
HEMOGLOBIN: 12.7 G/DL (ref 11.9–15.1)
IMMATURE GRANULOCYTES: 1 %
LYMPHOCYTES # BLD: 7 % (ref 24–43)
MAGNESIUM: 1.7 MG/DL (ref 1.6–2.6)
MCH RBC QN AUTO: 31.6 PG (ref 25.2–33.5)
MCHC RBC AUTO-ENTMCNC: 34.5 G/DL (ref 28.4–34.8)
MCV RBC AUTO: 91.5 FL (ref 82.6–102.9)
MONOCYTES # BLD: 2 % (ref 3–12)
MORPHOLOGY: NORMAL
NRBC AUTOMATED: 0 PER 100 WBC
PDW BLD-RTO: 12.2 % (ref 11.8–14.4)
PLATELET # BLD: 238 K/UL (ref 138–453)
PMV BLD AUTO: 10.5 FL (ref 8.1–13.5)
POTASSIUM SERPL-SCNC: 3.5 MMOL/L (ref 3.7–5.3)
RBC # BLD: 4.02 M/UL (ref 3.95–5.11)
SEG NEUTROPHILS: 90 % (ref 36–65)
SEGMENTED NEUTROPHILS ABSOLUTE COUNT: 6.93 K/UL (ref 1.5–8.1)
SODIUM BLD-SCNC: 139 MMOL/L (ref 135–144)
WBC # BLD: 7.7 K/UL (ref 3.5–11.3)

## 2022-03-26 PROCEDURE — 99255 IP/OBS CONSLTJ NEW/EST HI 80: CPT | Performed by: INTERNAL MEDICINE

## 2022-03-26 PROCEDURE — 72141 MRI NECK SPINE W/O DYE: CPT

## 2022-03-26 PROCEDURE — 6360000004 HC RX CONTRAST MEDICATION: Performed by: STUDENT IN AN ORGANIZED HEALTH CARE EDUCATION/TRAINING PROGRAM

## 2022-03-26 PROCEDURE — 6370000000 HC RX 637 (ALT 250 FOR IP): Performed by: FAMILY MEDICINE

## 2022-03-26 PROCEDURE — 97161 PT EVAL LOW COMPLEX 20 MIN: CPT

## 2022-03-26 PROCEDURE — 6360000002 HC RX W HCPCS: Performed by: INTERNAL MEDICINE

## 2022-03-26 PROCEDURE — 6360000002 HC RX W HCPCS: Performed by: STUDENT IN AN ORGANIZED HEALTH CARE EDUCATION/TRAINING PROGRAM

## 2022-03-26 PROCEDURE — 83735 ASSAY OF MAGNESIUM: CPT

## 2022-03-26 PROCEDURE — 72148 MRI LUMBAR SPINE W/O DYE: CPT

## 2022-03-26 PROCEDURE — 99223 1ST HOSP IP/OBS HIGH 75: CPT | Performed by: PSYCHIATRY & NEUROLOGY

## 2022-03-26 PROCEDURE — 97165 OT EVAL LOW COMPLEX 30 MIN: CPT

## 2022-03-26 PROCEDURE — 6370000000 HC RX 637 (ALT 250 FOR IP): Performed by: NURSE PRACTITIONER

## 2022-03-26 PROCEDURE — 97530 THERAPEUTIC ACTIVITIES: CPT

## 2022-03-26 PROCEDURE — 85025 COMPLETE CBC W/AUTO DIFF WBC: CPT

## 2022-03-26 PROCEDURE — 2060000000 HC ICU INTERMEDIATE R&B

## 2022-03-26 PROCEDURE — 99223 1ST HOSP IP/OBS HIGH 75: CPT | Performed by: NEUROLOGICAL SURGERY

## 2022-03-26 PROCEDURE — 36415 COLL VENOUS BLD VENIPUNCTURE: CPT

## 2022-03-26 PROCEDURE — 80048 BASIC METABOLIC PNL TOTAL CA: CPT

## 2022-03-26 PROCEDURE — A9576 INJ PROHANCE MULTIPACK: HCPCS | Performed by: STUDENT IN AN ORGANIZED HEALTH CARE EDUCATION/TRAINING PROGRAM

## 2022-03-26 PROCEDURE — 70553 MRI BRAIN STEM W/O & W/DYE: CPT

## 2022-03-26 PROCEDURE — 2580000003 HC RX 258: Performed by: INTERNAL MEDICINE

## 2022-03-26 PROCEDURE — 6370000000 HC RX 637 (ALT 250 FOR IP): Performed by: INTERNAL MEDICINE

## 2022-03-26 PROCEDURE — 70450 CT HEAD/BRAIN W/O DYE: CPT

## 2022-03-26 PROCEDURE — 99232 SBSQ HOSP IP/OBS MODERATE 35: CPT | Performed by: INTERNAL MEDICINE

## 2022-03-26 PROCEDURE — 72146 MRI CHEST SPINE W/O DYE: CPT

## 2022-03-26 RX ORDER — LEVETIRACETAM 10 MG/ML
1000 INJECTION INTRAVASCULAR ONCE
Status: COMPLETED | OUTPATIENT
Start: 2022-03-26 | End: 2022-03-26

## 2022-03-26 RX ORDER — SODIUM CHLORIDE 0.9 % (FLUSH) 0.9 %
10 SYRINGE (ML) INJECTION PRN
Status: DISCONTINUED | OUTPATIENT
Start: 2022-03-26 | End: 2022-03-31 | Stop reason: HOSPADM

## 2022-03-26 RX ORDER — LEVETIRACETAM 5 MG/ML
500 INJECTION INTRAVASCULAR EVERY 12 HOURS
Status: DISCONTINUED | OUTPATIENT
Start: 2022-03-26 | End: 2022-03-26

## 2022-03-26 RX ORDER — ALPRAZOLAM 0.25 MG/1
0.25 TABLET ORAL 4 TIMES DAILY PRN
Status: DISCONTINUED | OUTPATIENT
Start: 2022-03-26 | End: 2022-03-28

## 2022-03-26 RX ORDER — LEVETIRACETAM 500 MG/1
500 TABLET ORAL 2 TIMES DAILY
Status: DISCONTINUED | OUTPATIENT
Start: 2022-03-26 | End: 2022-03-31 | Stop reason: HOSPADM

## 2022-03-26 RX ADMIN — DEXAMETHASONE SODIUM PHOSPHATE 4 MG: 4 INJECTION, SOLUTION INTRA-ARTICULAR; INTRALESIONAL; INTRAMUSCULAR; INTRAVENOUS; SOFT TISSUE at 18:31

## 2022-03-26 RX ADMIN — SODIUM CHLORIDE, PRESERVATIVE FREE 10 ML: 5 INJECTION INTRAVENOUS at 09:15

## 2022-03-26 RX ADMIN — DEXAMETHASONE SODIUM PHOSPHATE 4 MG: 4 INJECTION, SOLUTION INTRA-ARTICULAR; INTRALESIONAL; INTRAMUSCULAR; INTRAVENOUS; SOFT TISSUE at 23:48

## 2022-03-26 RX ADMIN — GADOTERIDOL 14 ML: 279.3 INJECTION, SOLUTION INTRAVENOUS at 16:15

## 2022-03-26 RX ADMIN — SODIUM CHLORIDE, PRESERVATIVE FREE 10 ML: 5 INJECTION INTRAVENOUS at 21:22

## 2022-03-26 RX ADMIN — DEXAMETHASONE SODIUM PHOSPHATE 4 MG: 4 INJECTION, SOLUTION INTRA-ARTICULAR; INTRALESIONAL; INTRAMUSCULAR; INTRAVENOUS; SOFT TISSUE at 04:29

## 2022-03-26 RX ADMIN — ALPRAZOLAM 0.25 MG: 0.25 TABLET ORAL at 21:21

## 2022-03-26 RX ADMIN — LEVETIRACETAM 500 MG: 5 INJECTION INTRAVENOUS at 09:12

## 2022-03-26 RX ADMIN — ALPRAZOLAM 0.25 MG: 0.25 TABLET ORAL at 14:11

## 2022-03-26 RX ADMIN — ALPRAZOLAM 0.25 MG: 0.25 TABLET ORAL at 06:51

## 2022-03-26 RX ADMIN — POTASSIUM BICARBONATE 40 MEQ: 782 TABLET, EFFERVESCENT ORAL at 07:03

## 2022-03-26 RX ADMIN — DEXAMETHASONE SODIUM PHOSPHATE 4 MG: 4 INJECTION, SOLUTION INTRA-ARTICULAR; INTRALESIONAL; INTRAMUSCULAR; INTRAVENOUS; SOFT TISSUE at 12:02

## 2022-03-26 RX ADMIN — SODIUM CHLORIDE 25 ML: 9 INJECTION, SOLUTION INTRAVENOUS at 04:33

## 2022-03-26 RX ADMIN — LEVETIRACETAM 1000 MG: 10 INJECTION INTRAVENOUS at 04:34

## 2022-03-26 RX ADMIN — LEVETIRACETAM 500 MG: 500 TABLET, FILM COATED ORAL at 21:21

## 2022-03-26 ASSESSMENT — PAIN SCALES - GENERAL
PAINLEVEL_OUTOF10: 0
PAINLEVEL_OUTOF10: 2

## 2022-03-26 ASSESSMENT — ENCOUNTER SYMPTOMS
VOMITING: 0
STRIDOR: 0
CONSTIPATION: 0
DIARRHEA: 0
NAUSEA: 0
WHEEZING: 0
SHORTNESS OF BREATH: 0
BLOOD IN STOOL: 0
ABDOMINAL PAIN: 0
COUGH: 0

## 2022-03-26 ASSESSMENT — PAIN DESCRIPTION - LOCATION: LOCATION: HEAD

## 2022-03-26 ASSESSMENT — PAIN DESCRIPTION - FREQUENCY: FREQUENCY: INTERMITTENT

## 2022-03-26 ASSESSMENT — PAIN DESCRIPTION - PAIN TYPE: TYPE: ACUTE PAIN

## 2022-03-26 ASSESSMENT — PAIN DESCRIPTION - DESCRIPTORS: DESCRIPTORS: ACHING

## 2022-03-26 NOTE — FLOWSHEET NOTE
Advance Care Planning     Advance Care Planning Inpatient Note  Spiritual Care Department    Today's Date: 3/26/2022  Unit: [facility] 4A STEPDOWN    Received request from patient. Upon review of chart and communication with care team, patient's decision making abilities are not in question. . Patient was present in the room during visit. Goals of ACP Conversation:  Discuss advance care planning documents    Health Care Decision Makers:     No healthcare decision makers have been documented. Click here to complete [address] including selection of the Healthcare Decision Maker Relationship (ie \"Primary\")  Summary:  No Decision Maker named by patient at this time    Advance Care Planning Documents (Patient Wishes):  None     Assessment:   explained and gave information regarding decision making. Interventions:  Provided education on documents for clarity and greater understanding    Care Preferences Communicated:   No    Outcomes/Plan:  Patient did not want to move forward with ACP Documentaion. Electronically signed by Chaplain Michelle Resident on 3/26/2022 at 6:45 PM           03/26/22 5854   Encounter Summary   Services provided to: Patient   Referral/Consult From: Rounding   Continue Visiting   (3/26/22)   Complexity of Encounter Low   Length of Encounter 30 minutes   Spiritual Assessment Completed Yes   Advance Care Planning Yes   Routine   Type Initial   Assessment Approachable; Anxious; Coping; Hopeful   Intervention Active listening;Sustaining presence/ Ministry of presence   Outcome Refused/declined

## 2022-03-26 NOTE — FLOWSHEET NOTE
SPIRITUAL CARE DEPARTMENT - [address]  PROGRESS NOTE    Shift date: 3/26/22  Shift day: Saturday   Shift # 2    Room # 6524/2374-29   Name: Jovan Cabrera            Age: 61 y.o. Gender: female          Jainism:    Place of Faith:     Referral: Routine Visit    Admit Date & Time: 3/25/2022 10:15 PM    PATIENT/EVENT DESCRIPTION:  Jovan Cabrera is a 61 y.o. female        SPIRITUAL ASSESSMENT/INTERVENTION:   appeared welcome in room. Patient engaged in conversation and discussed her health and its impact. Patient appeared to be anxious and coping.  assessment was for patient to express feelings and concerns.  listened and validate her feelings and emotions.  was a ministry of presence to patient who expressed gratitude for visit. SPIRITUAL CARE FOLLOW-UP PLAN:  Chaplains will remain available to offer spiritual and emotional support as needed. Electronically signed by Ro Tilley, on 3/26/2022 at 6:49 PM.  [facility]  [phone]       03/26/22 7214   Encounter Summary   Services provided to: Patient   Referral/Consult From: Rounding   Continue Visiting   (3/26/22)   Complexity of Encounter Low   Length of Encounter 30 minutes   Spiritual Assessment Completed Yes   Spiritual/Pentecostal   Type Spiritual support   Assessment Anxious; Hopeful;Coping   Intervention Active listening;Explored feelings, thoughts, concerns;Sustaining presence/ Ministry of presence   Outcome Expressed gratitude

## 2022-03-26 NOTE — PROGRESS NOTES
Physical Therapy    Facility/Department: [facility] 4A STEPDOWN  Initial Assessment    NAME: Lisbet Gordillo  : 1955  MRN: [id number]    No chief complaint on file. Date of Service: 3/26/2022    Discharge Recommendations:    No further therapy required at discharge. PT Equipment Recommendations  Equipment Needed: No    Assessment   Body structures, Functions, Activity limitations: Decreased functional mobility ; Decreased strength;Decreased endurance;Decreased balance  Assessment: Pt grossly CGa to SBa for mobility, amb 200' no AD CGA. Pt without acute PT needs. Physical therapy to sign off. Prognosis: Good  Decision Making: Low Complexity  PT Education: Plan of Care;PT Role;General Safety; Functional Mobility Training  Patient Education: cues to slow down for safety, pt minorly impulsive, with good response  REQUIRES PT FOLLOW UP: No  Activity Tolerance  Activity Tolerance: Patient Tolerated treatment well       Patient Diagnosis(es): There were no encounter diagnoses. has a past medical history of Anxiety, Benign polyp of large intestine, Cancer ([facility] [address] COPD (chronic obstructive pulmonary disease) ([facility] [address] Hx of blood clots, Hyperlipidemia, Hypertension, Liver hemangioma, Lung nodule, Snores, Uterine fibroid, and Wears glasses. has a past surgical history that includes Hysterectomy (); Abdominal hernia repair (); Colonoscopy; Lung biopsy; Breast biopsy (Left, );  Lung removal, partial (Right, 2018); pr rmvl lung other than pneumonectomy 1 lobe lobect (Left, 2018); bronchoscopy (10/1/2018); pr [facility] incl fluor gdnce dx w/cell washg spx (N/A, 10/29/2018); other surgical history (Right, 2018); and Hysterectomy, total abdominal.    Restrictions  Restrictions/Precautions  Restrictions/Precautions: General Precautions,Seizure  Required Braces or Orthoses?: No  Position Activity Restriction  Other position/activity restrictions: up with assistance  Vision/Hearing  Vision: Impaired  Vision Exceptions: Wears glasses at all times  Hearing: Within functional limits     Subjective  General  Chart Reviewed: Yes  Patient assessed for rehabilitation services?: Yes  Response To Previous Treatment: Not applicable  Family / Caregiver Present: Yes  Follows Commands: Within Functional Limits  General Comment  Comments: OT co-eval. RN and pt agreeable to PT.  Pt alert in bed upon arrival.  Pain Screening  Patient Currently in Pain: Denies  Vital Signs  Patient Currently in Pain: Denies  Pre Treatment Pain Screening  Intervention List: Patient able to continue with treatment    Orientation  Orientation  Overall Orientation Status: Within Functional Limits  Social/Functional History  Social/Functional History  Lives With: Spouse  Type of Home: House  Home Layout: Two level,Bed/Bath upstairs,1/2 bath on main level  Home Access: Stairs to enter without rails  Entrance Stairs - Number of Steps: 2  Entrance Stairs - Rails: None  Bathroom Shower/Tub: Tub/Shower unit,Curtain  Bathroom Toilet: Standard  Home Equipment:  (does use DME at baseline)  ADL Assistance: [address]: Independent  Homemaking Responsibilities: Yes  Meal Prep Responsibility: Primary  Laundry Responsibility: Primary  Cleaning Responsibility: Primary  Shopping Responsibility: Primary  Ambulation Assistance: Independent  Transfer Assistance: Independent  Active : Yes  Mode of Transportation: Car  Occupation: Retired  Type of occupation: [place]  Leisure & Hobbies: iPAD, socialize, traveling  Additional Comments: Spouse able to provide assistance RN  Cognition    WFL    Objective          AROM RLE (degrees)  RLE AROM: WFL  AROM LLE (degrees)  LLE AROM : WFL  AROM RUE (degrees)  RUE AROM : WFL  AROM LUE (degrees)  LUE AROM : WFL  Strength RLE  Strength RLE: WFL  Strength LLE  Strength LLE: WFL  Strength RUE  Strength RUE: WFL  Comment: antigravity observed, see OT  Strength LUE  Strength LUE: WFL  Comment: antigravity observed, see OT     Sensation  Overall Sensation Status: WFL (Pt denies any numbness or tingling)  Bed mobility  Supine to Sit: Independent  Sit to Supine: Modified independent  Scooting: Independent  Transfers  Sit to Stand: Contact guard assistance  Stand to sit: Stand by assistance  Ambulation  Ambulation?: Yes  Ambulation 1  Surface: level tile  Device: No Device  Assistance: Contact guard assistance  Quality of Gait: good cadnece, 1-2 LOB self corrected.  reciprocal, intermittant L veer self corrected to straight linear gait  Distance: 200'  Stairs/Curb  Stairs?: No     Balance  Posture: Good  Sitting - Static: Good  Sitting - Dynamic: Good  Standing - Static: Good  Standing - Dynamic: Good;-  Comments: no AD used        Plan   Plan  Times per week: d/c PT  Safety Devices  Type of devices: Call light within reach,Nurse notified,Gait belt,Left in bed,All fall risk precautions in place  Restraints  Initially in place: No    AM-PAC Score  AM-PAC Inpatient Mobility Raw Score : 24 (03/26/22 1407)  AM-PAC Inpatient T-Scale Score : 61.14 (03/26/22 1407)  Mobility Inpatient CMS 0-100% Score: 0 (03/26/22 1407)  Mobility Inpatient CMS G-Code Modifier : CH (03/26/22 1407)          Goals  Short term goals  Time Frame for Short term goals: d/c PT       Therapy Time   Individual Concurrent Group Co-treatment   Time In 0947         Time Out 1007         Minutes 20         Timed Code Treatment Minutes: 8 Minutes       Tito Patel, PT

## 2022-03-26 NOTE — CONSULTS
[address]    Department of Neurosurgery  Resident Consult Note      Reason for Consult:  IPH vs mass  Requesting Physician:  Dr Das  Neurosurgeon:   [x]Dr. Montalvo Eye  []Dr. Zuhair Berkowitz  []Dr. Alisa Doshi  []Dr. Rayne Sabillon  []Dr. Rosalio Hobson    History Obtained From:  patient, electronic medical record    CHIEF COMPLAINT:         Seizure    HISTORY OF PRESENT ILLNESS:       The patient is a 61 y.o. female with past medical history of lung adenocarcinoma status post lobectomy in September 2018 and chemotherapy, history of liver hemangioma, DVT left leg, COPD, hypertension hyperlipidemia who presents as a transfer from [facility] due to intraparenchymal hemorrhage. Patient reportedly had seizure episode while grocery shopping on 3/25. Patient states she has no prior history of seizures however she does admit transient right-sided visual field loss 2 days prior which lasted approximately 30 seconds and then spontaneously resolved. Patient was taken by EMS to [facility] where CT had demonstrated right cerebral hemisphere mass with calcification as well as possible vasogenic edema and associated acute intraparenchymal hemorrhage  With mild mass-effect on the fourth ventricle but no hydrocephalus. Patient was loaded with Keppra as well as started on 500 twice daily, started on steroids. Patient was transferred to [facility] further evaluation as well as neurosurgery consultation.     PAST MEDICAL HISTORY :       Past Medical History:        Diagnosis Date    Anxiety     Benign polyp of large intestine     Cancer (HCC)     LT UPPER LOBE    COPD (chronic obstructive pulmonary disease) ([facility] [address]     Hx of blood clots     DVT LT LEG    Hyperlipidemia     Hypertension     Liver hemangioma     Lung nodule     BARAK  Nodule    Snores     not tested for apnea    Uterine fibroid 09/2010    Wears glasses        Past Surgical History:        Procedure Laterality Date    ABDOMINAL HERNIA REPAIR     BREAST BIOPSY Left 1983    BRONCHOSCOPY  10/1/2018    BRONCHOSCOPY performed [facility] at [facility]    COLONOSCOPY      HYSTERECTOMY  2010    HYSTERECTOMY, TOTAL ABDOMINAL      LUNG BIOPSY      LUNG REMOVAL, PARTIAL Right 2018    Robotic assisted left lobectomy, upper with lymph node biopsy    OTHER SURGICAL HISTORY Right 2018    IR PORT PLACEMENT EQUAL OR GREATER THAN 5 YEARS    [place] [address] INCL FLUOR GDNCE DX W/CELL WASHG SPX N/A 10/29/2018    BRONCHOSCOPY performed by Isela Davison MD at [facility] LUNG OTHER THAN PNEUMONECTOMY 1 LOBE LOBECT Left 2018    XI ROBOTIC ASSISTED LEFT UPPER LOBECTOMY MULTIPLE LYMPH NODE BIOPSY, INTERCOSTAL  NERVE BLOCK ABLATION W/EXPAREL performed by Kelly Ling MD at [address] [place] [address] History:   Social History     Socioeconomic History    Marital status:      Spouse name: Not on file    Number of children: Not on file    Years of education: Not on file    Highest education level: Not on file   Occupational History    Not on file   Tobacco Use    Smoking status: Former Smoker     Packs/day: 1.50     Years: 30.00     Pack years: 45.00     Types: Cigarettes     Quit date:      Years since quittin.2    Smokeless tobacco: Never Used   Vaping Use    Vaping Use: Never used   Substance and Sexual Activity    Alcohol use: Yes     Comment: Occassionally    Drug use: No    Sexual activity: Not on file   Other Topics Concern    Not on file   Social History Narrative    Not on file     Social Determinants of Health     Financial Resource Strain: Low Risk     Difficulty of Paying Living Expenses: Not hard at all   Food Insecurity: No Food Insecurity    Worried About Running Out of Food in the Last Year: Never true    [place] of Food in the Last Year: Never true   Transportation Needs:     Lack of Transportation (Medical): Not on file    Lack of Transportation (Non-Medical):  Not on file   Physical Activity:     Days of Exercise per Week: Not on file    Minutes of Exercise per Session: Not on file   Stress:     Feeling of Stress : Not on file   Social Connections:     Frequency of Communication with Friends and Family: Not on file    Frequency of Social Gatherings with Friends and Family: Not on file    Attends Sikh Services: Not on file    Active Member of [address] [place] [address] or Organizations: Not on file    Attends Club or Organization Meetings: Not on file    Marital Status: Not on file   Intimate Partner Violence:     Fear of Current or Ex-Partner: Not on file    Emotionally Abused: Not on file    Physically Abused: Not on file    Sexually Abused: Not on file   Housing Stability:     Unable to Pay for Housing in the Last Year: Not on file    Number of [place] in the Last Year: Not on file    Unstable Housing in the Last Year: Not on file       Family History:       Problem Relation Age of Onset    Cancer Mother         LUNG    Cancer Sister         LUNG       Allergies:   Codeine    Home Medications:  Prior to Admission medications    Medication Sig Start Date End Date Taking?  Authorizing Provider   ALPRAZolam Thuy Duran) 0.25 MG tablet take 1 tablet by mouth nightly if needed for sleep for 30 DAYS 3/17/22 4/17/22  ANNIE Tran - TEENA   albuterol sulfate  (90 Base) MCG/ACT inhaler inhale 2 puffs by mouth four times a day 9/28/21   Libertad Duque MD   lisinopril-hydroCHLOROthiazide (PRINZIDE;ZESTORETIC) 10-12.5 MG per tablet Take 1 tablet by mouth daily 5/3/21   Emilia Cruz MD   lovastatin (MEVACOR) 10 MG tablet Take 1 tablet by mouth nightly 5/3/21   Emilia Cruz MD       Current Medications:   Current Facility-Administered Medications: levETIRAcetam (KEPPRA) 500 mg/100 mL IVPB, 500 mg, IntraVENous, Q12H  sodium chloride flush 0.9 % injection 5-40 mL, 5-40 mL, IntraVENous, 2 times per day  sodium chloride flush 0.9 % injection 10 mL, 10 mL, IntraVENous, PRN  0.9 % sodium chloride infusion, 25 mL, IntraVENous, PRN  potassium chloride (KLOR-CON M) extended release tablet 40 mEq, 40 mEq, Oral, PRN **OR** potassium bicarb-citric acid (EFFER-K) effervescent tablet 40 mEq, 40 mEq, Oral, PRN **OR** potassium chloride 10 mEq/100 mL IVPB (Peripheral Line), 10 mEq, IntraVENous, PRN  magnesium sulfate 1000 mg in dextrose 5% 100 mL IVPB, 1,000 mg, IntraVENous, PRN  ondansetron (ZOFRAN-ODT) disintegrating tablet 4 mg, 4 mg, Oral, Q8H PRN **OR** ondansetron (ZOFRAN) injection 4 mg, 4 mg, IntraVENous, Q6H PRN  polyethylene glycol (GLYCOLAX) packet 17 g, 17 g, Oral, Daily PRN  acetaminophen (TYLENOL) tablet 650 mg, 650 mg, Oral, Q6H PRN **OR** acetaminophen (TYLENOL) suppository 650 mg, 650 mg, Rectal, Q6H PRN  dexamethasone (DECADRON) injection 4 mg, 4 mg, IntraVENous, Q6H  ALPRAZolam (XANAX) tablet 0.25 mg, 0.25 mg, Oral, Nightly PRN    REVIEW OF SYSTEMS:       General ROS - No fevers, no chills  Ophthalmic ROS - No changes in vision from baseline  ENT ROS -  No sore throat, no rhinorrhea  Respiratory ROS - no cough, no shortness of breath  Cardiovascular ROS - No chest pain  Gastrointestinal ROS - No abdominal pain, no nausea, no vomiting  Genito-Urinary ROS - No dysuria  Musculoskeletal ROS - No neck pain, no back pain  Neurological ROS - No focal weakness or loss of sensation, no headache  Dermatological ROS - No lesions, no rash  Vascular/Lymphatic ROS - No edema    PHYSICAL EXAM:       Vitals:    03/26/22 0430   BP: 133/78   Pulse: 85   Resp:    Temp:    SpO2: 92%       CONSTITUTIONAL:  Well developed, well nourished, alert and oriented x 3, in no acute distress, nontoxic. No dysarthria, no aphasia. EOMI.     HEAD:  normocephalic, atraumatic    EYES:  PERRLA, EOMI   ENT:  moist mucous membranes, no stridor, no tracheal deviation   NECK:  no midline tenderness to palpation, no step-offs or deformities,    BACK:  no midline tenderness to palpation, no step-offs or deformities    LUNGS: Wheezing present left lung, right lung clear   CARDIOVASCULAR:  RRR, no murmur   ABDOMEN:  Soft, no rigidity   NEUROLOGIC:  EYE OPENING     Spontaneous - 4 [x]       To voice - 3 []       To pain - 2 []       None - 1 []    VERBAL RESPONSE     Appropriate, oriented - 5 [x]       Dazed or confused - 4 []       Syllables, expletives - 3 []       Grunts - 2 []       None - 1 []    MOTOR RESPONSE     Spontaneous, command - 6 [x]       Localizes pain - 5 []       Withdraws pain - 4 []       Abnormal flexion - 3 []       Abnormal extension - 2 []       None - 1 []            Total GCS: 15    MENTAL STATUS:  A & O x3, awake               Cranial Nerves:    cranial nerves II-XII are grossly intact    MOTOR EXAM:    Drift:  absent  Tone:  normal    5/5 plantar flexion of the right ankle  5/5 dorsi flexion of the right ankle  5/5 plantar flexion of the left ankle  5/5 dorsi flexion of the left ankle  5/5 flexion of the right knee  5/5 flexion of the left knee  5/5  strength on the right  5/5  strength on the left  5/5 flexion of the right elbow  5/5 flexion of the left elbow  Shoulder elevation 5/5 bilaterally    SENSORY:    Touch:    Right Upper Extremity:  normal  Left Upper Extremity:  normal  Right Lower Extremity:  normal  Left Lower Extremity:  normal      Coordination/Dysmetria:  Finger to Nose:   Normal     SKIN:  no rash      LABS AND IMAGING:     Labs:  CBC with Differential:    Lab Results   Component Value Date    WBC 5.0 10/12/2021    RBC 4.43 10/12/2021    HGB 13.8 10/12/2021    HCT 40.8 10/12/2021     10/12/2021    MCV 92.1 10/12/2021    MCH 31.2 10/12/2021    MCHC 33.8 10/12/2021    RDW 12.5 10/12/2021    LYMPHOPCT 26 10/12/2021    MONOPCT 7 10/12/2021    BASOPCT 1 10/12/2021    MONOSABS 0.36 10/12/2021    LYMPHSABS 1.31 10/12/2021    EOSABS 0.06 10/12/2021    BASOSABS 0.05 10/12/2021    DIFFTYPE NOT REPORTED 10/12/2021     BMP:    Lab Results   Component Value Date     10/12/2021    K 4.3 10/12/2021     10/12/2021    CO2 25 10/12/2021    BUN 12 10/12/2021    LABALBU 4.5 10/12/2021    CREATININE 0.66 10/12/2021    CALCIUM 9.4 10/12/2021    GFRAA >60 10/12/2021    LABGLOM >60 10/12/2021    GLUCOSE 100 10/12/2021       Radiology Review:    Impression   1. Partially calcified, hemorrhagic mass within the right cerebellar   hemisphere resulting in mild compression of the 4th ventricle without   obstructive hydrocephalus at this time. 2. Areas of subcortical low attenuation throughout the bilateral cerebral   hemispheres concerning for additional intracranial mass lesions. 3. No significant mass effect or midline shift. 4. Possible calvarial metastasis versus underlying intraosseous hemangiomas. No significant change. 5. Recent prior outside head CT images would be of benefit to determine   stability. ASSESSMENT AND PLAN:       Patient Active Problem List   Diagnosis    Status post lobectomy of lung    Non-small cell cancer of left lung ([address]    Malignant neoplasm of bronchus of upper lobe, left ([address]    Essential hypertension    Anxiety    Intraparenchymal hemorrhage of brain ([address]    New onset seizure ([address]    Mass of occipital region    Hyperglycemia       A/P:  Marcelina Gan is a 61 y.o. female with past medical history of lung carcinoma status post resection in 2018 who presented as a transfer from [facility] due to new onset seizure that occurred yesterday with no prodromal symptoms. Patient taken to [facility] and CT imaging there demonstrated partially calcified hemorrhagic mass in the right cerebellar hemisphere with mild compression of fourth ventricle but no hydrocephalus. Patient transferred to [facility] for further evaluation.   Patient was loaded with Keppra and started on decadron    Patient care will be discussed with attending, will reevaluate patient along with attending     - No neurosurgical interventions planned for now  -Obtain MRI brain with and without contrast, Stealth protocol   - Neuro checks per protocol  - Hold all antiplatelets and anticoagulants  -CTA chest demonstrated multiple liver lesions and large right adrenal mass, recommend oncologic consultation  -Rest of medical management per primary      Additional recommendations may follow    Please contact neurosurgery with any changes in patient's neurologic status. Thank you for your consult. Dr. Eve Brown, DO   Emergency Medicine Resident, PGY-2  Neurosurgery/Neuro Critical Care Service  3/26/2022 6:19 AM      Please note that this chart was generated using voice recognition Dragon dictation software. Although every effort was made to ensure the accuracy of this automated transcription, some errors in transcription may have occurred.

## 2022-03-26 NOTE — CARE COORDINATION
Case Management Initial Discharge [address],             Met with:patient or spouse/SO to discuss discharge plans. Information verified: address, contacts, phone number, , insurance Yes  Insurance Provider: [facility]    Emergency Contact/Next of Kin name & number:   Bonilla Ho ([address] [facility]) [phone]  Elinor Boogie (daughter) [phone]  Who are involved in patient's support system? SO and daughter    PCP: Anu Porter MD  Date of last visit: 6 months ago      Discharge Planning    Living Arrangements:    lives with SO    Home has 2 stories  2 stairs to climb to get into front door, 14 stairs to climb to reach second floor  Location of bedroom/bathroom in home upstairs    Patient able to perform ADL's:Independent    Current Services (outpatient & in home) none  DME equipment: na  DME provider: na    Is patient receiving oral anticoagulation therapy? No    Potential Assistance Needed:       Patient agreeable to home care: No  [place] of choice provided:  n/a    Prior SNF/Rehab Placement and Facility: none  Agreeable to SNF/Rehab: No  [place] of choice provided: n/a     Evaluation: no    Expected Discharge date:       Patient expects to be discharged to:   home    If home: is the family and/or caregiver wiling & able to provide support at home? yes  Who will be providing this support? SO    Follow Up Appointment: Best Day/ Time:      Transportation provider: Significant Other  Transportation arrangements needed for discharge: No    Readmission Risk              Risk of Unplanned Readmission:  10             Does patient have a readmission risk score greater than 14?: Yes  If yes, follow-up appointment must be made within 7 days of discharge.      Goals of Care:       Educated patient on transitional options, provided freedom of choice and are agreeable with plan      Discharge Plan: d/c home independent, has ride and pcp          Electronically signed by Adan Sky RN on 3/26/22 at 1:21 PM EDT

## 2022-03-26 NOTE — H&P
[facility]  Office: [address], DO, Marietta Isaac, DO, Krista Lopez, DO, Wilian Das, DO, Kristine Jarquin MD, Yolande Ivan MD, Marina Handley MD, Luis Santos MD, Loi Garber MD, Kael Lacy MD, Aracelis Ta MD, Billy Cat, DO, Hossein Stanley, DO, Frankie Reilly MD,  Alaina Stevenson DO, Brian Segal MD, Masha Poole MD, Isabell Durán MD, Marcello Flores DO, Cate Villagomez MD, Doreen Cummings MD, Lee Mi, [facility], [facility] Carmona, CNP, Ronnie Sprague, CNP, Radha Maciel, CNS, Fide Valadez, CNP, Ernestina Bird, CNP, Meka Osborn, CNP, Jimmy Scott, CNP, Sven Rdz, CNP, Sarita Todd PA-C, Rosalie Nguyen, DNP, Hugo Phoenix, DNP, Kevin Sneed, CNP, Jacklyn Frankel, CNP, Keyana Trevino, CNP         [address]    HISTORY AND PHYSICAL EXAMINATION            Date:   3/25/2022  Patient name:  Dallas Yepez  Date of admission:  3/25/2022 10:15 PM  MRN:   [id number]  Account:  [de-identified]  YOB: 1955  PCP:    Db Gaytan MD  Room:   8061/9105-  Code Status:    Full Code    Chief Complaint:     Syncope vs seizure while at    History Obtained From:     patient, electronic medical record    History of Present Illness:     Dallas Yepez is a 61 y.o. 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 [facility] after having a seizure-like activity causing LOC at a grocery store. The activity was described as a tonic-clonic motions.  The patient denies being able to recall events leading up to the episode in the last thing she recalls if she was at [facility] shopping. She states she got warm but she thought it was because her coat was on. Then she remembers waking up in the back of the ambulance confused. Patient admits that she was confused for quite some time after waking up up into the point that she was even at the hospital and still somewhat confused. Up until present day patient has been feeling fine however it was voiced that she noted 1 episode of vision changes in the right eye in which she had a flashing bright white light that quickly came on and went and was painless. Patient was looking at her iPad at the moment and thought it was something with the ipad. Work up in the emergency room      Laboratories:   Metabolic panel. -Within normal limits with the exception of CO2 of 21 and a glucose of 162  Hematology.-Within normal limits  Coagulation-not done  Cardiac Yvpctnm-P-kwrwc 234 (lab cut off less than 255), troponin I less than 0.01  EKG-not done/sent with patient  Urine-within normal limits      Imaging and Diagnostics:     CT BRAIN WITHOUT CONTRAST    1.  Small calcification as well as findings suspicious for a small amount of acute parenchymal hemorrhage in the right cerebral hemisphere with associated vasogenic edema.  Associated mild mass effect on the fourth ventricle and associated low-lying right cerebellar tonsil.  No hydrocephalus,   however.  In addition, there is vasogenic edema of the posterior left temporal occipital lobes and possible small focal area of vasogenic edema of the right frontal lobe.  Findings are consistent with metastatic disease given history of lung cancer, and MRI the brain with and without contrast is   recommended for further characterization and to assess for additional lesions. 2.  Possible calvarial metastases. CTA CHEST  1. No central pulmonary emboli identified.      2.  Left perihilar consolidation suspicious for pneumonia possibly radiation pneumonitis if the patient has had radiation.  I cannot exclude lymphangitic spread of neoplasm in this region. [facility] clinical follow-up to document resolution within 6 weeks is recommended. 3.  Large right adrenal mass which is new likely metastatic. 4.  Multiple liver lesions.  At least one of these is probably a hemangioma but others are not completely imaged on the chest only.  Consider PET/CT for definitive restaging of the patient's lung cancer. Past Medical History:     Past Medical History:   Diagnosis Date    Anxiety     Benign polyp of large intestine     Cancer (HCC)     LT UPPER LOBE    COPD (chronic obstructive pulmonary disease) ([facility] [address]     Hx of blood clots     DVT LT LEG    Hyperlipidemia     Hypertension     Liver hemangioma     Lung nodule     BARAK  Nodule    Snores     not tested for apnea    Uterine fibroid 09/2010    Wears glasses         Past Surgical History:     Past Surgical History:   Procedure Laterality Date    ABDOMINAL HERNIA REPAIR  2012    BREAST BIOPSY Left 1983    BRONCHOSCOPY  10/1/2018    BRONCHOSCOPY performed by Dave Carnes MD at  [address] COLONOSCOPY      HYSTERECTOMY  2010    HYSTERECTOMY, TOTAL ABDOMINAL      LUNG BIOPSY      LUNG REMOVAL, PARTIAL Right 09/28/2018    Robotic assisted left lobectomy, upper with lymph node biopsy    OTHER SURGICAL HISTORY Right 11/05/2018    IR PORT PLACEMENT EQUAL OR GREATER THAN 5 YEARS    [place] [address] INCL FLUOR GDNCE DX W/CELL WASHG SPX N/A 10/29/2018    BRONCHOSCOPY performed by Latoya Esqueda MD at [facility] 1 LOBE LOBECT Left 9/28/2018    XI ROBOTIC ASSISTED LEFT UPPER LOBECTOMY MULTIPLE LYMPH NODE BIOPSY, INTERCOSTAL  NERVE BLOCK ABLATION W/EXPAREL performed by Kell Nolan MD at [place] [address]        Medications Prior to Admission:     Prior to Admission medications    Medication Sig Start Date End Date Taking?  Authorizing Provider   ALPRAZolam Imelda Laury) 0.25 MG tablet take 1 tablet by mouth nightly if needed for sleep for 30 DAYS 3/17/22 4/17/22  Tigist Montanez, APRN - CNP   albuterol sulfate  (90 Base) MCG/ACT inhaler inhale 2 puffs by mouth four times a day 9/28/21   Veronique Draper MD   lisinopril-hydroCHLOROthiazide (PRINZIDE;ZESTORETIC) 10-12.5 MG per tablet Take 1 tablet by mouth daily 5/3/21   Edilson Morrow MD   lovastatin (MEVACOR) 10 MG tablet Take 1 tablet by mouth nightly 5/3/21   Edilson Morrow MD        Allergies:     Codeine    Social History:     Tobacco:    reports that she quit smoking about 22 years ago. Her smoking use included cigarettes. She has a 45.00 pack-year smoking history. She has never used smokeless tobacco.  Alcohol:      reports current alcohol use. Drug Use:  reports no history of drug use. Family History:     Family History   Problem Relation Age of Onset    Cancer Mother         LUNG    Cancer Sister         LUNG       Review of Systems:     Positive and Negative as described in HPI. Review of Systems   Constitutional: Negative for chills, diaphoresis and fever. HENT: Negative for congestion and hearing loss. Eyes: Positive for visual disturbance (2 days prior). Respiratory: Negative for cough, shortness of breath, wheezing and stridor. Cardiovascular: Negative for chest pain, palpitations and leg swelling. Gastrointestinal: Negative for abdominal pain, blood in stool, constipation, diarrhea, nausea and vomiting. Genitourinary: Negative for dysuria and frequency. Musculoskeletal: Negative for myalgias. Skin: Negative for rash. Neurological: Positive for syncope. Negative for dizziness, seizures and headaches. Psychiatric/Behavioral: The patient is not nervous/anxious. Physical Exam:   There were no vitals taken for this visit. No data recorded. No results for input(s): POCGLU in the last 72 hours. No intake or output data in the 24 hours ending 03/25/22 2486    Physical Exam  Vitals and nursing note reviewed.    Constitutional: General: She is not in acute distress. Appearance: She is well-developed. She is not diaphoretic. HENT:      Head: Normocephalic and atraumatic. Right Ear: Hearing normal.      Left Ear: Hearing normal.      Nose: Nose normal. No rhinorrhea. Eyes:      General: Lids are normal.      Extraocular Movements:      Right eye: Normal extraocular motion. Left eye: Normal extraocular motion. Conjunctiva/sclera: Conjunctivae normal.      Right eye: Right conjunctiva is not injected. Left eye: Left conjunctiva is not injected. Pupils: Pupils are equal, round, and reactive to light. Pupils are equal.      Right eye: Pupil is reactive. Left eye: Pupil is reactive. Neck:      Thyroid: No thyromegaly. Vascular: No carotid bruit. Trachea: Trachea and phonation normal. No tracheal deviation. Cardiovascular:      Rate and Rhythm: Normal rate and regular rhythm. Pulses: Normal pulses. Heart sounds: Normal heart sounds. No murmur heard. Pulmonary:      Effort: Pulmonary effort is normal. No respiratory distress. Breath sounds: Normal breath sounds. No stridor. No decreased breath sounds. Abdominal:      General: Bowel sounds are normal. There is no distension. Palpations: Abdomen is soft. There is no mass. Tenderness: There is no abdominal tenderness. There is no guarding. Musculoskeletal:         General: No tenderness. Cervical back: Neck supple. Skin:     General: Skin is warm and dry. Findings: No erythema, lesion or rash. Neurological:      Mental Status: She is alert and oriented to person, place, and time. She is not disoriented. Cranial Nerves: No cranial nerve deficit. Psychiatric:         Speech: Speech normal.         Behavior: Behavior normal. Behavior is cooperative.          Investigations:      Laboratory Testing:  No results found for this or any previous visit (from the past 24 hour(s)). Imaging/Diagnostics:  No results found. Assessment :      Hospital Problems           Last Modified POA    * (Principal) New onset seizure ([facility] [address] 3/25/2022 Yes    Non-small cell cancer of left lung ([facility] [address] 3/26/2022 Yes    Essential hypertension 3/26/2022 Yes    Intraparenchymal hemorrhage of brain ([facility] [address] 3/25/2022 Yes    Mass of occipital region 3/26/2022 Yes    Hyperglycemia 3/26/2022 Yes          Plan:     Patient status inpatient in the Progressive Unit/Step down    1. Consult to neuro surgery  service  2. Consultation to heme-onc  3. Continue with steroids per neurology dosing  4. Loading of Keppra was not done in the out lying emergency room as far as documentation can prove  5. Await repeat CT out contrast for the possible intraparenchymal hemorrhage on outlying CT  6. Obtain MRI with and without contrast  7. Monitor blood pressures, will resume antihypertensives in morning and avoid hypotension  8. Hyperglycemia could be related to the seizure and/or the steroids given will continue to monitor. Patient has had previous elevated glucoses but A1c's were controlled and stable  9. Hold off on chemical anticoagulation and use EPC cuffs as the concern for intraparenchymal hemorrhage, however noting she is highly coagulable given the cancer suspicion. 10. GI prophylaxis with diet    Consultations:   IP CONSULT TO HEM/ONC  IP CONSULT TO NEUROSURGERY  IP CONSULT TO NEUROLOGY     Patient is admitted as inpatient status because of co-morbidities listed above, severity of signs and symptoms as outlined, requirement for current medical therapies and most importantly because of direct risk to patient if care not provided in a hospital setting. Expected length of stay > 48 hours.     Talia Delaney, APRN - [id number] [facility]  3/25/2022  10:31 PM    Copy sent to Dr. Tana Gonzalez MD

## 2022-03-26 NOTE — PROGRESS NOTES
[facility]  Office: [address], DO, Marietta Isaac, DO, Krista Lopez, DO, Wilian Das, DO, Kristine Jarquin MD, Yolande Ivan MD, Marina Handley MD, Luis Santos MD, Loi Garber MD, Kael Lacy MD, Aracelis Ta MD, Billy Cat, DO, Hossein Stanley, DO, Frankie Reilly MD,  Alaina Stevenson DO, Brian Segal MD, Masha Poole MD, Isabell Durán MD, Marcello Flores DO, Cate Villagomez MD, Doreen Cummings MD, Lee Mi, [facility], [facility] Carmona, CNP, Ronnie Sprague [facility], Radha Maciel, CNS, Fide Valadez, CNP, Ernestina Bird, CNP, Meka Osborn, CNP, Jimmy Scott, [facility], Sven Rdz, CNP, Sarita Todd PA-C, Rosalie Nguyen, DNP, Hugo Phoenix, DNP, Kevin Sneed, CNP, Jacklyn Frankel, CNP, Keyana Frisian, [facility]    Progress Note    3/26/2022    11:37 AM    Name:   Dallas Yepez  MRN:     [id number]     Acct:      [de-identified]   Room:   [address] [place] [address] Day:  1  Admit Date:  3/25/2022 10:15 PM    PCP:   Db Gaytan MD  Code Status:  Full Code    Subjective:     C/C:   Seizure     Interval History Status:   No further Sz  Mild temporal Headache    Data Base Updates:  WBC7.7k/uL RBC4.02m/uL Biahjoyvwb32.7     Adtrmry389 High mg/dL     NVK33bj/dL   CREATININE0.71mg/dL     Calcium9.3mg/dL   Orvshc562hzxa/L   Potassium3. 5 Low        Brief History:     As documented in the medical record:  \"   Dallas Yepez is a 61 y.o. 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 Patient presented to [facility] emergency room after having a seizure-like activity causing LOC at a grocery store.   The activity was described as a tonic-clonic motions. The patient denies being able to recall events leading up to the episode in the last thing she recalls if she was at [employer] [facility]. She states she got warm but she thought it was because her coat was on. Then she remembers waking up in the back of the ambulance confused. Patient admits that she was confused for quite some time after waking up up into the point that she was even at the hospital and still somewhat confused. Up until present day patient has been feeling fine however it was voiced that she noted 1 episode of vision changes in the right eye in which she had a flashing bright white light that quickly came on and went and was painless. Patient was looking at her iPad at the moment and thought it was something with the ipad. Work up in the emergency room   [employer]    1. Small calcification as well as findings suspicious for a small amount of acute parenchymal hemorrhage in the right cerebral hemisphere with associated vasogenic edema. Associated mild mass effect on the fourth ventricle and associated low-lying right cerebellar tonsil. No hydrocephalus,   however. In addition, there is vasogenic edema of the posterior left temporal occipital lobes and possible small focal area of vasogenic edema of the right frontal lobe. Findings are consistent with metastatic disease given history of lung cancer, and MRI the brain with and without contrast is   recommended for further characterization and to assess for additional lesions. 2.  Possible calvarial metastases. CTA CHEST  1. No central pulmonary emboli identified. 2.  Left perihilar consolidation suspicious for pneumonia possibly radiation pneumonitis if the patient has had radiation. I cannot exclude lymphangitic spread of neoplasm in this region. Close clinical follow-up to document resolution within 6 weeks is recommended. 3.  Large right adrenal mass which is new likely metastatic. 4.  Multiple liver lesions. At least one of these is probably a hemangioma but others are not completely imaged on the chest only. Consider PET/CT for definitive restaging of the patient's lung cancer. \"    The intitial assessment and plan included:  \"  Hospital Problems            Last Modified POA     * (Principal) New onset seizure ([address] 3/25/2022 Yes     Non-small cell cancer of left lung ([address] 3/26/2022 Yes     Essential hypertension 3/26/2022 Yes     Intraparenchymal hemorrhage of brain ([address] 3/25/2022 Yes     Mass of occipital region 3/26/2022 Yes     Hyperglycemia 3/26/2022 Yes          Plan:  Patient status inpatient in the Progressive Unit/Step down   Consult to neuro surgery  service  1. Consultation to heme-onc  2. Continue with steroids per neurology dosing  3. Loading of Keppra was not done in the out lying emergency room as far as documentation can prove  4. Await repeat CT out contrast for the possible intraparenchymal hemorrhage on outlying CT  5. Obtain MRI with and without contrast  6. Monitor blood pressures, will resume antihypertensives in morning and avoid hypotension  7. Hyperglycemia could be related to the seizure and/or the steroids given will continue to monitor. Patient has had previous elevated glucoses but A1c's were controlled and stable  8. Hold off on chemical anticoagulation and use EPC cuffs as the concern for intraparenchymal hemorrhage, however noting she is highly coagulable given the cancer suspicion. 9. GI prophylaxis with diet\"       Past Medical History:   has a past medical history of Anxiety, Benign polyp of large intestine, Cancer ([address] COPD (chronic obstructive pulmonary disease) ([address] Hx of blood clots, Hyperlipidemia, Hypertension, Liver hemangioma, Lung nodule, Snores, Uterine fibroid, and Wears glasses. Social History:   reports that she quit smoking about 22 years ago. Her smoking use included cigarettes. She has a 45.00 pack-year smoking history.  She has never used smokeless tobacco. She reports current alcohol use. She reports that she does not use drugs. Family History:   Family History   Problem Relation Age of Onset    Cancer Mother         LUNG    Cancer Sister         LUNG       Review of Systems:     Review of Systems   Eyes: Positive for visual disturbance (reports an episode of flashing lights several weeks ago ). Respiratory: Negative for cough and shortness of breath. Cardiovascular: Negative for chest pain and palpitations. Gastrointestinal: Negative for abdominal pain, nausea and vomiting. Genitourinary: Negative for flank pain and hematuria. Neurological: Positive for seizures (no further Sz's), weakness and headaches (bi-temporal HAs). Physical Examination:        Vitals  /80   Pulse 98   Temp 98.4 °F (36.9 °C) (Oral)   Resp 18   SpO2 92%   Temp (24hrs), Av.3 °F (36.8 °C), Min:98.2 °F (36.8 °C), Max:98.4 °F (36.9 °C)    No results for input(s): POCGLU in the last 72 hours. Physical Exam  Vitals reviewed. Constitutional:       General: She is not in acute distress. Appearance: She is not diaphoretic. HENT:      Head: Normocephalic. Nose: Nose normal.   Eyes:      General: No scleral icterus. Conjunctiva/sclera: Conjunctivae normal.   Neck:      Trachea: No tracheal deviation. Cardiovascular:      Rate and Rhythm: Normal rate and regular rhythm. Pulmonary:      Effort: Pulmonary effort is normal. No respiratory distress. Breath sounds: Normal breath sounds. No wheezing or rales. Chest:      Chest wall: No tenderness. Abdominal:      General: There is no distension. Palpations: Abdomen is soft. Tenderness: There is no abdominal tenderness. Musculoskeletal:         General: No tenderness. Cervical back: Neck supple. No rigidity. Skin:     General: Skin is warm and dry. Neurological:      Mental Status: She is alert and oriented to person, place, and time. Comments: Orientated x3  Doing well with historical data  Moving extremities x4  No gross motor or sensory deficits  Speech fluent   equal          Medications: Allergies: Allergies   Allergen Reactions    Codeine Nausea And Vomiting       Current Meds:   Scheduled Meds:    levetiracetam  500 mg IntraVENous Q12H    sodium chloride flush  5-40 mL IntraVENous 2 times per day    dexamethasone  4 mg IntraVENous Q6H     Continuous Infusions:    sodium chloride Stopped (03/26/22 0506)     PRN Meds: ALPRAZolam, sodium chloride flush, sodium chloride, potassium chloride **OR** potassium alternative oral replacement **OR** potassium chloride, magnesium sulfate, ondansetron **OR** ondansetron, polyethylene glycol, acetaminophen **OR** acetaminophen    Data:     I/O (24Hr): Intake/Output Summary (Last 24 hours) at 3/26/2022 1137  Last data filed at 3/26/2022 0600  Gross per 24 hour   Intake 125.28 ml   Output 250 ml   Net -124.72 ml       Labs:  Hematology:  Recent Labs     03/26/22  0612   WBC 7.7   RBC 4.02   HGB 12.7   HCT 36.8   MCV 91.5   MCH 31.6   MCHC 34.5   RDW 12.2      MPV 10.5     Chemistry:  Recent Labs     03/26/22  0612      K 3.5*      CO2 21   GLUCOSE 198*   BUN 13   CREATININE 0.71   MG 1.7   ANIONGAP 16   LABGLOM >60   GFRAA >60   CALCIUM 9.3   No results for input(s): PROT, LABALBU, LABA1C, O3GXQKK, V2JBUZO, FT4, TSH, AST, ALT, LDH, GGT, ALKPHOS, LABGGT, BILITOT, BILIDIR, AMMONIA, AMYLASE, LIPASE, LACTATE, CHOL, HDL, LDLCHOLESTEROL, CHOLHDLRATIO, TRIG, VLDL, KVA88UM, PHENYTOIN, PHENYF, URICACID, POCGLU in the last 72 hours.   ABG:  Lab Results   Component Value Date    POCPH 7.453 09/25/2018    PHART 7.411 09/28/2018    POCPCO2 35.0 09/25/2018    QHW3DJM 37.1 09/28/2018    POCPO2 84.8 09/25/2018    PO2ART 112.0 09/28/2018    POCHCO3 24.5 09/25/2018    MHB8AOV 23.1 09/28/2018    NBEA 0.7 09/28/2018    PBEA NOT REPORTED 09/28/2018    CSL9ARW 26 09/25/2018    ISDF1BJU 97 09/25/2018    Z3TPOLCB 98.7 09/28/2018    FIO2 80% 09/28/2018     Lab Results   Component Value Date/Time    SPECIAL NOT REPORTED 09/28/2018 08:47 AM     Lab Results   Component Value Date/Time    CULTURE NO GROWTH 09/28/2018 08:47 AM       Radiology:  CT HEAD WO CONTRAST    Result Date: 3/26/2022  1. Partially calcified, hemorrhagic mass within the right cerebellar hemisphere resulting in mild compression of the 4th ventricle without obstructive hydrocephalus at this time. 2. Areas of subcortical low attenuation throughout the bilateral cerebral hemispheres concerning for additional intracranial mass lesions. 3. No significant mass effect or midline shift. 4. Possible calvarial metastasis versus underlying intraosseous hemangiomas. No significant change. 5. Recent prior outside head CT images would be of benefit to determine stability.        Assessment:        Primary Problem  New onset seizure [facility])    Active Hospital Problems    Diagnosis Date Noted    Mass of occipital region [R22.0] 03/26/2022    Hyperglycemia [R73.9] 03/26/2022    Hypokalemia [E87.6] 03/26/2022    Vasogenic edema (HCC) [G93.6] 03/26/2022    Brain metastases (HCC) [C79.31] 03/26/2022    Adrenal mass (HCC) - right  [E27.8] 03/26/2022    Intraparenchymal hemorrhage of brain ([facility] [address] [I61.9] 03/25/2022    New onset seizure ([facility] [address] [R56.9] 03/25/2022    Essential hypertension [I10] 06/23/2020    Non-small cell cancer of left lung ([facility] [address] [C34.92]          Plan:        Admit  AEDs per neuro  Onc evaluation  Neurosurgery consultation  Decadron  Glycemic contol - Monitor and control blood sugars  Blood Pressure - Monitor and control  Correct electrolyte abnormalities  DVT prophylaxis:  EPCs    IP CONSULT TO HEM/ONC  IP CONSULT TO NEUROSURGERY  IP CONSULT TO NEUROLOGY    Kassy Romero DO  3/26/2022  11:37 AM

## 2022-03-26 NOTE — CONSULTS
[facility]   [address] [facility]    Inpatient Neurology Consult Note             Date:   3/26/2022  Patient name:  Jovan Cabrera  Date of admission:  3/25/2022 10:15 PM  MRN:   [id number]  Account:  [de-identified]  YOB: 1955  PCP:    Cisco Cantor MD  Room:   [address] [place] [address]  Code Status:    Full Code    Chief Complaint:   LOC seizure like episode while shopping at [facility] 3/25/22 around 11AM  History Obtained From:   patient, electronic medical record    History of Present Illness: The patient is a 61 y.o.  female who presents as a transfer from [facility] 3/25/2022 after having a seizure-like episode while at [facility] 3/25/2022 around 11 AM.  Past medical history significant for left upper lobe invasive lung adenocarcinoma s/p lobectomy 9/28/2018, chemotherapy with Abel Peaches Taxol cycle/ radiation therapy, abdominal hysterectomy, liver hemangioma, DVT left leg, COPD, HTN and HLD. Patient states that approximately 2 days ago she had an episode of right eye painless vision loss that she described as entire visual field flashing bright white light lasting less than 30 seconds and resolved on its own. Then yesterday she was shopping 3/25/2022 when she had a seizure with loss of consciousness generalized tonic-clonic and does not recall feeling anything prior to. Patient has never had a seizure previously and was taken by EMS to [facility] where CT head demonstrated right cerebral hemisphere small calcification with questionable associated acute parenchymal hemorrhage and vasogenic edema along with posterior left temporal occipital hypoattenuation consistent with vasogenic edema and likely metastatic process given her history of lung cancer. Patient transferred to our facility for further oncology work-up along with neurology evaluation.      Hypertensive on arrival to [facility] 153/93, HR 78 afebrile 97.2. glucose 162, bicarbonate 21, Patient given decadron 10mg 2:25PM 3/25 otherwise did not receive any AED and other labs unremarkable. Medical imaging reviewed:   CT head WO 3/26/22   Impression   1. Partially calcified, hemorrhagic mass within the right cerebellar   hemisphere resulting in mild compression of the 4th ventricle without   obstructive hydrocephalus at this time. 2. Areas of subcortical low attenuation throughout the bilateral cerebral   hemispheres concerning for additional intracranial mass lesions. 3. No significant mass effect or midline shift. 4. Possible calvarial metastasis versus underlying intraosseous hemangiomas. No significant change. 5. Recent prior outside head CT images would be of benefit to determine   stability. CT head WO 3/25/22  IMPRESSION:   1.  Small calcification as well as findings suspicious for a small amount of acute parenchymal hemorrhage in the right cerebral hemisphere with associated vasogenic edema.  Associated mild mass effect on the fourth ventricle and associated low-lying right cerebellar tonsil.  No hydrocephalus,   however.  In addition, there is vasogenic edema of the posterior left temporal occipital lobes and possible small focal area of vasogenic edema of the right frontal lobe.  Findings are consistent with metastatic disease given history of lung cancer, and MRI the brain with and without contrast is   recommended for further characterization and to assess for additional lesions. 2.  Possible calvarial metastases. CTA chest 3/25/2022  Impression:   1. No central pulmonary emboli identified. 2.  Left perihilar consolidation suspicious for pneumonia possibly radiation pneumonitis if the patient has had radiation.  I cannot exclude lymphangitic spread of neoplasm in this region. Maylin Robles clinical follow-up to document resolution within 6 weeks is recommended. 3.  Large right adrenal mass which is new likely metastatic.    4.  Multiple liver lesions.  At least one of these is probably a hemangioma but others are not completely imaged on the chest only.  Consider PET/CT for definitive restaging of the patient's lung cancer. MRI brain W/WO 10/24/2018  Impression   1. No convincing evidence of intracranial metastatic disease. 2. No acute intracranial abnormality.  No acute infarct. 3. There is an extra-axial enhancing mass along the right parietal convexity   measuring up to 6 mm in size, which correlates to a partially calcified   lesion seen on the prior CT.  This is most suggestive of a meningioma. 4. The larger lucent lesions within the calvarium on the prior CT demonstrate   intrinsic T1 hyperintensity most suggestive of intraosseous hemangiomas. Pet Scan 7/27/2018  Impression   1. Re- demonstration of irregular spiculated 2.1 cm nodule in the lingula   which is FDG avid most consistent with primary lung malignancy. 2. Anterior left hilar 8 mm lymph node which is mildly FDG avid suggesting   metastatic disease. 3. Multiple incidental/chronic findings as above including re- demonstration   of right hepatic lobe hemangioma and cyst.     PET scan 11/12/18  Impression   Status post left upper lobectomy.  Small left effusion with pleural   thickening that is mildly FDG avid.  This may be inflammatory in etiology. Attention to on follow-up is recommended.       Small mildly FDG avid subcarinal lymph node, which also may be reactive. Attention to on follow-up is recommended.       Redemonstration of non FDG avid liver lesions in the right hepatic lobe,   previously characterized as a hemangioma in segment 8 and with findings of a   cyst in segment 5.          Past Medical History:     Past Medical History:   Diagnosis Date    Anxiety     Benign polyp of large intestine     Cancer (HCC)     LT UPPER LOBE    COPD (chronic obstructive pulmonary disease) ([facility]     Hx of blood clots     DVT LT LEG    Hyperlipidemia     Hypertension  Liver hemangioma     Lung nodule     BARAK  Nodule    Snores     not tested for apnea    Uterine fibroid 09/2010    Wears glasses         Past Surgical History:     Past Surgical History:   Procedure Laterality Date    ABDOMINAL HERNIA REPAIR  2012    BREAST BIOPSY Left 1983    BRONCHOSCOPY  10/1/2018    BRONCHOSCOPY performed by Rena Castro MD at [facility]    COLONOSCOPY      HYSTERECTOMY  2010    HYSTERECTOMY, TOTAL ABDOMINAL      LUNG BIOPSY      LUNG REMOVAL, PARTIAL Right 09/28/2018    Robotic assisted left lobectomy, upper with lymph node biopsy    OTHER SURGICAL HISTORY Right 11/05/2018    IR PORT PLACEMENT EQUAL OR GREATER THAN 5 YEARS    [address] INCL FLUOR GDNCE DX W/CELL WASHG SPX N/A 10/29/2018    BRONCHOSCOPY performed by Adalberto Chow MD at [facility] 1 LOBE LOBECT Left 9/28/2018    XI ROBOTIC ASSISTED LEFT UPPER LOBECTOMY MULTIPLE LYMPH NODE BIOPSY, INTERCOSTAL  NERVE BLOCK ABLATION W/EXPAREL performed by Yadi Schultz MD at [place] [address]        Medications Prior to Admission:     Prior to Admission medications    Medication Sig Start Date End Date Taking? Authorizing Provider   ALPRAZolam [place]) 0.25 MG tablet take 1 tablet by mouth nightly if needed for sleep for 30 DAYS 3/17/22 4/17/22  ANNIE Gould - CNP   albuterol sulfate  (90 Base) MCG/ACT inhaler inhale 2 puffs by mouth four times a day 9/28/21   Todd Torres MD   lisinopril-hydroCHLOROthiazide (PRINZIDE;ZESTORETIC) 10-12.5 MG per tablet Take 1 tablet by mouth daily 5/3/21   Willa Magallanes MD   lovastatin (MEVACOR) 10 MG tablet Take 1 tablet by mouth nightly 5/3/21   Willa Magallanes MD        Allergies:     Codeine    Social History:     Tobacco:    reports that she quit smoking about 22 years ago. Her smoking use included cigarettes. She has a 45.00 pack-year smoking history.  She has never used smokeless tobacco.  Alcohol:      reports current alcohol use.  Drug Use:  reports no history of drug use. Family History:     Family History   Problem Relation Age of Onset   [facility] Cancer Mother         LUNG    Cancer Sister         LUNG       Review of Systems:     ROS:  Constitutional  Negative for fever and chills    HEENT  Negative for ear discharge, ear pain, nosebleed    Eyes  Negative for photophobia, pain and discharge    Respiratory  Negative for hemoptysis and sputum    Cardiovascular  Negative for orthopnea, claudication and PND    Gastrointestinal  Negative for abdominal pain, diarrhea, blood in stool    Musculoskeletal  Negative for joint pain, negative for myalgia    Neurology Positive for seizure    Skin  Negative for rash or itching    Endo/heme/allergies  Negative for polydipsia, environmental allergy    Psychiatric/behavioral  Negative for suicidal ideation. Patient is not anxious        Physical Exam:   /88   Pulse 80   Temp 98.2 °F (36.8 °C) (Oral)   Resp 16   SpO2 94%   Temp (24hrs), Av.2 °F (36.8 °C), Min:98.2 °F (36.8 °C), Max:98.2 °F (36.8 °C)    No results for input(s): POCGLU in the last 72 hours.     Intake/Output Summary (Last 24 hours) at 3/26/2022 0308  Last data filed at 3/26/2022 0000  Gross per 24 hour   Intake --   Output 250 ml   Net -250 ml         NEUROLOGIC EXAMINATION  GENERAL  Appears comfortable and in no distress   HEENT  NC/ AT   NECK  Supple and no bruits heard   MENTAL STATUS:  Alert, oriented, intact memory, no confusion, normal speech, normal language, no hallucination or delusion   CRANIAL NERVES: II     -      Difficulty in right upper quadrant visual field of right eye mainly   III,IV,VI -  EOMs full, no afferent defect, no KRISTIN, no ptosis  V     -     Normal facial sensation  VII    -     Normal facial symmetry  VIII   -     Intact hearing  IX,X -     Symmetrical palate  XI    -     Symmetrical shoulder shrug  XII   -     Midline tongue, no atrophy    MOTOR FUNCTION:  significant for good strength of grade 5/5 in bilateral proximal and distal muscle groups of both upper and lower extremities with normal bulk, normal tone and no involuntary movements, no tremor   SENSORY FUNCTION:  Normal touch, normal pin, normal vibration, normal proprioception   CEREBELLAR FUNCTION:  Intact fine motor control over upper limbs   REFLEX FUNCTION:  Symmetric, no perverted reflex, no Babinski sign   STATION and GAIT  patient able to walk without assistance no disturbance in her gait at baseline        Investigations:      Laboratory Testing:  No results found for this or any previous visit (from the past 24 hour(s)). Assessment :    Garcia Everett is a 82-year-old  female [facility] new onset generalized tonic-clonic seizure. Patient newly diagnosed with left parieto-occipital and right frontal parietal metastatic brain mass lesions and surrounding vasogenic edema with mild mass-effect on the fourth ventricle and associated low-lying right cerebellar tonsil without hydrocephalus. Given patient's new likely brain mets and vasogenic edema will load patient with Keppra 1 g continue 500 mg twice daily. We will also get MRI brain with and without and routine EEG as a baseline. Assessment  1. New onset generalized tonic-clonic seizure likely secondary to #2  2. Left temporal occipital and right parietal mass lesions with surrounding vasogenic edema and mild mass-effect on the fourth ventricle with associated low-lying right cerebellar tonsil  3.   History of left upper lobe adenocarcinoma status post lobectomy and chemo/radiation    Primary Problem  New onset seizure [facility])    Active Hospital Problems    Diagnosis Date Noted    Mass of occipital region [R22.0] 03/26/2022    Intraparenchymal hemorrhage of brain ([facility] [address] [I61.9] 03/25/2022    New onset seizure [facility]) [R56.9] 03/25/2022       Plan:     Patient status Admit as inpatient in the  Progressive Unit/Step down    -MRI brain with and without  -6 hour Repeat CT head WO to ensure right parietal ICH questioned at outlying facility is stable (after further review right cerebellar questionable IPH appears stable although with still recommend MRI brain prior to initiating anticoagulation considering location and appears to have significant hemosiderin deposition increasing risk for bleed  -continue decadron 4mg Q6 hours  -Load keppra 1gram now and continue 500mg BID  -30 minute routine EEG non-emergent as a baseline   -Rest of medical management per primary medicine team and oncology  -Recommend Neurosurgery consult     DVT ppx- will hold pending MRI brain given questionable IPH      Consultations:   IP CONSULT TO HEM/ONC  IP CONSULT TO NEUROSURGERY  IP CONSULT TO NEUROLOGY      Follow-up further recommendations after discussing the case with attending  The plan was discussed with the patient, patient's family and the medical staff. Patient is admitted as inpatient status because of co-morbidities listed above, severity of signs and symptoms as outlined, requirement for current medical therapies and most importantly because of direct risk to patient if care not provided in a hospital setting.     Mara Hanna MD   PGY-3 Neurology Residency   3/26/2022  3:08 AM    Copy sent to Dr. Brigid Bland MD

## 2022-03-26 NOTE — PROGRESS NOTES
Occupational Therapy   Occupational Therapy Initial Assessment  Date: 3/26/2022   Patient Name: Fior Mobley  MRN: [id number]     : 1955    Date of Service: 3/26/2022  Obtained from medical chart:   \" Fior Mobley is a 61 y.o. Non- / non  female with a significant past medical history of non- small cell- adenocarcinoma of the left upper lobe status post lobectomy in 2018 with chemo radiation therapy, DVT, COPD, hypertension, hyperlipidemia who presents with seizure-of new onset,  and is admitted to the hospital for the management of New onset seizure ([facility] \"    Discharge Recommendations:    No therapy recommended at discharge. Assessment   Assessment: Patient presents functioning at baseline level and reports no acute deficits impacting performance and safety with ADLs or functional tasks. OT to defer further treatment at this time in collaboration with patient, please re-consult if functional deficits arise impacting patient's independence. Prognosis: Good  Decision Making: Low Complexity  Patient Education: OT role,OT POC, purpose of evaluation, fall prevention, activity promotion - fair return  No Skilled OT: At baseline function  REQUIRES OT FOLLOW UP: No  Activity Tolerance  Activity Tolerance: Patient Tolerated treatment well  Safety Devices  Safety Devices in place: Yes  Type of devices: Left in bed;Call light within reach;Nurse notified  Restraints  Initially in place: No         Patient Diagnosis(es): There were no encounter diagnoses. has a past medical history of Anxiety, Benign polyp of large intestine, Cancer ([facility] COPD (chronic obstructive pulmonary disease) ([facility] Hx of blood clots, Hyperlipidemia, Hypertension, Liver hemangioma, Lung nodule, Snores, Uterine fibroid, and Wears glasses. has a past surgical history that includes Hysterectomy (); Abdominal hernia repair (); Colonoscopy; Lung biopsy; Breast biopsy (Left, );  Lung removal, partial (Right, 09/28/2018); pr rmvl lung other than pneumonectomy 1 lobe lobect (Left, 9/28/2018); bronchoscopy (10/1/2018); pr [facility] incl fluor gdnce dx w/cell washg spx (N/A, 10/29/2018); other surgical history (Right, 11/05/2018); and Hysterectomy, total abdominal.       Restrictions  Restrictions/Precautions  Restrictions/Precautions: Seizure  Position Activity Restriction  Other position/activity restrictions: up with assistance    Subjective   General  Patient assessed for rehabilitation services?: Yes  Family / Caregiver Present: Yes (spouse)  General Comment  Comments: RN ok'd patient for OT/PT evaluation. Pt agreeable, pleasant and cooperative throughout.   Patient Currently in Pain: Denies  Pain Assessment  Response to Pain Intervention: Patient Satisfied  Vital Signs  Patient Currently in Pain: Denies    Social/Functional History  Social/Functional History  Lives With: Spouse  Type of Home: House  Home Layout: Two level,Bed/Bath upstairs,1/2 bath on main level  Home Access: Stairs to enter without rails  Entrance Stairs - Number of Steps: 2  Entrance Stairs - Rails: None  Bathroom Shower/Tub: Tub/Shower unit,Curtain  Bathroom Toilet: Standard  Home Equipment:  (does use DME at baseline)  ADL Assistance: Independent  Homemaking Assistance: Independent  Homemaking Responsibilities: Yes  Meal Prep Responsibility: Primary  Laundry Responsibility: Primary  Cleaning Responsibility: Primary  Shopping Responsibility: Primary  Ambulation Assistance: Independent  Transfer Assistance: Independent  Active : Yes  Mode of Transportation: Car  Occupation: Retired  Type of occupation: [address]: [employer], socialize, traveling  Additional Comments: Spouse able to provide assistance RN     Objective   Vision: Impaired  Vision Exceptions: Wears glasses at all times  Hearing: Within functional limits          Balance  Sitting Balance: Independent (EOB for ~7-8 min)  Standing Balance: Supervision  Standing

## 2022-03-26 NOTE — CONSULTS
History:   has a past surgical history that includes Hysterectomy (2010); Abdominal hernia repair (2012); Colonoscopy; Lung biopsy; Breast biopsy (Left, 1983); Lung removal, partial (Right, 09/28/2018); pr rmvl lung other than pneumonectomy 1 lobe lobect (Left, 9/28/2018); bronchoscopy (10/1/2018); pr brncc incl fluor gdnce dx w/cell washg spx (N/A, 10/29/2018); other surgical history (Right, 11/05/2018); and Hysterectomy, total abdominal.     Medications:    Prior to Admission medications    Medication Sig Start Date End Date Taking?  Authorizing Provider   ALPRAZolam Meagan Basset) 0.25 MG tablet take 1 tablet by mouth nightly if needed for sleep for 30 DAYS 3/17/22 4/17/22  ANNIE Gimenez CNP   albuterol sulfate  (90 Base) MCG/ACT inhaler inhale 2 puffs by mouth four times a day 9/28/21   Keeley Grimaldo MD   lisinopril-hydroCHLOROthiazide (PRINZIDE;ZESTORETIC) 10-12.5 MG per tablet Take 1 tablet by mouth daily 5/3/21   Brigid Bland MD   lovastatin (MEVACOR) 10 MG tablet Take 1 tablet by mouth nightly 5/3/21   Brigid Bland MD     Current Facility-Administered Medications   Medication Dose Route Frequency Provider Last Rate Last Admin    levETIRAcetam (KEPPRA) 500 mg/100 mL IVPB  500 mg IntraVENous Q12H Mara Hanna MD   Stopped at 03/26/22 1021    ALPRAZolam (XANAX) tablet 0.25 mg  0.25 mg Oral 4x Daily PRN ANNIE Arthur CNP   0.25 mg at 03/26/22 1411    sodium chloride flush 0.9 % injection 10 mL  10 mL IntraVENous PRN Honorio Fields,         sodium chloride flush 0.9 % injection 5-40 mL  5-40 mL IntraVENous 2 times per day Mary Love P Blood, DO   10 mL at 03/26/22 0915    sodium chloride flush 0.9 % injection 10 mL  10 mL IntraVENous PRN Monty P Blood, DO        0.9 % sodium chloride infusion  25 mL IntraVENous PRN Monty BAE Blood, DO   Stopped at 03/26/22 0506    potassium chloride (KLOR-CON M) extended release tablet 40 mEq  40 mEq Oral PRN Monty BAE Blood, DO        Or  potassium bicarb-citric acid (EFFER-K) effervescent tablet 40 mEq  40 mEq Oral PRN Monty P Blood, DO   40 mEq at 03/26/22 0703    Or    potassium chloride 10 mEq/100 mL IVPB (Peripheral Line)  10 mEq IntraVENous PRN Monty P Blood, DO        magnesium sulfate 1000 mg in dextrose 5% 100 mL IVPB  1,000 mg IntraVENous PRN Monty P Blood, DO        ondansetron (ZOFRAN-ODT) disintegrating tablet 4 mg  4 mg Oral Q8H PRN Monty P Blood, DO        Or    ondansetron (ZOFRAN) injection 4 mg  4 mg IntraVENous Q6H PRN Monty P Blood, DO        polyethylene glycol (GLYCOLAX) packet 17 g  17 g Oral Daily PRN Monty P Blood, DO        acetaminophen (TYLENOL) tablet 650 mg  650 mg Oral Q6H PRN Monty P Blood, DO        Or    acetaminophen (TYLENOL) suppository 650 mg  650 mg Rectal Q6H PRN Monty P Blood, DO        dexamethasone (DECADRON) injection 4 mg  4 mg IntraVENous Q6H Monty P Blood, DO   4 mg at 03/26/22 1202       Allergies:  Codeine    Social History:   reports that she quit smoking about 22 years ago. Her smoking use included cigarettes. She has a 45.00 pack-year smoking history. She has never used smokeless tobacco. She reports current alcohol use. She reports that she does not use drugs. Family History: family history includes Cancer in her mother and sister. REVIEW OF SYSTEMS:      Constitutional: No fever or chills.  No night sweats, no weight loss   Eyes: No eye discharge, double vision, or eye pain   HEENT: negative for sore mouth, sore throat, hoarseness and voice change   Respiratory: negative for cough , sputum, dyspnea, wheezing, hemoptysis, chest pain   Cardiovascular: negative for chest pain, dyspnea, palpitations, orthopnea, PND   Gastrointestinal: negative for nausea, vomiting, diarrhea, constipation, abdominal pain, Dysphagia, hematemesis and hematochezia   Genitourinary: negative for frequency, dysuria, nocturia, urinary incontinence, and hematuria   Integument: negative for rash, skin lesions, bruises.    Hematologic/Lymphatic: negative for easy bruising, bleeding, lymphadenopathy, or petechiae   Endocrine: negative for heat or cold intolerance,weight changes, change in bowel habits and hair loss   Musculoskeletal: negative for myalgias, arthralgias, pain, joint swelling,and bone pain   Neurological: negative for headaches, dizziness, seizures, weakness, numbness    PHYSICAL EXAM:        BP (!) 142/76   Pulse 95   Temp 98.4 °F (36.9 °C) (Oral)   Resp 21   SpO2 92%    Temp (24hrs), Av.3 °F (36.8 °C), Min:98.2 °F (36.8 °C), Max:98.4 °F (36.9 °C)      General appearance - well appearing, no in pain or distress   Mental status - alert and cooperative   Eyes - pupils equal and reactive, extraocular eye movements intact   Ears - bilateral TM's and external ear canals normal   Mouth - mucous membranes moist, pharynx normal without lesions   Neck - supple, no significant adenopathy   Lymphatics - no palpable lymphadenopathy, no hepatosplenomegaly   Chest - clear to auscultation, no wheezes, rales or rhonchi, symmetric air entry   Heart - normal rate, regular rhythm, normal S1, S2, no murmurs  Abdomen - soft, nontender, nondistended, no masses or organomegaly   Neurological - alert, oriented, normal speech, no focal findings or movement disorder noted   Musculoskeletal - no joint tenderness, deformity or swelling   Extremities - peripheral pulses normal, no pedal edema, no clubbing or cyanosis   Skin - normal coloration and turgor, no rashes, no suspicious skin lesions noted ,      DATA:      Labs:     Results for orders placed or performed during the hospital encounter of 54/15/64   Basic Metabolic Panel w/ Reflex to MG   Result Value Ref Range    Glucose 198 (H) 70 - 99 mg/dL    BUN 13 8 - 23 mg/dL    CREATININE 0.71 0.50 - 0.90 mg/dL    Calcium 9.3 8.6 - 10.4 mg/dL    Sodium 139 135 - 144 mmol/L    Potassium 3.5 (L) 3.7 - 5.3 mmol/L    Chloride 102 98 - 107 mmol/L    CO2 21 20 - 31 mmol/L    Anion Gap 16 9 - 17 mmol/L    GFR Non-African American >60 >60 mL/min    GFR African American >60 >60 mL/min    GFR Comment         CBC with Auto Differential   Result Value Ref Range    WBC 7.7 3.5 - 11.3 k/uL    RBC 4.02 3.95 - 5.11 m/uL    Hemoglobin 12.7 11.9 - 15.1 g/dL    Hematocrit 36.8 36.3 - 47.1 %    MCV 91.5 82.6 - 102.9 fL    MCH 31.6 25.2 - 33.5 pg    MCHC 34.5 28.4 - 34.8 g/dL    RDW 12.2 11.8 - 14.4 %    Platelets 419 428 - 884 k/uL    MPV 10.5 8.1 - 13.5 fL    NRBC Automated 0.0 0.0 per 100 WBC    Immature Granulocytes 1 (H) 0 %    Seg Neutrophils 90 (H) 36 - 65 %    Lymphocytes 7 (L) 24 - 43 %    Monocytes 2 (L) 3 - 12 %    Eosinophils % 0 (L) 1 - 4 %    Basophils 0 0 - 2 %    Absolute Immature Granulocyte 0.08 0.00 - 0.30 k/uL    Segs Absolute 6.93 1.50 - 8.10 k/uL    Absolute Lymph # 0.54 (L) 1.10 - 3.70 k/uL    Absolute Mono # 0.15 0.10 - 1.20 k/uL    Absolute Eos # 0.00 0.00 - 0.44 k/uL    Basophils Absolute 0.00 0.00 - 0.20 k/uL    Morphology Normal    Magnesium   Result Value Ref Range    Magnesium 1.7 1.6 - 2.6 mg/dL         IMAGING DATA:    CT HEAD WO CONTRAST    Result Date: 3/26/2022  EXAMINATION: CT OF THE HEAD WITHOUT CONTRAST  3/26/2022 3:11 am TECHNIQUE: CT of the head was performed without the administration of intravenous contrast. Dose modulation, iterative reconstruction, and/or weight based adjustment of the mA/kV was utilized to reduce the radiation dose to as low as reasonably achievable. COMPARISON: Prior not available at time dictation, MR brain 10/24/2018, head CT 08/03/2018 HISTORY: ORDERING SYSTEM PROVIDED HISTORY: 6 hour repeat stability scan, new seizure today found to have left occipital-parital mass and right parietal with IPH FINDINGS: BRAIN/VENTRICLES: Partially calcified, hemorrhagic mass within the right cerebellar hemisphere with resultant localized edema resulting in mild compression of the 4th ventricle. No evidence of hydrocephalus.   Areas of subcortical low attenuation within the left parietal, left occipital and caudal aspect of the right postcentral gyrus suggesting underlying edema concerning for additional intracranial lesions. No significant mass effect or midline shift. No extra-axial fluid collections. Gray-white matter differentiation is otherwise maintained. ORBITS: The visualized portion of the orbits demonstrate no acute abnormality. SINUSES: The visualized paranasal sinuses and mastoid air cells demonstrate no acute abnormality. SOFT TISSUES/SKULL:  Scattered lytic lesions throughout the calvarium may reflect intraosseous hemangiomas, however underlying metastasis is possible. No significant change. 1. Partially calcified, hemorrhagic mass within the right cerebellar hemisphere resulting in mild compression of the 4th ventricle without obstructive hydrocephalus at this time. 2. Areas of subcortical low attenuation throughout the bilateral cerebral hemispheres concerning for additional intracranial mass lesions. 3. No significant mass effect or midline shift. 4. Possible calvarial metastasis versus underlying intraosseous hemangiomas. No significant change. 5. Recent prior outside head CT images would be of benefit to determine stability.          IMPRESSION:   Primary Problem  New onset seizure [facility])    Active Hospital Problems    Diagnosis Date Noted    Mass of occipital region [R22.0] 03/26/2022    Hyperglycemia [R73.9] 03/26/2022    Hypokalemia [E87.6] 03/26/2022    Vasogenic edema (HCC) [G93.6] 03/26/2022    Brain metastases (HCC) [C79.31] 03/26/2022    Adrenal mass ([address] - right  [E27.8] 03/26/2022    Episode of loss of consciousness [R55]     Intraparenchymal hemorrhage of brain ([address] [I61.9] 03/25/2022    New onset seizure ([address] [R56.9] 03/25/2022    Essential hypertension [I10] 06/23/2020    Non-small cell cancer of left lung ([address] [C34.92]            RECOMMENDATIONS:  I personally reviewed results of lab work-up imaging studies and other relevant clinical data. We will obtain abdomen pelvis. Only upper abdomen was visualized on the CT chest  Follow-up on results of brain MRI and spine MRI. If MRI shows brain metastasis will need treatment. Options would include surgery versus radiation depending upon location, number of the metastasis and feasibility for surgery versus radiation  We will need tissue diagnosis to establish recurrent disease. Will make recommendations regarding site of biopsy depending upon the MRI of the brain as well as other imaging studies. Patient multiple questions which answered the best my ability  Continue Decadron      Discussed with patient and Nurse. Thank you for asking us to see this patient. Meka Franz MD          This note is created with the assistance of a speech recognition program.  While intending to generate a document that actually reflects the content of the visit, the document can still have some errors including those of syntax and sound a like substitutions which may escape proof reading. It such instances, actual meaning can be extrapolated by contextual diversion.

## 2022-03-27 ENCOUNTER — HOSPITAL ENCOUNTER (OUTPATIENT)
Dept: RADIATION ONCOLOGY | Age: 67
Discharge: HOME | End: 2022-03-27
Payer: COMMERCIAL

## 2022-03-27 ENCOUNTER — APPOINTMENT (OUTPATIENT)
Dept: CT IMAGING | Age: 67
DRG: 025 | End: 2022-03-27
Attending: INTERNAL MEDICINE
Payer: COMMERCIAL

## 2022-03-27 LAB
ALBUMIN SERPL-MCNC: 4.2 G/DL (ref 3.5–5.2)
ALBUMIN/GLOBULIN RATIO: 1.6 (ref 1–2.5)
ALP BLD-CCNC: 70 U/L (ref 35–104)
ALT SERPL-CCNC: 18 U/L (ref 5–33)
ANION GAP SERPL CALCULATED.3IONS-SCNC: 12 MMOL/L (ref 9–17)
AST SERPL-CCNC: 21 U/L
BILIRUB SERPL-MCNC: 0.3 MG/DL (ref 0.3–1.2)
BILIRUBIN DIRECT: 0.1 MG/DL
BILIRUBIN, INDIRECT: 0.2 MG/DL (ref 0–1)
BUN BLDV-MCNC: 18 MG/DL (ref 8–23)
CALCIUM SERPL-MCNC: 9.5 MG/DL (ref 8.6–10.4)
CHLORIDE BLD-SCNC: 105 MMOL/L (ref 98–107)
CO2: 24 MMOL/L (ref 20–31)
CREAT SERPL-MCNC: 0.59 MG/DL (ref 0.5–0.9)
GFR AFRICAN AMERICAN: >60 ML/MIN
GFR NON-AFRICAN AMERICAN: >60 ML/MIN
GFR SERPL CREATININE-BSD FRML MDRD: ABNORMAL ML/MIN/{1.73_M2}
GLUCOSE BLD-MCNC: 124 MG/DL (ref 70–99)
POTASSIUM SERPL-SCNC: 4.5 MMOL/L (ref 3.7–5.3)
SODIUM BLD-SCNC: 141 MMOL/L (ref 135–144)
TOTAL PROTEIN: 6.8 G/DL (ref 6.4–8.3)

## 2022-03-27 PROCEDURE — 99255 IP/OBS CONSLTJ NEW/EST HI 80: CPT | Performed by: RADIOLOGY

## 2022-03-27 PROCEDURE — 6360000002 HC RX W HCPCS: Performed by: INTERNAL MEDICINE

## 2022-03-27 PROCEDURE — 71260 CT THORAX DX C+: CPT

## 2022-03-27 PROCEDURE — 6370000000 HC RX 637 (ALT 250 FOR IP): Performed by: NURSE PRACTITIONER

## 2022-03-27 PROCEDURE — 99232 SBSQ HOSP IP/OBS MODERATE 35: CPT | Performed by: INTERNAL MEDICINE

## 2022-03-27 PROCEDURE — 6360000004 HC RX CONTRAST MEDICATION: Performed by: STUDENT IN AN ORGANIZED HEALTH CARE EDUCATION/TRAINING PROGRAM

## 2022-03-27 PROCEDURE — 36415 COLL VENOUS BLD VENIPUNCTURE: CPT

## 2022-03-27 PROCEDURE — 99233 SBSQ HOSP IP/OBS HIGH 50: CPT | Performed by: NEUROLOGICAL SURGERY

## 2022-03-27 PROCEDURE — 2060000000 HC ICU INTERMEDIATE R&B

## 2022-03-27 PROCEDURE — 6370000000 HC RX 637 (ALT 250 FOR IP): Performed by: FAMILY MEDICINE

## 2022-03-27 PROCEDURE — 6370000000 HC RX 637 (ALT 250 FOR IP): Performed by: INTERNAL MEDICINE

## 2022-03-27 PROCEDURE — 99232 SBSQ HOSP IP/OBS MODERATE 35: CPT | Performed by: PSYCHIATRY & NEUROLOGY

## 2022-03-27 PROCEDURE — 94760 N-INVAS EAR/PLS OXIMETRY 1: CPT

## 2022-03-27 PROCEDURE — 99233 SBSQ HOSP IP/OBS HIGH 50: CPT | Performed by: INTERNAL MEDICINE

## 2022-03-27 PROCEDURE — 95816 EEG AWAKE AND DROWSY: CPT

## 2022-03-27 PROCEDURE — 95816 EEG AWAKE AND DROWSY: CPT | Performed by: INTERNAL MEDICINE

## 2022-03-27 PROCEDURE — 82248 BILIRUBIN DIRECT: CPT

## 2022-03-27 PROCEDURE — 2580000003 HC RX 258: Performed by: INTERNAL MEDICINE

## 2022-03-27 PROCEDURE — 80053 COMPREHEN METABOLIC PANEL: CPT

## 2022-03-27 RX ORDER — CLONIDINE HYDROCHLORIDE 0.1 MG/1
0.1 TABLET ORAL EVERY 4 HOURS PRN
Status: DISCONTINUED | OUTPATIENT
Start: 2022-03-27 | End: 2022-03-30

## 2022-03-27 RX ADMIN — SODIUM CHLORIDE, PRESERVATIVE FREE 10 ML: 5 INJECTION INTRAVENOUS at 07:49

## 2022-03-27 RX ADMIN — LEVETIRACETAM 500 MG: 500 TABLET, FILM COATED ORAL at 20:25

## 2022-03-27 RX ADMIN — DEXAMETHASONE SODIUM PHOSPHATE 4 MG: 4 INJECTION, SOLUTION INTRA-ARTICULAR; INTRALESIONAL; INTRAMUSCULAR; INTRAVENOUS; SOFT TISSUE at 18:56

## 2022-03-27 RX ADMIN — LEVETIRACETAM 500 MG: 500 TABLET, FILM COATED ORAL at 07:49

## 2022-03-27 RX ADMIN — DEXAMETHASONE SODIUM PHOSPHATE 4 MG: 4 INJECTION, SOLUTION INTRA-ARTICULAR; INTRALESIONAL; INTRAMUSCULAR; INTRAVENOUS; SOFT TISSUE at 06:38

## 2022-03-27 RX ADMIN — ALPRAZOLAM 0.25 MG: 0.25 TABLET ORAL at 07:49

## 2022-03-27 RX ADMIN — ALPRAZOLAM 0.25 MG: 0.25 TABLET ORAL at 21:56

## 2022-03-27 RX ADMIN — CLONIDINE HYDROCHLORIDE 0.1 MG: 0.1 TABLET ORAL at 12:34

## 2022-03-27 RX ADMIN — IOPAMIDOL 100 ML: 755 INJECTION, SOLUTION INTRAVENOUS at 11:45

## 2022-03-27 RX ADMIN — DEXAMETHASONE SODIUM PHOSPHATE 4 MG: 4 INJECTION, SOLUTION INTRA-ARTICULAR; INTRALESIONAL; INTRAMUSCULAR; INTRAVENOUS; SOFT TISSUE at 12:17

## 2022-03-27 RX ADMIN — SODIUM CHLORIDE, PRESERVATIVE FREE 10 ML: 5 INJECTION INTRAVENOUS at 20:26

## 2022-03-27 ASSESSMENT — ENCOUNTER SYMPTOMS
NAUSEA: 0
COUGH: 0
VOMITING: 0
ABDOMINAL PAIN: 0
SHORTNESS OF BREATH: 0

## 2022-03-27 ASSESSMENT — PAIN SCALES - GENERAL
PAINLEVEL_OUTOF10: 0
PAINLEVEL_OUTOF10: 0

## 2022-03-27 NOTE — PROGRESS NOTES
[facility]   [address] [place] [address]    Progress note             Date:   3/27/2022  Patient name:  Doug Dewey  Date of admission:  3/25/2022 10:15 PM  MRN:   [id number]  Account:  [de-identified]  YOB: 1955  PCP:    Alton Sandoval MD  Room:   [address] [place] [address]  Code Status:    Full Code    Chief Complaint:     No chief complaint on file. Seizure activity    Interval hx:   Neurosurgery on board, follow-up appreciated. Patient remains on Keppra and Decadron 4 mg every 6 hours. She is able to follow commands and is alert and oriented. EEG did show some delta slowing along with sharp wave activity. Oncology has been consulted. Brief History of Present Illness: The patient is a 61 y.o. Non- / non  female who presents with No chief complaint on file. Patient was transferred from [facility] after having a seizure-like episode at [place], while shopping. History of transient vision loss and lung adenocarcinoma status post lobectomy. Initial CT showed hypodensities in multiple locations concerning for metastatic disease. MRI has been completed which showed enhancing lesion. CT of the abdomen and pelvis were pending.   Neurosurgery has been consulted and patient has been started on Decadron        Past Medical History:     Past Medical History:   Diagnosis Date    Anxiety     Benign polyp of large intestine     Cancer ([address]     LT UPPER LOBE    COPD (chronic obstructive pulmonary disease) ([address]     Hx of blood clots     DVT LT LEG    Hyperlipidemia     Hypertension     Liver hemangioma     Lung nodule     BARAK  Nodule    Snores     not tested for apnea    Uterine fibroid 09/2010    Wears glasses         Past Surgical History:     Past Surgical History:   Procedure Laterality Date    ABDOMINAL HERNIA REPAIR  2012    BREAST BIOPSY Left 1983    BRONCHOSCOPY  10/1/2018    BRONCHOSCOPY performed by Shanique Hayes MD at [facility]    COLONOSCOPY      HYSTERECTOMY  2010    HYSTERECTOMY, TOTAL ABDOMINAL      LUNG BIOPSY      LUNG REMOVAL, PARTIAL Right 09/28/2018    Robotic assisted left lobectomy, upper with lymph node biopsy    OTHER SURGICAL HISTORY Right 11/05/2018    IR PORT PLACEMENT EQUAL OR GREATER THAN 5 YEARS    [address] INCL FLUOR GDNCE DX W/CELL WASHG SPX N/A 10/29/2018    BRONCHOSCOPY performed by Jorgito Lynch MD at [facility] 1 LOBE LOBECT Left 9/28/2018    XI ROBOTIC ASSISTED LEFT UPPER LOBECTOMY MULTIPLE LYMPH NODE BIOPSY, INTERCOSTAL  NERVE BLOCK ABLATION W/EXPAREL performed by Ritu Brown MD at [place] [id number]        Medications Prior to Admission:     Prior to Admission medications    Medication Sig Start Date End Date Taking? Authorizing Provider   ALPRAZolam Shakilaana m Mc) 0.25 MG tablet take 1 tablet by mouth nightly if needed for sleep for 30 DAYS 3/17/22 4/17/22  ANNIE Vides - CNP   albuterol sulfate  (90 Base) MCG/ACT inhaler inhale 2 puffs by mouth four times a day 9/28/21   Nikki Silva MD   lisinopril-hydroCHLOROthiazide (PRINZIDE;ZESTORETIC) 10-12.5 MG per tablet Take 1 tablet by mouth daily 5/3/21   Prabhakar Brush MD   lovastatin (MEVACOR) 10 MG tablet Take 1 tablet by mouth nightly 5/3/21   Prabhakar Brush MD        Allergies:     Codeine    Social History:     Tobacco:    reports that she quit smoking about 22 years ago. Her smoking use included cigarettes. She has a 45.00 pack-year smoking history. She has never used smokeless tobacco.  Alcohol:      reports current alcohol use. Drug Use:  reports no history of drug use.     Family History:     Family History   Problem Relation Age of Onset    Cancer Mother         LUNG    Cancer Sister         LUNG       Review of Systems:     ROS:  Constitutional  Negative for fever and chills    HEENT  Negative for ear discharge, ear pain, nosebleed    Eyes  Negative for photophobia, pain and discharge Respiratory  Negative for hemoptysis and sputum    Cardiovascular  Negative for orthopnea, claudication and PND    Gastrointestinal  Negative for abdominal pain, diarrhea, blood in stool    Musculoskeletal  Negative for joint pain, negative for myalgia    Neurology Negative for seizures, loss of consciousness   Skin  Negative for rash or itching    Endo/heme/allergies  Negative for polydipsia, environmental allergy    Psychiatric/behavioral  Negative for suicidal ideation. Patient is not anxious        Physical Exam:   BP (!) 149/81   Pulse 79   Temp 99.4 °F (37.4 °C) (Oral)   Resp 19   SpO2 97%   Temp (24hrs), Av.2 °F (36.8 °C), Min:97.5 °F (36.4 °C), Max:99.4 °F (37.4 °C)    No results for input(s): POCGLU in the last 72 hours.     Intake/Output Summary (Last 24 hours) at 3/27/2022 1651  Last data filed at 3/27/2022 0436  Gross per 24 hour   Intake --   Output 750 ml   Net -750 ml         NEUROLOGIC EXAMINATION  GENERAL  Appears comfortable and in no distress   HEENT  NC/ AT   NECK  Supple and no bruits heard   MENTAL STATUS:  Alert, oriented, intact memory, no confusion, normal speech, normal language, no hallucination or delusion   CRANIAL NERVES: II     -      Visual fields intact to confrontation  III,IV,VI -  EOMs full, no afferent defect, no KRISTIN, no ptosis  V     -     Normal facial sensation  VII    -     Normal facial symmetry  VIII   -     Intact hearing  IX,X -     Symmetrical palate  XI    -     Symmetrical shoulder shrug  XII   -     Midline tongue, no atrophy    MOTOR FUNCTION:  significant for good strength of grade 5/5 in bilateral proximal and distal muscle groups of both upper and lower extremities with normal bulk, normal tone and no involuntary movements, no tremor   SENSORY FUNCTION:  Normal touch, normal pin, normal vibration, normal proprioception   CEREBELLAR FUNCTION:  Intact fine motor control over upper limbs   REFLEX FUNCTION:  Symmetric, no perverted reflex, no Babinski sign STATION and GAIT  Not tested       Investigations:      Laboratory Testing:  Recent Results (from the past 24 hour(s))   Comprehensive Metabolic Panel with Bilirubin    Collection Time: 03/27/22  6:48 AM   Result Value Ref Range    Albumin 4.2 3.5 - 5.2 g/dL    Albumin/Globulin Ratio 1.6 1.0 - 2.5    Alkaline Phosphatase 70 35 - 104 U/L    ALT 18 5 - 33 U/L    AST 21 <32 U/L    Total Bilirubin 0.30 0.3 - 1.2 mg/dL    Bilirubin, Direct 0.10 <0.31 mg/dL    Bilirubin, Indirect 0.20 0.00 - 1.00 mg/dL    BUN 18 8 - 23 mg/dL    Calcium 9.5 8.6 - 10.4 mg/dL    CREATININE 0.59 0.50 - 0.90 mg/dL    Glucose 124 (H) 70 - 99 mg/dL    Total Protein 6.8 6.4 - 8.3 g/dL    Sodium 141 135 - 144 mmol/L    Potassium 4.5 3.7 - 5.3 mmol/L    Chloride 105 98 - 107 mmol/L    CO2 24 20 - 31 mmol/L    Anion Gap 12 9 - 17 mmol/L    GFR Non-African American >60 >60 mL/min    GFR African American >60 >60 mL/min    GFR Comment               Assessment :      Primary Problem  New onset seizure [facility])    Active Hospital Problems    Diagnosis Date Noted    Mass of occipital region [R22.0] 03/26/2022    Hyperglycemia [R73.9] 03/26/2022    Hypokalemia [E87.6] 03/26/2022    Vasogenic edema (HCC) [G93.6] 03/26/2022    Brain metastases (HCC) [C79.31] 03/26/2022    Adrenal mass (HCC) - right  [E27.8] 03/26/2022    Episode of loss of consciousness [R55]     Intraparenchymal hemorrhage of brain ([address] [I61.9] 03/25/2022    New onset seizure ([address] [R56.9] 03/25/2022    Essential hypertension [I10] 06/23/2020    Non-small cell cancer of left lung (HCC) [C34.92]        Plan:     61year-old status post seizure-like activity. CT concerning for metastatic disease with MRI shows enhancing lesions. Oncology has been consulted along with neurosurgery. Patient currently on Keppra along with Decadron. She is awake and alert and able to follow commands. Vitals have been stable.     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  -Oncology and neurosurgery on board, follow-up appreciated  -We will need tissue biopsy for pathology. -CT abdomen/pelvis ongoing.  -Additional management as per primary team        Follow-up further recommendations after discussing the case with attending  The plan was discussed with the patient, patient's family and the medical staff. Consultations:   IP CONSULT TO HEM/ONC  IP CONSULT TO NEUROSURGERY  IP CONSULT TO NEUROLOGY  IP CONSULT TO RADIATION ONCOLOGY    Patient is admitted as inpatient status because of co-morbidities listed above, severity of signs and symptoms as outlined, requirement for current medical therapies and most importantly because of direct risk to patient if care not provided in a hospital setting.     Sofya Tran MD  3/27/2022  4:59 PM    Copy sent to Dr. Flynn Ambrosio MD

## 2022-03-27 NOTE — PROGRESS NOTES
Neurosurgery Resident  Daily Progress Note    3/27/2022  6:31 AM    Chart reviewed. No acute events overnight. No new complaints. Vitals reviewed, afebrile. Vitals:    03/27/22 0000 03/27/22 0400 03/27/22 0423 03/27/22 0436   BP:   (!) 143/76    Pulse: 75 74 70 103   Resp:   23 (!) 40   Temp:   97.6 °F (36.4 °C)    TempSrc:   Oral    SpO2:   97%        PE:   Gen: AOx3, NAD  Cardiovascular: Regular rate, no dependent edema, distal pulses 2+  Respiratory: Chest symmetric, no accessory muscle use, normal respirations   Neuro: PERRlA, no focal deficits, CN intact, 5/5 BUE, 5/5 BLE      Lab Results   Component Value Date    WBC 7.7 03/26/2022    HGB 12.7 03/26/2022    HCT 36.8 03/26/2022     03/26/2022    CHOL 232 (H) 10/12/2021    TRIG 140 10/12/2021    HDL 77 10/12/2021    ALT 14 10/12/2021    AST 16 10/12/2021     03/26/2022    K 3.5 (L) 03/26/2022     03/26/2022    CREATININE 0.71 03/26/2022    BUN 13 03/26/2022    CO2 21 03/26/2022    TSH 1.77 05/11/2016    INR 1.0 11/05/2018    GLUF 110 (H) 05/11/2017    LABA1C 5.0 06/28/2018     A/P  John Mclaughlin is a 61 y.o. female with past medical history of lung carcinoma status post resection in 2018 who presented as a transfer from [facility] due to new onset seizure that occurred yesterday with no prodromal symptoms. Patient taken to [facility] and CT imaging there demonstrated partially calcified hemorrhagic mass in the right cerebellar hemisphere with mild compression of fourth ventricle but no hydrocephalus. Patient transferred to [place] for further evaluation.   Patient was loaded with Keppra and started on decadron     - Pending final work up  - Discussed cerebellar mass removal at a later date, pending work up              - Neuro checks per protocol   - Decadron 4 mg q6h  - Hold all antiplatelets and anticoagulants  - Oncology on board, CT chest abdomen pelvis with contrast pending  - Medical management per

## 2022-03-27 NOTE — PROGRESS NOTES
[facility]  Office: [phone]  Madyson Ramos, DO, Fab Gaming, DO, Babatunde Hairston, DO, Annabelle Das, DO, Rani Hernandez MD, Yady Jc MD, Adam Bellamy MD, Jazmine Gregorio MD, Adela Duffy MD, Haylie Francis MD, Vazquez Hernandez MD, Leighann Cordova, DO, Taya Sensor, DO, Clive Ortiz MD,  Alexsandra Purcell DO, Ha Yeung MD, Konrad Pham MD, Lani Polanco MD, Mary Quinones DO, Lana Quintero MD, Rehana Manzano MD, Yassine Ordonez, CNP, [facility], CNP, Kamille Scott, CNP, Maria Fernanda Swan, CNS, Rafael Grace, CNP, Lyndsay Carter, CNP, Neyda Kerr, CNP, Deng Frausto, CNP, Pravin Torres, CNP, Humza Garcia PA-C, Barb Mcclelland, DNP, Brynn Pace, TEMITOPE, Tamiko Winters, CNP, Katherine Cervantes, CNP, Jahaira Fuller, [address]    Progress Note    3/27/2022    10:17 AM    Name:   Bethel Maradiaga  MRN:     [id number]     Acct:      [de-identified]   Room:   [facility]/6227-54   Day:  2  Admit Date:  3/25/2022 10:15 PM    PCP:   Monique Elizabeth MD  Code Status:  Full Code    Subjective:     C/C:   Seizure     Interval History Status:   No further Sz  Headaches have resolved  No further visual change  No focal deficits    Data Base Updates:  BP somewhat labile    GBN40ky/dL   Calcium9.5mg/dL   CREATININE0.59mg/dL   Towhhlu906 High mg/dL   Total Protein6.8g/dL   Ccyxrh830wuyr/L   Potassium4.5mmol/L   Dgvzmwcu401lnct/L      MRI revealing multiple metastatic lesions    MRI revealing cervical spinal stenosis    Brief History:     As documented in the medical record:  \"   Bethel Maradiaga is a 61 y.o.  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 No definite osseous metastasis   identified. MRI cervical spine:  Impression:  1. Motion degraded examination with multilevel degenerative changes. There   is moderate spinal canal stenosis at C5-6 and C6-7 with mild spinal canal   narrowing at C3-4. Multilevel neural foraminal narrowing as above. 2. Reversal of the normal cervical lordosis with anterolisthesis at C3-4 and   retrolisthesis at C5-6. Past Medical History:   has a past medical history of Anxiety, Benign polyp of large intestine, Cancer ([address] COPD (chronic obstructive pulmonary disease) ([address] Hx of blood clots, Hyperlipidemia, Hypertension, Liver hemangioma, Lung nodule, Snores, Uterine fibroid, and Wears glasses. Social History:   reports that she quit smoking about 22 years ago. Her smoking use included cigarettes. She has a 45.00 pack-year smoking history. She has never used smokeless tobacco. She reports current alcohol use. She reports that she does not use drugs. Family History:   Family History   Problem Relation Age of Onset    Cancer Mother         LUNG    Cancer Sister         LUNG       Review of Systems:     Review of Systems   Eyes: Positive for visual disturbance (reports an episode of flashing lights several weeks ago ). Respiratory: Negative for cough and shortness of breath. Cardiovascular: Negative for chest pain and palpitations. Gastrointestinal: Negative for abdominal pain, nausea and vomiting. Genitourinary: Negative for flank pain and hematuria. Neurological: Positive for seizures (no further Sz's), weakness and headaches (bi-temporal HAs). Psychiatric/Behavioral: Positive for dysphoric mood (Findings reviewed with the patient, she is tearful at times).        Physical Examination:        Vitals  BP (!) 174/84   Pulse 81   Temp 97.9 °F (36.6 °C) (Oral)   Resp 17   SpO2 97%   Temp (24hrs), Av °F (36.7 °C), Min:97.5 °F (36.4 °C), Max:98.4 °F (36.9 °C)    No results for input(s): POCGLU in the last 72 hours. Physical Exam  Vitals reviewed. Constitutional:       General: She is not in acute distress. Appearance: She is not diaphoretic. HENT:      Head: Normocephalic. Nose: Nose normal.   Eyes:      General: No scleral icterus. Conjunctiva/sclera: Conjunctivae normal.   Neck:      Trachea: No tracheal deviation. Cardiovascular:      Rate and Rhythm: Normal rate and regular rhythm. Pulmonary:      Effort: Pulmonary effort is normal. No respiratory distress. Breath sounds: Normal breath sounds. No wheezing or rales. Chest:      Chest wall: No tenderness. Abdominal:      General: There is no distension. Palpations: Abdomen is soft. Tenderness: There is no abdominal tenderness. Musculoskeletal:         General: No tenderness. Cervical back: Neck supple. No rigidity. Skin:     General: Skin is warm and dry. Neurological:      Mental Status: She is alert and oriented to person, place, and time. Comments: Orientated x3  Doing well with historical data  Moving extremities x4  No gross motor or sensory deficits  Speech fluent   equal          Medications: Allergies: Allergies   Allergen Reactions    Codeine Nausea And Vomiting       Current Meds:   Scheduled Meds:    levETIRAcetam  500 mg Oral BID    sodium chloride flush  5-40 mL IntraVENous 2 times per day    dexamethasone  4 mg IntraVENous Q6H     Continuous Infusions:    sodium chloride Stopped (03/26/22 0506)     PRN Meds: cloNIDine, ALPRAZolam, sodium chloride flush, sodium chloride flush, sodium chloride, potassium chloride **OR** potassium alternative oral replacement **OR** potassium chloride, magnesium sulfate, ondansetron **OR** ondansetron, polyethylene glycol, acetaminophen **OR** acetaminophen    Data:     I/O (24Hr):     Intake/Output Summary (Last 24 hours) at 3/27/2022 1017  Last data filed Mass of occipital region [R22.0] 03/26/2022    Hyperglycemia [R73.9] 03/26/2022    Hypokalemia [E87.6] 03/26/2022    Vasogenic edema (HCC) [G93.6] 03/26/2022    Brain metastases (HCC) [C79.31] 03/26/2022    Adrenal mass ([facility] [address] - right  [E27.8] 03/26/2022    Episode of loss of consciousness [R55]     Intraparenchymal hemorrhage of brain ([facility] [address] [I61.9] 03/25/2022    New onset seizure ([facility] [address] [R56.9] 03/25/2022    Essential hypertension [I10] 06/23/2020    Non-small cell cancer of left lung ([facility] [address] [C34.92]          Plan:        AEDs per neuro  Onc evaluation  Neurosurgery consultation  Decadron  Glycemic contol - Monitor and control blood sugars  Observe for steroid-induced hyperglycemia  Blood Pressure - Monitor and control  Catapres as needed  Correct electrolyte abnormalities  DVT prophylaxis:  EPCs  Hope to transition to oral therapy  Complete work-up on outpatient basis  Discharge planning  Will discharge when arrangements complete and ok with other services.   Follow-up with PCP in one week, Meghan Cruz MD  Notify PCP of discharge     IP CONSULT TO HEM/ONC  IP CONSULT TO NEUROSURGERY  IP CONSULT TO [id number] Whatley DO  3/27/2022  10:17 AM

## 2022-03-27 NOTE — PROCEDURES
EEG REPORT       Patient: John Mclaughlin Age: 61 y.o. MRN: [id number]    Date: 3/25/2022  Referring Provider: Li Payne    History: This routine 30 minute scalp EEG was recorded with video- monitoring for a 61 y.o. Katia Lasha female  who presented with encephalopathy. This EEG was performed to evaluate for focal and epileptiform abnormalities.      Zenaida Coello   Current Facility-Administered Medications   Medication Dose Route Frequency Provider Last Rate Last Admin    cloNIDine (CATAPRES) tablet 0.1 mg  0.1 mg Oral Q4H PRN Kassy Minor, DO   0.1 mg at 03/27/22 1234    ALPRAZolam (XANAX) tablet 0.25 mg  0.25 mg Oral 4x Daily PRN ANNIE Marte - CNP   0.25 mg at 03/27/22 0749    sodium chloride flush 0.9 % injection 10 mL  10 mL IntraVENous PRN Marylen Crown, DO        levETIRAcetam (KEPPRA) tablet 500 mg  500 mg Oral BID Ashli Cruz MD   500 mg at 03/27/22 0749    sodium chloride flush 0.9 % injection 5-40 mL  5-40 mL IntraVENous 2 times per day Tha Valenciaers P Blood, DO   10 mL at 03/27/22 0749    sodium chloride flush 0.9 % injection 10 mL  10 mL IntraVENous PRN Monty P Blood, DO        0.9 % sodium chloride infusion  25 mL IntraVENous PRN Monty P Blood, DO   Stopped at 03/26/22 0506    potassium chloride (KLOR-CON M) extended release tablet 40 mEq  40 mEq Oral PRN Monty P Blood, DO        Or    potassium bicarb-citric acid (EFFER-K) effervescent tablet 40 mEq  40 mEq Oral PRN Monty P Blood, DO   40 mEq at 03/26/22 0703    Or    potassium chloride 10 mEq/100 mL IVPB (Peripheral Line)  10 mEq IntraVENous PRN Monty P Blood, DO        magnesium sulfate 1000 mg in dextrose 5% 100 mL IVPB  1,000 mg IntraVENous PRN Monty P Blood, DO        ondansetron (ZOFRAN-ODT) disintegrating tablet 4 mg  4 mg Oral Q8H PRN Monty P Blood, DO        Or    ondansetron (ZOFRAN) injection 4 mg  4 mg IntraVENous Q6H PRN Monty P Blood, DO        polyethylene glycol (GLYCOLAX) packet 17 g  17 g Oral Daily PRN Monty P Blood, DO        acetaminophen (TYLENOL) tablet 650 mg  650 mg Oral Q6H PRN Monty P Blood, DO        Or    acetaminophen (TYLENOL) suppository 650 mg  650 mg Rectal Q6H PRN Monty P Blood, DO        dexamethasone (DECADRON) injection 4 mg  4 mg IntraVENous Q6H Monty P Blood, DO   4 mg at 03/27/22 1217        Technical Description: This is a 21 channel digital EEG recording with time-locked video. Electrodes were placed in accordance with the 10-20 International System of Electrode Placement. Single lead EKG monitoring as well as temporal electrodes were included. The patient was not sleep deprived. This recording was obtained during wakefulness. EEG Description: The dominant background activity during maximal recorded wakefulness consisted of bioccipitally dominant 9-10 Hz, 25-35 uV symmetric, regular activity that was reactive to eye opening. Frequent left mid temporal 3 to 4 Hz of polymorphic delta slowing was seen of 15-20 µV. Frequent left posterior temporal sharp waves were seen at T5. During drowsiness, the background rhythm waxed and waned and there were periods of slowing. Deeper sleep was not seen. Photic stimulation - stepwise photic stimulation at 2-30 Hz was performed and there was a biposterior, symmetric, driving response. Hyperventilation - was not preformed. No abnormalities were activated by photic stimulation     The EKG channel demonstrated a normal sinus rhythm. Interpretation  This EEG was abnormal in wakefulness and sleep. Frequent left mid temporal polymorphic 3 to 4 Hz delta slowing was seen. Frequent left posterior and mid temporal sharp waves were seen. Clinical correlation  This EEG was abnormal.  Frequent left posterior temporal sharp waves conferred an increased risk for focal onset seizures. Frequent left mid temporal polymorphic delta slowing suggested underlying structural defect.     Марина Veras MD  Diplomate, American Board of Psychiatry and Neurology  Diplomate, American Board of Clinical Neurophysiology  Diplomate, American Board of Epilepsy

## 2022-03-27 NOTE — PROGRESS NOTES
Today's Date: 3/27/2022  Patient Name: Radha Butler  Date of admission: 3/25/2022 10:15 PM  Patient's age: 61 y.o., 1955  Admission Dx: Intraparenchymal hemorrhage of brain ([facility] [address] [I61.9]  New onset seizure ([facility] [address] [R56.9]    Reason for Consult: management recommendations  Requesting Physician: Yiar Schultz DO    CHIEF COMPLAINT: Seizure. Brain mass. History of lung cancer    History Obtained From:  patient, electronic medical record    Interval history:    Patient seen and examined  Labs and vitals reviewed  Patient overall denies any new complaint. Patient is on steroids  MRI brain shows multiple lesions most compatible with metastatic disease. There is associated edema with minimal mass-effect no midline shift. HISTORY OF PRESENT ILLNESS:      The patient is a 61 y.o.  female who Initially presented to [facility] after having a seizure-like activity with loss of consciousness at that grocery store. Patient was feeling well before the episode. 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 Patient according to records also underwent chemotherapy and radiation therapy. She also has history of DVT COPD dyslipidemia. Patient's work-up done in the ER including CT brain without contrast showed small calcification as well as finding suspicion for acute parenchymal hemorrhage associated with vasogenic edema. In addition there was also vasogenic edema noted in the posterior left occipital lobe. There were concern regarding calvarial metastasis. CTA chest showed left perihilar consolidation concerning for pneumonia/radiation pneumonitis. Lymphangitic spread could not be ruled out. There was a large right adrenal mass likely metastatic. There were multiple liver lesion noted CT PET was recommended. Patient was seen by neurosurgery. Patient is currently on Decadron. MRI brain as well as spine was ordered.        Past Medical History:   has a past medical history of Anxiety, Benign polyp of large intestine, Cancer ([facility] [address] COPD (chronic obstructive pulmonary disease) ([facility] [address] Hx of blood clots, Hyperlipidemia, Hypertension, Liver hemangioma, Lung nodule, Snores, Uterine fibroid, and Wears glasses. Past Surgical History:   has a past surgical history that includes Hysterectomy (2010); Abdominal hernia repair (2012); Colonoscopy; Lung biopsy; Breast biopsy (Left, 1983); Lung removal, partial (Right, 09/28/2018); pr rmvl lung other than pneumonectomy 1 lobe lobect (Left, 9/28/2018); bronchoscopy (10/1/2018); pr [facility] incl fluor gdnce dx w/cell washg spx (N/A, 10/29/2018); other surgical history (Right, 11/05/2018); and Hysterectomy, total abdominal.     Medications:    Prior to Admission medications    Medication Sig Start Date End Date Taking?  Authorizing Provider   Joycelyn Souza) 0.25 MG tablet take 1 tablet by mouth nightly if needed for sleep for 30 DAYS 3/17/22 4/17/22  ANNIE Mason CNP   albuterol sulfate  (90 Base) MCG/ACT inhaler inhale 2 puffs by mouth four times a day 9/28/21   Xiomara Oreilly MD   lisinopril-hydroCHLOROthiazide (PRINZIDE;ZESTORETIC) 10-12.5 MG per tablet Take 1 tablet by mouth daily 5/3/21   Shabnam Thomason MD   lovastatin (MEVACOR) 10 MG tablet Take 1 tablet by mouth nightly 5/3/21   Shabnam Thomason MD     Current Facility-Administered Medications   Medication Dose Route Frequency Provider Last Rate Last Admin    cloNIDine (CATAPRES) tablet 0.1 mg  0.1 mg Oral Q4H PRN Kathy Dunham DO        ALPRAZolam Alannah Souza) tablet 0.25 mg  0.25 mg Oral 4x Daily PRN ANNIE Carranza CNP   0.25 mg at 03/27/22 0749    sodium chloride flush 0.9 % injection 10 mL  10 mL IntraVENous PRN Lm Andersen DO        levETIRAcetam (KEPPRA) tablet 500 mg  500 mg Oral BID Jens Borrero MD   500 mg at 03/27/22 0749    sodium chloride flush 0.9 % injection 5-40 mL  5-40 mL IntraVENous 2 times per day Zoltan Dunlap P Blood, DO   10 mL at 03/27/22 0749    sodium chloride flush 0.9 % injection 10 mL  10 mL IntraVENous PRN Monty P Blood, DO        0.9 % sodium chloride infusion  25 mL IntraVENous PRN Monty P Blood, DO   Stopped at 03/26/22 0506    potassium chloride (KLOR-CON M) extended release tablet 40 mEq  40 mEq Oral PRN Monty P Blood, DO        Or    potassium bicarb-citric acid (EFFER-K) effervescent tablet 40 mEq  40 mEq Oral PRN Monty P Blood, DO   40 mEq at 03/26/22 0703    Or    potassium chloride 10 mEq/100 mL IVPB (Peripheral Line)  10 mEq IntraVENous PRN Monty P Blood, DO        magnesium sulfate 1000 mg in dextrose 5% 100 mL IVPB  1,000 mg IntraVENous PRN Monty P Blood, DO        ondansetron (ZOFRAN-ODT) disintegrating tablet 4 mg  4 mg Oral Q8H PRN Monty P Blood, DO        Or    ondansetron (ZOFRAN) injection 4 mg  4 mg IntraVENous Q6H PRN Monty P Blood, DO        polyethylene glycol (GLYCOLAX) packet 17 g  17 g Oral Daily PRN Monty P Blood, DO        acetaminophen (TYLENOL) tablet 650 mg  650 mg Oral Q6H PRN Monty P Blood, DO        Or    acetaminophen (TYLENOL) suppository 650 mg  650 mg Rectal Q6H PRN Monty P Blood, DO        dexamethasone (DECADRON) injection 4 mg  4 mg IntraVENous Q6H Monty P Blood, DO   4 mg at 03/27/22 0810       Allergies:  Codeine    Social History:   reports that she quit smoking about 22 years ago. Her smoking use included cigarettes. She has a 45.00 pack-year smoking history. She has never used smokeless tobacco. She reports current alcohol use. She reports that she does not use drugs. Family History: family history includes Cancer in her mother and sister. REVIEW OF SYSTEMS:      Constitutional: No fever or chills.  No night sweats, no weight loss   Eyes: No eye discharge, double vision, or eye pain   HEENT: negative for sore mouth, sore throat, hoarseness and voice change   Respiratory: negative for cough , sputum, dyspnea, wheezing, hemoptysis, chest pain   Cardiovascular: negative for chest pain, dyspnea, palpitations, orthopnea, PND   Gastrointestinal: negative for nausea, vomiting, diarrhea, constipation, abdominal pain, Dysphagia, hematemesis and hematochezia   Genitourinary: negative for frequency, dysuria, nocturia, urinary incontinence, and hematuria   Integument: negative for rash, skin lesions, bruises.    Hematologic/Lymphatic: negative for easy bruising, bleeding, lymphadenopathy, or petechiae   Endocrine: negative for heat or cold intolerance,weight changes, change in bowel habits and hair loss   Musculoskeletal: negative for myalgias, arthralgias, pain, joint swelling,and bone pain   Neurological: negative for headaches, dizziness, seizures, weakness, numbness    PHYSICAL EXAM:        BP (!) 174/84   Pulse 81   Temp 97.9 °F (36.6 °C) (Oral)   Resp 17   SpO2 97%    Temp (24hrs), Av °F (36.7 °C), Min:97.5 °F (36.4 °C), Max:98.4 °F (36.9 °C)      General appearance - well appearing, no in pain or distress   Mental status - alert and cooperative   Eyes - pupils equal and reactive, extraocular eye movements intact   Ears - bilateral TM's and external ear canals normal   Mouth - mucous membranes moist, pharynx normal without lesions   Neck - supple, no significant adenopathy   Lymphatics - no palpable lymphadenopathy, no hepatosplenomegaly   Chest - clear to auscultation, no wheezes, rales or rhonchi, symmetric air entry   Heart - normal rate, regular rhythm, normal S1, S2, no murmurs  Abdomen - soft, nontender, nondistended, no masses or organomegaly   Neurological - alert, oriented, normal speech, no focal findings or movement disorder noted   Musculoskeletal - no joint tenderness, deformity or swelling   Extremities - peripheral pulses normal, no pedal edema, no clubbing or cyanosis   Skin - normal coloration and turgor, no rashes, no suspicious skin lesions noted ,      DATA:      Labs:     Results for orders placed or performed during the hospital encounter of 39/68/88   Basic Metabolic Panel w/ Reflex to MG   Result Value Ref Range    Glucose 198 (H) 70 - 99 mg/dL    BUN 13 8 - 23 mg/dL    CREATININE 0.71 0.50 - 0.90 mg/dL    Calcium 9.3 8.6 - 10.4 mg/dL    Sodium 139 135 - 144 mmol/L    Potassium 3.5 (L) 3.7 - 5.3 mmol/L    Chloride 102 98 - 107 mmol/L    CO2 21 20 - 31 mmol/L    Anion Gap 16 9 - 17 mmol/L    GFR Non-African American >60 >60 mL/min    GFR African American >60 >60 mL/min    GFR Comment         CBC with Auto Differential   Result Value Ref Range    WBC 7.7 3.5 - 11.3 k/uL    RBC 4.02 3.95 - 5.11 m/uL    Hemoglobin 12.7 11.9 - 15.1 g/dL    Hematocrit 36.8 36.3 - 47.1 %    MCV 91.5 82.6 - 102.9 fL    MCH 31.6 25.2 - 33.5 pg    MCHC 34.5 28.4 - 34.8 g/dL    RDW 12.2 11.8 - 14.4 %    Platelets 854 331 - 095 k/uL    MPV 10.5 8.1 - 13.5 fL    NRBC Automated 0.0 0.0 per 100 WBC    Immature Granulocytes 1 (H) 0 %    Seg Neutrophils 90 (H) 36 - 65 %    Lymphocytes 7 (L) 24 - 43 %    Monocytes 2 (L) 3 - 12 %    Eosinophils % 0 (L) 1 - 4 %    Basophils 0 0 - 2 %    Absolute Immature Granulocyte 0.08 0.00 - 0.30 k/uL    Segs Absolute 6.93 1.50 - 8.10 k/uL    Absolute Lymph # 0.54 (L) 1.10 - 3.70 k/uL    Absolute Mono # 0.15 0.10 - 1.20 k/uL    Absolute Eos # 0.00 0.00 - 0.44 k/uL    Basophils Absolute 0.00 0.00 - 0.20 k/uL    Morphology Normal    Magnesium   Result Value Ref Range    Magnesium 1.7 1.6 - 2.6 mg/dL   Comprehensive Metabolic Panel with Bilirubin   Result Value Ref Range    Albumin 4.2 3.5 - 5.2 g/dL    Albumin/Globulin Ratio 1.6 1.0 - 2.5    Alkaline Phosphatase 70 35 - 104 U/L    ALT 18 5 - 33 U/L    AST 21 <32 U/L    Total Bilirubin 0.30 0.3 - 1.2 mg/dL    Bilirubin, Direct 0.10 <0.31 mg/dL    Bilirubin, Indirect 0.20 0.00 - 1.00 mg/dL    BUN 18 8 - 23 mg/dL    Calcium 9.5 8.6 - 10.4 mg/dL    CREATININE 0.59 0.50 - 0.90 mg/dL    Glucose 124 (H) 70 - 99 mg/dL    Total Protein 6.8 6.4 - 8.3 g/dL    Sodium 141 135 - 144 mmol/L    Potassium 4.5 3.7 - 5.3 mmol/L    Chloride 105 98 - 107 mmol/L    CO2 24 20 - 31 mmol/L    Anion Gap 12 9 - 17 mmol/L    GFR Non-African American >60 >60 mL/min    GFR African American >60 >60 mL/min    GFR Comment               IMAGING DATA:    CT HEAD WO CONTRAST    Result Date: 3/26/2022  EXAMINATION: CT OF THE HEAD WITHOUT CONTRAST  3/26/2022 3:11 am TECHNIQUE: CT of the head was performed without the administration of intravenous contrast. Dose modulation, iterative reconstruction, and/or weight based adjustment of the mA/kV was utilized to reduce the radiation dose to as low as reasonably achievable. COMPARISON: Prior not available at time dictation, MR brain 10/24/2018, head CT 08/03/2018 HISTORY: ORDERING SYSTEM PROVIDED HISTORY: 6 hour repeat stability scan, new seizure today found to have left occipital-parital mass and right parietal with IPH FINDINGS: BRAIN/VENTRICLES: Partially calcified, hemorrhagic mass within the right cerebellar hemisphere with resultant localized edema resulting in mild compression of the 4th ventricle. No evidence of hydrocephalus. Areas of subcortical low attenuation within the left parietal, left occipital and caudal aspect of the right postcentral gyrus suggesting underlying edema concerning for additional intracranial lesions. No significant mass effect or midline shift. No extra-axial fluid collections. Gray-white matter differentiation is otherwise maintained. ORBITS: The visualized portion of the orbits demonstrate no acute abnormality. SINUSES: The visualized paranasal sinuses and mastoid air cells demonstrate no acute abnormality. SOFT TISSUES/SKULL:  Scattered lytic lesions throughout the calvarium may reflect intraosseous hemangiomas, however underlying metastasis is possible. No significant change.      1. Partially calcified, hemorrhagic mass within the right cerebellar hemisphere resulting in mild compression of the 4th ventricle without obstructive hydrocephalus at this time. 2. Areas of subcortical low attenuation throughout the bilateral cerebral hemispheres concerning for additional intracranial mass lesions. 3. No significant mass effect or midline shift. 4. Possible calvarial metastasis versus underlying intraosseous hemangiomas. No significant change. 5. Recent prior outside head CT images would be of benefit to determine stability. IMPRESSION:   Primary Problem  New onset seizure [facility])    Active Hospital Problems    Diagnosis Date Noted    Mass of occipital region [R22.0] 03/26/2022    Hyperglycemia [R73.9] 03/26/2022    Hypokalemia [E87.6] 03/26/2022    Vasogenic edema (HCC) [G93.6] 03/26/2022    Brain metastases (HCC) [C79.31] 03/26/2022    Adrenal mass (HCC) - right  [E27.8] 03/26/2022    Episode of loss of consciousness [R55]     Intraparenchymal hemorrhage of brain ([address] [I61.9] 03/25/2022    New onset seizure ([address] [R56.9] 03/25/2022    Essential hypertension [I10] 06/23/2020    Non-small cell cancer of left lung ([address] [C34.92]            RECOMMENDATIONS:  I personally reviewed results of lab work-up imaging studies and other relevant clinical data. Reviewed results of MRI brain which show multiple brain metastasis  Discussed case with Dr. Ez Campos from neurosurgery and Dr. Ashley Lucero from radiation oncology. Agree with metastasectomy for the infratentorial lesion. Patient will also need radiation to the brain metastasis  We will hold off on biopsy of adrenal metastasis given patient is going to have surgical excision of the brain metastasis to establish diagnosis  Follow-up on results of CT abdomen pelvis  Discussed with family members at bedside  Patient is agreeable with surgery  Patient had multiple questions which answered the best my ability. Discussed with patient and Nurse. Thank you for asking us to see this patient.           Surendra Franz MD          This note is created with the assistance of a speech recognition program.  While intending to generate a document that actually reflects the content of the visit, the document can still have some errors including those of syntax and sound a like substitutions which may escape proof reading. It such instances, actual meaning can be extrapolated by contextual diversion.

## 2022-03-28 PROCEDURE — 99232 SBSQ HOSP IP/OBS MODERATE 35: CPT | Performed by: INTERNAL MEDICINE

## 2022-03-28 PROCEDURE — 2580000003 HC RX 258: Performed by: INTERNAL MEDICINE

## 2022-03-28 PROCEDURE — 6370000000 HC RX 637 (ALT 250 FOR IP): Performed by: FAMILY MEDICINE

## 2022-03-28 PROCEDURE — 6360000002 HC RX W HCPCS: Performed by: INTERNAL MEDICINE

## 2022-03-28 PROCEDURE — 99232 SBSQ HOSP IP/OBS MODERATE 35: CPT | Performed by: PSYCHIATRY & NEUROLOGY

## 2022-03-28 PROCEDURE — 99232 SBSQ HOSP IP/OBS MODERATE 35: CPT | Performed by: NEUROLOGICAL SURGERY

## 2022-03-28 PROCEDURE — 6370000000 HC RX 637 (ALT 250 FOR IP): Performed by: INTERNAL MEDICINE

## 2022-03-28 PROCEDURE — 6370000000 HC RX 637 (ALT 250 FOR IP): Performed by: NURSE PRACTITIONER

## 2022-03-28 PROCEDURE — 2060000000 HC ICU INTERMEDIATE R&B

## 2022-03-28 PROCEDURE — APPSS15 APP SPLIT SHARED TIME 0-15 MINUTES: Performed by: NURSE PRACTITIONER

## 2022-03-28 PROCEDURE — 94761 N-INVAS EAR/PLS OXIMETRY MLT: CPT

## 2022-03-28 RX ORDER — ALPRAZOLAM 0.5 MG/1
0.5 TABLET ORAL 4 TIMES DAILY PRN
Status: DISCONTINUED | OUTPATIENT
Start: 2022-03-28 | End: 2022-03-31 | Stop reason: HOSPADM

## 2022-03-28 RX ADMIN — DEXAMETHASONE SODIUM PHOSPHATE 4 MG: 4 INJECTION, SOLUTION INTRA-ARTICULAR; INTRALESIONAL; INTRAMUSCULAR; INTRAVENOUS; SOFT TISSUE at 18:16

## 2022-03-28 RX ADMIN — LEVETIRACETAM 500 MG: 500 TABLET, FILM COATED ORAL at 21:19

## 2022-03-28 RX ADMIN — DEXAMETHASONE SODIUM PHOSPHATE 4 MG: 4 INJECTION, SOLUTION INTRA-ARTICULAR; INTRALESIONAL; INTRAMUSCULAR; INTRAVENOUS; SOFT TISSUE at 12:44

## 2022-03-28 RX ADMIN — SODIUM CHLORIDE, PRESERVATIVE FREE 10 ML: 5 INJECTION INTRAVENOUS at 09:25

## 2022-03-28 RX ADMIN — SODIUM CHLORIDE, PRESERVATIVE FREE 10 ML: 5 INJECTION INTRAVENOUS at 21:20

## 2022-03-28 RX ADMIN — LEVETIRACETAM 500 MG: 500 TABLET, FILM COATED ORAL at 09:25

## 2022-03-28 RX ADMIN — ALPRAZOLAM 0.5 MG: 0.5 TABLET ORAL at 21:19

## 2022-03-28 RX ADMIN — ALPRAZOLAM 0.25 MG: 0.25 TABLET ORAL at 05:55

## 2022-03-28 RX ADMIN — ALPRAZOLAM 0.25 MG: 0.25 TABLET ORAL at 16:12

## 2022-03-28 RX ADMIN — CLONIDINE HYDROCHLORIDE 0.1 MG: 0.1 TABLET ORAL at 21:19

## 2022-03-28 RX ADMIN — CLONIDINE HYDROCHLORIDE 0.1 MG: 0.1 TABLET ORAL at 09:25

## 2022-03-28 RX ADMIN — DEXAMETHASONE SODIUM PHOSPHATE 4 MG: 4 INJECTION, SOLUTION INTRA-ARTICULAR; INTRALESIONAL; INTRAMUSCULAR; INTRAVENOUS; SOFT TISSUE at 00:08

## 2022-03-28 RX ADMIN — DEXAMETHASONE SODIUM PHOSPHATE 4 MG: 4 INJECTION, SOLUTION INTRA-ARTICULAR; INTRALESIONAL; INTRAMUSCULAR; INTRAVENOUS; SOFT TISSUE at 05:55

## 2022-03-28 ASSESSMENT — ENCOUNTER SYMPTOMS
SHORTNESS OF BREATH: 0
ABDOMINAL PAIN: 0
VOMITING: 0
COUGH: 0
NAUSEA: 0

## 2022-03-28 ASSESSMENT — PAIN SCALES - GENERAL
PAINLEVEL_OUTOF10: 0

## 2022-03-28 NOTE — CONSULTS
[address]            Radiation Oncology          [facility]          [address] [place]pez, [address]        Colman Sicard: [phone]        F: [phone]       [facility] [employer]       Radiation Oncology   [address] [facility], [address] [facility]       Colman Sicard: [phone]       F: [phone]       [email or web address]      3/27/2022    Patient: Alice Louise   YOB: 1955       Dear Dr Gilbert Borja:    Thank you for referring Alice Louise to me for evaluation. Below are the relevant portions of my assessment and plan of care. If you have questions, please do not hesitate to call me. I look forward to following this patient along with you. Sincerely,      Electronically signed by Cristiano Mora MD on 3/27/22 at 10:27 PM EDT      CC: Patient Care Team:  Dora Hopkins MD as PCP - General (Family Medicine)  Dora Hopkins MD as PCP - [facility]  Saqib Raymundo MD as Consulting Physician (Hematology and Oncology)  Avelina Gerard MD as Consulting Physician (Thoracic Surgery)  Chivo Ferrari MD as Consulting Physician (Pulmonology)  ------------------------------------------------------------------------------------------------------------------------------------------------------------------------------------------      INPATIENT RADIATION ONCOLOGY NEW PATIENT NOTE:     Date of Service: 3/27/2022    Location:  [address], [address] [place] [address]    Patient ID:   Alice Louise  : 1955   MRN: [id number]    Alice Louise is being seen today for an oncologic opinion at the request of Dr. Gilbert Borja.     CHIEF COMPLAINT: \"Brain metastases\"    DIAGNOSIS:  Cancer Staging  Malignant neoplasm of bronchus of upper lobe, left ([address]  Staging form: Lung, AJCC 8th Edition  - Pathologic stage from 2018: Stage IIIA (pT1b, pN2, cM0) - Signed by Anitha Jamil MD on 2/12/2019  Staging comments: Left upper lobe biopsy 8/22/2018 positive for invasive adenocarcinoma from a lung primary. Left upper lobectomy/mediastinal node dissection 9/28/2018 revealed 2.9 cm grade 1 adenocarcinoma with positive bronchial/vascular/parenchymal margins, 1/2 level 5 node positive, 5/5 peribronchial nodes positive, one level 9 node negative, 1/2 level 10 node positive, one level 11 node negative, no lymphovascular invasion. Non-small cell cancer of left lung [facility])  Staging form: Lung, AJCC 8th Edition  - Clinical stage from 10/10/2018: Stage IIIA (ycT1c, cN2, cM0) - Signed by Gisselle Qureshi MD on 10/10/2018    -s/p adj chemo carbo/taxol [id number]  -s/p adj RT 60Gy 3/29/19    HISTORY OF PRESENT ILLNESS:   Shaquille Coello 61 y.o. female with a history of left-sided lung cancer diagnosed in 2018 treated with lobectomy and lymph node dissection followed by adjuvant chemotherapy and sequentially adjuvant radiation therapy due to mediastinal involvement and positive margins. Patient subsequently had been monitored with surveillance imaging. Patient on Friday was at the grocery store shopping when she had a near syncopal episode. Patient was brought to the ED and evaluated with a CT of the head which showed a hemorrhagic mass in the right cerebellar hemisphere. 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 Patient had a CT chest abdomen pelvis today which showed no other evidence of metastatic disease. Patient was evaluated by neurosurgery and neurology. Patient had a EEG done which did show some abnormal findings. Patient was seen by neurosurgery who recommended surgical debulking of the large right cerebellar lesion as it may lead to hydrocephalus if treated with radiation.   Patient has been otherwise asymptomatic with no symptoms of headaches, dizziness, vision changes, numbness, weakness, confusion, chest pain, shortness of breath, abdominal pain, nausea, bony pain, weight loss, fatigue, or any bleeding. Radiation oncology has been consulted for treatment planning consideration of her intracranial metastases. PRIOR RADIATION HISTORY:  L lung mediastinum 60Gy 2019    PACEMAKER: None    PAST MEDICAL HISTORY:  Past Medical History:   Diagnosis Date    Anxiety     Benign polyp of large intestine     Cancer ([facility] [address]     LT UPPER LOBE    COPD (chronic obstructive pulmonary disease) ([facility] [address]     Hx of blood clots     DVT LT LEG    Hyperlipidemia     Hypertension     Liver hemangioma     Lung nodule     BARAK  Nodule    Snores     not tested for apnea    Uterine fibroid 09/2010    Wears glasses        PAST SURGICAL HISTORY:  Past Surgical History:   Procedure Laterality Date    ABDOMINAL HERNIA REPAIR  2012    BREAST BIOPSY Left 1983    BRONCHOSCOPY  10/1/2018    BRONCHOSCOPY performed by Sumaya Quinteros MD at [facility]    COLONOSCOPY      HYSTERECTOMY  2010    HYSTERECTOMY, TOTAL ABDOMINAL      LUNG BIOPSY      LUNG REMOVAL, PARTIAL Right 09/28/2018    Robotic assisted left lobectomy, upper with lymph node biopsy    OTHER SURGICAL HISTORY Right 11/05/2018    IR PORT PLACEMENT EQUAL OR GREATER THAN 5 YEARS    [address] INCL FLUOR GDNCE DX W/CELL WASHG SPX N/A 10/29/2018    BRONCHOSCOPY performed by Olegario Enriquez MD at [facility] 1 LOBE LOBECT Left 9/28/2018    XI ROBOTIC ASSISTED LEFT UPPER LOBECTOMY MULTIPLE LYMPH NODE BIOPSY, INTERCOSTAL  NERVE BLOCK ABLATION W/EXPAREL performed by Onel Anton MD at [address] [place] [address] Way:  No current facility-administered medications for this encounter. No current outpatient medications on file.     Facility-Administered Medications Ordered in Other Encounters:     cloNIDine (CATAPRES) tablet 0.1 mg, 0.1 mg, Oral, Q4H PRN, Pasquale Segura, , 0.1 mg at 03/27/22 1234    ALPRAZolam (XANAX) tablet 0.25 mg, 0.25 mg, Oral, 4x Daily PRN, Haylie Rossi, APRN - CNP, 0.25 mg at 03/27/22 2156    sodium chloride flush 0.9 % injection 10 mL, 10 mL, IntraVENous, PRN, April Ringer, DO    levETIRAcetam (KEPPRA) tablet 500 mg, 500 mg, Oral, BID, Huong Krishnamurthy MD, 500 mg at 03/27/22 2025    sodium chloride flush 0.9 % injection 5-40 mL, 5-40 mL, IntraVENous, 2 times per day, Monty P Blood, DO, 10 mL at 03/27/22 2026    sodium chloride flush 0.9 % injection 10 mL, 10 mL, IntraVENous, PRN, Kelvin Carls P Blood, DO    0.9 % sodium chloride infusion, 25 mL, IntraVENous, PRN, Monty P Blood, DO, Stopped at 03/26/22 0506    potassium chloride (KLOR-CON M) extended release tablet 40 mEq, 40 mEq, Oral, PRN **OR** potassium bicarb-citric acid (EFFER-K) effervescent tablet 40 mEq, 40 mEq, Oral, PRN, 40 mEq at 03/26/22 0703 **OR** potassium chloride 10 mEq/100 mL IVPB (Peripheral Line), 10 mEq, IntraVENous, PRN, Warren Carls P Blood, DO    magnesium sulfate 1000 mg in dextrose 5% 100 mL IVPB, 1,000 mg, IntraVENous, PRN, Warren Carls P Blood, DO    ondansetron (ZOFRAN-ODT) disintegrating tablet 4 mg, 4 mg, Oral, Q8H PRN **OR** ondansetron (ZOFRAN) injection 4 mg, 4 mg, IntraVENous, Q6H PRN, Monty P Blood, DO    polyethylene glycol (GLYCOLAX) packet 17 g, 17 g, Oral, Daily PRN, Monty P Blood, DO    acetaminophen (TYLENOL) tablet 650 mg, 650 mg, Oral, Q6H PRN **OR** acetaminophen (TYLENOL) suppository 650 mg, 650 mg, Rectal, Q6H PRN, Warren Carls P Blood, DO    dexamethasone (DECADRON) injection 4 mg, 4 mg, IntraVENous, Q6H, Monty Das, DO, 4 mg at 03/27/22 2617    ALLERGIES:   Allergies   Allergen Reactions    Codeine Nausea And Vomiting       SOCIAL HISTORY:  Social History     Socioeconomic History    Marital status:      Spouse name: Not on file    Number of children: Not on file    Years of education: Not on file    Highest education level: Not on file Occupational History    Not on file   Tobacco Use    Smoking status: Former Smoker     Packs/day: 1.50     Years: 30.00     Pack years: 45.00     Types: Cigarettes     Quit date:      Years since quittin.2    Smokeless tobacco: Never Used   Vaping Use    Vaping Use: Never used   Substance and Sexual Activity    Alcohol use: Yes     Comment: Occassionally    Drug use: No    Sexual activity: Not on file   Other Topics Concern    Not on file   Social History Narrative    Not on file     Social Determinants of Health     Financial Resource Strain: Low Risk     Difficulty of Paying Living Expenses: Not hard at all   Food Insecurity: No Food Insecurity    Worried About Running Out of Food in the Last Year: Never true    [place] of Food in the Last Year: Never true   Transportation Needs:     Lack of Transportation (Medical): Not on file    Lack of Transportation (Non-Medical):  Not on file   Physical Activity:     Days of Exercise per Week: Not on file    Minutes of Exercise per Session: Not on file   Stress:     Feeling of Stress : Not on file   Social Connections:     Frequency of Communication with Friends and Family: Not on file    Frequency of Social Gatherings with Friends and Family: Not on file    Attends Bahai Services: Not on file    Active Member of [address] [place] [address] [employer] or Organizations: Not on file    Attends Club or Organization Meetings: Not on file    Marital Status: Not on file   Intimate Partner Violence:     Fear of Current or Ex-Partner: Not on file    Emotionally Abused: Not on file    Physically Abused: Not on file    Sexually Abused: Not on file   Housing Stability:     Unable to Pay for Housing in the Last Year: Not on file    Number of [place] in the Last Year: Not on file    Unstable Housing in the Last Year: Not on file       FAMILY HISTORY:  Family History   Problem Relation Age of Onset    Cancer Mother         LUNG    Cancer Sister         LUNG       REVIEW OF SYSTEMS:    GENERAL/CONSTITUTIONAL: The patient denies fever, fatigue, weakness, weight gain or weight loss. HEENT: The patient denies pain, redness, loss of vision, double or blurred vision. The patient denies ringing in the ears, loss of hearing, hoarseness, trouble swallowing, or painful swallowing. CARDIOVASCULAR: The patient denies chest pain, irregular heartbeats, sudden changes in heartbeat or palpitation. RESPIRATORY: The patient denies shortness of breath, cough, hemoptysis. GASTROINTESTINAL: The patient denies nausea, vomiting, diarrhea, constipation, blood in the stools. GENITOURINARY: The patient denies difficult urination, pain or burning with urination, blood in the urine. MUSCULOSKELETAL: The patient denies arm, buttock, thigh or calf cramps. No joint or muscle pain. SKIN: The patient denies easy bruising, skin redness, skin rash, hives. NEUROLOGIC: (+) Syncope. The patient denies headache, dizziness, fainting, muscle spasm, loss of consciousness. ENDOCRINE: The patient denies intolerance to hot or cold temperature, flushing. HEMATOLOGIC/LYMPHATIC: The patient denies anemia, bleeding tendency or clotting tendency. ALLERGIC/IMMUNOLOGIC: The patient denies rhinitis, asthma, skin sensitivity. PYSCHOLOGIC: The patient denies any depression, anxiety, or confusion. A full 14 point review of systems was performed and assessed and found to be negative except as noted above. PHYSICAL EXAMINATION:    CHAPERONE: Family/friend/companieon Present    ECO Asymptomatic    VITAL SIGNS: BP (!) 174/84   Pulse 81   Temp 97.9 °F (36.6 °C) (Oral)   Resp 17   SpO2 97% GENERAL:  General appearance is that of a well-nourished, well-developed in no apparent distress. HEENT: Normocephalic, atraumatic, EOMI, moist mucosa, no erythema. NECK:  No adenopathy or a palpable thyroid mass, trachea is midline. LYMPHATICS: No cervical, supraclavicular  adenopathy.   HEART:  Normal rate and regular rhythm, normal heart sounds. LUNGS:  Pulmonary effort normal. Normal breath sounds. ABDOMEN:  Soft, nontender, non distended. EXTREMITIES:  No edema. No calf tenderness. MSK:  No spinal tenderness. Normal ROM. NEUROLOGICAL: Alert and oriented. Strength and sensation intact bilaterally. No focal deficits. PSYCH: Mood normal, behavior normal.  SKIN: No erythema, no desquamation. LABS:  WBC   Date Value Ref Range Status   03/26/2022 7.7 3.5 - 11.3 k/uL Final     Segs Absolute   Date Value Ref Range Status   03/26/2022 6.93 1.50 - 8.10 k/uL Final     Hemoglobin   Date Value Ref Range Status   03/26/2022 12.7 11.9 - 15.1 g/dL Final     Platelets   Date Value Ref Range Status   03/26/2022 238 138 - 453 k/uL Final     No results found for: , CEA  No results found for:   No results found for: PSA    IMAGING:   3/27/22 CT C/A/P  Impression   1. Mild centrilobular emphysema. 2. Redemonstration of left perihilar post treatment scarring with underlying   traction bronchiectasis extending into the upper lower lobes.  Stable. 3. No evidence for metastatic disease in the chest.   4. A 2.1 x 1.4 cm right adrenal nodule not present in 2018.  CT appearance   does not meet criteria for adenoma.  Considered metastatic nodule until   proven otherwise. 5. Redemonstration of hepatic hemangioma and several cysts. 3/26/22 MRI Brain  Impression   1. Multiple supra and infratentorial intraparenchymal lesions are most   consistent with metastatic disease.  There is associated edema with minimal   mass effect, but no midline shift. 2. Intrinsically T1 hyperintense right frontal calvarial lesion is most   consistent with an osseous hemangioma.  No definite osseous metastasis   identified.         3/26/22 CT Head  Impression   1. Partially calcified, hemorrhagic mass within the right cerebellar   hemisphere resulting in mild compression of the 4th ventricle without   obstructive hydrocephalus at this time. 2. Areas of subcortical low attenuation throughout the bilateral cerebral   hemispheres concerning for additional intracranial mass lesions. 3. No significant mass effect or midline shift. 4. Possible calvarial metastasis versus underlying intraosseous hemangiomas. No significant change. 5. Recent prior outside head CT images would be of benefit to determine   stability. PATHOLOGY:  9/28/2018 Lobectomy LND  -- Diagnosis --   1.  LYMPH NODE, LEVEL 9, BIOPSY:       -  ONE LYMPH NODE, NEGATIVE FOR MALIGNANCY (0/1). 2.  LYMPH NODE, LEVEL 11, DISSECTION:       -  ONE OF 2 LYMPH NODES POSITIVE FOR METASTATIC CARCINOMA (1/2). 3.  LYMPH NODE, LEVEL 5, DISSECTION:       -  ONE OF 2 LYMPH NODES POSITIVE FOR METASTATIC CARCINOMA (1/2).     -  CARCINOMA INVADES LARGE PERIPHERAL NERVE BRANCHES. 4.  LUNG, LEFT UPPER LOBE, LOBECTOMY:            -  WELL-DIFFERENTIATED INVASIVE ADENOCARCINOMA, 2.9 CM   GREATEST DIMENSION.     -  NEGATIVE FOR VISCERAL PLEURAL INVASION.          -  TUMOR INVOLVES SOFT TISSUE OF BRONCHIAL, VASCULAR AND   PARENCHYMAL MARGINS.     -  5 PERIBRONCHIOLAR LYMPH NODES, POSITIVE FOR METASTATIC   ADENOCARCINOMA (5/5).     -  SEPARATE SMALL INTRALOBAR FOCUS ATYPICAL ADENOMATOUS   HYPERPLASIA.           -  PATHOLOGIC STAGE:  pT1c, pN2, R1.     5.  LYMPH NODES, LEVEL 10, DISSECTION:       -  ONE OF 2 LYMPH NODES POSITIVE FOR METASTATIC CARCINOMA (1/2). ASSESSMENT AND PLAN:   Kathie Estrada is a 61 y.o. female with a Cancer Staging  Malignant neoplasm of bronchus of upper lobe, left ([facility] [address]  Staging form: Lung, AJCC 8th Edition  - Pathologic stage from 8/22/2018: Stage IIIA (pT1b, pN2, cM0) - Signed by Kevin Campbell MD on 2/12/2019  Staging comments: Left upper lobe biopsy 8/22/2018 positive for invasive adenocarcinoma from a lung primary.  Left upper lobectomy/mediastinal node dissection 9/28/2018 revealed 2.9 cm grade 1 adenocarcinoma with positive bronchial/vascular/parenchymal margins, 1/2 level 5 node positive, 5/5 peribronchial nodes positive, one level 9 node negative, 1/2 level 10 node positive, one level 11 node negative, no lymphovascular invasion. Non-small cell cancer of left lung [facility])  Staging form: Lung, AJCC 8th Edition  - Clinical stage from 10/10/2018: Stage IIIA (ycT1c, cN2, cM0) - Signed by Michael Faith MD on 10/10/2018    We reviewed with the patient and family the testing that has been done so far, including imaging and pathology. We also reviewed their staging based on the testing that as been done and the recommendations per the NCCN guidelines. We discussed the options of treatment, including surgery, chemotherapy, and radiation, and the rationale of why radiation therapy would be indicated for this diagnosis. We also discussed that they have the option to not pursue our recommended treatment as well. Patient was reviewed that her recent findings suggest recurrent disease with intracranial metastasis presents. Pathologic confirmation can be achieved by craniotomy. Neurosurgery has recommended craniotomy as well to alleviate possible risk of hydrocephalus or mass-effect of the large lesion in the right cerebellum. We agree that this would be helpful followed by a course of adjuvant SRS treatment to the resection cavity as well as to the other remaining intracranial metastasis with gamma knife. We reviewed the logistics of radiation treatment planning for [facility], including making a Aquaplast mask, a CBCT and a MRI scan, followed by single treatment session. With regards to radiation to the brain, I discussed the possible short-term side effects of brain radiation which included skin irritation (causing redness, dryness, or peeling), tiredness, low blood counts (causing infection or bleeding), hair loss, headache, and nausea/vomiting.   Possible long-term side effects discussed included hyperpigmentation of the skin, permanent hair loss or change in color and texture of the hair if it does regrow, damage to the normal brain resulting in decreased mentation, numbness and weakness, cataract formation and decreased pituitary function. After ample time to review the patient's questions, patient will be recommended to follow-up with us outpatient after likely craniotomy to discuss adjuvant radiation with gamma knife SRS to her other intracranial lesions in the resection cavity. Patient was in agreement with my recommendations. All questions were answered to his satisfaction. Patient was advised to contact us anytime should they have any questions or concerns. Electronically signed by Heladio Menjivar MD on 3/27/22 at 10:27 PM EDT      Drugs Prescribed:  New Prescriptions    No medications on file       Orders Placed:   No orders of the defined types were placed in this encounter. CC:  Patient Care Team:  Gabriel Baptiste MD as PCP - General (Family Medicine)  Gabriel Baptiste MD as PCP - [facility]  Masha Andersen MD as Consulting Physician (Hematology and Oncology)  Juan Crawford MD as Consulting Physician (Thoracic Surgery)  Najma Hendrix MD as Consulting Physician (Pulmonology)     Total time spent on this case on the day of encounter is more than 110 minutes. This time includes combination of one or more of the following - review of necessary tests, review of pertinent medical records from the EMR, performing medically appropriate examination and evaluation, counseling and educating the patient/family/caregiver, ordering necessary medical tests, procedures etc., documenting the clinical information in the electronic medical record, care coordination, referring and communicating with other health care providers and interpretation of results independently.  This note is created with the assistance of a speech recognition program.  While intending to generate a document that actually reflects the content of the visit, the document can still have some errors including those of syntax and sound a like substitutions which may escape proof reading. It such instances, actual meaning can be extrapolated by contextual diversion.

## 2022-03-28 NOTE — PROGRESS NOTES
Today's Date: 3/28/2022  Patient Name: Doug Dewey  Date of admission: 3/25/2022 10:15 PM  Patient's age: 61 y.o., 1955  Admission Dx: Intraparenchymal hemorrhage of brain ([address] [I61.9]  New onset seizure ([address] [R56.9]    Reason for Consult: management recommendations  Requesting Physician: Carlos A Marinelli DO    CHIEF COMPLAINT: Seizure. Brain mass. History of lung cancer    History Obtained From:  patient, electronic medical record    Interval history:    Patient seen and examined  Labs and vitals reviewed  No new complaint interval event  Patient very emotional and tearful at times. Hoping to have surgery soon. HISTORY OF PRESENT ILLNESS:      The patient is a 61 y.o.  female who Initially presented to [facility] after having a seizure-like activity with loss of consciousness at that grocery store. Patient was feeling well before the episode. 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 Patient according to records also underwent chemotherapy and radiation therapy. She also has history of DVT COPD dyslipidemia. Patient's work-up done in the ER including CT brain without contrast showed small calcification as well as finding suspicion for acute parenchymal hemorrhage associated with vasogenic edema. In addition there was also vasogenic edema noted in the posterior left occipital lobe. There were concern regarding calvarial metastasis. CTA chest showed left perihilar consolidation concerning for pneumonia/radiation pneumonitis. Lymphangitic spread could not be ruled out. There was a large right adrenal mass likely metastatic. There were multiple liver lesion noted CT PET was recommended. Patient was seen by neurosurgery. Patient is currently on Decadron. MRI brain as well as spine was ordered.        Past Medical History:   has a past medical history of Anxiety, Benign polyp of large intestine, Cancer ([address] COPD (chronic obstructive pulmonary disease) ([facility] [address] Hx of blood clots, Hyperlipidemia, Hypertension, Liver hemangioma, Lung nodule, Snores, Uterine fibroid, and Wears glasses. Past Surgical History:   has a past surgical history that includes Hysterectomy (2010); Abdominal hernia repair (2012); Colonoscopy; Lung biopsy; Breast biopsy (Left, 1983); Lung removal, partial (Right, 09/28/2018); pr rmvl lung other than pneumonectomy 1 lobe lobect (Left, 9/28/2018); bronchoscopy (10/1/2018); [facility]/cell washg spx (N/A, 10/29/2018); other surgical history (Right, 11/05/2018); and Hysterectomy, total abdominal.     Medications:    Prior to Admission medications    Medication Sig Start Date End Date Taking?  Authorizing Provider   ALPRAZolam Anita Kacie) 0.25 MG tablet take 1 tablet by mouth nightly if needed for sleep for 30 DAYS 3/17/22 4/17/22  ANNIE Deluca CNP   albuterol sulfate  (90 Base) MCG/ACT inhaler inhale 2 puffs by mouth four times a day 9/28/21   Alfredito Crum MD   lisinopril-hydroCHLOROthiazide (PRINZIDE;ZESTORETIC) 10-12.5 MG per tablet Take 1 tablet by mouth daily 5/3/21   Otis Ramirez MD   lovastatin (MEVACOR) 10 MG tablet Take 1 tablet by mouth nightly 5/3/21   Otis Ramirez MD     Current Facility-Administered Medications   Medication Dose Route Frequency Provider Last Rate Last Admin    cloNIDine (CATAPRES) tablet 0.1 mg  0.1 mg Oral Q4H PRN Kel Davila, DO   0.1 mg at 03/28/22 2496    ALPRAZolam (XANAX) tablet 0.25 mg  0.25 mg Oral 4x Daily PRN ANNIE Pineda CNP   0.25 mg at 03/28/22 1612    sodium chloride flush 0.9 % injection 10 mL  10 mL IntraVENous PRN Adalberto Garcia, DO        levETIRAcetam (KEPPRA) tablet 500 mg  500 mg Oral BID Katelynn Alvarez MD   500 mg at 03/28/22 0925    sodium chloride flush 0.9 % injection 5-40 mL  5-40 mL IntraVENous 2 times per day Monty Das, DO   10 mL at 03/28/22 0925    sodium chloride flush 0.9 % injection 10 mL  10 mL IntraVENous PRN Monty P Blood, DO        0.9 % sodium chloride infusion  25 mL IntraVENous PRN Monty P Blood, DO   Stopped at 03/26/22 0506    potassium chloride (KLOR-CON M) extended release tablet 40 mEq  40 mEq Oral PRN Monty P Blood, DO        Or    potassium bicarb-citric acid (EFFER-K) effervescent tablet 40 mEq  40 mEq Oral PRN Monty P Blood, DO   40 mEq at 03/26/22 0703    Or    potassium chloride 10 mEq/100 mL IVPB (Peripheral Line)  10 mEq IntraVENous PRN Monty P Blood, DO        magnesium sulfate 1000 mg in dextrose 5% 100 mL IVPB  1,000 mg IntraVENous PRN Monty P Blood, DO        ondansetron (ZOFRAN-ODT) disintegrating tablet 4 mg  4 mg Oral Q8H PRN Monty P Blood, DO        Or    ondansetron (ZOFRAN) injection 4 mg  4 mg IntraVENous Q6H PRN Monty P Blood, DO        polyethylene glycol (GLYCOLAX) packet 17 g  17 g Oral Daily PRN Monty P Blood, DO        acetaminophen (TYLENOL) tablet 650 mg  650 mg Oral Q6H PRN Monty P Blood, DO        Or    acetaminophen (TYLENOL) suppository 650 mg  650 mg Rectal Q6H PRN Monty P Blood, DO        dexamethasone (DECADRON) injection 4 mg  4 mg IntraVENous Q6H Monty P Blood, DO   4 mg at 03/28/22 1244       Allergies:  Codeine    Social History:   reports that she quit smoking about 22 years ago. Her smoking use included cigarettes. She has a 45.00 pack-year smoking history. She has never used smokeless tobacco. She reports current alcohol use. She reports that she does not use drugs. Family History: family history includes Cancer in her mother and sister. REVIEW OF SYSTEMS:      Constitutional: No fever or chills.  No night sweats, no weight loss   Eyes: No eye discharge, double vision, or eye pain   HEENT: negative for sore mouth, sore throat, hoarseness and voice change   Respiratory: negative for cough , sputum, dyspnea, wheezing, hemoptysis, chest pain   Cardiovascular: negative for chest pain, dyspnea, palpitations, orthopnea, PND   Gastrointestinal: negative for nausea, vomiting, diarrhea, constipation, abdominal pain, Dysphagia, hematemesis and hematochezia   Genitourinary: negative for frequency, dysuria, nocturia, urinary incontinence, and hematuria   Integument: negative for rash, skin lesions, bruises.    Hematologic/Lymphatic: negative for easy bruising, bleeding, lymphadenopathy, or petechiae   Endocrine: negative for heat or cold intolerance,weight changes, change in bowel habits and hair loss   Musculoskeletal: negative for myalgias, arthralgias, pain, joint swelling,and bone pain   Neurological: negative for headaches, dizziness, seizures, weakness, numbness    PHYSICAL EXAM:        BP (!) 161/88   Pulse 80   Temp 98.9 °F (37.2 °C) (Oral)   Resp 17   SpO2 95%    Temp (24hrs), Av.9 °F (37.2 °C), Min:98.9 °F (37.2 °C), Max:98.9 °F (37.2 °C)      General appearance - well appearing, no in pain or distress   Mental status - alert and cooperative   Eyes - pupils equal and reactive, extraocular eye movements intact   Ears - bilateral TM's and external ear canals normal   Mouth - mucous membranes moist, pharynx normal without lesions   Neck - supple, no significant adenopathy   Lymphatics - no palpable lymphadenopathy, no hepatosplenomegaly   Chest - clear to auscultation, no wheezes, rales or rhonchi, symmetric air entry   Heart - normal rate, regular rhythm, normal S1, S2, no murmurs  Abdomen - soft, nontender, nondistended, no masses or organomegaly   Neurological - alert, oriented, normal speech, no focal findings or movement disorder noted   Musculoskeletal - no joint tenderness, deformity or swelling   Extremities - peripheral pulses normal, no pedal edema, no clubbing or cyanosis   Skin - normal coloration and turgor, no rashes, no suspicious skin lesions noted ,      DATA:      Labs:     Results for orders placed or performed during the hospital encounter of    Basic Metabolic Panel w/ Reflex to MG   Result Value Ref Range    Glucose 198 (H) 70 - 99 mg/dL    BUN 13 8 - 23 mg/dL    CREATININE 0.71 0.50 - 0.90 mg/dL    Calcium 9.3 8.6 - 10.4 mg/dL    Sodium 139 135 - 144 mmol/L    Potassium 3.5 (L) 3.7 - 5.3 mmol/L    Chloride 102 98 - 107 mmol/L    CO2 21 20 - 31 mmol/L    Anion Gap 16 9 - 17 mmol/L    GFR Non-African American >60 >60 mL/min    GFR African American >60 >60 mL/min    GFR Comment         CBC with Auto Differential   Result Value Ref Range    WBC 7.7 3.5 - 11.3 k/uL    RBC 4.02 3.95 - 5.11 m/uL    Hemoglobin 12.7 11.9 - 15.1 g/dL    Hematocrit 36.8 36.3 - 47.1 %    MCV 91.5 82.6 - 102.9 fL    MCH 31.6 25.2 - 33.5 pg    MCHC 34.5 28.4 - 34.8 g/dL    RDW 12.2 11.8 - 14.4 %    Platelets 780 864 - 341 k/uL    MPV 10.5 8.1 - 13.5 fL    NRBC Automated 0.0 0.0 per 100 WBC    Immature Granulocytes 1 (H) 0 %    Seg Neutrophils 90 (H) 36 - 65 %    Lymphocytes 7 (L) 24 - 43 %    Monocytes 2 (L) 3 - 12 %    Eosinophils % 0 (L) 1 - 4 %    Basophils 0 0 - 2 %    Absolute Immature Granulocyte 0.08 0.00 - 0.30 k/uL    Segs Absolute 6.93 1.50 - 8.10 k/uL    Absolute Lymph # 0.54 (L) 1.10 - 3.70 k/uL    Absolute Mono # 0.15 0.10 - 1.20 k/uL    Absolute Eos # 0.00 0.00 - 0.44 k/uL    Basophils Absolute 0.00 0.00 - 0.20 k/uL    Morphology Normal    Magnesium   Result Value Ref Range    Magnesium 1.7 1.6 - 2.6 mg/dL   Comprehensive Metabolic Panel with Bilirubin   Result Value Ref Range    Albumin 4.2 3.5 - 5.2 g/dL    Albumin/Globulin Ratio 1.6 1.0 - 2.5    Alkaline Phosphatase 70 35 - 104 U/L    ALT 18 5 - 33 U/L    AST 21 <32 U/L    Total Bilirubin 0.30 0.3 - 1.2 mg/dL    Bilirubin, Direct 0.10 <0.31 mg/dL    Bilirubin, Indirect 0.20 0.00 - 1.00 mg/dL    BUN 18 8 - 23 mg/dL    Calcium 9.5 8.6 - 10.4 mg/dL    CREATININE 0.59 0.50 - 0.90 mg/dL    Glucose 124 (H) 70 - 99 mg/dL    Total Protein 6.8 6.4 - 8.3 g/dL    Sodium 141 135 - 144 mmol/L    Potassium 4.5 3.7 - 5.3 mmol/L    Chloride 105 98 - 107 mmol/L    CO2 24 20 - 31 mmol/L    Anion Gap 12 9 - 17 mmol/L    GFR Non-African American >60 >60 mL/min    GFR African American >60 >60 mL/min    GFR Comment               IMAGING DATA:    CT HEAD WO CONTRAST    Result Date: 3/26/2022  EXAMINATION: CT OF THE HEAD WITHOUT CONTRAST  3/26/2022 3:11 am TECHNIQUE: CT of the head was performed without the administration of intravenous contrast. Dose modulation, iterative reconstruction, and/or weight based adjustment of the mA/kV was utilized to reduce the radiation dose to as low as reasonably achievable. COMPARISON: Prior not available at time dictation, MR brain 10/24/2018, head CT 08/03/2018 HISTORY: ORDERING SYSTEM PROVIDED HISTORY: 6 hour repeat stability scan, new seizure today found to have left occipital-parital mass and right parietal with IPH FINDINGS: BRAIN/VENTRICLES: Partially calcified, hemorrhagic mass within the right cerebellar hemisphere with resultant localized edema resulting in mild compression of the 4th ventricle. No evidence of hydrocephalus. Areas of subcortical low attenuation within the left parietal, left occipital and caudal aspect of the right postcentral gyrus suggesting underlying edema concerning for additional intracranial lesions. No significant mass effect or midline shift. No extra-axial fluid collections. Gray-white matter differentiation is otherwise maintained. ORBITS: The visualized portion of the orbits demonstrate no acute abnormality. SINUSES: The visualized paranasal sinuses and mastoid air cells demonstrate no acute abnormality. SOFT TISSUES/SKULL:  Scattered lytic lesions throughout the calvarium may reflect intraosseous hemangiomas, however underlying metastasis is possible. No significant change. 1. Partially calcified, hemorrhagic mass within the right cerebellar hemisphere resulting in mild compression of the 4th ventricle without obstructive hydrocephalus at this time.  2. Areas of subcortical low attenuation throughout the bilateral cerebral hemispheres concerning for additional intracranial mass lesions. 3. No significant mass effect or midline shift. 4. Possible calvarial metastasis versus underlying intraosseous hemangiomas. No significant change. 5. Recent prior outside head CT images would be of benefit to determine stability. IMPRESSION:   Primary Problem  New onset seizure [facility])    Active Hospital Problems    Diagnosis Date Noted    Focal epilepsy ([address] [G40.109]     Mass of occipital region [R22.0] 03/26/2022    Hyperglycemia [R73.9] 03/26/2022    Hypokalemia [E87.6] 03/26/2022    Vasogenic edema (HCC) [G93.6] 03/26/2022    Brain metastases (HCC) [C79.31] 03/26/2022    Adrenal mass ([address] - right  [E27.8] 03/26/2022    Episode of loss of consciousness [R55]     Intraparenchymal hemorrhage of brain ([address] [I61.9] 03/25/2022    New onset seizure ([address] [R56.9] 03/25/2022    Essential hypertension [I10] 06/23/2020    Non-small cell cancer of left lung ([address] [C34.92]            RECOMMENDATIONS:  I personally reviewed results of lab work-up imaging studies and other relevant clinical data. Reviewed results of MRI brain which show multiple brain metastasis  Patient agreeable to surgery  We will hold off on biopsy of adrenal metastasis  Follow-up on path report from surgical resection of brain tumor  Patient likely has recurrent lung cancer. Patient had multiple questions which answered the best my ability. Discussed with patient and Nurse. Thank you for asking us to see this patient. Vesta Franz MD          This note is created with the assistance of a speech recognition program.  While intending to generate a document that actually reflects the content of the visit, the document can still have some errors including those of syntax and sound a like substitutions which may escape proof reading. It such instances, actual meaning can be extrapolated by contextual diversion.

## 2022-03-28 NOTE — PROGRESS NOTES
Physician Progress Note      Nan Lundberg  MATTHEW #:                  [id number]  :                       1955  ADMIT DATE:       3/25/2022 10:15 PM  DISCH DATE:  RESPONDING  PROVIDER #:        Bing SALAS DO          QUERY TEXT:    Pt admitted with new onset of seizure. Pt noted to have Left occipital mass   with h/o lung cancer with treatment in 2018. If possible, please document in   progress notes and discharge summary if you are evaluating and/or treating any   of the following: The medical record reflects the following:  Risk Factors:  BARAK non-small cell adenocarcinoma lung CA in 2018 with prior   treatment with chemo/RT. seizure liek activity. Clinical Indicators: CT head -3/26 with small hypodensity right cerebellum   suspicious for hemorrhage versus calcification and associated edema there is   mild mass-effect on the fourth ventricle. .  In addition vasogenic edema   posterior left temporal occipital lobe. Suspicion for metastatic disease. MRI   brain -3/26 shows multiple lesions most compatible with metastatic disease. There is associated edema with minimal mass-effect no midline shift. Treatment: NS consult with OR planned for surgical intervention. PO - Keppra. Iv- Decadron 4 mg every 6 hours. Thank Mitesh Lam RN, CDS  cell- [phone]  office hours [id number]  email - [email or web address]  Options provided:  -- Seizure due to metastatic brain neoplasm  -- Seizure due to non traumatic intracerebral hemorrhage  -- Seizure due to non traumatic intracerebral hemorrhage and metastatic brain   neoplasm  -- Seizure due to ##please specify etiology, please specify etiology. -- Other - I will add my own diagnosis  -- Disagree - Not applicable / Not valid  -- Disagree - Clinically unable to determine / Unknown  -- Refer to Clinical Documentation Reviewer    PROVIDER RESPONSE TEXT:    Seizure is due to metastatic brain neoplasm.     Query created by: Josefina Thomason on 3/28/2022 2:48 PM      Electronically signed by:  Amara Zimmerman DO 3/28/2022 3:44 PM

## 2022-03-28 NOTE — PROGRESS NOTES
and OT   - continue decadron 4mg every 6 hours   - Oncology and rad/onc following   - neurology following for new onset seizures         Please contact neurosurgery with any changes in patients neurologic status.        Christine Godinez CNP  3/28/22  7:39 AM

## 2022-03-28 NOTE — PROGRESS NOTES
leading up to the episode in the last thing she recalls if she was at [place]. She states she got warm but she thought it was because her coat was on. Then she remembers waking up in the back of the ambulance confused. Patient admits that she was confused for quite some time after waking up up into the point that she was even at the hospital and still somewhat confused. Up until present day patient has been feeling fine however it was voiced that she noted 1 episode of vision changes in the right eye in which she had a flashing bright white light that quickly came on and went and was painless. Patient was looking at her iPad at the moment and thought it was something with the ipad. Work up in the emergency room   [place]    1. Small calcification as well as findings suspicious for a small amount of acute parenchymal hemorrhage in the right cerebral hemisphere with associated vasogenic edema. Associated mild mass effect on the fourth ventricle and associated low-lying right cerebellar tonsil. No hydrocephalus,   however. In addition, there is vasogenic edema of the posterior left temporal occipital lobes and possible small focal area of vasogenic edema of the right frontal lobe. Findings are consistent with metastatic disease given history of lung cancer, and MRI the brain with and without contrast is   recommended for further characterization and to assess for additional lesions. 2.  Possible calvarial metastases. CTA CHEST  1. No central pulmonary emboli identified. 2.  Left perihilar consolidation suspicious for pneumonia possibly radiation pneumonitis if the patient has had radiation. I cannot exclude lymphangitic spread of neoplasm in this region. Close clinical follow-up to document resolution within 6 weeks is recommended. 3.  Large right adrenal mass which is new likely metastatic. 4.  Multiple liver lesions.   At least one of these is probably a hemangioma but others are not completely imaged on the chest only. Consider PET/CT for definitive restaging of the patient's lung cancer. \"    The intitial assessment and plan included:  \"  Hospital Problems            Last Modified POA     * (Principal) New onset seizure ([address] 3/25/2022 Yes     Non-small cell cancer of left lung ([address] 3/26/2022 Yes     Essential hypertension 3/26/2022 Yes     Intraparenchymal hemorrhage of brain ([address] 3/25/2022 Yes     Mass of occipital region 3/26/2022 Yes     Hyperglycemia 3/26/2022 Yes          Plan:  Patient status inpatient in the Progressive Unit/Step down   Consult to neuro surgery  service  1. Consultation to heme-onc  2. Continue with steroids per neurology dosing  3. Loading of Keppra was not done in the out lying emergency room as far as documentation can prove  4. Await repeat CT out contrast for the possible intraparenchymal hemorrhage on outlying CT  5. Obtain MRI with and without contrast  6. Monitor blood pressures, will resume antihypertensives in morning and avoid hypotension  7. Hyperglycemia could be related to the seizure and/or the steroids given will continue to monitor. Patient has had previous elevated glucoses but A1c's were controlled and stable  8. Hold off on chemical anticoagulation and use EPC cuffs as the concern for intraparenchymal hemorrhage, however noting she is highly coagulable given the cancer suspicion. 9. GI prophylaxis with diet\"     Subsequent database has included:  MRI brain:  Impression:  1. Multiple supra and infratentorial intraparenchymal lesions are most   consistent with metastatic disease. There is associated edema with minimal   mass effect, but no midline shift. 2. Intrinsically T1 hyperintense right frontal calvarial lesion is most   consistent with an osseous hemangioma. No definite osseous metastasis   identified. MRI cervical spine:  Impression:  1.  Motion degraded examination with multilevel degenerative changes. There   is moderate spinal canal stenosis at C5-6 and C6-7 with mild spinal canal   narrowing at C3-4. Multilevel neural foraminal narrowing as above. 2. Reversal of the normal cervical lordosis with anterolisthesis at C3-4 and   retrolisthesis at C5-6. EEG:  Clinical correlation   This EEG was abnormal.  Frequent left posterior temporal sharp   waves conferred an increased risk for focal onset seizures. Frequent left mid temporal polymorphic delta slowing suggested   underlying structural defect. Past Medical History:   has a past medical history of Anxiety, Benign polyp of large intestine, Cancer ([address] COPD (chronic obstructive pulmonary disease) ([facility] Hx of blood clots, Hyperlipidemia, Hypertension, Liver hemangioma, Lung nodule, Snores, Uterine fibroid, and Wears glasses. Social History:   reports that she quit smoking about 22 years ago. Her smoking use included cigarettes. She has a 45.00 pack-year smoking history. She has never used smokeless tobacco. She reports current alcohol use. She reports that she does not use drugs. Family History:   Family History   Problem Relation Age of Onset    Cancer Mother         LUNG    Cancer Sister         LUNG       Review of Systems:     Review of Systems   Eyes: Positive for visual disturbance (reports an episode of flashing lights several weeks ago ). Respiratory: Negative for cough and shortness of breath. Cardiovascular: Negative for chest pain and palpitations. Gastrointestinal: Negative for abdominal pain, nausea and vomiting. Genitourinary: Negative for flank pain and hematuria. Neurological: Positive for seizures (no further Sz's) and headaches (bi-temporal HAs resolved). Psychiatric/Behavioral: Positive for dysphoric mood.        Physical Examination:        Vitals  BP (!) 161/88   Pulse 80   Temp 98.9 °F (37.2 °C) (Oral)   Resp 17   SpO2 95%   Temp (24hrs), Av.2 °F (37.3 °C), Min:98.9 °F (37.2 °C), Max:99.4 °F (37.4 °C)    No results for input(s): POCGLU in the last 72 hours. Physical Exam  Vitals reviewed. Constitutional:       General: She is not in acute distress. Appearance: She is not diaphoretic. HENT:      Head: Normocephalic. Nose: Nose normal.   Eyes:      General: No scleral icterus. Conjunctiva/sclera: Conjunctivae normal.   Neck:      Trachea: No tracheal deviation. Cardiovascular:      Rate and Rhythm: Normal rate and regular rhythm. Pulmonary:      Effort: Pulmonary effort is normal. No respiratory distress. Breath sounds: Normal breath sounds. No wheezing or rales. Chest:      Chest wall: No tenderness. Abdominal:      General: There is no distension. Palpations: Abdomen is soft. Tenderness: There is no abdominal tenderness. Musculoskeletal:         General: No tenderness. Cervical back: Neck supple. No rigidity. Skin:     General: Skin is warm and dry. Neurological:      Mental Status: She is alert and oriented to person, place, and time. Comments: Orientated x3  Doing well with historical data  Moving extremities x4  No gross motor or sensory deficits  Speech fluent   equal          Medications: Allergies: Allergies   Allergen Reactions    Codeine Nausea And Vomiting       Current Meds:   Scheduled Meds:    levETIRAcetam  500 mg Oral BID    sodium chloride flush  5-40 mL IntraVENous 2 times per day    dexamethasone  4 mg IntraVENous Q6H     Continuous Infusions:    sodium chloride Stopped (03/26/22 0506)     PRN Meds: cloNIDine, ALPRAZolam, sodium chloride flush, sodium chloride flush, sodium chloride, potassium chloride **OR** potassium alternative oral replacement **OR** potassium chloride, magnesium sulfate, ondansetron **OR** ondansetron, polyethylene glycol, acetaminophen **OR** acetaminophen    Data:     I/O (24Hr):     Intake/Output Summary (Last 24 hours) at 3/28/2022 1602  Last data filed at 3/28/2022 9990  Gross per 24  Mass of occipital region [R22.0] 03/26/2022    Hyperglycemia [R73.9] 03/26/2022    Hypokalemia [E87.6] 03/26/2022    Vasogenic edema (HCC) [G93.6] 03/26/2022    Brain metastases (HCC) [C79.31] 03/26/2022    Adrenal mass ([facility] - right  [E27.8] 03/26/2022    Episode of loss of consciousness [R55]     Intraparenchymal hemorrhage of brain ([facility] [I61.9] 03/25/2022    New onset seizure ([facility] [R56.9] 03/25/2022    Essential hypertension [I10] 06/23/2020    Non-small cell cancer of left lung ([facility] [C34.92]          Plan:        AEDs per neuro  Keppra initiated  Onc evaluation  Radiation oncology evaluation  Neurosurgery consultation  Surgical options/resection under review  Decadron  Glycemic contol - Monitor and control blood sugars  Observe for steroid-induced hyperglycemia  Blood Pressure - Monitor and control  Catapres as needed  Correct electrolyte abnormalities  DVT prophylaxis:  EPCs  Hope to transition to oral therapy  Complete work-up on outpatient basis  Discharge planning  Will discharge when arrangements complete and ok with other services.   Follow-up with PCP in one week, Shanta Domingo MD  Notify PCP of discharge     IP CONSULT TO HEM/ONC  IP CONSULT TO NEUROSURGERY  IP CONSULT TO NEUROLOGY  IP CONSULT TO [facility]  3/28/2022  4:02 PM

## 2022-03-28 NOTE — PROGRESS NOTES
NEUROLOGY INPATIENT PROGRESS NOTE    3/28/2022         Subjective: Breanna Rooney is a  61 y.o. female admitted on 3/25/2022 with Intraparenchymal hemorrhage of brain [facility]) [I61.9]  New onset seizure ([facility] [R56.9]    Briefly, this is a  61 y.o. female admitted on 3/25/2022 as a transfer from [facility]. She was at [facility] and recalls being in check out line and next thing she knew she was in the back of an EMS. She was told she may have had seizure-like activity. Does not recall event. Per , did not have any tonic clonic activity. 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 S/p chemo and RT in past.  During this episode loaded with keppra and continued on maintenance. EEG with frequent left temporal sharp waves placing at increased risk for focal onset seizures. Imaging revealed multiple enhancing lesions in cerebral and cerebellar hemispheres suspicious for metastatic disease. Neurosurgery on board and discussed surgical resection to avoid hydrocephalus. Planning in process. Today, lying in bed with no acute events reported overnight. No current facility-administered medications on file prior to encounter. Current Outpatient Medications on File Prior to Encounter   Medication Sig Dispense Refill    ALPRAZolam (XANAX) 0.25 MG tablet take 1 tablet by mouth nightly if needed for sleep for 30 DAYS 30 tablet 0    albuterol sulfate  (90 Base) MCG/ACT inhaler inhale 2 puffs by mouth four times a day 18 g 11    lisinopril-hydroCHLOROthiazide (PRINZIDE;ZESTORETIC) 10-12.5 MG per tablet Take 1 tablet by mouth daily 30 tablet 11    lovastatin (MEVACOR) 10 MG tablet Take 1 tablet by mouth nightly 30 tablet 11       Allergies: Breanna Rooney is allergic to codeine.     Past Medical History:   Diagnosis Date    Anxiety     Benign polyp of large intestine     Cancer (HCC)     LT UPPER LOBE    COPD (chronic obstructive pulmonary disease) ([facility]     Hx of blood clots     DVT LT LEG    Hyperlipidemia     Hypertension     Liver hemangioma     Lung nodule     BARAK  Nodule    Snores     not tested for apnea    Uterine fibroid 09/2010    Wears glasses        Past Surgical History:   Procedure Laterality Date    ABDOMINAL HERNIA REPAIR  2012    BREAST BIOPSY Left 1983    BRONCHOSCOPY  10/1/2018    BRONCHOSCOPY performed by Ellen Moody MD at [facility]    COLONOSCOPY      HYSTERECTOMY  2010    HYSTERECTOMY, TOTAL ABDOMINAL      LUNG BIOPSY      LUNG REMOVAL, PARTIAL Right 09/28/2018    Robotic assisted left lobectomy, upper with lymph node biopsy    OTHER SURGICAL HISTORY Right 11/05/2018    IR PORT PLACEMENT EQUAL OR GREATER THAN 5 YEARS    [place] [address] INCL FLUOR GDNCE DX W/CELL WASHG SPX N/A 10/29/2018    BRONCHOSCOPY performed by Toni Virk MD at [facility] 1 LOBE LOBECT Left 9/28/2018    XI ROBOTIC ASSISTED LEFT UPPER LOBECTOMY MULTIPLE LYMPH NODE BIOPSY, INTERCOSTAL  NERVE BLOCK ABLATION W/EXPAREL performed by Gustavo Spears MD at [place] [address]       Medications:     levETIRAcetam  500 mg Oral BID    sodium chloride flush  5-40 mL IntraVENous 2 times per day    dexamethasone  4 mg IntraVENous Q6H     PRN Meds include: cloNIDine, ALPRAZolam, sodium chloride flush, sodium chloride flush, sodium chloride, potassium chloride **OR** potassium alternative oral replacement **OR** potassium chloride, magnesium sulfate, ondansetron **OR** ondansetron, polyethylene glycol, acetaminophen **OR** acetaminophen    Objective:   BP (!) 152/88   Pulse 80   Temp 99.4 °F (37.4 °C) (Oral)   Resp 17   SpO2 95%     Blood pressure range: Systolic (05QHV), ISB:590 , Min:134 , FYP:112   ; Diastolic (78ITS), XWT:06, Min:74, Max:88      ROS:  CONSTITUTIONAL: negative for fatigue and malaise   EYES: negative for double vision and photophobia    HEENT: negative for tinnitus and sore throat   RESPIRATORY: negative for cough, shortness of breath   CARDIOVASCULAR: negative for chest pain, palpitations, or syncope   GASTROINTESTINAL: negative for abdominal pain, nausea, vomiting, diarrhea, or constipation    GENITOURINARY: negative for incontinence or retention    MUSCULOSKELETAL: negative for neck or back pain, negative for extremity pain   NEUROLOGICAL: Negative for seizures, headaches, weakness, numbness, confusion, aphasia, dysarthria   PSYCHIATRIC: negative for agitation, hallucination, SI/HI   SKIN Negative for spontaneous contusions, rashes, or lesions        NEUROLOGIC EXAMINATION  GENERAL  Appears comfortable and in no distress   HEENT  NC/ AT   HEART  S1 and S2 heard; palpation of pulses: radial pulse    NECK  Supple and no bruits heard   MENTAL STATUS:  Alert, oriented, intact memory, no confusion, normal speech, normal language, no hallucination or delusion   CRANIAL NERVES: II     -      Visual fields intact to confrontation  III,IV,VI -  PERR, EOMs full, no ptosis  V     -     Normal facial sensation   VII    -     Normal facial symmetry  VIII   -     Intact hearing   IX,X -     Symmetrical palate  XI    -     Symmetrical shoulder shrug  XII   -     Midline tongue, no atrophy    MOTOR FUNCTION: RUE: Significant for good strength of grade 5/5 in proximal and distal muscle groups   LUE: Significant for good strength of grade 5/5 in proximal and distal muscle groups   RLE: Significant for good strength of grade 5/5 in proximal and distal muscle groups   LLE: Significant for good strength of grade 5/5 in proximal and distal muscle groups      Normal bulk, normal tone and no involuntary movements, no tremor   SENSORY FUNCTION:  Normal touch, normal pin, normal vibration, normal proprioception   CEREBELLAR FUNCTION:  Intact fine motor control over upper limbs and lower limbs   REFLEX FUNCTION:  Symmetric in upper and lower extremities, no Babinski sign   STATION and GAIT  Not tested     Data:    Lab Results:   CBC:   Recent Labs 03/26/22  0612   WBC 7.7   HGB 12.7        BMP:    Recent Labs     03/26/22  0612 03/27/22  0648    141   K 3.5* 4.5    105   CO2 21 24   BUN 13 18   CREATININE 0.71 0.59   GLUCOSE 198* 124*         Lab Results   Component Value Date    CHOL 232 (H) 10/12/2021    LDLCHOLESTEROL 127 10/12/2021    HDL 77 10/12/2021    TRIG 140 10/12/2021    ALT 18 03/27/2022    AST 21 03/27/2022    TSH 1.77 05/11/2016    INR 1.0 11/05/2018    GLUF 110 (H) 05/11/2017    LABA1C 5.0 06/28/2018       No results found for: PHENYTOIN, PHENYTOIN, VALPROATE, CBMZ    IMAGING  CT CHEST ABDOMEN PELVIS W CONTRAST   Final Result   1. Mild centrilobular emphysema. 2. Redemonstration of left perihilar post treatment scarring with underlying   traction bronchiectasis extending into the upper lower lobes. Stable. 3. No evidence for metastatic disease in the chest.   4. A 2.1 x 1.4 cm right adrenal nodule not present in 2018. CT appearance   does not meet criteria for adenoma. Considered metastatic nodule until   proven otherwise. 5. Redemonstration of hepatic hemangioma and several cysts. MRI BRAIN W WO CONTRAST   Final Result   1. Multiple supra and infratentorial intraparenchymal lesions are most   consistent with metastatic disease. There is associated edema with minimal   mass effect, but no midline shift. 2. Intrinsically T1 hyperintense right frontal calvarial lesion is most   consistent with an osseous hemangioma. No definite osseous metastasis   identified. MRI LUMBAR SPINE WO CONTRAST   Final Result   1. No acute abnormality or aggressive osseous lesion. 2.  No significant degenerative change. MRI THORACIC SPINE WO CONTRAST   Final Result   1. No acute abnormality or aggressive osseous lesion. 2. Fatty marrow replacement from T5-T8, which could be due to prior treatment. 3. No significant degenerative change. MRI CERVICAL SPINE WO CONTRAST   Final Result   1.  Motion degraded examination with multilevel degenerative changes. There   is moderate spinal canal stenosis at C5-6 and C6-7 with mild spinal canal   narrowing at C3-4. Multilevel neural foraminal narrowing as above. 2. Reversal of the normal cervical lordosis with anterolisthesis at C3-4 and   retrolisthesis at C5-6. CT HEAD WO CONTRAST   Final Result   1. Partially calcified, hemorrhagic mass within the right cerebellar   hemisphere resulting in mild compression of the 4th ventricle without   obstructive hydrocephalus at this time. 2. Areas of subcortical low attenuation throughout the bilateral cerebral   hemispheres concerning for additional intracranial mass lesions. 3. No significant mass effect or midline shift. 4. Possible calvarial metastasis versus underlying intraosseous hemangiomas. No significant change. 5. Recent prior outside head CT images would be of benefit to determine   stability. EEG 30min scalp 3/25/2022  EEG REPORT        Patient: Doug Dewey            Age: 61 y.o. MRN: [id number]     Date: 3/25/2022  Referring Provider: Susana Benites     History: This routine 30 minute scalp EEG was recorded with video- monitoring for a 61 y.o. [place] female  who presented with encephalopathy.  This EEG was performed to evaluate for focal and epileptiform abnormalities.      Enedina Coello   Current Facility-Administered Medications             Current Facility-Administered Medications   Medication Dose Route Frequency Provider Last Rate Last Admin    cloNIDine (CATAPRES) tablet 0.1 mg  0.1 mg Oral Q4H PRN Carlos A Marinelli DO   0.1 mg at 03/27/22 1234    ALPRAZolam (XANAX) tablet 0.25 mg  0.25 mg Oral 4x Daily PRN Claudean Joe Delgrosso, APRN - CNP   0.25 mg at 03/27/22 0749    sodium chloride flush 0.9 % injection 10 mL  10 mL IntraVENous PRN Babs Live DO        levETIRAcetam (KEPPRA) tablet 500 mg  500 mg Oral BID Janeth Salazar MD   500 mg at 03/27/22 3082    sodium chloride flush 0.9 % injection 5-40 mL  5-40 mL IntraVENous 2 times per day Monty P Blood, DO   10 mL at 03/27/22 0749    sodium chloride flush 0.9 % injection 10 mL  10 mL IntraVENous PRN Monty P Blood, DO        0.9 % sodium chloride infusion  25 mL IntraVENous PRN Monty P Blood, DO   Stopped at 03/26/22 0506    potassium chloride (KLOR-CON M) extended release tablet 40 mEq  40 mEq Oral PRN Monty P Blood, DO         Or    potassium bicarb-citric acid (EFFER-K) effervescent tablet 40 mEq  40 mEq Oral PRN Monyt P Blood, DO   40 mEq at 03/26/22 0703     Or    potassium chloride 10 mEq/100 mL IVPB (Peripheral Line)  10 mEq IntraVENous PRN Monty P Blood, DO        magnesium sulfate 1000 mg in dextrose 5% 100 mL IVPB  1,000 mg IntraVENous PRN Monty P Blood, DO        ondansetron (ZOFRAN-ODT) disintegrating tablet 4 mg  4 mg Oral Q8H PRN Monty P Blood, DO         Or    ondansetron (ZOFRAN) injection 4 mg  4 mg IntraVENous Q6H PRN Monty P Blood, DO        polyethylene glycol (GLYCOLAX) packet 17 g  17 g Oral Daily PRN Monty P Blood, DO        acetaminophen (TYLENOL) tablet 650 mg  650 mg Oral Q6H PRN Monty P Blood, DO         Or    acetaminophen (TYLENOL) suppository 650 mg  650 mg Rectal Q6H PRN Monty P Blood, DO        dexamethasone (DECADRON) injection 4 mg  4 mg IntraVENous Q6H Monty P Blood, DO   4 mg at 03/27/22 1217            Technical Description: This is a 21 channel digital EEG recording with time-locked video. Electrodes were placed in accordance with the 10-20 International System of Electrode Placement. Single lead EKG monitoring as well as temporal electrodes were included.     The patient was not sleep deprived. This recording was obtained during wakefulness.   EEG Description: The dominant background activity during maximal recorded wakefulness consisted of bioccipitally dominant 9-10 Hz, 25-35 uV symmetric, regular activity that was reactive to eye opening. Frequent left mid temporal 3 to 4 Hz of polymorphic delta slowing was seen of 15-20 µV. Frequent left posterior temporal sharp waves were seen at T5. During drowsiness, the background rhythm waxed and waned and there were periods of slowing. Deeper sleep was not seen.     Photic stimulation - stepwise photic stimulation at 2-30 Hz was performed and there was a biposterior, symmetric, driving response. Hyperventilation - was not preformed.      No abnormalities were activated by photic stimulation      The EKG channel demonstrated a normal sinus rhythm.     Interpretation  This EEG was abnormal in wakefulness and sleep. Frequent left mid temporal polymorphic 3 to 4 Hz delta slowing was seen. Frequent left posterior and mid temporal sharp waves were seen.     Clinical correlation  This EEG was abnormal.  Frequent left posterior temporal sharp waves conferred an increased risk for focal onset seizures. Frequent left mid temporal polymorphic delta slowing suggested underlying structural defect.     YANG HOWARD MD  Diplomate, American Board of Psychiatry and Neurology  Diplomate, American Board of Clinical Neurophysiology  Diplomate, American Board of Epilepsy                 Assessment and Plan  61 y.o. female admitted on 3/25/2022 as a transfer from [facility] for further evaluation of seizure like activity vs syncope. Known BARAK non-small cell adenocarcinoma lung CA in 2018 with prior treatment with chemo/RT  CT head with small hypodensity in right cerebellum suspicious for hemorrhage vs calcification with associated edema. Vasogenic edema posterior left temporal occipital lobe  Suspected metastatic disease. On decadron 4mg q6 per NSG.    EEG with frequent left posterior temporal sharp waves conferred increased risk for focal onset seizures. Continue Keppra 500mg BID    NSG, Oncology, Rad/onc on board. Further management at their direction.     Further recommendations may follow after discussing with attending physician. Thank you for allowing us to participate in the care of this patient.       Saurabh Garcia MD   Neurology PGY-3  3/28/2022  8:24 AM

## 2022-03-29 ENCOUNTER — ANESTHESIA (OUTPATIENT)
Dept: OPERATING ROOM | Age: 67
DRG: 025 | End: 2022-03-29
Payer: COMMERCIAL

## 2022-03-29 ENCOUNTER — ANESTHESIA EVENT (OUTPATIENT)
Dept: OPERATING ROOM | Age: 67
DRG: 025 | End: 2022-03-29
Payer: COMMERCIAL

## 2022-03-29 VITALS — SYSTOLIC BLOOD PRESSURE: 115 MMHG | OXYGEN SATURATION: 100 % | TEMPERATURE: 97.5 F | DIASTOLIC BLOOD PRESSURE: 69 MMHG

## 2022-03-29 LAB
ABO/RH: NORMAL
ANION GAP SERPL CALCULATED.3IONS-SCNC: 15 MMOL/L (ref 9–17)
ANTIBODY SCREEN: NEGATIVE
ARM BAND NUMBER: NORMAL
BUN BLDV-MCNC: 21 MG/DL (ref 8–23)
CALCIUM SERPL-MCNC: 9.1 MG/DL (ref 8.6–10.4)
CHLORIDE BLD-SCNC: 105 MMOL/L (ref 98–107)
CO2: 20 MMOL/L (ref 20–31)
CREAT SERPL-MCNC: 0.57 MG/DL (ref 0.5–0.9)
EXPIRATION DATE: NORMAL
GFR AFRICAN AMERICAN: >60 ML/MIN
GFR NON-AFRICAN AMERICAN: >60 ML/MIN
GFR SERPL CREATININE-BSD FRML MDRD: ABNORMAL ML/MIN/{1.73_M2}
GLUCOSE BLD-MCNC: 106 MG/DL (ref 70–99)
HCT VFR BLD CALC: 39.9 % (ref 36.3–47.1)
HEMOGLOBIN: 13.7 G/DL (ref 11.9–15.1)
MCH RBC QN AUTO: 31.4 PG (ref 25.2–33.5)
MCHC RBC AUTO-ENTMCNC: 34.3 G/DL (ref 28.4–34.8)
MCV RBC AUTO: 91.5 FL (ref 82.6–102.9)
NRBC AUTOMATED: 0 PER 100 WBC
PDW BLD-RTO: 12.1 % (ref 11.8–14.4)
PLATELET # BLD: 273 K/UL (ref 138–453)
PMV BLD AUTO: 10.2 FL (ref 8.1–13.5)
POTASSIUM SERPL-SCNC: 4 MMOL/L (ref 3.7–5.3)
RBC # BLD: 4.36 M/UL (ref 3.95–5.11)
SARS-COV-2, RAPID: NOT DETECTED
SODIUM BLD-SCNC: 140 MMOL/L (ref 135–144)
SPECIMEN DESCRIPTION: NORMAL
WBC # BLD: 9 K/UL (ref 3.5–11.3)

## 2022-03-29 PROCEDURE — 6370000000 HC RX 637 (ALT 250 FOR IP): Performed by: INTERNAL MEDICINE

## 2022-03-29 PROCEDURE — 6360000002 HC RX W HCPCS

## 2022-03-29 PROCEDURE — 88342 IMHCHEM/IMCYTCHM 1ST ANTB: CPT

## 2022-03-29 PROCEDURE — 3700000000 HC ANESTHESIA ATTENDED CARE: Performed by: NEUROLOGICAL SURGERY

## 2022-03-29 PROCEDURE — 86900 BLOOD TYPING SEROLOGIC ABO: CPT

## 2022-03-29 PROCEDURE — 2580000003 HC RX 258

## 2022-03-29 PROCEDURE — 88341 IMHCHEM/IMCYTCHM EA ADD ANTB: CPT

## 2022-03-29 PROCEDURE — 99232 SBSQ HOSP IP/OBS MODERATE 35: CPT | Performed by: INTERNAL MEDICINE

## 2022-03-29 PROCEDURE — 80048 BASIC METABOLIC PNL TOTAL CA: CPT

## 2022-03-29 PROCEDURE — C1713 ANCHOR/SCREW BN/BN,TIS/BN: HCPCS | Performed by: NEUROLOGICAL SURGERY

## 2022-03-29 PROCEDURE — APPSS15 APP SPLIT SHARED TIME 0-15 MINUTES: Performed by: NURSE PRACTITIONER

## 2022-03-29 PROCEDURE — 85027 COMPLETE CBC AUTOMATED: CPT

## 2022-03-29 PROCEDURE — 3700000001 HC ADD 15 MINUTES (ANESTHESIA): Performed by: NEUROLOGICAL SURGERY

## 2022-03-29 PROCEDURE — 2500000003 HC RX 250 WO HCPCS

## 2022-03-29 PROCEDURE — 99232 SBSQ HOSP IP/OBS MODERATE 35: CPT | Performed by: NEUROLOGICAL SURGERY

## 2022-03-29 PROCEDURE — 6360000002 HC RX W HCPCS: Performed by: INTERNAL MEDICINE

## 2022-03-29 PROCEDURE — 36415 COLL VENOUS BLD VENIPUNCTURE: CPT

## 2022-03-29 PROCEDURE — 86901 BLOOD TYPING SEROLOGIC RH(D): CPT

## 2022-03-29 PROCEDURE — 2580000003 HC RX 258: Performed by: NEUROLOGICAL SURGERY

## 2022-03-29 PROCEDURE — 6370000000 HC RX 637 (ALT 250 FOR IP)

## 2022-03-29 PROCEDURE — 6370000000 HC RX 637 (ALT 250 FOR IP): Performed by: FAMILY MEDICINE

## 2022-03-29 PROCEDURE — 61518 REMOVAL OF BRAIN LESION: CPT | Performed by: NEUROLOGICAL SURGERY

## 2022-03-29 PROCEDURE — 7100000001 HC PACU RECOVERY - ADDTL 15 MIN: Performed by: NEUROLOGICAL SURGERY

## 2022-03-29 PROCEDURE — 86850 RBC ANTIBODY SCREEN: CPT

## 2022-03-29 PROCEDURE — 3600000014 HC SURGERY LEVEL 4 ADDTL 15MIN: Performed by: NEUROLOGICAL SURGERY

## 2022-03-29 PROCEDURE — 87635 SARS-COV-2 COVID-19 AMP PRB: CPT

## 2022-03-29 PROCEDURE — 7100000000 HC PACU RECOVERY - FIRST 15 MIN: Performed by: NEUROLOGICAL SURGERY

## 2022-03-29 PROCEDURE — 99232 SBSQ HOSP IP/OBS MODERATE 35: CPT | Performed by: FAMILY MEDICINE

## 2022-03-29 PROCEDURE — 88331 PATH CONSLTJ SURG 1 BLK 1SPC: CPT

## 2022-03-29 PROCEDURE — 88305 TISSUE EXAM BY PATHOLOGIST: CPT

## 2022-03-29 PROCEDURE — 88307 TISSUE EXAM BY PATHOLOGIST: CPT

## 2022-03-29 PROCEDURE — 6360000002 HC RX W HCPCS: Performed by: NURSE ANESTHETIST, CERTIFIED REGISTERED

## 2022-03-29 PROCEDURE — 2500000003 HC RX 250 WO HCPCS: Performed by: NEUROLOGICAL SURGERY

## 2022-03-29 PROCEDURE — 2720000010 HC SURG SUPPLY STERILE: Performed by: NEUROLOGICAL SURGERY

## 2022-03-29 PROCEDURE — 87086 URINE CULTURE/COLONY COUNT: CPT

## 2022-03-29 PROCEDURE — 00BC0ZX EXCISION OF CEREBELLUM, OPEN APPROACH, DIAGNOSTIC: ICD-10-PCS | Performed by: NEUROLOGICAL SURGERY

## 2022-03-29 PROCEDURE — 2580000003 HC RX 258: Performed by: INTERNAL MEDICINE

## 2022-03-29 PROCEDURE — 2500000003 HC RX 250 WO HCPCS: Performed by: NURSE ANESTHETIST, CERTIFIED REGISTERED

## 2022-03-29 PROCEDURE — 2000000003 HC NEURO ICU R&B

## 2022-03-29 PROCEDURE — 3600000004 HC SURGERY LEVEL 4 BASE: Performed by: NEUROLOGICAL SURGERY

## 2022-03-29 PROCEDURE — 2709999900 HC NON-CHARGEABLE SUPPLY: Performed by: NEUROLOGICAL SURGERY

## 2022-03-29 PROCEDURE — 2780000010 HC IMPLANT OTHER: Performed by: NEUROLOGICAL SURGERY

## 2022-03-29 DEVICE — DURAGEN® PLUS DURAL REGENERATION MATRIX, 2 IN X 2 IN (5 CM X 5 CM)
Type: IMPLANTABLE DEVICE | Site: SKULL | Status: FUNCTIONAL
Brand: DURAGEN® PLUS

## 2022-03-29 DEVICE — 2 HOLE PLATES(3)SELF DRILL SCREW(6) AXS: Type: IMPLANTABLE DEVICE | Site: SKULL | Status: FUNCTIONAL

## 2022-03-29 RX ORDER — LISINOPRIL 5 MG/1
5 TABLET ORAL DAILY
Status: DISCONTINUED | OUTPATIENT
Start: 2022-03-29 | End: 2022-03-31 | Stop reason: HOSPADM

## 2022-03-29 RX ORDER — OXYCODONE HYDROCHLORIDE 5 MG/1
10 TABLET ORAL EVERY 4 HOURS PRN
Status: DISCONTINUED | OUTPATIENT
Start: 2022-03-29 | End: 2022-03-30

## 2022-03-29 RX ORDER — LABETALOL HYDROCHLORIDE 5 MG/ML
INJECTION, SOLUTION INTRAVENOUS
Status: COMPLETED
Start: 2022-03-29 | End: 2022-03-29

## 2022-03-29 RX ORDER — DIPHENHYDRAMINE HYDROCHLORIDE 50 MG/ML
12.5 INJECTION INTRAMUSCULAR; INTRAVENOUS
Status: DISCONTINUED | OUTPATIENT
Start: 2022-03-29 | End: 2022-03-29 | Stop reason: HOSPADM

## 2022-03-29 RX ORDER — DEXAMETHASONE SODIUM PHOSPHATE 10 MG/ML
INJECTION INTRAMUSCULAR; INTRAVENOUS PRN
Status: DISCONTINUED | OUTPATIENT
Start: 2022-03-29 | End: 2022-03-29 | Stop reason: SDUPTHER

## 2022-03-29 RX ORDER — FENTANYL CITRATE 50 UG/ML
INJECTION, SOLUTION INTRAMUSCULAR; INTRAVENOUS
Status: DISPENSED
Start: 2022-03-29 | End: 2022-03-30

## 2022-03-29 RX ORDER — CEFAZOLIN SODIUM 1 G/3ML
INJECTION, POWDER, FOR SOLUTION INTRAMUSCULAR; INTRAVENOUS PRN
Status: DISCONTINUED | OUTPATIENT
Start: 2022-03-29 | End: 2022-03-29 | Stop reason: SDUPTHER

## 2022-03-29 RX ORDER — MAGNESIUM HYDROXIDE 1200 MG/15ML
LIQUID ORAL CONTINUOUS PRN
Status: COMPLETED | OUTPATIENT
Start: 2022-03-29 | End: 2022-03-29

## 2022-03-29 RX ORDER — DROPERIDOL 2.5 MG/ML
0.62 INJECTION, SOLUTION INTRAMUSCULAR; INTRAVENOUS
Status: DISCONTINUED | OUTPATIENT
Start: 2022-03-29 | End: 2022-03-29 | Stop reason: HOSPADM

## 2022-03-29 RX ORDER — HYDROCHLOROTHIAZIDE 25 MG/1
25 TABLET ORAL DAILY
Status: DISCONTINUED | OUTPATIENT
Start: 2022-03-29 | End: 2022-03-31 | Stop reason: HOSPADM

## 2022-03-29 RX ORDER — SODIUM CHLORIDE 0.9 % (FLUSH) 0.9 %
5-40 SYRINGE (ML) INJECTION PRN
Status: DISCONTINUED | OUTPATIENT
Start: 2022-03-29 | End: 2022-03-29 | Stop reason: HOSPADM

## 2022-03-29 RX ORDER — SODIUM CHLORIDE, SODIUM LACTATE, POTASSIUM CHLORIDE, CALCIUM CHLORIDE 600; 310; 30; 20 MG/100ML; MG/100ML; MG/100ML; MG/100ML
INJECTION, SOLUTION INTRAVENOUS CONTINUOUS PRN
Status: DISCONTINUED | OUTPATIENT
Start: 2022-03-29 | End: 2022-03-29 | Stop reason: SDUPTHER

## 2022-03-29 RX ORDER — OXYCODONE HYDROCHLORIDE 5 MG/1
5 TABLET ORAL ONCE
Status: COMPLETED | OUTPATIENT
Start: 2022-03-29 | End: 2022-03-29

## 2022-03-29 RX ORDER — LABETALOL HYDROCHLORIDE 5 MG/ML
INJECTION, SOLUTION INTRAVENOUS PRN
Status: DISCONTINUED | OUTPATIENT
Start: 2022-03-29 | End: 2022-03-29 | Stop reason: SDUPTHER

## 2022-03-29 RX ORDER — HYDRALAZINE HYDROCHLORIDE 20 MG/ML
10 INJECTION INTRAMUSCULAR; INTRAVENOUS
Status: DISCONTINUED | OUTPATIENT
Start: 2022-03-29 | End: 2022-03-29 | Stop reason: HOSPADM

## 2022-03-29 RX ORDER — EPHEDRINE SULFATE/0.9% NACL/PF 50 MG/5 ML
SYRINGE (ML) INTRAVENOUS PRN
Status: DISCONTINUED | OUTPATIENT
Start: 2022-03-29 | End: 2022-03-29 | Stop reason: SDUPTHER

## 2022-03-29 RX ORDER — SODIUM CHLORIDE 0.9 % (FLUSH) 0.9 %
5-40 SYRINGE (ML) INJECTION EVERY 12 HOURS SCHEDULED
Status: DISCONTINUED | OUTPATIENT
Start: 2022-03-29 | End: 2022-03-29 | Stop reason: HOSPADM

## 2022-03-29 RX ORDER — FENTANYL CITRATE 50 UG/ML
INJECTION, SOLUTION INTRAMUSCULAR; INTRAVENOUS PRN
Status: DISCONTINUED | OUTPATIENT
Start: 2022-03-29 | End: 2022-03-29 | Stop reason: SDUPTHER

## 2022-03-29 RX ORDER — ONDANSETRON 2 MG/ML
4 INJECTION INTRAMUSCULAR; INTRAVENOUS EVERY 6 HOURS PRN
Status: DISCONTINUED | OUTPATIENT
Start: 2022-03-29 | End: 2022-03-31 | Stop reason: HOSPADM

## 2022-03-29 RX ORDER — PHENYLEPHRINE HCL IN 0.9% NACL 1 MG/10 ML
SYRINGE (ML) INTRAVENOUS PRN
Status: DISCONTINUED | OUTPATIENT
Start: 2022-03-29 | End: 2022-03-29 | Stop reason: SDUPTHER

## 2022-03-29 RX ORDER — LIDOCAINE HYDROCHLORIDE 10 MG/ML
INJECTION, SOLUTION EPIDURAL; INFILTRATION; INTRACAUDAL; PERINEURAL PRN
Status: DISCONTINUED | OUTPATIENT
Start: 2022-03-29 | End: 2022-03-29 | Stop reason: SDUPTHER

## 2022-03-29 RX ORDER — FENTANYL CITRATE 50 UG/ML
25 INJECTION, SOLUTION INTRAMUSCULAR; INTRAVENOUS ONCE
Status: COMPLETED | OUTPATIENT
Start: 2022-03-30 | End: 2022-03-29

## 2022-03-29 RX ORDER — METOCLOPRAMIDE HYDROCHLORIDE 5 MG/ML
10 INJECTION INTRAMUSCULAR; INTRAVENOUS
Status: DISCONTINUED | OUTPATIENT
Start: 2022-03-29 | End: 2022-03-29 | Stop reason: HOSPADM

## 2022-03-29 RX ORDER — ONDANSETRON 2 MG/ML
INJECTION INTRAMUSCULAR; INTRAVENOUS PRN
Status: DISCONTINUED | OUTPATIENT
Start: 2022-03-29 | End: 2022-03-29 | Stop reason: SDUPTHER

## 2022-03-29 RX ORDER — SODIUM CHLORIDE 9 MG/ML
25 INJECTION, SOLUTION INTRAVENOUS PRN
Status: DISCONTINUED | OUTPATIENT
Start: 2022-03-29 | End: 2022-03-29 | Stop reason: HOSPADM

## 2022-03-29 RX ORDER — PROPOFOL 10 MG/ML
INJECTION, EMULSION INTRAVENOUS PRN
Status: DISCONTINUED | OUTPATIENT
Start: 2022-03-29 | End: 2022-03-29 | Stop reason: SDUPTHER

## 2022-03-29 RX ORDER — KETOROLAC TROMETHAMINE 15 MG/ML
15 INJECTION, SOLUTION INTRAMUSCULAR; INTRAVENOUS ONCE
Status: DISCONTINUED | OUTPATIENT
Start: 2022-03-29 | End: 2022-03-29

## 2022-03-29 RX ORDER — LIDOCAINE HYDROCHLORIDE AND EPINEPHRINE 10; 10 MG/ML; UG/ML
INJECTION, SOLUTION INFILTRATION; PERINEURAL PRN
Status: DISCONTINUED | OUTPATIENT
Start: 2022-03-29 | End: 2022-03-29 | Stop reason: ALTCHOICE

## 2022-03-29 RX ORDER — MEPERIDINE HYDROCHLORIDE 50 MG/ML
12.5 INJECTION INTRAMUSCULAR; INTRAVENOUS; SUBCUTANEOUS EVERY 5 MIN PRN
Status: DISCONTINUED | OUTPATIENT
Start: 2022-03-29 | End: 2022-03-29 | Stop reason: HOSPADM

## 2022-03-29 RX ORDER — ROCURONIUM BROMIDE 10 MG/ML
INJECTION, SOLUTION INTRAVENOUS PRN
Status: DISCONTINUED | OUTPATIENT
Start: 2022-03-29 | End: 2022-03-29 | Stop reason: SDUPTHER

## 2022-03-29 RX ADMIN — CEFAZOLIN 2000 MG: 1 INJECTION, POWDER, FOR SOLUTION INTRAMUSCULAR; INTRAVENOUS at 16:30

## 2022-03-29 RX ADMIN — ROCURONIUM BROMIDE 10 MG: 10 INJECTION INTRAVENOUS at 16:49

## 2022-03-29 RX ADMIN — PROPOFOL 40 MG: 10 INJECTION, EMULSION INTRAVENOUS at 17:54

## 2022-03-29 RX ADMIN — PROPOFOL 100 MG: 10 INJECTION, EMULSION INTRAVENOUS at 16:20

## 2022-03-29 RX ADMIN — DEXAMETHASONE SODIUM PHOSPHATE 10 MG: 10 INJECTION INTRAMUSCULAR; INTRAVENOUS at 15:45

## 2022-03-29 RX ADMIN — ONDANSETRON 4 MG: 2 INJECTION INTRAMUSCULAR; INTRAVENOUS at 19:28

## 2022-03-29 RX ADMIN — SODIUM CHLORIDE, POTASSIUM CHLORIDE, SODIUM LACTATE AND CALCIUM CHLORIDE: 600; 310; 30; 20 INJECTION, SOLUTION INTRAVENOUS at 15:20

## 2022-03-29 RX ADMIN — PROPOFOL 150 MG: 10 INJECTION, EMULSION INTRAVENOUS at 15:28

## 2022-03-29 RX ADMIN — PROPOFOL 50 MG: 10 INJECTION, EMULSION INTRAVENOUS at 16:06

## 2022-03-29 RX ADMIN — FENTANYL CITRATE 25 MCG: 50 INJECTION, SOLUTION INTRAMUSCULAR; INTRAVENOUS at 17:07

## 2022-03-29 RX ADMIN — CLONIDINE HYDROCHLORIDE 0.1 MG: 0.1 TABLET ORAL at 12:03

## 2022-03-29 RX ADMIN — ROCURONIUM BROMIDE 20 MG: 10 INJECTION INTRAVENOUS at 15:55

## 2022-03-29 RX ADMIN — SUGAMMADEX 400 MG: 100 INJECTION, SOLUTION INTRAVENOUS at 19:35

## 2022-03-29 RX ADMIN — OXYCODONE HYDROCHLORIDE 5 MG: 5 TABLET ORAL at 21:45

## 2022-03-29 RX ADMIN — ROCURONIUM BROMIDE 50 MG: 10 INJECTION INTRAVENOUS at 15:28

## 2022-03-29 RX ADMIN — LISINOPRIL 5 MG: 5 TABLET ORAL at 08:30

## 2022-03-29 RX ADMIN — FENTANYL CITRATE 50 MCG: 50 INJECTION, SOLUTION INTRAMUSCULAR; INTRAVENOUS at 17:54

## 2022-03-29 RX ADMIN — ROCURONIUM BROMIDE 10 MG: 10 INJECTION INTRAVENOUS at 18:53

## 2022-03-29 RX ADMIN — LEVETIRACETAM 500 MG: 500 TABLET, FILM COATED ORAL at 08:30

## 2022-03-29 RX ADMIN — FENTANYL CITRATE 50 MCG: 50 INJECTION, SOLUTION INTRAMUSCULAR; INTRAVENOUS at 16:49

## 2022-03-29 RX ADMIN — LIDOCAINE HYDROCHLORIDE 50 MG: 10 INJECTION, SOLUTION EPIDURAL; INFILTRATION; INTRACAUDAL; PERINEURAL at 15:28

## 2022-03-29 RX ADMIN — SODIUM CHLORIDE, SODIUM LACTATE, POTASSIUM CHLORIDE, CALCIUM CHLORIDE: 600; 310; 30; 20 INJECTION, SOLUTION INTRAVENOUS at 15:44

## 2022-03-29 RX ADMIN — FENTANYL CITRATE 25 MCG: 50 INJECTION, SOLUTION INTRAMUSCULAR; INTRAVENOUS at 19:03

## 2022-03-29 RX ADMIN — ALPRAZOLAM 0.5 MG: 0.5 TABLET ORAL at 12:02

## 2022-03-29 RX ADMIN — Medication 20 MG: at 19:42

## 2022-03-29 RX ADMIN — Medication 50 MCG: at 16:13

## 2022-03-29 RX ADMIN — HYDROCHLOROTHIAZIDE 25 MG: 25 TABLET ORAL at 08:30

## 2022-03-29 RX ADMIN — DEXAMETHASONE SODIUM PHOSPHATE 4 MG: 4 INJECTION, SOLUTION INTRA-ARTICULAR; INTRALESIONAL; INTRAMUSCULAR; INTRAVENOUS; SOFT TISSUE at 12:02

## 2022-03-29 RX ADMIN — ACETAMINOPHEN 650 MG: 325 TABLET ORAL at 23:29

## 2022-03-29 RX ADMIN — FENTANYL CITRATE 50 MCG: 50 INJECTION, SOLUTION INTRAMUSCULAR; INTRAVENOUS at 15:28

## 2022-03-29 RX ADMIN — ROCURONIUM BROMIDE 10 MG: 10 INJECTION INTRAVENOUS at 17:25

## 2022-03-29 RX ADMIN — FENTANYL CITRATE 25 MCG: 50 INJECTION, SOLUTION INTRAMUSCULAR; INTRAVENOUS at 23:35

## 2022-03-29 RX ADMIN — Medication 20 MG: at 19:58

## 2022-03-29 RX ADMIN — ROCURONIUM BROMIDE 10 MG: 10 INJECTION INTRAVENOUS at 17:56

## 2022-03-29 RX ADMIN — Medication 5 MG: at 15:58

## 2022-03-29 RX ADMIN — SODIUM CHLORIDE, PRESERVATIVE FREE 10 ML: 5 INJECTION INTRAVENOUS at 08:30

## 2022-03-29 RX ADMIN — DEXAMETHASONE SODIUM PHOSPHATE 4 MG: 4 INJECTION, SOLUTION INTRA-ARTICULAR; INTRALESIONAL; INTRAMUSCULAR; INTRAVENOUS; SOFT TISSUE at 05:48

## 2022-03-29 ASSESSMENT — PULMONARY FUNCTION TESTS
PIF_VALUE: 19
PIF_VALUE: 19
PIF_VALUE: 21
PIF_VALUE: 18
PIF_VALUE: 24
PIF_VALUE: 18
PIF_VALUE: 19
PIF_VALUE: 19
PIF_VALUE: 17
PIF_VALUE: 19
PIF_VALUE: 19
PIF_VALUE: 17
PIF_VALUE: 1
PIF_VALUE: 17
PIF_VALUE: 18
PIF_VALUE: 19
PIF_VALUE: 18
PIF_VALUE: 19
PIF_VALUE: 18
PIF_VALUE: 21
PIF_VALUE: 18
PIF_VALUE: 18
PIF_VALUE: 24
PIF_VALUE: 18
PIF_VALUE: 19
PIF_VALUE: 18
PIF_VALUE: 18
PIF_VALUE: 32
PIF_VALUE: 18
PIF_VALUE: 18
PIF_VALUE: 17
PIF_VALUE: 25
PIF_VALUE: 1
PIF_VALUE: 18
PIF_VALUE: 17
PIF_VALUE: 18
PIF_VALUE: 18
PIF_VALUE: 30
PIF_VALUE: 18
PIF_VALUE: 23
PIF_VALUE: 18
PIF_VALUE: 17
PIF_VALUE: 19
PIF_VALUE: 18
PIF_VALUE: 19
PIF_VALUE: 18
PIF_VALUE: 24
PIF_VALUE: 17
PIF_VALUE: 18
PIF_VALUE: 19
PIF_VALUE: 18
PIF_VALUE: 20
PIF_VALUE: 19
PIF_VALUE: 18
PIF_VALUE: 18
PIF_VALUE: 19
PIF_VALUE: 18
PIF_VALUE: 18
PIF_VALUE: 19
PIF_VALUE: 20
PIF_VALUE: 18
PIF_VALUE: 3
PIF_VALUE: 19
PIF_VALUE: 18
PIF_VALUE: 18
PIF_VALUE: 19
PIF_VALUE: 18
PIF_VALUE: 18
PIF_VALUE: 17
PIF_VALUE: 6
PIF_VALUE: 18
PIF_VALUE: 18
PIF_VALUE: 19
PIF_VALUE: 18
PIF_VALUE: 19
PIF_VALUE: 25
PIF_VALUE: 19
PIF_VALUE: 19
PIF_VALUE: 18
PIF_VALUE: 17
PIF_VALUE: 18
PIF_VALUE: 19
PIF_VALUE: 19
PIF_VALUE: 21
PIF_VALUE: 19
PIF_VALUE: 2
PIF_VALUE: 18
PIF_VALUE: 17
PIF_VALUE: 25
PIF_VALUE: 19
PIF_VALUE: 18
PIF_VALUE: 0
PIF_VALUE: 18
PIF_VALUE: 17
PIF_VALUE: 19
PIF_VALUE: 18
PIF_VALUE: 1
PIF_VALUE: 19
PIF_VALUE: 8
PIF_VALUE: 18
PIF_VALUE: 17
PIF_VALUE: 18
PIF_VALUE: 27
PIF_VALUE: 2
PIF_VALUE: 17
PIF_VALUE: 19
PIF_VALUE: 19
PIF_VALUE: 24
PIF_VALUE: 18
PIF_VALUE: 18
PIF_VALUE: 0
PIF_VALUE: 18
PIF_VALUE: 21
PIF_VALUE: 18
PIF_VALUE: 18
PIF_VALUE: 17
PIF_VALUE: 31
PIF_VALUE: 18
PIF_VALUE: 19
PIF_VALUE: 18
PIF_VALUE: 5
PIF_VALUE: 19
PIF_VALUE: 18
PIF_VALUE: 19
PIF_VALUE: 19
PIF_VALUE: 18
PIF_VALUE: 18
PIF_VALUE: 2
PIF_VALUE: 18
PIF_VALUE: 19
PIF_VALUE: 19
PIF_VALUE: 18
PIF_VALUE: 25
PIF_VALUE: 19
PIF_VALUE: 18
PIF_VALUE: 19
PIF_VALUE: 18
PIF_VALUE: 17
PIF_VALUE: 18
PIF_VALUE: 18
PIF_VALUE: 17
PIF_VALUE: 18
PIF_VALUE: 19
PIF_VALUE: 1
PIF_VALUE: 18
PIF_VALUE: 19
PIF_VALUE: 18
PIF_VALUE: 18
PIF_VALUE: 17
PIF_VALUE: 18
PIF_VALUE: 8
PIF_VALUE: 18
PIF_VALUE: 19
PIF_VALUE: 0
PIF_VALUE: 18
PIF_VALUE: 19
PIF_VALUE: 18
PIF_VALUE: 8
PIF_VALUE: 19
PIF_VALUE: 18
PIF_VALUE: 17
PIF_VALUE: 18
PIF_VALUE: 19
PIF_VALUE: 18
PIF_VALUE: 19
PIF_VALUE: 18
PIF_VALUE: 19
PIF_VALUE: 18
PIF_VALUE: 2
PIF_VALUE: 20
PIF_VALUE: 22
PIF_VALUE: 18
PIF_VALUE: 2
PIF_VALUE: 0
PIF_VALUE: 18
PIF_VALUE: 18
PIF_VALUE: 19
PIF_VALUE: 18
PIF_VALUE: 18
PIF_VALUE: 19
PIF_VALUE: 20
PIF_VALUE: 18
PIF_VALUE: 19
PIF_VALUE: 19
PIF_VALUE: 18
PIF_VALUE: 2
PIF_VALUE: 18
PIF_VALUE: 19
PIF_VALUE: 19
PIF_VALUE: 13
PIF_VALUE: 2
PIF_VALUE: 19
PIF_VALUE: 19
PIF_VALUE: 18
PIF_VALUE: 17
PIF_VALUE: 18
PIF_VALUE: 1
PIF_VALUE: 18
PIF_VALUE: 18
PIF_VALUE: 30
PIF_VALUE: 18
PIF_VALUE: 19
PIF_VALUE: 19
PIF_VALUE: 18
PIF_VALUE: 10
PIF_VALUE: 28
PIF_VALUE: 18
PIF_VALUE: 18
PIF_VALUE: 19
PIF_VALUE: 1
PIF_VALUE: 19
PIF_VALUE: 18
PIF_VALUE: 29
PIF_VALUE: 18
PIF_VALUE: 19

## 2022-03-29 ASSESSMENT — ENCOUNTER SYMPTOMS
NAUSEA: 0
BLOOD IN STOOL: 0
CHEST TIGHTNESS: 0
ABDOMINAL PAIN: 0
WHEEZING: 0
DIARRHEA: 0
VOMITING: 0
COUGH: 0
CONSTIPATION: 0
SHORTNESS OF BREATH: 0

## 2022-03-29 ASSESSMENT — PAIN SCALES - GENERAL
PAINLEVEL_OUTOF10: 0
PAINLEVEL_OUTOF10: 10
PAINLEVEL_OUTOF10: 0
PAINLEVEL_OUTOF10: 0

## 2022-03-29 ASSESSMENT — PAIN - FUNCTIONAL ASSESSMENT: PAIN_FUNCTIONAL_ASSESSMENT: 0-10

## 2022-03-29 NOTE — PLAN OF CARE
Problem: Falls - Risk of:  Goal: Will remain free from falls  Description: Will remain free from falls  3/29/2022 0134 by Carina Wild RN  Outcome: Ongoing     Problem: Falls - Risk of:  Goal: Absence of physical injury  Description: Absence of physical injury  3/29/2022 0134 by Carina Wild RN  Outcome: Ongoing

## 2022-03-29 NOTE — ANESTHESIA PROCEDURE NOTES
Arterial Line:    An arterial line was placed using ultrasound guidance, in the OR for the following indication(s): continuous blood pressure monitoring. A 20 gauge (size), 1 and 3/4 inch (length), (type) catheter was placed, into the right radial artery, secured by tape and Tegaderm. Anesthesia type: General    Events:  patient tolerated procedure well with no complications and EBL < 5mL.   3/29/2022 3:38 PH3/[id number]/9051 3:44 PM  Anesthesiologist: Dimitry Pratt MD  Resident/CRNA: ANNIE Trujillo CRNA  Performed: Anesthesiologist   Preanesthetic Checklist  Completed: patient identified, IV checked, site marked, risks and benefits discussed, surgical consent, monitors and equipment checked, pre-op evaluation, timeout performed, anesthesia consent given, oxygen available and patient being monitored

## 2022-03-29 NOTE — PROGRESS NOTES
Neurosurgery JACOBY/Resident    Daily Progress Note   CC:No chief complaint on file. 3/29/2022  6:31 AM    Chart reviewed. No acute events overnight. No new complaints.  Resting in bed,  at bedside, Dr Bethany Escudero and myself spoke with patient today, Consent was obtained for OR today and explained to patient     Vitals:    03/28/22 0930 03/28/22 1830 03/28/22 2020 03/29/22 0600   BP: (!) 161/88 (!) 147/72 (!) 166/78    Pulse:       Resp:       Temp:   98.2 °F (36.8 °C)    TempSrc:   Oral    SpO2:       Weight:    161 lb 2.5 oz (73.1 kg)       PE:   AOx3   PERRL, EOMI  Cranial Nerves:    II: Visual acuity:  normal  III: Pupils:  equal, round, reactive to light  III,IV,VI: Extra Ocular Movements:intact  V: Facial sensation:  intact  VII: Facial strength: intact  VIII: Hearing:  intact  IX: Palate:  intact  XI: Shoulder shrug: intact  XII: Tongue movement: intact      Motor   L deltoid 5/5; R deltoid 5/5  L biceps 5/5; R biceps 5/5  L triceps 5/5; R triceps 5/5  L wrist extension 5/5; R wrist extension 5/5  L intrinsics 5/5; R intrinsics 5/5      L iliopsoas 5/5 , R iliopsoas 5/5  L quadriceps 5/5; R quadriceps 5/5  L Dorsiflexion 5/5; R dorsiflexion 5/5  L Plantarflexion 5/5; R plantarflexion 5/5  L EHL 5/5; R EHL 5/5    Drift:  absent  Tone:  normal    Sensation: intact       Lab Results   Component Value Date    WBC 9.0 03/29/2022    HGB 13.7 03/29/2022    HCT 39.9 03/29/2022     03/29/2022    CHOL 232 (H) 10/12/2021    TRIG 140 10/12/2021    HDL 77 10/12/2021    ALT 18 03/27/2022    AST 21 03/27/2022     03/29/2022    K 4.0 03/29/2022     03/29/2022    CREATININE 0.57 03/29/2022    BUN 21 03/29/2022    CO2 20 03/29/2022    TSH 1.77 05/11/2016    INR 1.0 11/05/2018    GLUF 110 (H) 05/11/2017    LABA1C 5.0 06/28/2018         A/P  61 y.o. female who presents with multifocal cerebral metastatic disease     - continue NPO at this time  - consent obtained for Suboccipital craniotomy for tumor resection  - will need to go to neuro ICU post op  - NCC consult while in ICU   - Neuro checks per floor protocol      Please contact neurosurgery with any changes in patients neurologic status.        Dennise Healy CNP  3/29/22  6:31 AM

## 2022-03-29 NOTE — PROGRESS NOTES
[facility]  Office: [address], DO, Raman Gear, DO, Isi Mosley, DO, Elder Val Blood, DO, Giorgi Pittman MD, Juaquin Nuñez MD, Barbie Louise MD, Larry Zaragoza MD, Uzma Jernigan MD, Cecelia Young MD, Becca Lang MD, Marcelina Fragmin, DO, Yang Nunes, DO, Geno Brito MD,  Nadia Zuñiga, DO, Luis Carlos Hobbs MD, Lou Thornton MD, Radha Rodriguez MD, Brock Gomez, DO, Melissa Wyman MD, Cyndie Dan MD, Deya Segura, CNP, [facility] OCALA DelGrosso, CNP, Moreubin Kavya, CNP, Leidy Mask, CNS, [place]ah Muse, CNP, Flor Can, CNP, Carleen Postal, CNP, Alena Dub, CNP, Nico Ngo, CNP, Lendel Nissen, PA-C, Mya Das, DNP, Chinyere Kc, DNP, Katharine Perkins, CNP, Dominick Bates, CNP, Lynn Anna, CNP         [address]    Progress Note    3/29/2022    8:21 AM    Name:   Kalee Choudhury  MRN:     [id number]     [place]:      [de-identified]   Room:   [facility]   Day:  4  Admit Date:  3/25/2022 10:15 PM    PCP:   Amparo Holguin MD  Code Status:  Full Code    Subjective:     C/C:   Interval History Status: improved. Patient seen and examined at bedside, no acute events overnight. Nervous regarding her surgery timing as there was no clear plan yet   Discussed with NS, plan is today and to keep NPO  BP elevated and BP meds were restarted  Patient denies any chest pain, shortness of breath, chills, fevers, nausea or vomiting. Patient vitals, labs and all providers notes were reviewed,from overnight shift and morning updates were noted and discussed with the nurse    Brief History:     Per chart    Noelle Coello 61 y.o. female with a history of left-sided lung cancer diagnosed in 2018 treated with lobectomy and lymph node dissection followed by adjuvant chemotherapy and sequentially adjuvant radiation therapy due to mediastinal involvement and positive margins.   Patient subsequently had been monitored with surveillance imaging. Patient on Friday was at the grocery store shopping when she had a near syncopal episode. Patient was brought to the ED and evaluated with a CT of the head which showed a hemorrhagic mass in the right cerebellar hemisphere. Patient was started on Decadron and Keppra and had MRI of the brain done  which showed a right cerebellar intracranial metastasis measuring 3.2 cm as well as multiple other lesions. Patient had a CT chest abdomen pelvis today which showed no other evidence of metastatic disease. Patient was evaluated by neurology. Patient had a EEG done which did show some abnormal findings. Patient was seen by neurosurgery who recommended surgical debulking of the large right cerebellar lesion as it may lead to hydrocephalus if treated with radiation. Patient has been otherwise asymptomatic with no symptoms of headaches, dizziness, vision changes, numbness, weakness, confusion, chest pain, shortness of breath, abdominal pain, nausea, bony pain, weight loss, fatigue, or any bleeding. Review of Systems:     Review of Systems   Constitutional: Positive for activity change, appetite change and fatigue. Negative for chills, diaphoresis and fever. HENT: Negative for congestion. Eyes: Negative for visual disturbance. Respiratory: Negative for cough, chest tightness, shortness of breath and wheezing. Cardiovascular: Negative for chest pain, palpitations and leg swelling. Gastrointestinal: Negative for abdominal pain, blood in stool, constipation, diarrhea, nausea and vomiting. Genitourinary: Negative for difficulty urinating. Neurological: Positive for seizures and weakness. Negative for dizziness, light-headedness, numbness and headaches. Psychiatric/Behavioral: The patient is nervous/anxious (regarding her urgery). All other systems reviewed and are negative. Medications: Allergies:     Allergies   Allergen Reactions    Codeine Nausea And Vomiting Current Meds:   Scheduled Meds:    levETIRAcetam  500 mg Oral BID    sodium chloride flush  5-40 mL IntraVENous 2 times per day    dexamethasone  4 mg IntraVENous Q6H     Continuous Infusions:    sodium chloride Stopped (22 0506)     PRN Meds: ALPRAZolam, cloNIDine, sodium chloride flush, sodium chloride flush, sodium chloride, potassium chloride **OR** potassium alternative oral replacement **OR** potassium chloride, magnesium sulfate, ondansetron **OR** ondansetron, polyethylene glycol, acetaminophen **OR** acetaminophen    Data:     Past Medical History:   has a past medical history of Anxiety, Benign polyp of large intestine, Cancer ([facility] COPD (chronic obstructive pulmonary disease) ([facility] Hx of blood clots, Hyperlipidemia, Hypertension, Liver hemangioma, Lung nodule, Snores, Uterine fibroid, and Wears glasses. Social History:   reports that she quit smoking about 22 years ago. Her smoking use included cigarettes. She has a 45.00 pack-year smoking history. She has never used smokeless tobacco. She reports current alcohol use. She reports that she does not use drugs. Family History:   Family History   Problem Relation Age of Onset    Cancer Mother         LUNG    Cancer Sister         LUNG       Vitals:  BP (!) 166/78   Pulse 80   Temp 98.2 °F (36.8 °C) (Oral)   Resp 17   Wt 161 lb 2.5 oz (73.1 kg)   SpO2 95%   BMI 32.55 kg/m²   Temp (24hrs), Av.2 °F (36.8 °C), Min:98.2 °F (36.8 °C), Max:98.2 °F (36.8 °C)    No results for input(s): POCGLU in the last 72 hours. I/O (24Hr):     Intake/Output Summary (Last 24 hours) at 3/29/2022 0821  Last data filed at 3/28/2022 1637  Gross per 24 hour   Intake 1500 ml   Output 1100 ml   Net 400 ml       Labs:  Hematology:  Recent Labs     22  0233   WBC 9.0   RBC 4.36   HGB 13.7   HCT 39.9   MCV 91.5   MCH 31.4   MCHC 34.3   RDW 12.1      MPV 10.2     Chemistry:  Recent Labs     22  0648 22  0233    140   K 4.5 4.0    105   CO2 24 20   GLUCOSE 124* 106*   BUN 18 21   CREATININE 0.59 0.57   ANIONGAP 12 15   LABGLOM >60 >60   GFRAA >60 >60   CALCIUM 9.5 9.1     Recent Labs     03/27/22  0648   PROT 6.8   LABALBU 4.2   AST 21   ALT 18   ALKPHOS 70   BILITOT 0.30   BILIDIR 0.10     ABG:  Lab Results   Component Value Date    POCPH 7.453 09/25/2018    PHART 7.411 09/28/2018    POCPCO2 35.0 09/25/2018    UIM4EOE 37.1 09/28/2018    POCPO2 84.8 09/25/2018    PO2ART 112.0 09/28/2018    POCHCO3 24.5 09/25/2018    MAY9RLO 23.1 09/28/2018    NBEA 0.7 09/28/2018    PBEA NOT REPORTED 09/28/2018    EBK6PXO 26 09/25/2018    UFGN1GAO 97 09/25/2018    P3JTKLGP 98.7 09/28/2018    FIO2 80% 09/28/2018     Lab Results   Component Value Date/Time    SPECIAL NOT REPORTED 09/28/2018 08:47 AM     Lab Results   Component Value Date/Time    CULTURE NO GROWTH 09/28/2018 08:47 AM       Radiology:  CT HEAD WO CONTRAST    Result Date: 3/26/2022  1. Partially calcified, hemorrhagic mass within the right cerebellar hemisphere resulting in mild compression of the 4th ventricle without obstructive hydrocephalus at this time. 2. Areas of subcortical low attenuation throughout the bilateral cerebral hemispheres concerning for additional intracranial mass lesions. 3. No significant mass effect or midline shift. 4. Possible calvarial metastasis versus underlying intraosseous hemangiomas. No significant change. 5. Recent prior outside head CT images would be of benefit to determine stability. MRI CERVICAL SPINE WO CONTRAST    Result Date: 3/26/2022  1. Motion degraded examination with multilevel degenerative changes. There is moderate spinal canal stenosis at C5-6 and C6-7 with mild spinal canal narrowing at C3-4. Multilevel neural foraminal narrowing as above. 2. Reversal of the normal cervical lordosis with anterolisthesis at C3-4 and retrolisthesis at C5-6. MRI THORACIC SPINE WO CONTRAST    Result Date: 3/26/2022  1.  No acute abnormality or aggressive osseous lesion. 2. Fatty marrow replacement from T5-T8, which could be due to prior treatment. 3. No significant degenerative change. MRI LUMBAR SPINE WO CONTRAST    Result Date: 3/26/2022  1. No acute abnormality or aggressive osseous lesion. 2.  No significant degenerative change. CT CHEST ABDOMEN PELVIS W CONTRAST    Result Date: 3/27/2022  1. Mild centrilobular emphysema. 2. Redemonstration of left perihilar post treatment scarring with underlying traction bronchiectasis extending into the upper lower lobes. Stable. 3. No evidence for metastatic disease in the chest. 4. A 2.1 x 1.4 cm right adrenal nodule not present in 2018. CT appearance does not meet criteria for adenoma. Considered metastatic nodule until proven otherwise. 5. Redemonstration of hepatic hemangioma and several cysts. MRI BRAIN W WO CONTRAST    Result Date: 3/26/2022  1. Multiple supra and infratentorial intraparenchymal lesions are most consistent with metastatic disease. There is associated edema with minimal mass effect, but no midline shift. 2. Intrinsically T1 hyperintense right frontal calvarial lesion is most consistent with an osseous hemangioma. No definite osseous metastasis identified. Physical Examination:        Physical Exam  Vitals and nursing note reviewed. Constitutional:       General: She is not in acute distress. HENT:      Head: Normocephalic and atraumatic. Eyes:      Conjunctiva/sclera: Conjunctivae normal.      Pupils: Pupils are equal, round, and reactive to light. Cardiovascular:      Rate and Rhythm: Normal rate and regular rhythm. Heart sounds: No murmur heard. Pulmonary:      Effort: Pulmonary effort is normal. No accessory muscle usage or respiratory distress. Breath sounds: No stridor. No decreased breath sounds, wheezing, rhonchi or rales. Abdominal:      General: Bowel sounds are normal. There is no distension. Palpations: Abdomen is soft.  Abdomen is not rigid. Tenderness: There is no abdominal tenderness. There is no guarding. Musculoskeletal:         General: No tenderness. Skin:     General: Skin is warm and dry. Findings: No erythema, lesion or rash. Neurological:      Mental Status: She is alert and oriented to person, place, and time. Cranial Nerves: No cranial nerve deficit. Motor: No seizure activity. Psychiatric:         Speech: Speech normal.         Behavior: Behavior normal. Behavior is cooperative. Assessment:        Hospital Problems           Last Modified POA    * (Principal) New onset seizure ([address] 3/25/2022 Yes    Non-small cell cancer of left lung ([address] 3/26/2022 Yes    Essential hypertension 3/26/2022 Yes    Intraparenchymal hemorrhage of brain ([address] 3/25/2022 Yes    Mass of occipital region 3/26/2022 Yes    Hyperglycemia 3/26/2022 Yes    Hypokalemia 3/26/2022 Yes    Vasogenic edema ([address] 3/26/2022 Yes    Brain metastases ([address] 3/26/2022 Yes    Adrenal mass ([address] - right  3/26/2022 Yes    Episode of loss of consciousness 3/26/2022 Yes    Focal epilepsy ([address] 3/27/2022 Yes          Plan:        New onset seizure secondary to multifocal cerebral metastasis:  Continue seizure precautions, antiseizure meds, neurology and neurosurgery input appreciated. Discussed with neurosurgery and plan for today for a suboccipital craniotomy for tumor resection      History of non-small cell lung cancer underwent lobectomy, lymph node dissection chemoradiation in the past: Currently with brain mets and vasogenic edema, continue Decadron    Patient underwent CT chest abdomen pelvis with no clear obvious metastasis detected    Radiation oncology consulted and evaluated the patient    Discussed in detail the patient and her  at bedside.   Discussed with the nurse and with neurosurgery NP    Lolita Mendoza MD  3/29/2022  8:21 AM

## 2022-03-29 NOTE — ANESTHESIA PRE PROCEDURE
Department of Anesthesiology  Preprocedure Note       Name:  Radha Butler   Age:  61 y.o.  :  1955                                          MRN:  [id number]         Date:  3/29/2022      Surgeon: Nevaeh Anne):  Judi Zimmerman DO    Procedure: Procedure(s):  SUBOCCIPITAL CRANIOTOMY FOR TUMOR  (REGULAR TABLE, SANJUANITA NAVIGATION, HOROWITZ HEADHOLDER)    Medications prior to admission:   Prior to Admission medications    Medication Sig Start Date End Date Taking?  Authorizing Provider   ALPRAZolam Brookkaleye Michaelster) 0.25 MG tablet take 1 tablet by mouth nightly if needed for sleep for 30 DAYS 3/17/22 4/17/22  ANNIE Ernst - CNP   albuterol sulfate  (90 Base) MCG/ACT inhaler inhale 2 puffs by mouth four times a day 21   Miky Solano MD   lisinopril-hydroCHLOROthiazide (PRINZIDE;ZESTORETIC) 10-12.5 MG per tablet Take 1 tablet by mouth daily 5/3/21   Meghan Cruz MD   lovastatin (MEVACOR) 10 MG tablet Take 1 tablet by mouth nightly 5/3/21   Meghan Cruz MD       Current medications:    Current Facility-Administered Medications   Medication Dose Route Frequency Provider Last Rate Last Admin    [MAR Hold] lisinopril (PRINIVIL;ZESTRIL) tablet 5 mg  5 mg Oral Daily Dena Bower MD   5 mg at 22 0830    [MAR Hold] hydroCHLOROthiazide (HYDRODIURIL) tablet 25 mg  25 mg Oral Daily Dena Bower MD   25 mg at 22 0830    [MAR Hold] ALPRAZolam (XANAX) tablet 0.5 mg  0.5 mg Oral 4x Daily PRN Bobbette Marion, DO   0.5 mg at 22 1202    [MAR Hold] cloNIDine (CATAPRES) tablet 0.1 mg  0.1 mg Oral Q4H PRN Bobbette Marion, DO   0.1 mg at 22 1203    [MAR Hold] sodium chloride flush 0.9 % injection 10 mL  10 mL IntraVENous PRN Dia Reese DO        [MAR Hold] levETIRAcetam (KEPPRA) tablet 500 mg  500 mg Oral BID Jameson Lozano MD   500 mg at 22 0830    [MAR Hold] sodium chloride flush 0.9 % injection 5-40 mL  5-40 mL IntraVENous 2 times per day Monty BAE Blood, DO   10 mL at 03/29/22 0830    [MAR Hold] sodium chloride flush 0.9 % injection 10 mL  10 mL IntraVENous PRN Monty P Blood, DO        [MAR Hold] 0.9 % sodium chloride infusion  25 mL IntraVENous PRN Monty P Blood, DO   Stopped at 03/26/22 0506    [MAR Hold] potassium chloride (KLOR-CON M) extended release tablet 40 mEq  40 mEq Oral PRN Monty P Blood, DO        Or    [MAR Hold] potassium bicarb-citric acid (EFFER-K) effervescent tablet 40 mEq  40 mEq Oral PRN Monty P Blood, DO   40 mEq at 03/26/22 0703    Or    [MAR Hold] potassium chloride 10 mEq/100 mL IVPB (Peripheral Line)  10 mEq IntraVENous PRN Monty P Blood, DO        [MAR Hold] magnesium sulfate 1000 mg in dextrose 5% 100 mL IVPB  1,000 mg IntraVENous PRN Monty P Blood, DO        [MAR Hold] ondansetron (ZOFRAN-ODT) disintegrating tablet 4 mg  4 mg Oral Q8H PRN Monty P Blood, DO        Or    [MAR Hold] ondansetron (ZOFRAN) injection 4 mg  4 mg IntraVENous Q6H PRN Monty P Blood, DO        [MAR Hold] polyethylene glycol (GLYCOLAX) packet 17 g  17 g Oral Daily PRN Monty P Blood, DO        [MAR Hold] acetaminophen (TYLENOL) tablet 650 mg  650 mg Oral Q6H PRN Monty P Blood, DO        Or    [MAR Hold] acetaminophen (TYLENOL) suppository 650 mg  650 mg Rectal Q6H PRN Monty P Blood, DO        [MAR Hold] dexamethasone (DECADRON) injection 4 mg  4 mg IntraVENous Q6H Monty P Blood, DO   4 mg at 03/29/22 1202       Allergies:     Allergies   Allergen Reactions    Codeine Nausea And Vomiting       Problem List:    Patient Active Problem List   Diagnosis Code    Status post lobectomy of lung Z90.2    Non-small cell cancer of left lung (HCC) C34.92    Malignant neoplasm of bronchus of upper lobe, left (HCC) C34.12    Essential hypertension I10    Anxiety F41.9    Intraparenchymal hemorrhage of brain ([address] I61.9    New onset seizure ([address] R56.9    Mass of occipital region R22.0    Hyperglycemia R73.9    Hypokalemia E87.6    Vasogenic edema (HCC) G93.6    Brain metastases (HCC) C79.31    Adrenal mass (HCC) - right  E27.8    Episode of loss of consciousness R55    Focal epilepsy ([address] G40.109       Past Medical History:        Diagnosis Date    Anxiety     Benign polyp of large intestine     Cancer (HCC)     LT UPPER LOBE    COPD (chronic obstructive pulmonary disease) (HCC)     Hx of blood clots     DVT LT LEG    Hyperlipidemia     Hypertension     Liver hemangioma     Lung nodule     BARAK  Nodule    Snores     not tested for apnea    Uterine fibroid 2010    Wears glasses        Past Surgical History:        Procedure Laterality Date    ABDOMINAL HERNIA REPAIR  2012    BREAST BIOPSY Left 1983    BRONCHOSCOPY  10/1/2018    BRONCHOSCOPY performed by Daysi Diego MD at [address] [place] [address]      HYSTERECTOMY, TOTAL ABDOMINAL      LUNG BIOPSY      LUNG REMOVAL, PARTIAL Right 2018    Robotic assisted left lobectomy, upper with lymph node biopsy    OTHER SURGICAL HISTORY Right 2018    IR PORT PLACEMENT EQUAL OR GREATER THAN 5 YEARS    [place] [address] INCL FLUOR GDNCE DX W/CELL WASHG SPX N/A 10/29/2018    BRONCHOSCOPY performed by Andre Cm MD at [facility] 54 1 LOBE LOBECT Left 2018    XI ROBOTIC ASSISTED LEFT UPPER LOBECTOMY MULTIPLE LYMPH NODE BIOPSY, INTERCOSTAL  NERVE BLOCK ABLATION W/EXPAREL performed by Jovita Koo MD at [place] [address] History:    Social History     Tobacco Use    Smoking status: Former Smoker     Packs/day: 1.50     Years: 30.00     Pack years: 45.00     Types: Cigarettes     Quit date:      Years since quittin.2    Smokeless tobacco: Never Used   Substance Use Topics    Alcohol use: Yes     Comment: Occassionally                                Counseling given: Not Answered      Vital Signs (Current):   Vitals:    22 1830 22 2020 22 0600 22 1404   BP: (!) 147/72 (!) 166/78  122/86   Pulse:    76   Resp:    16   Temp:  98.2 °F (36.8 °C)  97.3 °F (36.3 °C)   TempSrc:  Oral  Temporal   SpO2:    96%   Weight:   161 lb 2.5 oz (73.1 kg) 161 lb (73 kg)   Height:    4' 11\" (1.499 m)                                              BP Readings from Last 3 Encounters:   03/29/22 122/86   10/21/21 116/74   10/20/21 133/79       NPO Status: Time of last liquid consumption: 2100                        Time of last solid consumption: 1900                        Date of last liquid consumption: 03/28/22                        Date of last solid food consumption: 03/28/22    BMI:   Wt Readings from Last 3 Encounters:   03/29/22 161 lb (73 kg)   10/21/21 161 lb 6.4 oz (73.2 kg)   10/20/21 162 lb 12.8 oz (73.8 kg)     Body mass index is 32.52 kg/m². CBC:   Lab Results   Component Value Date    WBC 9.0 03/29/2022    RBC 4.36 03/29/2022    HGB 13.7 03/29/2022    HCT 39.9 03/29/2022    MCV 91.5 03/29/2022    RDW 12.1 03/29/2022     03/29/2022       CMP:   Lab Results   Component Value Date     03/29/2022    K 4.0 03/29/2022     03/29/2022    CO2 20 03/29/2022    BUN 21 03/29/2022    CREATININE 0.57 03/29/2022    GFRAA >60 03/29/2022    LABGLOM >60 03/29/2022    GLUCOSE 106 03/29/2022    PROT 6.8 03/27/2022    CALCIUM 9.1 03/29/2022    BILITOT 0.30 03/27/2022    ALKPHOS 70 03/27/2022    AST 21 03/27/2022    ALT 18 03/27/2022       POC Tests: No results for input(s): POCGLU, POCNA, POCK, POCCL, POCBUN, POCHEMO, POCHCT in the last 72 hours.     Coags:   Lab Results   Component Value Date    PROTIME 10.6 11/05/2018    INR 1.0 11/05/2018    APTT 28.5 11/05/2018       HCG (If Applicable): No results found for: PREGTESTUR, PREGSERUM, HCG, HCGQUANT     ABGs:   Lab Results   Component Value Date    PHART 7.411 09/28/2018    PO2ART 112.0 09/28/2018    AFQ7VNZ 37.1 09/28/2018    KGU8RWI 23.1 09/28/2018    W9BRKZNR 98.7 09/28/2018        Type & Screen (If Applicable):  No results found for: LABABO, [address]    Drug/Infectious Status (If Applicable):  Lab Results   Component Value Date    HEPCAB NONREACTIVE 10/12/2021       COVID-19 Screening (If Applicable):   Lab Results   Component Value Date    COVID19 Not Detected 05/28/2020           Anesthesia Evaluation  Patient summary reviewed and Nursing notes reviewed no history of anesthetic complications:   Airway: Mallampati: II  TM distance: >3 FB   Neck ROM: full  Mouth opening: > = 3 FB Dental: normal exam         Pulmonary:normal exam    (+) COPD:                             Cardiovascular:    (+) hypertension:, hyperlipidemia                  Neuro/Psych:   (+) seizures:,             GI/Hepatic/Renal:   (+) liver disease:,           Endo/Other: Negative Endo/Other ROS                    Abdominal:             Vascular: Other Findings:           Anesthesia Plan      general     ASA 3       Induction: intravenous. arterial line  MIPS: Postoperative opioids intended and Prophylactic antiemetics administered. Anesthetic plan and risks discussed with patient. Use of blood products discussed with patient whom consented to blood products. Plan discussed with CRNA.                 Osvaldo Martin MD   3/29/2022

## 2022-03-29 NOTE — PROGRESS NOTES
Today's Date: 3/29/2022  Patient Name: Jovan Cabrera  Date of admission: 3/25/2022 10:15 PM  Patient's age: 61 y.o., 1955  Admission Dx: Intraparenchymal hemorrhage of brain ([address] [I61.9]  New onset seizure ([facility] [address] [R56.9]    Reason for Consult: management recommendations  Requesting Physician: Patricia Anguiano MD    CHIEF COMPLAINT: Seizure. Brain mass. History of lung cancer    History Obtained From:  patient, electronic medical record    Interval history:    Patient seen and examined  Labs and vitals reviewed  No new complaint inerval event   awaiitng decision regarding surgery         HISTORY OF PRESENT ILLNESS:      The patient is a 61 y.o.  female who Initially presented to [facility] after having a seizure-like activity with loss of consciousness at that grocery store. Patient was feeling well before the episode. 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 Patient according to records also underwent chemotherapy and radiation therapy. She also has history of DVT COPD dyslipidemia. Patient's work-up done in the ER including CT brain without contrast showed small calcification as well as finding suspicion for acute parenchymal hemorrhage associated with vasogenic edema. In addition there was also vasogenic edema noted in the posterior left occipital lobe. There were concern regarding calvarial metastasis. CTA chest showed left perihilar consolidation concerning for pneumonia/radiation pneumonitis. Lymphangitic spread could not be ruled out. There was a large right adrenal mass likely metastatic. There were multiple liver lesion noted CT PET was recommended. Patient was seen by neurosurgery. Patient is currently on Decadron. MRI brain as well as spine was ordered.        Past Medical History:   has a past medical history of Anxiety, Benign polyp of large intestine, Cancer ([address] COPD (chronic obstructive pulmonary disease) ([facility] [address] Hx of blood clots, Hyperlipidemia, Hypertension, Liver hemangioma, Lung nodule, Snores, Uterine fibroid, and Wears glasses. Past Surgical History:   has a past surgical history that includes Hysterectomy (2010); Abdominal hernia repair (2012); Colonoscopy; Lung biopsy; Breast biopsy (Left, 1983); Lung removal, partial (Right, 09/28/2018); pr rmvl lung other than pneumonectomy 1 lobe lobect (Left, 9/28/2018); bronchoscopy (10/1/2018); pr [facility] incl fluor gdnce dx w/cell washg spx (N/A, 10/29/2018); other surgical history (Right, 11/05/2018); and Hysterectomy, total abdominal.     Medications:    Prior to Admission medications    Medication Sig Start Date End Date Taking?  Authorizing Provider   ALPRAZolam Meagan Basset) 0.25 MG tablet take 1 tablet by mouth nightly if needed for sleep for 30 DAYS 3/17/22 4/17/22  ANNIE Gimenez - CNP   albuterol sulfate  (90 Base) MCG/ACT inhaler inhale 2 puffs by mouth four times a day 9/28/21   Keeley Grimlado MD   lisinopril-hydroCHLOROthiazide (PRINZIDE;ZESTORETIC) 10-12.5 MG per tablet Take 1 tablet by mouth daily 5/3/21   Brigid Bland MD   lovastatin (MEVACOR) 10 MG tablet Take 1 tablet by mouth nightly 5/3/21   Brigid Bland MD     Current Facility-Administered Medications   Medication Dose Route Frequency Provider Last Rate Last Admin    lisinopril (PRINIVIL;ZESTRIL) tablet 5 mg  5 mg Oral Daily Malcolm Hernandez MD   5 mg at 03/29/22 0830    hydroCHLOROthiazide (HYDRODIURIL) tablet 25 mg  25 mg Oral Daily Malcolm Hernandez MD   25 mg at 03/29/22 0830    ondansetron (ZOFRAN) injection 4 mg  4 mg IntraVENous Q6H PRN Malcolm Hernandez MD        ALPRAZolam Guymon Basset) tablet 0.5 mg  0.5 mg Oral 4x Daily PRN Malcolm Hernandez MD   0.5 mg at 03/29/22 1202    cloNIDine (CATAPRES) tablet 0.1 mg  0.1 mg Oral Q4H PRN Malcolm Hernandez MD   0.1 mg at 03/29/22 1203    sodium chloride flush 0.9 % injection 10 mL  10 mL IntraVENous PRN Malcolm Hernandez MD        levETIRAcetam (KEPPRA) tablet 500 mg  500 mg Oral BID Ariana Cardoso MD   500 mg at 03/29/22 0830    sodium chloride flush 0.9 % injection 5-40 mL  5-40 mL IntraVENous 2 times per day Ariana Cardoso MD   10 mL at 03/29/22 0830    sodium chloride flush 0.9 % injection 10 mL  10 mL IntraVENous PRN Ariana Cardoso MD        0.9 % sodium chloride infusion  25 mL IntraVENous PRN Ariana Cardoso MD   Stopped at 03/26/22 0506    potassium chloride (KLOR-CON M) extended release tablet 40 mEq  40 mEq Oral PRN Ariana Cardoso MD        Or    potassium bicarb-citric acid (EFFER-K) effervescent tablet 40 mEq  40 mEq Oral PRN Ariana Cardoso MD   40 mEq at 03/26/22 0703    Or    potassium chloride 10 mEq/100 mL IVPB (Peripheral Line)  10 mEq IntraVENous PRN Ariana Cardoso MD        magnesium sulfate 1000 mg in dextrose 5% 100 mL IVPB  1,000 mg IntraVENous PRN Ariana Cardoso MD        ondansetron (ZOFRAN-ODT) disintegrating tablet 4 mg  4 mg Oral Q8H PRN Ariana Cardoso MD        Or    ondansetron (ZOFRAN) injection 4 mg  4 mg IntraVENous Q6H PRN Ariana Cardoso MD        polyethylene glycol (GLYCOLAX) packet 17 g  17 g Oral Daily PRN Ariana Cardoso MD        acetaminophen (TYLENOL) tablet 650 mg  650 mg Oral Q6H PRN Ariana Cardoso MD        Or    acetaminophen (TYLENOL) suppository 650 mg  650 mg Rectal Q6H PRN Ariana Cardoso MD        dexamethasone (DECADRON) injection 4 mg  4 mg IntraVENous Melany Maldonado MD   4 mg at 03/29/22 1202       Allergies:  Codeine    Social History:   reports that she quit smoking about 22 years ago. Her smoking use included cigarettes. She has a 45.00 pack-year smoking history. She has never used smokeless tobacco. She reports current alcohol use. She reports that she does not use drugs. Family History: family history includes Cancer in her mother and sister. REVIEW OF SYSTEMS:      Constitutional: No fever or chills.  No night sweats, no weight loss   Eyes: No eye discharge, double vision, or eye pain   HEENT: negative for sore mouth, sore throat, hoarseness and voice change   Respiratory: negative for cough , sputum, dyspnea, wheezing, hemoptysis, chest pain   Cardiovascular: negative for chest pain, dyspnea, palpitations, orthopnea, PND   Gastrointestinal: negative for nausea, vomiting, diarrhea, constipation, abdominal pain, Dysphagia, hematemesis and hematochezia   Genitourinary: negative for frequency, dysuria, nocturia, urinary incontinence, and hematuria   Integument: negative for rash, skin lesions, bruises.    Hematologic/Lymphatic: negative for easy bruising, bleeding, lymphadenopathy, or petechiae   Endocrine: negative for heat or cold intolerance,weight changes, change in bowel habits and hair loss   Musculoskeletal: negative for myalgias, arthralgias, pain, joint swelling,and bone pain   Neurological: negative for headaches, dizziness, seizures, weakness, numbness    PHYSICAL EXAM:        /61   Pulse 59   Temp 96.8 °F (36 °C) (Temporal)   Resp 17   Ht 4' 11\" (1.499 m)   Wt 161 lb (73 kg)   SpO2 99%   BMI 32.52 kg/m²    Temp (24hrs), Av °F (36.1 °C), Min:96.1 °F (35.6 °C), Max:98.8 °F (37.1 °C)      General appearance - well appearing, no in pain or distress   Mental status - alert and cooperative   Eyes - pupils equal and reactive, extraocular eye movements intact   Ears - bilateral TM's and external ear canals normal   Mouth - mucous membranes moist, pharynx normal without lesions   Neck - supple, no significant adenopathy   Lymphatics - no palpable lymphadenopathy, no hepatosplenomegaly   Chest - clear to auscultation, no wheezes, rales or rhonchi, symmetric air entry   Heart - normal rate, regular rhythm, normal S1, S2, no murmurs  Abdomen - soft, nontender, nondistended, no masses or organomegaly   Neurological - alert, oriented, normal speech, no focal findings or movement disorder noted   Musculoskeletal - no joint tenderness, deformity or swelling   Extremities - peripheral pulses normal, no pedal edema, no clubbing or cyanosis   Skin - normal coloration and turgor, no rashes, no suspicious skin lesions noted ,      DATA:      Labs:     Results for orders placed or performed during the hospital encounter of 03/25/22   COVID-19, Rapid    Specimen: Nasopharyngeal Swab   Result Value Ref Range    Specimen Description . NASOPHARYNGEAL SWAB     SARS-CoV-2, Rapid Not Detected Not Detected   Basic Metabolic Panel w/ Reflex to MG   Result Value Ref Range    Glucose 198 (H) 70 - 99 mg/dL    BUN 13 8 - 23 mg/dL    CREATININE 0.71 0.50 - 0.90 mg/dL    Calcium 9.3 8.6 - 10.4 mg/dL    Sodium 139 135 - 144 mmol/L    Potassium 3.5 (L) 3.7 - 5.3 mmol/L    Chloride 102 98 - 107 mmol/L    CO2 21 20 - 31 mmol/L    Anion Gap 16 9 - 17 mmol/L    GFR Non-African American >60 >60 mL/min    GFR African American >60 >60 mL/min    GFR Comment         CBC with Auto Differential   Result Value Ref Range    WBC 7.7 3.5 - 11.3 k/uL    RBC 4.02 3.95 - 5.11 m/uL    Hemoglobin 12.7 11.9 - 15.1 g/dL    Hematocrit 36.8 36.3 - 47.1 %    MCV 91.5 82.6 - 102.9 fL    MCH 31.6 25.2 - 33.5 pg    MCHC 34.5 28.4 - 34.8 g/dL    RDW 12.2 11.8 - 14.4 %    Platelets 337 359 - 670 k/uL    MPV 10.5 8.1 - 13.5 fL    NRBC Automated 0.0 0.0 per 100 WBC    Immature Granulocytes 1 (H) 0 %    Seg Neutrophils 90 (H) 36 - 65 %    Lymphocytes 7 (L) 24 - 43 %    Monocytes 2 (L) 3 - 12 %    Eosinophils % 0 (L) 1 - 4 %    Basophils 0 0 - 2 %    Absolute Immature Granulocyte 0.08 0.00 - 0.30 k/uL    Segs Absolute 6.93 1.50 - 8.10 k/uL    Absolute Lymph # 0.54 (L) 1.10 - 3.70 k/uL    Absolute Mono # 0.15 0.10 - 1.20 k/uL    Absolute Eos # 0.00 0.00 - 0.44 k/uL    Basophils Absolute 0.00 0.00 - 0.20 k/uL    Morphology Normal    Magnesium   Result Value Ref Range    Magnesium 1.7 1.6 - 2.6 mg/dL   Comprehensive Metabolic Panel with Bilirubin   Result Value Ref Range    Albumin 4.2 3.5 - 5.2 g/dL    Albumin/Globulin Ratio 1.6 1.0 - 2.5 Alkaline Phosphatase 70 35 - 104 U/L    ALT 18 5 - 33 U/L    AST 21 <32 U/L    Total Bilirubin 0.30 0.3 - 1.2 mg/dL    Bilirubin, Direct 0.10 <0.31 mg/dL    Bilirubin, Indirect 0.20 0.00 - 1.00 mg/dL    BUN 18 8 - 23 mg/dL    Calcium 9.5 8.6 - 10.4 mg/dL    CREATININE 0.59 0.50 - 0.90 mg/dL    Glucose 124 (H) 70 - 99 mg/dL    Total Protein 6.8 6.4 - 8.3 g/dL    Sodium 141 135 - 144 mmol/L    Potassium 4.5 3.7 - 5.3 mmol/L    Chloride 105 98 - 107 mmol/L    CO2 24 20 - 31 mmol/L    Anion Gap 12 9 - 17 mmol/L    GFR Non-African American >60 >60 mL/min    GFR African American >60 >60 mL/min    GFR Comment         CBC   Result Value Ref Range    WBC 9.0 3.5 - 11.3 k/uL    RBC 4.36 3.95 - 5.11 m/uL    Hemoglobin 13.7 11.9 - 15.1 g/dL    Hematocrit 39.9 36.3 - 47.1 %    MCV 91.5 82.6 - 102.9 fL    MCH 31.4 25.2 - 33.5 pg    MCHC 34.3 28.4 - 34.8 g/dL    RDW 12.1 11.8 - 14.4 %    Platelets 887 062 - 556 k/uL    MPV 10.2 8.1 - 13.5 fL    NRBC Automated 0.0 0.0 per 100 WBC   Basic Metabolic Panel   Result Value Ref Range    Glucose 106 (H) 70 - 99 mg/dL    BUN 21 8 - 23 mg/dL    CREATININE 0.57 0.50 - 0.90 mg/dL    Calcium 9.1 8.6 - 10.4 mg/dL    Sodium 140 135 - 144 mmol/L    Potassium 4.0 3.7 - 5.3 mmol/L    Chloride 105 98 - 107 mmol/L    CO2 20 20 - 31 mmol/L    Anion Gap 15 9 - 17 mmol/L    GFR Non-African American >60 >60 mL/min    GFR African American >60 >60 mL/min    GFR Comment         TYPE AND SCREEN   Result Value Ref Range    Expiration Date 04/01/2022,2359     Arm Band Number BE [id number]     ABO/Rh O POSITIVE     Antibody Screen NEGATIVE          IMAGING DATA:    CT HEAD WO CONTRAST    Result Date: 3/26/2022  EXAMINATION: CT OF THE HEAD WITHOUT CONTRAST  3/26/2022 3:11 am TECHNIQUE: CT of the head was performed without the administration of intravenous contrast. Dose modulation, iterative reconstruction, and/or weight based adjustment of the mA/kV was utilized to reduce the radiation dose to as low as reasonably achievable. COMPARISON: Prior not available at time dictation, MR brain 10/24/2018, head CT 08/03/2018 HISTORY: ORDERING SYSTEM PROVIDED HISTORY: 6 hour repeat stability scan, new seizure today found to have left occipital-parital mass and right parietal with IPH FINDINGS: BRAIN/VENTRICLES: Partially calcified, hemorrhagic mass within the right cerebellar hemisphere with resultant localized edema resulting in mild compression of the 4th ventricle. No evidence of hydrocephalus. Areas of subcortical low attenuation within the left parietal, left occipital and caudal aspect of the right postcentral gyrus suggesting underlying edema concerning for additional intracranial lesions. No significant mass effect or midline shift. No extra-axial fluid collections. Gray-white matter differentiation is otherwise maintained. ORBITS: The visualized portion of the orbits demonstrate no acute abnormality. SINUSES: The visualized paranasal sinuses and mastoid air cells demonstrate no acute abnormality. SOFT TISSUES/SKULL:  Scattered lytic lesions throughout the calvarium may reflect intraosseous hemangiomas, however underlying metastasis is possible. No significant change. 1. Partially calcified, hemorrhagic mass within the right cerebellar hemisphere resulting in mild compression of the 4th ventricle without obstructive hydrocephalus at this time. 2. Areas of subcortical low attenuation throughout the bilateral cerebral hemispheres concerning for additional intracranial mass lesions. 3. No significant mass effect or midline shift. 4. Possible calvarial metastasis versus underlying intraosseous hemangiomas. No significant change. 5. Recent prior outside head CT images would be of benefit to determine stability.          IMPRESSION:   Primary Problem  New onset seizure [facility])    Active Hospital Problems    Diagnosis Date Noted    Focal epilepsy ([facility] [address] [T56.253]     Mass of occipital region [R22.0] 03/26/2022    Hyperglycemia [R73.9] 03/26/2022    Hypokalemia [E87.6] 03/26/2022    Vasogenic edema ([facility] [address] [G93.6] 03/26/2022    Brain metastases ([facility]) [C79.31] 03/26/2022    Adrenal mass ([address] - right  [E27.8] 03/26/2022    Episode of loss of consciousness [R55]     Intraparenchymal hemorrhage of brain ([address] [I61.9] 03/25/2022    New onset seizure ([address] [R56.9] 03/25/2022    Essential hypertension [I10] 06/23/2020    Non-small cell cancer of left lung ([facility] [address] [C34.92]            RECOMMENDATIONS:  I personally reviewed results of lab work-up imaging studies and other relevant clinical data. Reviewed results of MRI brain which show multiple brain metastasis  Discussed case with Dr. Aaron Chaney from neurosurgery and Dr. Clarice Carlos from radiation oncology. Follow-up on decision for resection of cerebellar tumor. CT abdomen pelvis shows renal metastasis  Patient is agreeable with surgery. We will follow up on path report from surgical specimen. Patient had multiple questions which answered the best my ability. Discussed with patient and Nurse. Thank you for asking us to see this patient. Vesta Franz MD          This note is created with the assistance of a speech recognition program.  While intending to generate a document that actually reflects the content of the visit, the document can still have some errors including those of syntax and sound a like substitutions which may escape proof reading. It such instances, actual meaning can be extrapolated by contextual diversion.

## 2022-03-30 ENCOUNTER — APPOINTMENT (OUTPATIENT)
Dept: MRI IMAGING | Age: 67
DRG: 025 | End: 2022-03-30
Attending: INTERNAL MEDICINE
Payer: COMMERCIAL

## 2022-03-30 ENCOUNTER — APPOINTMENT (OUTPATIENT)
Dept: CT IMAGING | Age: 67
DRG: 025 | End: 2022-03-30
Attending: INTERNAL MEDICINE
Payer: COMMERCIAL

## 2022-03-30 LAB
ABSOLUTE EOS #: <0.03 K/UL (ref 0–0.44)
ABSOLUTE IMMATURE GRANULOCYTE: 0.08 K/UL (ref 0–0.3)
ABSOLUTE LYMPH #: 0.85 K/UL (ref 1.1–3.7)
ABSOLUTE MONO #: 0.81 K/UL (ref 0.1–1.2)
ANION GAP SERPL CALCULATED.3IONS-SCNC: 11 MMOL/L (ref 9–17)
BASOPHILS # BLD: 0 % (ref 0–2)
BASOPHILS ABSOLUTE: <0.03 K/UL (ref 0–0.2)
BUN BLDV-MCNC: 17 MG/DL (ref 8–23)
CALCIUM SERPL-MCNC: 7.8 MG/DL (ref 8.6–10.4)
CHLORIDE BLD-SCNC: 109 MMOL/L (ref 98–107)
CO2: 21 MMOL/L (ref 20–31)
CREAT SERPL-MCNC: 0.5 MG/DL (ref 0.5–0.9)
CULTURE: NO GROWTH
EOSINOPHILS RELATIVE PERCENT: 0 % (ref 1–4)
GFR AFRICAN AMERICAN: >60 ML/MIN
GFR NON-AFRICAN AMERICAN: >60 ML/MIN
GFR SERPL CREATININE-BSD FRML MDRD: ABNORMAL ML/MIN/{1.73_M2}
GLUCOSE BLD-MCNC: 104 MG/DL (ref 70–99)
HCT VFR BLD CALC: 37.9 % (ref 36.3–47.1)
HEMOGLOBIN: 12.4 G/DL (ref 11.9–15.1)
IMMATURE GRANULOCYTES: 1 %
LYMPHOCYTES # BLD: 7 % (ref 24–43)
MCH RBC QN AUTO: 31.2 PG (ref 25.2–33.5)
MCHC RBC AUTO-ENTMCNC: 32.7 G/DL (ref 28.4–34.8)
MCV RBC AUTO: 95.2 FL (ref 82.6–102.9)
MONOCYTES # BLD: 7 % (ref 3–12)
NRBC AUTOMATED: 0 PER 100 WBC
PDW BLD-RTO: 11.9 % (ref 11.8–14.4)
PLATELET # BLD: 257 K/UL (ref 138–453)
PMV BLD AUTO: 10 FL (ref 8.1–13.5)
POTASSIUM SERPL-SCNC: 4 MMOL/L (ref 3.7–5.3)
RBC # BLD: 3.98 M/UL (ref 3.95–5.11)
SEG NEUTROPHILS: 85 % (ref 36–65)
SEGMENTED NEUTROPHILS ABSOLUTE COUNT: 10.04 K/UL (ref 1.5–8.1)
SODIUM BLD-SCNC: 141 MMOL/L (ref 135–144)
SPECIMEN DESCRIPTION: NORMAL
WBC # BLD: 11.8 K/UL (ref 3.5–11.3)

## 2022-03-30 PROCEDURE — A9576 INJ PROHANCE MULTIPACK: HCPCS | Performed by: STUDENT IN AN ORGANIZED HEALTH CARE EDUCATION/TRAINING PROGRAM

## 2022-03-30 PROCEDURE — 99223 1ST HOSP IP/OBS HIGH 75: CPT | Performed by: PSYCHIATRY & NEUROLOGY

## 2022-03-30 PROCEDURE — 97164 PT RE-EVAL EST PLAN CARE: CPT

## 2022-03-30 PROCEDURE — 85025 COMPLETE CBC W/AUTO DIFF WBC: CPT

## 2022-03-30 PROCEDURE — 2580000003 HC RX 258: Performed by: STUDENT IN AN ORGANIZED HEALTH CARE EDUCATION/TRAINING PROGRAM

## 2022-03-30 PROCEDURE — 6370000000 HC RX 637 (ALT 250 FOR IP)

## 2022-03-30 PROCEDURE — 2580000003 HC RX 258: Performed by: PSYCHIATRY & NEUROLOGY

## 2022-03-30 PROCEDURE — 6360000002 HC RX W HCPCS: Performed by: REGISTERED NURSE

## 2022-03-30 PROCEDURE — 97530 THERAPEUTIC ACTIVITIES: CPT

## 2022-03-30 PROCEDURE — 6370000000 HC RX 637 (ALT 250 FOR IP): Performed by: STUDENT IN AN ORGANIZED HEALTH CARE EDUCATION/TRAINING PROGRAM

## 2022-03-30 PROCEDURE — 97535 SELF CARE MNGMENT TRAINING: CPT

## 2022-03-30 PROCEDURE — 6360000002 HC RX W HCPCS: Performed by: STUDENT IN AN ORGANIZED HEALTH CARE EDUCATION/TRAINING PROGRAM

## 2022-03-30 PROCEDURE — 6360000002 HC RX W HCPCS: Performed by: PSYCHIATRY & NEUROLOGY

## 2022-03-30 PROCEDURE — 70553 MRI BRAIN STEM W/O & W/DYE: CPT

## 2022-03-30 PROCEDURE — 6360000002 HC RX W HCPCS

## 2022-03-30 PROCEDURE — 99232 SBSQ HOSP IP/OBS MODERATE 35: CPT | Performed by: INTERNAL MEDICINE

## 2022-03-30 PROCEDURE — 2060000000 HC ICU INTERMEDIATE R&B

## 2022-03-30 PROCEDURE — 2500000003 HC RX 250 WO HCPCS: Performed by: STUDENT IN AN ORGANIZED HEALTH CARE EDUCATION/TRAINING PROGRAM

## 2022-03-30 PROCEDURE — 97168 OT RE-EVAL EST PLAN CARE: CPT

## 2022-03-30 PROCEDURE — 94640 AIRWAY INHALATION TREATMENT: CPT

## 2022-03-30 PROCEDURE — APPSS30 APP SPLIT SHARED TIME 16-30 MINUTES: Performed by: REGISTERED NURSE

## 2022-03-30 PROCEDURE — 6360000004 HC RX CONTRAST MEDICATION: Performed by: STUDENT IN AN ORGANIZED HEALTH CARE EDUCATION/TRAINING PROGRAM

## 2022-03-30 PROCEDURE — 6370000000 HC RX 637 (ALT 250 FOR IP): Performed by: NURSE PRACTITIONER

## 2022-03-30 PROCEDURE — 70450 CT HEAD/BRAIN W/O DYE: CPT

## 2022-03-30 PROCEDURE — 2580000003 HC RX 258

## 2022-03-30 PROCEDURE — 80048 BASIC METABOLIC PNL TOTAL CA: CPT

## 2022-03-30 PROCEDURE — 6370000000 HC RX 637 (ALT 250 FOR IP): Performed by: PSYCHIATRY & NEUROLOGY

## 2022-03-30 RX ORDER — ALBUTEROL SULFATE 90 UG/1
2 AEROSOL, METERED RESPIRATORY (INHALATION) 4 TIMES DAILY
Status: DISCONTINUED | OUTPATIENT
Start: 2022-03-30 | End: 2022-03-30

## 2022-03-30 RX ORDER — SODIUM CHLORIDE 0.9 % (FLUSH) 0.9 %
10 SYRINGE (ML) INJECTION PRN
Status: DISCONTINUED | OUTPATIENT
Start: 2022-03-30 | End: 2022-03-31 | Stop reason: HOSPADM

## 2022-03-30 RX ORDER — ATORVASTATIN CALCIUM 10 MG/1
10 TABLET, FILM COATED ORAL NIGHTLY
Status: DISCONTINUED | OUTPATIENT
Start: 2022-03-30 | End: 2022-03-31 | Stop reason: HOSPADM

## 2022-03-30 RX ORDER — FAMOTIDINE 20 MG/1
20 TABLET, FILM COATED ORAL 2 TIMES DAILY
Status: DISCONTINUED | OUTPATIENT
Start: 2022-03-30 | End: 2022-03-31 | Stop reason: HOSPADM

## 2022-03-30 RX ORDER — DEXAMETHASONE 4 MG/1
4 TABLET ORAL EVERY 12 HOURS SCHEDULED
Status: DISCONTINUED | OUTPATIENT
Start: 2022-04-03 | End: 2022-03-31 | Stop reason: HOSPADM

## 2022-03-30 RX ORDER — OXYCODONE HYDROCHLORIDE 5 MG/1
10 TABLET ORAL EVERY 4 HOURS PRN
Status: DISCONTINUED | OUTPATIENT
Start: 2022-03-30 | End: 2022-03-31 | Stop reason: HOSPADM

## 2022-03-30 RX ORDER — DEXAMETHASONE 4 MG/1
4 TABLET ORAL EVERY 8 HOURS SCHEDULED
Status: DISCONTINUED | OUTPATIENT
Start: 2022-04-01 | End: 2022-03-31 | Stop reason: HOSPADM

## 2022-03-30 RX ORDER — ALBUTEROL SULFATE 90 UG/1
2 AEROSOL, METERED RESPIRATORY (INHALATION) EVERY 4 HOURS PRN
Status: DISCONTINUED | OUTPATIENT
Start: 2022-03-30 | End: 2022-03-31 | Stop reason: HOSPADM

## 2022-03-30 RX ORDER — OXYCODONE HYDROCHLORIDE 5 MG/1
5 TABLET ORAL EVERY 4 HOURS PRN
Status: DISCONTINUED | OUTPATIENT
Start: 2022-03-30 | End: 2022-03-31 | Stop reason: HOSPADM

## 2022-03-30 RX ORDER — OXYCODONE HYDROCHLORIDE 5 MG/1
5 TABLET ORAL EVERY 4 HOURS PRN
Status: DISCONTINUED | OUTPATIENT
Start: 2022-03-30 | End: 2022-03-30

## 2022-03-30 RX ORDER — DEXAMETHASONE 4 MG/1
4 TABLET ORAL EVERY 6 HOURS SCHEDULED
Status: DISCONTINUED | OUTPATIENT
Start: 2022-03-30 | End: 2022-03-31 | Stop reason: HOSPADM

## 2022-03-30 RX ORDER — LABETALOL HYDROCHLORIDE 5 MG/ML
10 INJECTION, SOLUTION INTRAVENOUS
Status: DISCONTINUED | OUTPATIENT
Start: 2022-03-30 | End: 2022-03-31 | Stop reason: HOSPADM

## 2022-03-30 RX ORDER — DEXAMETHASONE 4 MG/1
4 TABLET ORAL DAILY
Status: DISCONTINUED | OUTPATIENT
Start: 2022-04-06 | End: 2022-03-31 | Stop reason: HOSPADM

## 2022-03-30 RX ORDER — DEXAMETHASONE 2 MG/1
2 TABLET ORAL DAILY
Status: DISCONTINUED | OUTPATIENT
Start: 2022-04-08 | End: 2022-03-31 | Stop reason: HOSPADM

## 2022-03-30 RX ADMIN — DEXAMETHASONE 4 MG: 4 TABLET ORAL at 19:33

## 2022-03-30 RX ADMIN — Medication 10 MG: at 10:00

## 2022-03-30 RX ADMIN — HYDROMORPHONE HYDROCHLORIDE 0.25 MG: 1 INJECTION, SOLUTION INTRAMUSCULAR; INTRAVENOUS; SUBCUTANEOUS at 02:48

## 2022-03-30 RX ADMIN — WATER 2000 MG: 1 INJECTION INTRAMUSCULAR; INTRAVENOUS; SUBCUTANEOUS at 17:08

## 2022-03-30 RX ADMIN — LISINOPRIL 5 MG: 5 TABLET ORAL at 09:32

## 2022-03-30 RX ADMIN — SODIUM CHLORIDE, PRESERVATIVE FREE 5 ML: 5 INJECTION INTRAVENOUS at 20:16

## 2022-03-30 RX ADMIN — DEXAMETHASONE SODIUM PHOSPHATE 4 MG: 4 INJECTION, SOLUTION INTRA-ARTICULAR; INTRALESIONAL; INTRAMUSCULAR; INTRAVENOUS; SOFT TISSUE at 06:59

## 2022-03-30 RX ADMIN — HYDROCHLOROTHIAZIDE 25 MG: 25 TABLET ORAL at 09:32

## 2022-03-30 RX ADMIN — LEVETIRACETAM 500 MG: 500 TABLET, FILM COATED ORAL at 20:15

## 2022-03-30 RX ADMIN — FAMOTIDINE 20 MG: 20 TABLET, FILM COATED ORAL at 12:41

## 2022-03-30 RX ADMIN — LEVETIRACETAM 500 MG: 500 TABLET, FILM COATED ORAL at 09:32

## 2022-03-30 RX ADMIN — OXYCODONE HYDROCHLORIDE 10 MG: 5 TABLET ORAL at 00:21

## 2022-03-30 RX ADMIN — OXYCODONE 5 MG: 5 TABLET ORAL at 08:57

## 2022-03-30 RX ADMIN — ALPRAZOLAM 0.5 MG: 0.5 TABLET ORAL at 21:37

## 2022-03-30 RX ADMIN — DEXAMETHASONE SODIUM PHOSPHATE 4 MG: 4 INJECTION, SOLUTION INTRA-ARTICULAR; INTRALESIONAL; INTRAMUSCULAR; INTRAVENOUS; SOFT TISSUE at 00:19

## 2022-03-30 RX ADMIN — OXYCODONE HYDROCHLORIDE 5 MG: 5 TABLET ORAL at 03:55

## 2022-03-30 RX ADMIN — HYDROMORPHONE HYDROCHLORIDE 0.25 MG: 1 INJECTION, SOLUTION INTRAMUSCULAR; INTRAVENOUS; SUBCUTANEOUS at 06:59

## 2022-03-30 RX ADMIN — HYDROMORPHONE HYDROCHLORIDE 0.5 MG: 1 INJECTION, SOLUTION INTRAMUSCULAR; INTRAVENOUS; SUBCUTANEOUS at 15:22

## 2022-03-30 RX ADMIN — ONDANSETRON 4 MG: 4 TABLET, ORALLY DISINTEGRATING ORAL at 08:57

## 2022-03-30 RX ADMIN — DEXAMETHASONE 4 MG: 4 TABLET ORAL at 13:41

## 2022-03-30 RX ADMIN — OXYCODONE HYDROCHLORIDE 10 MG: 5 TABLET ORAL at 21:28

## 2022-03-30 RX ADMIN — OXYCODONE HYDROCHLORIDE 10 MG: 5 TABLET ORAL at 17:04

## 2022-03-30 RX ADMIN — HYDROMORPHONE HYDROCHLORIDE 0.5 MG: 1 INJECTION, SOLUTION INTRAMUSCULAR; INTRAVENOUS; SUBCUTANEOUS at 11:17

## 2022-03-30 RX ADMIN — ATORVASTATIN CALCIUM 10 MG: 10 TABLET, FILM COATED ORAL at 20:16

## 2022-03-30 RX ADMIN — ALBUTEROL SULFATE 2 PUFF: 90 AEROSOL, METERED RESPIRATORY (INHALATION) at 13:02

## 2022-03-30 RX ADMIN — FAMOTIDINE 20 MG: 20 TABLET, FILM COATED ORAL at 20:16

## 2022-03-30 RX ADMIN — SODIUM CHLORIDE, PRESERVATIVE FREE 10 ML: 5 INJECTION INTRAVENOUS at 11:23

## 2022-03-30 RX ADMIN — GADOTERIDOL 14 ML: 279.3 INJECTION, SOLUTION INTRAVENOUS at 10:51

## 2022-03-30 RX ADMIN — WATER 2000 MG: 1 INJECTION INTRAMUSCULAR; INTRAVENOUS; SUBCUTANEOUS at 01:25

## 2022-03-30 RX ADMIN — ALBUTEROL SULFATE 2 PUFF: 90 AEROSOL, METERED RESPIRATORY (INHALATION) at 09:36

## 2022-03-30 RX ADMIN — ALBUTEROL SULFATE 2 PUFF: 90 AEROSOL, METERED RESPIRATORY (INHALATION) at 21:12

## 2022-03-30 RX ADMIN — WATER 2000 MG: 1 INJECTION INTRAMUSCULAR; INTRAVENOUS; SUBCUTANEOUS at 09:38

## 2022-03-30 ASSESSMENT — PAIN SCALES - GENERAL
PAINLEVEL_OUTOF10: 7
PAINLEVEL_OUTOF10: 0
PAINLEVEL_OUTOF10: 7
PAINLEVEL_OUTOF10: 7
PAINLEVEL_OUTOF10: 9
PAINLEVEL_OUTOF10: 9
PAINLEVEL_OUTOF10: 8
PAINLEVEL_OUTOF10: 6
PAINLEVEL_OUTOF10: 10
PAINLEVEL_OUTOF10: 6
PAINLEVEL_OUTOF10: 10
PAINLEVEL_OUTOF10: 10
PAINLEVEL_OUTOF10: 3

## 2022-03-30 ASSESSMENT — PAIN DESCRIPTION - ONSET: ONSET: ON-GOING

## 2022-03-30 ASSESSMENT — PAIN DESCRIPTION - ORIENTATION: ORIENTATION: RIGHT;POSTERIOR

## 2022-03-30 ASSESSMENT — PAIN DESCRIPTION - FREQUENCY: FREQUENCY: INTERMITTENT

## 2022-03-30 ASSESSMENT — PAIN DESCRIPTION - PAIN TYPE: TYPE: ACUTE PAIN

## 2022-03-30 ASSESSMENT — PAIN DESCRIPTION - DESCRIPTORS: DESCRIPTORS: ACHING;SHARP

## 2022-03-30 ASSESSMENT — PAIN DESCRIPTION - LOCATION: LOCATION: HEAD;NECK

## 2022-03-30 NOTE — PROGRESS NOTES
Today's Date: 3/30/2022  Patient Name: Marcelina Gan  Date of admission: 3/25/2022 10:15 PM  Patient's age: 61 y.o., 1955  Admission Dx: Intraparenchymal hemorrhage of brain ([address] [I61.9]  New onset seizure ([address] [R56.9]    Reason for Consult: management recommendations  Requesting Physician: Marilin Marks MD    CHIEF COMPLAINT: Seizure. Brain mass. History of lung cancer    History Obtained From:  patient, electronic medical record    Interval history:    Patient seen and examined  Labs and vitals reviewed  Postop day #1. Patient tolerated surgery well. Does not show any evidence of residual tumor. HISTORY OF PRESENT ILLNESS:      The patient is a 61 y.o.  female who Initially presented to [facility] after having a seizure-like activity with loss of consciousness at that grocery store. Patient was feeling well before the episode. 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 Patient according to records also underwent chemotherapy and radiation therapy. She also has history of DVT COPD dyslipidemia. Patient's work-up done in the ER including CT brain without contrast showed small calcification as well as finding suspicion for acute parenchymal hemorrhage associated with vasogenic edema. In addition there was also vasogenic edema noted in the posterior left occipital lobe. There were concern regarding calvarial metastasis. CTA chest showed left perihilar consolidation concerning for pneumonia/radiation pneumonitis. Lymphangitic spread could not be ruled out. There was a large right adrenal mass likely metastatic. There were multiple liver lesion noted CT PET was recommended. Patient was seen by neurosurgery. Patient is currently on Decadron. MRI brain as well as spine was ordered.        Past Medical History:   has a past medical history of Anxiety, Benign polyp of large intestine, Cancer ([address] COPD (chronic obstructive pulmonary disease) ([facility] Hx of blood clots, Hyperlipidemia, Hypertension, Liver hemangioma, Lung nodule, Snores, Uterine fibroid, and Wears glasses. Past Surgical History:   has a past surgical history that includes Hysterectomy (2010); Abdominal hernia repair (2012); Colonoscopy; Lung biopsy; Breast biopsy (Left, 1983); Lung removal, partial (Right, 09/28/2018); pr rmvl lung other than pneumonectomy 1 lobe lobect (Left, 9/28/2018); bronchoscopy (10/1/2018); pr [facility] incl fluor gdnce dx w/cell washg spx (N/A, 10/29/2018); other surgical history (Right, 11/05/2018); Hysterectomy, total abdominal; and craniotomy (N/A, 3/29/2022). Medications:    Prior to Admission medications    Medication Sig Start Date End Date Taking?  Authorizing Provider   ALPRAZolam Jennet Gram) 0.25 MG tablet take 1 tablet by mouth nightly if needed for sleep for 30 DAYS 3/17/22 4/17/22  ANNIE Ann - CNP   albuterol sulfate  (90 Base) MCG/ACT inhaler inhale 2 puffs by mouth four times a day 9/28/21   Veronique Draper MD   lisinopril-hydroCHLOROthiazide (PRINZIDE;ZESTORETIC) 10-12.5 MG per tablet Take 1 tablet by mouth daily 5/3/21   Edilson Morrow MD   lovastatin (MEVACOR) 10 MG tablet Take 1 tablet by mouth nightly 5/3/21   Edilson Morrow MD     Current Facility-Administered Medications   Medication Dose Route Frequency Provider Last Rate Last Admin    labetalol (NORMODYNE;TRANDATE) injection 10 mg  10 mg IntraVENous Q1H PRN Dareen Nkechi, DO   10 mg at 03/30/22 1000    HYDROmorphone (DILAUDID) injection 0.5 mg  0.5 mg IntraVENous Q4H PRN Dareen Nkechi, DO   0.5 mg at 03/30/22 1522    oxyCODONE (ROXICODONE) immediate release tablet 5 mg  5 mg Oral Q4H PRN Dareen Nkechi, DO   5 mg at 03/30/22 0857    Or    oxyCODONE (ROXICODONE) immediate release tablet 10 mg  10 mg Oral Q4H PRN Dareen Nkechi, DO   10 mg at 03/30/22 1704    albuterol sulfate  (90 Base) MCG/ACT inhaler 2 puff  2 puff Inhalation Q4H PRN ANNIE Valdivia CNP   2 puff at 03/30/22 1302    famotidine (PEPCID) tablet 20 mg  20 mg Oral BID EleanoANNIE Toledo CNP   20 mg at 03/30/22 1241    atorvastatin (LIPITOR) tablet 10 mg  10 mg Oral Nightly Jose Wallace MD        sodium chloride flush 0.9 % injection 10 mL  10 mL IntraVENous PRN Nicolas Monteiro MD        dexamethasone (DECADRON) tablet 4 mg  4 mg Oral 4 times per day ANNIE Fabian CNP   4 mg at 03/30/22 1341    Followed by   Sheryn Form ON 4/1/2022] dexamethasone (DECADRON) tablet 4 mg  4 mg Oral 3 times per day ANNIE Fabian CNP        Followed by   Sheryn Form ON 4/3/2022] dexamethasone (DECADRON) tablet 4 mg  4 mg Oral 2 times per day ANNIE Fabian CNP        Followed by   Sheryn Form ON 4/6/2022] dexamethasone (DECADRON) tablet 4 mg  4 mg Oral Daily ANNIE Fabian CNP        Followed by   Sheryn Form ON 4/8/2022] dexamethasone (DECADRON) tablet 2 mg  2 mg Oral Daily ANNIE Fabian CNP        lisinopril (PRINIVIL;ZESTRIL) tablet 5 mg  5 mg Oral Daily Dipak Nelson MD   5 mg at 03/30/22 0932    hydroCHLOROthiazide (HYDRODIURIL) tablet 25 mg  25 mg Oral Daily Dipak Nelson MD   25 mg at 03/30/22 0932    ondansetron (ZOFRAN) injection 4 mg  4 mg IntraVENous Q6H PRN Dipak Nelson MD        ALPRAZolam Jennet Gram) tablet 0.5 mg  0.5 mg Oral 4x Daily PRN Dipak Nelson MD   0.5 mg at 03/29/22 1202    sodium chloride flush 0.9 % injection 10 mL  10 mL IntraVENous PRN Dipak Nelson MD        levETIRAcetam (KEPPRA) tablet 500 mg  500 mg Oral BID Dipak Nelson MD   500 mg at 03/30/22 0932    sodium chloride flush 0.9 % injection 5-40 mL  5-40 mL IntraVENous 2 times per day Dipak Nelson MD   10 mL at 03/30/22 1123    sodium chloride flush 0.9 % injection 10 mL  10 mL IntraVENous PRN Dipak Nelson MD        0.9 % sodium chloride infusion  25 mL IntraVENous PRN Dipak Nelson MD   Stopped at 03/26/22 0506    potassium chloride (KLOR-CON M) extended release tablet 40 mEq  40 mEq Oral PRN Stephen Beal MD        Or    potassium bicarb-citric acid (EFFER-K) effervescent tablet 40 mEq  40 mEq Oral PRN Stephen Beal MD   40 mEq at 03/26/22 0703    Or    potassium chloride 10 mEq/100 mL IVPB (Peripheral Line)  10 mEq IntraVENous PRMONTSERRAT Beal MD        magnesium sulfate 1000 mg in dextrose 5% 100 mL IVPB  1,000 mg IntraVENous PRN Stephen Beal MD        ondansetron (ZOFRAN-ODT) disintegrating tablet 4 mg  4 mg Oral Q8H PRN Stephen Beal MD   4 mg at 03/30/22 0857    Or    ondansetron (ZOFRAN) injection 4 mg  4 mg IntraVENous Q6H PRMONTSERRAT Beal MD        polyethylene glycol (GLYCOLAX) packet 17 g  17 g Oral Daily PRN Stephen Beal MD        acetaminophen (TYLENOL) tablet 650 mg  650 mg Oral Q6H PRMONTSERRAT Beal MD   650 mg at 03/29/22 2329    Or    acetaminophen (TYLENOL) suppository 650 mg  650 mg Rectal Q6H PRMONTSERRAT Beal MD           Allergies:  Codeine    Social History:   reports that she quit smoking about 22 years ago. Her smoking use included cigarettes. She has a 45.00 pack-year smoking history. She has never used smokeless tobacco. She reports current alcohol use. She reports that she does not use drugs. Family History: family history includes Cancer in her mother and sister. REVIEW OF SYSTEMS:      Constitutional: No fever or chills.  No night sweats, no weight loss   Eyes: No eye discharge, double vision, or eye pain   HEENT: negative for sore mouth, sore throat, hoarseness and voice change   Respiratory: negative for cough , sputum, dyspnea, wheezing, hemoptysis, chest pain   Cardiovascular: negative for chest pain, dyspnea, palpitations, orthopnea, PND   Gastrointestinal: negative for nausea, vomiting, diarrhea, constipation, abdominal pain, Dysphagia, hematemesis and hematochezia   Genitourinary: negative for frequency, dysuria, nocturia, urinary incontinence, and hematuria   Integument: negative for rash, skin lesions, bruises. Hematologic/Lymphatic: negative for easy bruising, bleeding, lymphadenopathy, or petechiae   Endocrine: negative for heat or cold intolerance,weight changes, change in bowel habits and hair loss   Musculoskeletal: negative for myalgias, arthralgias, pain, joint swelling,and bone pain   Neurological: negative for headaches, dizziness, seizures, weakness, numbness    PHYSICAL EXAM:        BP (!) 146/70   Pulse 68   Temp 97.5 °F (36.4 °C) (Oral)   Resp 13   Ht 4' 11\" (1.499 m)   Wt 161 lb (73 kg)   SpO2 93%   BMI 32.52 kg/m²    Temp (24hrs), Av.3 °F (36.3 °C), Min:96.6 °F (35.9 °C), Max:98.1 °F (36.7 °C)      General appearance - well appearing, no in pain or distress   Mental status - alert and cooperative   Eyes - pupils equal and reactive, extraocular eye movements intact   Ears - bilateral TM's and external ear canals normal   Mouth - mucous membranes moist, pharynx normal without lesions   Neck - supple, no significant adenopathy   Lymphatics - no palpable lymphadenopathy, no hepatosplenomegaly   Chest - clear to auscultation, no wheezes, rales or rhonchi, symmetric air entry   Heart - normal rate, regular rhythm, normal S1, S2, no murmurs  Abdomen - soft, nontender, nondistended, no masses or organomegaly   Neurological - alert, oriented, normal speech, no focal findings or movement disorder noted   Musculoskeletal - no joint tenderness, deformity or swelling   Extremities - peripheral pulses normal, no pedal edema, no clubbing or cyanosis   Skin - normal coloration and turgor, no rashes, no suspicious skin lesions noted ,      DATA:      Labs:     Results for orders placed or performed during the hospital encounter of 22   COVID-19, Rapid    Specimen: Nasopharyngeal Swab   Result Value Ref Range    Specimen Description . NASOPHARYNGEAL SWAB     SARS-CoV-2, Rapid Not Detected Not Detected   Basic Metabolic Panel w/ Reflex to MG   Result Value Ref Range    Glucose 198 (H) 70 - 99 mg/dL    BUN 13 8 - 23 mg/dL    CREATININE 0.71 0.50 - 0.90 mg/dL    Calcium 9.3 8.6 - 10.4 mg/dL    Sodium 139 135 - 144 mmol/L    Potassium 3.5 (L) 3.7 - 5.3 mmol/L    Chloride 102 98 - 107 mmol/L    CO2 21 20 - 31 mmol/L    Anion Gap 16 9 - 17 mmol/L    GFR Non-African American >60 >60 mL/min    GFR African American >60 >60 mL/min    GFR Comment         CBC with Auto Differential   Result Value Ref Range    WBC 7.7 3.5 - 11.3 k/uL    RBC 4.02 3.95 - 5.11 m/uL    Hemoglobin 12.7 11.9 - 15.1 g/dL    Hematocrit 36.8 36.3 - 47.1 %    MCV 91.5 82.6 - 102.9 fL    MCH 31.6 25.2 - 33.5 pg    MCHC 34.5 28.4 - 34.8 g/dL    RDW 12.2 11.8 - 14.4 %    Platelets 506 042 - 397 k/uL    MPV 10.5 8.1 - 13.5 fL    NRBC Automated 0.0 0.0 per 100 WBC    Immature Granulocytes 1 (H) 0 %    Seg Neutrophils 90 (H) 36 - 65 %    Lymphocytes 7 (L) 24 - 43 %    Monocytes 2 (L) 3 - 12 %    Eosinophils % 0 (L) 1 - 4 %    Basophils 0 0 - 2 %    Absolute Immature Granulocyte 0.08 0.00 - 0.30 k/uL    Segs Absolute 6.93 1.50 - 8.10 k/uL    Absolute Lymph # 0.54 (L) 1.10 - 3.70 k/uL    Absolute Mono # 0.15 0.10 - 1.20 k/uL    Absolute Eos # 0.00 0.00 - 0.44 k/uL    Basophils Absolute 0.00 0.00 - 0.20 k/uL    Morphology Normal    Magnesium   Result Value Ref Range    Magnesium 1.7 1.6 - 2.6 mg/dL   Comprehensive Metabolic Panel with Bilirubin   Result Value Ref Range    Albumin 4.2 3.5 - 5.2 g/dL    Albumin/Globulin Ratio 1.6 1.0 - 2.5    Alkaline Phosphatase 70 35 - 104 U/L    ALT 18 5 - 33 U/L    AST 21 <32 U/L    Total Bilirubin 0.30 0.3 - 1.2 mg/dL    Bilirubin, Direct 0.10 <0.31 mg/dL    Bilirubin, Indirect 0.20 0.00 - 1.00 mg/dL    BUN 18 8 - 23 mg/dL    Calcium 9.5 8.6 - 10.4 mg/dL    CREATININE 0.59 0.50 - 0.90 mg/dL    Glucose 124 (H) 70 - 99 mg/dL    Total Protein 6.8 6.4 - 8.3 g/dL    Sodium 141 135 - 144 mmol/L    Potassium 4.5 3.7 - 5.3 mmol/L    Chloride 105 98 - 107 mmol/L    CO2 24 20 - 31 mmol/L    Anion Gap 12 9 - 17 mmol/L GFR Non-African American >60 >60 mL/min    GFR African American >60 >60 mL/min    GFR Comment         CBC   Result Value Ref Range    WBC 9.0 3.5 - 11.3 k/uL    RBC 4.36 3.95 - 5.11 m/uL    Hemoglobin 13.7 11.9 - 15.1 g/dL    Hematocrit 39.9 36.3 - 47.1 %    MCV 91.5 82.6 - 102.9 fL    MCH 31.4 25.2 - 33.5 pg    MCHC 34.3 28.4 - 34.8 g/dL    RDW 12.1 11.8 - 14.4 %    Platelets 066 876 - 661 k/uL    MPV 10.2 8.1 - 13.5 fL    NRBC Automated 0.0 0.0 per 100 WBC   Basic Metabolic Panel   Result Value Ref Range    Glucose 106 (H) 70 - 99 mg/dL    BUN 21 8 - 23 mg/dL    CREATININE 0.57 0.50 - 0.90 mg/dL    Calcium 9.1 8.6 - 10.4 mg/dL    Sodium 140 135 - 144 mmol/L    Potassium 4.0 3.7 - 5.3 mmol/L    Chloride 105 98 - 107 mmol/L    CO2 20 20 - 31 mmol/L    Anion Gap 15 9 - 17 mmol/L    GFR Non-African American >60 >60 mL/min    GFR African American >60 >60 mL/min    GFR Comment         Basic Metabolic Panel   Result Value Ref Range    Glucose 104 (H) 70 - 99 mg/dL    BUN 17 8 - 23 mg/dL    CREATININE 0.50 0.50 - 0.90 mg/dL    Calcium 7.8 (L) 8.6 - 10.4 mg/dL    Sodium 141 135 - 144 mmol/L    Potassium 4.0 3.7 - 5.3 mmol/L    Chloride 109 (H) 98 - 107 mmol/L    CO2 21 20 - 31 mmol/L    Anion Gap 11 9 - 17 mmol/L    GFR Non-African American >60 >60 mL/min    GFR African American >60 >60 mL/min    GFR Comment         CBC with Auto Differential   Result Value Ref Range    WBC 11.8 (H) 3.5 - 11.3 k/uL    RBC 3.98 3.95 - 5.11 m/uL    Hemoglobin 12.4 11.9 - 15.1 g/dL    Hematocrit 37.9 36.3 - 47.1 %    MCV 95.2 82.6 - 102.9 fL    MCH 31.2 25.2 - 33.5 pg    MCHC 32.7 28.4 - 34.8 g/dL    RDW 11.9 11.8 - 14.4 %    Platelets 095 260 - 809 k/uL    MPV 10.0 8.1 - 13.5 fL    NRBC Automated 0.0 0.0 per 100 WBC    Seg Neutrophils 85 (H) 36 - 65 %    Lymphocytes 7 (L) 24 - 43 %    Monocytes 7 3 - 12 %    Eosinophils % 0 (L) 1 - 4 %    Basophils 0 0 - 2 %    Immature Granulocytes 1 (H) 0 %    Segs Absolute 10.04 (H) 1.50 - 8.10 k/uL Absolute Lymph # 0.85 (L) 1.10 - 3.70 k/uL    Absolute Mono # 0.81 0.10 - 1.20 k/uL    Absolute Eos # <0.03 0.00 - 0.44 k/uL    Basophils Absolute <0.03 0.00 - 0.20 k/uL    Absolute Immature Granulocyte 0.08 0.00 - 0.30 k/uL   TYPE AND SCREEN   Result Value Ref Range    Expiration Date 04/01/2022,2359     Arm Band Number [id number]     ABO/Rh O POSITIVE     Antibody Screen NEGATIVE          IMAGING DATA:    CT HEAD WO CONTRAST    Result Date: 3/26/2022  EXAMINATION: CT OF THE HEAD WITHOUT CONTRAST  3/26/2022 3:11 am TECHNIQUE: CT of the head was performed without the administration of intravenous contrast. Dose modulation, iterative reconstruction, and/or weight based adjustment of the mA/kV was utilized to reduce the radiation dose to as low as reasonably achievable. COMPARISON: Prior not available at time dictation, MR brain 10/24/2018, head CT 08/03/2018 HISTORY: ORDERING SYSTEM PROVIDED HISTORY: 6 hour repeat stability scan, new seizure today found to have left occipital-parital mass and right parietal with IPH FINDINGS: BRAIN/VENTRICLES: Partially calcified, hemorrhagic mass within the right cerebellar hemisphere with resultant localized edema resulting in mild compression of the 4th ventricle. No evidence of hydrocephalus. Areas of subcortical low attenuation within the left parietal, left occipital and caudal aspect of the right postcentral gyrus suggesting underlying edema concerning for additional intracranial lesions. No significant mass effect or midline shift. No extra-axial fluid collections. Gray-white matter differentiation is otherwise maintained. ORBITS: The visualized portion of the orbits demonstrate no acute abnormality. SINUSES: The visualized paranasal sinuses and mastoid air cells demonstrate no acute abnormality. SOFT TISSUES/SKULL:  Scattered lytic lesions throughout the calvarium may reflect intraosseous hemangiomas, however underlying metastasis is possible. No significant change. 1. Partially calcified, hemorrhagic mass within the right cerebellar hemisphere resulting in mild compression of the 4th ventricle without obstructive hydrocephalus at this time. 2. Areas of subcortical low attenuation throughout the bilateral cerebral hemispheres concerning for additional intracranial mass lesions. 3. No significant mass effect or midline shift. 4. Possible calvarial metastasis versus underlying intraosseous hemangiomas. No significant change. 5. Recent prior outside head CT images would be of benefit to determine stability. 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]     Mass of occipital region [R22.0] 03/26/2022    Hyperglycemia [R73.9] 03/26/2022    Hypokalemia [E87.6] 03/26/2022    Vasogenic edema ([facility]) [G93.6] 03/26/2022    Brain metastases (HCC) [C79.31] 03/26/2022    Adrenal mass ([address] - right  [E27.8] 03/26/2022    Episode of loss of consciousness [R55]     Intraparenchymal hemorrhage of brain ([address] [I61.9] 03/25/2022    New onset seizure ([address] [R56.9] 03/25/2022    Essential hypertension [I10] 06/23/2020    Non-small cell cancer of left lung ([address] [C34.92]            RECOMMENDATIONS:  I personally reviewed results of lab work-up imaging studies and other relevant clinical data. Reviewed results of MRI brain which show multiple brain metastasis  Patient postop day #1. Tolerated surgery well  Follow-up on path report  Will need brain radiation as well  Continue symptomatic supportive care  Patient had multiple questions which answered the best my ability. Discussed with patient and Nurse. Thank you for asking us to see this patient.           Gaurang Franz MD          This note is created with the assistance of a speech recognition program.  While intending to generate a document that actually reflects the content of the visit, the document can still have some errors including those of syntax and sound a like substitutions which may escape proof reading. It such instances, actual meaning can be extrapolated by contextual diversion.

## 2022-03-30 NOTE — PROGRESS NOTES
Neurosurgery Post op Progress Note      POD# 0    s/p Right-sided suboccipital craniotomy for resection of intra-axial brain tumor  Use of Stealth neuro navigation    SUBJECTIVE:    80-year-old female with known history of lung cancer status post lobectomy, chemotherapy and radiation presents with a new onset seizure with loss of consciousness associated with tongue bite,  -MRI done showed multifocal lesions with the largest one being right cerebellar, multifocal cerebral metastatic disease,    OBJECTIVE      Physical exam   VITALS:    Vitals:    03/30/22 0000   BP: (!) 148/80   Pulse: 67   Resp: 18   Temp:    SpO2: 99%     INTAKE:      Intake/Output Summary (Last 24 hours) at 3/30/2022 0042  Last data filed at 3/30/2022 0009  Gross per 24 hour   Intake 1500 ml   Output 1600 ml   Net -100 ml              PHYSICAL EXAM:  CONSTITUTIONAL:  Well developed, well nourished, alert and oriented x 3, in no acute distress. GCS 15. Nontoxic. No dysarthria. No aphasia.    HEAD:  normocephalic, atraumatic    EYES:  PERRLA, EOMI.   ENT:  moist mucous membranes   LUNGS:  Equal air entry bilaterally   CARDIOVASCULAR:  normal s1 / s2   ABDOMEN:  Soft, no rigidity   NECK supple, symmetric, no midline tenderness to palpation    BACK without midline tenderness, step-offs or deformities    EXTREMITIES Normal ROM with no deformities   NEUROLOGIC:  Mental Status:  A & O x3,lethargic             Cranial Nerves:    VII: Facial strength: abnormal Right facial droop    Motor Exam:    Drift:  absent  Tone:  normal    Motor exam is symmetrical 5 out of 5 all extremities bilaterally    Sensory:    Touch:    Right Upper Extremity:  normal  Left Upper Extremity:  normal  Right Lower Extremity:  normal  Left Lower Extremity:  normal    Deep Tendon Reflexes:    Right Bicep:  2+  Left Bicep:  2+  Right Knee:  2+  Left Knee:  2+      Clonus:  absent  Kate's:  N/A    Gait: Not done    SKIN No obvious ecchymosis, rashes, or lesions          Wound Post op wound:    Dressing is clean/dry/intact with no signs of drainage   Drain     ASSESSMENT AND PLAN    61 y.o. female status post Right-sided suboccipital craniotomy for resection of intra-axial brain tumor  Use of Stealth neuro navigation post op day # 0    - Analgesia: peri  5 mg every 4 as needed  - Periop Antibiotics: Ancef 2g q8hrs for 3 doses   - Activity: As tolerated , Bedrest HOB 30-45   - CT head noncontrast follow-up  - MRI brain [employer] 0 tomorrow  - DVT prophylaxis: SCDs  - Diet: NPO, Advance as tolerated  - GI prophylaxis: Protonix 40mg IV Daily  - Seizure prophylaxis: Keppra 500mg q12hrs IV     Electronically signed by Lm Mckenzie MD on 3/30/2022 at 12:42 AM

## 2022-03-30 NOTE — PLAN OF CARE
Problem: Falls - Risk of:  Goal: Will remain free from falls  Description: Will remain free from falls  Outcome: Met This Shift  Goal: Absence of physical injury  Description: Absence of physical injury  Outcome: Met This Shift     Problem: Coping:  Goal: Ability to cope will improve  Description: Ability to cope will improve  Outcome: Ongoing     Problem: Health Behavior:  Goal: Identification of resources available to assist in meeting health care needs will improve  Description: Identification of resources available to assist in meeting health care needs will improve  Outcome: Ongoing     Problem: Nutritional:  Goal: Maintenance of adequate nutrition will improve  Description: Maintenance of adequate nutrition will improve  Outcome: Ongoing  Goal: Ability to tolerate tube feedings without aspirating will improve  Description: Ability to tolerate tube feedings without aspirating will improve  Outcome: Ongoing     Problem: Physical Regulation:  Goal: Complications related to the disease process, condition or treatment will be avoided or minimized  Description: Complications related to the disease process, condition or treatment will be avoided or minimized  Outcome: Ongoing  Goal: Ability to achieve and maintain adequate cardiopulmonary perfusion will improve  Description: Ability to achieve and maintain adequate cardiopulmonary perfusion will improve  Outcome: Ongoing     Problem: Respiratory:  Goal: Ability to maintain adequate ventilation will improve  Description: Ability to maintain adequate ventilation will improve  Outcome: Ongoing  Goal: Ability to achieve and maintain a regular respiratory rate will improve  Description: Ability to achieve and maintain a regular respiratory rate will improve  Outcome: Ongoing     Problem: Role Relationship:  Goal: Ability to communicate needs accurately will improve - ADL needs  Description: Ability to communicate needs accurately will improve  Outcome: Ongoing Problem: Sensory:  Goal: Pain level will decrease  Description: Pain level will decrease  Outcome: Ongoing     Problem: Skin Integrity:  Goal: Demonstration of wound healing without infection will improve  Description: Demonstration of wound healing without infection will improve  Outcome: Ongoing     Problem: Pain:  Goal: Pain level will decrease  Description: Pain level will decrease  Outcome: Ongoing  Goal: Control of acute pain  Description: Control of acute pain  Outcome: Ongoing  Goal: Control of chronic pain  Description: Control of chronic pain  Outcome: Ongoing

## 2022-03-30 NOTE — PROGRESS NOTES
Neuro Critical Care Sign Out to Internal Medicine      Date and time: 3/30/2022 3:44 PM  Patient's name:  Daya Sagastume  Medical Record Number: [id number]  Patient's account/billing number: [de-identified]  Patient's YOB: 1955  Age: 61 y.o. Date of Admission: 3/25/2022 10:15 PM  Length of stay during current admission: 5    Primary Care Physician: Bigg Alvarado MD    Code Status: Full Code    Mode of physician to physician communication:        [x] Via telephone   [] In person     Date and time of sign-out: 3/30/2022 3:44 PM    Accepting Team's Attending: Dr. Piper Alva    Patient's current ICU Bed:  514     Patient's assigned bed on floor:  138-1        [] Med-Surg Monitored [x] Step-down       [] Psychiatry ICU       [] Psych floor     Reason for ICU admission:     Post operatively from sub-occipital craniotomy     ICU course summary:     Initial Presentation (Admitted 3/25):     The patient is V 54 y. o. 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 with chemoradiation therapy, DVT, COPD, hypertension, hyperlipidemia, presented with a seizure of new onset, admitted to the hospital for management of new onset seizure,     [facility] after having seizure-like activity causing LOC at the grocery store, tonic-clonic motion, patient does not recall events confused postictally, patient was found to have intraparenchymal hemorrhage,  -CT head demonstrates right cerebral hemisphere mass with calcification as well as possible vasogenic edema and associated with acute IPH, with mild mass-effect on the fourth ventricle but no hydrocephalus, patient was loaded with Keppra started on 500 twice daily, also on steroids,  Neurosurgery suspected metastatic disease to brain secondary to lung cancer vasogenic edema, recommended Decadron 4 every 6,  -MRI brain multifocal lesions with the largest one being right cerebellar, multifocal cerebral metastatic disease,     Neurosurgery explained to the patient and family, infratentorial lesions was fairly large at the x3 cm in the tightest enclosed space which was the posterior fossa, risk of nonoperative management with steroids and radiation also were explained, and the risk of hydrocephalus with demise along with a significant brainstem compression, patient and family would like to go with surgical option     -Neurology was following recommended continue Keppra 500 mg twice daily,     Craniotomy took place on 3/29 by Dr. Rip Servin postop recommendation follow-up CT head, MRI brain [employer] 0 tomorrow, Ancef 2 g every 8, head of the bed 30 to 45 degree, SBP  no blood thinners        Hospital Course:   3/29: right sided suboccipital craniotomy. Admitted to neuro icu post operatively    Procedures during patient's ICU stay:     Suboccipital Craniotomy    Current Vitals:     BP (!) 146/70   Pulse 68   Temp 97.5 °F (36.4 °C) (Oral)   Resp 13   Ht 4' 11\" (1.499 m)   Wt 161 lb (73 kg)   SpO2 93%   BMI 32.52 kg/m²       Cultures:     Blood cultures:                 [x] None drawn      [] Negative             []  Positive (Details:  )  Urine Culture:                   [] None drawn      [x] Negative             []  Positive (Details:  )  Sputum Culture:               [x] None drawn       [] Negative             []  Positive (Details:  )   Endotracheal aspirate:     [x] None drawn       [] Negative             []  Positive (Details:  )       Consults:     1. Neurology  2.  Neurosurgery    Assessment:     Patient Active Problem List    Diagnosis Date Noted    Focal epilepsy [facility])     Mass of occipital region 03/26/2022    Hyperglycemia 03/26/2022    Hypokalemia 03/26/2022    Vasogenic edema ([address] 03/26/2022    Brain metastases ([address] 03/26/2022    Adrenal mass ([address] - right  03/26/2022    Episode of loss of consciousness     Intraparenchymal hemorrhage of brain ([facility] 03/25/2022    New onset seizure ([address] 03/25/2022    Essential hypertension 06/23/2020    Anxiety 06/23/2020    Malignant neoplasm of bronchus of upper lobe, left ([address] 02/12/2019    Non-small cell cancer of left lung ([address]     Status post lobectomy of lung 09/28/2018       Additional assessment:    1. Neurosurgery  2. Neurology   3. Rad Onc    Recommended Follow-up:     1. Recommendations per neurosurgery  2. Gen neurology previously signed off the patient, recommended continuing keppra and f/u outpatient   3. Outpatient follow up with radiation oncology        Above mentioned assessment and plan was discussed by me with the admitting medicine resident. The medicine team assigned to the patient by medicine admitting resident will be following up the patient from now onwards on the floor.      Javier Fontana,   Neuro Critical Care  3/30/2022, 3:44 PM

## 2022-03-30 NOTE — PROGRESS NOTES
Physical Therapy    Facility/Department: [address]  Reassessment    NAME: Napcarmencita Friday  : 1955  MRN: [id number]  Re-evaluation completed s/p R suboccipital craniotomy    Date of Service: 3/30/2022    Discharge Recommendations: Further therapy recommended at discharge for high level balance training. PT Equipment Recommendations  Equipment Needed: No (pt reports owning RW)    Assessment   Body structures, Functions, Activity limitations: Decreased functional mobility ; Decreased endurance;Decreased balance  Assessment: The pt ambulated 150ft with RW and SBA and 30ft without AD and CGA. Recommend continued PT to progress gait and balance toward prior level of independence. Prognosis: Good  Decision Making: Medium Complexity  PT Education: Plan of Care;PT Role;Functional Mobility Training;Goals; General Safety  REQUIRES PT FOLLOW UP: Yes  Activity Tolerance  Activity Tolerance: Patient Tolerated treatment well       Patient Diagnosis(es): There were no encounter diagnoses. has a past medical history of Anxiety, Benign polyp of large intestine, Cancer ([facility] COPD (chronic obstructive pulmonary disease) ([facility] Hx of blood clots, Hyperlipidemia, Hypertension, Liver hemangioma, Lung nodule, Snores, Uterine fibroid, and Wears glasses. has a past surgical history that includes Hysterectomy (); Abdominal hernia repair (); Colonoscopy; Lung biopsy; Breast biopsy (Left, ); Lung removal, partial (Right, 2018); pr rmvl lung other than pneumonectomy 1 lobe lobect (Left, 2018); bronchoscopy (10/1/2018); [facility] dx w/cell washg spx (N/A, 10/29/2018); other surgical history (Right, 2018); Hysterectomy, total abdominal; and craniotomy (N/A, 3/29/2022).     Restrictions  Restrictions/Precautions  Restrictions/Precautions: Seizure  Required Braces or Orthoses?: No  Position Activity Restriction  Other position/activity restrictions: up with assistance, s/p R suboccipital craniotomy 3/29  Vision/Hearing  Vision: Impaired  Vision Exceptions: Wears glasses at all times  Hearing: Within functional limits     Subjective  General  Patient assessed for rehabilitation services?: Yes  Response To Previous Treatment: Not applicable  Family / Caregiver Present: Yes (pt's spouse at bedside)  Follows Commands: Within Functional Limits  Subjective  Subjective: RN and pt agreeable to PT. Pt supine in bed upon arrival, very pleasant and cooperative throughout.   Pain Screening  Patient Currently in Pain: Denies  Vital Signs  Patient Currently in Pain: Denies       Orientation  Orientation  Overall Orientation Status: Within Functional Limits  Social/Functional History  Social/Functional History  Lives With: Spouse  Type of Home: House  Home Layout: Two level,Bed/Bath upstairs,1/2 bath on main level  Home Access: Stairs to enter without rails  Entrance Stairs - Number of Steps: 2  Entrance Stairs - Rails: None  Bathroom Shower/Tub: Tub/Shower unit,Curtain  Bathroom Toilet: Standard  Home Equipment: Rolling walker (ambulates without AD at baseline)  ADL Assistance: Independent  Homemaking Assistance: Independent  Homemaking Responsibilities: Yes  Meal Prep Responsibility: Primary  Laundry Responsibility: Primary  Cleaning Responsibility: Primary  Shopping Responsibility: Primary  Ambulation Assistance: Independent  Transfer Assistance: Independent  Active : Yes  Mode of Transportation: Car  Occupation: Retired  Type of occupation: [place]  Leisure & Hobbies: iPAD, socialize, traveling  Additional Comments: Pt reports spouse able to provide 24hr assistance  Cognition   Cognition  Overall Cognitive Status: WFL    Objective          Joint Mobility  Spine: WFL  ROM RLE: WFL  ROM LLE: WFL  ROM RUE: WFL  ROM LUE: WFL  Strength RLE  Strength RLE: WFL  Strength LLE  Strength LLE: WFL  Strength RUE  Strength RUE: WFL  Comment: formally assessed by OT  Strength LUE  Strength LUE: WFL  Comment: formally assessed by OT  Tone RLE  RLE Tone: Normotonic  Tone LLE  LLE Tone: Normotonic  Motor Control  Gross Motor?: WFL  Sensation  Overall Sensation Status: WFL (Pt denies any numbness or tingling)  Bed mobility  Supine to Sit: Supervision  Sit to Supine: Supervision  Scooting: Supervision  Transfers  Sit to Stand: Contact guard assistance  Stand to sit: Contact guard assistance  Bed to Chair: Contact guard assistance  Comment: Transfers performed 2x with RW, verbal cues for UE placement with good return.   Ambulation  Ambulation?: Yes  More Ambulation?: Yes  Ambulation 1  Surface: level tile  Device: Rolling Walker  Assistance: Stand by assistance  Quality of Gait: LOB when looking down  Gait Deviations: Slow Megan;Decreased step length;Decreased step height  Distance: 30ft + 120ft  Ambulation 2  Surface - 2: level tile  Device 2: No device  Assistance 2: Contact guard assistance  Quality of Gait 2: mildly unsteady with ankle strategy noted  Gait Deviations: Slow Megan;Decreased step length;Decreased step height;Decreased arm swing;Decreased head and trunk rotation  Distance: 30ft  Comments: pt reports feeling unsteady without AD- educated on use of RW upon discharge  Stairs/Curb  Stairs?: Yes  Stairs  # Steps : 2  Stairs Height: 6\"  Device: Hand Held Assist (to simulate home environment)  Assistance: Contact guard assistance  Comment: reciprocal, mildly unsteady when descending     Balance  Posture: Good  Sitting - Static: Good  Sitting - Dynamic: Good;-  Standing - Static: Fair;+  Standing - Dynamic: Fair  Comments: standing balance assessed without AD        Plan   Plan  Times per week: 3-5x/wk  Current Treatment Recommendations: Strengthening,ROM,Balance Training,Functional Mobility Training,Transfer Training,Gait Training,Stair training,Endurance [employer] Exercise Program,Safety Education & Training,Patient/Caregiver Education & Training,Neuromuscular Re-education  Safety Devices  Type of devices: Nurse notified,Call light within reach,Gait belt,Left in bed  Restraints  Initially in place: No    AM-PAC Score  AM-PAC Inpatient Mobility Raw Score : 20 (03/30/22 1457)  AM-PAC Inpatient T-Scale Score : 47.67 (03/30/22 1457)  Mobility Inpatient CMS 0-100% Score: 35.83 (03/30/22 1457)  Mobility Inpatient CMS G-Code Modifier : CJ (03/30/22 1457)          Goals  Short term goals  Time Frame for Short term goals: 14 visits  Short term goal 1: Perform bed mobility and functional transfers independently  Short term goal 2: Ambulate 300ft without AD independently  Short term goal 3: Ascend/descend 10 steps with HR and SBA  Short term goal 4: Demo Good dynamic standing balance to decrease risk of falls       Therapy Time   Individual Concurrent Group Co-treatment   Time In 1419         Time Out 1441         Minutes 22         Timed Code Treatment Minutes: 8 Minutes       Cheko Barrera, PT

## 2022-03-30 NOTE — PROGRESS NOTES
Daily Progress Note  Neuro Critical Care    Patient Name: Allyson Parrish  Patient : 1955  Room/Bed: 8692/5089-95  Code Status: Full Code  Allergies: Allergies   Allergen Reactions    Codeine Nausea And Vomiting       CHIEF COMPLAINT:      Seizure     INTERVAL HISTORY    Initial Presentation (Admitted 3/25):     The patient is a 61 y.o. 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 post lobectomy in 2018 with chemoradiation therapy, DVT, COPD, hypertension, hyperlipidemia, presented with a seizure of new onset, admitted to the hospital for management of new onset seizure,     Patient presented to [facility] emergency room after having seizure-like activity causing LOC at the grocery store, tonic-clonic motion, patient does not recall events confused postictally, patient was found to have intraparenchymal hemorrhage,  -CT head demonstrates right cerebral hemisphere mass with calcification as well as possible vasogenic edema and associated with acute IPH, with mild mass-effect on the fourth ventricle but no hydrocephalus, patient was loaded with Keppra started on 500 twice daily, also on steroids,  Neurosurgery suspected metastatic disease to brain secondary to lung cancer vasogenic edema, recommended Decadron 4 every 6,  -MRI brain multifocal lesions with the largest one being right cerebellar, multifocal cerebral metastatic disease,     Neurosurgery explained to the patient and family, infratentorial lesions was fairly large at the x3 cm in the tightest enclosed space which was the posterior fossa, risk of nonoperative management with steroids and radiation also were explained, and the risk of hydrocephalus with demise along with a significant brainstem compression, patient and family would like to go with surgical option     -Neurology was following recommended continue Keppra 500 mg twice daily,     Craniotomy took place on 3/29 by Dr. Lala Cooper, neurosurgery postop recommendation follow-up CT head, MRI brain [employer] 0 tomorrow, Ancef 2 g every 8, head of the bed 30 to 45 degree, SBP  no blood thinners       Hospital Course:   3/29: right sided suboccipital craniotomy. Admitted to neuro icu post operatively    Last 24h:    No acute events overnight.       CURRENT MEDICATIONS:  SCHEDULED MEDICATIONS:   famotidine  20 mg Oral BID    atorvastatin  10 mg Oral Nightly    ceFAZolin  2,000 mg IntraVENous Q8H    dexamethasone  4 mg Oral 4 times per day    Followed by   Rod Mariscal ON 2022] dexamethasone  4 mg Oral 3 times per day    Followed by   Rod Mariscal ON 4/3/2022] dexamethasone  4 mg Oral 2 times per day    Followed by   Rod Mariscal ON 2022] dexamethasone  4 mg Oral Daily    Followed by   Rod Mariscal ON 2022] dexamethasone  2 mg Oral Daily    lisinopril  5 mg Oral Daily    hydroCHLOROthiazide  25 mg Oral Daily    levETIRAcetam  500 mg Oral BID    sodium chloride flush  5-40 mL IntraVENous 2 times per day     CONTINUOUS INFUSIONS:   sodium chloride Stopped (22 0506)     PRN MEDICATIONS:   labetalol, HYDROmorphone, oxyCODONE **OR** oxyCODONE, albuterol sulfate HFA, sodium chloride flush, ondansetron, ALPRAZolam, sodium chloride flush, sodium chloride flush, sodium chloride, potassium chloride **OR** potassium alternative oral replacement **OR** potassium chloride, magnesium sulfate, ondansetron **OR** ondansetron, polyethylene glycol, acetaminophen **OR** acetaminophen    VITALS:  Temperature Range: Temp: 98.1 °F (36.7 °C) Temp  Av °F (36.1 °C)  Min: 96.1 °F (35.6 °C)  Max: 98.1 °F (36.7 °C)  BP Range: Systolic (73TVA), TYR:938 , Min:91 , YNT:094     Diastolic (43FXS), MAYELIN:45, Min:52, Max:105    Pulse Range: Pulse  Av.9  Min: 55  Max: 76  Respiration Range: Resp  Av.8  Min: 0  Max: 25  Current Pulse Ox: SpO2: 93 %  24HR Pulse Ox Range: SpO2  Av.6 %  Min: 92 %  Max: 100 %  Patient Vitals for the past 12 hrs:   BP Temp Temp src Pulse Resp SpO2   03/30/22 1000 (!) 174/62 -- -- 67 15 --   03/30/22 0920 (!) 175/75 -- -- 62 13 --   03/30/22 0800 (!) 147/74 98.1 °F (36.7 °C) Oral 61 14 93 %   03/30/22 0700 (!) 145/63 -- -- 61 13 94 %   03/30/22 0600 120/62 98 °F (36.7 °C) -- 56 14 92 %   03/30/22 0500 134/69 -- -- 56 14 93 %   03/30/22 0400 (!) 154/71 97.9 °F (36.6 °C) -- 59 16 99 %   03/30/22 0330 -- -- -- 57 17 98 %   03/30/22 0315 -- -- -- 55 12 99 %   03/30/22 0300 (!) 152/60 -- -- 56 14 99 %   03/30/22 0245 -- -- -- 61 15 99 %   03/30/22 0230 -- -- -- 59 14 99 %   03/30/22 0215 -- -- -- 69 22 100 %   03/30/22 0200 (!) 145/67 97.3 °F (36.3 °C) Oral 62 15 99 %   03/30/22 0145 -- -- -- 61 14 99 %         RECENT LABS:   Lab Results   Component Value Date    WBC 11.8 (H) 03/30/2022    HGB 12.4 03/30/2022    HCT 37.9 03/30/2022     03/30/2022    CHOL 232 (H) 10/12/2021    TRIG 140 10/12/2021    HDL 77 10/12/2021    ALT 18 03/27/2022    AST 21 03/27/2022     03/30/2022    K 4.0 03/30/2022     (H) 03/30/2022    CREATININE 0.50 03/30/2022    BUN 17 03/30/2022    CO2 21 03/30/2022    TSH 1.77 05/11/2016    INR 1.0 11/05/2018    GLUF 110 (H) 05/11/2017    LABA1C 5.0 06/28/2018     24 HOUR INTAKE/OUTPUT:    Intake/Output Summary (Last 24 hours) at 3/30/2022 1342  Last data filed at 3/30/2022 0009  Gross per 24 hour   Intake 1500 ml   Output 700 ml   Net 800 ml       Labs and Images reviewed with:  [x] Dr. Ally Zaragoza. Altagracia    [] Dr. Jacquelynn Bumpers  [] Dr. Rain Luna  [] There are no new interval images to review. PHYSICAL EXAM       CONSTITUTIONAL:  Well developed, well nourished, alert and oriented x 3, in no acute distress. GCS 15. Nontoxic. No dysarthria. No aphasia.    HEAD:  Right sided occipital surgical incision clean and dry, without drainage   EYES:  PERRLA, EOMI.   ENT:  moist mucous membranes   LUNGS:  Equal air entry bilaterally   CARDIOVASCULAR:  normal s1 / s2   ABDOMEN:  Soft, no rigidity   NECK supple, symmetric, no midline tenderness to palpation    BACK without midline tenderness, step-offs or deformities    EXTREMITIES Normal ROM with no deformities   NEUROLOGIC:  Mental Status:  A & O x3,lethargic             Cranial Nerves:    VII: Facial strength: abnormal Right facial droop     Motor Exam:    Drift:  absent  Tone:  normal     Motor exam is symmetrical 5 out of 5 all extremities bilaterally     Sensory:    Touch:    Right Upper Extremity:  normal  Left Upper Extremity:  normal  Right Lower Extremity:  normal  Left Lower Extremity:  normal     Deep Tendon Reflexes:    Right Bicep:  2+  Left Bicep:  2+  Right Knee:  2+  Left Knee:  2+        Clonus:  absent  Kate's:  N/A     Gait: Not done    SKIN No obvious ecchymosis, rashes, or lesions        DRAINS:  [x] There are no drains for Neuro Critical Care to monitor at this time.      ASSESSMENT AND PLAN:     ASSESSMENT:      82-year-old female with known history of lung cancer status post lobectomy, chemotherapy and radiation presents with a new onset seizure with loss of consciousness associated with tongue bite,  -MRI done showed multifocal lesions with the largest one being right cerebellar, multifocal cerebral metastatic disease,     Decision was made for craniotomy by neurosurgery,         Patient care will be discussed with attending, will reevaluate patient along with attending.      PLAN/MEDICAL DECISION MAKING:     NEUROLOGIC:  - Imaging: MRI W0/W3/26: Multiple supra and infratentorial intraparenchymal lesion are most consistent with metastatic disease, associated edema with minimal mass-effect no midline shift,  -Postop day 1: Suboccipital Craniotomy  -Radiation Oncology recommends outpatient treatment  - f/u post op MRI  - AEDs 500 mg twice daily,  - Decadron 4 mg every 6  - Goal SBP 90- 150  - Ancef 2 g every 8,   - HOB 30 to 45  - Neuro checks per protocol     CARDIOVASCULAR:  - Goal SBP 90- 150  - Continue telemetry     PULMONARY:  -Satting well on 3 L nasal cannula     RENAL/FLUID/ELECTROLYTE:  - BUN 17/ Creatinine 0.50  - Urine output monitor  - IVF: none   - Replace electrolytes PRN  - Daily BMP     GI/NUTRITION:  NUTRITION:  Diet NPO  - Bowel regimen:  - GI prophylaxis:     ID:  - Tmax 98.1  - WBC: 11.8  - Continue to monitor for fevers  - Daily CBC     HEME:   -Heme-onc is following: CT abdomen pelvis shows renal metastasis,  - H&H 12.4/37.9  - Platelets 240  - Daily CBC     ENDOCRINE:  - Continue to monitor blood glucose, goal <180     OTHER:  - PT/OT/ST     PROPHYLAXIS:  Stress ulcer: not indicated     DVT PROPHYLAXIS:  -No DVT prophylaxis or antiplatelets until ok by NSG    DISPOSITION:  [] To remain ICU:   [x] OK for out of ICU from Neuro Critical Care standpoint    We will continue to follow along. For any changes in exam or patient status please contact Neuro Critical Care.       Yves Vang DO  Neuro Critical Care  Pager [phone]  3/30/2022     1:42 PM

## 2022-03-30 NOTE — PLAN OF CARE
MRI brain reviewed by Dr Gwendolyn Martines for discharge  Decadron wean at discharge  Follow up 2 weeks for staple removal  Okay to leave incision site open to air and shower  Please call with any questions or concerns

## 2022-03-30 NOTE — OP NOTE
Operative Note      Patient: Fior Mobley  YOB: 1955  MRN: [id number]    Date of Procedure: 3/29/2022    Pre-Op Diagnosis: Multifocal metastatic brain lesions, vasogenic edema    Post-Op Diagnosis: Same     Indications for procedure. Patient with large right cerebellar metastatic lesion with surrounding vasogenic edema and risk of obstructive hydrocephalus and brainstem compression. Need for tissue diagnosis as well. Decision made to resect the largest lesion in the cerebellum and radiate the rest of the lesions as this was the most life-threatening. Procedure  Right-sided suboccipital craniotomy for resection of intra-axial brain tumor  Use of Stealth neuro navigation    Surgeon(s):  Trista Michaud DO    Assistant:   First Assistant: Simone Viera; Maggie Mejia RN    Anesthesia: General    Estimated Blood Loss (mL): less than 359     Complications: None    Specimens:   ID Type Source Tests Collected by Time Destination   1 : URINE CULTURE Urine Urine, indwelling catheter CULTURE, URINE José Luis Boo, DO 3/29/2022 1902    A : CEREBELLAR MASS Tissue Brain SURGICAL PATHOLOGY Trista Goodmanie, DO 3/29/2022 1751    B : CEREBELLAR MASS Tissue Brain SURGICAL PATHOLOGY Tritsa Michaud, DO 3/29/2022 1821        Implants:  Implant Name Type Inv.  Item Serial No.  Lot No. LRB No. Used Action   PLATE QUIKFLAP 3X2 RIGID SD AXS - [id number]  PLATE QUIKFLAP 3X2 RIGID SD AXS  Symplified CRANIOMAXILLOFACIAL- [id number] N/A 2 Implanted   GRAFT DURA [id number] ULTRAPURE DURAGN + EA - [id number]  GRAFT DURA [id number] ULTRAPURE DURAGN + EA [id number] [employer] [facility]- [id number] N/A 1 Implanted         Drains:   Urethral Catheter Non-latex 16 fr (Active)   Catheter Indications Perioperative use for selected surgical procedures 03/29/22 2017   Site Assessment No urethral drainage 03/29/22 2017   Urine Color Yellow 03/29/22 2017   Urine Appearance Clear 03/29/22 2017 Findings: Intraoperative frozen pathology revealed metastatic adenocarcinoma of unknown origin    Detailed Description of Procedure:   Patient was consented preoperatively brought into the operative room and placed under anesthesia. She was placed left lateral decubitus with a axillary roll right arm placed at the side. [place] woods was applied myself after Stealth registration was performed. Head was placed approximately 30 degrees from the horizontal facing the ground toward the left. Right-sided retromastoid suboccipital region linear incision was marked out. After sterile prepping draping a timeout was performed infiltrated incision with onset lidocaine with epinephrine. 10 blade is used to perform incision followed Bovie to take all the way down to the scalpel the placement of cerebellar retractor. An 8 was used to make multiple bur holes at the inferior margin of the transverse sinus laterally at the transverse sigmoid junction and medially toward the midline. Caro Dy was used to generously clear the epidural space ensuring that the dural sinuses were completely clear. B1 with footplate was then used to turn a flap was handed off to the scrub tech replaced in saline. Thorough irrigation was performed. Punch was used to undermine the inner table cephalad. Dura was tented using 4-0 Nurolon followed by 15 blade from the durotomy followed by Caro Dy to elevate the dura and 15 blade to extend the durotomy in a curvilinear fashion flap it superiorly. Brain was full at this point and alberto and suction were used to remove CSF from the cisterna magna which allowed for brain relaxation. Navigation along blade was used to confirm appropriate approach including the incision craniotomy and durotomy.   At this point using navigation identify the medial border of the tumor and began bipolar cauterizing the cortex to perform a corticectomy at the medial margin and performed a partial lobectomy of the right cerebellar hemisphere. I then identified the cephalad and lateral extent identifying the tentorial dura and the petrous ridge. Amputation of the right lateral cerebellar hemisphere was performed and an en bloc removal of was removed with the tumor was handed off for final pathology. Prior to this a frozen specimen was sent and returned metastatic adenocarcinoma. My sonics was then used to generously remove the margins of the tumor both anteriorly and medially. White matter was noted and no residual tumor was identifiable grossly. At this point bipolar cauterization was performed to obtain hemostasis followed by lining the surgical cavity with Surgicel. Dura was lightly reapproximated with 4-0 Nurolon's after thorough irrigation of the wound. DuraGen was on laid and tucked under the bone edges circumferentially. The bone was then reaffixed using multiple dog bones. Wound was closed in multiple layers using 0 Vicryl for the muscle fascia inverted 2-0 Vicryl for subcutaneous tissue and a running 3-0 nylon for skin followed by bacitracin and island dressing. Patient was then taken to the [facility] extubated and returned to the PACU.     Electronically signed by Alfredo Rivera DO on 3/29/2022 at 8:19 PM

## 2022-03-30 NOTE — PROGRESS NOTES
Occupational Therapy   Occupational Therapy Initial Assessment  Date: 3/30/2022   Patient Name: Kinga Quinones  MRN: [id number]     : 1955    Date of Service: 3/30/2022    Re-evaluation completed s/p R suboccipital craniotomy     Discharge Recommendations:  Patient would benefit from continued therapy after discharge       Assessment   Performance deficits / Impairments: Decreased functional mobility ; Decreased ADL status; Decreased safe awareness;Decreased endurance;Decreased balance;Decreased high-level IADLs  Prognosis: Good  Decision Making: Medium Complexity  OT Education: Plan of Care;OT Role;Precautions; ADL Adaptive Strategies;Transfer Training;Equipment (Activity Promotion, Safety Awareness/Fall Prevention; good return)  REQUIRES OT FOLLOW UP: Yes  Activity Tolerance  Activity Tolerance: Patient Tolerated treatment well  Safety Devices  Type of devices: Call light within reach;Nurse notified; Left in bed  Restraints  Initially in place: No           Patient Diagnosis(es): There were no encounter diagnoses. has a past medical history of Anxiety, Benign polyp of large intestine, Cancer ([facility] [address] COPD (chronic obstructive pulmonary disease) ([facility] [address] Hx of blood clots, Hyperlipidemia, Hypertension, Liver hemangioma, Lung nodule, Snores, Uterine fibroid, and Wears glasses. has a past surgical history that includes Hysterectomy (); Abdominal hernia repair (); Colonoscopy; Lung biopsy; Breast biopsy (Left, ); Lung removal, partial (Right, 2018); pr rmvl lung other than pneumonectomy 1 lobe lobect (Left, 2018); bronchoscopy (10/1/2018); pr [facility] incl fluor gdnce dx w/cell washg spx (N/A, 10/29/2018); other surgical history (Right, 2018); Hysterectomy, total abdominal; and craniotomy (N/A, 3/29/2022).          Restrictions  Restrictions/Precautions  Restrictions/Precautions: Seizure  Required Braces or Orthoses?: No  Position Activity Restriction  Other position/activity restrictions: up with assistance, s/p R suboccipital craniotomy 3/29    Subjective   General  Patient assessed for rehabilitation services?: Yes  Family / Caregiver Present: Yes ()  General Comment  Comments: RN ok'd for therapy this PM. OT re-evaluation performed this date s/p R suboccipital craniotomy; pt agreeable to participate in session and pleasant throughout.   Patient Currently in Pain: Denies    Oxygen Therapy  Pulse Oximeter Device Mode: Intermittent  O2 Device: None (Room air)     Social/Functional History  Social/Functional History  Lives With: Spouse  Type of Home: House  Home Layout: Two level,Bed/Bath upstairs,1/2 bath on main level  Home Access: Stairs to enter without rails  Entrance Stairs - Number of Steps: 2  Entrance Stairs - Rails: None  Bathroom Shower/Tub: Tub/Shower unit,Curtain  Bathroom Toilet: Standard  Home Equipment: Rolling walker (ambulates without AD at baseline)  ADL Assistance: Independent  Homemaking Assistance: Independent  Homemaking Responsibilities: Yes  Meal Prep Responsibility: Primary  Laundry Responsibility: Primary  Cleaning Responsibility: Primary  Shopping Responsibility: Primary  Ambulation Assistance: Independent  Transfer Assistance: Independent  Active : Yes  Mode of Transportation: Car  Occupation: Retired  Type of occupation: [employer]  Leisure & Hobbies: iPAD, socialize, traveling  Additional Comments: Pt reports spouse able to provide 24hr assistance       Objective   Vision: Impaired  Vision Exceptions: Wears glasses at all times  Hearing: Within functional limits    Orientation  Overall Orientation Status: Within Functional Limits        Balance  Sitting Balance: Supervision  Standing Balance: Stand by assistance  Standing Balance  Time: ~3-6 minute bouts  Activity: functional mobility to/from bathroom, toileting tasks, sink side grooming tasks, functional mobility within hallway  Comment: SBA with use of RW; mildly unsteady however no true LOB; pt with ADL    AM-PAC Score   AM-PAC Inpatient Daily Activity Raw Score: 20 (03/30/22 1619)  AM-PAC Inpatient ADL T-Scale Score : 42.03 (03/30/22 1619)  ADL Inpatient CMS 0-100% Score: 38.32 (03/30/22 1619)  ADL Inpatient CMS G-Code Modifier : CJ (03/30/22 1619)    Goals  Short term goals  Time Frame for Short term goals: Pt will, by discharge:  Short term goal 1: Perform ADL tasks independently  Short term goal 2:  Independently demo good safety awareness during engagment in all ADLs and functional transfers/functional mobility  Short term goal 3: Perform functional transfers/functional mobility independently  Short term goal 4: Demo 10+ minutes standing tolerance for increased participation in ADL/IADL tasks       Therapy Time   Individual Concurrent Group Co-treatment   Time In 1419         Time Out 1442         Minutes 23         Timed Code Treatment Minutes: [address], OTR/L

## 2022-03-30 NOTE — PROGRESS NOTES
Neurosurgery JACOBY/Resident    Daily Progress Note   No chief complaint on file. 3/30/2022  11:46 AM    Chart reviewed. No acute events overnight. No new complaints. Mild headache. Denies nausea. Vitals:    03/30/22 0700 03/30/22 0800 03/30/22 0920 03/30/22 1000   BP: (!) 145/63 (!) 147/74 (!) 175/75 (!) 174/62   Pulse: 61 61 62 67   Resp: 13 14 13 15   Temp:  98.1 °F (36.7 °C)     TempSrc:  Oral     SpO2: 94% 93%     Weight:       Height:             PE: AOx3   CNII-XII intact   PERRL, EOMI   Motor   L deltoid 5/5; R deltoid 5/5  L biceps 5/5; R biceps 5/5  L triceps 5/5; R triceps 5/5  L intrinsics 5/5; R intrinsics 5/5      L iliopsoas 5/5 , R iliopsoas 5/5  L quadriceps 5/5; R quadriceps 5/5  L Dorsiflexion 5/5; R dorsiflexion 5/5  L Plantarflexion 5/5; R plantarflexion 5/5    Sensation intact    Incision cranial dressing dry and intact      Lab Results   Component Value Date    WBC 11.8 (H) 03/30/2022    HGB 12.4 03/30/2022    HCT 37.9 03/30/2022     03/30/2022    CHOL 232 (H) 10/12/2021    TRIG 140 10/12/2021    HDL 77 10/12/2021    ALT 18 03/27/2022    AST 21 03/27/2022     03/30/2022    K 4.0 03/30/2022     (H) 03/30/2022    CREATININE 0.50 03/30/2022    BUN 17 03/30/2022    CO2 21 03/30/2022    TSH 1.77 05/11/2016    INR 1.0 11/05/2018    GLUF 110 (H) 05/11/2017    LABA1C 5.0 06/28/2018       Radiology   CT HEAD WO CONTRAST    Result Date: 3/30/2022  EXAMINATION: CT OF THE HEAD WITHOUT CONTRAST  3/30/2022 4:29 am TECHNIQUE: CT of the head was performed without the administration of intravenous contrast. Dose modulation, iterative reconstruction, and/or weight based adjustment of the mA/kV was utilized to reduce the radiation dose to as low as reasonably achievable.  COMPARISON: CT and MRI 03/26/2022 HISTORY: ORDERING SYSTEM PROVIDED HISTORY: f/u post crani TECHNOLOGIST PROVIDED HISTORY: f/u post crani FINDINGS: BRAIN/VENTRICLES: Sequelae of right occipital craniotomy and resection of a right cerebellar mass is noted. There is a fluid collection and focus of air within the surgical bed. There is no residual tumor evident. Vasogenic edema within the left occipital lobe is noted consistent with a known metastatic lesion in this location. Multiple additional subcentimeter metastatic lesions are noted demonstrating vague areas of hypodensity, better appreciated on the recent MRI. There is no acute infarct or acute intracranial hemorrhage. There is no mass effect or midline shift. There is no ventriculomegaly. ORBITS: Limited evaluation of the orbits is unremarkable. SINUSES: The paranasal sinuses and mastoid air cells are clear. SOFT TISSUES/SKULL:  Sequelae of right occipital craniotomy is noted. 1. Interval right occipital craniotomy and resection of a right cerebellar mass. 2. Redemonstration of multiple additional known intracranial metastatic lesions. 3. No acute infarct, acute intracranial hemorrhage or worsened mass effect. MRI BRAIN W WO CONTRAST    Result Date: 3/30/2022  EXAMINATION: MRI OF THE BRAIN WITHOUT AND WITH CONTRAST  3/30/2022 10:30 am TECHNIQUE: Multiplanar multisequence MRI of the head/brain was performed without and with the administration of intravenous contrast. COMPARISON: CT brain performed 03/30/2022. MRI brain performed 03/26/2022. HISTORY: ORDERING SYSTEM PROVIDED HISTORY: post crani, brain mets TECHNOLOGIST PROVIDED HISTORY: post crani, brain mets Reason for Exam: post crani, brain mets FINDINGS: INTRACRANIAL STRUCTURES/VENTRICLES:  The sellar and suprasellar structures, optic chiasm, corpus callosum, pineal gland, tectum, and midline brainstem structures are unremarkable. The craniocervical junction is unremarkable. There is a postoperative resection cavity within the right aspect of the posterior fossa status post partial resection of the right cerebellar hemisphere. There is adjacent edema in the right cerebellar hemisphere.  There is fluid and air within the resection cavity. There is no significant residual enhancement within the cerebellum adjacent to the resection cavity. Supratentorially there is redemonstration of enhance sing right frontal lobe mass, enhancing right occipital lobe mass, and enhancing posterior left temporal lobe mass laterally and medially. These demonstrate adjacent edema. There is no significant mass effect or midline shift. The ventricular structures are unremarkable. ORBITS: The visualized portion of the orbits demonstrate no acute abnormality. SINUSES: There is a mucous retention cyst in the left maxillary sinus. The mastoid air cells are normally aerated. There is no abnormal restricted diffusion. BONES/SOFT TISSUES: The bone marrow signal intensity appears normal. The soft tissues demonstrate no acute abnormality. Postoperative resection cavity in the right aspect of the posterior fossa status post partial resection of the right cerebellar hemisphere. No definite residual metastatic focus is demonstrated. Multiple similar metastatic foci are demonstrated supratentorially with adjacent edema. No significant mass effect or midline shift. A/P  Multifocal metastatic brain lesions with vasogenic edema  POD#1 right sided suboccipital craniotomy for resection of brain tumor     - f/u post op MRI brain, transfer out of ICU pending findings  - wean decadron over 10 days   - PT and OT eval  - f/u pathology  - oncology following    Please contact neurosurgery with any changes in patients neurologic status.        Pat Rhodes CNP  3/30/22  11:46 AM

## 2022-03-30 NOTE — H&P
Neuro ICU History & Physical    Patient Name: Silvia Valencia  Patient : 1955  Room/Bed: 6561/0965-03  Code Status: Full code  Allergies:    Allergies   Allergen Reactions    Codeine Nausea And Vomiting       CHIEF COMPLAINT     Brain mets, status post brain Crani    HPI    History Obtained From:     The patient is a 61 y.o. 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 post lobectomy in 2018 with chemoradiation therapy, DVT, COPD, hypertension, hyperlipidemia, presented with a seizure of new onset, admitted to the hospital for management of new onset seizure,    Patient presented to [facility] emergency room after having seizure-like activity causing LOC at the grocery store, tonic-clonic motion, patient does not recall events confused postictally, patient was found to have intraparenchymal hemorrhage,  -CT head demonstrates right cerebral hemisphere mass with calcification as well as possible vasogenic edema and associated with acute IPH, with mild mass-effect on the fourth ventricle but no hydrocephalus, patient was loaded with Keppra started on 500 twice daily, also on steroids,  Neurosurgery suspected metastatic disease to brain secondary to lung cancer vasogenic edema, recommended Decadron 4 every 6,  -MRI brain multifocal lesions with the largest one being right cerebellar, multifocal cerebral metastatic disease,    Neurosurgery explained to the patient and family, infratentorial lesions was fairly large at the x3 cm in the tightest enclosed space which was the posterior fossa, risk of nonoperative management with steroids and radiation also were explained, and the risk of hydrocephalus with demise along with a significant brainstem compression, patient and family would like to go with surgical option    -Neurology was following recommended continue Keppra 500 mg twice daily,    Craniotomy took place on 3/29 by Dr. Nuha Harrison, neurosurgery postop recommendation follow-up CT head, MRI brain [facility] 0 tomorrow, Ancef 2 g every 8, head of the bed 30 to 45 degree, SBP  no blood thinners      Admitted to ICU From: Postop         PATIENT HISTORY   Past Medical History:        Diagnosis Date    Anxiety     Benign polyp of large intestine     Cancer ([facility] [address]     LT UPPER LOBE    COPD (chronic obstructive pulmonary disease) ([facility] [address]     Hx of blood clots     DVT LT LEG    Hyperlipidemia     Hypertension     Liver hemangioma     Lung nodule     BARAK  Nodule    Snores     not tested for apnea    Uterine fibroid 2010    Wears glasses        Past Surgical History:        Procedure Laterality Date    ABDOMINAL HERNIA REPAIR  2012    BREAST BIOPSY Left     BRONCHOSCOPY  10/1/2018    BRONCHOSCOPY performed by [facility]udia [facility]derr[facility] at [facility]    COLONOSCOPY      HYSTERECTOMY      HYSTERECTOMY, TOTAL ABDOMINAL      LUNG BIOPSY      LUNG REMOVAL, PARTIAL Right 2018    Robotic assisted left lobectomy, upper with lymph node biopsy    OTHER SURGICAL HISTORY Right 2018    IR PORT PLACEMENT EQUAL OR GREATER THAN 5 YEARS    MA [address] INCL FLUOR GDNCE DX W/CELL WASHG SPX N/A 10/29/2018    BRONCHOSCOPY performed by Corwin Mc MD at [facility] LUNG OTHER THAN PNEUMONECTOMY 1 LOBE LOBECT Left 2018    XI ROBOTIC ASSISTED LEFT UPPER LOBECTOMY MULTIPLE LYMPH NODE BIOPSY, INTERCOSTAL  NERVE BLOCK ABLATION W/EXPAREL performed by Jocelyn Mina MD at [address] [place] [address] History:   Social History     Socioeconomic History    Marital status:      Spouse name: Not on file    Number of children: Not on file    Years of education: Not on file    Highest education level: Not on file   Occupational History    Not on file   Tobacco Use    Smoking status: Former Smoker     Packs/day: 1.50     Years: 30.00     Pack years: 45.00     Types: Cigarettes     Quit date:      Years since quittin.2    Smokeless tobacco: Never Used   Vaping Use    Vaping Use: Never used   Substance and Sexual Activity    Alcohol use: Yes     Comment: Occassionally    Drug use: No    Sexual activity: Not on file   Other Topics Concern    Not on file   Social History Narrative    Not on file     Social Determinants of Health     Financial Resource Strain: Low Risk     Difficulty of Paying Living Expenses: Not hard at all   Food Insecurity: No Food Insecurity    Worried About [address] in the Last Year: Never true    [facility] in the Last Year: Never true   Transportation Needs:     Lack of Transportation (Medical): Not on file    Lack of Transportation (Non-Medical):  Not on file   Physical Activity:     Days of Exercise per Week: Not on file    Minutes of Exercise per Session: Not on file   Stress:     Feeling of Stress : Not on file   Social Connections:     Frequency of Communication with Friends and Family: Not on file    Frequency of Social Gatherings with Friends and Family: Not on file    Attends Mosque Services: Not on file    Active Member of [address] [place] [address] or Organizations: Not on file    Attends Club or Organization Meetings: Not on file    Marital Status: Not on file   Intimate Partner Violence:     Fear of Current or Ex-Partner: Not on file    Emotionally Abused: Not on file    Physically Abused: Not on file    Sexually Abused: Not on file   Housing Stability:     Unable to Pay for Housing in the Last Year: Not on file    Number of [place] in the Last Year: Not on file    Unstable Housing in the Last Year: Not on file       Family History:       Problem Relation Age of Onset    Cancer Mother         LUNG    Cancer Sister         LUNG       Allergies:    Codeine    Medications Prior to Admission:    Medications Prior to Admission: ALPRAZolam (XANAX) 0.25 MG tablet, take 1 tablet by mouth nightly if needed for sleep for 30 DAYS  albuterol sulfate  (90 Base) MCG/ACT inhaler, inhale 2 puffs by mouth four times a day  lisinopril-hydroCHLOROthiazide (PRINZIDE;ZESTORETIC) 10-12.5 MG per tablet, Take 1 tablet by mouth daily  lovastatin (MEVACOR) 10 MG tablet, Take 1 tablet by mouth nightly    Current Medications:  Current Facility-Administered Medications: lisinopril (PRINIVIL;ZESTRIL) tablet 5 mg, 5 mg, Oral, Daily  hydroCHLOROthiazide (HYDRODIURIL) tablet 25 mg, 25 mg, Oral, Daily  ondansetron (ZOFRAN) injection 4 mg, 4 mg, IntraVENous, Q6H PRN  ALPRAZolam (XANAX) tablet 0.5 mg, 0.5 mg, Oral, 4x Daily PRN  cloNIDine (CATAPRES) tablet 0.1 mg, 0.1 mg, Oral, Q4H PRN  sodium chloride flush 0.9 % injection 10 mL, 10 mL, IntraVENous, PRN  levETIRAcetam (KEPPRA) tablet 500 mg, 500 mg, Oral, BID  sodium chloride flush 0.9 % injection 5-40 mL, 5-40 mL, IntraVENous, 2 times per day  sodium chloride flush 0.9 % injection 10 mL, 10 mL, IntraVENous, PRN  0.9 % sodium chloride infusion, 25 mL, IntraVENous, PRN  potassium chloride (KLOR-CON M) extended release tablet 40 mEq, 40 mEq, Oral, PRN **OR** potassium bicarb-citric acid (EFFER-K) effervescent tablet 40 mEq, 40 mEq, Oral, PRN **OR** potassium chloride 10 mEq/100 mL IVPB (Peripheral Line), 10 mEq, IntraVENous, PRN  magnesium sulfate 1000 mg in dextrose 5% 100 mL IVPB, 1,000 mg, IntraVENous, PRN  ondansetron (ZOFRAN-ODT) disintegrating tablet 4 mg, 4 mg, Oral, Q8H PRN **OR** ondansetron (ZOFRAN) injection 4 mg, 4 mg, IntraVENous, Q6H PRN  polyethylene glycol (GLYCOLAX) packet 17 g, 17 g, Oral, Daily PRN  acetaminophen (TYLENOL) tablet 650 mg, 650 mg, Oral, Q6H PRN **OR** acetaminophen (TYLENOL) suppository 650 mg, 650 mg, Rectal, Q6H PRN  dexamethasone (DECADRON) injection 4 mg, 4 mg, IntraVENous, Q6H    REVIEW OF SYSTEMS     CONSTITUTIONAL: negative for fatigue and malaise   EYES: negative for double vision and photophobia    HEENT: negative for tinnitus and sore throat   RESPIRATORY: negative for cough, shortness of breath CARDIOVASCULAR: negative for chest pain, palpitations, or syncope   GASTROINTESTINAL: negative for abdominal pain, nausea, vomiting, diarrhea, or constipation    GENITOURINARY: negative for incontinence or retention    MUSCULOSKELETAL: negative for neck or back pain, negative for extremity pain   NEUROLOGICAL: Negative for seizures, headaches, weakness, numbness, confusion, aphasia, dysarthria    PSYCHIATRIC: negative for agitation, hallucination, SI/HI   SKIN Negative for spontaneous contusions, rashes, or lesions      PHYSICAL EXAM:     /61   Pulse 59   Temp 96.8 °F (36 °C) (Temporal)   Resp 17   Ht 4' 11\" (1.499 m)   Wt 161 lb (73 kg)   SpO2 99%   BMI 32.52 kg/m²     PHYSICAL EXAM:  CONSTITUTIONAL:  Well developed, well nourished, alert and oriented x 3, in no acute distress. GCS 15. Nontoxic. No dysarthria. No aphasia.    HEAD:  normocephalic, atraumatic    EYES:  PERRLA, EOMI.   ENT:  moist mucous membranes   LUNGS:  Equal air entry bilaterally   CARDIOVASCULAR:  normal s1 / s2   ABDOMEN:  Soft, no rigidity   NECK supple, symmetric, no midline tenderness to palpation    BACK without midline tenderness, step-offs or deformities    EXTREMITIES Normal ROM with no deformities   NEUROLOGIC:  Mental Status:  A & O x3,lethargic             Cranial Nerves:    VII: Facial strength: abnormal Right facial droop    Motor Exam:    Drift:  absent  Tone:  normal    Motor exam is symmetrical 5 out of 5 all extremities bilaterally    Sensory:    Touch:    Right Upper Extremity:  normal  Left Upper Extremity:  normal  Right Lower Extremity:  normal  Left Lower Extremity:  normal    Deep Tendon Reflexes:    Right Bicep:  2+  Left Bicep:  2+  Right Knee:  2+  Left Knee:  2+      Clonus:  absent  Kate's:  N/A    Gait: Not done    SKIN No obvious ecchymosis, rashes, or lesions        LABS AND IMAGING:     RECENT LABS:  CBC with Differential:    Lab Results   Component Value Date    WBC 9.0 03/29/2022    RBC 4.36 03/29/2022    HGB 13.7 03/29/2022    HCT 39.9 03/29/2022     03/29/2022    MCV 91.5 03/29/2022    MCH 31.4 03/29/2022    MCHC 34.3 03/29/2022    RDW 12.1 03/29/2022    LYMPHOPCT 7 03/26/2022    MONOPCT 2 03/26/2022    BASOPCT 0 03/26/2022    MONOSABS 0.15 03/26/2022    LYMPHSABS 0.54 03/26/2022    EOSABS 0.00 03/26/2022    BASOSABS 0.00 03/26/2022    DIFFTYPE NOT REPORTED 10/12/2021     BMP:    Lab Results   Component Value Date     03/29/2022    K 4.0 03/29/2022     03/29/2022    CO2 20 03/29/2022    BUN 21 03/29/2022    LABALBU 4.2 03/27/2022    CREATININE 0.57 03/29/2022    CALCIUM 9.1 03/29/2022    GFRAA >60 03/29/2022    LABGLOM >60 03/29/2022    GLUCOSE 106 03/29/2022       RADIOLOGY:       Labs and Images reviewed with:    [x] Cynthia Fuchs MD    [] Mar Mccall MD  [] Jackeline Andersen MD  --[] there are no new interval images to review. ASSESSMENT AND PLAN:         ASSESSMENT:     81-year-old female with known history of lung cancer status post lobectomy, chemotherapy and radiation presents with a new onset seizure with loss of consciousness associated with tongue bite,  -MRI done showed multifocal lesions with the largest one being right cerebellar, multifocal cerebral metastatic disease,    Decision was made for craniotomy by neurosurgery,       Patient care will be discussed with attending, will reevaluate patient along with attending.      PLAN/MEDICAL DECISION MAKING:    NEUROLOGIC:  - Imaging: MRI W0/W3/26: Multiple supra and infratentorial intraparenchymal lesion are most consistent with metastatic disease, associated edema with minimal mass-effect no midline shift,  -Postop day 0: Craniotomy  - follow-up CT,   - MRI WW 0 on 3/30/2022  - AEDs 500 mg twice daily,  - Decadron 4 mg every 6  - Goal SBP 90- 150  - Ancef 2 g every 8,   - HOB 30 to 45  - Neuro checks per protocol    CARDIOVASCULAR:  - Goal SBP 90- 150  - Continue telemetry    PULMONARY:  -Satting well on 3 L nasal cannula    RENAL/FLUID/ELECTROLYTE:  - BUN 21/ Creatinine 0.57  - Urine output monitor  - IVF:   - Replace electrolytes PRN  - Daily BMP    GI/NUTRITION:  NUTRITION:  Diet NPO  - Bowel regimen:  - GI prophylaxis:    ID:  - Tmax 97.3  - WBC: 5.4  - Continue to monitor for fevers  - Daily CBC    HEME:   -Heme-onc is following: CT abdomen pelvis shows renal metastasis,  - H&H 13.5/39.9  - Platelets 326  - Daily CBC    ENDOCRINE:  - Continue to monitor blood glucose, goal <180    OTHER:  - PT/OT/ST    PROPHYLAXIS:  Stress ulcer:     DVT PROPHYLAXIS:  -No DVT prophylaxis or antiplatelets    DISPOSITION: keep in the icu       Baljinder Kasper MD  Neuro Critical Care Service   Pager [phone]  3/29/2022     10:08 PM

## 2022-03-31 VITALS
TEMPERATURE: 98.2 F | RESPIRATION RATE: 12 BRPM | HEART RATE: 88 BPM | SYSTOLIC BLOOD PRESSURE: 132 MMHG | DIASTOLIC BLOOD PRESSURE: 75 MMHG | BODY MASS INDEX: 32.46 KG/M2 | OXYGEN SATURATION: 95 % | HEIGHT: 59 IN | WEIGHT: 161 LBS

## 2022-03-31 LAB
ABSOLUTE EOS #: <0.03 K/UL (ref 0–0.44)
ABSOLUTE IMMATURE GRANULOCYTE: 0.09 K/UL (ref 0–0.3)
ABSOLUTE LYMPH #: 0.77 K/UL (ref 1.1–3.7)
ABSOLUTE MONO #: 0.72 K/UL (ref 0.1–1.2)
ANION GAP SERPL CALCULATED.3IONS-SCNC: 14 MMOL/L (ref 9–17)
BASOPHILS # BLD: 0 % (ref 0–2)
BASOPHILS ABSOLUTE: <0.03 K/UL (ref 0–0.2)
BUN BLDV-MCNC: 16 MG/DL (ref 8–23)
CALCIUM SERPL-MCNC: 9.2 MG/DL (ref 8.6–10.4)
CHLORIDE BLD-SCNC: 97 MMOL/L (ref 98–107)
CO2: 25 MMOL/L (ref 20–31)
CREAT SERPL-MCNC: 0.63 MG/DL (ref 0.5–0.9)
EOSINOPHILS RELATIVE PERCENT: 0 % (ref 1–4)
GFR AFRICAN AMERICAN: >60 ML/MIN
GFR NON-AFRICAN AMERICAN: >60 ML/MIN
GFR SERPL CREATININE-BSD FRML MDRD: ABNORMAL ML/MIN/{1.73_M2}
GLUCOSE BLD-MCNC: 113 MG/DL (ref 70–99)
HCT VFR BLD CALC: 38.4 % (ref 36.3–47.1)
HEMOGLOBIN: 13.1 G/DL (ref 11.9–15.1)
IMMATURE GRANULOCYTES: 1 %
LYMPHOCYTES # BLD: 7 % (ref 24–43)
MCH RBC QN AUTO: 31.3 PG (ref 25.2–33.5)
MCHC RBC AUTO-ENTMCNC: 34.1 G/DL (ref 28.4–34.8)
MCV RBC AUTO: 91.6 FL (ref 82.6–102.9)
MONOCYTES # BLD: 7 % (ref 3–12)
NRBC AUTOMATED: 0 PER 100 WBC
PDW BLD-RTO: 12.2 % (ref 11.8–14.4)
PLATELET # BLD: 260 K/UL (ref 138–453)
PMV BLD AUTO: 10.1 FL (ref 8.1–13.5)
POTASSIUM SERPL-SCNC: 4.2 MMOL/L (ref 3.7–5.3)
RBC # BLD: 4.19 M/UL (ref 3.95–5.11)
SEG NEUTROPHILS: 85 % (ref 36–65)
SEGMENTED NEUTROPHILS ABSOLUTE COUNT: 9.19 K/UL (ref 1.5–8.1)
SODIUM BLD-SCNC: 136 MMOL/L (ref 135–144)
WBC # BLD: 10.8 K/UL (ref 3.5–11.3)

## 2022-03-31 PROCEDURE — 6370000000 HC RX 637 (ALT 250 FOR IP): Performed by: NURSE PRACTITIONER

## 2022-03-31 PROCEDURE — 99239 HOSP IP/OBS DSCHRG MGMT >30: CPT | Performed by: FAMILY MEDICINE

## 2022-03-31 PROCEDURE — 6370000000 HC RX 637 (ALT 250 FOR IP)

## 2022-03-31 PROCEDURE — 2580000003 HC RX 258

## 2022-03-31 PROCEDURE — 6360000002 HC RX W HCPCS: Performed by: REGISTERED NURSE

## 2022-03-31 PROCEDURE — 36415 COLL VENOUS BLD VENIPUNCTURE: CPT

## 2022-03-31 PROCEDURE — 80048 BASIC METABOLIC PNL TOTAL CA: CPT

## 2022-03-31 PROCEDURE — 85025 COMPLETE CBC W/AUTO DIFF WBC: CPT

## 2022-03-31 PROCEDURE — 94761 N-INVAS EAR/PLS OXIMETRY MLT: CPT

## 2022-03-31 PROCEDURE — 6370000000 HC RX 637 (ALT 250 FOR IP): Performed by: STUDENT IN AN ORGANIZED HEALTH CARE EDUCATION/TRAINING PROGRAM

## 2022-03-31 PROCEDURE — 94640 AIRWAY INHALATION TREATMENT: CPT

## 2022-03-31 RX ORDER — DEXAMETHASONE 4 MG/1
4 TABLET ORAL EVERY 8 HOURS SCHEDULED
Qty: 6 TABLET | Refills: 0 | Status: SHIPPED | OUTPATIENT
Start: 2022-04-02 | End: 2022-04-04

## 2022-03-31 RX ORDER — DEXAMETHASONE 2 MG/1
2 TABLET ORAL DAILY
Qty: 2 TABLET | Refills: 0 | Status: SHIPPED | OUTPATIENT
Start: 2022-04-10 | End: 2022-04-12

## 2022-03-31 RX ORDER — PANTOPRAZOLE SODIUM 40 MG/1
40 TABLET, DELAYED RELEASE ORAL
Qty: 30 TABLET | Refills: 1 | Status: SHIPPED | OUTPATIENT
Start: 2022-03-31 | End: 2022-05-11 | Stop reason: DRUGHIGH

## 2022-03-31 RX ORDER — LEVETIRACETAM 500 MG/1
500 TABLET ORAL 2 TIMES DAILY
Qty: 60 TABLET | Refills: 3 | Status: SHIPPED | OUTPATIENT
Start: 2022-03-31 | End: 2022-06-30 | Stop reason: SDUPTHER

## 2022-03-31 RX ORDER — OXYCODONE HYDROCHLORIDE 10 MG/1
10 TABLET ORAL EVERY 6 HOURS PRN
Qty: 12 TABLET | Refills: 0 | Status: SHIPPED | OUTPATIENT
Start: 2022-03-31 | End: 2022-04-06

## 2022-03-31 RX ORDER — DEXAMETHASONE 4 MG/1
4 TABLET ORAL EVERY 6 HOURS
Qty: 8 TABLET | Refills: 0 | Status: SHIPPED | OUTPATIENT
Start: 2022-03-31 | End: 2022-04-06 | Stop reason: SDUPTHER

## 2022-03-31 RX ORDER — DEXAMETHASONE 4 MG/1
4 TABLET ORAL EVERY 12 HOURS SCHEDULED
Qty: 4 TABLET | Refills: 0 | Status: SHIPPED | OUTPATIENT
Start: 2022-04-05 | End: 2022-04-07

## 2022-03-31 RX ORDER — DEXAMETHASONE 4 MG/1
4 TABLET ORAL DAILY
Qty: 2 TABLET | Refills: 0 | Status: SHIPPED | OUTPATIENT
Start: 2022-04-08 | End: 2022-04-10

## 2022-03-31 RX ADMIN — ALBUTEROL SULFATE 2 PUFF: 90 AEROSOL, METERED RESPIRATORY (INHALATION) at 08:51

## 2022-03-31 RX ADMIN — HYDROCHLOROTHIAZIDE 25 MG: 25 TABLET ORAL at 09:09

## 2022-03-31 RX ADMIN — DEXAMETHASONE 4 MG: 4 TABLET ORAL at 00:09

## 2022-03-31 RX ADMIN — OXYCODONE HYDROCHLORIDE 10 MG: 5 TABLET ORAL at 01:28

## 2022-03-31 RX ADMIN — DEXAMETHASONE 4 MG: 4 TABLET ORAL at 11:20

## 2022-03-31 RX ADMIN — DEXAMETHASONE 4 MG: 4 TABLET ORAL at 05:35

## 2022-03-31 RX ADMIN — OXYCODONE HYDROCHLORIDE 10 MG: 5 TABLET ORAL at 09:08

## 2022-03-31 RX ADMIN — FAMOTIDINE 20 MG: 20 TABLET, FILM COATED ORAL at 09:09

## 2022-03-31 RX ADMIN — LEVETIRACETAM 500 MG: 500 TABLET, FILM COATED ORAL at 09:10

## 2022-03-31 RX ADMIN — SODIUM CHLORIDE, PRESERVATIVE FREE 10 ML: 5 INJECTION INTRAVENOUS at 09:10

## 2022-03-31 RX ADMIN — LISINOPRIL 5 MG: 5 TABLET ORAL at 09:10

## 2022-03-31 RX ADMIN — OXYCODONE HYDROCHLORIDE 10 MG: 5 TABLET ORAL at 05:35

## 2022-03-31 ASSESSMENT — PAIN DESCRIPTION - LOCATION
LOCATION: HEAD
LOCATION: HEAD;NECK
LOCATION: HEAD

## 2022-03-31 ASSESSMENT — PAIN DESCRIPTION - ORIENTATION
ORIENTATION: RIGHT;POSTERIOR

## 2022-03-31 ASSESSMENT — PAIN DESCRIPTION - DESCRIPTORS
DESCRIPTORS: ACHING;SHARP
DESCRIPTORS: ACHING;SHARP

## 2022-03-31 ASSESSMENT — PAIN SCALES - GENERAL
PAINLEVEL_OUTOF10: 5
PAINLEVEL_OUTOF10: 7
PAINLEVEL_OUTOF10: 4
PAINLEVEL_OUTOF10: 6

## 2022-03-31 ASSESSMENT — PAIN DESCRIPTION - PROGRESSION
CLINICAL_PROGRESSION: GRADUALLY IMPROVING
CLINICAL_PROGRESSION: GRADUALLY IMPROVING

## 2022-03-31 ASSESSMENT — PAIN DESCRIPTION - ONSET
ONSET: ON-GOING
ONSET: ON-GOING

## 2022-03-31 ASSESSMENT — PAIN - FUNCTIONAL ASSESSMENT
PAIN_FUNCTIONAL_ASSESSMENT: ACTIVITIES ARE NOT PREVENTED
PAIN_FUNCTIONAL_ASSESSMENT: ACTIVITIES ARE NOT PREVENTED

## 2022-03-31 ASSESSMENT — PAIN DESCRIPTION - FREQUENCY
FREQUENCY: CONTINUOUS
FREQUENCY: CONTINUOUS

## 2022-03-31 ASSESSMENT — PAIN DESCRIPTION - PAIN TYPE
TYPE: ACUTE PAIN

## 2022-03-31 NOTE — DISCHARGE SUMMARY
[facility]  Office: [address], DO, Greggjosafat Wang, DO, Hallieirma Shepherd, DO, Tomasa Jose Das, DO, William Mukherjee MD, Kisha Ling MD, Darryle So, MD, Aneta Tyler MD, Alfred Monk MD, Narinder Gunderson MD, Isiah Nichole MD, Adrian Bruno, DO, Paloma Patten DO, Tobin Baird MD,  Tasneem Carrillo DO, Cinthia Barrera MD, Leonidas Guzman MD, Alok James MD, John Rod DO, Tate Pedro MD, Petra Montanez MD, Mireya Urias, [facility], [facility], CNP, Efren Damon, CNP, Mesfin Mann, CNS, Miguel Neal, CNP, Chris Clark, CNP, Lorena Burks, CNP, Reese Field, CNP, Wendy Israel, CNP, Kady Harris PA-C, Talia Burnett, DNP, Bautista Jacobs [facility], Lewis Littlejohn, CNP, Yony Interiano, CNP, Michelle Newman, [facility]    Discharge Summary     Patient ID: Stan Hilliard  :  1955   MRN: [id number]     ACCOUNT:  [de-identified]   Patient's PCP: Celine Wong MD  Admit Date: 3/25/2022   Discharge Date: 3/31/2022     Length of Stay: 6  Code Status:  Full Code  Admitting Physician: Aneta Tyler MD  Discharge Physician: Aneta Tyler MD     Active Discharge Diagnoses:     Hospital Problem Lists:  Principal Problem:    New onset seizure [facility])  Active Problems:    Non-small cell cancer of left lung [facility])    Essential hypertension    Intraparenchymal hemorrhage of brain ([facility]    Mass of occipital region    Hyperglycemia    Hypokalemia    Vasogenic edema (HCC)    Brain metastases ([facility]    Adrenal mass ([facility] - right     Episode of loss of consciousness    Focal epilepsy ([facility]  Resolved Problems:    * No resolved hospital problems. *      Admission Condition:  poor     Discharged Condition: good    Hospital Stay:     Hospital course: The patient is a 62 y. o. 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 with chemoradiation therapy, DVT, COPD, hypertension, hyperlipidemia, presented with a seizure of new onset, admitted to the hospital for management of new onset seizure,     [facility] after having seizure-like activity causing LOC at the grocery store, tonic-clonic motion, patient does not recall events confused postictally, patient was found to have intraparenchymal hemorrhage,  -CT head demonstrates right cerebral hemisphere mass with calcification as well as possible vasogenic edema and associated with acute IPH, with mild mass-effect on the fourth ventricle but no hydrocephalus, patient was loaded with Keppra started on 500 twice daily, also on steroids,  Neurosurgery suspected metastatic disease to brain secondary to lung cancer vasogenic edema, recommended Decadron 4 every 6,  -MRI brain multifocal lesions with the largest one being right cerebellar, multifocal cerebral metastatic disease,     Neurosurgery explained to the patient and family, infratentorial lesions was fairly large at the x3 cm in the tightest enclosed space which was the posterior fossa, risk of nonoperative management with steroids and radiation also were explained, and the risk of hydrocephalus with demise along with a significant brainstem compression, patient and family would like to go with surgical option     -Neurology was following recommended continue Keppra 500 mg twice daily,     Craniotomy took place on 3/29 by Dr. Molinda Nissen postop recommendation follow-up CT head, MRI brain [employer] 0 tomorrow, Ancef 2 g every 8, head of the bed 30 to 45 degree, SBP  no blood thinners     3/29 : Rt sided suboccipital craniotomy.   Course afterwards went unremarkable , discharged on 3/31 in a stable condition     Significant therapeutic interventions: as above    Significant Diagnostic Studies:   Labs / Micro:  CBC:   Lab Results   Component Value Date    WBC 10.8 03/31/2022    RBC 4.19 03/31/2022    HGB 13.1 03/31/2022    HCT 38.4 03/31/2022    MCV 91.6 03/31/2022    MCH 31.3 03/31/2022    MCHC 34.1 03/31/2022    RDW 12.2 03/31/2022     03/31/2022     BMP:    Lab Results   Component Value Date    GLUCOSE 113 03/31/2022     03/31/2022    K 4.2 03/31/2022    CL 97 03/31/2022    CO2 25 03/31/2022    ANIONGAP 14 03/31/2022    BUN 16 03/31/2022    CREATININE 0.63 03/31/2022    BUNCRER NOT REPORTED 10/12/2021    CALCIUM 9.2 03/31/2022    LABGLOM >60 03/31/2022    GFRAA >60 03/31/2022    GFR      03/31/2022     HFP:    Lab Results   Component Value Date    PROT 6.8 03/27/2022     CMP:    Lab Results   Component Value Date    GLUCOSE 113 03/31/2022     03/31/2022    K 4.2 03/31/2022    CL 97 03/31/2022    CO2 25 03/31/2022    BUN 16 03/31/2022    CREATININE 0.63 03/31/2022    ANIONGAP 14 03/31/2022    ALKPHOS 70 03/27/2022    ALT 18 03/27/2022    AST 21 03/27/2022    BILITOT 0.30 03/27/2022    LABALBU 4.2 03/27/2022    ALBUMIN 1.6 03/27/2022    LABGLOM >60 03/31/2022    GFRAA >60 03/31/2022    GFR      03/31/2022    PROT 6.8 03/27/2022    CALCIUM 9.2 03/31/2022     PT/INR:    Lab Results   Component Value Date    PROTIME 10.6 11/05/2018    INR 1.0 11/05/2018     PTT:   Lab Results   Component Value Date    APTT 28.5 11/05/2018     FLP:    Lab Results   Component Value Date    CHOL 232 10/12/2021    TRIG 140 10/12/2021    HDL 77 10/12/2021     U/A:    Lab Results   Component Value Date    COLORU YELLOW 09/25/2018    TURBIDITY CLEAR 09/25/2018    SPECGRAV 1.009 09/25/2018    HGBUR NEGATIVE 09/25/2018    PHUR 7.5 09/25/2018    PROTEINU NEGATIVE 09/25/2018    GLUCOSEU NEGATIVE 09/25/2018    KETUA NEGATIVE 09/25/2018    BILIRUBINUR NEGATIVE 09/25/2018    UROBILINOGEN Normal 09/25/2018    NITRU NEGATIVE 09/25/2018    LEUKOCYTESUR NEGATIVE 09/25/2018     TSH:    Lab Results   Component Value Date    TSH 1.77 05/11/2016        Radiology:  CT HEAD WO CONTRAST    Result Date: 3/30/2022  1.  Interval right occipital craniotomy and resection of a right cerebellar mass. 2. Redemonstration of multiple additional known intracranial metastatic lesions. 3. No acute infarct, acute intracranial hemorrhage or worsened mass effect. CT HEAD WO CONTRAST    Result Date: 3/26/2022  1. Partially calcified, hemorrhagic mass within the right cerebellar hemisphere resulting in mild compression of the 4th ventricle without obstructive hydrocephalus at this time. 2. Areas of subcortical low attenuation throughout the bilateral cerebral hemispheres concerning for additional intracranial mass lesions. 3. No significant mass effect or midline shift. 4. Possible calvarial metastasis versus underlying intraosseous hemangiomas. No significant change. 5. Recent prior outside head CT images would be of benefit to determine stability. MRI CERVICAL SPINE WO CONTRAST    Result Date: 3/26/2022  1. Motion degraded examination with multilevel degenerative changes. There is moderate spinal canal stenosis at C5-6 and C6-7 with mild spinal canal narrowing at C3-4. Multilevel neural foraminal narrowing as above. 2. Reversal of the normal cervical lordosis with anterolisthesis at C3-4 and retrolisthesis at C5-6. MRI THORACIC SPINE WO CONTRAST    Result Date: 3/26/2022  1. No acute abnormality or aggressive osseous lesion. 2. Fatty marrow replacement from T5-T8, which could be due to prior treatment. 3. No significant degenerative change. MRI LUMBAR SPINE WO CONTRAST    Result Date: 3/26/2022  1. No acute abnormality or aggressive osseous lesion. 2.  No significant degenerative change. CT CHEST ABDOMEN PELVIS W CONTRAST    Result Date: 3/27/2022  1. Mild centrilobular emphysema. 2. Redemonstration of left perihilar post treatment scarring with underlying traction bronchiectasis extending into the upper lower lobes. Stable. 3. No evidence for metastatic disease in the chest. 4. A 2.1 x 1.4 cm right adrenal nodule not present in 2018.   CT appearance does not meet criteria for adenoma. Considered metastatic nodule until proven otherwise. 5. Redemonstration of hepatic hemangioma and several cysts. MRI BRAIN W WO CONTRAST    Result Date: 3/30/2022  Postoperative resection cavity in the right aspect of the posterior fossa status post partial resection of the right cerebellar hemisphere. No definite residual metastatic focus is demonstrated. Multiple similar metastatic foci are demonstrated supratentorially with adjacent edema. No significant mass effect or midline shift. MRI BRAIN W WO CONTRAST    Result Date: 3/26/2022  1. Multiple supra and infratentorial intraparenchymal lesions are most consistent with metastatic disease. There is associated edema with minimal mass effect, but no midline shift. 2. Intrinsically T1 hyperintense right frontal calvarial lesion is most consistent with an osseous hemangioma. No definite osseous metastasis identified.        Consultations:    Consults:     Final Specialist Recommendations/Findings:   IP CONSULT TO HEM/ONC  IP CONSULT TO NEUROSURGERY  IP CONSULT TO NEUROLOGY  IP CONSULT TO RADIATION ONCOLOGY  IP CONSULT TO NEUROCRITICAL CARE      The patient was seen and examined on day of discharge    A&O X 3   Scalp wound in dressing  AE diminished on basis   NSR, NO MRG  Soft abdomen , +BS  No swelling  and pulse palpable      Discharge plan:     Disposition: Home    Physician Follow Up:     [address], [address] # [facility] [phone]    Schedule an appointment as soon as possible for a visit in 2 weeks  Please follow up with neurosurgery in 2 weeks after surgery    Vania Thomas MD  [id number] [facility] [phone]    In 1 week         Requiring Further Evaluation/Follow Up POST HOSPITALIZATION/Incidental Findings:     Diet: regular diet    Activity: As tolerated    Instructions to Patient:     Discharge Medications: Medication List      START taking these medications    * dexamethasone 4 MG tablet  Commonly known as: DECADRON  Take 1 tablet by mouth every 6 hours for 2 days     * dexamethasone 4 MG tablet  Commonly known as: DECADRON  Take 1 tablet by mouth every 8 hours for 6 doses  Start taking on: April 2, 2022     * dexamethasone 4 MG tablet  Commonly known as: DECADRON  Take 1 tablet by mouth every 12 hours for 2 days  Start taking on: April 5, 2022     * dexamethasone 4 MG tablet  Commonly known as: DECADRON  Take 1 tablet by mouth daily for 2 days  Start taking on: April 8, 2022     * dexamethasone 2 MG tablet  Commonly known as: DECADRON  Take 1 tablet by mouth daily for 2 doses  Start taking on: April 10, 2022     levETIRAcetam 500 MG tablet  Commonly known as: KEPPRA  Take 1 tablet by mouth 2 times daily     oxyCODONE HCl 10 MG immediate release tablet  Commonly known as: OXY-IR  Take 1 tablet by mouth every 6 hours as needed for Pain for up to 3 days. pantoprazole 40 MG tablet  Commonly known as: PROTONIX  Take 1 tablet by mouth every morning (before breakfast)         * This list has 5 medication(s) that are the same as other medications prescribed for you. Read the directions carefully, and ask your doctor or other care provider to review them with you.             CONTINUE taking these medications    albuterol sulfate  (90 Base) MCG/ACT inhaler  inhale 2 puffs by mouth four times a day     ALPRAZolam 0.25 MG tablet  Commonly known as: XANAX  take 1 tablet by mouth nightly if needed for sleep for 30 DAYS     lisinopril-hydroCHLOROthiazide 10-12.5 MG per tablet  Commonly known as: PRINZIDE;ZESTORETIC  Take 1 tablet by mouth daily     lovastatin 10 MG tablet  Commonly known as: MEVACOR  Take 1 tablet by mouth nightly           Where to Get Your Medications      These medications were sent to [facility] [address] [facility], [address] [place] [address] [address] [address], [address]  [address]    Phone: [phone]   · dexamethasone 2 MG tablet  · dexamethasone 4 MG tablet  · dexamethasone 4 MG tablet  · dexamethasone 4 MG tablet  · dexamethasone 4 MG tablet  · levETIRAcetam 500 MG tablet  · oxyCODONE HCl 10 MG immediate release tablet  · pantoprazole 40 MG tablet         No discharge procedures on file. Time Spent on discharge is  35 mins in patient examination, evaluation, counseling as well as medication reconciliation, prescriptions for required medications, discharge plan and follow up. Electronically signed by   Lukas Wang MD  3/31/2022  1:40 PM      Thank you Dr. Anu Porter MD for the opportunity to be involved in this patient's care.

## 2022-03-31 NOTE — PLAN OF CARE
Problem: Respiratory:  Goal: Ability to maintain adequate ventilation will improve  Description: Ability to maintain adequate ventilation will improve  Outcome: Ongoing  Goal: Ability to achieve and maintain a regular respiratory rate will improve  Description: Ability to achieve and maintain a regular respiratory rate will improve  Outcome: Ongoing   BRONCHOSPASM/BRONCHOCONSTRICTION     [x]         IMPROVE AERATION/BREATH SOUNDS  [x]   ADMINISTER BRONCHODILATOR THERAPY AS APPROPRIATE  [x]   ASSESS BREATH SOUNDS  []   IMPLEMENT AEROSOL/MDI PROTOCOL  [x]   PATIENT EDUCATION AS NEEDED

## 2022-03-31 NOTE — CARE COORDINATION
[place] [facility] [place] [facility]  Written by: Garcia Everett RN    Patient Name: Patti Hurley  Attending Provider: Familia Pham MD  Admit Date: 3/25/2022 10:15 PM  MRN: [id number]  Account: [de-identified]                     : 1955  Discharge Date:  22      Disposition: home with . Declined HC/DME needs. Has transportation.      Garcia Everett RN

## 2022-03-31 NOTE — PROGRESS NOTES
Today's Date: 3/31/2022  Patient Name: Breanna Rooney  Date of admission: 3/25/2022 10:15 PM  Patient's age: 61 y.o., 1955  Admission Dx: Intraparenchymal hemorrhage of brain ([facility] [address] [I61.9]  New onset seizure ([facility] [address] [R56.9]    Reason for Consult: management recommendations  Requesting Physician: Janneth Jama MD    CHIEF COMPLAINT: Seizure. Brain mass. History of lung cancer    History Obtained From:  patient, electronic medical record    Interval history:  Patient is post op day 2 after resection of metastatic brain lesion, pending pathology report. She is sitting comfortably in bed, hemodynamically stable. CBC and BMP unremarkable. Patient is getting discharged today. HISTORY OF PRESENT ILLNESS:      The patient is a 61 y.o.  female who Initially presented to [facility] after having a seizure-like activity with loss of consciousness at that grocery store. Patient was feeling well before the episode. 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 Patient according to records also underwent chemotherapy and radiation therapy. She also has history of DVT COPD dyslipidemia. Patient's work-up done in the ER including CT brain without contrast showed small calcification as well as finding suspicion for acute parenchymal hemorrhage associated with vasogenic edema. In addition there was also vasogenic edema noted in the posterior left occipital lobe. There were concern regarding calvarial metastasis. CTA chest showed left perihilar consolidation concerning for pneumonia/radiation pneumonitis. Lymphangitic spread could not be ruled out. There was a large right adrenal mass likely metastatic. There were multiple liver lesion noted CT PET was recommended. Patient was seen by neurosurgery. Patient is currently on Decadron. She underwent right sided sub occipital craniotomy for resection of intra axial brain tumor on 3/29/22. Repeat MRI post surgery revealed no residual metastatic focus, also revealed multiple metastatic foci supratentorially. Past Medical History:   has a past medical history of Anxiety, Benign polyp of large intestine, Cancer ([facility] [address] COPD (chronic obstructive pulmonary disease) ([facility] [address] Hx of blood clots, Hyperlipidemia, Hypertension, Liver hemangioma, Lung nodule, Snores, Uterine fibroid, and Wears glasses. Past Surgical History:   has a past surgical history that includes Hysterectomy (2010); Abdominal hernia repair (2012); Colonoscopy; Lung biopsy; Breast biopsy (Left, 1983); Lung removal, partial (Right, 09/28/2018); pr rmvl lung other than pneumonectomy 1 lobe lobect (Left, 9/28/2018); bronchoscopy (10/1/2018); pr [facility] incl fluor gdnce dx w/cell washg spx (N/A, 10/29/2018); other surgical history (Right, 11/05/2018); Hysterectomy, total abdominal; and craniotomy (N/A, 3/29/2022). Medications:    Prior to Admission medications    Medication Sig Start Date End Date Taking?  Authorizing Provider   ALPRAZolam Alannah Hallis) 0.25 MG tablet take 1 tablet by mouth nightly if needed for sleep for 30 DAYS 3/17/22 4/17/22  ANNIE Mason - CNP   albuterol sulfate  (90 Base) MCG/ACT inhaler inhale 2 puffs by mouth four times a day 9/28/21   Gladysterick Oreilly MD   lisinopril-hydroCHLOROthiazide (PRINZIDE;ZESTORETIC) 10-12.5 MG per tablet Take 1 tablet by mouth daily 5/3/21   Shabnam Thomason MD   lovastatin (MEVACOR) 10 MG tablet Take 1 tablet by mouth nightly 5/3/21   Shabnam Thomason MD     Current Facility-Administered Medications   Medication Dose Route Frequency Provider Last Rate Last Admin    labetalol (NORMODYNE;TRANDATE) injection 10 mg  10 mg IntraVENous Q1H PRN Rinda Peppers, DO   10 mg at 03/30/22 1000    HYDROmorphone (DILAUDID) injection 0.5 mg  0.5 mg IntraVENous Q4H PRN Rinda Peppers, DO   0.5 mg at 03/30/22 1522    oxyCODONE (ROXICODONE) immediate release tablet 5 mg  5 mg Oral Q4H PRN Kendrick Singh Xiang Zaheer, DO   5 mg at 03/30/22 0857    Or    oxyCODONE (ROXICODONE) immediate release tablet 10 mg  10 mg Oral Q4H PRN Myriam Uriah, DO   10 mg at 03/31/22 0908    albuterol sulfate  (90 Base) MCG/ACT inhaler 2 puff  2 puff Inhalation Q4H PRN ANNIE Toussaint CNP   2 puff at 03/31/22 0851    famotidine (PEPCID) tablet 20 mg  20 mg Oral BID ANNIE Toussaint CNP   20 mg at 03/31/22 8250    atorvastatin (LIPITOR) tablet 10 mg  10 mg Oral Nightly Laly Dia MD   10 mg at 03/30/22 2016    sodium chloride flush 0.9 % injection 10 mL  10 mL IntraVENous PRN Gilson Gilbert MD        dexamethasone (DECADRON) tablet 4 mg  4 mg Oral 4 times per day [place] ANNIE Zimmer - CNP   4 mg at 03/31/22 0535    Followed by   [place] Jarrell ON 4/1/2022] dexamethasone (DECADRON) tablet 4 mg  4 mg Oral 3 times per day Louie Lavender APRN - CNP        Followed by   [place] Jarrell ON 4/3/2022] dexamethasone (DECADRON) tablet 4 mg  4 mg Oral 2 times per day Louie Lavender APRN - CNP        Followed by   [place] Jarrell ON 4/6/2022] dexamethasone (DECADRON) tablet 4 mg  4 mg Oral Daily Louie Lavender, APRN - CNP        Followed by   [place] Jarrell ON 4/8/2022] dexamethasone (DECADRON) tablet 2 mg  2 mg Oral Daily [place] Lavender, APRN - CNP        lisinopril (PRINIVIL;ZESTRIL) tablet 5 mg  5 mg Oral Daily Cullen Medina MD   5 mg at 03/31/22 0910    hydroCHLOROthiazide (HYDRODIURIL) tablet 25 mg  25 mg Oral Daily Cullen Medina MD   25 mg at 03/31/22 0909    ondansetron (ZOFRAN) injection 4 mg  4 mg IntraVENous Q6H PRN Cullen Medina MD        ALPRAZolam Belvin Blare) tablet 0.5 mg  0.5 mg Oral 4x Daily PRN Cullen Medina MD   0.5 mg at 03/30/22 2137    sodium chloride flush 0.9 % injection 10 mL  10 mL IntraVENous PRN Cullen Medina MD        levETIRAcetam (KEPPRA) tablet 500 mg  500 mg Oral BID Cullen Medina MD   500 mg at 03/31/22 0915    sodium chloride flush 0.9 % injection 5-40 mL  5-40 mL IntraVENous 2 times per day Vitor Yoder MD   10 mL at 03/31/22 0910    sodium chloride flush 0.9 % injection 10 mL  10 mL IntraVENous PRN Vitor Yoder MD        0.9 % sodium chloride infusion  25 mL IntraVENous PRN Vitor Yoder MD   Stopped at 03/26/22 0506    potassium chloride (KLOR-CON M) extended release tablet 40 mEq  40 mEq Oral PRN Vitor Yoder MD        Or    potassium bicarb-citric acid (EFFER-K) effervescent tablet 40 mEq  40 mEq Oral PRN Vitor Yoder MD   40 mEq at 03/26/22 0703    Or    potassium chloride 10 mEq/100 mL IVPB (Peripheral Line)  10 mEq IntraVENous PRN Vitor Yoder MD        magnesium sulfate 1000 mg in dextrose 5% 100 mL IVPB  1,000 mg IntraVENous PRN Vitor Yoder MD        ondansetron (ZOFRAN-ODT) disintegrating tablet 4 mg  4 mg Oral Q8H PRN Vitor Yoder MD   4 mg at 03/30/22 0857    Or    ondansetron (ZOFRAN) injection 4 mg  4 mg IntraVENous Q6H PRN Vitor Yoder MD        polyethylene glycol (GLYCOLAX) packet 17 g  17 g Oral Daily PRN Vitor Yoder MD        acetaminophen (TYLENOL) tablet 650 mg  650 mg Oral Q6H PRN Vitor Yoder MD   650 mg at 03/29/22 2329    Or    acetaminophen (TYLENOL) suppository 650 mg  650 mg Rectal Q6H PRN Vitor Yoder MD           Allergies:  Codeine    Social History:   reports that she quit smoking about 22 years ago. Her smoking use included cigarettes. She has a 45.00 pack-year smoking history. She has never used smokeless tobacco. She reports current alcohol use. She reports that she does not use drugs. Family History: family history includes Cancer in her mother and sister. REVIEW OF SYSTEMS:      Constitutional: No fever or chills.  No night sweats, no weight loss   Eyes: No eye discharge, double vision, or eye pain   HEENT: negative for sore mouth, sore throat, hoarseness and voice change   Respiratory: negative for cough , sputum, dyspnea, wheezing, hemoptysis, chest pain   Cardiovascular: negative for chest pain, dyspnea, palpitations, orthopnea, PND   Gastrointestinal: negative for nausea, vomiting, diarrhea, constipation, abdominal pain, Dysphagia, hematemesis and hematochezia   Genitourinary: negative for frequency, dysuria, nocturia, urinary incontinence, and hematuria   Integument: negative for rash, skin lesions, bruises.    Hematologic/Lymphatic: negative for easy bruising, bleeding, lymphadenopathy, or petechiae   Endocrine: negative for heat or cold intolerance,weight changes, change in bowel habits and hair loss   Musculoskeletal: negative for myalgias, arthralgias, pain, joint swelling,and bone pain   Neurological: negative for headaches, dizziness, seizures, weakness, numbness    PHYSICAL EXAM:        BP (!) 163/64   Pulse 69   Temp 98.2 °F (36.8 °C) (Oral)   Resp 15   Ht 4' 11\" (1.499 m)   Wt 161 lb (73 kg)   SpO2 95%   BMI 32.52 kg/m²    Temp (24hrs), Av.8 °F (36.6 °C), Min:97.4 °F (36.3 °C), Max:98.3 °F (36.8 °C)      General appearance - well appearing, no in pain or distress   Mental status - alert and cooperative   Eyes - pupils equal and reactive, extraocular eye movements intact   Ears - bilateral TM's and external ear canals normal   Mouth - mucous membranes moist, pharynx normal without lesions   Neck - supple, no significant adenopathy   Lymphatics - no palpable lymphadenopathy, no hepatosplenomegaly   Chest - clear to auscultation, no wheezes, rales or rhonchi, symmetric air entry   Heart - normal rate, regular rhythm, normal S1, S2, no murmurs  Abdomen - soft, nontender, nondistended, no masses or organomegaly   Neurological - alert, oriented, normal speech, no focal findings or movement disorder noted   Musculoskeletal - no joint tenderness, deformity or swelling   Extremities - peripheral pulses normal, no pedal edema, no clubbing or cyanosis   Skin - normal coloration and turgor, no rashes, no suspicious skin lesions noted     DATA:      Labs:     Results for orders placed or performed during the hospital encounter of 03/25/22   COVID-19, Rapid    Specimen: Nasopharyngeal Swab   Result Value Ref Range    Specimen Description . NASOPHARYNGEAL SWAB     SARS-CoV-2, Rapid Not Detected Not Detected   Culture, Urine    Specimen: Urine, indwelling catheter   Result Value Ref Range    Specimen Description . INDWELLING CATH URINE     Culture NO GROWTH    Basic Metabolic Panel w/ Reflex to MG   Result Value Ref Range    Glucose 198 (H) 70 - 99 mg/dL    BUN 13 8 - 23 mg/dL    CREATININE 0.71 0.50 - 0.90 mg/dL    Calcium 9.3 8.6 - 10.4 mg/dL    Sodium 139 135 - 144 mmol/L    Potassium 3.5 (L) 3.7 - 5.3 mmol/L    Chloride 102 98 - 107 mmol/L    CO2 21 20 - 31 mmol/L    Anion Gap 16 9 - 17 mmol/L    GFR Non-African American >60 >60 mL/min    GFR African American >60 >60 mL/min    GFR Comment         CBC with Auto Differential   Result Value Ref Range    WBC 7.7 3.5 - 11.3 k/uL    RBC 4.02 3.95 - 5.11 m/uL    Hemoglobin 12.7 11.9 - 15.1 g/dL    Hematocrit 36.8 36.3 - 47.1 %    MCV 91.5 82.6 - 102.9 fL    MCH 31.6 25.2 - 33.5 pg    MCHC 34.5 28.4 - 34.8 g/dL    RDW 12.2 11.8 - 14.4 %    Platelets 050 151 - 012 k/uL    MPV 10.5 8.1 - 13.5 fL    NRBC Automated 0.0 0.0 per 100 WBC    Immature Granulocytes 1 (H) 0 %    Seg Neutrophils 90 (H) 36 - 65 %    Lymphocytes 7 (L) 24 - 43 %    Monocytes 2 (L) 3 - 12 %    Eosinophils % 0 (L) 1 - 4 %    Basophils 0 0 - 2 %    Absolute Immature Granulocyte 0.08 0.00 - 0.30 k/uL    Segs Absolute 6.93 1.50 - 8.10 k/uL    Absolute Lymph # 0.54 (L) 1.10 - 3.70 k/uL    Absolute Mono # 0.15 0.10 - 1.20 k/uL    Absolute Eos # 0.00 0.00 - 0.44 k/uL    Basophils Absolute 0.00 0.00 - 0.20 k/uL    Morphology Normal    Magnesium   Result Value Ref Range    Magnesium 1.7 1.6 - 2.6 mg/dL   Comprehensive Metabolic Panel with Bilirubin   Result Value Ref Range    Albumin 4.2 3.5 - 5.2 g/dL    Albumin/Globulin Ratio 1.6 1.0 - 2.5    Alkaline Phosphatase 70 35 - 104 U/L    ALT 18 5 - 33 U/L    AST 21 <32 U/L    Total Bilirubin 0.30 0.3 - 1.2 mg/dL    Bilirubin, Direct 0.10 <0.31 mg/dL    Bilirubin, Indirect 0.20 0.00 - 1.00 mg/dL    BUN 18 8 - 23 mg/dL    Calcium 9.5 8.6 - 10.4 mg/dL    CREATININE 0.59 0.50 - 0.90 mg/dL    Glucose 124 (H) 70 - 99 mg/dL    Total Protein 6.8 6.4 - 8.3 g/dL    Sodium 141 135 - 144 mmol/L    Potassium 4.5 3.7 - 5.3 mmol/L    Chloride 105 98 - 107 mmol/L    CO2 24 20 - 31 mmol/L    Anion Gap 12 9 - 17 mmol/L    GFR Non-African American >60 >60 mL/min    GFR African American >60 >60 mL/min    GFR Comment         CBC   Result Value Ref Range    WBC 9.0 3.5 - 11.3 k/uL    RBC 4.36 3.95 - 5.11 m/uL    Hemoglobin 13.7 11.9 - 15.1 g/dL    Hematocrit 39.9 36.3 - 47.1 %    MCV 91.5 82.6 - 102.9 fL    MCH 31.4 25.2 - 33.5 pg    MCHC 34.3 28.4 - 34.8 g/dL    RDW 12.1 11.8 - 14.4 %    Platelets 625 299 - 730 k/uL    MPV 10.2 8.1 - 13.5 fL    NRBC Automated 0.0 0.0 per 100 WBC   Basic Metabolic Panel   Result Value Ref Range    Glucose 106 (H) 70 - 99 mg/dL    BUN 21 8 - 23 mg/dL    CREATININE 0.57 0.50 - 0.90 mg/dL    Calcium 9.1 8.6 - 10.4 mg/dL    Sodium 140 135 - 144 mmol/L    Potassium 4.0 3.7 - 5.3 mmol/L    Chloride 105 98 - 107 mmol/L    CO2 20 20 - 31 mmol/L    Anion Gap 15 9 - 17 mmol/L    GFR Non-African American >60 >60 mL/min    GFR African American >60 >60 mL/min    GFR Comment         Basic Metabolic Panel   Result Value Ref Range    Glucose 104 (H) 70 - 99 mg/dL    BUN 17 8 - 23 mg/dL    CREATININE 0.50 0.50 - 0.90 mg/dL    Calcium 7.8 (L) 8.6 - 10.4 mg/dL    Sodium 141 135 - 144 mmol/L    Potassium 4.0 3.7 - 5.3 mmol/L    Chloride 109 (H) 98 - 107 mmol/L    CO2 21 20 - 31 mmol/L    Anion Gap 11 9 - 17 mmol/L    GFR Non-African American >60 >60 mL/min    GFR African American >60 >60 mL/min    GFR Comment         CBC with Auto Differential   Result Value Ref Range    WBC 11.8 (H) 3.5 - 11.3 k/uL    RBC 3.98 3.95 - 5.11 m/uL    Hemoglobin 12.4 11.9 - 15.1 g/dL    Hematocrit 37.9 36.3 - 47.1 % MCV 95.2 82.6 - 102.9 fL    MCH 31.2 25.2 - 33.5 pg    MCHC 32.7 28.4 - 34.8 g/dL    RDW 11.9 11.8 - 14.4 %    Platelets 407 836 - 526 k/uL    MPV 10.0 8.1 - 13.5 fL    NRBC Automated 0.0 0.0 per 100 WBC    Seg Neutrophils 85 (H) 36 - 65 %    Lymphocytes 7 (L) 24 - 43 %    Monocytes 7 3 - 12 %    Eosinophils % 0 (L) 1 - 4 %    Basophils 0 0 - 2 %    Immature Granulocytes 1 (H) 0 %    Segs Absolute 10.04 (H) 1.50 - 8.10 k/uL    Absolute Lymph # 0.85 (L) 1.10 - 3.70 k/uL    Absolute Mono # 0.81 0.10 - 1.20 k/uL    Absolute Eos # <0.03 0.00 - 0.44 k/uL    Basophils Absolute <0.03 0.00 - 0.20 k/uL    Absolute Immature Granulocyte 0.08 0.00 - 0.30 k/uL   CBC with Auto Differential   Result Value Ref Range    WBC 10.8 3.5 - 11.3 k/uL    RBC 4.19 3.95 - 5.11 m/uL    Hemoglobin 13.1 11.9 - 15.1 g/dL    Hematocrit 38.4 36.3 - 47.1 %    MCV 91.6 82.6 - 102.9 fL    MCH 31.3 25.2 - 33.5 pg    MCHC 34.1 28.4 - 34.8 g/dL    RDW 12.2 11.8 - 14.4 %    Platelets 362 985 - 606 k/uL    MPV 10.1 8.1 - 13.5 fL    NRBC Automated 0.0 0.0 per 100 WBC    Seg Neutrophils 85 (H) 36 - 65 %    Lymphocytes 7 (L) 24 - 43 %    Monocytes 7 3 - 12 %    Eosinophils % 0 (L) 1 - 4 %    Basophils 0 0 - 2 %    Immature Granulocytes 1 (H) 0 %    Segs Absolute 9.19 (H) 1.50 - 8.10 k/uL    Absolute Lymph # 0.77 (L) 1.10 - 3.70 k/uL    Absolute Mono # 0.72 0.10 - 1.20 k/uL    Absolute Eos # <0.03 0.00 - 0.44 k/uL    Basophils Absolute <0.03 0.00 - 0.20 k/uL    Absolute Immature Granulocyte 0.09 0.00 - 0.30 k/uL   Basic Metabolic Panel   Result Value Ref Range    Glucose 113 (H) 70 - 99 mg/dL    BUN 16 8 - 23 mg/dL    CREATININE 0.63 0.50 - 0.90 mg/dL    Calcium 9.2 8.6 - 10.4 mg/dL    Sodium 136 135 - 144 mmol/L    Potassium 4.2 3.7 - 5.3 mmol/L    Chloride 97 (L) 98 - 107 mmol/L    CO2 25 20 - 31 mmol/L    Anion Gap 14 9 - 17 mmol/L    GFR Non-African American >60 >60 mL/min    GFR African American >60 >60 mL/min    GFR Comment         TYPE AND SCREEN Result Value Ref Range    Expiration Date 04/01/2022,2359     Arm Band Number [id number]     ABO/Rh O POSITIVE     Antibody Screen NEGATIVE          IMAGING DATA:    CT HEAD WO CONTRAST    Result Date: 3/26/2022  EXAMINATION: CT OF THE HEAD WITHOUT CONTRAST  3/26/2022 3:11 am TECHNIQUE: CT of the head was performed without the administration of intravenous contrast. Dose modulation, iterative reconstruction, and/or weight based adjustment of the mA/kV was utilized to reduce the radiation dose to as low as reasonably achievable. COMPARISON: Prior not available at time dictation, MR brain 10/24/2018, head CT 08/03/2018 HISTORY: ORDERING SYSTEM PROVIDED HISTORY: 6 hour repeat stability scan, new seizure today found to have left occipital-parital mass and right parietal with IPH FINDINGS: BRAIN/VENTRICLES: Partially calcified, hemorrhagic mass within the right cerebellar hemisphere with resultant localized edema resulting in mild compression of the 4th ventricle. No evidence of hydrocephalus. Areas of subcortical low attenuation within the left parietal, left occipital and caudal aspect of the right postcentral gyrus suggesting underlying edema concerning for additional intracranial lesions. No significant mass effect or midline shift. No extra-axial fluid collections. Gray-white matter differentiation is otherwise maintained. ORBITS: The visualized portion of the orbits demonstrate no acute abnormality. SINUSES: The visualized paranasal sinuses and mastoid air cells demonstrate no acute abnormality. SOFT TISSUES/SKULL:  Scattered lytic lesions throughout the calvarium may reflect intraosseous hemangiomas, however underlying metastasis is possible. No significant change. 1. Partially calcified, hemorrhagic mass within the right cerebellar hemisphere resulting in mild compression of the 4th ventricle without obstructive hydrocephalus at this time.  2. Areas of subcortical low attenuation throughout the bilateral cerebral hemispheres concerning for additional intracranial mass lesions. 3. No significant mass effect or midline shift. 4. Possible calvarial metastasis versus underlying intraosseous hemangiomas. No significant change. 5. Recent prior outside head CT images would be of benefit to determine stability. IMPRESSION:   Primary Problem  New onset seizure [facility])    Active Hospital Problems    Diagnosis Date Noted    Focal epilepsy ([address] [G40.109]     Mass of occipital region [R22.0] 03/26/2022    Hyperglycemia [R73.9] 03/26/2022    Hypokalemia [E87.6] 03/26/2022    Vasogenic edema (HCC) [G93.6] 03/26/2022    Brain metastases (HCC) [C79.31] 03/26/2022    Adrenal mass ([address] - right  [E27.8] 03/26/2022    Episode of loss of consciousness [R55]     Intraparenchymal hemorrhage of brain ([address] [I61.9] 03/25/2022    New onset seizure ([address] [R56.9] 03/25/2022    Essential hypertension [I10] 06/23/2020    Non-small cell cancer of left lung ([address] [C34.92]      RECOMMENDATIONS:    I personally reviewed results of lab work-up imaging studies and other relevant clinical data. Reviewed results of repeat MRI brain after resection of metastatic brain lesion, revealed no definite residual disease, showed supratentorial mets. Patient postop day #2. Tolerated surgery well  Follow-up on path report, further recommendations on radiation therapy to follow as she will need the same, follow Up as OP. Continue symptomatic supportive care. Patient had multiple questions which answered the best my ability. Discussed with patient and Nurse. Thank you for asking us to see this patient.     Po Silva MD      Department of Internal Medicine  [facility]         3/31/2022, 10:09 AM

## 2022-03-31 NOTE — PROGRESS NOTES
CLINICAL PHARMACY NOTE: MEDS TO BEDS    Total # of Prescriptions Filled: 4   The following medications were delivered to the patient:  · Dexamethasone 4mg  · Pantoprazole 40mg  · Levetiracetam 500mg  · Oxycodone 10mg    Additional Documentation: delivered to patient in room 138 3/31 at 11:06am. Co-pay paid with cash ($16.16). Patient had one visitor in the room with her.

## 2022-03-31 NOTE — PLAN OF CARE
Problem: Falls - Risk of:  Goal: Will remain free from falls  Description: Will remain free from falls  Outcome: Ongoing  Goal: Absence of physical injury  Description: Absence of physical injury  Outcome: Ongoing     Problem: Coping:  Goal: Ability to cope will improve  Description: Ability to cope will improve  Outcome: Ongoing     Problem: Health Behavior:  Goal: Identification of resources available to assist in meeting health care needs will improve  Description: Identification of resources available to assist in meeting health care needs will improve  Outcome: Ongoing     Problem: Nutritional:  Goal: Maintenance of adequate nutrition will improve  Description: Maintenance of adequate nutrition will improve  Outcome: Ongoing  Goal: Ability to tolerate tube feedings without aspirating will improve  Description: Ability to tolerate tube feedings without aspirating will improve  Outcome: Ongoing     Problem: Physical Regulation:  Goal: Complications related to the disease process, condition or treatment will be avoided or minimized  Description: Complications related to the disease process, condition or treatment will be avoided or minimized  Outcome: Ongoing  Goal: Ability to achieve and maintain adequate cardiopulmonary perfusion will improve  Description: Ability to achieve and maintain adequate cardiopulmonary perfusion will improve  Outcome: Ongoing     Problem: Respiratory:  Goal: Ability to maintain adequate ventilation will improve  Description: Ability to maintain adequate ventilation will improve  3/31/2022 0033 by Yuval Gonzalez RN  Outcome: Ongoing  3/30/2022 2244 by Valeria Gamez RCP  Outcome: Ongoing  Goal: Ability to achieve and maintain a regular respiratory rate will improve  Description: Ability to achieve and maintain a regular respiratory rate will improve  3/31/2022 0033 by Yuval Gonzalez RN  Outcome: Ongoing  3/30/2022 2244 by Valeria Gamez RCP  Outcome: Ongoing     Problem: Role Relationship:  Goal: Ability to communicate needs accurately will improve - ADL needs  Description: Ability to communicate needs accurately will improve  Outcome: Ongoing     Problem: Sensory:  Goal: Pain level will decrease  Description: Pain level will decrease  Outcome: Ongoing     Problem: Skin Integrity:  Goal: Demonstration of wound healing without infection will improve  Description: Demonstration of wound healing without infection will improve  Outcome: Ongoing  Goal: Will show no infection signs and symptoms  Description: Will show no infection signs and symptoms  Outcome: Ongoing  Goal: Absence of new skin breakdown  Description: Absence of new skin breakdown  Outcome: Ongoing     Problem: Pain:  Goal: Pain level will decrease  Description: Pain level will decrease  Outcome: Ongoing  Goal: Control of acute pain  Description: Control of acute pain  Outcome: Ongoing  Goal: Control of chronic pain  Description: Control of chronic pain  Outcome: Ongoing

## 2022-03-31 NOTE — PROGRESS NOTES
Patient upset and crying at this time. States is overwhelmed and feels like no-one that works here at this hospital cares about her and feels that no-one wants to go in her room. Writer explained to patient that this nurse has been in patients room at least 4 times since [address] has come on shift and call button has been answered and patient was assisted. Writer held patients hand and comforted patient. Patient states feels very anxious. PRN Xanax administered to patient per her request as well as PRN oxycodone at 2128.

## 2022-04-01 LAB — SURGICAL PATHOLOGY REPORT: NORMAL

## 2022-04-01 NOTE — ADT AUTH CERT
Utilization Reviews         Seizure - Care Day 2 (3/26/2022) by Tiffanie Jones RN       Review Entered Review Status   3/28/2022 10:19 Completed      Criteria Review      Care Day: 2 Care Date: 3/26/2022 Level of Care:    Guideline Day 2    Level Of Care    (X) Neurologic unit or floor to discharge    3/28/2022 10:19 AM EDT by Vimal Dunham      intermediate    Clinical Status    (X) * Hemodynamic stability    3/28/2022 10:19 AM EDT by Vimal Dunham      83 139/78    ( ) * Mental status at baseline    ( ) * No evidence of CNS bleeding or infection    ( ) * No new neurologic deficits    ( ) * Seizures absent or managed    ( ) * No evidence of seizure etiology requiring inpatient care    ( ) * Discharge plans and education understood    Activity    (X) * Ambulatory or acceptable for next level of care    3/28/2022 10:19 AM EDT by Vimal Dunham      up with assist    Routes    ( ) * Oral hydration    ( ) * Oral medications or regimen acceptable for next level of care    (X) * Oral diet or acceptable for next level of care    3/28/2022 10:19 AM EDT by Vimal Dunham      reg diet    Interventions    (X) Neurologic checks and seizure monitoring    3/28/2022 10:19 AM EDT by Vimal Dunham      nv cks q4h  sz precautions    (X) Possible cardiac monitoring    3/28/2022 10:19 AM EDT by Vimal Dunham      telemetry    Medications    (X) * Antiepileptic regimen suitable for next level of care in place, if indicated    3/28/2022 10:19 AM EDT by Vimal Dunham      keppra 500 mg 2xd    * Milestone   Additional Notes   DATE: 3/26/22          Vitals:   98.4 (36.9) 17 83 139/78   sp02 98% ra  pain   0                Abnl/Pertinent Labs/Radiology/Diagnostic Studies:   K 3.5   Glucose 198      MRI BRAIN W WO CONTRAST   Multiple supra and infratentorial intraparenchymal lesions are most   consistent with metastatic disease.  There is associated edema with minimal   mass effect, but no midline shift.    2. Intrinsically T1 hyperintense right frontal calvarial lesion is most   consistent with an osseous hemangioma.  No definite osseous metastasis   identified. MRI LUMBAR SPINE WO CONTRAST    No acute abnormality or aggressive osseous lesion.    2.  No significant degenerative change. MRI CERVICAL SPINE WO CONTRAST   Motion degraded examination with multilevel degenerative changes.  There   is moderate spinal canal stenosis at C5-6 and C6-7 with mild spinal canal   narrowing at C3-4.  Multilevel neural foraminal narrowing as above. 2. Reversal of the normal cervical lordosis with anterolisthesis at C3-4 and   retrolisthesis at C5-6. MRI THORACIC SPINE WO CONTRAST   . No acute abnormality or aggressive osseous lesion. 2. Fatty marrow replacement from T5-T8, which could be due to prior treatment. 3. No significant degenerative change.          Physical Exam:   General: She is not in acute distress.      Appearance: She is not diaphoretic. HENT:       Head: Normocephalic.       Nose: Nose normal.    Eyes:       General: No scleral icterus.      Conjunctiva/sclera: Conjunctivae normal.    Neck:       Trachea: No tracheal deviation. Cardiovascular:       Rate and Rhythm: Normal rate and regular rhythm. Pulmonary:       Effort: Pulmonary effort is normal. No respiratory distress.       Breath sounds: Normal breath sounds. No wheezing or rales. Chest:       Chest wall: No tenderness. Abdominal:       General: There is no distension.       Palpations: Abdomen is soft.       Tenderness: There is no abdominal tenderness. Musculoskeletal:          General: No tenderness.       Cervical back: Neck supple. No rigidity. Skin:      General: Skin is warm and dry.     Neurological:       Mental Status: She is alert and oriented to person, place, and time.       Comments: Orientated x3   Doing well with historical data   Moving extremities x4   No gross motor or sensory deficits   Speech fluent      MD Consults/Assessments & Plans:   Medicine    No further Sz   Mild temporal Headache    AEDs per neuro   Onc evaluation   Neurosurgery consultation   Decadron   Glycemic contol - Monitor and control blood sugars   Blood Pressure - Monitor and control   Correct electrolyte abnormalities   DVT prophylaxis:  EPCs    Consult to neuro surgery  service   1. Consultation to heme-onc   2. Continue with steroids per neurology dosing   3. Loading of Keppra was not done in the out lying emergency room as far as documentation can prove   4. Await repeat CT out contrast for the possible intraparenchymal hemorrhage on outlying CT   5. Obtain MRI with and without contrast   6. Monitor blood pressures, will resume antihypertensives in morning and avoid hypotension   7. Hyperglycemia could be related to the seizure and/or the steroids given will continue to monitor.  Patient has had previous elevated glucoses but A1c's were controlled and stable   8.  Hold off on chemical anticoagulation and use EPC cuffs as the concern for intraparenchymal hemorrhage, however noting she is highly coagulable given the cancer suspicion.     9. GI prophylaxis with diet\"         Neurosurgery   61 y.o. female with past medical history of lung adenocarcinoma status post lobectomy in September 2018 and chemotherapy, history of liver hemangioma, DVT left leg, COPD, hypertension hyperlipidemia who presents as a transfer from [place] [id number] due to intraparenchymal hemorrhage.  Patient reportedly had seizure episode while grocery shopping on 3/25. Fritz Allison states she has no prior history of seizures however she does admit transient right-sided visual field loss 2 days prior which lasted approximately 30 seconds and then spontaneously resolved. Fritzsarah Allison was taken by EMS to [facility] where CT had demonstrated right cerebral hemisphere mass with calcification as well as possible vasogenic edema and associated acute intraparenchymal hemorrhage  With mild mass-effect on the fourth ventricle but no hydrocephalus.  Patient was loaded with Keppra as well as started on 500 twice daily, started on steroids. Patient was transferred to [facility] further evaluation as well as neurosurgery consultation.       A & O x3, awake                   Cranial Nerves:     cranial nerves II-XII are grossly intact       MOTOR EXAM:     Drift:  absent   Tone:  normal       5/5 plantar flexion of the right ankle   5/5 dorsi flexion of the right ankle   5/5 plantar flexion of the left ankle   5/5 dorsi flexion of the left ankle   5/5 flexion of the right knee   5/5 flexion of the left knee   5/5  strength on the right   5/5  strength on the left   5/5 flexion of the right elbow   5/5 flexion of the left elbow   Shoulder elevation 5/5 bilaterally       SENSORY:     Touch:     Right Upper Extremity:  normal   Left Upper Extremity:  normal   Right Lower Extremity:  normal   Left Lower Extremity:  normal           Coordination/Dysmetria:   Finger to Nose:    Normal    61 y.o. female with past medical history of lung carcinoma status post resection in 2018 who presented as a transfer from [facility] due to new onset seizure that occurred yesterday with no prodromal symptoms.  Patient taken to [facility] and CT imaging there demonstrated partially calcified hemorrhagic mass in the right cerebellar hemisphere with mild compression of fourth ventricle but no hydrocephalus.  Patient transferred to [facility] for further evaluation.  Patient was loaded with Keppra and started on decadron    - No neurosurgical interventions planned for now   -Obtain MRI brain with and without contrast, Stealth protocol               - Neuro checks per protocol   - Hold all antiplatelets and anticoagulants   -CTA chest demonstrated multiple liver lesions and large right adrenal mass, recommend oncologic consultation   -Rest of medical management per primary          Hem/onc    CHIEF COMPLAINT: Seizure.  Brain mass.  History of lung cancer   61 y.o.  female who Initially presented to [facility] after having a seizure-like activity with loss of consciousness at that grocery store. Kacey Thakur was feeling well before the episode. 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  Patient according to records also underwent chemotherapy and radiation therapy.  She also has history of DVT COPD dyslipidemia.  Patient's work-up done in the ER including CT brain without contrast showed small calcification as well as finding suspicion for acute parenchymal hemorrhage associated with vasogenic edema.  In addition there was also vasogenic edema noted in the posterior left occipital lobe.  There were concern regarding calvarial metastasis.  CTA chest showed left perihilar consolidation concerning for pneumonia/radiation pneumonitis.  Lymphangitic spread could not be ruled out. Noreene Shouts was a large right adrenal mass likely metastatic.  There were multiple liver lesion noted CT PET was recommended.       General appearance - well appearing, no in pain or distress    Mental status - alert and cooperative    Eyes - pupils equal and reactive, extraocular eye movements intact    Ears - bilateral TM's and external ear canals normal    Mouth - mucous membranes moist, pharynx normal without lesions    Neck - supple, no significant adenopathy    Lymphatics - no palpable lymphadenopathy, no hepatosplenomegaly    Chest - clear to auscultation, no wheezes, rales or rhonchi, symmetric air entry    Heart - normal rate, regular rhythm, normal S1, S2, no murmurs   Abdomen - soft, nontender, nondistended, no masses or organomegaly    Neurological - alert, oriented, normal speech, no focal findings or movement disorder noted    Musculoskeletal - no joint tenderness, deformity or swelling    Extremities - peripheral pulses normal, no pedal edema, no clubbing or cyanosis    Skin - normal coloration and turgor, no rashes, no suspicious skin lesions noted ,      We will obtain abdomen pelvis.  Only upper abdomen was visualized on the CT chest   Follow-up on results of brain MRI and spine MRI. If MRI shows brain metastasis will need treatment.  Options would include surgery versus radiation depending upon location, number of the metastasis and feasibility for surgery versus radiation   We will need tissue diagnosis to establish recurrent disease.  Will make recommendations regarding site of biopsy depending upon the MRI of the brain as well as other imaging studies. Patient multiple questions which answered the best my ability   Continue Decadron      Medications:   Decadron 4 mg iv q6h, keppra 500 mg 2xd,  xanax 0.25 mg 4xd pn x3,  effer K 40 meq prn x1         Orders:   Reg diet,  daily wt,   ia nd o q8h,  epc, sp02, telemtry, up with assist  vs        PT/OT/SLP/CM Assessments or Notes:   Occupational Therapy   Discharge Recommendations:     No therapy recommended at discharge. Assessment    Assessment: Patient presents functioning at baseline level and reports no acute deficits impacting performance and safety with ADLs or functional tasks. OT to defer further treatment at this time in collaboration with patient, please re-consult if functional deficits arise impacting patient's independence. Prognosis: Good   Decision Making: Low Complexity      Discharge Plan: d/c home independent, has ride and pcp      Physical Therapy     No further therapy required at discharge.        PT Equipment Recommendations   Equipment Needed: No       Assessment    Body structures, Functions, Activity limitations: Decreased functional mobility ; Decreased strength;Decreased endurance;Decreased balance   Assessment: Pt grossly CGa to SBa for mobility, amb 200' no AD CGA. Pt without acute PT needs. Physical therapy to sign off.    Prognosis: Good   Decision Making: Low Complexity               Seizure - Care Day 1 (3/25/2022) by Tiffanie Jones RN       Review Entered Review Status   3/28/2022 10:07 Completed      Criteria Review      Care Day: 1 Care Date: 3/25/2022 Level of Care:    Guideline Day 1    Level Of Care    (X) ICU, neurologic unit, or floor    3/28/2022 10:07 AM EDT by Vimal Dunham      intermediate    Clinical Status    (X) * Clinical Indications met    3/28/2022 10:07 AM EDT by Vimal Dunham      new onset sz    Activity    (X) Possible ambulation with assistance    3/28/2022 10:07 AM EDT by Vimal Dunham      up with assist    Routes    (X) Oral hydration as tolerated    3/28/2022 10:07 AM EDT by Vimal Dunham      po    (X) Oral medications as tolerated    3/28/2022 10:07 AM EDT by Vimal Dunham      decadron 4 mg iv    (X) Oral diet as tolerated    3/28/2022 10:07 AM EDT by Vimal Dunham      reg diet    Interventions    (X) Neurologic checks and seizure monitoring    3/28/2022 10:07 AM EDT by Vimal Dunham      nv cks q4h   sz precautions    (X) Possible cardiac monitoring    3/28/2022 10:07 AM EDT by Vimal Dunham      telemetry    (X) Possible pulse oximetry    3/28/2022 10:07 AM EDT by Vimal Dunham      sp02    Medications    (X) Possible antiepileptic drug    3/28/2022 10:07 AM EDT by Vimal Dunham      keppra 1 gm iv   keffpa 500 mg iv q12h    * Milestone   Additional Notes   DATE: 3/25/22       Patient name:             Santiago Coello   Chief Complaint/ED Presentation:   Eduar Fernandes is a 61 y.o. Non- / non  female with a significant past medical history of non- small cell- adenocarcinoma of the left upper lobe status post lobectomy in 2018 with chemo radiation therapy, DVT, COPD, hypertension, hyperlipidemia who presents with seizure-of new onset,  and is admitted to the hospital for the management of New onset seizure ([address]            [facility] having a seizure-like activity causing LOC at a grocery maren Luzma Gentleman activity was described as a tonic-clonic motions. The patient denies being able to recall events leading up to the episode in the last thing she recalls if she was at [employer]. Rena Ayon states she got warm but she thought it was because her coat was on. Malou Vilma she remembers waking up in the back of the ambulance confused. Daryn Walker admits that she was confused for quite some time after waking up up into the point that she was even at the hospital and still somewhat confused.     Up until present day patient has been feeling fine however it was voiced that she noted 1 episode of vision changes in the right eye in which she had a flashing bright white light that quickly came on and went and was painless.  Patient was looking at her iPad at the moment and thought it was something with the ipad.        IP CONSULT TO HEM/ONC   IP CONSULT TO NEUROSURGERY   IP CONSULT TO NEUROLOGY         Vitals:   36.8  16  110  163/89   sp02 94%   pain  0-2       Abnl/Pertinent Labs/Radiology/Diagnostic Studies:   Metabolic panel. -Within normal limits with the exception of CO2 of 21 and a glucose of 162   Hematology.-Within normal limits   Coagulation-not done   Cardiac Assznxu-E-lctpz 234 (lab cut off less than 255), troponin I less than 0.01   EKG-not done/sent with patient   Urine-within normal limits      CT BRAIN WITHOUT CONTRAST     1.  Small calcification as well as findings suspicious for a small amount of acute parenchymal hemorrhage in the right cerebral hemisphere with associated vasogenic edema.  Associated mild mass effect on the fourth ventricle and associated low-lying right cerebellar tonsil.  No hydrocephalus,    however.  In addition, there is vasogenic edema of the posterior left temporal occipital lobes and possible small focal area of vasogenic edema of the right frontal lobe.  Findings are consistent with metastatic disease given history of lung cancer, and MRI the brain with and without contrast is recommended for further characterization and to assess for additional lesions. 2.  Possible calvarial metastases.         CTA CHEST   1. No central pulmonary emboli identified. 2.  Left perihilar consolidation suspicious for pneumonia possibly radiation pneumonitis if the patient has had radiation.  I cannot exclude lymphangitic spread of neoplasm in this region. Latrice Port clinical follow-up to document resolution within 6 weeks is recommended. 3.  Large right adrenal mass which is new likely metastatic.        4.  Multiple liver lesions.  At least one of these is probably a hemangioma but others are not completely imaged on the chest only.  Consider PET/CT for definitive restaging of the patient's lung cancer.               Pertinent Medical History:   Past Medical History:   Diagnosis Date   · Anxiety     · Benign polyp of large intestine     · Cancer (HCC)       LT UPPER LOBE   · COPD (chronic obstructive pulmonary disease) (HCC)     · Hx of blood clots       DVT LT LEG   · Hyperlipidemia     · Hypertension     · Liver hemangioma     · Lung nodule       BARAK  Nodule   · Snores       not tested for apnea   · Uterine fibroid 09/2010   · Wears glasses    Past Surgical History:   Procedure Laterality Date   · ABDOMINAL HERNIA REPAIR   2012   · BREAST BIOPSY Left 1983   · BRONCHOSCOPY   10/1/2018     BRONCHOSCOPY performed by Sandy Gonzalez MD at [facility]   · COLONOSCOPY       · HYSTERECTOMY   2010   · HYSTERECTOMY, TOTAL ABDOMINAL       · LUNG BIOPSY       · LUNG REMOVAL, PARTIAL Right 09/28/2018     Robotic assisted left lobectomy, upper with lymph node biopsy   · OTHER SURGICAL HISTORY Right 11/05/2018     IR PORT PLACEMENT EQUAL OR GREATER THAN 5 YEARS   · NE [address] INCL FLUOR GDNCE DX W/CELL WASHG SPX N/A 10/29/2018     BRONCHOSCOPY performed by Krzysztof Barahona MD at [address]   · NE RMVL LUNG OTHER THAN PNEUMONECTOMY 1 LOBE LOBECT Left 9/28/2018     XI ROBOTIC ASSISTED LEFT UPPER LOBECTOMY MULTIPLE LYMPH NODE BIOPSY, INTERCOSTAL  NERVE BLOCK ABLATION W/EXPAREL performed by Felton March MD at [place] [address]               Physical Exam:      General: She is not in acute distress.      Appearance: She is well-developed. She is not diaphoretic. HENT:       Head: Normocephalic and atraumatic.       Right Ear: Hearing normal.       Left Ear: Hearing normal.       Nose: Nose normal. No rhinorrhea. Eyes:       General: Lids are normal.       Extraocular Movements:       Right eye: Normal extraocular motion.       Left eye: Normal extraocular motion.       Conjunctiva/sclera: Conjunctivae normal.       Right eye: Right conjunctiva is not injected.       Left eye: Left conjunctiva is not injected.       Pupils: Pupils are equal, round, and reactive to light. Pupils are equal.       Right eye: Pupil is reactive.       Left eye: Pupil is reactive. Neck:       Thyroid: No thyromegaly.       Vascular: No carotid bruit.       Trachea: Trachea and phonation normal. No tracheal deviation. Cardiovascular:       Rate and Rhythm: Normal rate and regular rhythm.       Pulses: Normal pulses.       Heart sounds: Normal heart sounds. No murmur heard.          Pulmonary:       Effort: Pulmonary effort is normal. No respiratory distress.       Breath sounds: Normal breath sounds. No stridor. No decreased breath sounds. Abdominal:       General: Bowel sounds are normal. There is no distension.       Palpations: Abdomen is soft. There is no mass.       Tenderness: There is no abdominal tenderness. There is no guarding. Musculoskeletal:          General: No tenderness.       Cervical back: Neck supple. Skin:      General: Skin is warm and dry.       Findings: No erythema, lesion or rash. Neurological:       Mental Status: She is alert and oriented to person, place, and time. She is not disoriented.       Cranial Nerves: No cranial nerve deficit.     Psychiatric:          Speech: Speech normal.          Behavior: Behavior normal. Behavior is cooperative.           MD Consults/Assessments & Plans:   Medicine    Progressive Unit/Step down       1. Consult to neuro surgery  service   2. Consultation to heme-onc   3. Continue with steroids per neurology dosing   4. Loading of Keppra was not done in the out lying emergency room as far as documentation can prove   5. Await repeat CT out contrast for the possible intraparenchymal hemorrhage on outlying CT   6. Obtain MRI with and without contrast   7. Monitor blood pressures, will resume antihypertensives in morning and avoid hypotension   8. Hyperglycemia could be related to the seizure and/or the steroids given will continue to monitor.  Patient has had previous elevated glucoses but A1c's were controlled and stable   9. Hold off on chemical anticoagulation and use EPC cuffs as the concern for intraparenchymal hemorrhage, however noting she is highly coagulable given the cancer suspicion.     10. GI prophylaxis with diet          Neurology     61 y.o.   female who presents as a transfer from [facility] 3/25/2022 after having a seizure-like episode while at [employer] 3/25/2022 around 11 AM.  Past medical history significant for left upper lobe invasive lung adenocarcinoma s/p lobectomy 9/28/2018, chemotherapy with Abel Peaches Taxol cycle/ radiation therapy, abdominal hysterectomy, liver hemangioma, DVT left leg, COPD, HTN and HLD.  Patient states that approximately 2 days ago she had an episode of right eye painless vision loss that she described as entire visual field flashing bright white light lasting less than 30 seconds and resolved on its own. Mey Weaver yesterday she was shopping 3/25/2022 when she had a seizure with loss of consciousness generalized tonic-clonic and does not recall feeling anything prior to. Noah Philippe has never had a seizure previously and was taken by EMS to [facility] where CT head demonstrated right cerebral hemisphere small calcification with questionable associated acute parenchymal hemorrhage and vasogenic edema along with posterior left temporal occipital hypoattenuation consistent with vasogenic edema and likely metastatic process given her history of lung cancer.  Patient transferred to our facility for further oncology work-up along with neurology evaluation.        Hypertensive on arrival to [facility] 153/93, HR 78 afebrile 97.2. glucose 162, bicarbonate 21, Patient given decadron 10mg 2:25PM 3/25 otherwise did not receive any AED and other labs unremarkable.        GENERAL Appears comfortable and in no distress   HEENT NC/ AT   NECK Supple and no bruits heard   MENTAL STATUS: Alert, oriented, intact memory, no confusion, normal speech, normal language, no hallucination or delusion   CRANIAL NERVES: II     -      Difficulty in right upper quadrant visual field of right eye mainly    III,IV,VI -  EOMs full, no afferent defect, no KRISTIN, no ptosis   V     -     Normal facial sensation   VII    -     Normal facial symmetry   VIII   -     Intact hearing   IX,X -     Symmetrical palate   XI    -     Symmetrical shoulder shrug   XII   -     Midline tongue, no atrophy    MOTOR FUNCTION: significant for good strength of grade 5/5 in bilateral proximal and distal muscle groups of both upper and lower extremities with normal bulk, normal tone and no involuntary movements, no tremor   SENSORY FUNCTION: Normal touch, normal pin, normal vibration, normal proprioception   CEREBELLAR FUNCTION: Intact fine motor control over upper limbs   REFLEX FUNCTION: Symmetric, no perverted reflex, no Babinski sign   STATION and GAIT patient able to walk without assistance no disturbance in her gait at baseline       72-year-old  female who presents as a transfer from outlying facility due to new onset generalized tonic-clonic seizure.  Patient newly diagnosed with left parieto-occipital and right frontal parietal metastatic brain mass lesions and surrounding vasogenic edema with mild mass-effect on the fourth ventricle and associated low-lying right cerebellar tonsil without hydrocephalus.  Given patient's new likely brain mets and vasogenic edema will load patient with Keppra 1 g continue 500 mg twice daily.  We will also get MRI brain with and without and routine EEG as a baseline.       Assessment   1.  New onset generalized tonic-clonic seizure likely secondary to #2   2.  Left temporal occipital and right parietal mass lesions with surrounding vasogenic edema and mild mass-effect on the fourth ventricle with associated low-lying right cerebellar tonsil   3.  History of left upper lobe adenocarcinoma status post lobectomy and chemo/radiation     Progressive Unit/Step down       -MRI brain with and without   -6 hour Repeat CT head WO to ensure right parietal ICH questioned at outlying facility is stable (after further review right cerebellar questionable IPH appears stable although with still recommend MRI brain prior to initiating anticoagulation considering location and appears to have significant hemosiderin deposition increasing risk for bleed   -continue decadron 4mg Q6 hours   -Load keppra 1gram now and continue 500mg BID   -30 minute routine EEG non-emergent as a baseline    -Rest of medical management per primary medicine team and oncology   -Recommend Neurosurgery consult        DVT ppx- will hold pending MRI brain given questionable IPH      Medication   Xanax 0.25 mg ngihtly prn x1       Orders:   Reg diet,  ns consult,  daily wt,  I and o q8h,  epc,  sp02,  telmetry,  up with  assist, vs       PT/OT/SLP/CM Assessments or Notes:    Dc plan  pending         Seizure - Clinical Indications for Admission to Inpatient Care by Nicholas Daily RN       Review Entered Review Status   3/28/2022 10:03 Completed      Criteria Review      Clinical Indications for Admission to Inpatient Care    Most Recent : Berta Jackson Most Recent Date: 3/28/2022 10:03 AM EDT    (X) Admission is indicated for  1 or more  of the following  (1) (2) (3) (4) (5):       (X) Status epilepticus [A] (7) (8) (9) (10)       3/28/2022 10:03 AM EDT by Kraig Servin         new onset sz     Consult Notes      Consults by Federico Wells MD at 3/27/2022 11:10 AM    Author: Federico Wells MD Specialty: Radiation Oncology Author Type: Physician   Filed: 3/27/2022 10:38 PM Date of Service: 3/27/2022 11:10 AM Status: Signed   : Federico Wells MD (Physician)   Consult Orders   1. Inpatient consult to Radiation Oncology [[id number]] ordered by Perry Schultz MD at 03/27/22 [facility]            Radiation Oncology          [address] [facility], [address]        [facility]: [phone]        F: [phone]       [employer]        [facility]       Radiation Oncology   [facility], [address] [facility]: [phone]       F: [phone]       [employer]       3/27/2022     Patient: Carmelina Castillo   YOB: 1955         Dear Dr Gilbert Law:     Thank you for referring Carmelina Castillo to me for evaluation. Below are the relevant portions of my assessment and plan of care. If you have questions, please do not hesitate to call me.  I look forward to following this patient along with you.     Sincerely,        Electronically signed by Federico Wells MD on 3/27/22 at 10:27 PM EDT        CC: Patient Care Team:  Kenyatta Chapman MD as PCP - General (Family Medicine)  Kenyatta Chapman MD as PCP - [facility] Provider  Miguel Ramirez MD as Consulting Physician (Hematology and Oncology)  Jocelyn Mina MD as Consulting Physician (Thoracic Surgery)  Izaiah Patricia MD as Consulting Physician (Pulmonology)  ------------------------------------------------------------------------------------------------------------------------------------------------------------------------------------------        INPATIENT RADIATION ONCOLOGY NEW PATIENT NOTE:      Date of Service: 3/27/2022     Location:  [address]     Patient ID:   Az Aranda  : 1955                                    MRN: [id number]     Az Aranda is being seen today for an oncologic opinion at the request of Dr. 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 Left upper lobectomy/mediastinal node dissection 2018 revealed 2.9 cm grade 1 adenocarcinoma with positive bronchial/vascular/parenchymal margins, 1/2 level 5 node positive, 5/5 peribronchial nodes positive, one level 9 node negative, 1/2 level 10 node positive, one level 11 node negative, no lymphovascular invasion.     Non-small cell cancer of left lung ([facility] [address]  Staging form: Lung, AJCC 8th Edition  - Clinical stage from 10/10/2018: Stage IIIA (ycT1c, cN2, cM0) - Signed by Eliana French MD on 10/10/2018     -s/p adj chemo carbo/taxol [id number]  -s/p adj RT 60Gy 3/29/19     HISTORY OF PRESENT ILLNESS:   Glen Coello 61 y.o. female with a history of left-sided lung cancer diagnosed in 2018 treated with lobectomy and lymph node dissection followed by adjuvant chemotherapy and sequentially adjuvant radiation therapy due to mediastinal involvement and positive margins. Patient subsequently had been monitored with surveillance imaging.   Patient on Friday was at the grocery store shopping when she had a near syncopal episode. Patient was brought to the ED and evaluated with a CT of the head which showed a hemorrhagic mass in the right cerebellar hemisphere. 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 Patient had a CT chest abdomen pelvis today which showed no other evidence of metastatic disease. Patient was evaluated by neurosurgery and neurology. Patient had a EEG done which did show some abnormal findings. Patient was seen by neurosurgery who recommended surgical debulking of the large right cerebellar lesion as it may lead to hydrocephalus if treated with radiation. Patient has been otherwise asymptomatic with no symptoms of headaches, dizziness, vision changes, numbness, weakness, confusion, chest pain, shortness of breath, abdominal pain, nausea, bony pain, weight loss, fatigue, or any bleeding.   Radiation oncology has been consulted for treatment planning consideration of her intracranial metastases.     PRIOR RADIATION HISTORY:  L lung mediastinum 60Gy 2019     PACEMAKER: None     PAST MEDICAL HISTORY:       Past Medical History:   Diagnosis Date    Anxiety      Benign polyp of large intestine      Cancer ([address]       LT UPPER LOBE    COPD (chronic obstructive pulmonary disease) ([address]      Hx of blood clots       DVT LT LEG    Hyperlipidemia      Hypertension      Liver hemangioma      Lung nodule       BARAK  Nodule    Snores       not tested for apnea    Uterine fibroid 09/2010    Wears glasses           PAST SURGICAL HISTORY:        Past Surgical History:   Procedure Laterality Date    ABDOMINAL HERNIA REPAIR   2012    BREAST BIOPSY Left 1983    BRONCHOSCOPY   10/1/2018     BRONCHOSCOPY performed by Crow Stone MD at  [address] COLONOSCOPY        HYSTERECTOMY   2010    HYSTERECTOMY, TOTAL ABDOMINAL        LUNG BIOPSY        LUNG REMOVAL, PARTIAL Right 09/28/2018     Robotic assisted left lobectomy, upper with lymph node biopsy    OTHER SURGICAL HISTORY Right 11/05/2018     IR PORT PLACEMENT EQUAL OR GREATER THAN 5 YEARS    [address] INCL FLUOR GDNCE DX W/CELL WASHG SPX N/A 10/29/2018     BRONCHOSCOPY performed by Cassandra Recinos MD at [facility] 1 LOBE LOBECT Left 9/28/2018     XI ROBOTIC ASSISTED LEFT UPPER LOBECTOMY MULTIPLE LYMPH NODE BIOPSY, INTERCOSTAL  NERVE BLOCK ABLATION W/EXPAREL performed by Sixto Viera MD at [facility]:  No current facility-administered medications for this encounter.   No current outpatient medications on file.     Facility-Administered Medications Ordered in Other Encounters:     cloNIDine (CATAPRES) tablet 0.1 mg, 0.1 mg, Oral, Q4H PRN, Edmund Acevedo DO, 0.1 mg at 03/27/22 1234    ALPRAZolam (XANAX) tablet 0.25 mg, 0.25 mg, Oral, 4x Daily PRN, ANNIE Sanchez - CNP, 0.25 mg at 03/27/22 2156    sodium chloride flush 0.9 % injection 10 mL, 10 mL, IntraVENous, PRN, Mark Kerr, DO    levETIRAcetam (KEPPRA) tablet 500 mg, 500 mg, Oral, BID, Albania Nelson MD, 500 mg at 03/27/22 2025    sodium chloride flush 0.9 % injection 5-40 mL, 5-40 mL, IntraVENous, 2 times per day, Monty BAE Blood, DO, 10 mL at 03/27/22 2026    sodium chloride flush 0.9 % injection 10 mL, 10 mL, IntraVENous, PRN, Keith BAE Blood, DO    0.9 % sodium chloride infusion, 25 mL, IntraVENous, PRN, Monty BAE Blood, DO, Stopped at 03/26/22 0506    potassium chloride (KLOR-CON M) extended release tablet 40 mEq, 40 mEq, Oral, PRN **OR** potassium bicarb-citric acid (EFFER-K) effervescent tablet 40 mEq, 40 mEq, Oral, PRN, 40 mEq at 03/26/22 0703 **OR** potassium chloride 10 mEq/100 mL IVPB (Peripheral Line), 10 mEq, IntraVENous, PRN, Ahmad Brood P Blood, DO    magnesium sulfate 1000 mg in dextrose 5% 100 mL IVPB, 1,000 mg, on file   Stress:     Feeling of Stress : Not on file   Social Connections:     Frequency of Communication with Friends and Family: Not on file    Frequency of Social Gatherings with Friends and Family: Not on file    Attends Mandaen Services: Not on file    Active Member of [address] [place] [address] [employer] or Organizations: Not on file    Attends Club or Organization Meetings: Not on file    Marital Status: Not on file   Intimate Partner Violence:     Fear of Current or Ex-Partner: Not on file    Emotionally Abused: Not on file    Physically Abused: Not on file    Sexually Abused: Not on file   Housing Stability:     Unable to Pay for Housing in the Last Year: Not on file    Number of [place] in the Last Year: Not on file    Unstable Housing in the Last Year: Not on file         FAMILY HISTORY:        Family History   Problem Relation Age of Onset    Cancer Mother           LUNG    Cancer Sister           LUNG         REVIEW OF SYSTEMS:    GENERAL/CONSTITUTIONAL: The patient denies fever, fatigue, weakness, weight gain or weight loss. HEENT: The patient denies pain, redness, loss of vision, double or blurred vision. The patient denies ringing in the ears, loss of hearing, hoarseness, trouble swallowing, or painful swallowing. CARDIOVASCULAR: The patient denies chest pain, irregular heartbeats, sudden changes in heartbeat or palpitation. RESPIRATORY: The patient denies shortness of breath, cough, hemoptysis. GASTROINTESTINAL: The patient denies nausea, vomiting, diarrhea, constipation, blood in the stools. GENITOURINARY: The patient denies difficult urination, pain or burning with urination, blood in the urine. MUSCULOSKELETAL: The patient denies arm, buttock, thigh or calf cramps. No joint or muscle pain. SKIN: The patient denies easy bruising, skin redness, skin rash, hives. NEUROLOGIC: (+) Syncope. The patient denies headache, dizziness, fainting, muscle spasm, loss of consciousness.   ENDOCRINE: The patient denies intolerance to hot or cold temperature, flushing. HEMATOLOGIC/LYMPHATIC: The patient denies anemia, bleeding tendency or clotting tendency. ALLERGIC/IMMUNOLOGIC: The patient denies rhinitis, asthma, skin sensitivity. PYSCHOLOGIC: The patient denies any depression, anxiety, or confusion.     A full 14 point review of systems was performed and assessed and found to be negative except as noted above.           PHYSICAL EXAMINATION:     CHAPERONE: Family/friend/companieon Present     ECO Asymptomatic     VITAL SIGNS: BP (!) 174/84   Pulse 81   Temp 97.9 °F (36.6 °C) (Oral)   Resp 17   SpO2 97% GENERAL:  General appearance is that of a well-nourished, well-developed in no apparent distress. HEENT: Normocephalic, atraumatic, EOMI, moist mucosa, no erythema. NECK:  No adenopathy or a palpable thyroid mass, trachea is midline. LYMPHATICS: No cervical, supraclavicular  adenopathy. HEART:  Normal rate and regular rhythm, normal heart sounds. LUNGS:  Pulmonary effort normal. Normal breath sounds. ABDOMEN:  Soft, nontender, non distended. EXTREMITIES:  No edema. No calf tenderness. MSK:  No spinal tenderness. Normal ROM. NEUROLOGICAL: Alert and oriented. Strength and sensation intact bilaterally. No focal deficits. PSYCH: Mood normal, behavior normal.  SKIN: No erythema, no desquamation.        LABS:        WBC   Date Value Ref Range Status   2022 7.7 3.5 - 11.3 k/uL Final            Segs Absolute   Date Value Ref Range Status   2022 6.93 1.50 - 8.10 k/uL Final            Hemoglobin   Date Value Ref Range Status   2022 12.7 11.9 - 15.1 g/dL Final            Platelets   Date Value Ref Range Status   2022 238 138 - 453 k/uL Final      No results found for: , CEA  No results found for:   No results found for: PSA     IMAGING:   3/27/22 CT C/A/P  Impression   1. Mild centrilobular emphysema.    2. Redemonstration of left perihilar post treatment scarring with underlying   traction bronchiectasis extending into the upper lower lobes.  Stable. 3. No evidence for metastatic disease in the chest.   4. A 2.1 x 1.4 cm right adrenal nodule not present in 2018.  CT appearance   does not meet criteria for adenoma.  Considered metastatic nodule until   proven otherwise. 5. Redemonstration of hepatic hemangioma and several cysts.      3/26/22 MRI Brain  Impression   1. Multiple supra and infratentorial intraparenchymal lesions are most   consistent with metastatic disease.  There is associated edema with minimal   mass effect, but no midline shift. 2. Intrinsically T1 hyperintense right frontal calvarial lesion is most   consistent with an osseous hemangioma.  No definite osseous metastasis   identified.          3/26/22 CT Head  Impression   1. Partially calcified, hemorrhagic mass within the right cerebellar   hemisphere resulting in mild compression of the 4th ventricle without   obstructive hydrocephalus at this time. 2. Areas of subcortical low attenuation throughout the bilateral cerebral   hemispheres concerning for additional intracranial mass lesions. 3. No significant mass effect or midline shift. 4. Possible calvarial metastasis versus underlying intraosseous hemangiomas. No significant change. 5. Recent prior outside head CT images would be of benefit to determine   stability.         PATHOLOGY:  9/28/2018 Lobectomy LND  -- Diagnosis --   1.  LYMPH NODE, LEVEL 9, BIOPSY:       -  ONE LYMPH NODE, NEGATIVE FOR MALIGNANCY (0/1). 2.  LYMPH NODE, LEVEL 11, DISSECTION:       -  ONE OF 2 LYMPH NODES POSITIVE FOR METASTATIC CARCINOMA (1/2). 3.  LYMPH NODE, LEVEL 5, DISSECTION:       -  ONE OF 2 LYMPH NODES POSITIVE FOR METASTATIC CARCINOMA (1/2).     -  CARCINOMA INVADES LARGE PERIPHERAL NERVE BRANCHES. 4.  LUNG, LEFT UPPER LOBE, LOBECTOMY:            -  WELL-DIFFERENTIATED INVASIVE ADENOCARCINOMA, 2.9 CM   GREATEST DIMENSION.        -  NEGATIVE FOR VISCERAL PLEURAL INVASION.          -  TUMOR INVOLVES SOFT TISSUE OF BRONCHIAL, VASCULAR AND   PARENCHYMAL MARGINS.     -  5 PERIBRONCHIOLAR LYMPH NODES, POSITIVE FOR METASTATIC   ADENOCARCINOMA (5/5).     -  SEPARATE SMALL INTRALOBAR FOCUS ATYPICAL ADENOMATOUS   HYPERPLASIA.           -  PATHOLOGIC STAGE:  pT1c, pN2, R1.     5.  LYMPH NODES, LEVEL 10, DISSECTION:       -  ONE OF 2 LYMPH NODES POSITIVE FOR METASTATIC CARCINOMA (1/2).       ASSESSMENT AND PLAN:   Bartolo Cornell is a 61 y.o. 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 Left upper lobectomy/mediastinal node dissection 9/28/2018 revealed 2.9 cm grade 1 adenocarcinoma with positive bronchial/vascular/parenchymal margins, 1/2 level 5 node positive, 5/5 peribronchial nodes positive, one level 9 node negative, 1/2 level 10 node positive, one level 11 node negative, no lymphovascular invasion.     Non-small cell cancer of left lung ([facility] [address]  Staging form: Lung, AJCC 8th Edition  - Clinical stage from 10/10/2018: Stage IIIA (ycT1c, cN2, cM0) - Signed by Maren Castillo MD on 10/10/2018     We reviewed with the patient and family the testing that has been done so far, including imaging and pathology. We also reviewed their staging based on the testing that as been done and the recommendations per the NCCN guidelines. We discussed the options of treatment, including surgery, chemotherapy, and radiation, and the rationale of why radiation therapy would be indicated for this diagnosis.  We also discussed that they have the option to not pursue our recommended treatment as well.     Patient was reviewed that her recent findings suggest recurrent disease with intracranial metastasis presents. Pathologic confirmation can be achieved by craniotomy. Neurosurgery has recommended craniotomy as well to alleviate possible risk of hydrocephalus or mass-effect of the large lesion in the right cerebellum. We agree that this would be helpful followed by a course of adjuvant SRS treatment to the resection cavity as well as to the other remaining intracranial metastasis with gamma knife.     We reviewed the logistics of radiation treatment planning for Gamma Knife SRS, including making a Aquaplast mask, a CBCT and a MRI scan, followed by single treatment session.     With regards to radiation to the brain, I discussed the possible short-term side effects of brain radiation which included skin irritation (causing redness, dryness, or peeling), tiredness, low blood counts (causing infection or bleeding), hair loss, headache, and nausea/vomiting. Possible long-term side effects discussed included hyperpigmentation of the skin, permanent hair loss or change in color and texture of the hair if it does regrow, damage to the normal brain resulting in decreased mentation, numbness and weakness, cataract formation and decreased pituitary function.     After ample time to review the patient's questions, patient will be recommended to follow-up with us outpatient after likely craniotomy to discuss adjuvant radiation with gamma knife SRS to her other intracranial lesions in the resection cavity. Patient was in agreement with my recommendations. All questions were answered to his satisfaction.  Patient was advised to contact us anytime should they have any questions or concerns.               Electronically signed by Nikky Purcell MD on 3/27/22 at 10:27 PM EDT        Drugs Prescribed:      New Prescriptions     No medications on file         Orders Placed:   No orders of the defined types were placed in this encounter.           CC:  Patient Care Team:  Hallie Soliz MD as PCP - General (Family Medicine)  Emilia Cruz MD as PCP - [facility]  Libertad Duque MD as Consulting Physician (Hematology and Oncology)  Tati Caal MD as Consulting Physician (Thoracic Surgery)  Jonah Coy MD as Consulting Physician (Pulmonology)      Total time spent on this case on the day of encounter is more than 110 minutes. This time includes combination of one or more of the following - review of necessary tests, review of pertinent medical records from the EMR, performing medically appropriate examination and evaluation, counseling and educating the patient/family/caregiver, ordering necessary medical tests, procedures etc., documenting the clinical information in the electronic medical record, care coordination, referring and communicating with other health care providers and interpretation of results independently. This note is created with the assistance of a speech recognition program.  While intending to generate a document that actually reflects the content of the visit, the document can still have some errors including those of syntax and sound a like substitutions which may escape proof reading.  It such instances, actual meaning can be extrapolated by contextual diversion.               Consults by Mera Bruno MD at 3/26/2022  4:17 PM    Author: Mera Bruno MD Specialty: Hematology and Oncology, Internal Medicine Author Type: Physician   Filed: 3/26/2022 10:05 PM Date of Service: 3/26/2022  4:17 PM Status: Signed   : Mera Bruno MD (Physician)   Consult Orders   1. Inpatient consult to Hem/Onc [[id number]] ordered by Shannan Das, DO at 03/25/22 1629             Today's Date:  3/26/2022  Patient Name: Halley Worthy  Date of admission:      3/25/2022 10:15 PM  Patient's age:  61 y.o., 1955  Admission Dx:  Intraparenchymal hemorrhage of brain ([address] [I61.9]  New onset seizure ([address] [R56.9]     Reason for Consult: management recommendations  Requesting Physician: Sam Jackson DO     CHIEF COMPLAINT: Seizure. Brain mass. History of lung cancer     History Obtained From:  patient, electronic medical record     HISTORY OF PRESENT ILLNESS:       The patient is a 61 y.o.  female who Initially presented to [facility] after having a seizure-like activity with loss of consciousness at that grocery store. Patient was feeling well before the episode. 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 Patient according to records also underwent chemotherapy and radiation therapy. She also has history of DVT COPD dyslipidemia. Patient's work-up done in the ER including CT brain without contrast showed small calcification as well as finding suspicion for acute parenchymal hemorrhage associated with vasogenic edema. In addition there was also vasogenic edema noted in the posterior left occipital lobe. There were concern regarding calvarial metastasis. CTA chest showed left perihilar consolidation concerning for pneumonia/radiation pneumonitis. Lymphangitic spread could not be ruled out. There was a large right adrenal mass likely metastatic. There were multiple liver lesion noted CT PET was recommended.     Patient was seen by neurosurgery. Patient is currently on Decadron. MRI brain as well as spine was ordered.         Past Medical History:   has a past medical history of Anxiety, Benign polyp of large intestine, Cancer ([address] COPD (chronic obstructive pulmonary disease) ([address] Hx of blood clots, Hyperlipidemia, Hypertension, Liver hemangioma, Lung nodule, Snores, Uterine fibroid, and Wears glasses.     Past Surgical History:   has a past surgical history that includes Hysterectomy (2010); Abdominal hernia repair (2012); Colonoscopy; Lung biopsy; Breast biopsy (Left, 1983);  Lung removal, partial (Right, 09/28/2018); pr rmvl lung other than pneumonectomy 1 lobe lobect (Left, 9/28/2018); bronchoscopy magnesium sulfate 1000 mg in dextrose 5% 100 mL IVPB  1,000 mg IntraVENous PRN Monty P Blood, DO        ondansetron (ZOFRAN-ODT) disintegrating tablet 4 mg  4 mg Oral Q8H PRN Monty P Blood, DO         Or    ondansetron (ZOFRAN) injection 4 mg  4 mg IntraVENous Q6H PRN Monty P Blood, DO        polyethylene glycol (GLYCOLAX) packet 17 g  17 g Oral Daily PRN Monty P Blood, DO        acetaminophen (TYLENOL) tablet 650 mg  650 mg Oral Q6H PRN Monty P Blood, DO         Or    acetaminophen (TYLENOL) suppository 650 mg  650 mg Rectal Q6H PRN Monty P Blood, DO        dexamethasone (DECADRON) injection 4 mg  4 mg IntraVENous Q6H Monty P Blood, DO   4 mg at 03/26/22 1202         Allergies:  Codeine     Social History:   reports that she quit smoking about 22 years ago. Her smoking use included cigarettes. She has a 45.00 pack-year smoking history. She has never used smokeless tobacco. She reports current alcohol use. She reports that she does not use drugs.      Family History: family history includes Cancer in her mother and sister.     REVIEW OF SYSTEMS:       Constitutional: No fever or chills. No night sweats, no weight loss   Eyes: No eye discharge, double vision, or eye pain   HEENT: negative for sore mouth, sore throat, hoarseness and voice change   Respiratory: negative for cough , sputum, dyspnea, wheezing, hemoptysis, chest pain   Cardiovascular: negative for chest pain, dyspnea, palpitations, orthopnea, PND   Gastrointestinal: negative for nausea, vomiting, diarrhea, constipation, abdominal pain, Dysphagia, hematemesis and hematochezia   Genitourinary: negative for frequency, dysuria, nocturia, urinary incontinence, and hematuria   Integument: negative for rash, skin lesions, bruises.    Hematologic/Lymphatic: negative for easy bruising, bleeding, lymphadenopathy, or petechiae   Endocrine: negative for heat or cold intolerance,weight changes, change in bowel habits and hair loss   Musculoskeletal: negative for myalgias, arthralgias, pain, joint swelling,and bone pain   Neurological: negative for headaches, dizziness, seizures, weakness, numbness     PHYSICAL EXAM:         BP (!) 142/76   Pulse 95   Temp 98.4 °F (36.9 °C) (Oral)   Resp 21   SpO2 92%    Temp (24hrs), Av.3 °F (36.8 °C), Min:98.2 °F (36.8 °C), Max:98.4 °F (36.9 °C)        General appearance - well appearing, no in pain or distress   Mental status - alert and cooperative   Eyes - pupils equal and reactive, extraocular eye movements intact   Ears - bilateral TM's and external ear canals normal   Mouth - mucous membranes moist, pharynx normal without lesions   Neck - supple, no significant adenopathy   Lymphatics - no palpable lymphadenopathy, no hepatosplenomegaly   Chest - clear to auscultation, no wheezes, rales or rhonchi, symmetric air entry   Heart - normal rate, regular rhythm, normal S1, S2, no murmurs  Abdomen - soft, nontender, nondistended, no masses or organomegaly   Neurological - alert, oriented, normal speech, no focal findings or movement disorder noted   Musculoskeletal - no joint tenderness, deformity or swelling   Extremities - peripheral pulses normal, no pedal edema, no clubbing or cyanosis   Skin - normal coloration and turgor, no rashes, no suspicious skin lesions noted ,        DATA:       Labs:            Results for orders placed or performed during the hospital encounter of    Basic Metabolic Panel w/ Reflex to MG   Result Value Ref Range     Glucose 198 (H) 70 - 99 mg/dL     BUN 13 8 - 23 mg/dL     CREATININE 0.71 0.50 - 0.90 mg/dL     Calcium 9.3 8.6 - 10.4 mg/dL     Sodium 139 135 - 144 mmol/L     Potassium 3.5 (L) 3.7 - 5.3 mmol/L     Chloride 102 98 - 107 mmol/L     CO2 21 20 - 31 mmol/L     Anion Gap 16 9 - 17 mmol/L     GFR Non-African American >60 >60 mL/min     GFR African American >60 >60 mL/min     GFR Comment          CBC with Auto Differential   Result Value Ref Range     WBC 7.7 3.5 - 11.3 k/uL     RBC 4.02 3.95 - 5.11 m/uL     Hemoglobin 12.7 11.9 - 15.1 g/dL     Hematocrit 36.8 36.3 - 47.1 %     MCV 91.5 82.6 - 102.9 fL     MCH 31.6 25.2 - 33.5 pg     MCHC 34.5 28.4 - 34.8 g/dL     RDW 12.2 11.8 - 14.4 %     Platelets 295 634 - 795 k/uL     MPV 10.5 8.1 - 13.5 fL     NRBC Automated 0.0 0.0 per 100 WBC     Immature Granulocytes 1 (H) 0 %     Seg Neutrophils 90 (H) 36 - 65 %     Lymphocytes 7 (L) 24 - 43 %     Monocytes 2 (L) 3 - 12 %     Eosinophils % 0 (L) 1 - 4 %     Basophils 0 0 - 2 %     Absolute Immature Granulocyte 0.08 0.00 - 0.30 k/uL     Segs Absolute 6.93 1.50 - 8.10 k/uL     Absolute Lymph # 0.54 (L) 1.10 - 3.70 k/uL     Absolute Mono # 0.15 0.10 - 1.20 k/uL     Absolute Eos # 0.00 0.00 - 0.44 k/uL     Basophils Absolute 0.00 0.00 - 0.20 k/uL     Morphology Normal     Magnesium   Result Value Ref Range     Magnesium 1.7 1.6 - 2.6 mg/dL            IMAGING DATA:     CT HEAD WO CONTRAST     Result Date: 3/26/2022  EXAMINATION: CT OF THE HEAD WITHOUT CONTRAST  3/26/2022 3:11 am TECHNIQUE: CT of the head was performed without the administration of intravenous contrast. Dose modulation, iterative reconstruction, and/or weight based adjustment of the mA/kV was utilized to reduce the radiation dose to as low as reasonably achievable. COMPARISON: Prior not available at time dictation, MR brain 10/24/2018, head CT 08/03/2018 HISTORY: ORDERING SYSTEM PROVIDED HISTORY: 6 hour repeat stability scan, new seizure today found to have left occipital-parital mass and right parietal with IPH FINDINGS: BRAIN/VENTRICLES: Partially calcified, hemorrhagic mass within the right cerebellar hemisphere with resultant localized edema resulting in mild compression of the 4th ventricle. No evidence of hydrocephalus. Areas of subcortical low attenuation within the left parietal, left occipital and caudal aspect of the right postcentral gyrus suggesting underlying edema concerning for additional intracranial lesions. No significant mass effect or midline shift. No extra-axial fluid collections. Gray-white matter differentiation is otherwise maintained. ORBITS: The visualized portion of the orbits demonstrate no acute abnormality. SINUSES: The visualized paranasal sinuses and mastoid air cells demonstrate no acute abnormality. SOFT TISSUES/SKULL:  Scattered lytic lesions throughout the calvarium may reflect intraosseous hemangiomas, however underlying metastasis is possible. No significant change.      1. Partially calcified, hemorrhagic mass within the right cerebellar hemisphere resulting in mild compression of the 4th ventricle without obstructive hydrocephalus at this time. 2. Areas of subcortical low attenuation throughout the bilateral cerebral hemispheres concerning for additional intracranial mass lesions. 3. No significant mass effect or midline shift. 4. Possible calvarial metastasis versus underlying intraosseous hemangiomas. No significant change. 5. Recent prior outside head CT images would be of benefit to determine stability.            IMPRESSION:   Primary Problem  New onset seizure [facility])          Active Hospital Problems     Diagnosis Date Noted    Mass of occipital region [R22.0] 03/26/2022    Hyperglycemia [R73.9] 03/26/2022    Hypokalemia [E87.6] 03/26/2022    Vasogenic edema ([address] [G93.6] 03/26/2022    Brain metastases (HCC) [C79.31] 03/26/2022    Adrenal mass ([address] - right  [E27.8] 03/26/2022    Episode of loss of consciousness [R55]      Intraparenchymal hemorrhage of brain ([address] [I61.9] 03/25/2022    New onset seizure ([address] [R56.9] 03/25/2022    Essential hypertension [I10] 06/23/2020    Non-small cell cancer of left lung (HCC) [C34.92]                 RECOMMENDATIONS:  I personally reviewed results of lab work-up imaging studies and other relevant clinical data. We will obtain abdomen pelvis.   Only upper abdomen was visualized on the CT chest  Follow-up on results of brain MRI and spine MRI.  If MRI shows brain metastasis will need treatment. Options would include surgery versus radiation depending upon location, number of the metastasis and feasibility for surgery versus radiation  We will need tissue diagnosis to establish recurrent disease. Will make recommendations regarding site of biopsy depending upon the MRI of the brain as well as other imaging studies. Patient multiple questions which answered the best my ability  Continue Decadron        Discussed with patient and Nurse.        Thank you for asking us to see this patient.              Alfredo Franz MD             This note is created with the assistance of a speech recognition program.  While intending to generate a document that actually reflects the content of the visit, the document can still have some errors including those of syntax and sound a like substitutions which may escape proof reading.   It such instances, actual meaning can be extrapolated by contextual diversion.               Discharge Summary      Larry Zaragoza MD  3/31/2022 13:40     [place]  Office: [address], DO, Raman Elam, DO, Isi Mosley, DO, Elder Das, DO, Giorgi Pittman MD, Juaquin Nuñez MD, Barbie Louise MD, Larry Zaragoza MD, Uzma Jernigan MD, Cecelia Young MD, Becca Lang MD, Marcelina Waggoner, DO, Yang Nunes, DO, Geno Brito MD,  Nadia Zuñiga, DO, Luis Carlos Hobbs MD, Lou Thornton MD, Radha Rodriguez MD, Brock Gomez, DO, Melissa Wyman MD, Cyndie Dan MD, Deya Segura, CNP, [facility] JEFFALA DelGrosso, CNP, Reubin Kavya, CNP, Leidy Mask, CNS, McCarley Serena, CNP, Flor Can, CNP, Carleen Postal, CNP, Alena Dub, CNP, Nico Flavin, CNP, Lendel Nissen, PA-C, Mya Das, DNP, Chinyere Kc DNP, Katharine Perkins, TEENA, Dominick Bates, CNP, Lynn Anna, CNP            [address]     Discharge Summary     Patient ID: Noelle Grewal Oumou  :  1955             MRN: [id number]                                     ACCOUNT:  [de-identified]   Patient's PCP: Otis Dickey MD  Admit Date: 3/25/2022   Discharge Date: 3/31/2022     Length of Stay: 6  Code Status:  Full Code  Admitting Physician: Samanta Herrera MD  Discharge Physician: Samanta Herrera MD      Active Discharge Diagnoses:      Hospital Problem Lists:  Principal Problem:    New onset seizure ([address]  Active Problems:    Non-small cell cancer of left lung ([address]    Essential hypertension    Intraparenchymal hemorrhage of brain ([address]    Mass of occipital region    Hyperglycemia    Hypokalemia    Vasogenic edema ([address]    Brain metastases ([address]    Adrenal mass ([address] - right     Episode of loss of consciousness    Focal epilepsy ([address]  Resolved Problems:    * No resolved hospital problems. *        Admission Condition:  poor      Discharged Condition: good     Hospital Stay:      Hospital course:      The patient is a 62 y. o. 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 with chemoradiation therapy, DVT, COPD, hypertension, hyperlipidemia, presented with a seizure of new onset, admitted to the hospital for management of new onset seizure,     [facility] after having seizure-like activity causing LOC at the grocery store, tonic-clonic motion, patient does not recall events confused postictally, patient was found to have intraparenchymal hemorrhage,  -CT head demonstrates right cerebral hemisphere mass with calcification as well as possible vasogenic edema and associated with acute IPH, with mild mass-effect on the fourth ventricle but no hydrocephalus, patient was loaded with Keppra started on 500 twice daily, also on steroids,  Neurosurgery suspected metastatic disease to brain secondary to lung cancer vasogenic edema, recommended Decadron 4 every 6,  -MRI brain multifocal lesions with the largest one being right cerebellar, multifocal cerebral metastatic disease,     Neurosurgery explained to the patient and family, infratentorial lesions was fairly large at the x3 cm in the tightest enclosed space which was the posterior fossa, risk of nonoperative management with steroids and radiation also were explained, and the risk of hydrocephalus with demise along with a significant brainstem compression, patient and family would like to go with surgical option     -Neurology was following recommended continue Keppra 500 mg twice daily,     Craniotomy took place on 3/29 by Dr. Kalyan Otto postop recommendation follow-up CT head, MRI brain [employer] 0 tomorrow, Ancef 2 g every 8, head of the bed 30 to 45 degree, SBP  no blood thinners     3/29 : Rt sided suboccipital craniotomy.   Course afterwards went unremarkable , discharged on 3/31 in a stable condition      Significant therapeutic interventions: as above     Significant Diagnostic Studies:   Labs / Micro:  CBC:         Lab Results   Component Value Date     WBC 10.8 03/31/2022     RBC 4.19 03/31/2022     HGB 13.1 03/31/2022     HCT 38.4 03/31/2022     MCV 91.6 03/31/2022     MCH 31.3 03/31/2022     MCHC 34.1 03/31/2022     RDW 12.2 03/31/2022      03/31/2022      BMP:          Lab Results   Component Value Date     GLUCOSE 113 03/31/2022      03/31/2022     K 4.2 03/31/2022     CL 97 03/31/2022     CO2 25 03/31/2022     ANIONGAP 14 03/31/2022     BUN 16 03/31/2022     CREATININE 0.63 03/31/2022     BUNCRER NOT REPORTED 10/12/2021     CALCIUM 9.2 03/31/2022     LABGLOM >60 03/31/2022     GFRAA >60 03/31/2022     GFR      03/31/2022      HFP:          Lab Results   Component Value Date     PROT 6.8 03/27/2022      CMP:          Lab Results   Component Value Date     GLUCOSE 113 03/31/2022      03/31/2022     K 4.2 03/31/2022     CL 97 03/31/2022     CO2 25 03/31/2022     BUN 16 03/31/2022     CREATININE 0.63 03/31/2022     ANIONGAP 14 03/31/2022     ALKPHOS 70 03/27/2022     ALT 18 03/27/2022     AST 21 03/27/2022     BILITOT 0.30 03/27/2022     LABALBU 4.2 03/27/2022     ALBUMIN 1.6 03/27/2022     LABGLOM >60 03/31/2022     GFRAA >60 03/31/2022     GFR      03/31/2022     PROT 6.8 03/27/2022     CALCIUM 9.2 03/31/2022      PT/INR:          Lab Results   Component Value Date     PROTIME 10.6 11/05/2018     INR 1.0 11/05/2018      PTT:         Lab Results   Component Value Date     APTT 28.5 11/05/2018      FLP:          Lab Results   Component Value Date     CHOL 232 10/12/2021     TRIG 140 10/12/2021     HDL 77 10/12/2021      U/A:          Lab Results   Component Value Date     COLORU YELLOW 09/25/2018     TURBIDITY CLEAR 09/25/2018     SPECGRAV 1.009 09/25/2018     HGBUR NEGATIVE 09/25/2018     PHUR 7.5 09/25/2018     PROTEINU NEGATIVE 09/25/2018     GLUCOSEU NEGATIVE 09/25/2018     KETUA NEGATIVE 09/25/2018     BILIRUBINUR NEGATIVE 09/25/2018     UROBILINOGEN Normal 09/25/2018     NITRU NEGATIVE 09/25/2018     LEUKOCYTESUR NEGATIVE 09/25/2018      TSH:          Lab Results   Component Value Date     TSH 1.77 05/11/2016         Radiology:  CT HEAD WO CONTRAST     Result Date: 3/30/2022  1. Interval right occipital craniotomy and resection of a right cerebellar mass. 2. Redemonstration of multiple additional known intracranial metastatic lesions. 3. No acute infarct, acute intracranial hemorrhage or worsened mass effect.      CT HEAD WO CONTRAST     Result Date: 3/26/2022  1. Partially calcified, hemorrhagic mass within the right cerebellar hemisphere resulting in mild compression of the 4th ventricle without obstructive hydrocephalus at this time. 2. Areas of subcortical low attenuation throughout the bilateral cerebral hemispheres concerning for additional intracranial mass lesions. 3. No significant mass effect or midline shift.  4. Possible calvarial metastasis versus underlying intraosseous hemangiomas. No significant change. 5. Recent prior outside head CT images would be of benefit to determine stability.      MRI CERVICAL SPINE WO CONTRAST     Result Date: 3/26/2022  1. Motion degraded examination with multilevel degenerative changes. There is moderate spinal canal stenosis at C5-6 and C6-7 with mild spinal canal narrowing at C3-4. Multilevel neural foraminal narrowing as above. 2. Reversal of the normal cervical lordosis with anterolisthesis at C3-4 and retrolisthesis at C5-6.      MRI THORACIC SPINE WO CONTRAST     Result Date: 3/26/2022  1. No acute abnormality or aggressive osseous lesion. 2. Fatty marrow replacement from T5-T8, which could be due to prior treatment. 3. No significant degenerative change.      MRI LUMBAR SPINE WO CONTRAST     Result Date: 3/26/2022  1. No acute abnormality or aggressive osseous lesion. 2.  No significant degenerative change.      CT CHEST ABDOMEN PELVIS W CONTRAST     Result Date: 3/27/2022  1. Mild centrilobular emphysema. 2. Redemonstration of left perihilar post treatment scarring with underlying traction bronchiectasis extending into the upper lower lobes. Stable. 3. No evidence for metastatic disease in the chest. 4. A 2.1 x 1.4 cm right adrenal nodule not present in 2018. CT appearance does not meet criteria for adenoma. Considered metastatic nodule until proven otherwise. 5. Redemonstration of hepatic hemangioma and several cysts.      MRI BRAIN W WO CONTRAST     Result Date: 3/30/2022  Postoperative resection cavity in the right aspect of the posterior fossa status post partial resection of the right cerebellar hemisphere. No definite residual metastatic focus is demonstrated. Multiple similar metastatic foci are demonstrated supratentorially with adjacent edema. No significant mass effect or midline shift.      MRI BRAIN W WO CONTRAST     Result Date: 3/26/2022  1.  Multiple supra and infratentorial intraparenchymal lesions are most consistent with metastatic disease. There is associated edema with minimal mass effect, but no midline shift. 2. Intrinsically T1 hyperintense right frontal calvarial lesion is most consistent with an osseous hemangioma.   No definite osseous metastasis identified.         Consultations:    Consults:      Final Specialist Recommendations/Findings:   IP CONSULT TO HEM/ONC  IP CONSULT TO NEUROSURGERY  IP CONSULT TO NEUROLOGY  IP CONSULT TO RADIATION ONCOLOGY  IP CONSULT TO NEUROCRITICAL CARE        The patient was seen and examined on day of discharge     A&O X 3   Scalp wound in dressing  AE diminished on basis   NSR, NO MRG  Soft abdomen , +BS  No swelling  and pulse palpable        Discharge plan:      Disposition: Home     Physician Follow Up:      Keira Holden [address],  [address] [facility] [place] [address]  [phone]     Schedule an appointment as soon as possible for a visit in 2 weeks  Please follow up with neurosurgery in 2 weeks after surgery     Lukasz Najera MD    [address]  [phone]     In 1 week           Requiring Further Evaluation/Follow Up POST HOSPITALIZATION/Incidental Findings:      Diet: regular diet     Activity: As tolerated     Instructions to Patient:      Discharge Medications:             Medication List             START taking these medications          * dexamethasone 4 MG tablet  Commonly known as: DECADRON  Take 1 tablet by mouth every 6 hours for 2 days      * dexamethasone 4 MG tablet  Commonly known as: DECADRON  Take 1 tablet by mouth every 8 hours for 6 doses  Start taking on: April 2, 2022      * dexamethasone 4 MG tablet  Commonly known as: DECADRON  Take 1 tablet by mouth every 12 hours for 2 days  Start taking on: April 5, 2022      * dexamethasone 4 MG tablet  Commonly known as: DECADRON  Take 1 tablet by mouth daily for 2 days  Start taking on: April 8, 2022      * dexamethasone 2 MG tablet  Commonly known as: DECADRON  Take 1 tablet by mouth daily for 2 doses  Start taking on: April 10, 2022      levETIRAcetam 500 MG tablet  Commonly known as: KEPPRA  Take 1 tablet by mouth 2 times daily      oxyCODONE HCl 10 MG immediate release tablet  Commonly known as: OXY-IR  Take 1 tablet by mouth every 6 hours as needed for Pain for up to 3 days.      pantoprazole 40 MG tablet  Commonly known as: PROTONIX  Take 1 tablet by mouth every morning (before breakfast)                 * This list has 5 medication(s) that are the same as other medications prescribed for you.  Read the directions carefully, and ask your doctor or other care provider to review them with you.                          CONTINUE taking these medications          albuterol sulfate  (90 Base) MCG/ACT inhaler  inhale 2 puffs by mouth four times a day      ALPRAZolam 0.25 MG tablet  Commonly known as: XANAX  take 1 tablet by mouth nightly if needed for sleep for 30 DAYS      lisinopril-hydroCHLOROthiazide 10-12.5 MG per tablet  Commonly known as: PRINZIDE;ZESTORETIC  Take 1 tablet by mouth daily      lovastatin 10 MG tablet  Commonly known as: MEVACOR  Take 1 tablet by mouth nightly                          Where to Get Your Medications             These medications were sent to [facility] [address] [facility], [address] [facility]  [address] [facility], [address]  [address]     Phone: [phone] ·   dexamethasone 2 MG tablet  · dexamethasone 4 MG tablet  · dexamethasone 4 MG tablet  · dexamethasone 4 MG tablet  · dexamethasone 4 MG tablet  · levETIRAcetam 500 MG tablet  · oxyCODONE HCl 10 MG immediate release tablet  · pantoprazole 40 MG tablet            No discharge procedures on file.     Time Spent on discharge is  35 mins in patient examination, evaluation, counseling as well as medication reconciliation, prescriptions for required medications, discharge plan and follow up.     Electronically signed by   Mariah Cordero MD  3/31/2022  1:40 PM        Thank you Dr. Willa Magallanes MD for the opportunity to be involved in this patient's care.

## 2022-04-04 ENCOUNTER — HOSPITAL ENCOUNTER (OUTPATIENT)
Dept: CT IMAGING | Age: 67
Discharge: HOME | End: 2022-04-06
Payer: COMMERCIAL

## 2022-04-04 DIAGNOSIS — R22.32 LUMP OF SKIN OF LEFT UPPER EXTREMITY: ICD-10-CM

## 2022-04-04 DIAGNOSIS — C34.92 NON-SMALL CELL CANCER OF LEFT LUNG (HCC): ICD-10-CM

## 2022-04-04 PROCEDURE — 2580000003 HC RX 258: Performed by: INTERNAL MEDICINE

## 2022-04-04 PROCEDURE — 71260 CT THORAX DX C+: CPT

## 2022-04-04 PROCEDURE — 6360000004 HC RX CONTRAST MEDICATION: Performed by: INTERNAL MEDICINE

## 2022-04-04 PROCEDURE — 73201 CT UPPER EXTREMITY W/DYE: CPT

## 2022-04-04 RX ORDER — 0.9 % SODIUM CHLORIDE 0.9 %
100 INTRAVENOUS SOLUTION INTRAVENOUS ONCE
Status: DISCONTINUED | OUTPATIENT
Start: 2022-04-04 | End: 2022-04-07 | Stop reason: HOSPADM

## 2022-04-04 RX ORDER — SODIUM CHLORIDE 0.9 % (FLUSH) 0.9 %
10 SYRINGE (ML) INJECTION ONCE
Status: COMPLETED | OUTPATIENT
Start: 2022-04-04 | End: 2022-04-04

## 2022-04-04 RX ADMIN — Medication 80 ML: at 08:02

## 2022-04-04 RX ADMIN — IOPAMIDOL 75 ML: 755 INJECTION, SOLUTION INTRAVENOUS at 08:01

## 2022-04-04 RX ADMIN — SODIUM CHLORIDE, PRESERVATIVE FREE 10 ML: 5 INJECTION INTRAVENOUS at 08:02

## 2022-04-05 ENCOUNTER — HOSPITAL ENCOUNTER (OUTPATIENT)
Dept: RADIATION ONCOLOGY | Age: 67
Discharge: HOME | End: 2022-04-05
Payer: COMMERCIAL

## 2022-04-05 ENCOUNTER — TELEPHONE (OUTPATIENT)
Dept: RADIATION ONCOLOGY | Age: 67
End: 2022-04-05

## 2022-04-05 VITALS
SYSTOLIC BLOOD PRESSURE: 160 MMHG | DIASTOLIC BLOOD PRESSURE: 93 MMHG | HEIGHT: 59 IN | HEART RATE: 92 BPM | BODY MASS INDEX: 32.13 KG/M2 | WEIGHT: 159.4 LBS | OXYGEN SATURATION: 93 % | RESPIRATION RATE: 18 BRPM | TEMPERATURE: 98 F

## 2022-04-05 DIAGNOSIS — C34.12 MALIGNANT NEOPLASM OF BRONCHUS OF UPPER LOBE, LEFT (HCC): Primary | ICD-10-CM

## 2022-04-05 DIAGNOSIS — C79.31 BRAIN METASTASES (HCC): ICD-10-CM

## 2022-04-05 PROCEDURE — 99215 OFFICE O/P EST HI 40 MIN: CPT | Performed by: RADIOLOGY

## 2022-04-05 PROCEDURE — 99213 OFFICE O/P EST LOW 20 MIN: CPT | Performed by: RADIOLOGY

## 2022-04-05 ASSESSMENT — PAIN DESCRIPTION - DESCRIPTORS: DESCRIPTORS: ACHING

## 2022-04-05 ASSESSMENT — PAIN DESCRIPTION - LOCATION: LOCATION: HEAD

## 2022-04-05 ASSESSMENT — PAIN SCALES - GENERAL: PAINLEVEL_OUTOF10: 4

## 2022-04-05 ASSESSMENT — PAIN DESCRIPTION - PAIN TYPE: TYPE: ACUTE PAIN

## 2022-04-05 NOTE — TELEPHONE ENCOUNTER
Patient consulted inhouse needs follow up with Dr. Annie Gautam asamk left message for patient to call back to schedule.

## 2022-04-05 NOTE — PROGRESS NOTES
Referring Physician: Dr. Hernandez Malina:    04/05/22 1312   BP: (!) 160/93   Pulse: 92   Resp: 18   Temp: 98 °F (36.7 °C)   SpO2: 93%    :  Patient Currently in Pain: Yes  Pain Assessment: 0-10  Pain Level: 4       Wt Readings from Last 1 Encounters:   04/05/22 159 lb 6.4 oz (72.3 kg)        Body mass index is 32.19 kg/m². Height: 4' 11\" (149.9 cm)         Immunizations:    Influenza status:    [x]   Current   []   Patient declined    Pneumococcal status:  [x]   Current  []   Patient declined  Covid status:   [x]  Dose #1:                     [x]  Dose #2:               []   Patient declined    Smoking Status:    [] Smoker - PPD:   [x] Nonsmoker - Quit Date:    2000           [] Never a smoker      No chief complaint on file. Cancer Staging  Malignant neoplasm of bronchus of upper lobe, left ([facility] [address]  Staging form: Lung, AJCC 8th Edition  - Pathologic stage from 8/22/2018: Stage IIIA (pT1b, pN2, cM0) - Signed by Jackelyn Mcgowan MD on 2/12/2019    Non-small cell cancer of left lung [facility])  Staging form: Lung, AJCC 8th Edition  - Clinical stage from 10/10/2018: Stage IIIA (ycT1c, cN2, cM0) - Signed by Tati Caal MD on 10/10/2018      Prior Radiation Therapy? Yes   If yes, site treated: Left upper lung  Facility:      [facility]       Date: 2019    Concurrent Chemo/radiation? No   If yes, start date:    Prior Chemotherapy? Yes   If yes    Facility:   [facility]                           [id number]    Prior Hormonal Therapy? No   If yes   Facility:                             Date:    Head and Neck Cancer? No   If yes, please remind physician to place swallow study order and speech therapy order.       Pacemaker/Defibulator/ICD:  No              Current Outpatient Medications   Medication Sig Dispense Refill    pantoprazole (PROTONIX) 40 MG tablet Take 1 tablet by mouth every morning (before breakfast) 30 tablet 1    dexamethasone (DECADRON) 4 MG tablet Take 1 tablet by mouth every 12 hours for 2 days 4 tablet 0    [START ON 4/8/2022] dexamethasone (DECADRON) 4 MG tablet Take 1 tablet by mouth daily for 2 days 2 tablet 0    [START ON 4/10/2022] dexamethasone (DECADRON) 2 MG tablet Take 1 tablet by mouth daily for 2 doses 2 tablet 0    levETIRAcetam (KEPPRA) 500 MG tablet Take 1 tablet by mouth 2 times daily 60 tablet 3    ALPRAZolam (XANAX) 0.25 MG tablet take 1 tablet by mouth nightly if needed for sleep for 30 DAYS 30 tablet 0    albuterol sulfate  (90 Base) MCG/ACT inhaler inhale 2 puffs by mouth four times a day 18 g 11    lisinopril-hydroCHLOROthiazide (PRINZIDE;ZESTORETIC) 10-12.5 MG per tablet Take 1 tablet by mouth daily 30 tablet 11    lovastatin (MEVACOR) 10 MG tablet Take 1 tablet by mouth nightly 30 tablet 11     No current facility-administered medications for this encounter.      Facility-Administered Medications Ordered in Other Encounters   Medication Dose Route Frequency Provider Last Rate Last Admin    0.9 % sodium chloride bolus  100 mL IntraVENous Once Miky Solano MD           Past Medical History:   Diagnosis Date    Anxiety     Benign polyp of large intestine     Cancer ([facility] [address]     LT UPPER LOBE    COPD (chronic obstructive pulmonary disease) (HCC)     Hx of blood clots     DVT LT LEG    Hyperlipidemia     Hypertension     Liver hemangioma     Lung nodule     BARAK  Nodule    Snores     not tested for apnea    Uterine fibroid 09/2010    Wears glasses        Past Surgical History:   Procedure Laterality Date    ABDOMINAL HERNIA REPAIR  2012    BREAST BIOPSY Left 1983    BRONCHOSCOPY  10/1/2018    BRONCHOSCOPY performed by Shu White MD at [facility] N/A 3/29/2022    SUBOCCIPITAL CRANIOTOMY FOR TUMOR  (REGULAR TABLE, [employer] NAVIGATION, [employer]OL[employer]) performed by Judi Zimmerman DO at [facility], TOTAL ABDOMINAL      LUNG BIOPSY      LUNG REMOVAL, PARTIAL Right 09/28/2018    Robotic assisted left lobectomy, upper with lymph node biopsy    OTHER SURGICAL HISTORY Right 2018    IR PORT PLACEMENT EQUAL OR GREATER THAN 5 YEARS    [address] INCL FLUOR GDNCE DX W/CELL WASHG SPX N/A 10/29/2018    BRONCHOSCOPY performed by Maria A Correia MD at [address] [place] [address] LUNG OTHER THAN PNEUMONECTOMY 1 LOBE LOBECT Left 2018    XI ROBOTIC ASSISTED LEFT UPPER LOBECTOMY MULTIPLE LYMPH NODE BIOPSY, INTERCOSTAL  NERVE BLOCK ABLATION W/EXPAREL performed by Gallo Merida MD at [place] History   Problem Relation Age of Onset    Cancer Mother         LUNG    Cancer Sister         LUNG       Social History     Socioeconomic History    Marital status:      Spouse name: Not on file    Number of children: Not on file    Years of education: Not on file    Highest education level: Not on file   Occupational History    Not on file   Tobacco Use    Smoking status: Former Smoker     Packs/day: 1.50     Years: 30.00     Pack years: 45.00     Types: Cigarettes     Quit date:      Years since quittin.2    Smokeless tobacco: Never Used   Vaping Use    Vaping Use: Never used   Substance and Sexual Activity    Alcohol use: Yes     Comment: Occassionally    Drug use: No    Sexual activity: Not on file   Other Topics Concern    Not on file   Social History Narrative    Not on file     Social Determinants of Health     Financial Resource Strain: Low Risk     Difficulty of Paying Living Expenses: Not hard at all   Food Insecurity: No Food Insecurity    Worried About Running Out of Food in the Last Year: Never true    Ran Out of Food in the Last Year: Never true   Transportation Needs:     Lack of Transportation (Medical): Not on file    Lack of Transportation (Non-Medical):  Not on file   Physical Activity:     Days of Exercise per Week: Not on file    Minutes of Exercise per Session: Not on file   Stress:     Feeling of Stress : Not on file   Social Connections:     Frequency of Communication with Friends and Family: Not on file    Frequency of Social Gatherings with Friends and Family: Not on file    Attends Oriental orthodox Services: Not on file    Active Member of Clubs or Organizations: Not on file    Attends Club or Organization Meetings: Not on file    Marital Status: Not on file   Intimate Partner Violence:     Fear of Current or Ex-Partner: Not on file    Emotionally Abused: Not on file    Physically Abused: Not on file    Sexually Abused: Not on file   Housing Stability:     Unable to Pay for Housing in the Last Year: Not on file    Number of [place] in the Last Year: Not on file    Unstable Housing in the Last Year: Not on file             FALLS RISK SCREEN  Instructions:  Assess the patient and enter the appropriate indicators that are present for fall risk identification. Total the numbers entered and assign a fall risk score from Table 2.  Reassess patient at a minimum every 12 weeks or with status change. Assessment   Date  4/5/2022     1. Mental Ability: confusion/cognitively impaired 3     2. Elimination Issues: incontinence, frequency 0       3. Ambulatory: use of assistive devices (walker, cane, off-loading devices),        attached to equipment (IV pole, oxygen) 0     4. Sensory Limitations: dizziness, vertigo, impaired vision 0     5. Age less than 65        0     6. Age 72 or greater 0     7. Medication: diuretics, strong analgesics, hypnotics, sedatives,        antihypertensive agents 3   8. Falls:  recent history of falls within the last 3 months (not to include slipping or        tripping) 0   TOTAL 6    If score of 4 or greater was education given? Yes       TABLE 2   Risk Score Risk Level Plan of Care   0-3 Little or  No Risk 1. Provide assistance as indicated for ambulation activities  2. Reorient confused/cognitively impaired patient  3. Call-light/bell within patient's reach  4.   Chair/bed in low position, stretcher/bed with siderails up except when performing patient care activities  5. Educate patient/family/caregiver on falls prevention  6.  Reassess in 12 weeks or with any noted change in patient condition which places them at a risk for a fall   4-6 Moderate Risk 1. Provide assistance as indicated for ambulation activities  2. Reorient confused/cognitively impaired patient  3. Call-light/bell within patient's reach  4. Chair/bed in low position, stretcher/bed with siderails up except when performing patient care activities  5. Educate patient/family/caregiver on falls prevention     7 or   Higher High Risk 1. Place patient in easily observable treatment room  2. Patient attended at all times by family member or staff  3. Provide assistance as indicated for ambulation activities  4. Reorient confused/cognitively impaired patient  5. Call-light/bell within patient's reach  6. Chair/bed in low position, stretcher/bed with siderails up except when performing patient care activities  7. Educate patient/family/caregiver on falls prevention             Assessment/Plan: Patient was seen today for [facility] consult with Dr. Monico Nageotte. Continues to have some confusion. She will have staples removed next week. After consultation patient has decided to go forward with [facility] radiosurgery. Consent was signed, copy provided. GammaKnife Frame[] Mask [x] education provided via verbal and written instructions. Also reviewed pre-procedure instructions and provided patient with a written copy of the same. Office will contact pt once insurance approval is obtained to discuss procedure date/time. All patient's questions were answered. Encouraged to call with any further questions or concerns.         Virgil Tristan RN 4/5/2022 1:17 PM

## 2022-04-06 ENCOUNTER — OFFICE VISIT (OUTPATIENT)
Dept: ONCOLOGY | Age: 67
End: 2022-04-06
Payer: COMMERCIAL

## 2022-04-06 ENCOUNTER — HOSPITAL ENCOUNTER (OUTPATIENT)
Facility: MEDICAL CENTER | Age: 67
End: 2022-04-06
Payer: COMMERCIAL

## 2022-04-06 ENCOUNTER — TELEPHONE (OUTPATIENT)
Dept: ONCOLOGY | Age: 67
End: 2022-04-06

## 2022-04-06 ENCOUNTER — HOSPITAL ENCOUNTER (OUTPATIENT)
Facility: MEDICAL CENTER | Age: 67
Discharge: HOME | End: 2022-04-06
Payer: COMMERCIAL

## 2022-04-06 VITALS
BODY MASS INDEX: 32.42 KG/M2 | DIASTOLIC BLOOD PRESSURE: 85 MMHG | SYSTOLIC BLOOD PRESSURE: 121 MMHG | RESPIRATION RATE: 16 BRPM | TEMPERATURE: 97.6 F | WEIGHT: 160.5 LBS | HEART RATE: 96 BPM

## 2022-04-06 DIAGNOSIS — C34.92 NON-SMALL CELL CANCER OF LEFT LUNG (HCC): ICD-10-CM

## 2022-04-06 DIAGNOSIS — C34.92 NON-SMALL CELL CANCER OF LEFT LUNG (HCC): Primary | ICD-10-CM

## 2022-04-06 DIAGNOSIS — C34.90 PRIMARY MALIGNANT NEOPLASM OF LUNG WITH METASTASIS TO BRAIN (HCC): ICD-10-CM

## 2022-04-06 DIAGNOSIS — C79.31 PRIMARY MALIGNANT NEOPLASM OF LUNG WITH METASTASIS TO BRAIN (HCC): ICD-10-CM

## 2022-04-06 LAB
ABSOLUTE EOS #: 0.05 K/UL (ref 0–0.44)
ABSOLUTE IMMATURE GRANULOCYTE: 0.26 K/UL (ref 0–0.3)
ABSOLUTE LYMPH #: 2.33 K/UL (ref 1.1–3.7)
ABSOLUTE MONO #: 1.15 K/UL (ref 0.1–1.2)
ALBUMIN SERPL-MCNC: 4 G/DL (ref 3.5–5.2)
ALP BLD-CCNC: 67 U/L (ref 35–104)
ALT SERPL-CCNC: 21 U/L (ref 5–33)
ANION GAP SERPL CALCULATED.3IONS-SCNC: 11 MMOL/L (ref 9–17)
AST SERPL-CCNC: 13 U/L
BASOPHILS # BLD: 0 % (ref 0–2)
BASOPHILS ABSOLUTE: <0.03 K/UL (ref 0–0.2)
BILIRUB SERPL-MCNC: 0.85 MG/DL (ref 0.3–1.2)
BUN BLDV-MCNC: 21 MG/DL (ref 8–23)
BUN/CREAT BLD: 40 (ref 9–20)
CALCIUM SERPL-MCNC: 8.7 MG/DL (ref 8.6–10.4)
CHLORIDE BLD-SCNC: 95 MMOL/L (ref 98–107)
CO2: 25 MMOL/L (ref 20–31)
CREAT SERPL-MCNC: 0.52 MG/DL (ref 0.5–0.9)
EOSINOPHILS RELATIVE PERCENT: 0 % (ref 1–4)
GFR AFRICAN AMERICAN: >60 ML/MIN
GFR NON-AFRICAN AMERICAN: >60 ML/MIN
GFR SERPL CREATININE-BSD FRML MDRD: ABNORMAL ML/MIN/{1.73_M2}
GLUCOSE BLD-MCNC: 107 MG/DL (ref 70–99)
HCT VFR BLD CALC: 41.8 % (ref 36.3–47.1)
HEMOGLOBIN: 14.4 G/DL (ref 11.9–15.1)
IMMATURE GRANULOCYTES: 1 %
LYMPHOCYTES # BLD: 12 % (ref 24–43)
MCH RBC QN AUTO: 31.2 PG (ref 25.2–33.5)
MCHC RBC AUTO-ENTMCNC: 34.4 G/DL (ref 28.4–34.8)
MCV RBC AUTO: 90.5 FL (ref 82.6–102.9)
MONOCYTES # BLD: 6 % (ref 3–12)
NRBC AUTOMATED: 0 PER 100 WBC
PDW BLD-RTO: 12.1 % (ref 11.8–14.4)
PLATELET # BLD: 383 K/UL (ref 138–453)
PMV BLD AUTO: 9.2 FL (ref 8.1–13.5)
POTASSIUM SERPL-SCNC: 4.1 MMOL/L (ref 3.7–5.3)
RBC # BLD: 4.62 M/UL (ref 3.95–5.11)
SEG NEUTROPHILS: 80 % (ref 36–65)
SEGMENTED NEUTROPHILS ABSOLUTE COUNT: 15.05 K/UL (ref 1.5–8.1)
SODIUM BLD-SCNC: 131 MMOL/L (ref 135–144)
TOTAL PROTEIN: 6.5 G/DL (ref 6.4–8.3)
WBC # BLD: 18.9 K/UL (ref 3.5–11.3)

## 2022-04-06 PROCEDURE — 1036F TOBACCO NON-USER: CPT | Performed by: INTERNAL MEDICINE

## 2022-04-06 PROCEDURE — G8427 DOCREV CUR MEDS BY ELIG CLIN: HCPCS | Performed by: INTERNAL MEDICINE

## 2022-04-06 PROCEDURE — 1111F DSCHRG MED/CURRENT MED MERGE: CPT | Performed by: INTERNAL MEDICINE

## 2022-04-06 PROCEDURE — 85025 COMPLETE CBC W/AUTO DIFF WBC: CPT

## 2022-04-06 PROCEDURE — G8417 CALC BMI ABV UP PARAM F/U: HCPCS | Performed by: INTERNAL MEDICINE

## 2022-04-06 PROCEDURE — 80053 COMPREHEN METABOLIC PANEL: CPT

## 2022-04-06 PROCEDURE — 36415 COLL VENOUS BLD VENIPUNCTURE: CPT

## 2022-04-06 PROCEDURE — 99211 OFF/OP EST MAY X REQ PHY/QHP: CPT | Performed by: INTERNAL MEDICINE

## 2022-04-06 PROCEDURE — 99215 OFFICE O/P EST HI 40 MIN: CPT | Performed by: INTERNAL MEDICINE

## 2022-04-06 PROCEDURE — 3017F COLORECTAL CA SCREEN DOC REV: CPT | Performed by: INTERNAL MEDICINE

## 2022-04-06 RX ORDER — DEXAMETHASONE 4 MG/1
4 TABLET ORAL 2 TIMES DAILY
Qty: 60 TABLET | Refills: 0 | Status: SHIPPED | OUTPATIENT
Start: 2022-04-06 | End: 2022-05-06

## 2022-04-06 NOTE — TELEPHONE ENCOUNTER
RAMÍREZ HERE FOR MD VISIT  TEMPUS TEST  PLAN FOR PORT  PLAN FOR CHEMO LIKELY 3 WKS  RV 3 WKS  TEMPUS TESTING WAS DONE ON 4/6/22 W/ Humberto Rodriguez, PATHOLOGY REPORT AND MD NOTES FAXED TO [phone] AND SCANNED UNDER MEDIA  PORT PLACEMENT IS ON 4/13/22 @ 11AM AT [address] ARRIVAL @ 9:30AM  NEW ORDER CHEMO IS PENDING MAGGY W/ TRACEE RODGERS VISIT C1D1  SCRIPTS SENT TO PT PHARMACY  AVS PRINTED W/ INSTRUCTIONS AND GIVEN TO PT ON EXIT

## 2022-04-06 NOTE — PROGRESS NOTES
Patient ID: Moe Palacios, 1955, [id number], 61 y.o. Diagnosis:   1. Left upper lobe invasive lung adenocarcinoma, Status post lobectomy 9/28/18, Pathologic stage: pT1c, pN2, stage IIIa R1 with positive margin,    2. Will start chemotherapy followed By RT  3. Carbo taxol cycle 1 on 11/14/18  4. Radiation Therapy completed on 3/29/19  5. Unfortunately on 3/26/2022 she presented with seizure activity and her CT scan MRI showed multiple supra and infratentorial intraparenchymal lesion consistent with metastatic disease in brain  6. Her CT chest abdomen pelvis on 3/27/2022 showed 2.1 cm right adrenal nodule suspicious for metastasis  7. On 3/29/2022 she underwent right-sided craniotomy with resection of intraaxial brain tumor and now planning to get SRS/gamma knife soon  8. We will get next-generation sequencing and plan for systemic therapy  HISTORY OF PRESENT ILLNESS:    Oncologic History:  The patient is a 66 y.o.  female who is admitted to the hospital for Left upper lobe mass. Pt has a significant past medical history of Uterine fibroids requiring total abdominal hysterectomy, liver hemangioma, HTN, Hyperlipidemia, DVT Left leg, COPD, and anxiety. Pt was found to have a left upper lung adenocarcinoma and presented to the hospital on 9/28/2018 for a scheduled robotic-assisted left upper lobe lobectomy. Pathology is positive for metastatic adenocarcinoma. There are some lymph nodes positive and some evidence of invasion to surrounding structures seen during surgery. Surgical team is planning on discharging patient this evening from the hospital.  She was discharged from the hospital with plan to follow with oncology as outpatient. Interval history:   Jocy Salinas is returning for follow-up visit and to discuss lab results, recent imaging studies, and further recommendations.   Unfortunately she was admitted recently to hospital with new brain metastasis and it seems that she possibly has disease progression with development of right adrenal nodule as well. She underwent resection of the intra-axial brain tumor on 3/29/2022 and recovering well. She denies any significant headache. She has a follow-up coming with neurosurgery and also has been evaluated by radiation oncology for consideration of gamma knife/SRS. During this visit patient's allergy, social, medical, surgical history and medications were reviewed and updated. Current Outpatient Medications   Medication Sig Dispense Refill    dexamethasone (DECADRON) 4 MG tablet Take 1 tablet by mouth in the morning and at bedtime 60 tablet 0    pantoprazole (PROTONIX) 40 MG tablet Take 1 tablet by mouth every morning (before breakfast) 30 tablet 1    dexamethasone (DECADRON) 4 MG tablet Take 1 tablet by mouth every 12 hours for 2 days 4 tablet 0    levETIRAcetam (KEPPRA) 500 MG tablet Take 1 tablet by mouth 2 times daily 60 tablet 3    oxyCODONE (OXY-IR) 10 MG immediate release tablet Take 1 tablet by mouth every 6 hours as needed for Pain for up to 3 days. 12 tablet 0    ALPRAZolam (XANAX) 0.25 MG tablet take 1 tablet by mouth nightly if needed for sleep for 30 DAYS 30 tablet 0    albuterol sulfate  (90 Base) MCG/ACT inhaler inhale 2 puffs by mouth four times a day 18 g 11    lisinopril-hydroCHLOROthiazide (PRINZIDE;ZESTORETIC) 10-12.5 MG per tablet Take 1 tablet by mouth daily 30 tablet 11    lovastatin (MEVACOR) 10 MG tablet Take 1 tablet by mouth nightly 30 tablet 11    [START ON 4/8/2022] dexamethasone (DECADRON) 4 MG tablet Take 1 tablet by mouth daily for 2 days (Patient not taking: Reported on 4/6/2022) 2 tablet 0    [START ON 4/10/2022] dexamethasone (DECADRON) 2 MG tablet Take 1 tablet by mouth daily for 2 doses (Patient not taking: Reported on 4/6/2022) 2 tablet 0     No current facility-administered medications for this visit.      Facility-Administered Medications Ordered in Other Visits   Medication Dose Route Frequency Provider Last Rate Last Admin    0.9 % sodium chloride bolus  100 mL IntraVENous Once Thelma Taylor MD           Social History     Socioeconomic History    Marital status:      Spouse name: Not on file    Number of children: Not on file    Years of education: Not on file    Highest education level: Not on file   Occupational History    Not on file   Tobacco Use    Smoking status: Former Smoker     Packs/day: 1.50     Years: 30.00     Pack years: 45.00     Types: Cigarettes     Quit date:      Years since quittin.2    Smokeless tobacco: Never Used   Vaping Use    Vaping Use: Never used   Substance and Sexual Activity    Alcohol use: Yes     Comment: Occassionally    Drug use: No    Sexual activity: Not on file   Other Topics Concern    Not on file   Social History Narrative    Not on file     Social Determinants of Health     Financial Resource Strain: Low Risk     Difficulty of Paying Living Expenses: Not hard at all   Food Insecurity: No Food Insecurity    Worried About Running Out of Food in the Last Year: Never true    [place] of Food in the Last Year: Never true   Transportation Needs:     Lack of Transportation (Medical): Not on file    Lack of Transportation (Non-Medical):  Not on file   Physical Activity:     Days of Exercise per Week: Not on file    Minutes of Exercise per Session: Not on file   Stress:     Feeling of Stress : Not on file   Social Connections:     Frequency of Communication with Friends and Family: Not on file    Frequency of Social Gatherings with Friends and Family: Not on file    Attends Anabaptism Services: Not on file    Active Member of Clubs or Organizations: Not on file    Attends Club or Organization Meetings: Not on file    Marital Status: Not on file   Intimate Partner Violence:     Fear of Current or Ex-Partner: Not on file    Emotionally Abused: Not on file    Physically Abused: Not on file    Sexually Abused: Not on file   Housing Stability:     Unable to Pay for Housing in the Last Year: Not on file    Number of Places Lived in the Last Year: Not on file    Unstable Housing in the Last Year: Not on file       Family History   Problem Relation Age of Onset    Cancer Mother         LUNG    Cancer Sister         LUNG        REVIEW OF SYSTEM:     Constitutional: No fever or chills. No night sweats, no weight loss   Eyes: No eye discharge, double vision, or eye pain   HEENT: negative for sore mouth, sore throat, hoarseness and voice change   Respiratory: negative for cough , sputum, dyspnea, wheezing, hemoptysis, chest pain   Cardiovascular: negative for chest pain, dyspnea, palpitations, orthopnea, PND   Gastrointestinal: negative for nausea, vomiting, diarrhea, constipation, abdominal pain, Dysphagia, hematemesis and hematochezia   Genitourinary: negative for frequency, dysuria, nocturia, urinary incontinence, and hematuria   Integument: negative for rash, skin lesions, bruises.    Hematologic/Lymphatic: negative for easy bruising, bleeding, lymphadenopathy, petechiae and swelling/edema   Endocrine: negative for heat or cold intolerance, tremor, weight changes, change in bowel habits and hair loss   Musculoskeletal: negative for myalgias, arthralgias, pain, joint swelling,and bone pain   Neurological: negative for headaches, dizziness, seizures, weakness, numbness       OBJECTIVE:         Vitals:    04/06/22 0822   BP: 121/85   Pulse: 96   Resp: 16   Temp: 97.6 °F (36.4 °C)   PHYSICAL EXAM:   General appearance - well appearing, no in pain or distress   Mental status - alert and cooperative   Eyes - pupils equal and reactive, extraocular eye movements intact   Ears - bilateral TM's and external ear canals normal   Mouth - mucous membranes moist, pharynx normal without lesions   Neck - supple, no significant adenopathy   Lymphatics - no palpable lymphadenopathy, no hepatosplenomegaly   Chest - clear to auscultation, no wheezes, rales or rhonchi, symmetric air entry   Left chest surgical scar healing well  Heart - normal rate, regular rhythm, normal S1, S2, no murmurs, rubs, clicks or gallops   Abdomen - soft, nontender, nondistended, no masses or organomegaly   Neurological - alert, oriented, normal speech, no focal findings or movement disorder noted   Musculoskeletal - no joint tenderness, deformity or swelling   Extremities - peripheral pulses normal, no pedal edema, no clubbing or cyanosis   Skin - normal coloration and turgor, no rashes, no suspicious skin lesions noted ,  LABORATORY DATA:     Lab Results   Component Value Date    WBC 18.9 (H) 04/06/2022    HGB 14.4 04/06/2022    HCT 41.8 04/06/2022    MCV 90.5 04/06/2022     04/06/2022    LYMPHOPCT 12 (L) 04/06/2022    RBC 4.62 04/06/2022    MCH 31.2 04/06/2022    MCHC 34.4 04/06/2022    RDW 12.1 04/06/2022    MONOPCT 6 04/06/2022    BASOPCT 0 04/06/2022    NEUTROABS 15.05 (H) 04/06/2022    LYMPHSABS 2.33 04/06/2022    MONOSABS 1.15 04/06/2022    EOSABS 0.05 04/06/2022    BASOSABS <0.03 04/06/2022       Chemistry        Component Value Date/Time     (L) 04/06/2022 0746    K 4.1 04/06/2022 0746    CL 95 (L) 04/06/2022 0746    CO2 25 04/06/2022 0746    BUN 21 04/06/2022 0746    CREATININE 0.52 04/06/2022 0746        Component Value Date/Time    CALCIUM 8.7 04/06/2022 0746    ALKPHOS 67 04/06/2022 0746    AST 13 04/06/2022 0746    ALT 21 04/06/2022 0746    BILITOT 0.85 04/06/2022 0746        PATHOLOGY DATA:   Pathology:  CT Guided Biopsy ([facility]) 8/22/18:   LEFT LUNG, UPPER LOBE, CT GUIDED CORE NEEDLE BIOPSIES:  - INVASIVE ADENOCARCINOMA, CONSISTENT WITH LUNG PRIMARY. Collected: 9/28/2018   -- Diagnosis --   1.  LYMPH NODE, LEVEL 9, BIOPSY:       -  ONE LYMPH NODE, NEGATIVE FOR MALIGNANCY (0/1). 2.  LYMPH NODE, LEVEL 11, DISSECTION:       -  ONE OF 2 LYMPH NODES POSITIVE FOR METASTATIC CARCINOMA (1/2).    3.  LYMPH NODE, LEVEL 5, DISSECTION:       -  ONE OF 2 LYMPH NODES POSITIVE FOR METASTATIC CARCINOMA (1/2).     -  CARCINOMA INVADES LARGE PERIPHERAL NERVE BRANCHES. 4.  LUNG, LEFT UPPER LOBE, LOBECTOMY:            -  WELL-DIFFERENTIATED INVASIVE ADENOCARCINOMA, 2.9 CM   GREATEST DIMENSION.     -  NEGATIVE FOR VISCERAL PLEURAL INVASION.          -  TUMOR INVOLVES SOFT TISSUE OF BRONCHIAL, VASCULAR AND PARENCHYMAL MARGINS.     -  5 PERIBRONCHIOLAR LYMPH NODES, POSITIVE FOR METASTATIC   ADENOCARCINOMA (5/5).     -  SEPARATE SMALL INTRALOBAR FOCUS ATYPICAL ADENOMATOUS   HYPERPLASIA.           -  PATHOLOGIC STAGE:  pT1c, pN2, R1.     5.  LYMPH NODES, LEVEL 10, DISSECTION:       -  ONE OF 2 LYMPH NODES POSITIVE FOR METASTATIC CARCINOMA (1/2). IMAGING DATA:    MRI brain 3/26/2022  Impression   1. Multiple supra and infratentorial intraparenchymal lesions are most   consistent with metastatic disease.  There is associated edema with minimal   mass effect, but no midline shift. 2. Intrinsically T1 hyperintense right frontal calvarial lesion is most   consistent with an osseous hemangioma.  No definite osseous metastasis   identified. CT chest abdomen pelvis 3/27/2022  mpression   1. Mild centrilobular emphysema. 2. Redemonstration of left perihilar post treatment scarring with underlying   traction bronchiectasis extending into the upper lower lobes.  Stable. 3. No evidence for metastatic disease in the chest.   4. A 2.1 x 1.4 cm right adrenal nodule not present in 2018.  CT appearance   does not meet criteria for adenoma.  Considered metastatic nodule until   proven otherwise. 5. Redemonstration of hepatic hemangioma and several cysts. ASSESSMENT:    Handy Garcia Is a very pleasant 61 y.o.  female with initial diagnosis of left upper lobe non-small cell lung cancer, adenocarcinoma, status post Robotically assisted BARAK lobectomy with LN dissection and Intercostal nerve block with experal on 9/28/18.    She has [id number] disease, stage IIIA, she had R1 disease with positive margins. She completed sequential chemo RT. Unfortunately now she has recurrent disease with brain metastasis and also adrenal metastasis. I reviewed the NCCN guidelines and goals of care and will plan for systemic therapy when she completes radiation therapy I will get next-generation sequencing as well. During today's visit, the patient and the family had a number of reasonable questions which were answered to their satisfaction. They verbalized understanding of the information provided and they agreed to proceed as outlined above. PLAN:   I reviewed her recent lab work, imaging studies, discussed diagnosis, prognosis and treatment recommendations   Unfortunately now she has stage IV metastatic disease with recurrence in brain and adrenal gland  She had craniotomy and planning to have SRS and gamma knife soon  I would get next-generation sequencing for biomarker testing  We will get port placement  Plan for restaging PET scan and MRI brain is scheduled  I will get CT of the left arm to evaluate her soft tissue swelling  We will plan for chemoimmunotherapy in about 3 weeks      Jose Singh MD  Hematologist/Medical Oncologist    I spent more than 40 minutes examining, evaluating, reviewing data and counseling the patient. Greater than 50% of that time was spent face-to-face with the patient in counseling and coordinating her care. This note is created with the assistance of a speech recognition program.  While intending to generate a document that actually reflects the content of the visit, the document can still have some errors including those of syntax and sound a like substitutions which may escape proof reading. It such instances, actual meaning can be extrapolated by contextual diversion.

## 2022-04-06 NOTE — PROGRESS NOTES
[address]            Radiation Oncology          [address] [facility]          [address] [place], [address]        [place]: [phone]        F: [phone]       [employer] [address] [place] [address]       Radiation Oncology   [address] [facility], [address] [facility]       [place]: [phone]       F: [phone]       [email or web address]      Date of Service: 2022     Location:  [address] [place] [address],   [address] [facility], [place]   [phone]        RADIATION ONCOLOGY FOLLOW UP NOTE    Patient ID:   Jj White  : 1955   MRN: [id number]    DIAGNOSIS:  Cancer Staging  Malignant neoplasm of bronchus of upper lobe, left ([facility] [address]  Staging form: Lung, AJCC 8th Edition  - Pathologic stage from 2018: Stage IIIA (pT1b, pN2, cM0) - Signed by Surendra Sifuentes MD on 2019  Staging comments: Left upper lobe biopsy 2018 positive for invasive adenocarcinoma from a lung primary. Left upper lobectomy/mediastinal node dissection 2018 revealed 2.9 cm grade 1 adenocarcinoma with positive bronchial/vascular/parenchymal margins, 1/2 level 5 node positive, 5/5 peribronchial nodes positive, one level 9 node negative, 1/2 level 10 node positive, one level 11 node negative, no lymphovascular invasion. Non-small cell cancer of left lung [facility])  Staging form: Lung, AJCC 8th Edition  - Clinical stage from 10/10/2018: Stage IIIA (ycT1c, cN2, cM0) - Signed by Anda Severe, MD on 10/10/2018    -s/p Left upper lobectomy/mediastinal node dissection 2018   -s/p adj chemo carbo/taxol [id number]  -s/p adj RT 60Gy 3/29/19  -s/p R craniotomy 3/29/22    INTERVAL HISTORY:   Jj White is a 61 y.o. K[place] Graven female with history of left-sided lung cancer treated in 2018 with surgery followed by chemotherapy and radiation therapy.   Patient subsequently had been doing well undergoing routine surveillance until she presented to the ED after having a seizure at [facility] while [facility]. Patient underwent a CT of the head on March 26, 2022 which showed some intracranial lesions. Patient subsequently had an MRI of the brain in the same day which showed a large right cerebellar lesion measuring 3.2 cm. There was also a few other intracranial lesions noted. Patient given the new onset of symptoms also had a CT of the chest abdomen pelvis which showed no evidence of metastatic disease elsewhere. Given the large tumor and vasogenic edema, patient was taken to surgery by neurosurgery for right cerebellar craniotomy. The pathology from the tumor came back for metastatic lung adenocarcinoma. Patient now reports she is recovering well from surgery denying any further episodes of seizures. She denies any symptoms headaches, dizziness, numbness, confusion, or weakness. She does have some sensitivity around the surgical site but otherwise denies any chest pain, shortness of breath, coughing, abdominal pain, weight loss, bony pain, swelling, or bleeding. Patient was seen inpatient and discussed the role of SRS treatment for intracranial metastases. Patient is currently on Decadron and Keppra twice daily.       MEDICATIONS:    Current Outpatient Medications:     dexamethasone (DECADRON) 4 MG tablet, Take 1 tablet by mouth in the morning and at bedtime, Disp: 60 tablet, Rfl: 0    pantoprazole (PROTONIX) 40 MG tablet, Take 1 tablet by mouth every morning (before breakfast), Disp: 30 tablet, Rfl: 1    dexamethasone (DECADRON) 4 MG tablet, Take 1 tablet by mouth every 12 hours for 2 days, Disp: 4 tablet, Rfl: 0    [START ON 4/8/2022] dexamethasone (DECADRON) 4 MG tablet, Take 1 tablet by mouth daily for 2 days (Patient not taking: Reported on 4/6/2022), Disp: 2 tablet, Rfl: 0    [START ON 4/10/2022] dexamethasone (DECADRON) 2 MG tablet, Take 1 tablet by mouth daily for 2 doses (Patient not taking: Reported on 4/6/2022), Disp: 2 tablet, Rfl: 0   levETIRAcetam (KEPPRA) 500 MG tablet, Take 1 tablet by mouth 2 times daily, Disp: 60 tablet, Rfl: 3    oxyCODONE (OXY-IR) 10 MG immediate release tablet, Take 1 tablet by mouth every 6 hours as needed for Pain for up to 3 days. , Disp: 12 tablet, Rfl: 0    ALPRAZolam (XANAX) 0.25 MG tablet, take 1 tablet by mouth nightly if needed for sleep for 30 DAYS, Disp: 30 tablet, Rfl: 0    albuterol sulfate  (90 Base) MCG/ACT inhaler, inhale 2 puffs by mouth four times a day, Disp: 18 g, Rfl: 11    lisinopril-hydroCHLOROthiazide (PRINZIDE;ZESTORETIC) 10-12.5 MG per tablet, Take 1 tablet by mouth daily, Disp: 30 tablet, Rfl: 11    lovastatin (MEVACOR) 10 MG tablet, Take 1 tablet by mouth nightly, Disp: 30 tablet, Rfl: 11  No current facility-administered medications for this encounter. Facility-Administered Medications Ordered in Other Encounters:     0.9 % sodium chloride bolus, 100 mL, IntraVENous, Once, Clayton Delgado MD    ALLERGIES:  Allergies   Allergen Reactions    Codeine Nausea And Vomiting         REVIEW OF SYSTEMS:    A full 14 point review of systems was performed and assessed and found to be negative except as noted above. PHYSICAL EXAMINATION:    CHAPERONE: Family/friend/companieon Present    ECO Asymptomatic    VITAL SIGNS: BP (!) 160/93   Pulse 92   Temp 98 °F (36.7 °C) (Oral)   Resp 18   Ht 4' 11\" (1.499 m)   Wt 159 lb 6.4 oz (72.3 kg)   SpO2 93%   BMI 32.19 kg/m²   GENERAL:  General appearance is that of a well-nourished, well-developed in no apparent distress. HEENT: Normocephalic, atraumatic, EOMI, moist mucosa, no erythema. NECK:  No adenopathy or a palpable thyroid mass, trachea is midline. LYMPHATICS: No cervical, supraclavicular adenopathy. HEART:  Normal rate and regular rhythm, normal heart sounds. LUNGS:  Pulmonary effort normal. Normal breath sounds. ABDOMEN:  Soft, nontender, non distended  EXTREMITIES:  No edema. No calf tenderness.   MSK:  No spinal tenderness. Normal ROM. NEUROLOGICAL: Alert and oriented. Strength and sensation intact bilaterally. No focal deficits. PSYCH: Mood normal, behavior normal.  SKIN: No erythema, no desquamation. LABS:  WBC   Date Value Ref Range Status   04/06/2022 18.9 (H) 3.5 - 11.3 k/uL Final     Segs Absolute   Date Value Ref Range Status   04/06/2022 15.05 (H) 1.50 - 8.10 k/uL Final     Hemoglobin   Date Value Ref Range Status   04/06/2022 14.4 11.9 - 15.1 g/dL Final     Platelets   Date Value Ref Range Status   04/06/2022 383 138 - 453 k/uL Final     No results found for: , CEA  No results found for: PSA    IMAGING:   3/30/22 MRI Brain  Impression   Postoperative resection cavity in the right aspect of the posterior fossa   status post partial resection of the right cerebellar hemisphere.  No   definite residual metastatic focus is demonstrated.       Multiple similar metastatic foci are demonstrated supratentorially with   adjacent edema.  No significant mass effect or midline shift. 3/27/22 CT CAP  Impression   1. Mild centrilobular emphysema. 2. Redemonstration of left perihilar post treatment scarring with underlying   traction bronchiectasis extending into the upper lower lobes.  Stable. 3. No evidence for metastatic disease in the chest.   4. A 2.1 x 1.4 cm right adrenal nodule not present in 2018.  CT appearance   does not meet criteria for adenoma.  Considered metastatic nodule until   proven otherwise. 5. Redemonstration of hepatic hemangioma and several cysts. 3/26/22 MRI Brain  Impression   1. Multiple supra and infratentorial intraparenchymal lesions are most   consistent with metastatic disease.  There is associated edema with minimal   mass effect, but no midline shift. 2. Intrinsically T1 hyperintense right frontal calvarial lesion is most   consistent with an osseous hemangioma.  No definite osseous metastasis   identified.          ASSESSMENT AND PLAN:  Jj White is a 61 y.o. 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)  Staging form: Lung, AJCC 8th Edition  - Pathologic stage from 8/22/2018: Stage IIIA (pT1b, pN2, cM0) - Signed by Brenda Carrero MD on 2/12/2019  Staging comments: Left upper lobe biopsy 8/22/2018 positive for invasive adenocarcinoma from a lung primary. Left upper lobectomy/mediastinal node dissection 9/28/2018 revealed 2.9 cm grade 1 adenocarcinoma with positive bronchial/vascular/parenchymal margins, 1/2 level 5 node positive, 5/5 peribronchial nodes positive, one level 9 node negative, 1/2 level 10 node positive, one level 11 node negative, no lymphovascular invasion. Non-small cell cancer of left lung [facility])  Staging form: Lung, AJCC 8th Edition  - Clinical stage from 10/10/2018: Stage IIIA (ycT1c, cN2, cM0) - Signed by Amee Lyon MD on 10/10/2018  . We reviewed with the patient and family the testing that has been done so far, including imaging and pathology. We also reviewed their staging based on the testing that as been done and the recommendations per the NCCN guidelines. We discussed the options of treatment, including surgery, chemotherapy, and radiation, and the rationale of why radiation therapy would be indicated for this diagnosis. We reviewed that her intracranial lesions are from recurrent disease from her previous lung cancer unfortunately. She is healing and recovering from her craniotomy. She is scheduled to see neurosurgery next week for removal of her staples. However she will be recommended to have [facility] treatment to the resection cavity and the other smaller lesions. We reviewed the logistics of radiation treatment planning for [facility], including making a Aquaplast mask, a CBCT and a MRI scan, followed by single treatment session.   We will also order a PET scan for restaging to further assess for systemic involvement and patient will follow with medical oncology to discuss in the electronic medical record, care coordination, referring and communicating with other health care providers and interpretation of results independently. This note is created with the assistance of a speech recognition program.  While intending to generate a document that actually reflects the content of the visit, the document can still have some errors including those of syntax and sound a like substitutions which may escape proof reading. It such instances, actual meaning can be extrapolated by contextual diversion.

## 2022-04-11 ENCOUNTER — TELEPHONE (OUTPATIENT)
Dept: CASE MANAGEMENT | Age: 67
End: 2022-04-11

## 2022-04-11 ENCOUNTER — HOSPITAL ENCOUNTER (OUTPATIENT)
Dept: NUCLEAR MEDICINE | Age: 67
Discharge: HOME | End: 2022-04-13
Payer: COMMERCIAL

## 2022-04-11 ENCOUNTER — TELEPHONE (OUTPATIENT)
Dept: RADIATION ONCOLOGY | Age: 67
End: 2022-04-11

## 2022-04-11 DIAGNOSIS — C79.31 BRAIN METASTASES (HCC): ICD-10-CM

## 2022-04-11 DIAGNOSIS — C79.31 NON-SMALL CELL LUNG CANCER METASTATIC TO BRAIN (HCC): Primary | ICD-10-CM

## 2022-04-11 DIAGNOSIS — C34.12 MALIGNANT NEOPLASM OF BRONCHUS OF UPPER LOBE, LEFT (HCC): ICD-10-CM

## 2022-04-11 DIAGNOSIS — C34.90 NON-SMALL CELL LUNG CANCER METASTATIC TO BRAIN (HCC): Primary | ICD-10-CM

## 2022-04-11 LAB — GLUCOSE BLD-MCNC: 106 MG/DL (ref 65–105)

## 2022-04-11 PROCEDURE — 78815 PET IMAGE W/CT SKULL-THIGH: CPT

## 2022-04-11 PROCEDURE — A9552 F18 FDG: HCPCS | Performed by: RADIOLOGY

## 2022-04-11 PROCEDURE — 82947 ASSAY GLUCOSE BLOOD QUANT: CPT

## 2022-04-11 PROCEDURE — 2580000003 HC RX 258: Performed by: RADIOLOGY

## 2022-04-11 PROCEDURE — 3430000000 HC RX DIAGNOSTIC RADIOPHARMACEUTICAL: Performed by: RADIOLOGY

## 2022-04-11 RX ORDER — SODIUM CHLORIDE 0.9 % (FLUSH) 0.9 %
10 SYRINGE (ML) INJECTION ONCE
Status: COMPLETED | OUTPATIENT
Start: 2022-04-11 | End: 2022-04-11

## 2022-04-11 RX ORDER — FLUDEOXYGLUCOSE F 18 200 MCI/ML
10 INJECTION, SOLUTION INTRAVENOUS
Status: COMPLETED | OUTPATIENT
Start: 2022-04-11 | End: 2022-04-11

## 2022-04-11 RX ADMIN — FLUDEOXYGLUCOSE F 18 10.69 MILLICURIE: 200 INJECTION, SOLUTION INTRAVENOUS at 10:20

## 2022-04-11 RX ADMIN — SODIUM CHLORIDE, PRESERVATIVE FREE 10 ML: 5 INJECTION INTRAVENOUS at 10:20

## 2022-04-11 NOTE — TELEPHONE ENCOUNTER
Name: Guillermo Mccormack  : 1955  MRN: [id number]    Oncology Navigation Follow-Up Note  Navigator reaching out to pt. Offering assistance if needed, but could only leave a message. Writer adding self to care team and plan to follow.    Electronically signed by Krys Schulte RN on 2022 at 12:14 PM

## 2022-04-12 ENCOUNTER — OFFICE VISIT (OUTPATIENT)
Dept: NEUROSURGERY | Age: 67
End: 2022-04-12

## 2022-04-12 ENCOUNTER — TELEPHONE (OUTPATIENT)
Dept: CASE MANAGEMENT | Age: 67
End: 2022-04-12

## 2022-04-12 VITALS
OXYGEN SATURATION: 96 % | SYSTOLIC BLOOD PRESSURE: 125 MMHG | BODY MASS INDEX: 31.81 KG/M2 | HEART RATE: 91 BPM | RESPIRATION RATE: 22 BRPM | WEIGHT: 157.8 LBS | DIASTOLIC BLOOD PRESSURE: 81 MMHG | HEIGHT: 59 IN | TEMPERATURE: 98.1 F

## 2022-04-12 DIAGNOSIS — C79.31 PRIMARY MALIGNANT NEOPLASM OF LUNG WITH METASTASIS TO BRAIN (HCC): ICD-10-CM

## 2022-04-12 DIAGNOSIS — C34.90 PRIMARY MALIGNANT NEOPLASM OF LUNG WITH METASTASIS TO BRAIN (HCC): ICD-10-CM

## 2022-04-12 DIAGNOSIS — Z98.890 S/P CRANIOTOMY: ICD-10-CM

## 2022-04-12 DIAGNOSIS — C79.31 BRAIN METASTASES (HCC): Primary | ICD-10-CM

## 2022-04-12 PROCEDURE — 99024 POSTOP FOLLOW-UP VISIT: CPT | Performed by: NURSE PRACTITIONER

## 2022-04-12 NOTE — PROGRESS NOTES
[address]  [facility] [address] [facility] [address]  Dept: [phone]    Patient:  John Mclaughlin  YOB: 1955  Date: 4/12/22    The patient is a 61 y.o. female who presents today for consult of the following problems:     Chief Complaint   Patient presents with    Follow-up         HPI:     John Mclaughlin is a 61 y.o. female who presents to the office for post-op evaluation s/p craniotomy for tumor resection. Pathology consistent with adenocarcinoma, lung primary. Patient does have known history of lung cancer. Has had follow-up appointments with both radiation and medical oncology. Patient is scheduled to undergo gamma knife radiation treatment on the 25th. Incision has been healing well, denies any discharge, drainage, fevers or chills. Some mild blurred vision, however patient did receive new glasses just prior to her surgery. Occasional chirping sound from left ear. Denies any new numbness tingling or weakness to extremities. Denies any seizures, seizure-like activity. Some headaches that have been gradually improving. Not requiring any narcotic medication, takes occasional Tylenol if needed but has not required that in several days. Strength 5/5  Sensation intact  Incision CDI    Date of surgery: 3/29/2022    Assessment and Plan:      1. Brain metastases ([address]    2. Primary malignant neoplasm of lung with metastasis to brain ([address]    3. S/P craniotomy          Plan: Incision healing well. Intact sutures removed without difficulty, patient tolerated well. Has had appropriate follow-up with oncology team, plans for gamma knife on 4/25. No longer requiring postoperative pain medication. Steroid dose has been adjusted by oncology. Maintain upcoming follow-up appointments. Next postop visit with Dr. Pedro Keenan in 4 to 6 weeks.     Followup: Return in about 5 weeks (around 5/17/2022), or if symptoms worsen or fail to improve. Prescriptions Ordered:  No orders of the defined types were placed in this encounter. Orders Placed:  No orders of the defined types were placed in this encounter. Electronically signed by ANNIE Che CNP on 4/12/2022 at 11:57 AM    Please note that this chart was generated using voice recognition Dragon dictation software. Although every effort was made to ensure the accuracy of this automated transcription, some errors in transcription may have occurred.

## 2022-04-12 NOTE — TELEPHONE ENCOUNTER
Name: Breanna Rooney  : 1955  MRN: [id number]    Oncology Navigation Follow-Up Note  Navigator received call from pt. And offering assistance if needed. Pt. Anticipates needing assistance with Medicare and will have [facility] reach out when pt. Ready. Plan to follow.    Electronically signed by Bina Hewitt RN on 2022 at 4:17 PM

## 2022-04-13 ENCOUNTER — HOSPITAL ENCOUNTER (OUTPATIENT)
Dept: INTERVENTIONAL RADIOLOGY/VASCULAR | Age: 67
Discharge: HOME | End: 2022-04-15
Payer: COMMERCIAL

## 2022-04-13 VITALS
DIASTOLIC BLOOD PRESSURE: 68 MMHG | OXYGEN SATURATION: 100 % | SYSTOLIC BLOOD PRESSURE: 99 MMHG | HEART RATE: 70 BPM | BODY MASS INDEX: 31.85 KG/M2 | HEIGHT: 59 IN | TEMPERATURE: 97.2 F | RESPIRATION RATE: 12 BRPM | WEIGHT: 158 LBS

## 2022-04-13 DIAGNOSIS — C34.92 NON-SMALL CELL CANCER OF LEFT LUNG (HCC): ICD-10-CM

## 2022-04-13 LAB
INR BLD: 0.9
PARTIAL THROMBOPLASTIN TIME: 24.3 SEC (ref 23.9–33.8)
PLATELET # BLD: 311 K/UL (ref 138–453)
PROTHROMBIN TIME: 12.2 SEC (ref 11.5–14.2)

## 2022-04-13 PROCEDURE — 2709999900 IR PORT PLACEMENT > 5 YEARS

## 2022-04-13 PROCEDURE — 6360000002 HC RX W HCPCS: Performed by: RADIOLOGY

## 2022-04-13 PROCEDURE — 2709999900 HC NON-CHARGEABLE SUPPLY

## 2022-04-13 PROCEDURE — 77001 FLUOROGUIDE FOR VEIN DEVICE: CPT

## 2022-04-13 PROCEDURE — 2580000003 HC RX 258: Performed by: RADIOLOGY

## 2022-04-13 PROCEDURE — 85730 THROMBOPLASTIN TIME PARTIAL: CPT

## 2022-04-13 PROCEDURE — 76937 US GUIDE VASCULAR ACCESS: CPT

## 2022-04-13 PROCEDURE — 2500000003 HC RX 250 WO HCPCS: Performed by: RADIOLOGY

## 2022-04-13 PROCEDURE — 99153 MOD SED SAME PHYS/QHP EA: CPT

## 2022-04-13 PROCEDURE — 85049 AUTOMATED PLATELET COUNT: CPT

## 2022-04-13 PROCEDURE — 7100000010 HC PHASE II RECOVERY - FIRST 15 MIN

## 2022-04-13 PROCEDURE — 36561 INSERT TUNNELED CV CATH: CPT

## 2022-04-13 PROCEDURE — 85610 PROTHROMBIN TIME: CPT

## 2022-04-13 PROCEDURE — 99152 MOD SED SAME PHYS/QHP 5/>YRS: CPT

## 2022-04-13 PROCEDURE — 7100000011 HC PHASE II RECOVERY - ADDTL 15 MIN

## 2022-04-13 RX ORDER — MIDAZOLAM HYDROCHLORIDE 1 MG/ML
INJECTION INTRAMUSCULAR; INTRAVENOUS
Status: COMPLETED | OUTPATIENT
Start: 2022-04-13 | End: 2022-04-13

## 2022-04-13 RX ORDER — FENTANYL CITRATE 50 UG/ML
INJECTION, SOLUTION INTRAMUSCULAR; INTRAVENOUS
Status: COMPLETED | OUTPATIENT
Start: 2022-04-13 | End: 2022-04-13

## 2022-04-13 RX ORDER — SODIUM CHLORIDE 9 MG/ML
25 INJECTION, SOLUTION INTRAVENOUS PRN
Status: DISCONTINUED | OUTPATIENT
Start: 2022-04-13 | End: 2022-04-16 | Stop reason: HOSPADM

## 2022-04-13 RX ORDER — HEPARIN SODIUM (PORCINE) LOCK FLUSH IV SOLN 100 UNIT/ML 100 UNIT/ML
SOLUTION INTRAVENOUS
Status: COMPLETED | OUTPATIENT
Start: 2022-04-13 | End: 2022-04-13

## 2022-04-13 RX ORDER — LIDOCAINE HYDROCHLORIDE 10 MG/ML
INJECTION, SOLUTION EPIDURAL; INFILTRATION; INTRACAUDAL; PERINEURAL
Status: COMPLETED | OUTPATIENT
Start: 2022-04-13 | End: 2022-04-13

## 2022-04-13 RX ORDER — SODIUM CHLORIDE 9 MG/ML
INJECTION, SOLUTION INTRAVENOUS CONTINUOUS
Status: DISCONTINUED | OUTPATIENT
Start: 2022-04-13 | End: 2022-04-16 | Stop reason: HOSPADM

## 2022-04-13 RX ORDER — ACETAMINOPHEN 325 MG/1
650 TABLET ORAL EVERY 4 HOURS PRN
Status: DISCONTINUED | OUTPATIENT
Start: 2022-04-13 | End: 2022-04-16 | Stop reason: HOSPADM

## 2022-04-13 RX ORDER — SODIUM CHLORIDE 0.9 % (FLUSH) 0.9 %
5-40 SYRINGE (ML) INJECTION PRN
Status: DISCONTINUED | OUTPATIENT
Start: 2022-04-13 | End: 2022-04-16 | Stop reason: HOSPADM

## 2022-04-13 RX ADMIN — SODIUM CHLORIDE 25 ML: 9 INJECTION, SOLUTION INTRAVENOUS at 10:10

## 2022-04-13 RX ADMIN — Medication 500 UNITS: at 11:53

## 2022-04-13 RX ADMIN — CEFAZOLIN SODIUM 1000 MG: 1 INJECTION, POWDER, FOR SOLUTION INTRAMUSCULAR; INTRAVENOUS at 11:22

## 2022-04-13 RX ADMIN — LIDOCAINE HYDROCHLORIDE 5 ML: 10 INJECTION, SOLUTION EPIDURAL; INFILTRATION; INTRACAUDAL; PERINEURAL at 11:36

## 2022-04-13 RX ADMIN — MIDAZOLAM 0.5 MG: 1 INJECTION INTRAMUSCULAR; INTRAVENOUS at 11:46

## 2022-04-13 RX ADMIN — MIDAZOLAM 0.5 MG: 1 INJECTION INTRAMUSCULAR; INTRAVENOUS at 11:36

## 2022-04-13 RX ADMIN — Medication 50 MCG: at 11:36

## 2022-04-13 ASSESSMENT — PAIN SCALES - GENERAL
PAINLEVEL_OUTOF10: 2
PAINLEVEL_OUTOF10: 2

## 2022-04-13 ASSESSMENT — PAIN DESCRIPTION - DESCRIPTORS: DESCRIPTORS: TENDER

## 2022-04-13 ASSESSMENT — PAIN DESCRIPTION - LOCATION: LOCATION: CHEST

## 2022-04-13 ASSESSMENT — PAIN DESCRIPTION - ORIENTATION: ORIENTATION: RIGHT

## 2022-04-13 ASSESSMENT — PAIN - FUNCTIONAL ASSESSMENT: PAIN_FUNCTIONAL_ASSESSMENT: 0-10

## 2022-04-13 ASSESSMENT — PAIN DESCRIPTION - PAIN TYPE: TYPE: SURGICAL PAIN

## 2022-04-13 NOTE — POST SEDATION
Sedation Post Procedure Note    Patient Name: Daya Sagastume   YOB: 1955  Room/Bed: Room/bed info not found  Medical Record Number: [id number]  Date: 4/13/2022   Time: 12:08 PM         Physicians/Assistants: Chloé Powell MD, MD    Procedure Performed:  Port placement    Post-Sedation Vital Signs:  Vitals:    04/13/22 1200   BP: 108/63   Pulse: 87   Resp: 14   Temp:    SpO2: 97%      Vital signs were reviewed and were stable after the procedure (see flow sheet for vitals)            Complications: none    Electronically signed by Chloé Powell MD on 4/13/2022 at 12:08 PM

## 2022-04-13 NOTE — BRIEF OP NOTE
Brief Postoperative Note    Stan Hilliard  YOB: 1955  [id number]    Pre-operative Diagnosis: Metastatic lung cancer    Post-operative Diagnosis: Same    Procedure: Port placement    Anesthesia: Moderate Sedation    Surgeons/Assistants: Jamel    Estimated Blood Loss: less than 50     Complications: None    Specimens: Was Not Obtained      Electronically signed by Annabelle Wright MD on 4/13/2022 at 12:09 PM

## 2022-04-13 NOTE — PRE SEDATION
mouth every morning (before breakfast) 3/31/22   Luis Santos MD   levETIRAcetam (KEPPRA) 500 MG tablet Take 1 tablet by mouth 2 times daily 3/31/22   Luis Santos MD   ALPRAZolam Yandel Cheryl) 0.25 MG tablet take 1 tablet by mouth nightly if needed for sleep for 30 DAYS 3/17/22 4/17/22  ANNIE Lang - CNP   albuterol sulfate  (90 Base) MCG/ACT inhaler inhale 2 puffs by mouth four times a day 9/28/21   Marvel Romero MD   lisinopril-hydroCHLOROthiazide (PRINZIDE;ZESTORETIC) 10-12.5 MG per tablet Take 1 tablet by mouth daily 5/3/21   Db Gaytan MD   lovastatin (MEVACOR) 10 MG tablet Take 1 tablet by mouth nightly 5/3/21   Db Gaytan MD     Coumadin Use Last 7 Days:  no  Antiplatelet drug therapy use last 7 days: no  Other anticoagulant use last 7 days: no  Additional Medication Information:  See med [facility]      Pre-Sedation Documentation and Exam:   I have reviewed the patient's history and review of systems.     Mallampati Airway Assessment:  Mallampati Class II - (soft palate, fauces & uvula are visible)    Prior History of Anesthesia Complications:   none    ASA Classification:  Class 3 - A patient with severe systemic disease that limits activity but is not incapacitating    Sedation/ Anesthesia Plan:   intravenous sedation    Medications Planned:   midazolam (Versed) intravenously and fentanyl intravenously    Patient is an appropriate candidate for plan of sedation: yes    Electronically signed by Genia Ferraro MD on 4/13/2022 at 11:13 AM

## 2022-04-13 NOTE — H&P
Interval H&P Note    Pt Name: Dallas Yepez  MRN: [id number]  YOB: 1955  Date of evaluation: 4/13/2022      [x] I have reviewed in [facility] the oncology progress note by Dr. Deepti Ryder dated 4/6/2022 attached below which meets the criteria for an Interval History and Physical note. [x] I have examined  Sarita Coello  There are no changes to the patient who is scheduled for a IR port placement by Dr. Kirti Holley for malignant neoplasm of unspecified part of left bronchus or lung. The patient denies new health changes, fever, chills, wheezing, cough, increased SOB, chest pain, open sores or wounds. Patient had extensive hospitalization from 3/25/2022 to 3/31/2022 for new onset seizures and she had right craniotomy with tumor resection on 3/29/2022 (refer to all notes in [facility]). Patient was discharged on [address]. Denies any recurrent seizures since discharge. Denies hx diabetes. Denies taking any blood thinning medications in the last 10 days. Vital signs: /78   Pulse 96   Temp 97.7 °F (36.5 °C) (Temporal)   Resp 16   SpO2 97%     Allergies:  Codeine    Medications:    Prior to Admission medications    Medication Sig Start Date End Date Taking?  Authorizing Provider   dexamethasone (DECADRON) 4 MG tablet Take 1 tablet by mouth in the morning and at bedtime 4/6/22 5/6/22  Marvel Romero MD   pantoprazole (PROTONIX) 40 MG tablet Take 1 tablet by mouth every morning (before breakfast) 3/31/22   Luis Santos MD   levETIRAcetam (KEPPRA) 500 MG tablet Take 1 tablet by mouth 2 times daily 3/31/22   Luis Santos MD   ALPRAZolam Yandel Cheryl) 0.25 MG tablet take 1 tablet by mouth nightly if needed for sleep for 30 DAYS 3/17/22 4/17/22  ANNIE Lang - CNP   albuterol sulfate  (90 Base) MCG/ACT inhaler inhale 2 puffs by mouth four times a day 9/28/21   Marvel Romero MD   lisinopril-hydroCHLOROthiazide (PRINZIDE;ZESTORETIC) 10-12.5 MG per tablet Take 1 tablet by mouth daily 5/3/21   Lew Arreola Lucy Prado MD   lovastatin (MEVACOR) 10 MG tablet Take 1 tablet by mouth nightly 5/3/21   Brigid Bland MD         This is a 61 y.o. female who is pleasant, cooperative, alert and oriented x3, in no acute distress. Heart: Heart sounds are normal.  HR 96 regular rate and rhythm without murmur, gallop or rub. Lungs: Normal respiratory effort with equal expansion, good air exchange, unlabored and clear to auscultation without wheezes or rales bilaterally   Abdomen: soft, nontender, nondistended with bowel sounds active. Labs:  Recent Labs     04/06/22  0746   HGB 14.4   HCT 41.8   WBC 18.9*   MCV 90.5      *   K 4.1   CL 95*   CO2 25   BUN 21   CREATININE 0.52   GLUCOSE 107*   AST 13   ALT 21   LABALBU 4.0       Recent Labs     03/29/22  1933   COVID19 Not Detected       Boneta Maxine Vanegas, APRN - CNP  Electronically signed 4/13/2022 at 9:50 Venecia Hammer MD   Physician   Specialty:  Hematology and Oncology   Progress Notes       Signed   Encounter Date:  4/6/2022         Related encounter: Office Visit from 4/6/2022 in [facility] Collapse All          Show:Clear all  [x]Manual[x]Template[x]Copied    Added by:  Pastor Virgil MD      []Jerod for details            Patient ID: Crissy Orosco, 1955, [id number], 61 y.o. Diagnosis:   1. Left upper lobe invasive lung adenocarcinoma, Status post lobectomy 9/28/18, Pathologic stage: pT1c, pN2, stage IIIa R1 with positive margin,    2. Will start chemotherapy followed By RT  3. Carbo taxol cycle 1 on 11/14/18  4. Radiation Therapy completed on 3/29/19  5. Unfortunately on 3/26/2022 she presented with seizure activity and her CT scan MRI showed multiple supra and infratentorial intraparenchymal lesion consistent with metastatic disease in brain  6. Her CT chest abdomen pelvis on 3/27/2022 showed 2.1 cm right adrenal nodule suspicious for metastasis  7.  On 3/29/2022 she underwent right-sided craniotomy with resection of intraaxial brain tumor and now planning to get SRS/gamma knife soon  8. We will get next-generation sequencing and plan for systemic therapy  HISTORY OF PRESENT ILLNESS:    Oncologic History:  The patient is a 66 y.o.  female who is admitted to the hospital for Left upper lobe mass. Pt has a significant past medical history of Uterine fibroids requiring total abdominal hysterectomy, liver hemangioma, HTN, Hyperlipidemia, DVT Left leg, COPD, and anxiety. Pt was found to have a left upper lung adenocarcinoma and presented to the hospital on 9/28/2018 for a scheduled robotic-assisted left upper lobe lobectomy. Pathology is positive for metastatic adenocarcinoma. There are some lymph nodes positive and some evidence of invasion to surrounding structures seen during surgery. Surgical team is planning on discharging patient this evening from the hospital.  She was discharged from the hospital with plan to follow with oncology as outpatient. Interval history:   Elham Fragoso is returning for follow-up visit and to discuss lab results, recent imaging studies, and further recommendations. Unfortunately she was admitted recently to hospital with new brain metastasis and it seems that she possibly has disease progression with development of right adrenal nodule as well. She underwent resection of the intra-axial brain tumor on 3/29/2022 and recovering well. She denies any significant headache. She has a follow-up coming with neurosurgery and also has been evaluated by radiation oncology for consideration of gamma knife/SRS. During this visit patient's allergy, social, medical, surgical history and medications were reviewed and updated.      Current Facility-Administered Medications          Current Outpatient Medications   Medication Sig Dispense Refill    dexamethasone (DECADRON) 4 MG tablet Take 1 tablet by mouth in the morning and at bedtime 60 tablet 0    pantoprazole (PROTONIX) 40 MG tablet Take 1 tablet by mouth every morning (before breakfast) 30 tablet 1    dexamethasone (DECADRON) 4 MG tablet Take 1 tablet by mouth every 12 hours for 2 days 4 tablet 0    levETIRAcetam (KEPPRA) 500 MG tablet Take 1 tablet by mouth 2 times daily 60 tablet 3    oxyCODONE (OXY-IR) 10 MG immediate release tablet Take 1 tablet by mouth every 6 hours as needed for Pain for up to 3 days. 12 tablet 0    ALPRAZolam (XANAX) 0.25 MG tablet take 1 tablet by mouth nightly if needed for sleep for 30 DAYS 30 tablet 0    albuterol sulfate  (90 Base) MCG/ACT inhaler inhale 2 puffs by mouth four times a day 18 g 11    lisinopril-hydroCHLOROthiazide (PRINZIDE;ZESTORETIC) 10-12.5 MG per tablet Take 1 tablet by mouth daily 30 tablet 11    lovastatin (MEVACOR) 10 MG tablet Take 1 tablet by mouth nightly 30 tablet 11    [START ON 2022] dexamethasone (DECADRON) 4 MG tablet Take 1 tablet by mouth daily for 2 days (Patient not taking: Reported on 2022) 2 tablet 0    [START ON 4/10/2022] dexamethasone (DECADRON) 2 MG tablet Take 1 tablet by mouth daily for 2 doses (Patient not taking: Reported on 2022) 2 tablet 0      No current facility-administered medications for this visit.       Facility-Administered Medications Ordered in Other Visits   Medication Dose Route Frequency Provider Last Rate Last Admin    0.9 % sodium chloride bolus  100 mL IntraVENous Once Devorah Urbano MD                Social History               Socioeconomic History    Marital status:        Spouse name: Not on file    Number of children: Not on file    Years of education: Not on file    Highest education level: Not on file   Occupational History    Not on file   Tobacco Use    Smoking status: Former Smoker       Packs/day: 1.50       Years: 30.00       Pack years: 45.00       Types: Cigarettes       Quit date:        Years since quittin.2    Smokeless tobacco: Never Used   Vaping Use    Vaping Use: Never used   Substance and Sexual Activity    Alcohol use: Yes       Comment: Occassionally    Drug use: No    Sexual activity: Not on file   Other Topics Concern    Not on file   Social History Narrative    Not on file      Social Determinants of Health          Financial Resource Strain: Low Risk     Difficulty of Paying Living Expenses: Not hard at all   Food Insecurity: No Food Insecurity    Worried About [address] in the Last Year: Never true    [address] in the Last Year: Never true   Transportation Needs:     Lack of Transportation (Medical): Not on file    Lack of Transportation (Non-Medical): Not on file   Physical Activity:     Days of Exercise per Week: Not on file    Minutes of Exercise per Session: Not on file   Stress:     Feeling of Stress : Not on file   Social Connections:     Frequency of Communication with Friends and Family: Not on file    Frequency of Social Gatherings with Friends and Family: Not on file    Attends Faith Services: Not on file    Active Member of [address] [place] [address] or Organizations: Not on file    Attends Club or Organization Meetings: Not on file    Marital Status: Not on file   Intimate Partner Violence:     Fear of Current or Ex-Partner: Not on file    Emotionally Abused: Not on file    Physically Abused: Not on file    Sexually Abused: Not on file   Housing Stability:     Unable to Pay for Housing in the Last Year: Not on file    Number of [place] in the Last Year: Not on file    Unstable Housing in the Last Year: Not on file            Family History         Family History   Problem Relation Age of Onset    Cancer Mother           LUNG    Cancer Sister           LUNG            REVIEW OF SYSTEM:      Constitutional: No fever or chills.  No night sweats, no weight loss   Eyes: No eye discharge, double vision, or eye pain   HEENT: negative for sore mouth, sore throat, hoarseness and voice change   Respiratory: negative for cough , sputum, dyspnea, wheezing, hemoptysis, chest pain   Cardiovascular: negative for chest pain, dyspnea, palpitations, orthopnea, PND   Gastrointestinal: negative for nausea, vomiting, diarrhea, constipation, abdominal pain, Dysphagia, hematemesis and hematochezia   Genitourinary: negative for frequency, dysuria, nocturia, urinary incontinence, and hematuria   Integument: negative for rash, skin lesions, bruises.    Hematologic/Lymphatic: negative for easy bruising, bleeding, lymphadenopathy, petechiae and swelling/edema   Endocrine: negative for heat or cold intolerance, tremor, weight changes, change in bowel habits and hair loss   Musculoskeletal: negative for myalgias, arthralgias, pain, joint swelling,and bone pain   Neurological: negative for headaches, dizziness, seizures, weakness, numbness        OBJECTIVE:             Vitals:     04/06/22 0822   BP: 121/85   Pulse: 96   Resp: 16   Temp: 97.6 °F (36.4 °C)   PHYSICAL EXAM:   General appearance - well appearing, no in pain or distress   Mental status - alert and cooperative   Eyes - pupils equal and reactive, extraocular eye movements intact   Ears - bilateral TM's and external ear canals normal   Mouth - mucous membranes moist, pharynx normal without lesions   Neck - supple, no significant adenopathy   Lymphatics - no palpable lymphadenopathy, no hepatosplenomegaly   Chest - clear to auscultation, no wheezes, rales or rhonchi, symmetric air entry   Left chest surgical scar healing well  Heart - normal rate, regular rhythm, normal S1, S2, no murmurs, rubs, clicks or gallops   Abdomen - soft, nontender, nondistended, no masses or organomegaly   Neurological - alert, oriented, normal speech, no focal findings or movement disorder noted   Musculoskeletal - no joint tenderness, deformity or swelling   Extremities - peripheral pulses normal, no pedal edema, no clubbing or cyanosis   Skin - normal coloration and turgor, no rashes, no suspicious skin lesions noted ,  LABORATORY DATA:            Lab Results   Component Value Date     WBC 18.9 (H) 04/06/2022     HGB 14.4 04/06/2022     HCT 41.8 04/06/2022     MCV 90.5 04/06/2022      04/06/2022     LYMPHOPCT 12 (L) 04/06/2022     RBC 4.62 04/06/2022     MCH 31.2 04/06/2022     MCHC 34.4 04/06/2022     RDW 12.1 04/06/2022     MONOPCT 6 04/06/2022     BASOPCT 0 04/06/2022     NEUTROABS 15.05 (H) 04/06/2022     LYMPHSABS 2.33 04/06/2022     MONOSABS 1.15 04/06/2022     EOSABS 0.05 04/06/2022     BASOSABS <0.03 04/06/2022        Chemistry               Component Value Date/Time      (L) 04/06/2022 0746     K 4.1 04/06/2022 0746     CL 95 (L) 04/06/2022 0746     CO2 25 04/06/2022 0746     BUN 21 04/06/2022 0746     CREATININE 0.52 04/06/2022 0746         Component Value Date/Time     CALCIUM 8.7 04/06/2022 0746     ALKPHOS 67 04/06/2022 0746     AST 13 04/06/2022 0746     ALT 21 04/06/2022 0746     BILITOT 0.85 04/06/2022 0746          PATHOLOGY DATA:   Pathology:  CT Guided Biopsy ([facility]) 8/22/18:   LEFT LUNG, UPPER LOBE, CT GUIDED CORE NEEDLE BIOPSIES:  - INVASIVE ADENOCARCINOMA, CONSISTENT WITH LUNG PRIMARY. Collected: 9/28/2018   -- Diagnosis --   1.  LYMPH NODE, LEVEL 9, BIOPSY:       -  ONE LYMPH NODE, NEGATIVE FOR MALIGNANCY (0/1). 2.  LYMPH NODE, LEVEL 11, DISSECTION:       -  ONE OF 2 LYMPH NODES POSITIVE FOR METASTATIC CARCINOMA (1/2). 3.  LYMPH NODE, LEVEL 5, DISSECTION:       -  ONE OF 2 LYMPH NODES POSITIVE FOR METASTATIC CARCINOMA (1/2). -  CARCINOMA INVADES LARGE PERIPHERAL NERVE BRANCHES. 4.  LUNG, LEFT UPPER LOBE, LOBECTOMY:            -  WELL-DIFFERENTIATED INVASIVE ADENOCARCINOMA, 2.9 CM   GREATEST DIMENSION. -  NEGATIVE FOR VISCERAL PLEURAL INVASION. -  TUMOR INVOLVES SOFT TISSUE OF BRONCHIAL, VASCULAR AND PARENCHYMAL MARGINS. -  5 PERIBRONCHIOLAR LYMPH NODES, POSITIVE FOR METASTATIC   ADENOCARCINOMA (5/5).        -  SEPARATE SMALL INTRALOBAR FOCUS ATYPICAL ADENOMATOUS   HYPERPLASIA. -  PATHOLOGIC STAGE:  pT1c, pN2, R1.     5.  LYMPH NODES, LEVEL 10, DISSECTION:       -  ONE OF 2 LYMPH NODES POSITIVE FOR METASTATIC CARCINOMA (1/2). IMAGING DATA:    MRI brain 3/26/2022  Impression   1. Multiple supra and infratentorial intraparenchymal lesions are most   consistent with metastatic disease. There is associated edema with minimal   mass effect, but no midline shift. 2. Intrinsically T1 hyperintense right frontal calvarial lesion is most   consistent with an osseous hemangioma. No definite osseous metastasis   identified. CT chest abdomen pelvis 3/27/2022  mpression   1. Mild centrilobular emphysema. 2. Redemonstration of left perihilar post treatment scarring with underlying   traction bronchiectasis extending into the upper lower lobes. Stable. 3. No evidence for metastatic disease in the chest.   4. A 2.1 x 1.4 cm right adrenal nodule not present in 2018. CT appearance   does not meet criteria for adenoma. Considered metastatic nodule until   proven otherwise. 5. Redemonstration of hepatic hemangioma and several cysts. ASSESSMENT:    Richard Sullivan Is a very pleasant 61 y.o.  female with initial diagnosis of left upper lobe non-small cell lung cancer, adenocarcinoma, status post Robotically assisted BARAK lobectomy with LN dissection and Intercostal nerve block with experal on 9/28/18. She has [id number] disease, stage IIIA, she had R1 disease with positive margins. She completed sequential chemo RT. Unfortunately now she has recurrent disease with brain metastasis and also adrenal metastasis. I reviewed the NCCN guidelines and goals of care and will plan for systemic therapy when she completes radiation therapy I will get next-generation sequencing as well. During today's visit, the patient and the family had a number of reasonable questions which were answered to their satisfaction.   They verbalized understanding of the information provided and they agreed to proceed as outlined above. PLAN:   I reviewed her recent lab work, imaging studies, discussed diagnosis, prognosis and treatment recommendations   Unfortunately now she has stage IV metastatic disease with recurrence in brain and adrenal gland  She had craniotomy and planning to have SRS and gamma knife soon  I would get next-generation sequencing for biomarker testing  We will get port placement  Plan for restaging PET scan and MRI brain is scheduled  I will get CT of the left arm to evaluate her soft tissue swelling  We will plan for chemoimmunotherapy in about 3 weeks        Jose Hicks MD  Hematologist/Medical Oncologist     I spent more than 40 minutes examining, evaluating, reviewing data and counseling the patient. Greater than 50% of that time was spent face-to-face with the patient in counseling and coordinating her care. This note is created with the assistance of a speech recognition program.  While intending to generate a document that actually reflects the content of the visit, the document can still have some errors including those of syntax and sound a like substitutions which may escape proof reading. It such instances, actual meaning can be extrapolated by contextual diversion.

## 2022-04-14 ENCOUNTER — TELEPHONE (OUTPATIENT)
Dept: INFUSION THERAPY | Facility: MEDICAL CENTER | Age: 67
End: 2022-04-14

## 2022-04-14 ENCOUNTER — TELEPHONE (OUTPATIENT)
Dept: ONCOLOGY | Age: 67
End: 2022-04-14

## 2022-04-14 DIAGNOSIS — C34.90 PRIMARY MALIGNANT NEOPLASM OF LUNG WITH METASTASIS TO BRAIN (HCC): ICD-10-CM

## 2022-04-14 DIAGNOSIS — C79.31 PRIMARY MALIGNANT NEOPLASM OF LUNG WITH METASTASIS TO BRAIN (HCC): ICD-10-CM

## 2022-04-14 DIAGNOSIS — C34.92 NON-SMALL CELL CANCER OF LEFT LUNG (HCC): Primary | ICD-10-CM

## 2022-04-14 NOTE — TELEPHONE ENCOUNTER
RN check of new chemotherapy orders per Dr. Yessi Grayson. Resuming treatment for NSCLC    Keytruda/Alimta/Carbo q21 days    Ht = 149.9 cm  Wt = 71.6 kg  BSA = 1.73    Keytruda 200 mg (flat dose) day 1  Alimta 500 mg/m2 = 865 mg IV on day 1  Carboplatin AUC 5 on day 1    Kardex updated and labs to epic; chart to  for processing/scheduling; note to pool for 2nd RN check.

## 2022-04-14 NOTE — TELEPHONE ENCOUNTER
Name: Moe Palacios  : 1955  MRN: [id number]    Oncology Navigation Follow-Up Note  Navigator reaching out to pt. And informing her of POC. Plan to start with GK, then 2-3 cycles of CTX, then reimage Adrenal if no change may want to do RTX. Pts. Port placed yesterday and pt. Denies needs at this tme. Plan to follow.    Electronically signed by Joe Sen RN on 2022 at 4:04 PM

## 2022-04-21 ENCOUNTER — OFFICE VISIT (OUTPATIENT)
Dept: FAMILY MEDICINE CLINIC | Age: 67
End: 2022-04-21
Payer: COMMERCIAL

## 2022-04-21 VITALS
DIASTOLIC BLOOD PRESSURE: 88 MMHG | OXYGEN SATURATION: 96 % | BODY MASS INDEX: 32.05 KG/M2 | TEMPERATURE: 98.4 F | HEART RATE: 129 BPM | HEIGHT: 59 IN | SYSTOLIC BLOOD PRESSURE: 138 MMHG | WEIGHT: 159 LBS

## 2022-04-21 DIAGNOSIS — I10 ESSENTIAL HYPERTENSION: ICD-10-CM

## 2022-04-21 DIAGNOSIS — C34.92 NON-SMALL CELL CANCER OF LEFT LUNG (HCC): Primary | ICD-10-CM

## 2022-04-21 DIAGNOSIS — R22.0 MASS OF OCCIPITAL REGION: ICD-10-CM

## 2022-04-21 DIAGNOSIS — C79.31 BRAIN METASTASES (HCC): ICD-10-CM

## 2022-04-21 PROCEDURE — G8417 CALC BMI ABV UP PARAM F/U: HCPCS | Performed by: FAMILY MEDICINE

## 2022-04-21 PROCEDURE — 1111F DSCHRG MED/CURRENT MED MERGE: CPT | Performed by: FAMILY MEDICINE

## 2022-04-21 PROCEDURE — 1036F TOBACCO NON-USER: CPT | Performed by: FAMILY MEDICINE

## 2022-04-21 PROCEDURE — G8427 DOCREV CUR MEDS BY ELIG CLIN: HCPCS | Performed by: FAMILY MEDICINE

## 2022-04-21 PROCEDURE — 3017F COLORECTAL CA SCREEN DOC REV: CPT | Performed by: FAMILY MEDICINE

## 2022-04-21 PROCEDURE — 99214 OFFICE O/P EST MOD 30 MIN: CPT | Performed by: FAMILY MEDICINE

## 2022-04-21 RX ORDER — CLONAZEPAM 0.5 MG/1
0.5 TABLET ORAL 2 TIMES DAILY PRN
Qty: 28 TABLET | Refills: 0 | Status: SHIPPED | OUTPATIENT
Start: 2022-04-21 | End: 2022-05-05

## 2022-04-21 RX ORDER — ASPIRIN 81 MG/1
TABLET, CHEWABLE ORAL
COMMUNITY
End: 2022-06-22

## 2022-04-21 RX ORDER — LOVASTATIN 10 MG/1
10 TABLET ORAL NIGHTLY
Qty: 30 TABLET | Refills: 11 | Status: SHIPPED | OUTPATIENT
Start: 2022-04-21

## 2022-04-21 RX ORDER — ALPRAZOLAM 0.25 MG/1
0.25 TABLET ORAL NIGHTLY PRN
COMMUNITY
End: 2022-04-21

## 2022-04-21 RX ORDER — LISINOPRIL AND HYDROCHLOROTHIAZIDE 12.5; 1 MG/1; MG/1
1 TABLET ORAL DAILY
Qty: 30 TABLET | Refills: 11 | Status: SHIPPED | OUTPATIENT
Start: 2022-04-21 | End: 2022-08-17

## 2022-04-21 NOTE — PROGRESS NOTES
General FM note    Patti Hurley is a 61 y.o. female who presents today for follow up on her  medical conditions as noted below.   Patti Hurley is c/o of   Chief Complaint   Patient presents with    Seizures     3/25/22 -3/31/22    Insomnia       Patient Active Problem List:     Status post lobectomy of lung     Non-small cell cancer of left lung ([address]     Malignant neoplasm of bronchus of upper lobe, left ([address]     Essential hypertension     Anxiety     Intraparenchymal hemorrhage of brain ([address]     New onset seizure ([address]     Mass of occipital region     Hyperglycemia     Hypokalemia     Vasogenic edema (HCC)     Brain metastases (HCC)     Adrenal mass (HCC) - right      Episode of loss of consciousness     Focal epilepsy ([address]     Primary malignant neoplasm of lung with metastasis to brain [facility])     Past Medical History:   Diagnosis Date    Anxiety     Benign polyp of large intestine     Cancer ([address]     LT UPPER LOBE    COPD (chronic obstructive pulmonary disease) ([address]     Hx of blood clots     DVT LT LEG    Hyperlipidemia     Hypertension     Liver hemangioma     Lung nodule     BARAK  Nodule    Snores     not tested for apnea    Uterine fibroid 09/2010    Wears glasses       Past Surgical History:   Procedure Laterality Date    ABDOMINAL HERNIA REPAIR  2012    BREAST BIOPSY Left 1983    BRONCHOSCOPY  10/1/2018    BRONCHOSCOPY performed by Daysi Diego MD at [facility] N/A 3/29/2022    SUBOCCIPITAL CRANIOTOMY FOR TUMOR  (REGULAR TABLE, [employer] NAVIGATION, [place] HEADHOLDER) performed by Go Bower DO at [address] [place] [address], [facility] ABDOMINAL      IR PORT PLACEMENT EQUAL OR GREATER THAN 5 YEARS  4/13/2022    IR PORT PLACEMENT EQUAL OR GREATER THAN 5 YEARS 4/13/2022 MD YEFRI Saleem SPECIAL PROCEDURES    LUNG BIOPSY      LUNG REMOVAL, PARTIAL Right 09/28/2018    Robotic assisted left lobectomy, upper with lymph node biopsy    OTHER SURGICAL HISTORY Right 2018    IR PORT PLACEMENT EQUAL OR GREATER THAN 5 YEARS    MD [address] INCL FLUOR GDNCE DX W/CELL WASHG SPX N/A 10/29/2018    BRONCHOSCOPY performed by Joshua Cameron MD at [facility] LUNG OTHER THAN PNEUMONECTOMY 1 LOBE LOBECT Left 2018    XI ROBOTIC ASSISTED LEFT UPPER LOBECTOMY MULTIPLE LYMPH NODE BIOPSY, INTERCOSTAL  NERVE BLOCK ABLATION W/EXPAREL performed by Edgar Patino MD at [address] [place] [address] History   Problem Relation Age of Onset    Cancer Mother         LUNG    Cancer Sister         LUNG     Current Outpatient Medications   Medication Sig Dispense Refill    lovastatin (MEVACOR) 10 MG tablet Take 1 tablet by mouth nightly 30 tablet 11    lisinopril-hydroCHLOROthiazide (PRINZIDE;ZESTORETIC) 10-12.5 MG per tablet Take 1 tablet by mouth daily 30 tablet 11    clonazePAM (KLONOPIN) 0.5 MG tablet Take 1 tablet by mouth 2 times daily as needed for Anxiety for up to 14 days. 28 tablet 0    dexamethasone (DECADRON) 4 MG tablet Take 1 tablet by mouth in the morning and at bedtime 60 tablet 0    pantoprazole (PROTONIX) 40 MG tablet Take 1 tablet by mouth every morning (before breakfast) 30 tablet 1    levETIRAcetam (KEPPRA) 500 MG tablet Take 1 tablet by mouth 2 times daily 60 tablet 3    albuterol sulfate  (90 Base) MCG/ACT inhaler inhale 2 puffs by mouth four times a day 18 g 11    aspirin 81 MG chewable tablet Take by mouth       No current facility-administered medications for this visit. ALLERGIES:    Allergies   Allergen Reactions    Codeine Nausea And Vomiting       Social History     Tobacco Use    Smoking status: Former Smoker     Packs/day: 1.50     Years: 30.00     Pack years: 45.00     Types: Cigarettes     Quit date:      Years since quittin.3    Smokeless tobacco: Never Used   Substance Use Topics    Alcohol use: Yes     Comment: Occassionally      Body mass index is 32.11 kg/m².   BP 138/88   Pulse 129   Temp 98.4 °F (36.9 °C)   Ht 4' 11\" (1.499 m)   Wt 159 lb (72.1 kg)   SpO2 96%   BMI 32.11 kg/m²     Subjective:      HPI    61 y.o. female coming in today for a 6-month follow-up of her blood pressure. The patient has a history of left upper lobe invasive lung adenoma which was treated with chemotherapy followed by radiation been 2018. She is status post lobectomy in September 2018. The patient was admitted to the hospital on 03/25/2022. She was found to have mets in her brain/occipital region. She did have surgery done on 03/27 2022. The patient has followed up with a oncologist she did get a port installed. She has a another appointment set up for a surgical procedure of her brain met. The patient overall feels that she is doing better. She she states that she is not able to remember what actually happened. But she is hanging in there. She would like to have a refill of the Xanax because she is not able to sleep. Her significant other which she has been together over 25 years so she tells me does everything for her. And she does not want to cause him any discomfort or any pain. Review of Systems   Constitutional: Negative for fever and unexpected weight change. Pertinent items are noted in HPI. Objective:   Physical Exam  Constitutional: VS (see above). General appearance: normal development, habitus and attention, no deformities. No distress. Eyes: normal conjunctiva and lids. CAV: RRR, no RMG. No edema lower extremities. Pulmo: CTA bilateral, no CWR. Skin: no rashes, lesions or ulcers. Musculoskeletal: normal gait. Nails: no clubbing or cyanosis. Psychiatric: alert and oriented to place, time and person. Normal mood and affect. Assessment:       Diagnosis Orders   1. Non-small cell cancer of left lung (HCC)  clonazePAM (KLONOPIN) 0.5 MG tablet   2.  Essential hypertension  lovastatin (MEVACOR) 10 MG tablet    lisinopril-hydroCHLOROthiazide (PRINZIDE;ZESTORETIC) 10-12.5 MG per tablet   3. Mass of occipital region  clonazePAM (KLONOPIN) 0.5 MG tablet   4. Brain metastases (HCC)  clonazePAM (KLONOPIN) 0.5 MG tablet       Plan:   I will start the patient on the low-dose of clonazepam twice a day. The patient will let me know if this helps or not. If needed we will bump it up. But the patient needs to make sure that she gets some sleep. The clonazepam also will overall help her hopefully with her anxiety. She tells me that she will go through with all the procedures and the chemo as indicated by the specialist.  She feels very comfortable with them and she would do what needs to be done to improve. The patient has a history of lung cancer that she has been found to have mets in her brain. We need to make sure that she is overall well-balanced especially in her moods. She did not want to have an antidepressant yet. She states that just getting sleep will help her a lot. Again we can bump up clonazepam anytime. Return in about 6 months (around 10/21/2022), or if symptoms worsen or fail to improve. No orders of the defined types were placed in this encounter. Orders Placed This Encounter   Medications    lovastatin (MEVACOR) 10 MG tablet     Sig: Take 1 tablet by mouth nightly     Dispense:  30 tablet     Refill:  11    lisinopril-hydroCHLOROthiazide (PRINZIDE;ZESTORETIC) 10-12.5 MG per tablet     Sig: Take 1 tablet by mouth daily     Dispense:  30 tablet     Refill:  11    clonazePAM (KLONOPIN) 0.5 MG tablet     Sig: Take 1 tablet by mouth 2 times daily as needed for Anxiety for up to 14 days. Dispense:  28 tablet     Refill:  0       Call or return to clinic prn if these symptoms worsen or fail to improve as anticipated. I have reviewed the instructions with the patient, answering all questions to patient's satisfaction.       Denia Branham received counseling on the following healthy behaviors: nutrition, exercise, and medication adherence  Reviewed prior labs and health maintenance. Continue current medications, diet and exercise. Discussed use, benefit, and side effects of prescribed medications. Barriers to medication compliance addressed. Patient given educational materials - see patient instructions. All patient questions answered. Patient voiced understanding.       Electronically signed by Brenda Sandoval MD on 4/21/2022 at 9:54 AM       (Please note that portions of this note were completed with a voice recognition program. Efforts were made to edit the dictations but occasionally words are mis-transcribed.)

## 2022-04-22 ENCOUNTER — TELEPHONE (OUTPATIENT)
Dept: RADIATION ONCOLOGY | Age: 67
End: 2022-04-22

## 2022-04-22 ENCOUNTER — TELEPHONE (OUTPATIENT)
Dept: INFUSION THERAPY | Facility: MEDICAL CENTER | Age: 67
End: 2022-04-22

## 2022-04-22 NOTE — TELEPHONE ENCOUNTER
RN called pt to review GK instructions and appt time for 4/25. Patient instructed to take her Dexamethasone and Keppra that morning with a sip of water. She will bring her Clonazepam with her as well and she is aware to have a . Plan for day reviewed with patient and that her port will be accessed for labs and contrast as well. Patient verbalized understanding of information.

## 2022-04-22 NOTE — TELEPHONE ENCOUNTER
[facility] AND READY TO SCHEDULE. PER NAVIGATOR NOTE, PT TO DO GAMMAKNIFE TX WHICH IS SCHEDULED FOR 4-25-22 FIRST. THEN WILL DO CHEMOTHERAPY. I WILL  CONTACT NAVIGATOR AFTER GAMMAKNIFE IS COMPLETED TO SEE WHEN TO SCHEDULE.

## 2022-04-25 ENCOUNTER — HOSPITAL ENCOUNTER (OUTPATIENT)
Dept: RADIATION ONCOLOGY | Age: 67
Discharge: HOME | End: 2022-04-25
Payer: COMMERCIAL

## 2022-04-25 ENCOUNTER — HOSPITAL ENCOUNTER (OUTPATIENT)
Dept: MRI IMAGING | Age: 67
Discharge: HOME | End: 2022-04-27
Payer: COMMERCIAL

## 2022-04-25 ENCOUNTER — TELEPHONE (OUTPATIENT)
Dept: CASE MANAGEMENT | Age: 67
End: 2022-04-25

## 2022-04-25 VITALS
TEMPERATURE: 97.4 F | HEART RATE: 94 BPM | BODY MASS INDEX: 31.75 KG/M2 | OXYGEN SATURATION: 95 % | RESPIRATION RATE: 18 BRPM | SYSTOLIC BLOOD PRESSURE: 156 MMHG | WEIGHT: 157.2 LBS | DIASTOLIC BLOOD PRESSURE: 81 MMHG

## 2022-04-25 DIAGNOSIS — C34.90 PRIMARY MALIGNANT NEOPLASM OF LUNG WITH METASTASIS TO BRAIN (HCC): ICD-10-CM

## 2022-04-25 DIAGNOSIS — C34.90 NON-SMALL CELL LUNG CANCER METASTATIC TO BRAIN (HCC): ICD-10-CM

## 2022-04-25 DIAGNOSIS — C34.90 PRIMARY MALIGNANT NEOPLASM OF LUNG WITH METASTASIS TO BRAIN (HCC): Primary | ICD-10-CM

## 2022-04-25 DIAGNOSIS — C79.31 PRIMARY MALIGNANT NEOPLASM OF LUNG WITH METASTASIS TO BRAIN (HCC): Primary | ICD-10-CM

## 2022-04-25 DIAGNOSIS — C79.31 BRAIN METASTASES (HCC): ICD-10-CM

## 2022-04-25 DIAGNOSIS — C79.31 PRIMARY MALIGNANT NEOPLASM OF LUNG WITH METASTASIS TO BRAIN (HCC): ICD-10-CM

## 2022-04-25 DIAGNOSIS — C79.31 NON-SMALL CELL LUNG CANCER METASTATIC TO BRAIN (HCC): ICD-10-CM

## 2022-04-25 LAB
BUN BLDV-MCNC: 18 MG/DL (ref 8–23)
CREAT SERPL-MCNC: 0.45 MG/DL (ref 0.5–0.9)
GFR AFRICAN AMERICAN: >60 ML/MIN
GFR NON-AFRICAN AMERICAN: >60 ML/MIN
GFR SERPL CREATININE-BSD FRML MDRD: ABNORMAL ML/MIN/{1.73_M2}

## 2022-04-25 PROCEDURE — 70552 MRI BRAIN STEM W/DYE: CPT

## 2022-04-25 PROCEDURE — 77295 3-D RADIOTHERAPY PLAN: CPT | Performed by: RADIOLOGY

## 2022-04-25 PROCEDURE — A4216 STERILE WATER/SALINE, 10 ML: HCPCS | Performed by: RADIOLOGY

## 2022-04-25 PROCEDURE — A9579 GAD-BASE MR CONTRAST NOS,1ML: HCPCS | Performed by: RADIOLOGY

## 2022-04-25 PROCEDURE — 77300 RADIATION THERAPY DOSE PLAN: CPT | Performed by: RADIOLOGY

## 2022-04-25 PROCEDURE — 84520 ASSAY OF UREA NITROGEN: CPT

## 2022-04-25 PROCEDURE — 6360000004 HC RX CONTRAST MEDICATION: Performed by: RADIOLOGY

## 2022-04-25 PROCEDURE — 77371 SRS MULTISOURCE: CPT | Performed by: RADIOLOGY

## 2022-04-25 PROCEDURE — 82565 ASSAY OF CREATININE: CPT

## 2022-04-25 PROCEDURE — 77334 RADIATION TREATMENT AID(S): CPT | Performed by: RADIOLOGY

## 2022-04-25 PROCEDURE — 2580000003 HC RX 258: Performed by: RADIOLOGY

## 2022-04-25 PROCEDURE — 77290 THER RAD SIMULAJ FIELD CPLX: CPT | Performed by: RADIOLOGY

## 2022-04-25 PROCEDURE — 77263 THER RADIOLOGY TX PLNG CPLX: CPT | Performed by: RADIOLOGY

## 2022-04-25 RX ORDER — SODIUM CHLORIDE 0.9 % (FLUSH) 0.9 %
10 SYRINGE (ML) INJECTION PRN
Status: DISCONTINUED | OUTPATIENT
Start: 2022-04-25 | End: 2022-04-28 | Stop reason: HOSPADM

## 2022-04-25 RX ADMIN — SODIUM CHLORIDE, PRESERVATIVE FREE 10 ML: 5 INJECTION INTRAVENOUS at 09:25

## 2022-04-25 RX ADMIN — GADOTERIDOL 28 ML: 279.3 INJECTION, SOLUTION INTRAVENOUS at 09:25

## 2022-04-25 ASSESSMENT — PAIN SCALES - GENERAL: PAINLEVEL_OUTOF10: 1

## 2022-04-25 ASSESSMENT — PAIN DESCRIPTION - ORIENTATION: ORIENTATION: LEFT

## 2022-04-25 ASSESSMENT — PAIN DESCRIPTION - LOCATION: LOCATION: ABDOMEN

## 2022-04-25 NOTE — PROGRESS NOTES
Gamma Knife Radiation Delivery Time Out for MASK     1. Patient states name and birthdate correctly? Yes    2. Procedure listed on consent? Stereotactic Radiosurgery, Gamma Knife for brain metastases    3. Is this the correct procedure? Yes    4. Does the patient have only one benign target or lesion? No    -If yes, what side are we treating? N/A    -If yes, is the side marked for laterality? N/A    5. Has the final radiologist report been reviewed? yes    6. Has the patient received IV Dexamethasone prior to radiation delivery?  no    7. Does the patient require Keppra or Ativan prior to treatment delivery? yes    8. Is the mask the correct patient's mask? Yes    9. Is a CBCT required prior to treatment delivery? Yes    10. Are all present in agreement? Yes    11.  Those present for time out:  MELANY LOCKE SP    **

## 2022-04-25 NOTE — PROGRESS NOTES
Patient here today for gamma knife treatment/procedure. She arrived with supportive spouse who drove her. Patient stated she took her Dexamethasone 4 mg and Keppra this morning with a sip of water. Port accessed and labs drawn. Patient denies headaches, nausea or vision problems.  confirmed she remains on Dexamethasone 4 mg twice per day. Dr. Jesus Hooper spoke with pt and  regarding adding simulation today for 3 radiation treatments to her brain which plan to start next week (mon, wed, fri). [facility] discharge instructions reviewed with spouse who verbalized understanding.

## 2022-04-25 NOTE — PROGRESS NOTES
[address]       Radiation Oncology          [address]          [place] [address]        Deshaun Anusha: [phone]        F: [phone]       [address]       Radiation Oncology   [address] [facility], [address] [facility]       Deshaun Anusha: [phone]       F: [phone]       [facility]          RADIATION ONCOLOGY WEEKLY PROGRESS NOTE  Patient ID:   Nadira Velazquez  : 1955   MRN: [id number]    Location:  [facility] Radiation Oncology,   [address] [facility] [place]   [phone]     DIAGNOSIS:  Cancer Staging  Malignant neoplasm of bronchus of upper lobe, left ([facility]  Staging form: Lung, AJCC 8th Edition  - Pathologic stage from 2018: Stage IIIA (pT1b, pN2, cM0) - Signed by Bobby Elam MD on 2019  Staging comments: Left upper lobe biopsy 2018 positive for invasive adenocarcinoma from a lung primary. Left upper lobectomy/mediastinal node dissection 2018 revealed 2.9 cm grade 1 adenocarcinoma with positive bronchial/vascular/parenchymal margins, 1/2 level 5 node positive, 5/5 peribronchial nodes positive, one level 9 node negative, 1/2 level 10 node positive, one level 11 node negative, no lymphovascular invasion. Non-small cell cancer of left lung [facility])  Staging form: Lung, AJCC 8th Edition  - Clinical stage from 10/10/2018: Stage IIIA (ycT1c, cN2, cM0) - Signed by Ary Cooper MD on 10/10/2018      TREATMENT DETAILS:  Treatment Site: 6 intracranial lesions  Actual Dose: 1600-1800cGy  Total Planned Dose: 1600-1800cGy  Treatment Technique: Gamma Knife SRS  Fraction Technique: Single fraction  Therapy imaging monitoring: Gamma Knife  Concurrent Chemotherapy: NA    SUBJECTIVE:   Patient seen for their weekly on treatment evaluation today. Patient Toller treatment well with no symptoms of headaches, dizziness, confusion, or weakness.   She had treatment to the 6 intracranial lesions however there was a large resection cavity from her craniotomy which was 5 cm and therefore will be treated with EBRT. She did undergo CT simulation for that treatment today after her gamma knife treatment. OBJECTIVE:   CHAPERONE: Family/friend/companieon Present    ECO Asymptomatic    VITAL SIGNS: BP (!) 156/81   Pulse 94   Temp 97.4 °F (36.3 °C) (Temporal)   Resp 18   Wt 157 lb 3.2 oz (71.3 kg)   SpO2 95%   BMI 31.75 kg/m²   Wt Readings from Last 5 Encounters:   22 157 lb 3.2 oz (71.3 kg)   22 159 lb (72.1 kg)   22 158 lb (71.7 kg)   22 157 lb 12.8 oz (71.6 kg)   22 160 lb 8 oz (72.8 kg)     GENERAL:  General appearance is that of a well-nourished, well-developed in no apparent distress. LUNGS:  Pulmonary effort normal. Normal breath sounds. ABDOMEN:  Soft, nontender, non distended. EXTREMITIES:  No edema. Normal ROM. NEUROLOGICAL: Alert and oriented. Strength and sensation intact bilaterally. No focal deficits. PSYCH: Mood normal, behavior normal.  SKIN: No erythema, no desquamation.       LABS:  WBC   Date Value Ref Range Status   2022 18.9 (H) 3.5 - 11.3 k/uL Final   2022 10.8 3.5 - 11.3 k/uL Final   2022 11.8 (H) 3.5 - 11.3 k/uL Final     Segs Absolute   Date Value Ref Range Status   2022 15.05 (H) 1.50 - 8.10 k/uL Final   2022 9.19 (H) 1.50 - 8.10 k/uL Final   2022 10.04 (H) 1.50 - 8.10 k/uL Final     Hemoglobin   Date Value Ref Range Status   2022 14.4 11.9 - 15.1 g/dL Final   2022 13.1 11.9 - 15.1 g/dL Final   2022 12.4 11.9 - 15.1 g/dL Final     Platelets   Date Value Ref Range Status   2022 311 138 - 453 k/uL Final   2022 383 138 - 453 k/uL Final   2022 260 138 - 453 k/uL Final     CREATININE   Date Value Ref Range Status   2022 0.45 (L) 0.50 - 0.90 mg/dL Final   2022 0.52 0.50 - 0.90 mg/dL Final   2022 0.63 0.50 - 0.90 mg/dL Final     No results found for: , CEA  No results found for: PSA    MEDICATIONS:    Current Outpatient Medications:     aspirin 81 MG chewable tablet, Take by mouth, Disp: , Rfl:     lovastatin (MEVACOR) 10 MG tablet, Take 1 tablet by mouth nightly, Disp: 30 tablet, Rfl: 11    lisinopril-hydroCHLOROthiazide (PRINZIDE;ZESTORETIC) 10-12.5 MG per tablet, Take 1 tablet by mouth daily, Disp: 30 tablet, Rfl: 11    clonazePAM (KLONOPIN) 0.5 MG tablet, Take 1 tablet by mouth 2 times daily as needed for Anxiety for up to 14 days. , Disp: 28 tablet, Rfl: 0    dexamethasone (DECADRON) 4 MG tablet, Take 1 tablet by mouth in the morning and at bedtime, Disp: 60 tablet, Rfl: 0    pantoprazole (PROTONIX) 40 MG tablet, Take 1 tablet by mouth every morning (before breakfast), Disp: 30 tablet, Rfl: 1    levETIRAcetam (KEPPRA) 500 MG tablet, Take 1 tablet by mouth 2 times daily, Disp: 60 tablet, Rfl: 3    albuterol sulfate  (90 Base) MCG/ACT inhaler, inhale 2 puffs by mouth four times a day, Disp: 18 g, Rfl: 11  No current facility-administered medications for this encounter. Facility-Administered Medications Ordered in Other Encounters:     sodium chloride flush 0.9 % injection 10 mL, 10 mL, IntraVENous, PRN, Akiko Hagen MD, 10 mL at 04/25/22 6073      ASSESSMENT PLAN:   Treatment setup and plan reviewed. Port images/CBCT images reviewed. Appropriate laboratory work was reviewed. Treatment side effects and toxicities reviewed with the patient, and appropriate management was advised. Patient completed gamma knife treatment as planned, and recommend patient contact us if they have any questions or concerns. She underwent CT simulation today for resection cavity SBRT. We will contact her to schedule that shortly. She will have MRI of the brain done in 3 months after treatment and follow-up with us after that.     Electronically signed by Akiko Hagen MD on 4/25/2022 at 4:04 PM      Drugs Prescribed:  New Prescriptions    No medications on file       Other Orders Placed:  Orders Placed This Encounter   Procedures    MRI BRAIN [address] [facility] BUN & Creatinine

## 2022-04-25 NOTE — PROGRESS NOTES
[address]            Radiation Oncology          [address] [facility]          [address]        Verl Simper: [phone]        F: [phone]       [employer] [address] [facility]       Radiation Oncology   [address] [facility], [address] [facility]       Verl Simper: [phone]       F: [phone]       [employer]      Dear Dr Cole Hanley:    Thank you for referring Bartolo Cornell to me for evaluation and treatment. Below is a summary of the patient's recently completed radiation course. If you have questions, please do not hesitate to call me. I look forward to following this patient along with you. Sincerely,  Electronically signed by Carmine Amos MD on 22 at 5:08 PM EDT      CC: Patient Care Team:  Otis Dickey MD as PCP - General (Family Medicine)  Otis Dickey MD as PCP - [address] [place] [address] Dr Martins Provider  Marly Finnegan MD as Consulting Physician (Hematology and Oncology)  Maren Castillo MD as Consulting Physician (Thoracic Surgery)  Aiden Haddad MD as Consulting Physician (Pulmonology)  Rc Javier RN as Nurse Navigator (Oncology)  ------------------------------------------------------------------------------------------------------------------------------------------------------------------------------------------        Date of Service: 2022     Location:  [facility] Radiation Oncology,   [address] [facility] [place]   [phone]        RADIATION ONCOLOGY END OF TREATMENT SUMMARY:    Patient ID:   Bartolo Cornell  : 1955   MRN: [id number]    DIAGNOSIS:  Cancer Staging  Malignant neoplasm of bronchus of upper lobe, left [facility])  Staging form: Lung, AJCC 8th Edition  - Pathologic stage from 2018: Stage IIIA (pT1b, pN2, cM0) - Signed by Shauna Duvall MD on 2019  Staging comments: Left upper lobe biopsy 2018 positive for invasive adenocarcinoma from a lung primary.  Left upper lobectomy/mediastinal node dissection 9/28/2018 revealed 2.9 cm grade 1 adenocarcinoma with positive bronchial/vascular/parenchymal margins, 1/2 level 5 node positive, 5/5 peribronchial nodes positive, one level 9 node negative, 1/2 level 10 node positive, one level 11 node negative, no lymphovascular invasion. Non-small cell cancer of left lung [facility])  Staging form: Lung, AJCC 8th Edition  - Clinical stage from 10/10/2018: Stage IIIA (ycT1c, cN2, cM0) - Signed by Gallo Merida MD on 10/10/2018      TREATMENT DETAILS:    Treatment Technique: SRS  Fraction Technique: Single fraction          Concurrent Chemotherapy: NA    CLINICAL COURSE:    Patient completed the treatment as prescribed and tolerated treatment as expected. Patient developed no significant symptoms. She did have a larger resection cavity that could not be treated with SRS, and so underwent CT simulation for SRT to the R cerebellar cavity. Patient will come back for treatment to that soon. She will also have a follow up in 3 months to assess treatment toxicity and response with an MRI brain. Patient was advised to continue close follow up with medical oncology and neurosurgery as well. Patient does have our contact information in case they have any questions or concerns in the interim.     Electronically signed by Laisha Castillo MD on 4/25/2022 at 5:08 PM

## 2022-04-25 NOTE — TELEPHONE ENCOUNTER
Name: John Mclaughlin  : 1955  MRN: [id number]    Oncology Navigation Follow-Up Note  Navigator spoke with dr. Stuart Alicea and informed pt. Completing GK today and when should systemic begin. Pt. To be started as soon as approved and MO with C-1D-1. Dr. Stuart Alicea will go over [employer] testing at that time.  Leanne parker notified  Electronically signed by Charbel Rivera RN on 2022 at 3:19 PM

## 2022-04-26 ENCOUNTER — TELEPHONE (OUTPATIENT)
Dept: CASE MANAGEMENT | Age: 67
End: 2022-04-26

## 2022-04-26 NOTE — TELEPHONE ENCOUNTER
I CALLED THE PT TO SCHEDULE HER TX AND SHE TOLD ME THAT SHE HAS SOME FLUID IN HER BRAIN PER RADIATION AND THEY ARE GOING TO DO SOME TX ON IT TO DRY IT OUT AND THEN SHE CAN START  CHEMOTHERAPY. I DID SCHEDULE HER FOR THE VITAMIN B 12 INJECTION AND SHE WILL NEED FOR HER ALIMTA. I CALLED HER NAVIGATOR AND TOLD HER OF THE ABOVE AND SHE STATES SHE WILL F/U ON THIS AND LET ME KNOW WHEN PT WILL BE READY TO SCHEDULE.

## 2022-04-26 NOTE — TELEPHONE ENCOUNTER
Name: Rehan Connor  : 1955  MRN: [id number]    Oncology Navigation Follow-Up Note  Navigator spoke wit Danny and pt. Needing SRS to tumor cavity and anticipate M-T-W of next week and will plan to start CTX post SRS. [facility] updated.   Electronically signed by Michelle Rhodes RN on 2022 at 1:18 PM

## 2022-04-27 ENCOUNTER — TELEPHONE (OUTPATIENT)
Dept: CASE MANAGEMENT | Age: 67
End: 2022-04-27

## 2022-04-27 ENCOUNTER — HOSPITAL ENCOUNTER (OUTPATIENT)
Dept: RADIATION ONCOLOGY | Age: 67
Discharge: HOME | End: 2022-04-27
Payer: COMMERCIAL

## 2022-04-27 PROCEDURE — 77338 DESIGN MLC DEVICE FOR IMRT: CPT | Performed by: RADIOLOGY

## 2022-04-27 PROCEDURE — 77301 RADIOTHERAPY DOSE PLAN IMRT: CPT | Performed by: RADIOLOGY

## 2022-04-27 PROCEDURE — 77300 RADIATION THERAPY DOSE PLAN: CPT | Performed by: RADIOLOGY

## 2022-04-27 NOTE — TELEPHONE ENCOUNTER
Name: Carmelina Castillo  : 1955  MRN: [id number]    Oncology Navigation Follow-Up Note  Navigator received call from pt. And asking about injection scheduled for tomorrow? Pt. Would rather have inj next week since still having M-W-F to complete for RTX. Pt. Tearful and very anxious, pt. wantign writer to speak with spouse. Writer spoke with Sonido Stevenson and Bessy Shook c/o about VM being left at pts. Home ! Bessy Shook sharing , \"you need to let everyone know not to leave a VM, but to talk with a person\" John-spouse concerned VM upsetting pt. And making her anxiety worse. Writer spoke with SAKINA and maria eugenia to not leave a VM when reachign out with appts. Writer also placing call to [facility] to change injection to next week  -[employer] to call navigator back.    Electronically signed by Kelly Slaughter RN on 2022 at 12:22 PM

## 2022-04-28 DIAGNOSIS — C79.31 NON-SMALL CELL LUNG CANCER METASTATIC TO BRAIN (HCC): Primary | ICD-10-CM

## 2022-04-28 DIAGNOSIS — C34.90 NON-SMALL CELL LUNG CANCER METASTATIC TO BRAIN (HCC): Primary | ICD-10-CM

## 2022-04-28 RX ORDER — PANTOPRAZOLE SODIUM 20 MG/1
40 TABLET, DELAYED RELEASE ORAL
Qty: 30 TABLET | Refills: 1 | Status: SHIPPED | OUTPATIENT
Start: 2022-04-28 | End: 2022-05-11

## 2022-04-28 RX ORDER — LEVETIRACETAM 500 MG/1
500 TABLET ORAL 2 TIMES DAILY
Qty: 60 TABLET | Refills: 1 | Status: SHIPPED | OUTPATIENT
Start: 2022-04-28 | End: 2022-05-11 | Stop reason: ALTCHOICE

## 2022-04-28 RX ORDER — DEXAMETHASONE 4 MG/1
4 TABLET ORAL 2 TIMES DAILY WITH MEALS
Qty: 30 TABLET | Refills: 1 | Status: SHIPPED | OUTPATIENT
Start: 2022-04-28 | End: 2022-05-11

## 2022-04-29 PROCEDURE — 77336 RADIATION PHYSICS CONSULT: CPT | Performed by: RADIOLOGY

## 2022-05-02 ENCOUNTER — HOSPITAL ENCOUNTER (OUTPATIENT)
Dept: RADIATION ONCOLOGY | Age: 67
Discharge: HOME | End: 2022-05-02
Attending: RADIOLOGY
Payer: COMMERCIAL

## 2022-05-02 PROCEDURE — 77373 STRTCTC BDY RAD THER TX DLVR: CPT | Performed by: RADIOLOGY

## 2022-05-02 NOTE — ANESTHESIA POSTPROCEDURE EVALUATION
Department of Anesthesiology  Postprocedure Note    Patient: Napolean Friday  MRN: [id number]  YOB: 1955  Date of evaluation: 5/2/2022  Time:  6:09 AM     Procedure Summary     Date: 03/29/22 Room / Location: [address]    Anesthesia Start: 1520 Anesthesia Stop: 1955    Procedure: SUBOCCIPITAL CRANIOTOMY FOR TUMOR  (REGULAR TABLE, [employer] NAVIGATION, [place] HEADHOLDER) (N/A ) Diagnosis: (RIGHT BRAIN TUMOR)    Surgeons: Brie Barcenas DO Responsible Provider: Hema Barcenas MD    Anesthesia Type: general ASA Status: 3          Anesthesia Type: general    Palma Phase I: Palma Score: 8    Palma Phase II:      Last vitals: Reviewed and per EMR flowsheets.        Anesthesia Post Evaluation    Patient location during evaluation: floor  Patient participation: complete - patient cannot participate  Level of consciousness: awake  Pain score: 3  Airway patency: patent  Nausea & Vomiting: no nausea and no vomiting  Complications: no  Cardiovascular status: hemodynamically stable  Respiratory status: acceptable  Hydration status: stable

## 2022-05-04 ENCOUNTER — HOSPITAL ENCOUNTER (OUTPATIENT)
Dept: RADIATION ONCOLOGY | Age: 67
Discharge: HOME | End: 2022-05-04
Payer: COMMERCIAL

## 2022-05-04 ENCOUNTER — HOSPITAL ENCOUNTER (OUTPATIENT)
Dept: RADIATION ONCOLOGY | Age: 67
Discharge: HOME | End: 2022-05-04
Attending: RADIOLOGY
Payer: COMMERCIAL

## 2022-05-04 VITALS
BODY MASS INDEX: 31.95 KG/M2 | DIASTOLIC BLOOD PRESSURE: 72 MMHG | HEART RATE: 92 BPM | TEMPERATURE: 97.3 F | SYSTOLIC BLOOD PRESSURE: 110 MMHG | RESPIRATION RATE: 16 BRPM | WEIGHT: 158.2 LBS | OXYGEN SATURATION: 96 %

## 2022-05-04 DIAGNOSIS — R22.0 MASS OF OCCIPITAL REGION: ICD-10-CM

## 2022-05-04 DIAGNOSIS — C79.31 BRAIN METASTASES (HCC): ICD-10-CM

## 2022-05-04 DIAGNOSIS — C34.92 NON-SMALL CELL CANCER OF LEFT LUNG (HCC): ICD-10-CM

## 2022-05-04 PROCEDURE — 77373 STRTCTC BDY RAD THER TX DLVR: CPT | Performed by: RADIOLOGY

## 2022-05-04 PROCEDURE — 77432 STEREOTACTIC RADIATION TRMT: CPT | Performed by: RADIOLOGY

## 2022-05-04 PROCEDURE — 77435 SBRT MANAGEMENT: CPT | Performed by: RADIOLOGY

## 2022-05-04 RX ORDER — CLONAZEPAM 0.5 MG/1
0.5 TABLET ORAL 2 TIMES DAILY PRN
Qty: 28 TABLET | Refills: 0 | OUTPATIENT
Start: 2022-05-04 | End: 2022-05-18

## 2022-05-04 NOTE — PROGRESS NOTES
Roni Coello  5/4/2022  Wt Readings from Last 3 Encounters:   05/04/22 158 lb 3.2 oz (71.8 kg)   04/25/22 157 lb 3.2 oz (71.3 kg)   04/21/22 159 lb (72.1 kg)     Body mass index is 31.95 kg/m². Treatment Area:  SBRT Brain    Patient was seen today for weekly visit. Comfort Alteration    Fatigue: Mild      CNS Alteration  Depressed Level of Consciousness:no  Orientation: time place person  Neuropathy-Motor: WDL  Ataxia: Absent   Speech Impairment: No  Seizures: No  Urinary Incontinence: No  Bowel Incontinence: No  Insomnia: No    Sensory Alteration: N/A    Nutritional Alteration  Anorexia: No   Nausea: No   Vomiting: No    Skin Alteration   Sensation: WDL    Radiation Dermatitis:  Intact [x]     Erythema  []     Discoloration  []     Rash []     Dry desquamation  []     Moist desquamation []       Emotional  Coping: effective      Injury, potential bleeding or infection:    Lab Results   Component Value Date    WBC 18.9 (H) 04/06/2022     04/13/2022         /72   Pulse 92   Temp 97.3 °F (36.3 °C) (Temporal)   Resp 16   Wt 158 lb 3.2 oz (71.8 kg)   SpO2 96%   BMI 31.95 kg/m²      Pain Assessment: 0-10              Assessment/Plan: Patient was seen today for weekly visit. [place] headaches or nausea. Taking Dexamethasone 4 mg twice per day. Patient instructed on how to wean this per Dr. Alena Petersen (7 day taper). Overall patient feeling well.       Cornelius Woods RN

## 2022-05-04 NOTE — TELEPHONE ENCOUNTER
Dorsie Goldberg is calling to request a refill on the following medication(s):    Last Visit Date (If Applicable):  2/59/6470    Next Visit Date:    Visit date not found    Medication Request:  Requested Prescriptions     Pending Prescriptions Disp Refills    clonazePAM (KLONOPIN) 0.5 MG tablet 28 tablet 0     Sig: Take 1 tablet by mouth 2 times daily as needed for Anxiety for up to 14 days.

## 2022-05-04 NOTE — PROGRESS NOTES
[address] [facility], [address] [place] [address]  Fax [phone]  Office [phone] [facility] WEEKLY PROGRESS NOTE  Patient ID:   Eva Fernandes  : 1955   MRN: [id number]    DIAGNOSIS:  Cancer Staging  Malignant neoplasm of bronchus of upper lobe, left ([address]  Staging form: Lung, AJCC 8th Edition  - Pathologic stage from 2018: Stage IIIA (pT1b, pN2, cM0) - Signed by Cleopatra Beal MD on 2019  Staging comments: Left upper lobe biopsy 2018 positive for invasive adenocarcinoma from a lung primary. Left upper lobectomy/mediastinal node dissection 2018 revealed 2.9 cm grade 1 adenocarcinoma with positive bronchial/vascular/parenchymal margins, 1/2 level 5 node positive, 5/5 peribronchial nodes positive, one level 9 node negative, 1/2 level 10 node positive, one level 11 node negative, no lymphovascular invasion. Non-small cell cancer of left lung [facility])  Staging form: Lung, AJCC 8th Edition  - Clinical stage from 10/10/2018: Stage IIIA (ycT1c, cN2, cM0) - Signed by Sixto Viera MD on 10/10/2018      TREATMENT DETAILS:  Treatment Site: Brain  Actual Dose: 1800 cGy  Total Planned Dose: 2700 cGy  Treatment Technique: SBRT  Fraction Technique: Every other day  Therapy imaging monitoring: CBCT daily  Concurrent Chemotherapy: no    SUBJECTIVE:   Patient seen for their weekly on treatment evaluation today. Doing well, one more treatment. No new issues or problems.      OBJECTIVE:   CHAPERONE: Not Required    ECO Asymptomatic    VITAL SIGNS: Pulse 92   Temp 97.3 °F (36.3 °C) (Temporal)   Resp 16   Wt 158 lb 3.2 oz (71.8 kg)   SpO2 96%   BMI 31.95 kg/m²   Wt Readings from Last 5 Encounters:   22 158 lb 3.2 oz (71.8 kg)   22 157 lb 3.2 oz (71.3 kg)   22 159 lb (72.1 kg)   22 158 lb (71.7 kg)   22 157 lb 12.8 oz (71.6 kg)     GENERAL:  General appearance is that of a well-nourished, well-developed in no apparent distress. HEENT: Normocephalic, atraumatic, EOMI, moist mucosa, no erythema. Indirect exam .      LABS:  WBC   Date Value Ref Range Status   04/06/2022 18.9 (H) 3.5 - 11.3 k/uL Final   03/31/2022 10.8 3.5 - 11.3 k/uL Final   03/30/2022 11.8 (H) 3.5 - 11.3 k/uL Final     Segs Absolute   Date Value Ref Range Status   04/06/2022 15.05 (H) 1.50 - 8.10 k/uL Final   03/31/2022 9.19 (H) 1.50 - 8.10 k/uL Final   03/30/2022 10.04 (H) 1.50 - 8.10 k/uL Final     Hemoglobin   Date Value Ref Range Status   04/06/2022 14.4 11.9 - 15.1 g/dL Final   03/31/2022 13.1 11.9 - 15.1 g/dL Final   03/30/2022 12.4 11.9 - 15.1 g/dL Final     Platelets   Date Value Ref Range Status   04/13/2022 311 138 - 453 k/uL Final   04/06/2022 383 138 - 453 k/uL Final   03/31/2022 260 138 - 453 k/uL Final     CREATININE   Date Value Ref Range Status   04/25/2022 0.45 (L) 0.50 - 0.90 mg/dL Final   04/06/2022 0.52 0.50 - 0.90 mg/dL Final   03/31/2022 0.63 0.50 - 0.90 mg/dL Final     No results found for: , CEA  No results found for: PSA    MEDICATIONS:    Current Outpatient Medications:     dexamethasone (DECADRON) 4 MG tablet, Take 1 tablet by mouth 2 times daily (with meals) for 10 days, Disp: 30 tablet, Rfl: 1    pantoprazole (PROTONIX) 20 MG tablet, Take 2 tablets by mouth every morning (before breakfast), Disp: 30 tablet, Rfl: 1    levETIRAcetam (KEPPRA) 500 MG tablet, Take 1 tablet by mouth 2 times daily, Disp: 60 tablet, Rfl: 1    aspirin 81 MG chewable tablet, Take by mouth, Disp: , Rfl:     lovastatin (MEVACOR) 10 MG tablet, Take 1 tablet by mouth nightly, Disp: 30 tablet, Rfl: 11    lisinopril-hydroCHLOROthiazide (PRINZIDE;ZESTORETIC) 10-12.5 MG per tablet, Take 1 tablet by mouth daily, Disp: 30 tablet, Rfl: 11    clonazePAM (KLONOPIN) 0.5 MG tablet, Take 1 tablet by mouth 2 times daily as needed for Anxiety for up to 14 days. , Disp: 28 tablet, Rfl: 0    dexamethasone (DECADRON) 4 MG tablet, Take 1 tablet by mouth in the morning and at bedtime, Disp: 60 tablet, Rfl: 0    pantoprazole (PROTONIX) 40 MG tablet, Take 1 tablet by mouth every morning (before breakfast), Disp: 30 tablet, Rfl: 1    levETIRAcetam (KEPPRA) 500 MG tablet, Take 1 tablet by mouth 2 times daily, Disp: 60 tablet, Rfl: 3    albuterol sulfate  (90 Base) MCG/ACT inhaler, inhale 2 puffs by mouth four times a day, Disp: 18 g, Rfl: 11      ASSESSMENT PLAN:     Last SBRT treatment on Friday. Follow up 4-6 weeks or sooner if needed. Treatment setup and plan reviewed. Port images/CBCT images reviewed. Appropriate laboratory work was reviewed. Treatment side effects and toxicities reviewed with the patient, and appropriate management was advised. Will continue radiation treatment as planned, and recommend patient contact us if they have any questions or concerns. Electronically signed by Damaris Lorenzo MD on 5/4/2022 at 2:34 PM      Drugs Prescribed:  New Prescriptions    No medications on file       Other Orders Placed:  No orders of the defined types were placed in this encounter.

## 2022-05-05 ENCOUNTER — APPOINTMENT (OUTPATIENT)
Dept: RADIATION ONCOLOGY | Age: 67
End: 2022-05-05
Payer: COMMERCIAL

## 2022-05-05 ENCOUNTER — HOSPITAL ENCOUNTER (OUTPATIENT)
Dept: INFUSION THERAPY | Facility: MEDICAL CENTER | Age: 67
Discharge: HOME | End: 2022-05-05
Payer: COMMERCIAL

## 2022-05-05 ENCOUNTER — TELEPHONE (OUTPATIENT)
Dept: ONCOLOGY | Age: 67
End: 2022-05-05

## 2022-05-05 VITALS
RESPIRATION RATE: 16 BRPM | DIASTOLIC BLOOD PRESSURE: 84 MMHG | HEART RATE: 90 BPM | SYSTOLIC BLOOD PRESSURE: 122 MMHG | TEMPERATURE: 98.2 F

## 2022-05-05 DIAGNOSIS — C79.31 BRAIN METASTASES (HCC): ICD-10-CM

## 2022-05-05 DIAGNOSIS — C79.31 PRIMARY MALIGNANT NEOPLASM OF LUNG WITH METASTASIS TO BRAIN (HCC): Primary | ICD-10-CM

## 2022-05-05 DIAGNOSIS — C34.90 PRIMARY MALIGNANT NEOPLASM OF LUNG WITH METASTASIS TO BRAIN (HCC): Primary | ICD-10-CM

## 2022-05-05 DIAGNOSIS — R22.0 MASS OF OCCIPITAL REGION: ICD-10-CM

## 2022-05-05 DIAGNOSIS — C34.92 NON-SMALL CELL CANCER OF LEFT LUNG (HCC): ICD-10-CM

## 2022-05-05 PROCEDURE — 96372 THER/PROPH/DIAG INJ SC/IM: CPT

## 2022-05-05 PROCEDURE — 6360000002 HC RX W HCPCS: Performed by: INTERNAL MEDICINE

## 2022-05-05 RX ORDER — CYANOCOBALAMIN 1000 UG/ML
1000 INJECTION INTRAMUSCULAR; SUBCUTANEOUS ONCE
Status: COMPLETED | OUTPATIENT
Start: 2022-05-05 | End: 2022-05-05

## 2022-05-05 RX ORDER — CLONAZEPAM 0.5 MG/1
0.5 TABLET ORAL 2 TIMES DAILY PRN
Qty: 60 TABLET | Refills: 0 | Status: SHIPPED | OUTPATIENT
Start: 2022-05-05 | End: 2022-06-28 | Stop reason: SDUPTHER

## 2022-05-05 RX ADMIN — CYANOCOBALAMIN 1000 MCG: 1000 INJECTION, SOLUTION INTRAMUSCULAR at 08:44

## 2022-05-05 NOTE — TELEPHONE ENCOUNTER
Pharm requested    Health Maintenance   Topic Date Due    DTaP/Tdap/Td vaccine (1 - Tdap) Never done    COVID-19 Vaccine (3 - Moderna risk 4-dose series) 05/27/2021    Depression Screen  10/21/2022    Pneumococcal 0-64 years Vaccine (2 - PCV) 10/21/2022    Potassium  04/06/2023    Creatinine  04/25/2023    Flu vaccine  Completed    Shingles vaccine  Completed    Hepatitis A vaccine  Aged Out    Hepatitis B vaccine  Aged Out    Hib vaccine  Aged Out    Meningococcal (ACWY) vaccine  Aged Out             (applicable per patient's age: Cancer Screenings, Depression Screening, Fall Risk Screening, Immunizations)    Hemoglobin A1C (%)   Date Value   06/28/2018 5.0     LDL Cholesterol (mg/dL)   Date Value   10/12/2021 127     AST (U/L)   Date Value   04/06/2022 13     ALT (U/L)   Date Value   04/06/2022 21     BUN (mg/dL)   Date Value   04/25/2022 18      (goal A1C is < 7)   (goal LDL is <100) need 30-50% reduction from baseline     BP Readings from Last 3 Encounters:   05/04/22 110/72   04/25/22 (!) 156/81   04/21/22 138/88    (goal /80)      All Future Testing planned in [facility]:  Lab Frequency Next Occurrence   CBC With Auto Differential Once 10/19/2021   Comprehensive Metabolic Panel Once 94/73/1930   MRI BRAIN W WO CONTRAST Once 07/25/2022   CBC with Auto Differential     Comprehensive Metabolic Panel     TSH     Cortisol Total     Hepatitis B Core Antibody, Total     Hepatitis B Surface Antibody     Hepatitis B Surface Antigen         Next Visit Date:  Future Appointments   Date Time Provider Kim Doyleisti   5/6/2022  2:30 PM MD CLAUDINE English [facility] [place]   5/11/2022 11:00 AM ST[facility] STA CHAIR 10 [facility] STA MED [place]   5/11/2022 11:30 AM Kirby Masterson MD [facility]   5/18/2022  2:00 PM José Luis Boo DO [facility]   7/25/2022 10:30 AM [facility] [place] [facility] [place]   7/28/2022 11:00 AM MD CLAUDINE Bill [facility] [place] Patient Active Problem List:     Status post lobectomy of lung     Non-small cell cancer of left lung (HCC)     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

## 2022-05-05 NOTE — PROGRESS NOTES
Patient arrives ambulatory with spouse for B12 injection. Denies complaint or concern. Vitals as charted. Patient tolerate injection well; band-aid applied. Patient and spouse confirming appointment to begin cycle 1 chemo next Wednesday. Writer finds notes that [facility] nurse navigator has been in various communication RE treatments. Writer confirms with [facility], patient to be scheduled for 5/11 to see MD and have cycle 1 chemo. Writer requests Thaddeus Key  to place on schedule and I or she can call patient to inform of time. Writer confirms with Thaddeus Key, chemo/MD visit is scheduled and she has called patient to inform of time/date.

## 2022-05-05 NOTE — TELEPHONE ENCOUNTER
PT ARRIVES FOR INJECTION. PT GAVE [employer] FINANCIAL FORMS TO BRAD. PT STATES SHE DOES NOT NEED ANY FINANCIAL ASSISTANCE. SCANNED FORM TO PATIENT'S CHART.

## 2022-05-05 NOTE — TELEPHONE ENCOUNTER
PT CAME TODAY FOR B 12 INJECTION FOR HER CHEMO. NURSING BRINGS NOTE TO HAVE PT SCHEDULE C1D1 AND MD VISIT FOR 5-11-22. I CALLED BOTH PHONE NUMBERS LISTED AND RECEIVED VM. PER PT  THEY DO NOT WANT VM LEFT FOR PT. THEY WANT US TO TALK TO SOMEONE.  HOPEFULLY PT WILL SEE THE MISSED CALL NUMBER AND CALL US BACK TO SCHEDULE HER TX.

## 2022-05-06 ENCOUNTER — TELEPHONE (OUTPATIENT)
Dept: CASE MANAGEMENT | Age: 67
End: 2022-05-06

## 2022-05-06 ENCOUNTER — HOSPITAL ENCOUNTER (OUTPATIENT)
Dept: RADIATION ONCOLOGY | Age: 67
Discharge: HOME | End: 2022-05-06
Attending: RADIOLOGY
Payer: COMMERCIAL

## 2022-05-06 PROCEDURE — 77373 STRTCTC BDY RAD THER TX DLVR: CPT | Performed by: RADIOLOGY

## 2022-05-06 NOTE — TELEPHONE ENCOUNTER
Name: Nona Friday  : 1955  MRN: [id number]    Oncology Navigation Follow-Up Note  Navigator met with pt. And spouse face to face. Pt. Final RTX today and plan to start CTX next week . Pt. Denies needs at this time. Plan to follow.    Electronically signed by Tyler Alvarez RN on 2022 at 2:22 PM

## 2022-05-10 ENCOUNTER — HOSPITAL ENCOUNTER (OUTPATIENT)
Facility: MEDICAL CENTER | Age: 67
End: 2022-05-10
Payer: COMMERCIAL

## 2022-05-11 ENCOUNTER — HOSPITAL ENCOUNTER (OUTPATIENT)
Dept: INFUSION THERAPY | Facility: MEDICAL CENTER | Age: 67
Discharge: HOME | End: 2022-05-11
Payer: COMMERCIAL

## 2022-05-11 ENCOUNTER — OFFICE VISIT (OUTPATIENT)
Dept: ONCOLOGY | Age: 67
End: 2022-05-11
Payer: COMMERCIAL

## 2022-05-11 ENCOUNTER — TELEPHONE (OUTPATIENT)
Dept: ONCOLOGY | Facility: CLINIC | Age: 67
End: 2022-05-11

## 2022-05-11 ENCOUNTER — TELEPHONE (OUTPATIENT)
Dept: ONCOLOGY | Age: 67
End: 2022-05-11

## 2022-05-11 ENCOUNTER — TELEPHONE (OUTPATIENT)
Dept: CASE MANAGEMENT | Age: 67
End: 2022-05-11

## 2022-05-11 VITALS — OXYGEN SATURATION: 95 %

## 2022-05-11 VITALS
TEMPERATURE: 97 F | HEART RATE: 103 BPM | DIASTOLIC BLOOD PRESSURE: 55 MMHG | WEIGHT: 161.1 LBS | SYSTOLIC BLOOD PRESSURE: 103 MMHG | BODY MASS INDEX: 32.54 KG/M2 | RESPIRATION RATE: 16 BRPM

## 2022-05-11 DIAGNOSIS — C79.31 PRIMARY MALIGNANT NEOPLASM OF LUNG WITH METASTASIS TO BRAIN (HCC): ICD-10-CM

## 2022-05-11 DIAGNOSIS — C34.92 NON-SMALL CELL CANCER OF LEFT LUNG (HCC): Primary | ICD-10-CM

## 2022-05-11 DIAGNOSIS — C34.90 PRIMARY MALIGNANT NEOPLASM OF LUNG WITH METASTASIS TO BRAIN (HCC): ICD-10-CM

## 2022-05-11 LAB
ABSOLUTE EOS #: 0 K/UL (ref 0–0.4)
ABSOLUTE IMMATURE GRANULOCYTE: 0 K/UL (ref 0–0.3)
ABSOLUTE LYMPH #: 0.45 K/UL (ref 1–4.8)
ABSOLUTE MONO #: 0.36 K/UL (ref 0.2–0.8)
ALBUMIN SERPL-MCNC: 4.2 G/DL (ref 3.5–5.2)
ALP BLD-CCNC: 61 U/L (ref 35–104)
ALT SERPL-CCNC: 23 U/L (ref 5–33)
ANION GAP SERPL CALCULATED.3IONS-SCNC: 11 MMOL/L (ref 9–17)
AST SERPL-CCNC: 15 U/L
BASOPHILS # BLD: 0 %
BASOPHILS ABSOLUTE: 0 K/UL (ref 0–0.2)
BILIRUB SERPL-MCNC: 0.44 MG/DL (ref 0.3–1.2)
BUN BLDV-MCNC: 16 MG/DL (ref 8–23)
BUN/CREAT BLD: 29 (ref 9–20)
CALCIUM SERPL-MCNC: 8.8 MG/DL (ref 8.6–10.4)
CHLORIDE BLD-SCNC: 99 MMOL/L (ref 98–107)
CHOLESTEROL/HDL RATIO: 2.2
CHOLESTEROL: 198 MG/DL
CO2: 26 MMOL/L (ref 20–31)
CORTISOL COLLECTION INFO: ABNORMAL
CORTISOL: 1.1 UG/DL (ref 2.7–18.4)
CREAT SERPL-MCNC: 0.55 MG/DL (ref 0.5–0.9)
EOSINOPHILS RELATIVE PERCENT: 0 % (ref 1–4)
GFR AFRICAN AMERICAN: >60 ML/MIN
GFR NON-AFRICAN AMERICAN: >60 ML/MIN
GFR SERPL CREATININE-BSD FRML MDRD: ABNORMAL ML/MIN/{1.73_M2}
GLUCOSE BLD-MCNC: 103 MG/DL (ref 70–99)
HBV SURFACE AB TITR SER: <3.5 MIU/ML
HCT VFR BLD CALC: 38.2 % (ref 36.3–47.1)
HDLC SERPL-MCNC: 89 MG/DL
HEMOGLOBIN: 12.6 G/DL (ref 11.9–15.1)
HEPATITIS B CORE TOTAL ANTIBODY: NONREACTIVE
HEPATITIS B SURFACE ANTIGEN: NONREACTIVE
IMMATURE GRANULOCYTES: 0 %
LDL CHOLESTEROL: 92 MG/DL (ref 0–130)
LYMPHOCYTES # BLD: 5 % (ref 24–44)
MCH RBC QN AUTO: 31.4 PG (ref 25.2–33.5)
MCHC RBC AUTO-ENTMCNC: 33 G/DL (ref 28.4–34.8)
MCV RBC AUTO: 95.3 FL (ref 82.6–102.9)
MONOCYTES # BLD: 4 % (ref 1–7)
NRBC AUTOMATED: 0 PER 100 WBC
PDW BLD-RTO: 13.2 % (ref 11.8–14.4)
PLATELET # BLD: 208 K/UL (ref 138–453)
PMV BLD AUTO: 9 FL (ref 8.1–13.5)
POTASSIUM SERPL-SCNC: 3.8 MMOL/L (ref 3.7–5.3)
RBC # BLD: 4.01 M/UL (ref 3.95–5.11)
SEG NEUTROPHILS: 91 % (ref 36–66)
SEGMENTED NEUTROPHILS ABSOLUTE COUNT: 8.19 K/UL (ref 1.8–7.7)
SODIUM BLD-SCNC: 136 MMOL/L (ref 135–144)
TOTAL PROTEIN: 6.3 G/DL (ref 6.4–8.3)
TRIGL SERPL-MCNC: 84 MG/DL
TSH SERPL DL<=0.05 MIU/L-ACNC: 0.57 UIU/ML (ref 0.3–5)
WBC # BLD: 9 K/UL (ref 3.5–11.3)

## 2022-05-11 PROCEDURE — 86704 HEP B CORE ANTIBODY TOTAL: CPT

## 2022-05-11 PROCEDURE — 80061 LIPID PANEL: CPT

## 2022-05-11 PROCEDURE — 99211 OFF/OP EST MAY X REQ PHY/QHP: CPT | Performed by: INTERNAL MEDICINE

## 2022-05-11 PROCEDURE — 3017F COLORECTAL CA SCREEN DOC REV: CPT | Performed by: INTERNAL MEDICINE

## 2022-05-11 PROCEDURE — 86317 IMMUNOASSAY INFECTIOUS AGENT: CPT

## 2022-05-11 PROCEDURE — 96409 CHEMO IV PUSH SNGL DRUG: CPT

## 2022-05-11 PROCEDURE — G8427 DOCREV CUR MEDS BY ELIG CLIN: HCPCS | Performed by: INTERNAL MEDICINE

## 2022-05-11 PROCEDURE — 99215 OFFICE O/P EST HI 40 MIN: CPT | Performed by: INTERNAL MEDICINE

## 2022-05-11 PROCEDURE — 96375 TX/PRO/DX INJ NEW DRUG ADDON: CPT

## 2022-05-11 PROCEDURE — 96365 THER/PROPH/DIAG IV INF INIT: CPT

## 2022-05-11 PROCEDURE — 36591 DRAW BLOOD OFF VENOUS DEVICE: CPT

## 2022-05-11 PROCEDURE — 82533 TOTAL CORTISOL: CPT

## 2022-05-11 PROCEDURE — 80053 COMPREHEN METABOLIC PANEL: CPT

## 2022-05-11 PROCEDURE — 96417 CHEMO IV INFUS EACH ADDL SEQ: CPT

## 2022-05-11 PROCEDURE — 96413 CHEMO IV INFUSION 1 HR: CPT

## 2022-05-11 PROCEDURE — 2580000003 HC RX 258: Performed by: INTERNAL MEDICINE

## 2022-05-11 PROCEDURE — 1036F TOBACCO NON-USER: CPT | Performed by: INTERNAL MEDICINE

## 2022-05-11 PROCEDURE — 96411 CHEMO IV PUSH ADDL DRUG: CPT

## 2022-05-11 PROCEDURE — 6360000002 HC RX W HCPCS: Performed by: INTERNAL MEDICINE

## 2022-05-11 PROCEDURE — G8417 CALC BMI ABV UP PARAM F/U: HCPCS | Performed by: INTERNAL MEDICINE

## 2022-05-11 PROCEDURE — 84443 ASSAY THYROID STIM HORMONE: CPT

## 2022-05-11 PROCEDURE — 87340 HEPATITIS B SURFACE AG IA: CPT

## 2022-05-11 PROCEDURE — 85025 COMPLETE CBC W/AUTO DIFF WBC: CPT

## 2022-05-11 PROCEDURE — 96367 TX/PROPH/DG ADDL SEQ IV INF: CPT

## 2022-05-11 RX ORDER — HEPARIN SODIUM (PORCINE) LOCK FLUSH IV SOLN 100 UNIT/ML 100 UNIT/ML
500 SOLUTION INTRAVENOUS PRN
Status: CANCELLED | OUTPATIENT
Start: 2022-06-01

## 2022-05-11 RX ORDER — SODIUM CHLORIDE 9 MG/ML
5-40 INJECTION INTRAVENOUS PRN
Status: CANCELLED | OUTPATIENT
Start: 2022-06-01

## 2022-05-11 RX ORDER — SODIUM CHLORIDE 9 MG/ML
20 INJECTION, SOLUTION INTRAVENOUS ONCE
Status: CANCELLED | OUTPATIENT
Start: 2022-05-11 | End: 2022-05-12

## 2022-05-11 RX ORDER — DIPHENHYDRAMINE HYDROCHLORIDE 50 MG/ML
50 INJECTION INTRAMUSCULAR; INTRAVENOUS
Status: CANCELLED | OUTPATIENT
Start: 2022-06-01

## 2022-05-11 RX ORDER — HEPARIN SODIUM (PORCINE) LOCK FLUSH IV SOLN 100 UNIT/ML 100 UNIT/ML
500 SOLUTION INTRAVENOUS PRN
Status: DISCONTINUED | OUTPATIENT
Start: 2022-05-11 | End: 2022-05-12 | Stop reason: HOSPADM

## 2022-05-11 RX ORDER — SODIUM CHLORIDE 0.9 % (FLUSH) 0.9 %
5-40 SYRINGE (ML) INJECTION PRN
Status: DISCONTINUED | OUTPATIENT
Start: 2022-05-11 | End: 2022-05-12 | Stop reason: HOSPADM

## 2022-05-11 RX ORDER — EPINEPHRINE 1 MG/ML
0.3 INJECTION, SOLUTION, CONCENTRATE INTRAVENOUS PRN
Status: CANCELLED | OUTPATIENT
Start: 2022-06-01

## 2022-05-11 RX ORDER — SODIUM CHLORIDE 9 MG/ML
25 INJECTION, SOLUTION INTRAVENOUS PRN
Status: CANCELLED | OUTPATIENT
Start: 2022-05-11

## 2022-05-11 RX ORDER — SODIUM CHLORIDE 9 MG/ML
INJECTION, SOLUTION INTRAVENOUS CONTINUOUS
Status: CANCELLED | OUTPATIENT
Start: 2022-05-11

## 2022-05-11 RX ORDER — MEPERIDINE HYDROCHLORIDE 50 MG/ML
12.5 INJECTION INTRAMUSCULAR; INTRAVENOUS; SUBCUTANEOUS PRN
Status: CANCELLED | OUTPATIENT
Start: 2022-06-01

## 2022-05-11 RX ORDER — PANTOPRAZOLE SODIUM 40 MG/1
40 GRANULE, DELAYED RELEASE ORAL
COMMUNITY
End: 2022-06-01 | Stop reason: SDUPTHER

## 2022-05-11 RX ORDER — FOLIC ACID 1 MG/1
1 TABLET ORAL DAILY
Qty: 30 TABLET | Refills: 5 | Status: SHIPPED | OUTPATIENT
Start: 2022-05-11

## 2022-05-11 RX ORDER — ACETAMINOPHEN 325 MG/1
650 TABLET ORAL
Status: CANCELLED | OUTPATIENT
Start: 2022-06-01

## 2022-05-11 RX ORDER — CYANOCOBALAMIN 1000 UG/ML
1000 INJECTION INTRAMUSCULAR; SUBCUTANEOUS
Status: CANCELLED | OUTPATIENT
Start: 2022-05-11

## 2022-05-11 RX ORDER — ALBUTEROL SULFATE 90 UG/1
4 AEROSOL, METERED RESPIRATORY (INHALATION) PRN
Status: CANCELLED | OUTPATIENT
Start: 2022-06-01

## 2022-05-11 RX ORDER — PALONOSETRON 0.05 MG/ML
0.25 INJECTION, SOLUTION INTRAVENOUS ONCE
Status: CANCELLED | OUTPATIENT
Start: 2022-06-01 | End: 2022-06-01

## 2022-05-11 RX ORDER — ONDANSETRON 2 MG/ML
8 INJECTION INTRAMUSCULAR; INTRAVENOUS
Status: CANCELLED | OUTPATIENT
Start: 2022-05-11

## 2022-05-11 RX ORDER — PALONOSETRON 0.05 MG/ML
0.25 INJECTION, SOLUTION INTRAVENOUS ONCE
Status: CANCELLED | OUTPATIENT
Start: 2022-05-11 | End: 2022-05-12

## 2022-05-11 RX ORDER — EPINEPHRINE 1 MG/ML
0.3 INJECTION, SOLUTION, CONCENTRATE INTRAVENOUS PRN
Status: CANCELLED | OUTPATIENT
Start: 2022-05-11

## 2022-05-11 RX ORDER — DIPHENHYDRAMINE HYDROCHLORIDE 50 MG/ML
50 INJECTION INTRAMUSCULAR; INTRAVENOUS
Status: CANCELLED | OUTPATIENT
Start: 2022-05-11

## 2022-05-11 RX ORDER — SODIUM CHLORIDE 0.9 % (FLUSH) 0.9 %
5-40 SYRINGE (ML) INJECTION PRN
Status: CANCELLED | OUTPATIENT
Start: 2022-06-01

## 2022-05-11 RX ORDER — ONDANSETRON 4 MG/1
4 TABLET, FILM COATED ORAL 3 TIMES DAILY PRN
Qty: 30 TABLET | Refills: 0 | Status: SHIPPED | OUTPATIENT
Start: 2022-05-11

## 2022-05-11 RX ORDER — SODIUM CHLORIDE 9 MG/ML
20 INJECTION, SOLUTION INTRAVENOUS ONCE
Status: COMPLETED | OUTPATIENT
Start: 2022-05-11 | End: 2022-05-11

## 2022-05-11 RX ORDER — ALBUTEROL SULFATE 90 UG/1
4 AEROSOL, METERED RESPIRATORY (INHALATION) PRN
Status: CANCELLED | OUTPATIENT
Start: 2022-05-11

## 2022-05-11 RX ORDER — ONDANSETRON 2 MG/ML
8 INJECTION INTRAMUSCULAR; INTRAVENOUS
Status: CANCELLED | OUTPATIENT
Start: 2022-06-01

## 2022-05-11 RX ORDER — SODIUM CHLORIDE 9 MG/ML
25 INJECTION, SOLUTION INTRAVENOUS PRN
Status: CANCELLED | OUTPATIENT
Start: 2022-06-01

## 2022-05-11 RX ORDER — SODIUM CHLORIDE 9 MG/ML
5-40 INJECTION INTRAVENOUS PRN
Status: CANCELLED | OUTPATIENT
Start: 2022-05-11

## 2022-05-11 RX ORDER — ACETAMINOPHEN 325 MG/1
650 TABLET ORAL
Status: CANCELLED | OUTPATIENT
Start: 2022-05-11

## 2022-05-11 RX ORDER — SODIUM CHLORIDE 0.9 % (FLUSH) 0.9 %
5-40 SYRINGE (ML) INJECTION PRN
Status: CANCELLED | OUTPATIENT
Start: 2022-05-11

## 2022-05-11 RX ORDER — HEPARIN SODIUM (PORCINE) LOCK FLUSH IV SOLN 100 UNIT/ML 100 UNIT/ML
500 SOLUTION INTRAVENOUS PRN
Status: CANCELLED | OUTPATIENT
Start: 2022-05-11

## 2022-05-11 RX ORDER — SODIUM CHLORIDE 9 MG/ML
20 INJECTION, SOLUTION INTRAVENOUS ONCE
Status: CANCELLED | OUTPATIENT
Start: 2022-06-01 | End: 2022-06-01

## 2022-05-11 RX ORDER — SODIUM CHLORIDE 9 MG/ML
INJECTION, SOLUTION INTRAVENOUS CONTINUOUS
Status: CANCELLED | OUTPATIENT
Start: 2022-06-01

## 2022-05-11 RX ORDER — FAMOTIDINE 10 MG/ML
20 INJECTION, SOLUTION INTRAVENOUS
Status: CANCELLED | OUTPATIENT
Start: 2022-05-11

## 2022-05-11 RX ORDER — DEXAMETHASONE SODIUM PHOSPHATE 10 MG/ML
10 INJECTION INTRAMUSCULAR; INTRAVENOUS ONCE
Status: COMPLETED | OUTPATIENT
Start: 2022-05-11 | End: 2022-05-11

## 2022-05-11 RX ORDER — MEPERIDINE HYDROCHLORIDE 50 MG/ML
12.5 INJECTION INTRAMUSCULAR; INTRAVENOUS; SUBCUTANEOUS PRN
Status: CANCELLED | OUTPATIENT
Start: 2022-05-11

## 2022-05-11 RX ORDER — PALONOSETRON 0.05 MG/ML
0.25 INJECTION, SOLUTION INTRAVENOUS ONCE
Status: COMPLETED | OUTPATIENT
Start: 2022-05-11 | End: 2022-05-11

## 2022-05-11 RX ORDER — FAMOTIDINE 10 MG/ML
20 INJECTION, SOLUTION INTRAVENOUS
Status: CANCELLED | OUTPATIENT
Start: 2022-06-01

## 2022-05-11 RX ADMIN — DEXAMETHASONE SODIUM PHOSPHATE 10 MG: 10 INJECTION INTRAMUSCULAR; INTRAVENOUS at 12:37

## 2022-05-11 RX ADMIN — SODIUM CHLORIDE, PRESERVATIVE FREE 10 ML: 5 INJECTION INTRAVENOUS at 15:56

## 2022-05-11 RX ADMIN — SODIUM CHLORIDE 20 ML/HR: 9 INJECTION, SOLUTION INTRAVENOUS at 11:29

## 2022-05-11 RX ADMIN — SODIUM CHLORIDE 200 MG: 9 INJECTION, SOLUTION INTRAVENOUS at 13:41

## 2022-05-11 RX ADMIN — CARBOPLATIN 500 MG: 10 INJECTION INTRAVENOUS at 15:04

## 2022-05-11 RX ADMIN — SODIUM CHLORIDE, PRESERVATIVE FREE 10 ML: 5 INJECTION INTRAVENOUS at 11:25

## 2022-05-11 RX ADMIN — FOSAPREPITANT 150 MG: 150 INJECTION, POWDER, LYOPHILIZED, FOR SOLUTION INTRAVENOUS at 12:51

## 2022-05-11 RX ADMIN — HEPARIN 500 UNITS: 100 SYRINGE at 15:56

## 2022-05-11 RX ADMIN — SODIUM CHLORIDE 900 MG: 9 INJECTION, SOLUTION INTRAVENOUS at 14:26

## 2022-05-11 RX ADMIN — PALONOSETRON 0.25 MG: 0.05 INJECTION, SOLUTION INTRAVENOUS at 12:37

## 2022-05-11 NOTE — TELEPHONE ENCOUNTER
Name: Bartolo Cornell  : 1955  MRN: [id number]    Oncology Navigation Follow-Up Note  Navigator met with pt. Face to face and pt. Requesting assistance with applying for Medicare. Writer reaching out to Mireya for assistance. Plan to follow.    Electronically signed by Rc Jvaier RN on 2022 at 12:10 PM

## 2022-05-11 NOTE — PROGRESS NOTES
Patient arrives ambulatory for cycle 1 day 1 treatment and MD visit. Patient states her abdomen has been achy. Patient states she completed radiation. Denies other complaints or concerns. Vitals as charted. Port accessed; specimen sent. Labs reviewed. MD in room, ok to proceed with treatment. [facility] education sheets provided to patient and , education given. Patient premedicated. Keytruda infused with no sign of adverse reaction; line flushed. Alimta infused with no sign of adverse reaction; line flushed. Carboplatin infused with no sign of adverse reaction; line flushed. Port flushed and heparinized with intact kay needle removed per protocol. Patient discharged with AVS in hand.

## 2022-05-11 NOTE — TELEPHONE ENCOUNTER
SW received consult from [facility] Nurse Eboniator. Pt has questions regarding signing up for medicare. SW called patient. Left message for return call.

## 2022-05-11 NOTE — PROGRESS NOTES
Patient ID: Bethel Maradiaga, 1955, [id number], 61 y.o. Diagnosis:   1. Left upper lobe invasive lung adenocarcinoma, Status post lobectomy 9/28/18, Pathologic stage: pT1c, pN2, stage IIIa R1 with positive margin,    2. Will start chemotherapy followed By RT  3. Carbo taxol cycle 1 on 11/14/18  4. Radiation Therapy completed on 3/29/19  5. Unfortunately on 3/26/2022 she presented with seizure activity and her CT scan MRI showed multiple supra and infratentorial intraparenchymal lesion consistent with metastatic disease in brain  6. Her CT chest abdomen pelvis on 3/27/2022 showed 2.1 cm right adrenal nodule suspicious for metastasis  7. On 3/29/2022 she underwent right-sided craniotomy with resection of intraaxial brain tumor and completed SRS/gamma knife to 6 intracranial lesion on 4/25/2022  8. Next-generation sequencing is negative for PD-L1, negative for EGFR, ALK, RET, ROS1, BRAF, HER2 mutations  HISTORY OF PRESENT ILLNESS:    Oncologic History:  The patient is a 66 y.o.  female who is admitted to the hospital for Left upper lobe mass. Pt has a significant past medical history of Uterine fibroids requiring total abdominal hysterectomy, liver hemangioma, HTN, Hyperlipidemia, DVT Left leg, COPD, and anxiety. Pt was found to have a left upper lung adenocarcinoma and presented to the hospital on 9/28/2018 for a scheduled robotic-assisted left upper lobe lobectomy. Pathology is positive for metastatic adenocarcinoma. There are some lymph nodes positive and some evidence of invasion to surrounding structures seen during surgery. Surgical team is planning on discharging patient this evening from the hospital.  She was discharged from the hospital with plan to follow with oncology as outpatient. Interval history:   Carley Florez is returning for follow-up visit and to discuss lab results and further questions. She is planning to start chemotherapy today.   She denies any headache, nausea matting, abdominal pain. Her breathing is stable. She has completed gamma knife SRS on  and tolerated well. I explained that her next-generation sequencing did not show any targetable mutation. During this visit patient's allergy, social, medical, surgical history and medications were reviewed and updated. Current Outpatient Medications   Medication Sig Dispense Refill    clonazePAM (KLONOPIN) 0.5 MG tablet Take 1 tablet by mouth 2 times daily as needed for Anxiety for up to 30 days. 60 tablet 0    dexamethasone (DECADRON) 4 MG tablet Take 1 tablet by mouth 2 times daily (with meals) for 10 days 30 tablet 1    lovastatin (MEVACOR) 10 MG tablet Take 1 tablet by mouth nightly 30 tablet 11    lisinopril-hydroCHLOROthiazide (PRINZIDE;ZESTORETIC) 10-12.5 MG per tablet Take 1 tablet by mouth daily 30 tablet 11    levETIRAcetam (KEPPRA) 500 MG tablet Take 1 tablet by mouth 2 times daily 60 tablet 3    albuterol sulfate  (90 Base) MCG/ACT inhaler inhale 2 puffs by mouth four times a day 18 g 11    pantoprazole (PROTONIX) 20 MG tablet Take 2 tablets by mouth every morning (before breakfast) 30 tablet 1    aspirin 81 MG chewable tablet Take by mouth (Patient not taking: Reported on 2022)       No current facility-administered medications for this visit.        Social History     Socioeconomic History    Marital status:      Spouse name: Not on file    Number of children: Not on file    Years of education: Not on file    Highest education level: Not on file   Occupational History    Not on file   Tobacco Use    Smoking status: Former Smoker     Packs/day: 1.50     Years: 30.00     Pack years: 45.00     Types: Cigarettes     Quit date:      Years since quittin.3    Smokeless tobacco: Never Used   Vaping Use    Vaping Use: Never used   Substance and Sexual Activity    Alcohol use: Yes     Comment: Occassionally    Drug use: No    Sexual activity: Not on file   Other Topics Concern    Not on file   Social History Narrative    Not on file     Social Determinants of Health     Financial Resource Strain: Low Risk     Difficulty of Paying Living Expenses: Not hard at all   Food Insecurity: No Food Insecurity    Worried About Running Out of Food in the Last Year: Never true    [address] in the Last Year: Never true   Transportation Needs:     Lack of Transportation (Medical): Not on file    Lack of Transportation (Non-Medical): Not on file   Physical Activity:     Days of Exercise per Week: Not on file    Minutes of Exercise per Session: Not on file   Stress:     Feeling of Stress : Not on file   Social Connections:     Frequency of Communication with Friends and Family: Not on file    Frequency of Social Gatherings with Friends and Family: Not on file    Attends Spiritism Services: Not on file    Active Member of [address] [place] [address] or Organizations: Not on file    Attends Club or Organization Meetings: Not on file    Marital Status: Not on file   Intimate Partner Violence:     Fear of Current or Ex-Partner: Not on file    Emotionally Abused: Not on file    Physically Abused: Not on file    Sexually Abused: Not on file   Housing Stability:     Unable to Pay for Housing in the Last Year: Not on file    Number of [place] in the Last Year: Not on file    Unstable Housing in the Last Year: Not on file       Family History   Problem Relation Age of Onset    Cancer Mother         LUNG    Cancer Sister         LUNG        REVIEW OF SYSTEM:     Constitutional: No fever or chills.  No night sweats, no weight loss   Eyes: No eye discharge, double vision, or eye pain   HEENT: negative for sore mouth, sore throat, hoarseness and voice change   Respiratory: negative for cough , sputum, dyspnea, wheezing, hemoptysis, chest pain   Cardiovascular: negative for chest pain, dyspnea, palpitations, orthopnea, PND   Gastrointestinal: negative for nausea, vomiting, diarrhea, constipation, abdominal pain, Dysphagia, hematemesis and hematochezia   Genitourinary: negative for frequency, dysuria, nocturia, urinary incontinence, and hematuria   Integument: negative for rash, skin lesions, bruises.    Hematologic/Lymphatic: negative for easy bruising, bleeding, lymphadenopathy, petechiae and swelling/edema   Endocrine: negative for heat or cold intolerance, tremor, weight changes, change in bowel habits and hair loss   Musculoskeletal: negative for myalgias, arthralgias, pain, joint swelling,and bone pain   Neurological: negative for headaches, dizziness, seizures, weakness, numbness       OBJECTIVE:         Vitals:    05/11/22 1149   BP: (!) 103/55   Pulse: 103   Resp: 16   Temp: 97 °F (36.1 °C)   PHYSICAL EXAM:   General appearance - well appearing, no in pain or distress   Mental status - alert and cooperative   Eyes - pupils equal and reactive, extraocular eye movements intact   Ears - bilateral TM's and external ear canals normal   Mouth - mucous membranes moist, pharynx normal without lesions   Neck - supple, no significant adenopathy   Lymphatics - no palpable lymphadenopathy, no hepatosplenomegaly   Chest - clear to auscultation, no wheezes, rales or rhonchi, symmetric air entry   Left chest surgical scar healing well  Heart - normal rate, regular rhythm, normal S1, S2, no murmurs, rubs, clicks or gallops   Abdomen - soft, nontender, nondistended, no masses or organomegaly   Neurological - alert, oriented, normal speech, no focal findings or movement disorder noted   Musculoskeletal - no joint tenderness, deformity or swelling   Extremities - peripheral pulses normal, no pedal edema, no clubbing or cyanosis   Skin - normal coloration and turgor, no rashes, no suspicious skin lesions noted ,  LABORATORY DATA:     Lab Results   Component Value Date    WBC 9.0 05/11/2022    HGB 12.6 05/11/2022    HCT 38.2 05/11/2022    MCV 95.3 05/11/2022     05/11/2022    LYMPHOPCT PENDING 05/11/2022    RBC 4.01 05/11/2022    MCH 31.4 05/11/2022    MCHC 33.0 05/11/2022    RDW 13.2 05/11/2022    MONOPCT PENDING 05/11/2022    BASOPCT PENDING 05/11/2022    NEUTROABS PENDING 05/11/2022    LYMPHSABS PENDING 05/11/2022    MONOSABS PENDING 05/11/2022    EOSABS PENDING 05/11/2022    BASOSABS PENDING 05/11/2022       Chemistry        Component Value Date/Time     (L) 04/06/2022 0746    K 4.1 04/06/2022 0746    CL 95 (L) 04/06/2022 0746    CO2 25 04/06/2022 0746    BUN 18 04/25/2022 0845    CREATININE 0.45 (L) 04/25/2022 0845        Component Value Date/Time    CALCIUM 8.7 04/06/2022 0746    ALKPHOS 67 04/06/2022 0746    AST 13 04/06/2022 0746    ALT 21 04/06/2022 0746    BILITOT 0.85 04/06/2022 0746        PATHOLOGY DATA:   Pathology:  CT Guided Biopsy ([facility]) 8/22/18:   LEFT LUNG, UPPER LOBE, CT GUIDED CORE NEEDLE BIOPSIES:  - INVASIVE ADENOCARCINOMA, CONSISTENT WITH LUNG PRIMARY. Collected: 9/28/2018   -- Diagnosis --   1.  LYMPH NODE, LEVEL 9, BIOPSY:       -  ONE LYMPH NODE, NEGATIVE FOR MALIGNANCY (0/1). 2.  LYMPH NODE, LEVEL 11, DISSECTION:       -  ONE OF 2 LYMPH NODES POSITIVE FOR METASTATIC CARCINOMA (1/2). 3.  LYMPH NODE, LEVEL 5, DISSECTION:       -  ONE OF 2 LYMPH NODES POSITIVE FOR METASTATIC CARCINOMA (1/2).     -  CARCINOMA INVADES LARGE PERIPHERAL NERVE BRANCHES. 4.  LUNG, LEFT UPPER LOBE, LOBECTOMY:            -  WELL-DIFFERENTIATED INVASIVE ADENOCARCINOMA, 2.9 CM   GREATEST DIMENSION.     -  NEGATIVE FOR VISCERAL PLEURAL INVASION.          -  TUMOR INVOLVES SOFT TISSUE OF BRONCHIAL, VASCULAR AND PARENCHYMAL MARGINS.     -  5 PERIBRONCHIOLAR LYMPH NODES, POSITIVE FOR METASTATIC   ADENOCARCINOMA (5/5).     -  SEPARATE SMALL INTRALOBAR FOCUS ATYPICAL ADENOMATOUS   HYPERPLASIA.           -  PATHOLOGIC STAGE:  pT1c, pN2, R1.     5.  LYMPH NODES, LEVEL 10, DISSECTION:       -  ONE OF 2 LYMPH NODES POSITIVE FOR METASTATIC CARCINOMA (1/2).     IMAGING DATA:    MRI brain 3/26/2022  Impression 1. Multiple supra and infratentorial intraparenchymal lesions are most   consistent with metastatic disease.  There is associated edema with minimal   mass effect, but no midline shift. 2. Intrinsically T1 hyperintense right frontal calvarial lesion is most   consistent with an osseous hemangioma.  No definite osseous metastasis   identified. CT chest abdomen pelvis 3/27/2022  mpression   1. Mild centrilobular emphysema. 2. Redemonstration of left perihilar post treatment scarring with underlying   traction bronchiectasis extending into the upper lower lobes.  Stable. 3. No evidence for metastatic disease in the chest.   4. A 2.1 x 1.4 cm right adrenal nodule not present in 2018.  CT appearance   does not meet criteria for adenoma.  Considered metastatic nodule until   proven otherwise. 5. Redemonstration of hepatic hemangioma and several cysts. ASSESSMENT:    Leyda Dinh Is a very pleasant 61 y.o.  female with initial diagnosis of left upper lobe non-small cell lung cancer, adenocarcinoma, status post Robotically assisted BARAK lobectomy with LN dissection and Intercostal nerve block with experal on 9/28/18. She has [id number] disease, stage IIIA, she had R1 disease with positive margins. She completed sequential chemo RT. Unfortunately now she has recurrent disease with brain metastasis and also adrenal metastasis. I reviewed the NCCN guidelines and goals of care and will plan for systemic therapy when she completes radiation therapy      During today's visit, the patient and the family had a number of reasonable questions which were answered to their satisfaction. They verbalized understanding of the information provided and they agreed to proceed as outlined above.    PLAN:   I reviewed her recent lab work, imaging studies, discussed diagnosis, prognosis and treatment recommendations  Plan to start her on chemo plus immunotherapy today  Her next-generation sequencing did not show any targetable mutations  I will plan to get restaging scans after 2 cycles to assess the response  Proceed with chemotherapy as planned today  Return to clinic with cycle 3 with restaging scans prior      Jose Eddy MD  Hematologist/Medical Oncologist    I spent more than 40 minutes examining, evaluating, reviewing data and counseling the patient. Greater than 50% of that time was spent face-to-face with the patient in counseling and coordinating her care. This note is created with the assistance of a speech recognition program.  While intending to generate a document that actually reflects the content of the visit, the document can still have some errors including those of syntax and sound a like substitutions which may escape proof reading. It such instances, actual meaning can be extrapolated by contextual diversion.

## 2022-05-11 NOTE — TELEPHONE ENCOUNTER
[facility] HERE FOR MD VISIT & TX  CHEMO AS PLANNED  RV IN C3 W/ SCANS PRIOR  CT C/A/P IS ON 6/13/22 @ 11AM AT [address] [facility] ARRIVAL @ 10AM W/ PORT ACCESS @ 10:30AM  MD VISIT 6/22/22 @ 10:30AM TX @ 10AM  SCRIPTS SENT TO PT PHARMACY  AVS PRINTED W/ INSTRUCTIONS AND GIVEN TO PT ON EXIT

## 2022-05-12 ENCOUNTER — TELEPHONE (OUTPATIENT)
Dept: ONCOLOGY | Facility: CLINIC | Age: 67
End: 2022-05-12

## 2022-05-12 NOTE — TELEPHONE ENCOUNTER
SW received call from patient regarding applying for medicare. Received permission to text her information on medicare. SW sent her phone number to call medicare, medicare. gov website, and link to apply for medicare.

## 2022-05-17 NOTE — PROGRESS NOTES
[address]            Radiation Oncology          [address] [facility]          lu[place] en[place], [address]        Stevie Rice: [phone]        F: [phone]       [facility] [address] [facility]       Radiation Oncology   [address] [facility], [address] [facility]       Stevie Rice: [phone]       F: [phone]       [place]      Dear Dr Shefali Mars:    Thank you for referring Eddy Lopez to me for evaluation and treatment. Below is a summary of the patient's recently completed radiation course. If you have questions, please do not hesitate to call me. I look forward to following this patient along with you. Sincerely,  Electronically signed by Willian Vallejo MD on 22 at 9:21 AM EDT      CC: Patient Care Team:  Milena Rust MD as PCP - General (Family Medicine)  Milena Rust MD as PCP - [facility]  Cedric Solorzano MD as Consulting Physician (Hematology and Oncology)  Felton March MD as Consulting Physician (Thoracic Surgery)  Antonio Vasquez MD as Consulting Physician (Pulmonology)  Lori Mijares RN as Nurse Navigator (Oncology)  ------------------------------------------------------------------------------------------------------------------------------------------------------------------------------------------        Date of Service: 2022     Location:  [address] [address],   [address] [facility] [place]   [phone]        RADIATION ONCOLOGY END OF TREATMENT SUMMARY:    Patient ID:   Eddy Lopez  : 1955   MRN: [id number]    DIAGNOSIS:  Cancer Staging  Malignant neoplasm of bronchus of upper lobe, left [facility])  Staging form: Lung, AJCC 8th Edition  - Pathologic stage from 2018: Stage IIIA (pT1b, pN2, cM0) - Signed by Olga Torres MD on 2019  Staging comments: Left upper lobe biopsy 2018 positive for invasive adenocarcinoma from a lung primary.  Left upper lobectomy/mediastinal node dissection 9/28/2018 revealed 2.9 cm grade 1 adenocarcinoma with positive bronchial/vascular/parenchymal margins, 1/2 level 5 node positive, 5/5 peribronchial nodes positive, one level 9 node negative, 1/2 level 10 node positive, one level 11 node negative, no lymphovascular invasion. Non-small cell cancer of left lung [facility])  Staging form: Lung, AJCC 8th Edition  - Clinical stage from 10/10/2018: Stage IIIA (ycT1c, cN2, cM0) - Signed by Gustavo Spears MD on 10/10/2018      TREATMENT DETAILS:    Treatment Technique: SBRT  Fraction Technique: Every other day           Concurrent Chemotherapy: NA    CLINICAL COURSE:    Patient completed the treatment as prescribed and tolerated treatment as expected. Patient developed no significant symptoms. Patient will come back in 3 months for a follow up visit and to assess treatment toxicity and response. Patient was advised to continue close follow up with medical oncology and neurosurgery as well. Patient does have our contact information in case they have any questions or concerns in the interim.     Electronically signed by Marina Sorenson MD on 5/17/2022 at 9:21 AM

## 2022-05-18 ENCOUNTER — OFFICE VISIT (OUTPATIENT)
Dept: NEUROSURGERY | Age: 67
End: 2022-05-18
Payer: COMMERCIAL

## 2022-05-18 ENCOUNTER — TELEPHONE (OUTPATIENT)
Dept: CASE MANAGEMENT | Age: 67
End: 2022-05-18

## 2022-05-18 VITALS
WEIGHT: 161 LBS | BODY MASS INDEX: 32.46 KG/M2 | HEIGHT: 59 IN | OXYGEN SATURATION: 100 % | SYSTOLIC BLOOD PRESSURE: 133 MMHG | HEART RATE: 78 BPM | TEMPERATURE: 97.2 F | DIASTOLIC BLOOD PRESSURE: 89 MMHG

## 2022-05-18 DIAGNOSIS — C34.90 PRIMARY MALIGNANT NEOPLASM OF LUNG WITH METASTASIS TO BRAIN (HCC): Primary | ICD-10-CM

## 2022-05-18 DIAGNOSIS — C79.31 PRIMARY MALIGNANT NEOPLASM OF LUNG WITH METASTASIS TO BRAIN (HCC): Primary | ICD-10-CM

## 2022-05-18 PROCEDURE — G8417 CALC BMI ABV UP PARAM F/U: HCPCS | Performed by: NEUROLOGICAL SURGERY

## 2022-05-18 PROCEDURE — 99213 OFFICE O/P EST LOW 20 MIN: CPT | Performed by: NEUROLOGICAL SURGERY

## 2022-05-18 PROCEDURE — 3017F COLORECTAL CA SCREEN DOC REV: CPT | Performed by: NEUROLOGICAL SURGERY

## 2022-05-18 PROCEDURE — G8427 DOCREV CUR MEDS BY ELIG CLIN: HCPCS | Performed by: NEUROLOGICAL SURGERY

## 2022-05-18 PROCEDURE — 1036F TOBACCO NON-USER: CPT | Performed by: NEUROLOGICAL SURGERY

## 2022-05-18 NOTE — TELEPHONE ENCOUNTER
Name: Hilda Smith  : 1955  MRN: [id number]    Oncology Navigation Follow-Up Note  Navigator left VM for pt. Offering assistance if needed. Plan to follow.    Electronically signed by Derek Schmitt RN on 2022 at 3:38 PM

## 2022-05-18 NOTE — PROGRESS NOTES
[address]  [facility] [place] [address], [address] [facility] [address]  Dept: [phone]    Patient:  Guillermo Mccormack  YOB: 1955  Date: 5/18/22    The patient is a 61 y.o. female who presents today for consult of the following problems:     Chief Complaint   Patient presents with    Post-Op Check     post op             HPI:     Guillermo Mccormack is a 61 y.o. female on whom neurosurgical consultation was requested by Shabnam Thomason MD for management of metastatic lung adenocarcinoma status post resection of the right cerebellar lesion approximately 7 weeks ago. She has undergone a course of radiation and is currently on immunotherapy. She has seen both radiation oncology as well as hematology oncology and most recently her hematologist I did raise the Decadron dose to 4 mg twice daily. He is still on the antiepileptics as well. Overall she denies any new symptoms including numbness tingling weakness speech difficulty or vision changes. She does have some difficulty with balance and ataxia but denies any falls. No lateralizing symptoms otherwise and states that she has minimal symptoms from the chemotherapy and has not required much of the nausea medication. Graylon Renzo       History:     Past Medical History:   Diagnosis Date    Anxiety     Benign polyp of large intestine     Cancer (HCC)     LT UPPER LOBE    COPD ([facility]ronic obstructive [facility] disease) [facility] [address]     Hx of blood clots     DVT LT LEG    Hyperlipidemia     Hypertension     Liver hemangioma     Lung nodule     BARAK  Nodule    Snores     not tested for apnea    Uterine fibroid 09/2010    Wears glasses      Past Surgical History:   Procedure Laterality Date    ABDOMINAL HERNIA REPAIR  2012    BREAST BIOPSY Left 1983    BRONCHOSCOPY  10/1/2018    BRONCHOSCOPY performed by Cassidy Ziegler MD at [facility]  CRANIOTOMY N/A 3/29/2022    SUBOCCIPITAL CRANIOTOMY FOR TUMOR  (REGULAR TABLE, SANJUANITA NAVIGATION, [place] HEADHOLDER) performed by Karla Barragan DO at [facility], TOTAL ABDOMINAL      IR PORT PLACEMENT EQUAL OR GREATER THAN 5 YEARS  4/13/2022    IR PORT PLACEMENT EQUAL OR GREATER THAN 5 YEARS 4/13/2022 Pattie Acuna MD [facility] SPECIAL PROCEDURES    LUNG BIOPSY      LUNG REMOVAL, PARTIAL Right 09/28/2018    Robotic assisted left lobectomy, upper with lymph node biopsy    OTHER SURGICAL HISTORY Right 11/05/2018    IR PORT PLACEMENT EQUAL OR GREATER THAN 5 YEARS    [place] [address] INCL FLUOR GDNCE DX W/CELL WASHG SPX N/A 10/29/2018    BRONCHOSCOPY performed by Latoya Esqueda MD at [facility] LUNG OTHER THAN PNEUMONECTOMY 1 LOBE LOBECT Left 9/28/2018    XI ROBOTIC ASSISTED LEFT UPPER LOBECTOMY MULTIPLE LYMPH NODE BIOPSY, INTERCOSTAL  NERVE BLOCK ABLATION W/EXPAREL performed by Kell Nolan MD at [facility] History   Problem Relation Age of Onset    Cancer Mother         LUNG    Cancer Sister         LUNG     Current Outpatient Medications on File Prior to Visit   Medication Sig Dispense Refill    pantoprazole sodium (PROTONIX) 40 MG PACK packet Take 40 mg by mouth every morning (before breakfast)      Multiple Vitamin (MULTIVITAMIN PO) Take by mouth daily      folic acid (FOLVITE) 1 MG tablet Take 1 tablet by mouth daily 30 tablet 5    ondansetron (ZOFRAN) 4 MG tablet Take 1 tablet by mouth 3 times daily as needed for Nausea or Vomiting 30 tablet 0    clonazePAM (KLONOPIN) 0.5 MG tablet Take 1 tablet by mouth 2 times daily as needed for Anxiety for up to 30 days.  60 tablet 0    lovastatin (MEVACOR) 10 MG tablet Take 1 tablet by mouth nightly 30 tablet 11    lisinopril-hydroCHLOROthiazide (PRINZIDE;ZESTORETIC) 10-12.5 MG per tablet Take 1 tablet by mouth daily 30 tablet 11    levETIRAcetam (KEPPRA) 500 MG tablet Take 1 tablet by mouth 2 times daily 60 tablet 3    albuterol sulfate  (90 Base) MCG/ACT inhaler inhale 2 puffs by mouth four times a day 18 g 11    aspirin 81 MG chewable tablet Take by mouth (Patient not taking: Reported on 2022)       No current facility-administered medications on file prior to visit. Social History     Tobacco Use    Smoking status: Former Smoker     Packs/day: 1.50     Years: 30.00     Pack years: 45.00     Types: Cigarettes     Quit date:      Years since quittin.3    Smokeless tobacco: Never Used   Vaping Use    Vaping Use: Never used   Substance Use Topics    Alcohol use: Yes     Comment: Occassionally    Drug use: No       Allergies   Allergen Reactions    Codeine Nausea And Vomiting       Review of Systems  ROS: None    Physical Exam:      /89   Pulse 78   Temp 97.2 °F (36.2 °C)   Ht 4' 11\" (1.499 m)   Wt 161 lb (73 kg)   SpO2 100%   BMI 32.52 kg/m²   Estimated body mass index is 32.52 kg/m² as calculated from the following:    Height as of this encounter: 4' 11\" (1.499 m). Weight as of this encounter: 161 lb (73 kg). General:  Saira Saldivar is a 61y.o. year old female who appears her stated age. HEENT: Normocephalic atraumatic. Neck supple. Chest: regular rate; pulses equal. Equal chest rise and fall  Abdomen: Soft nondistended.    Ext: DP equal with good capillary refill  Neuro    Mentation  Appropriate affect   oriented    Cranial Nerves:   Pupils equal and reactive to light  Extraocular motion intact  Face symmetric  No dysarthria  v1-3 sensation symmetric, masseter tone symmetric  Hearing symmetric and intact to finger rub    Sensation:   Intact    Motor  L deltoid 5/5; R deltoid 5/5  L biceps 5/5; R biceps 5/5  L triceps 5/5; R triceps 5/5  L wrist extension 5/5; R wrist extension 5/5  L intrinsics 5/5; R intrinsics 5/5     L iliopsoas 5/5 , R iliopsoas 5/5  L quadriceps 5/5; R quadriceps 5/5  L Dorsiflexion 5/5; R dorsiflexion 5/5  L Plantarflexion 5/5; R plantarflexion 5/5  L EHL 5/5; R EHL 5/5    Reflexes  L Brachioradialis 2+/4; R brachioradialis 2+/4  L Biceps 2+/4; R Biceps 2+/4  L Triceps 2+/4; R Triceps 2+/4  L Patellar 2+/4: R Patellar 2+/4  L Achilles 2+/4; R Achilles 2+/4    hoffmans L: neg  hoffmans R: neg  Clonus L: neg  Clonus R: neg  Babinski L: up  Babinski R; up    No dysmetria on finger to nose of either hands. Stable heel to shin as well. Studies Review:     MRI brain with and without contrast was completed. Radiation and compared to the immediate postop. Per my review of the scan there is possibly a very small subtle growth of the left parieto-occipital and the margins of the prior resection cavity in the right cerebellar region to have may be some small nodular enhancement. Overall it appears to be relatively stable compared to the immediate postoperative MRI. Assessment and Plan:      1. Primary malignant neoplasm of lung with metastasis to brain [facility])          Plan: Discussion with the patient regarding the findings on the imaging which I believe are relatively stable and I indicated to her that I would use the delayed MRI at the 1 month postop. As the baseline relative to the immediate postop MRI. I indicated to her that there really is no role for surgical invention for systemic control of the disease and the impetus for surgery was based on a focal large cerebellar lesion with the significant risk of obstructive hydrocephalus and herniation and in order to mitigate this risk. I explained to her that at this point it will really be a process of surveillance to see how she responds to the radiation as well as the immunotherapy as managed by her oncologist.  Overall she is young with good Karnofsky performance score so it is very reasonable that she be treated from an aggressive standpoint which I did relate to her.   I will follow along with surveillance MRIs and I agree with a 3-month interval scan as ordered by radiation oncology. I will message both of her oncologist in order to ensure that there is a consensus on the management of the steroids and the current dosage that she is on. Followup: No follow-ups on file. Prescriptions Ordered:  No orders of the defined types were placed in this encounter. Orders Placed:  No orders of the defined types were placed in this encounter. Electronically signed by Caden Ramirez DO on 5/18/2022 at 2:44 PM    Please note that this chart was generated using voice recognition Dragon dictation software. Although every effort was made to ensure the accuracy of this automated transcription, some errors in transcription may have occurred.

## 2022-05-19 ENCOUNTER — TELEPHONE (OUTPATIENT)
Dept: RADIATION ONCOLOGY | Age: 67
End: 2022-05-19

## 2022-05-19 NOTE — TELEPHONE ENCOUNTER
Post [employer] phone call to patient to see how she is doing with side effects of treatment and steroid taper. Patient stated she was in the process of weaning off her Dexamethasone and then was instructed to 4mg twice per day for 10 days by Dr. Choco Aviles. She is not having any headaches, nausea, or other neurological symptoms during her previous wean or now. Patient has history of adrenal mass as well. Discussed with Dr. Jesus Hooper and pt instructed to start weaning off of Dexamethasone today as 4 mg daily for 7 days. Then on 5/26 take 1/2 tab daily for 7 days and then a 1/2 tab every other day for 3 doses (last dose 6/7/22). Instructions given to Ann Barrett (significant other) who verbalized understanding.

## 2022-05-31 ENCOUNTER — HOSPITAL ENCOUNTER (OUTPATIENT)
Facility: MEDICAL CENTER | Age: 67
End: 2022-05-31
Payer: COMMERCIAL

## 2022-06-01 ENCOUNTER — HOSPITAL ENCOUNTER (OUTPATIENT)
Dept: INFUSION THERAPY | Facility: MEDICAL CENTER | Age: 67
Discharge: HOME | End: 2022-06-01
Payer: COMMERCIAL

## 2022-06-01 VITALS
RESPIRATION RATE: 16 BRPM | BODY MASS INDEX: 32.51 KG/M2 | HEART RATE: 110 BPM | DIASTOLIC BLOOD PRESSURE: 59 MMHG | TEMPERATURE: 99.3 F | WEIGHT: 160.94 LBS | SYSTOLIC BLOOD PRESSURE: 95 MMHG

## 2022-06-01 DIAGNOSIS — C34.90 PRIMARY MALIGNANT NEOPLASM OF LUNG WITH METASTASIS TO BRAIN (HCC): Primary | ICD-10-CM

## 2022-06-01 DIAGNOSIS — C79.31 PRIMARY MALIGNANT NEOPLASM OF LUNG WITH METASTASIS TO BRAIN (HCC): Primary | ICD-10-CM

## 2022-06-01 LAB
ABSOLUTE EOS #: 0.05 K/UL (ref 0–0.44)
ABSOLUTE IMMATURE GRANULOCYTE: 0.1 K/UL (ref 0–0.3)
ABSOLUTE LYMPH #: 0.64 K/UL (ref 1.1–3.7)
ABSOLUTE MONO #: 0.44 K/UL (ref 0.1–1.2)
ALBUMIN SERPL-MCNC: 3.8 G/DL (ref 3.5–5.2)
ALP BLD-CCNC: 74 U/L (ref 35–104)
ALT SERPL-CCNC: 20 U/L (ref 5–33)
ANION GAP SERPL CALCULATED.3IONS-SCNC: 12 MMOL/L (ref 9–17)
AST SERPL-CCNC: 16 U/L
BASOPHILS # BLD: 0 % (ref 0–2)
BASOPHILS ABSOLUTE: 0 K/UL (ref 0–0.2)
BILIRUB SERPL-MCNC: 0.35 MG/DL (ref 0.3–1.2)
BUN BLDV-MCNC: 13 MG/DL (ref 8–23)
BUN/CREAT BLD: 27 (ref 9–20)
CALCIUM SERPL-MCNC: 8.7 MG/DL (ref 8.6–10.4)
CHLORIDE BLD-SCNC: 96 MMOL/L (ref 98–107)
CO2: 27 MMOL/L (ref 20–31)
CREAT SERPL-MCNC: 0.48 MG/DL (ref 0.5–0.9)
EOSINOPHILS RELATIVE PERCENT: 1 % (ref 1–4)
GFR AFRICAN AMERICAN: >60 ML/MIN
GFR NON-AFRICAN AMERICAN: >60 ML/MIN
GFR SERPL CREATININE-BSD FRML MDRD: ABNORMAL ML/MIN/{1.73_M2}
GLUCOSE BLD-MCNC: 142 MG/DL (ref 70–99)
HCT VFR BLD CALC: 29 % (ref 36.3–47.1)
HEMOGLOBIN: 9.4 G/DL (ref 11.9–15.1)
IMMATURE GRANULOCYTES: 2 %
LYMPHOCYTES # BLD: 13 % (ref 24–43)
MCH RBC QN AUTO: 30.9 PG (ref 25.2–33.5)
MCHC RBC AUTO-ENTMCNC: 32.4 G/DL (ref 28.4–34.8)
MCV RBC AUTO: 95.4 FL (ref 82.6–102.9)
MONOCYTES # BLD: 9 % (ref 3–12)
PDW BLD-RTO: 15 % (ref 11.8–14.4)
PLATELET # BLD: 307 K/UL (ref 138–453)
PMV BLD AUTO: 8.8 FL (ref 8.1–13.5)
POTASSIUM SERPL-SCNC: 3.5 MMOL/L (ref 3.7–5.3)
RBC # BLD: 3.04 M/UL (ref 3.95–5.11)
SEG NEUTROPHILS: 75 % (ref 36–65)
SEGMENTED NEUTROPHILS ABSOLUTE COUNT: 3.67 K/UL (ref 1.5–8.1)
SODIUM BLD-SCNC: 135 MMOL/L (ref 135–144)
TOTAL PROTEIN: 6.4 G/DL (ref 6.4–8.3)
TSH SERPL DL<=0.05 MIU/L-ACNC: 1.22 UIU/ML (ref 0.3–5)
WBC # BLD: 4.9 K/UL (ref 3.5–11.3)

## 2022-06-01 PROCEDURE — 84443 ASSAY THYROID STIM HORMONE: CPT

## 2022-06-01 PROCEDURE — 96375 TX/PRO/DX INJ NEW DRUG ADDON: CPT

## 2022-06-01 PROCEDURE — 2580000003 HC RX 258: Performed by: INTERNAL MEDICINE

## 2022-06-01 PROCEDURE — 96417 CHEMO IV INFUS EACH ADDL SEQ: CPT

## 2022-06-01 PROCEDURE — 85025 COMPLETE CBC W/AUTO DIFF WBC: CPT

## 2022-06-01 PROCEDURE — A4216 STERILE WATER/SALINE, 10 ML: HCPCS | Performed by: INTERNAL MEDICINE

## 2022-06-01 PROCEDURE — 96409 CHEMO IV PUSH SNGL DRUG: CPT

## 2022-06-01 PROCEDURE — 80053 COMPREHEN METABOLIC PANEL: CPT

## 2022-06-01 PROCEDURE — 96376 TX/PRO/DX INJ SAME DRUG ADON: CPT

## 2022-06-01 PROCEDURE — 96367 TX/PROPH/DG ADDL SEQ IV INF: CPT

## 2022-06-01 PROCEDURE — 96413 CHEMO IV INFUSION 1 HR: CPT

## 2022-06-01 PROCEDURE — 96411 CHEMO IV PUSH ADDL DRUG: CPT

## 2022-06-01 PROCEDURE — 36591 DRAW BLOOD OFF VENOUS DEVICE: CPT

## 2022-06-01 PROCEDURE — 6360000002 HC RX W HCPCS: Performed by: INTERNAL MEDICINE

## 2022-06-01 RX ORDER — PALONOSETRON 0.05 MG/ML
0.25 INJECTION, SOLUTION INTRAVENOUS ONCE
Status: COMPLETED | OUTPATIENT
Start: 2022-06-01 | End: 2022-06-01

## 2022-06-01 RX ORDER — HEPARIN SODIUM (PORCINE) LOCK FLUSH IV SOLN 100 UNIT/ML 100 UNIT/ML
500 SOLUTION INTRAVENOUS PRN
Status: DISCONTINUED | OUTPATIENT
Start: 2022-06-01 | End: 2022-06-02 | Stop reason: HOSPADM

## 2022-06-01 RX ORDER — DEXAMETHASONE SODIUM PHOSPHATE 10 MG/ML
10 INJECTION INTRAMUSCULAR; INTRAVENOUS ONCE
Status: COMPLETED | OUTPATIENT
Start: 2022-06-01 | End: 2022-06-01

## 2022-06-01 RX ORDER — SODIUM CHLORIDE 9 MG/ML
20 INJECTION, SOLUTION INTRAVENOUS ONCE
Status: COMPLETED | OUTPATIENT
Start: 2022-06-01 | End: 2022-06-01

## 2022-06-01 RX ORDER — SODIUM CHLORIDE 0.9 % (FLUSH) 0.9 %
5-40 SYRINGE (ML) INJECTION PRN
Status: DISCONTINUED | OUTPATIENT
Start: 2022-06-01 | End: 2022-06-02 | Stop reason: HOSPADM

## 2022-06-01 RX ADMIN — CARBOPLATIN 500 MG: 10 INJECTION INTRAVENOUS at 13:57

## 2022-06-01 RX ADMIN — DEXAMETHASONE SODIUM PHOSPHATE 10 MG: 10 INJECTION INTRAMUSCULAR; INTRAVENOUS at 12:11

## 2022-06-01 RX ADMIN — SODIUM CHLORIDE 200 MG: 9 INJECTION, SOLUTION INTRAVENOUS at 12:59

## 2022-06-01 RX ADMIN — PALONOSETRON 0.25 MG: 0.05 INJECTION, SOLUTION INTRAVENOUS at 12:11

## 2022-06-01 RX ADMIN — SODIUM CHLORIDE, PRESERVATIVE FREE 10 ML: 5 INJECTION INTRAVENOUS at 10:57

## 2022-06-01 RX ADMIN — SODIUM CHLORIDE 900 MG: 9 INJECTION, SOLUTION INTRAVENOUS at 13:38

## 2022-06-01 RX ADMIN — SODIUM CHLORIDE, PRESERVATIVE FREE 10 ML: 5 INJECTION INTRAVENOUS at 14:41

## 2022-06-01 RX ADMIN — SODIUM CHLORIDE 20 ML/HR: 9 INJECTION, SOLUTION INTRAVENOUS at 10:57

## 2022-06-01 RX ADMIN — FOSAPREPITANT 150 MG: 150 INJECTION, POWDER, LYOPHILIZED, FOR SOLUTION INTRAVENOUS at 12:24

## 2022-06-01 RX ADMIN — Medication 500 UNITS: at 14:41

## 2022-06-01 NOTE — FLOWSHEET NOTE
SPIRITUAL CARE PROGRESS NOTE: [address] [facility] 33: Writer encountered and visited with Patient and Partner in the treatment cubicle of the infusion clinic. When writer previously encountered Pt this morning, writer did not recall meeting Pt and Partner. Upon reviewing the notes, Johnny Martínez found that she met with Pt and Partner briefly in the fall of 2018. Writer apologized to Pt for this. Pt told writer she thought they had met. Pt shared how she was doing. She expressed gratitude for making progress and stated her belief that she will continue to do so. She gave thanks for her oncologist, Dr. Ailyn Singh. Intervention: Writer provided supportive presence; inquired about Pt's coping and needs; affirmed Pt's strengths, including her positive attitude; told Pt she is available for support; offered words of support and encouragement to Pt. Outcome: Patient expressed gratitude for how she is doing She thanked writer for the visit. Plan: Chaplains will remain available to provide emotional and spiritual support as needed. 06/01/22 1159   Encounter Summary   Encounter Overview/Reason  Spiritual/Emotional Needs   Service Provided For: Patient and family together   Referral/Consult From: [facility] Family members;Significant other   Last Encounter  11/14/18   Complexity of Encounter Low   Begin Time 1205   End Time  1210   Total Time Calculated 5 min   Encounter    Type Follow up   Spiritual/Emotional needs   Type Spiritual Support   Assessment/Intervention/Outcome   Assessment Coping   Intervention Active listening;Explored Coping Skills/Resources; Explored/Affirmed feelings, thoughts, concerns;Sustaining Presence/Ministry of presence   Outcome Expressed Gratitude;Coping   Plan and Referrals   Plan/Referrals Continue Support (comment)     Electronically signed by Dawn Sheffield, Oncology Outpatient [address] [facility]  [place], [address] [facility] Radiation Oncology  [phone]  6/1/2022  12:46 PM

## 2022-06-01 NOTE — PROGRESS NOTES
Patient arrived ambulatory with  for cycle 2 day 1 ttreatment. Patient denies complaints or concerns. Port accessed;specimen sent. Labs reviewed. Patient premedicated. Keytruda infused with no sign adverse reaction;line flushed. Alimta infused with no sign adverse reaction;line flushed. Carboplatin infused with no sign adverse reaction;line flushed. Port flushed and heparinized with intact kay needle removed per protocol. Patient ambulated off unit with  at discharge.

## 2022-06-02 ENCOUNTER — HOSPITAL ENCOUNTER (EMERGENCY)
Age: 67
Discharge: HOME | End: 2022-06-02
Attending: EMERGENCY MEDICINE
Payer: COMMERCIAL

## 2022-06-02 ENCOUNTER — TELEPHONE (OUTPATIENT)
Dept: CASE MANAGEMENT | Age: 67
End: 2022-06-02

## 2022-06-02 VITALS
TEMPERATURE: 98.2 F | DIASTOLIC BLOOD PRESSURE: 76 MMHG | SYSTOLIC BLOOD PRESSURE: 139 MMHG | RESPIRATION RATE: 15 BRPM | HEART RATE: 103 BPM | OXYGEN SATURATION: 95 %

## 2022-06-02 DIAGNOSIS — H11.32 SUBCONJUNCTIVAL HEMORRHAGE OF LEFT EYE: Primary | ICD-10-CM

## 2022-06-02 PROCEDURE — 99282 EMERGENCY DEPT VISIT SF MDM: CPT

## 2022-06-02 ASSESSMENT — VISUAL ACUITY
OD: 20/25
OU: 1
OS: 20/30
OU: 20/25

## 2022-06-02 ASSESSMENT — PAIN - FUNCTIONAL ASSESSMENT: PAIN_FUNCTIONAL_ASSESSMENT: 0-10

## 2022-06-02 ASSESSMENT — PAIN SCALES - GENERAL: PAINLEVEL_OUTOF10: 3

## 2022-06-02 ASSESSMENT — ENCOUNTER SYMPTOMS
EYE PAIN: 1
EYE REDNESS: 1

## 2022-06-02 NOTE — TELEPHONE ENCOUNTER
Patient request medication refill. Routed to Dr. Galindo Hong for review as this is a patient of Dr. Gm Ryan who is on vacation.

## 2022-06-02 NOTE — TELEPHONE ENCOUNTER
Name: Doug Dewey  : 1955  MRN: [id number]    Oncology Navigation Follow-Up Note  Navigator received call from pt. And pt. Denies needs, but had questions about letter from Hao Wagner? All questions answered. Plan to follow.    Electronically signed by Kriss Lambert RN on 2022 at 3:40 PM

## 2022-06-03 NOTE — ED PROVIDER NOTES
EMERGENCY DEPARTMENT ENCOUNTER    Pt Name: Rehan Connor  MRN: [id number]  [place] 1955  Date of evaluation: 6/2/22  CHIEF COMPLAINT       Chief Complaint   Patient presents with    Eye Problem     Pt reports she felt there was an eyelash in her eye and went to flush it; pt's eye is now bleeding; pt reports pain only when she moves her eye to the right or left; pt is not on blood thinners     HISTORY OF PRESENT ILLNESS   62-year-old female presents emergency room for blood in her left eye. She had felt like there was something in her eye earlier and was rubbing it suddenly when she saw the eye she saw the amount of blood around the sclera and came in. She does have some increased pain when looking to the right. She does not have any vision changes. She is not on blood thinners. REVIEW OF SYSTEMS     Review of Systems   Eyes: Positive for pain and redness.        PASTMEDICAL HISTORY     Past Medical History:   Diagnosis Date    Anxiety     Benign polyp of large intestine     Cancer (HCC)     LT UPPER LOBE    COPD (chronic obstructive pulmonary disease) ([address]     Hx of blood clots     DVT LT LEG    Hyperlipidemia     Hypertension     Liver hemangioma     Lung nodule     BARAK  Nodule    Snores     not tested for apnea    Uterine fibroid 09/2010    Wears glasses      Past Problem List  Patient Active Problem List   Diagnosis Code    Status post lobectomy of lung Z90.2    Non-small cell cancer of left lung (HCC) C34.92    Malignant neoplasm of bronchus of upper lobe, left (HCC) C34.12    Essential hypertension I10    Anxiety F41.9    Intraparenchymal hemorrhage of brain ([address] I61.9    New onset seizure ([address] R56.9    Mass of occipital region R22.0    Hyperglycemia R73.9    Hypokalemia E87.6    Vasogenic edema (HCC) G93.6    Brain metastases (HCC) C79.31    Adrenal mass ([address] - right  E27.8    Episode of loss of consciousness R55    Focal epilepsy ([address] G40.109    Primary malignant neoplasm of lung with metastasis to brain ([facility] [address] C34.90, C79.31     SURGICAL HISTORY       Past Surgical History:   Procedure Laterality Date    ABDOMINAL HERNIA REPAIR  2012    BREAST BIOPSY Left 1983    BRONCHOSCOPY  10/1/2018    BRONCHOSCOPY performed by Dave Carnes MD at [address] N/A 3/29/2022    SUBOCCIPITAL CRANIOTOMY FOR TUMOR  (REGULAR TABLE, SANJUANITA NAVIGATION, [place] HEADHOLDER) [place] [facility] [place] [facility] [place] [place]    HYSTERECTOMY, TOTAL ABDOMINAL      IR PORT PLACEMENT EQUAL OR GREATER THAN 5 YEARS  4/13/2022    IR PORT PLACEMENT EQUAL OR GREATER THAN 5 YEARS 4/13/2022 Pattie Acuna MD [facility] SPECIAL PROCEDURES    LUNG BIOPSY      LUNG REMOVAL, PARTIAL Right 09/28/2018    Robotic assisted left lobectomy, upper with lymph node biopsy    OTHER SURGICAL HISTORY Right 11/05/2018    IR PORT PLACEMENT EQUAL OR GREATER THAN 5 YEARS    [place] [address] INCL FLUOR GDNCE DX W/CELL WASHG SPX N/A 10/29/2018    BRONCHOSCOPY performed by Latoay Esqueda MD at [facility] 1 LOBE LOBECT Left 9/28/2018    XI ROBOTIC ASSISTED LEFT UPPER LOBECTOMY MULTIPLE LYMPH NODE BIOPSY, INTERCOSTAL  NERVE BLOCK ABLATION W/EXPAREL performed by Kell Nolan MD at [address] [place] [address]       Discharge Medication List as of 6/2/2022  8:28 PM      CONTINUE these medications which have NOT CHANGED    Details   pantoprazole sodium (PROTONIX) 40 MG PACK packet Take 40 mg by mouth every morning (before breakfast)Historical Med      Multiple Vitamin (MULTIVITAMIN PO) Take by mouth dailyHistorical Med      folic acid (FOLVITE) 1 MG tablet Take 1 tablet by mouth daily, Disp-30 tablet, R-5Normal      ondansetron (ZOFRAN) 4 MG tablet Take 1 tablet by mouth 3 times daily as needed for Nausea or Vomiting, Disp-30 tablet, R-0Normal      clonazePAM (KLONOPIN) 0.5 MG tablet Take 1 tablet by mouth 2 times daily as needed for Anxiety for up to 30 days. , Disp-60 tablet, R-0Normal      aspirin 81 MG chewable tablet Take by mouthHistorical Med      lovastatin (MEVACOR) 10 MG tablet Take 1 tablet by mouth nightly, Disp-30 tablet, R-11Normal      lisinopril-hydroCHLOROthiazide (PRINZIDE;ZESTORETIC) 10-12.5 MG per tablet Take 1 tablet by mouth daily, Disp-30 tablet, R-11Normal      levETIRAcetam (KEPPRA) 500 MG tablet Take 1 tablet by mouth 2 times daily, Disp-60 tablet, R-3Normal      albuterol sulfate  (90 Base) MCG/ACT inhaler inhale 2 puffs by mouth four times a day, Disp-18 g, R-11Normal           ALLERGIES     is allergic to codeine. FAMILY HISTORY     She indicated that her mother is . She indicated that her father is . She indicated that her sister is . SOCIAL HISTORY       Social History     Tobacco Use    Smoking status: Former Smoker     Packs/day: 1.50     Years: 30.00     Pack years: 45.00     Types: Cigarettes     Quit date:      Years since quittin.    Smokeless tobacco: Never Used   Vaping Use    Vaping Use: Never used   Substance Use Topics    Alcohol use: Yes     Comment: Occassionally    Drug use: No     PHYSICAL EXAM     INITIAL VITALS: /76   Pulse (!) 103   Temp 98.2 °F (36.8 °C) (Oral)   Resp 15   SpO2 95%    Physical Exam  Constitutional:       General: She is not in acute distress. Appearance: She is well-developed. HENT:      Head: Normocephalic. Eyes:      General: Lids are normal. Lids are everted, no foreign bodies appreciated. Vision grossly intact. Conjunctiva/sclera:      Left eye: Hemorrhage present. Pupils: Pupils are equal, round, and reactive to light. Comments: Blood and swelling to the sclera no involvement of the iris or pupil  Unremarkable slit-lamp and Woods lamp exam   Cardiovascular:      Rate and Rhythm: Normal rate and regular rhythm. Heart sounds: Normal heart sounds.    Pulmonary:      Effort: Pulmonary effort is normal. No respiratory distress. Breath sounds: Normal breath sounds. Musculoskeletal:         General: Normal range of motion. Skin:     General: Skin is warm and dry. Neurological:      Mental Status: She is alert and oriented to person, place, and time. MEDICAL DECISION MAKIN-year-old female presenting to the emergency room with blood in the eye. Patient has what looks like to be fairly substantial subconjunctival hemorrhage with blood and swelling to the sclera. No active bleeding. Patient's vision is intact. I am not concerned for globe rupture. Patient has normal Woods lamp and slit lamp exam.  Patient has an optometrist she was instructed to call the office tomorrow for close follow-up or return to the emergency room for any worsening symptoms. CRITICAL CARE:       PROCEDURES:    Procedures    DIAGNOSTIC RESULTS   EKG:All EKG's are interpreted by the Emergency Department Physician who either signs or Co-signs this chart in the absence of a cardiologist.        RADIOLOGY:All plain film, CT, MRI, and formal ultrasound images (except ED bedside ultrasound) are read by the radiologist, see reports below, unless otherwisenoted in MDM or here. No orders to display     LABS: All lab results were reviewed by myself, and all abnormals are listed below. Labs Reviewed - No data to display    EMERGENCY DEPARTMENTCOURSE:         Vitals:    Vitals:    22 1817   BP: 139/76   Pulse: (!) 103   Resp: 15   Temp: 98.2 °F (36.8 °C)   TempSrc: Oral   SpO2: 95%       The patient was given the following medications while in the emergency department:  No orders of the defined types were placed in this encounter. CONSULTS:  None    FINAL IMPRESSION      1. Subconjunctival hemorrhage of left eye          DISPOSITION/PLAN   DISPOSITION Decision To Discharge 2022 08:27:25 PM      PATIENT REFERRED TO:  No follow-up provider specified.   DISCHARGE MEDICATIONS:  Discharge Medication List as of 6/2/2022  8:28 PM        Jessica Lai MD  Attending Emergency Physician      Care during this encounter was due to an unprecedented national emergency due to COVID-19.             Lizabeth Ospina MD  06/02/22 2124

## 2022-06-04 DIAGNOSIS — C34.90 NON-SMALL CELL LUNG CANCER METASTATIC TO BRAIN (HCC): ICD-10-CM

## 2022-06-04 DIAGNOSIS — C79.31 NON-SMALL CELL LUNG CANCER METASTATIC TO BRAIN (HCC): ICD-10-CM

## 2022-06-06 RX ORDER — PANTOPRAZOLE SODIUM 20 MG/1
TABLET, DELAYED RELEASE ORAL
Qty: 30 TABLET | Refills: 1 | OUTPATIENT
Start: 2022-06-06

## 2022-06-07 RX ORDER — PANTOPRAZOLE SODIUM 20 MG/1
20 TABLET, DELAYED RELEASE ORAL DAILY
Qty: 30 TABLET | Refills: 3 | Status: ON HOLD | OUTPATIENT
Start: 2022-06-07 | End: 2022-08-17 | Stop reason: HOSPADM

## 2022-06-08 ENCOUNTER — TELEPHONE (OUTPATIENT)
Dept: INFUSION THERAPY | Facility: MEDICAL CENTER | Age: 67
End: 2022-06-08

## 2022-06-08 NOTE — TELEPHONE ENCOUNTER
Per review of additional call from Tung Dowell, nurse navigator writer reached out to ELLE to confirm she has received her Protonix. ELLE states is picking up today from pharmacy as Dr. Lizy Reed has now signed script that was routed Friday 6/3/22. Further patient states she had eye issue and wonders if this is related to [facility]; informed her not a side effect I have observed; she will discuss with Dr. Jeff Rooney at next appointment prior to receiving next dose Keytruda.

## 2022-06-09 ENCOUNTER — TELEPHONE (OUTPATIENT)
Dept: ONCOLOGY | Facility: CLINIC | Age: 67
End: 2022-06-09

## 2022-06-09 ENCOUNTER — TELEPHONE (OUTPATIENT)
Dept: CASE MANAGEMENT | Age: 67
End: 2022-06-09

## 2022-06-09 NOTE — TELEPHONE ENCOUNTER
SW spoke with patient and discussed options to have Part A, B, D or to get Part C which is medicare contracted with a private company. Advised that she can apply for MFA once she gets medicare which can help with the bills. Pt makes about $2200 a month and is unmarried. Insurance swtich happen on July 1st, 2022 at which point new SW will reach out to patient to discuss time to meet to apply for MFA.

## 2022-06-09 NOTE — TELEPHONE ENCOUNTER
Name: Moe Palacios  : 1955  MRN: [id number]    Oncology Navigation Follow-Up Note  Navigator received call from pt. And again asking questions about Medicare application for medications coverage? Pt. Has already applied for Medicare Part A and B and now trying to find appropriate coverage for medications. Pt. Has already left message for Isaiah. Navigator will reach out to [facility] MALIK BOX navigator for her assistance. Plan to follow. 2:51 writer left VM infomring pt. Navigator would be out of office next week, but Rowan covering . 3:50 Writer received call from pt. And after talking with Isaiah pt. still has questions. Navigator directing her to Kriss odellator .    Electronically signed by Joe Sen RN on 2022 at 10:26 AM

## 2022-06-13 ENCOUNTER — HOSPITAL ENCOUNTER (OUTPATIENT)
Dept: INFUSION THERAPY | Facility: MEDICAL CENTER | Age: 67
Discharge: HOME | End: 2022-06-13
Payer: COMMERCIAL

## 2022-06-13 ENCOUNTER — TELEPHONE (OUTPATIENT)
Dept: INFUSION THERAPY | Facility: MEDICAL CENTER | Age: 67
End: 2022-06-13

## 2022-06-13 ENCOUNTER — HOSPITAL ENCOUNTER (OUTPATIENT)
Dept: CT IMAGING | Age: 67
Discharge: HOME | End: 2022-06-15
Payer: COMMERCIAL

## 2022-06-13 VITALS
TEMPERATURE: 98 F | RESPIRATION RATE: 16 BRPM | SYSTOLIC BLOOD PRESSURE: 144 MMHG | DIASTOLIC BLOOD PRESSURE: 80 MMHG | HEART RATE: 109 BPM

## 2022-06-13 DIAGNOSIS — C79.31 PRIMARY MALIGNANT NEOPLASM OF LUNG WITH METASTASIS TO BRAIN (HCC): ICD-10-CM

## 2022-06-13 DIAGNOSIS — C34.90 PRIMARY MALIGNANT NEOPLASM OF LUNG WITH METASTASIS TO BRAIN (HCC): ICD-10-CM

## 2022-06-13 DIAGNOSIS — C34.92 NON-SMALL CELL CANCER OF LEFT LUNG (HCC): ICD-10-CM

## 2022-06-13 PROCEDURE — 2580000003 HC RX 258: Performed by: INTERNAL MEDICINE

## 2022-06-13 PROCEDURE — 6360000004 HC RX CONTRAST MEDICATION: Performed by: INTERNAL MEDICINE

## 2022-06-13 PROCEDURE — 71260 CT THORAX DX C+: CPT

## 2022-06-13 PROCEDURE — 6360000002 HC RX W HCPCS: Performed by: INTERNAL MEDICINE

## 2022-06-13 RX ORDER — SODIUM CHLORIDE 0.9 % (FLUSH) 0.9 %
5-40 SYRINGE (ML) INJECTION PRN
Status: ACTIVE | OUTPATIENT
Start: 2022-06-13 | End: 2022-06-13

## 2022-06-13 RX ORDER — SODIUM CHLORIDE 0.9 % (FLUSH) 0.9 %
10 SYRINGE (ML) INJECTION ONCE
Status: COMPLETED | OUTPATIENT
Start: 2022-06-13 | End: 2022-06-13

## 2022-06-13 RX ORDER — HEPARIN SODIUM (PORCINE) LOCK FLUSH IV SOLN 100 UNIT/ML 100 UNIT/ML
500 SOLUTION INTRAVENOUS PRN
Status: CANCELLED | OUTPATIENT
Start: 2022-06-22

## 2022-06-13 RX ORDER — SODIUM CHLORIDE 0.9 % (FLUSH) 0.9 %
5-40 SYRINGE (ML) INJECTION PRN
Status: CANCELLED | OUTPATIENT
Start: 2022-06-22

## 2022-06-13 RX ORDER — 0.9 % SODIUM CHLORIDE 0.9 %
80 INTRAVENOUS SOLUTION INTRAVENOUS ONCE
Status: COMPLETED | OUTPATIENT
Start: 2022-06-13 | End: 2022-06-13

## 2022-06-13 RX ORDER — HEPARIN SODIUM (PORCINE) LOCK FLUSH IV SOLN 100 UNIT/ML 100 UNIT/ML
500 SOLUTION INTRAVENOUS PRN
Status: DISPENSED | OUTPATIENT
Start: 2022-06-13 | End: 2022-06-13

## 2022-06-13 RX ADMIN — IOHEXOL 30 ML: 300 INJECTION, SOLUTION INTRAVENOUS at 10:56

## 2022-06-13 RX ADMIN — SODIUM CHLORIDE, PRESERVATIVE FREE 10 ML: 5 INJECTION INTRAVENOUS at 10:38

## 2022-06-13 RX ADMIN — SODIUM CHLORIDE 80 ML: 9 INJECTION, SOLUTION INTRAVENOUS at 10:56

## 2022-06-13 RX ADMIN — SODIUM CHLORIDE, PRESERVATIVE FREE 10 ML: 5 INJECTION INTRAVENOUS at 11:04

## 2022-06-13 RX ADMIN — SODIUM CHLORIDE, PRESERVATIVE FREE 10 ML: 5 INJECTION INTRAVENOUS at 10:56

## 2022-06-13 RX ADMIN — IOPAMIDOL 100 ML: 755 INJECTION, SOLUTION INTRAVENOUS at 10:53

## 2022-06-13 RX ADMIN — Medication 500 UNITS: at 11:04

## 2022-06-13 NOTE — PROGRESS NOTES
Patient arrive ambulatory with  for port access prior to CT scan. Patient denies complaints or concerns. Port accessed with brisk blood return. Patient off to CT scanning. Patient returned from CT scanning. Port flushed and heparinized with intact kay needle removed per protocol. Patient ambulated off unit with  at discharge.

## 2022-06-15 RX ORDER — PANTOPRAZOLE SODIUM 40 MG/1
40 GRANULE, DELAYED RELEASE ORAL
Qty: 30 EACH | Refills: 0 | Status: SHIPPED | OUTPATIENT
Start: 2022-06-15 | End: 2022-06-22

## 2022-06-17 ENCOUNTER — TELEPHONE (OUTPATIENT)
Dept: INFUSION THERAPY | Facility: MEDICAL CENTER | Age: 67
End: 2022-06-17

## 2022-06-17 NOTE — TELEPHONE ENCOUNTER
Writer returned call as referred by Nurse Navigator. Patient's questions answered. Writer will meet with patient at Infusion appointment on Wednesday, 6/22 to obtain signature for copay assistance for 6/22 date of service.

## 2022-06-20 ENCOUNTER — TELEPHONE (OUTPATIENT)
Dept: CASE MANAGEMENT | Age: 67
End: 2022-06-20

## 2022-06-20 NOTE — TELEPHONE ENCOUNTER
Name: Jovan Cabrera  : 1955  MRN: [id number]    Oncology Navigation Follow-Up Note  Navigator reviewing chart and pt. Will meet with Bing LOOMIS this week during treatment. Plan to follow.    Electronically signed by Sonny Palomino RN on 2022 at 1:56 PM

## 2022-06-21 ENCOUNTER — HOSPITAL ENCOUNTER (OUTPATIENT)
Facility: MEDICAL CENTER | Age: 67
End: 2022-06-21
Payer: COMMERCIAL

## 2022-06-22 ENCOUNTER — TELEPHONE (OUTPATIENT)
Dept: ONCOLOGY | Age: 67
End: 2022-06-22

## 2022-06-22 ENCOUNTER — OFFICE VISIT (OUTPATIENT)
Dept: ONCOLOGY | Age: 67
End: 2022-06-22
Payer: MEDICARE

## 2022-06-22 ENCOUNTER — TELEPHONE (OUTPATIENT)
Dept: CASE MANAGEMENT | Age: 67
End: 2022-06-22

## 2022-06-22 ENCOUNTER — HOSPITAL ENCOUNTER (OUTPATIENT)
Dept: INFUSION THERAPY | Facility: MEDICAL CENTER | Age: 67
Discharge: HOME | End: 2022-06-22
Payer: COMMERCIAL

## 2022-06-22 VITALS
SYSTOLIC BLOOD PRESSURE: 138 MMHG | HEART RATE: 103 BPM | DIASTOLIC BLOOD PRESSURE: 83 MMHG | BODY MASS INDEX: 33.43 KG/M2 | RESPIRATION RATE: 16 BRPM | TEMPERATURE: 98.3 F | WEIGHT: 165.5 LBS

## 2022-06-22 DIAGNOSIS — C34.90 PRIMARY MALIGNANT NEOPLASM OF LUNG WITH METASTASIS TO BRAIN (HCC): ICD-10-CM

## 2022-06-22 DIAGNOSIS — C79.31 PRIMARY MALIGNANT NEOPLASM OF LUNG WITH METASTASIS TO BRAIN (HCC): ICD-10-CM

## 2022-06-22 DIAGNOSIS — C34.90 PRIMARY MALIGNANT NEOPLASM OF LUNG WITH METASTASIS TO BRAIN (HCC): Primary | ICD-10-CM

## 2022-06-22 DIAGNOSIS — C34.92 NON-SMALL CELL CANCER OF LEFT LUNG (HCC): Primary | ICD-10-CM

## 2022-06-22 DIAGNOSIS — C79.31 PRIMARY MALIGNANT NEOPLASM OF LUNG WITH METASTASIS TO BRAIN (HCC): Primary | ICD-10-CM

## 2022-06-22 LAB
ABSOLUTE EOS #: 0.06 K/UL (ref 0–0.44)
ABSOLUTE IMMATURE GRANULOCYTE: 0.03 K/UL (ref 0–0.3)
ABSOLUTE LYMPH #: 1.21 K/UL (ref 1.1–3.7)
ABSOLUTE MONO #: 0.55 K/UL (ref 0.1–1.2)
ALBUMIN SERPL-MCNC: 3.8 G/DL (ref 3.5–5.2)
ALP BLD-CCNC: 101 U/L (ref 35–104)
ALT SERPL-CCNC: 50 U/L (ref 5–33)
ANION GAP SERPL CALCULATED.3IONS-SCNC: 11 MMOL/L (ref 9–17)
AST SERPL-CCNC: 44 U/L
BASOPHILS # BLD: 1 % (ref 0–2)
BASOPHILS ABSOLUTE: 0.03 K/UL (ref 0–0.2)
BILIRUB SERPL-MCNC: 0.39 MG/DL (ref 0.3–1.2)
BUN BLDV-MCNC: 11 MG/DL (ref 8–23)
BUN/CREAT BLD: 21 (ref 9–20)
CALCIUM SERPL-MCNC: 9.1 MG/DL (ref 8.6–10.4)
CHLORIDE BLD-SCNC: 98 MMOL/L (ref 98–107)
CO2: 27 MMOL/L (ref 20–31)
CREAT SERPL-MCNC: 0.52 MG/DL (ref 0.5–0.9)
EOSINOPHILS RELATIVE PERCENT: 1 % (ref 1–4)
GFR AFRICAN AMERICAN: >60 ML/MIN
GFR NON-AFRICAN AMERICAN: >60 ML/MIN
GFR SERPL CREATININE-BSD FRML MDRD: ABNORMAL ML/MIN/{1.73_M2}
GLUCOSE BLD-MCNC: 103 MG/DL (ref 70–99)
HCT VFR BLD CALC: 31.6 % (ref 36.3–47.1)
HEMOGLOBIN: 9.8 G/DL (ref 11.9–15.1)
IMMATURE GRANULOCYTES: 1 %
LYMPHOCYTES # BLD: 25 % (ref 24–43)
MCH RBC QN AUTO: 31.3 PG (ref 25.2–33.5)
MCHC RBC AUTO-ENTMCNC: 31 G/DL (ref 28.4–34.8)
MCV RBC AUTO: 101 FL (ref 82.6–102.9)
MONOCYTES # BLD: 11 % (ref 3–12)
NRBC AUTOMATED: 0 PER 100 WBC
PDW BLD-RTO: 16.8 % (ref 11.8–14.4)
PLATELET # BLD: 235 K/UL (ref 138–453)
PMV BLD AUTO: 9.2 FL (ref 8.1–13.5)
POTASSIUM SERPL-SCNC: 3.7 MMOL/L (ref 3.7–5.3)
RBC # BLD: 3.13 M/UL (ref 3.95–5.11)
RBC # BLD: ABNORMAL 10*6/UL
SEG NEUTROPHILS: 61 % (ref 36–65)
SEGMENTED NEUTROPHILS ABSOLUTE COUNT: 3.04 K/UL (ref 1.5–8.1)
SODIUM BLD-SCNC: 136 MMOL/L (ref 135–144)
TOTAL PROTEIN: 6.6 G/DL (ref 6.4–8.3)
TSH SERPL DL<=0.05 MIU/L-ACNC: 1.64 UIU/ML (ref 0.3–5)
WBC # BLD: 4.9 K/UL (ref 3.5–11.3)

## 2022-06-22 PROCEDURE — 96375 TX/PRO/DX INJ NEW DRUG ADDON: CPT

## 2022-06-22 PROCEDURE — 85025 COMPLETE CBC W/AUTO DIFF WBC: CPT

## 2022-06-22 PROCEDURE — 36591 DRAW BLOOD OFF VENOUS DEVICE: CPT

## 2022-06-22 PROCEDURE — 99211 OFF/OP EST MAY X REQ PHY/QHP: CPT | Performed by: INTERNAL MEDICINE

## 2022-06-22 PROCEDURE — 99215 OFFICE O/P EST HI 40 MIN: CPT | Performed by: INTERNAL MEDICINE

## 2022-06-22 PROCEDURE — 96411 CHEMO IV PUSH ADDL DRUG: CPT

## 2022-06-22 PROCEDURE — 96367 TX/PROPH/DG ADDL SEQ IV INF: CPT

## 2022-06-22 PROCEDURE — 1036F TOBACCO NON-USER: CPT | Performed by: INTERNAL MEDICINE

## 2022-06-22 PROCEDURE — 96372 THER/PROPH/DIAG INJ SC/IM: CPT

## 2022-06-22 PROCEDURE — 96413 CHEMO IV INFUSION 1 HR: CPT

## 2022-06-22 PROCEDURE — 6360000002 HC RX W HCPCS: Performed by: INTERNAL MEDICINE

## 2022-06-22 PROCEDURE — G8427 DOCREV CUR MEDS BY ELIG CLIN: HCPCS | Performed by: INTERNAL MEDICINE

## 2022-06-22 PROCEDURE — 3017F COLORECTAL CA SCREEN DOC REV: CPT | Performed by: INTERNAL MEDICINE

## 2022-06-22 PROCEDURE — 84443 ASSAY THYROID STIM HORMONE: CPT

## 2022-06-22 PROCEDURE — 80053 COMPREHEN METABOLIC PANEL: CPT

## 2022-06-22 PROCEDURE — 96417 CHEMO IV INFUS EACH ADDL SEQ: CPT

## 2022-06-22 PROCEDURE — G8417 CALC BMI ABV UP PARAM F/U: HCPCS | Performed by: INTERNAL MEDICINE

## 2022-06-22 PROCEDURE — 2580000003 HC RX 258: Performed by: INTERNAL MEDICINE

## 2022-06-22 RX ORDER — DEXAMETHASONE SODIUM PHOSPHATE 10 MG/ML
10 INJECTION INTRAMUSCULAR; INTRAVENOUS ONCE
Status: COMPLETED | OUTPATIENT
Start: 2022-06-22 | End: 2022-06-22

## 2022-06-22 RX ORDER — PALONOSETRON 0.05 MG/ML
0.25 INJECTION, SOLUTION INTRAVENOUS ONCE
Status: CANCELLED | OUTPATIENT
Start: 2022-06-22 | End: 2022-06-22

## 2022-06-22 RX ORDER — MEPERIDINE HYDROCHLORIDE 50 MG/ML
12.5 INJECTION INTRAMUSCULAR; INTRAVENOUS; SUBCUTANEOUS PRN
Status: CANCELLED | OUTPATIENT
Start: 2022-06-22

## 2022-06-22 RX ORDER — EPINEPHRINE 1 MG/ML
0.3 INJECTION, SOLUTION, CONCENTRATE INTRAVENOUS PRN
Status: CANCELLED | OUTPATIENT
Start: 2022-06-22

## 2022-06-22 RX ORDER — CYANOCOBALAMIN 1000 UG/ML
1000 INJECTION INTRAMUSCULAR; SUBCUTANEOUS ONCE
Status: COMPLETED | OUTPATIENT
Start: 2022-06-22 | End: 2022-06-22

## 2022-06-22 RX ORDER — SODIUM CHLORIDE 0.9 % (FLUSH) 0.9 %
5-40 SYRINGE (ML) INJECTION PRN
Status: DISCONTINUED | OUTPATIENT
Start: 2022-06-22 | End: 2022-06-23 | Stop reason: HOSPADM

## 2022-06-22 RX ORDER — SODIUM CHLORIDE 9 MG/ML
20 INJECTION, SOLUTION INTRAVENOUS ONCE
Status: COMPLETED | OUTPATIENT
Start: 2022-06-22 | End: 2022-06-22

## 2022-06-22 RX ORDER — FAMOTIDINE 10 MG/ML
20 INJECTION, SOLUTION INTRAVENOUS
Status: CANCELLED | OUTPATIENT
Start: 2022-06-22

## 2022-06-22 RX ORDER — ALBUTEROL SULFATE 90 UG/1
4 AEROSOL, METERED RESPIRATORY (INHALATION) PRN
Status: CANCELLED | OUTPATIENT
Start: 2022-06-22

## 2022-06-22 RX ORDER — DIPHENHYDRAMINE HYDROCHLORIDE 50 MG/ML
50 INJECTION INTRAMUSCULAR; INTRAVENOUS
Status: CANCELLED | OUTPATIENT
Start: 2022-06-22

## 2022-06-22 RX ORDER — ACETAMINOPHEN 325 MG/1
650 TABLET ORAL
Status: CANCELLED | OUTPATIENT
Start: 2022-06-22

## 2022-06-22 RX ORDER — HEPARIN SODIUM (PORCINE) LOCK FLUSH IV SOLN 100 UNIT/ML 100 UNIT/ML
500 SOLUTION INTRAVENOUS PRN
Status: DISCONTINUED | OUTPATIENT
Start: 2022-06-22 | End: 2022-06-23 | Stop reason: HOSPADM

## 2022-06-22 RX ORDER — ONDANSETRON 2 MG/ML
8 INJECTION INTRAMUSCULAR; INTRAVENOUS
Status: CANCELLED | OUTPATIENT
Start: 2022-06-22

## 2022-06-22 RX ORDER — SODIUM CHLORIDE 9 MG/ML
5-40 INJECTION INTRAVENOUS PRN
Status: CANCELLED | OUTPATIENT
Start: 2022-06-22

## 2022-06-22 RX ORDER — PALONOSETRON 0.05 MG/ML
0.25 INJECTION, SOLUTION INTRAVENOUS ONCE
Status: COMPLETED | OUTPATIENT
Start: 2022-06-22 | End: 2022-06-22

## 2022-06-22 RX ORDER — SODIUM CHLORIDE 9 MG/ML
INJECTION, SOLUTION INTRAVENOUS CONTINUOUS
Status: CANCELLED | OUTPATIENT
Start: 2022-06-22

## 2022-06-22 RX ORDER — SODIUM CHLORIDE 9 MG/ML
25 INJECTION, SOLUTION INTRAVENOUS PRN
Status: CANCELLED | OUTPATIENT
Start: 2022-06-22

## 2022-06-22 RX ADMIN — FOSAPREPITANT 150 MG: 150 INJECTION, POWDER, LYOPHILIZED, FOR SOLUTION INTRAVENOUS at 12:20

## 2022-06-22 RX ADMIN — SODIUM CHLORIDE 200 MG: 9 INJECTION, SOLUTION INTRAVENOUS at 13:04

## 2022-06-22 RX ADMIN — CYANOCOBALAMIN 1000 MCG: 1000 INJECTION, SOLUTION INTRAMUSCULAR at 13:45

## 2022-06-22 RX ADMIN — SODIUM CHLORIDE 900 MG: 9 INJECTION, SOLUTION INTRAVENOUS at 13:46

## 2022-06-22 RX ADMIN — SODIUM CHLORIDE, PRESERVATIVE FREE 10 ML: 5 INJECTION INTRAVENOUS at 14:55

## 2022-06-22 RX ADMIN — CARBOPLATIN 500 MG: 10 INJECTION INTRAVENOUS at 14:08

## 2022-06-22 RX ADMIN — SODIUM CHLORIDE 20 ML/HR: 9 INJECTION, SOLUTION INTRAVENOUS at 12:17

## 2022-06-22 RX ADMIN — Medication 500 UNITS: at 14:55

## 2022-06-22 RX ADMIN — SODIUM CHLORIDE, PRESERVATIVE FREE 10 ML: 5 INJECTION INTRAVENOUS at 10:15

## 2022-06-22 RX ADMIN — DEXAMETHASONE SODIUM PHOSPHATE 10 MG: 10 INJECTION INTRAMUSCULAR; INTRAVENOUS at 12:17

## 2022-06-22 RX ADMIN — PALONOSETRON 0.25 MG: 0.05 INJECTION, SOLUTION INTRAVENOUS at 12:17

## 2022-06-22 NOTE — PROGRESS NOTES
Patient ID: Esvin Primus, 1955, [id number], 61 y.o. Diagnosis:   Left upper lobe invasive lung adenocarcinoma, Status post lobectomy 9/28/18, Pathologic stage: pT1c, pN2, stage IIIa R1 with positive margin,    Will start chemotherapy followed By RT  Carbo taxol cycle 1 on 11/14/18  Radiation Therapy completed on 3/29/19  Unfortunately on 3/26/2022 she presented with seizure activity and her CT scan MRI showed multiple supra and infratentorial intraparenchymal lesion consistent with metastatic disease in brain  Her CT chest abdomen pelvis on 3/27/2022 showed 2.1 cm right adrenal nodule suspicious for metastasis  On 3/29/2022 she underwent right-sided craniotomy with resection of intraaxial brain tumor and completed SRS/gamma knife to 6 intracranial lesion on 4/25/2022  Next-generation sequencing is negative for PD-L1, negative for EGFR, ALK, RET, ROS1, BRAF, HER2 mutations  HISTORY OF PRESENT ILLNESS:    Oncologic History:  The patient is a 66 y.o.  female who is admitted to the hospital for Left upper lobe mass. Pt has a significant past medical history of Uterine fibroids requiring total abdominal hysterectomy, liver hemangioma, HTN, Hyperlipidemia, DVT Left leg, COPD, and anxiety. Pt was found to have a left upper lung adenocarcinoma and presented to the hospital on 9/28/2018 for a scheduled robotic-assisted left upper lobe lobectomy. Pathology is positive for metastatic adenocarcinoma. There are some lymph nodes positive and some evidence of invasion to surrounding structures seen during surgery. Surgical team is planning on discharging patient this evening from the hospital.  She was discharged from the hospital with plan to follow with oncology as outpatient. Interval history:   Jovan De Anda is returning for follow-up visit and to discuss lab results and further recommendations. She denies any new bone pain or back pain. She started chemotherapy with hemoglobin.   She does have some fatigue and tiredness. Denies any chest pain or worsening shortness of breath. During this visit patient's allergy, social, medical, surgical history and medications were reviewed and updated. Current Outpatient Medications   Medication Sig Dispense Refill    pantoprazole (PROTONIX) 20 MG tablet Take 1 tablet by mouth daily (Patient taking differently: Take 40 mg by mouth daily ) 30 tablet 3    Multiple Vitamin (MULTIVITAMIN PO) Take by mouth daily      folic acid (FOLVITE) 1 MG tablet Take 1 tablet by mouth daily 30 tablet 5    lovastatin (MEVACOR) 10 MG tablet Take 1 tablet by mouth nightly 30 tablet 11    lisinopril-hydroCHLOROthiazide (PRINZIDE;ZESTORETIC) 10-12.5 MG per tablet Take 1 tablet by mouth daily 30 tablet 11    levETIRAcetam (KEPPRA) 500 MG tablet Take 1 tablet by mouth 2 times daily 60 tablet 3    albuterol sulfate  (90 Base) MCG/ACT inhaler inhale 2 puffs by mouth four times a day 18 g 11    ondansetron (ZOFRAN) 4 MG tablet Take 1 tablet by mouth 3 times daily as needed for Nausea or Vomiting (Patient not taking: Reported on 2022) 30 tablet 0    clonazePAM (KLONOPIN) 0.5 MG tablet Take 1 tablet by mouth 2 times daily as needed for Anxiety for up to 30 days. (Patient not taking: Reported on 2022) 60 tablet 0     No current facility-administered medications for this visit.        Social History     Socioeconomic History    Marital status:      Spouse name: Not on file    Number of children: Not on file    Years of education: Not on file    Highest education level: Not on file   Occupational History    Not on file   Tobacco Use    Smoking status: Former Smoker     Packs/day: 1.50     Years: 30.00     Pack years: 45.00     Types: Cigarettes     Quit date:      Years since quittin.4    Smokeless tobacco: Never Used   Vaping Use    Vaping Use: Never used   Substance and Sexual Activity    Alcohol use: Yes     Comment: Occassionally    Drug use: No    Sexual activity: Not on file   Other Topics Concern    Not on file   Social History Narrative    Not on file     Social Determinants of Health     Financial Resource Strain: Low Risk     Difficulty of Paying Living Expenses: Not hard at all   Food Insecurity: No Food Insecurity    Worried About [address] in the Last Year: Never true    [address] in the Last Year: Never true   Transportation Needs:     Lack of Transportation (Medical): Not on file    Lack of Transportation (Non-Medical): Not on file   Physical Activity:     Days of Exercise per Week: Not on file    Minutes of Exercise per Session: Not on file   Stress:     Feeling of Stress : Not on file   Social Connections:     Frequency of Communication with Friends and Family: Not on file    Frequency of Social Gatherings with Friends and Family: Not on file    Attends Je[employer] witness Services: Not on file    Active Member of Clubs or Organizations: Not on file    Attends Club or Organization Meetings: Not on file    Marital Status: Not on file   Intimate Partner Violence:     Fear of Current or Ex-Partner: Not on file    Emotionally Abused: Not on file    Physically Abused: Not on file    Sexually Abused: Not on file   Housing Stability:     Unable to Pay for Housing in the Last Year: Not on file    Number of [place] in the Last Year: Not on file    Unstable Housing in the Last Year: Not on file       Family History   Problem Relation Age of Onset    Cancer Mother         LUNG    Cancer Sister         LUNG        REVIEW OF SYSTEM:     Constitutional: No fever or chills.  No night sweats, no weight loss   Eyes: No eye discharge, double vision, or eye pain   HEENT: negative for sore mouth, sore throat, hoarseness and voice change   Respiratory: negative for cough , sputum, dyspnea, wheezing, hemoptysis, chest pain   Cardiovascular: negative for chest pain, dyspnea, palpitations, orthopnea, PND   Gastrointestinal: negative for nausea, vomiting, diarrhea, constipation, abdominal pain, Dysphagia, hematemesis and hematochezia   Genitourinary: negative for frequency, dysuria, nocturia, urinary incontinence, and hematuria   Integument: negative for rash, skin lesions, bruises.    Hematologic/Lymphatic: negative for easy bruising, bleeding, lymphadenopathy, petechiae and swelling/edema   Endocrine: negative for heat or cold intolerance, tremor, weight changes, change in bowel habits and hair loss   Musculoskeletal: negative for myalgias, arthralgias, pain, joint swelling,and bone pain   Neurological: negative for headaches, dizziness, seizures, weakness, numbness       OBJECTIVE:         Vitals:    06/22/22 1101   BP: 138/83   Pulse: (!) 103   Resp:    Temp:    PHYSICAL EXAM:   General appearance - well appearing, no in pain or distress   Mental status - alert and cooperative   Eyes - pupils equal and reactive, extraocular eye movements intact   Ears - bilateral TM's and external ear canals normal   Mouth - mucous membranes moist, pharynx normal without lesions   Neck - supple, no significant adenopathy   Lymphatics - no palpable lymphadenopathy, no hepatosplenomegaly   Chest - clear to auscultation, no wheezes, rales or rhonchi, symmetric air entry   Left chest surgical scar healing well  Heart - normal rate, regular rhythm, normal S1, S2, no murmurs, rubs, clicks or gallops   Abdomen - soft, nontender, nondistended, no masses or organomegaly   Neurological - alert, oriented, normal speech, no focal findings or movement disorder noted   Musculoskeletal - no joint tenderness, deformity or swelling   Extremities - peripheral pulses normal, no pedal edema, no clubbing or cyanosis   Skin - normal coloration and turgor, no rashes, no suspicious skin lesions noted ,  LABORATORY DATA:     Lab Results   Component Value Date    WBC 4.9 06/22/2022    HGB 9.8 (L) 06/22/2022    HCT 31.6 (L) 06/22/2022    .0 06/22/2022     06/22/2022    LYMPHOPCT 25 06/22/2022    RBC 3.13 (L) 06/22/2022    MCH 31.3 06/22/2022    MCHC 31.0 06/22/2022    RDW 16.8 (H) 06/22/2022    MONOPCT 11 06/22/2022    BASOPCT 1 06/22/2022    NEUTROABS 3.04 06/22/2022    LYMPHSABS 1.21 06/22/2022    MONOSABS 0.55 06/22/2022    EOSABS 0.06 06/22/2022    BASOSABS 0.03 06/22/2022       Chemistry        Component Value Date/Time     06/22/2022 1018    K 3.7 06/22/2022 1018    CL 98 06/22/2022 1018    CO2 27 06/22/2022 1018    BUN 11 06/22/2022 1018    CREATININE 0.52 06/22/2022 1018        Component Value Date/Time    CALCIUM 9.1 06/22/2022 1018    ALKPHOS 101 06/22/2022 1018    AST 44 (H) 06/22/2022 1018    ALT 50 (H) 06/22/2022 1018    BILITOT 0.39 06/22/2022 1018        PATHOLOGY DATA:   Pathology:  CT Guided Biopsy ([facility]) 8/22/18:   LEFT LUNG, UPPER LOBE, CT GUIDED CORE NEEDLE BIOPSIES:  - INVASIVE ADENOCARCINOMA, CONSISTENT WITH LUNG PRIMARY. Collected: 9/28/2018   -- Diagnosis --   1.  LYMPH NODE, LEVEL 9, BIOPSY:       -  ONE LYMPH NODE, NEGATIVE FOR MALIGNANCY (0/1). 2.  LYMPH NODE, LEVEL 11, DISSECTION:       -  ONE OF 2 LYMPH NODES POSITIVE FOR METASTATIC CARCINOMA (1/2). 3.  LYMPH NODE, LEVEL 5, DISSECTION:       -  ONE OF 2 LYMPH NODES POSITIVE FOR METASTATIC CARCINOMA (1/2). -  CARCINOMA INVADES LARGE PERIPHERAL NERVE BRANCHES. 4.  LUNG, LEFT UPPER LOBE, LOBECTOMY:            -  WELL-DIFFERENTIATED INVASIVE ADENOCARCINOMA, 2.9 CM   GREATEST DIMENSION. -  NEGATIVE FOR VISCERAL PLEURAL INVASION. -  TUMOR INVOLVES SOFT TISSUE OF BRONCHIAL, VASCULAR AND PARENCHYMAL MARGINS. -  5 PERIBRONCHIOLAR LYMPH NODES, POSITIVE FOR METASTATIC   ADENOCARCINOMA (5/5). -  SEPARATE SMALL INTRALOBAR FOCUS ATYPICAL ADENOMATOUS   HYPERPLASIA. -  PATHOLOGIC STAGE:  pT1c, pN2, R1.     5.  LYMPH NODES, LEVEL 10, DISSECTION:       -  ONE OF 2 LYMPH NODES POSITIVE FOR METASTATIC CARCINOMA (1/2). IMAGING DATA:    MRI brain 3/26/2022  Impression   1.  Multiple supra and infratentorial intraparenchymal lesions are most   consistent with metastatic disease. There is associated edema with minimal   mass effect, but no midline shift. 2. Intrinsically T1 hyperintense right frontal calvarial lesion is most   consistent with an osseous hemangioma. No definite osseous metastasis   identified. CT chest abdomen pelvis 3/27/2022  mpression   1. Mild centrilobular emphysema. 2. Redemonstration of left perihilar post treatment scarring with underlying   traction bronchiectasis extending into the upper lower lobes. Stable. 3. No evidence for metastatic disease in the chest.   4. A 2.1 x 1.4 cm right adrenal nodule not present in 2018. CT appearance   does not meet criteria for adenoma. Considered metastatic nodule until   proven otherwise. 5. Redemonstration of hepatic hemangioma and several cysts. ASSESSMENT:    Lewis Cates Is a very pleasant 61 y.o.  female with initial diagnosis of left upper lobe non-small cell lung cancer, adenocarcinoma, status post Robotically assisted BRAAK lobectomy with LN dissection and Intercostal nerve block with experal on 9/28/18. She has [id number] disease, stage IIIA, she had R1 disease with positive margins. She completed sequential chemo RT. Unfortunately now she has recurrent disease with brain metastasis and also adrenal metastasis. I reviewed the NCCN guidelines and goals of care and now on  systemic therapy when she completes radiation therapy      During today's visit, the patient and the family had a number of reasonable questions which were answered to their satisfaction. They verbalized understanding of the information provided and they agreed to proceed as outlined above. PLAN:   I reviewed her recent lab work, discussed diagnosis and treatment recommendations   Plan to continue chemotherapy as scheduled   RTC in 3 weeks      Jose Castillo MD  Hematologist/Medical Oncologist    I spent more than 40 minutes examining, evaluating, reviewing data and counseling the patient. Greater than 50% of that time was spent face-to-face with the patient in counseling and coordinating her care. This note is created with the assistance of a speech recognition program.  While intending to generate a document that actually reflects the content of the visit, the document can still have some errors including those of syntax and sound a like substitutions which may escape proof reading. It such instances, actual meaning can be extrapolated by contextual diversion.

## 2022-06-22 NOTE — TELEPHONE ENCOUNTER
Name: Moe Palacios  : 1955  MRN: [id number]    Oncology Navigation Follow-Up Note  Navigator met with pt. Face to face offering assistance if needed. Pt. To meet with Christie for completion of assistance paperwork. Pt. Denies needs to day. Plan to follow.    Electronically signed by Joe Sen RN on 2022 at 10:33 AM

## 2022-06-22 NOTE — PROGRESS NOTES
Patient arrive ambulatory with spouse for cycle 2 day 1 treatment and physician visit. Denies complaint or concern. Vitals as charted. Port accessed; specimen sent. Physician met with patient; labs reviewed; ok to proceed with treatment. Patient premedicated. Keytruda infused with no sign of adverse reaction; line flushed. Alimta infused with no sign of adverse reaction; line flushed. Carbo infused with no sign of adverse reaction; line flushed. Port flushed and heparinized with intact kay needle removed per protocol. Patient ambulate off unit with spouse at discharge; AVS per front office staff.

## 2022-06-28 DIAGNOSIS — C34.92 NON-SMALL CELL CANCER OF LEFT LUNG (HCC): ICD-10-CM

## 2022-06-28 DIAGNOSIS — R22.0 MASS OF OCCIPITAL REGION: ICD-10-CM

## 2022-06-28 DIAGNOSIS — C79.31 BRAIN METASTASES (HCC): ICD-10-CM

## 2022-06-28 RX ORDER — CLONAZEPAM 0.5 MG/1
0.5 TABLET ORAL 2 TIMES DAILY PRN
Qty: 60 TABLET | Refills: 0 | Status: SHIPPED | OUTPATIENT
Start: 2022-06-28 | End: 2022-08-31

## 2022-06-28 NOTE — TELEPHONE ENCOUNTER
Patti Hurley is calling to request a refill on the following medication(s):    Last Visit Date (If Applicable):  4/85/9442    Next Visit Date:    Visit date not found    Medication Request:  Requested Prescriptions     Pending Prescriptions Disp Refills    clonazePAM (KLONOPIN) 0.5 MG tablet 60 tablet 0     Sig: Take 1 tablet by mouth 2 times daily as needed for Anxiety for up to 30 days.

## 2022-06-30 RX ORDER — LEVETIRACETAM 500 MG/1
500 TABLET ORAL 2 TIMES DAILY
Qty: 60 TABLET | Refills: 0 | Status: SHIPPED | OUTPATIENT
Start: 2022-06-30 | End: 2022-07-28

## 2022-06-30 NOTE — TELEPHONE ENCOUNTER
Called Dr Tayler Cox, see previous notes   Pt has 2 pills left of Keppra   Dr Tayler Cox approved 1 month of Keppra then pt needs to have pcp or neurology     Called pt, advised of above, she will ask Rad Onc tomorrow in the length of time for Jace Guzman does not have a neurologist and her pcp has not been involved in her care     keppra prescription sent to pharmacy

## 2022-07-06 ENCOUNTER — TELEPHONE (OUTPATIENT)
Dept: RADIATION ONCOLOGY | Age: 67
End: 2022-07-06

## 2022-07-06 NOTE — TELEPHONE ENCOUNTER
Patient called to make sure she is supposed to continue taking Keppra. She had this refilled by Dr. Ana Ahuja over the weekend because she ran out and he was on call. Verified with Dr. Wigigns Crew he wants her to continue to taking this and to call here next time she needs a refill.

## 2022-07-11 ENCOUNTER — TELEPHONE (OUTPATIENT)
Dept: INFUSION THERAPY | Facility: MEDICAL CENTER | Age: 67
End: 2022-07-11

## 2022-07-11 NOTE — TELEPHONE ENCOUNTER
Writer spoke with patient regarding [facility] appointment scheduled on 7/13/2022. Free [facility] will not arrive prior to 7/13/22; therefore, appointment has been cancelled (with Dr. Zackery rBadley knowledge and approval) and will be rescheduled once Practice is notified of ship date of free product. Prescription was faxed to [address] today and writer will follow with them tomorrow. When ship date is provied, patient will be notified with rescheduled appointment date. [address] notified as well.

## 2022-07-12 ENCOUNTER — HOSPITAL ENCOUNTER (OUTPATIENT)
Facility: MEDICAL CENTER | Age: 67
End: 2022-07-12
Payer: MEDICARE

## 2022-07-12 NOTE — TELEPHONE ENCOUNTER
Writer spoke with Tony Forman at [facility] with [employer] to order free Evelena Samuel. Product will ship overnight for delivery on Wednesday, 7/13; however, time cannot be guaranteed. Writer has informed pharmacy staff and .

## 2022-07-13 ENCOUNTER — HOSPITAL ENCOUNTER (OUTPATIENT)
Dept: INFUSION THERAPY | Facility: MEDICAL CENTER | Age: 67
Discharge: HOME | End: 2022-07-13
Payer: MEDICARE

## 2022-07-13 ENCOUNTER — TELEPHONE (OUTPATIENT)
Dept: ONCOLOGY | Age: 67
End: 2022-07-13

## 2022-07-13 ENCOUNTER — HOSPITAL ENCOUNTER (OUTPATIENT)
Facility: MEDICAL CENTER | Age: 67
Discharge: HOME | End: 2022-07-13
Payer: MEDICARE

## 2022-07-13 ENCOUNTER — OFFICE VISIT (OUTPATIENT)
Dept: ONCOLOGY | Age: 67
End: 2022-07-13
Payer: MEDICARE

## 2022-07-13 VITALS
DIASTOLIC BLOOD PRESSURE: 78 MMHG | TEMPERATURE: 98.3 F | WEIGHT: 166 LBS | RESPIRATION RATE: 16 BRPM | OXYGEN SATURATION: 97 % | SYSTOLIC BLOOD PRESSURE: 122 MMHG | HEART RATE: 105 BPM | BODY MASS INDEX: 33.53 KG/M2

## 2022-07-13 VITALS — HEART RATE: 90 BPM

## 2022-07-13 DIAGNOSIS — C34.92 NON-SMALL CELL CANCER OF LEFT LUNG (HCC): Primary | ICD-10-CM

## 2022-07-13 DIAGNOSIS — C79.31 PRIMARY MALIGNANT NEOPLASM OF LUNG WITH METASTASIS TO BRAIN (HCC): ICD-10-CM

## 2022-07-13 DIAGNOSIS — C34.90 PRIMARY MALIGNANT NEOPLASM OF LUNG WITH METASTASIS TO BRAIN (HCC): ICD-10-CM

## 2022-07-13 LAB
ABSOLUTE EOS #: <0.03 K/UL (ref 0–0.44)
ABSOLUTE IMMATURE GRANULOCYTE: 0.01 K/UL (ref 0–0.3)
ABSOLUTE LYMPH #: 0.89 K/UL (ref 1.1–3.7)
ABSOLUTE MONO #: 0.41 K/UL (ref 0.1–1.2)
ALBUMIN SERPL-MCNC: 4.3 G/DL (ref 3.5–5.2)
ALP BLD-CCNC: 74 U/L (ref 35–104)
ALT SERPL-CCNC: 53 U/L (ref 5–33)
ANION GAP SERPL CALCULATED.3IONS-SCNC: 11 MMOL/L (ref 9–17)
AST SERPL-CCNC: 88 U/L
BASOPHILS # BLD: 1 % (ref 0–2)
BASOPHILS ABSOLUTE: <0.03 K/UL (ref 0–0.2)
BILIRUB SERPL-MCNC: 0.2 MG/DL (ref 0.3–1.2)
BUN BLDV-MCNC: 10 MG/DL (ref 8–23)
BUN/CREAT BLD: 22 (ref 9–20)
CALCIUM SERPL-MCNC: 9.3 MG/DL (ref 8.6–10.4)
CHLORIDE BLD-SCNC: 100 MMOL/L (ref 98–107)
CHOLESTEROL/HDL RATIO: 2.7
CHOLESTEROL: 196 MG/DL
CO2: 27 MMOL/L (ref 20–31)
CREAT SERPL-MCNC: 0.46 MG/DL (ref 0.5–0.9)
EOSINOPHILS RELATIVE PERCENT: 1 % (ref 1–4)
GFR AFRICAN AMERICAN: >60 ML/MIN
GFR NON-AFRICAN AMERICAN: >60 ML/MIN
GFR SERPL CREATININE-BSD FRML MDRD: ABNORMAL ML/MIN/{1.73_M2}
GLUCOSE BLD-MCNC: 109 MG/DL (ref 70–99)
HCT VFR BLD CALC: 32.1 % (ref 36.3–47.1)
HDLC SERPL-MCNC: 72 MG/DL
HEMOGLOBIN: 10.4 G/DL (ref 11.9–15.1)
IMMATURE GRANULOCYTES: 0 %
LDL CHOLESTEROL: 91 MG/DL (ref 0–130)
LYMPHOCYTES # BLD: 25 % (ref 24–43)
MCH RBC QN AUTO: 32.5 PG (ref 25.2–33.5)
MCHC RBC AUTO-ENTMCNC: 32.4 G/DL (ref 28.4–34.8)
MCV RBC AUTO: 100.3 FL (ref 82.6–102.9)
MONOCYTES # BLD: 12 % (ref 3–12)
NRBC AUTOMATED: 0 PER 100 WBC
PDW BLD-RTO: 15.4 % (ref 11.8–14.4)
PLATELET # BLD: 185 K/UL (ref 138–453)
PMV BLD AUTO: 9.3 FL (ref 8.1–13.5)
POTASSIUM SERPL-SCNC: 3.5 MMOL/L (ref 3.7–5.3)
RBC # BLD: 3.2 M/UL (ref 3.95–5.11)
RBC # BLD: ABNORMAL 10*6/UL
SEG NEUTROPHILS: 61 % (ref 36–65)
SEGMENTED NEUTROPHILS ABSOLUTE COUNT: 2.2 K/UL (ref 1.5–8.1)
SODIUM BLD-SCNC: 138 MMOL/L (ref 135–144)
TOTAL PROTEIN: 6.6 G/DL (ref 6.4–8.3)
TRIGL SERPL-MCNC: 166 MG/DL
TSH SERPL DL<=0.05 MIU/L-ACNC: 1.57 UIU/ML (ref 0.3–5)
WBC # BLD: 3.6 K/UL (ref 3.5–11.3)

## 2022-07-13 PROCEDURE — 85025 COMPLETE CBC W/AUTO DIFF WBC: CPT

## 2022-07-13 PROCEDURE — G8400 PT W/DXA NO RESULTS DOC: HCPCS | Performed by: INTERNAL MEDICINE

## 2022-07-13 PROCEDURE — G8427 DOCREV CUR MEDS BY ELIG CLIN: HCPCS | Performed by: INTERNAL MEDICINE

## 2022-07-13 PROCEDURE — 1090F PRES/ABSN URINE INCON ASSESS: CPT | Performed by: INTERNAL MEDICINE

## 2022-07-13 PROCEDURE — 96417 CHEMO IV INFUS EACH ADDL SEQ: CPT

## 2022-07-13 PROCEDURE — 1036F TOBACCO NON-USER: CPT | Performed by: INTERNAL MEDICINE

## 2022-07-13 PROCEDURE — 3017F COLORECTAL CA SCREEN DOC REV: CPT | Performed by: INTERNAL MEDICINE

## 2022-07-13 PROCEDURE — 6360000002 HC RX W HCPCS: Performed by: INTERNAL MEDICINE

## 2022-07-13 PROCEDURE — 96409 CHEMO IV PUSH SNGL DRUG: CPT

## 2022-07-13 PROCEDURE — 96413 CHEMO IV INFUSION 1 HR: CPT

## 2022-07-13 PROCEDURE — 96365 THER/PROPH/DIAG IV INF INIT: CPT

## 2022-07-13 PROCEDURE — 96411 CHEMO IV PUSH ADDL DRUG: CPT

## 2022-07-13 PROCEDURE — 1123F ACP DISCUSS/DSCN MKR DOCD: CPT | Performed by: INTERNAL MEDICINE

## 2022-07-13 PROCEDURE — 36591 DRAW BLOOD OFF VENOUS DEVICE: CPT

## 2022-07-13 PROCEDURE — 2580000003 HC RX 258: Performed by: INTERNAL MEDICINE

## 2022-07-13 PROCEDURE — 96367 TX/PROPH/DG ADDL SEQ IV INF: CPT

## 2022-07-13 PROCEDURE — 84443 ASSAY THYROID STIM HORMONE: CPT

## 2022-07-13 PROCEDURE — 99215 OFFICE O/P EST HI 40 MIN: CPT | Performed by: INTERNAL MEDICINE

## 2022-07-13 PROCEDURE — 80053 COMPREHEN METABOLIC PANEL: CPT

## 2022-07-13 PROCEDURE — 96375 TX/PRO/DX INJ NEW DRUG ADDON: CPT

## 2022-07-13 PROCEDURE — 99211 OFF/OP EST MAY X REQ PHY/QHP: CPT | Performed by: INTERNAL MEDICINE

## 2022-07-13 PROCEDURE — G8417 CALC BMI ABV UP PARAM F/U: HCPCS | Performed by: INTERNAL MEDICINE

## 2022-07-13 PROCEDURE — 80061 LIPID PANEL: CPT

## 2022-07-13 RX ORDER — HEPARIN SODIUM (PORCINE) LOCK FLUSH IV SOLN 100 UNIT/ML 100 UNIT/ML
500 SOLUTION INTRAVENOUS PRN
Status: CANCELLED | OUTPATIENT
Start: 2022-07-13

## 2022-07-13 RX ORDER — MEPERIDINE HYDROCHLORIDE 50 MG/ML
12.5 INJECTION INTRAMUSCULAR; INTRAVENOUS; SUBCUTANEOUS PRN
Status: CANCELLED | OUTPATIENT
Start: 2022-07-13

## 2022-07-13 RX ORDER — DIPHENHYDRAMINE HYDROCHLORIDE 50 MG/ML
50 INJECTION INTRAMUSCULAR; INTRAVENOUS
Status: CANCELLED | OUTPATIENT
Start: 2022-07-13

## 2022-07-13 RX ORDER — ONDANSETRON 2 MG/ML
8 INJECTION INTRAMUSCULAR; INTRAVENOUS
Status: CANCELLED | OUTPATIENT
Start: 2022-07-13

## 2022-07-13 RX ORDER — ACETAMINOPHEN 325 MG/1
650 TABLET ORAL
Status: CANCELLED | OUTPATIENT
Start: 2022-07-13

## 2022-07-13 RX ORDER — PALONOSETRON 0.05 MG/ML
0.25 INJECTION, SOLUTION INTRAVENOUS ONCE
Status: CANCELLED | OUTPATIENT
Start: 2022-07-13 | End: 2022-07-13

## 2022-07-13 RX ORDER — SODIUM CHLORIDE 9 MG/ML
INJECTION, SOLUTION INTRAVENOUS CONTINUOUS
Status: CANCELLED | OUTPATIENT
Start: 2022-07-13

## 2022-07-13 RX ORDER — SODIUM CHLORIDE 9 MG/ML
5-40 INJECTION INTRAVENOUS PRN
Status: CANCELLED | OUTPATIENT
Start: 2022-07-13

## 2022-07-13 RX ORDER — SODIUM CHLORIDE 0.9 % (FLUSH) 0.9 %
5-40 SYRINGE (ML) INJECTION PRN
Status: CANCELLED | OUTPATIENT
Start: 2022-07-13

## 2022-07-13 RX ORDER — CYANOCOBALAMIN 1000 UG/ML
1000 INJECTION INTRAMUSCULAR; SUBCUTANEOUS ONCE
Status: CANCELLED | OUTPATIENT
Start: 2022-07-13 | End: 2022-07-13

## 2022-07-13 RX ORDER — DEXAMETHASONE SODIUM PHOSPHATE 10 MG/ML
10 INJECTION INTRAMUSCULAR; INTRAVENOUS ONCE
Status: COMPLETED | OUTPATIENT
Start: 2022-07-13 | End: 2022-07-13

## 2022-07-13 RX ORDER — ALBUTEROL SULFATE 90 UG/1
4 AEROSOL, METERED RESPIRATORY (INHALATION) PRN
Status: CANCELLED | OUTPATIENT
Start: 2022-07-13

## 2022-07-13 RX ORDER — SODIUM CHLORIDE 0.9 % (FLUSH) 0.9 %
5-40 SYRINGE (ML) INJECTION PRN
Status: DISCONTINUED | OUTPATIENT
Start: 2022-07-13 | End: 2022-07-14 | Stop reason: HOSPADM

## 2022-07-13 RX ORDER — SODIUM CHLORIDE 9 MG/ML
20 INJECTION, SOLUTION INTRAVENOUS ONCE
Status: COMPLETED | OUTPATIENT
Start: 2022-07-13 | End: 2022-07-13

## 2022-07-13 RX ORDER — FAMOTIDINE 10 MG/ML
20 INJECTION, SOLUTION INTRAVENOUS
Status: CANCELLED | OUTPATIENT
Start: 2022-07-13

## 2022-07-13 RX ORDER — EPINEPHRINE 1 MG/ML
0.3 INJECTION, SOLUTION, CONCENTRATE INTRAVENOUS PRN
Status: CANCELLED | OUTPATIENT
Start: 2022-07-13

## 2022-07-13 RX ORDER — SODIUM CHLORIDE 9 MG/ML
25 INJECTION, SOLUTION INTRAVENOUS PRN
Status: CANCELLED | OUTPATIENT
Start: 2022-07-13

## 2022-07-13 RX ORDER — PALONOSETRON 0.05 MG/ML
0.25 INJECTION, SOLUTION INTRAVENOUS ONCE
Status: COMPLETED | OUTPATIENT
Start: 2022-07-13 | End: 2022-07-13

## 2022-07-13 RX ORDER — SODIUM CHLORIDE 9 MG/ML
20 INJECTION, SOLUTION INTRAVENOUS ONCE
Status: CANCELLED | OUTPATIENT
Start: 2022-07-13 | End: 2022-07-13

## 2022-07-13 RX ORDER — HEPARIN SODIUM (PORCINE) LOCK FLUSH IV SOLN 100 UNIT/ML 100 UNIT/ML
500 SOLUTION INTRAVENOUS PRN
Status: DISCONTINUED | OUTPATIENT
Start: 2022-07-13 | End: 2022-07-14 | Stop reason: HOSPADM

## 2022-07-13 RX ADMIN — SODIUM CHLORIDE 200 MG: 9 INJECTION, SOLUTION INTRAVENOUS at 15:07

## 2022-07-13 RX ADMIN — CARBOPLATIN 500 MG: 10 INJECTION INTRAVENOUS at 16:09

## 2022-07-13 RX ADMIN — SODIUM CHLORIDE, PRESERVATIVE FREE 10 ML: 5 INJECTION INTRAVENOUS at 13:12

## 2022-07-13 RX ADMIN — SODIUM CHLORIDE 20 ML/HR: 9 INJECTION, SOLUTION INTRAVENOUS at 13:15

## 2022-07-13 RX ADMIN — DEXAMETHASONE SODIUM PHOSPHATE 10 MG: 10 INJECTION INTRAMUSCULAR; INTRAVENOUS at 14:02

## 2022-07-13 RX ADMIN — SODIUM CHLORIDE 900 MG: 9 INJECTION, SOLUTION INTRAVENOUS at 15:49

## 2022-07-13 RX ADMIN — HEPARIN 500 UNITS: 100 SYRINGE at 17:02

## 2022-07-13 RX ADMIN — SODIUM CHLORIDE, PRESERVATIVE FREE 10 ML: 5 INJECTION INTRAVENOUS at 17:02

## 2022-07-13 RX ADMIN — PALONOSETRON 0.25 MG: 0.05 INJECTION, SOLUTION INTRAVENOUS at 14:02

## 2022-07-13 RX ADMIN — FOSAPREPITANT 150 MG: 150 INJECTION, POWDER, LYOPHILIZED, FOR SOLUTION INTRAVENOUS at 14:18

## 2022-07-13 NOTE — TELEPHONE ENCOUNTER
Danielle Marrero MD VISIT & [address] IN TO SEE PATIENT  ORDERS RECEIVED  CHEMO AS PLANNED  RV 3 WEEKS  MD VISIT 08/03/22 @10:30AM [email or web address]  AVS PRINTED AND GIVEN TO PATIENT WITH INSTRUCTIONS  PATIENT REMAINS IN [address] [place] [address]

## 2022-07-13 NOTE — PROGRESS NOTES
Patient ID: Michelle Mckee, 1955, [id number], 74 y.o. Diagnosis:   Left upper lobe invasive lung adenocarcinoma, Status post lobectomy 9/28/18, Pathologic stage: pT1c, pN2, stage IIIa R1 with positive margin,    Will start chemotherapy followed By RT  Carbo taxol cycle 1 on 11/14/18  Radiation Therapy completed on 3/29/19  Unfortunately on 3/26/2022 she presented with seizure activity and her CT scan MRI showed multiple supra and infratentorial intraparenchymal lesion consistent with metastatic disease in brain  Her CT chest abdomen pelvis on 3/27/2022 showed 2.1 cm right adrenal nodule suspicious for metastasis  On 3/29/2022 she underwent right-sided craniotomy with resection of intraaxial brain tumor and completed SRS/gamma knife to 6 intracranial lesion on 4/25/2022  Next-generation sequencing is negative for PD-L1, negative for EGFR, ALK, RET, ROS1, BRAF, HER2 mutations  HISTORY OF PRESENT ILLNESS:    Oncologic History:  The patient is a 66 y.o.  female who is admitted to the hospital for Left upper lobe mass. Pt has a significant past medical history of Uterine fibroids requiring total abdominal hysterectomy, liver hemangioma, HTN, Hyperlipidemia, DVT Left leg, COPD, and anxiety. Pt was found to have a left upper lung adenocarcinoma and presented to the hospital on 9/28/2018 for a scheduled robotic-assisted left upper lobe lobectomy. Pathology is positive for metastatic adenocarcinoma. There are some lymph nodes positive and some evidence of invasion to surrounding structures seen during surgery. Surgical team is planning on discharging patient this evening from the hospital.  She was discharged from the hospital with plan to follow with oncology as outpatient. Interval history:   Khushi Betancourt is returning for follow-up visit and to discuss lab results and further recommendations. She has tolerated chemotherapy reasonably well. She denies any nausea vomiting.   She does have some shortness of breath. She is planning to have MRI brain on . She denies any worsening shortness of breath. Her CT chest showed more numerous and patchy peripheral opacities in the left lobe possibly inflammation she denies any worsening cough. During this visit patient's allergy, social, medical, surgical history and medications were reviewed and updated. Current Outpatient Medications   Medication Sig Dispense Refill    levETIRAcetam (KEPPRA) 500 MG tablet Take 1 tablet by mouth 2 times daily 60 tablet 0    clonazePAM (KLONOPIN) 0.5 MG tablet Take 1 tablet by mouth 2 times daily as needed for Anxiety for up to 30 days. 60 tablet 0    pantoprazole (PROTONIX) 20 MG tablet Take 1 tablet by mouth daily (Patient taking differently: Take 40 mg by mouth daily ) 30 tablet 3    Multiple Vitamin (MULTIVITAMIN PO) Take by mouth daily      folic acid (FOLVITE) 1 MG tablet Take 1 tablet by mouth daily 30 tablet 5    ondansetron (ZOFRAN) 4 MG tablet Take 1 tablet by mouth 3 times daily as needed for Nausea or Vomiting (Patient not taking: Reported on 2022) 30 tablet 0    lovastatin (MEVACOR) 10 MG tablet Take 1 tablet by mouth nightly 30 tablet 11    lisinopril-hydroCHLOROthiazide (PRINZIDE;ZESTORETIC) 10-12.5 MG per tablet Take 1 tablet by mouth daily 30 tablet 11    albuterol sulfate  (90 Base) MCG/ACT inhaler inhale 2 puffs by mouth four times a day 18 g 11     No current facility-administered medications for this visit.        Social History     Socioeconomic History    Marital status:      Spouse name: Not on file    Number of children: Not on file    Years of education: Not on file    Highest education level: Not on file   Occupational History    Not on file   Tobacco Use    Smoking status: Former Smoker     Packs/day: 1.50     Years: 30.00     Pack years: 45.00     Types: Cigarettes     Quit date:      Years since quittin.5    Smokeless tobacco: Never Used   Vaping Use    Vaping Use: Never used   Substance and Sexual Activity    Alcohol use: Yes     Comment: Occassionally    Drug use: No    Sexual activity: Not on file   Other Topics Concern    Not on file   Social History Narrative    Not on file     Social Determinants of Health     Financial Resource Strain: Low Risk     Difficulty of Paying Living Expenses: Not hard at all   Food Insecurity: No Food Insecurity    Worried About [address] in the Last Year: Never true    [address] in the Last Year: Never true   Transportation Needs:     Lack of Transportation (Medical): Not on file    Lack of Transportation (Non-Medical): Not on file   Physical Activity:     Days of Exercise per Week: Not on file    Minutes of Exercise per Session: Not on file   Stress:     Feeling of Stress : Not on file   Social Connections:     Frequency of Communication with Friends and Family: Not on file    Frequency of Social Gatherings with Friends and Family: Not on file    Attends Protestant Services: Not on file    Active Member of Clubs or Organizations: Not on file    Attends Club or Organization Meetings: Not on file    Marital Status: Not on file   Intimate Partner Violence:     Fear of Current or Ex-Partner: Not on file    Emotionally Abused: Not on file    Physically Abused: Not on file    Sexually Abused: Not on file   Housing Stability:     Unable to Pay for Housing in the Last Year: Not on file    Number of [place] in the Last Year: Not on file    Unstable Housing in the Last Year: Not on file       Family History   Problem Relation Age of Onset    Cancer Mother         LUNG    Cancer Sister         LUNG        REVIEW OF SYSTEM:     Constitutional: No fever or chills.  No night sweats, no weight loss   Eyes: No eye discharge, double vision, or eye pain   HEENT: negative for sore mouth, sore throat, hoarseness and voice change   Respiratory: negative for cough , sputum, dyspnea, wheezing, hemoptysis, chest pain   Cardiovascular: negative for chest pain, dyspnea, palpitations, orthopnea, PND   Gastrointestinal: negative for nausea, vomiting, diarrhea, constipation, abdominal pain, Dysphagia, hematemesis and hematochezia   Genitourinary: negative for frequency, dysuria, nocturia, urinary incontinence, and hematuria   Integument: negative for rash, skin lesions, bruises. Hematologic/Lymphatic: negative for easy bruising, bleeding, lymphadenopathy, petechiae and swelling/edema   Endocrine: negative for heat or cold intolerance, tremor, weight changes, change in bowel habits and hair loss   Musculoskeletal: negative for myalgias, arthralgias, pain, joint swelling,and bone pain   Neurological: negative for headaches, dizziness, seizures, weakness, numbness       OBJECTIVE:         There were no vitals filed for this visit. PHYSICAL EXAM:   General appearance - well appearing, no in pain or distress   Mental status - alert and cooperative   Eyes - pupils equal and reactive, extraocular eye movements intact   Ears - bilateral TM's and external ear canals normal   Mouth - mucous membranes moist, pharynx normal without lesions   Neck - supple, no significant adenopathy   Lymphatics - no palpable lymphadenopathy, no hepatosplenomegaly   Chest - clear to auscultation, no wheezes, rales or rhonchi, symmetric air entry   Left chest surgical scar healing well  Heart - normal rate, regular rhythm, normal S1, S2, no murmurs, rubs, clicks or gallops   Abdomen - soft, nontender, nondistended, no masses or organomegaly   Neurological - alert, oriented, normal speech, no focal findings or movement disorder noted   Musculoskeletal - no joint tenderness, deformity or swelling   Extremities - peripheral pulses normal, no pedal edema, no clubbing or cyanosis   Skin - normal coloration and turgor, no rashes, no suspicious skin lesions noted ,  LABORATORY DATA:     Lab Results   Component Value Date    WBC 3.6 07/13/2022    HGB 10.4 (L) 07/13/2022    HCT 32.1 (L) 07/13/2022    .3 07/13/2022     07/13/2022    LYMPHOPCT 25 07/13/2022    RBC 3.20 (L) 07/13/2022    MCH 32.5 07/13/2022    MCHC 32.4 07/13/2022    RDW 15.4 (H) 07/13/2022    MONOPCT 12 07/13/2022    BASOPCT 1 07/13/2022    NEUTROABS 2.20 07/13/2022    LYMPHSABS 0.89 (L) 07/13/2022    MONOSABS 0.41 07/13/2022    EOSABS <0.03 07/13/2022    BASOSABS <0.03 07/13/2022       Chemistry        Component Value Date/Time     06/22/2022 1018    K 3.7 06/22/2022 1018    CL 98 06/22/2022 1018    CO2 27 06/22/2022 1018    BUN 11 06/22/2022 1018    CREATININE 0.52 06/22/2022 1018        Component Value Date/Time    CALCIUM 9.1 06/22/2022 1018    ALKPHOS 101 06/22/2022 1018    AST 44 (H) 06/22/2022 1018    ALT 50 (H) 06/22/2022 1018    BILITOT 0.39 06/22/2022 1018        PATHOLOGY DATA:   Pathology:  CT Guided Biopsy ([facility]) 8/22/18:   LEFT LUNG, UPPER LOBE, CT GUIDED CORE NEEDLE BIOPSIES:  - INVASIVE ADENOCARCINOMA, CONSISTENT WITH LUNG PRIMARY. Collected: 9/28/2018   -- Diagnosis --   1.  LYMPH NODE, LEVEL 9, BIOPSY:       -  ONE LYMPH NODE, NEGATIVE FOR MALIGNANCY (0/1). 2.  LYMPH NODE, LEVEL 11, DISSECTION:       -  ONE OF 2 LYMPH NODES POSITIVE FOR METASTATIC CARCINOMA (1/2). 3.  LYMPH NODE, LEVEL 5, DISSECTION:       -  ONE OF 2 LYMPH NODES POSITIVE FOR METASTATIC CARCINOMA (1/2). -  CARCINOMA INVADES LARGE PERIPHERAL NERVE BRANCHES. 4.  LUNG, LEFT UPPER LOBE, LOBECTOMY:            -  WELL-DIFFERENTIATED INVASIVE ADENOCARCINOMA, 2.9 CM   GREATEST DIMENSION. -  NEGATIVE FOR VISCERAL PLEURAL INVASION. -  TUMOR INVOLVES SOFT TISSUE OF BRONCHIAL, VASCULAR AND PARENCHYMAL MARGINS. -  5 PERIBRONCHIOLAR LYMPH NODES, POSITIVE FOR METASTATIC   ADENOCARCINOMA (5/5). -  SEPARATE SMALL INTRALOBAR FOCUS ATYPICAL ADENOMATOUS   HYPERPLASIA.            -  PATHOLOGIC STAGE:  pT1c, pN2, R1.     5.  LYMPH NODES, LEVEL 10, DISSECTION:       -  ONE OF 2 LYMPH NODES POSITIVE FOR METASTATIC CARCINOMA (1/2). IMAGING DATA:    MRI brain 3/26/2022  Impression   1. Multiple supra and infratentorial intraparenchymal lesions are most   consistent with metastatic disease. There is associated edema with minimal   mass effect, but no midline shift. 2. Intrinsically T1 hyperintense right frontal calvarial lesion is most   consistent with an osseous hemangioma. No definite osseous metastasis   identified. CT chest abdomen pelvis 3/27/2022  mpression   1. Mild centrilobular emphysema. 2. Redemonstration of left perihilar post treatment scarring with underlying   traction bronchiectasis extending into the upper lower lobes. Stable. 3. No evidence for metastatic disease in the chest.   4. A 2.1 x 1.4 cm right adrenal nodule not present in 2018. CT appearance   does not meet criteria for adenoma. Considered metastatic nodule until   proven otherwise. 5. Redemonstration of hepatic hemangioma and several cysts. ASSESSMENT:    Ahmet Parisi Is a very pleasant 74 y.o.  female with initial diagnosis of left upper lobe non-small cell lung cancer, adenocarcinoma, status post Robotically assisted BARAK lobectomy with LN dissection and Intercostal nerve block with experal on 9/28/18. She has [id number] disease, stage IIIA, she had R1 disease with positive margins. She completed sequential chemo RT. Unfortunately now she has recurrent disease with brain metastasis and also adrenal metastasis. I reviewed the NCCN guidelines and goals of care and will plan for systemic therapy when she completes radiation therapy      During today's visit, the patient and the family had a number of reasonable questions which were answered to their satisfaction. They verbalized understanding of the information provided and they agreed to proceed as outlined above.    PLAN:   I reviewed recent lab work, discussed diagnosis and treatment recommendations   Continue chemotherapy as planned   Return to clinic in 3 weeks or earlier if needed   I will watch for any signs of pneumonitis given her recent CT chest finding and hold off immunotherapy as needed    Jose Brown MD  Hematologist/Medical Oncologist    I spent more than 40 minutes examining, evaluating, reviewing data and counseling the patient. Greater than 50% of that time was spent face-to-face with the patient in counseling and coordinating her care. This note is created with the assistance of a speech recognition program.  While intending to generate a document that actually reflects the content of the visit, the document can still have some errors including those of syntax and sound a like substitutions which may escape proof reading. It such instances, actual meaning can be extrapolated by contextual diversion.

## 2022-07-13 NOTE — TELEPHONE ENCOUNTER
Patient notified [facility] has been received for treatment today at 1 pm. Patient confirmed she'll come for treatment. Pharmacy Staff and Schedulers also notified.

## 2022-07-13 NOTE — PROGRESS NOTES
Patient arrive ambulatory with spouse for cycle 4 day 1 treatment and physician visit. Patient states she is fatigued and has occasional constipation. Denies other complaint or concern. Vitals as charted. Port accessed; specimen sent. Labs reviewed; MD in room, ok to proceed with treatment. Per MD, no potassium replacement necessary, patient educated on dietary sources of potassium. Patient premedicated. Keytruda infused with no sign of adverse reaction; line flushed. Alimta infused with no sign of adverse reaction; line flushed. Carboplatin infused with no sign of adverse reaction; pump alerting to air in line. Writer to room, large air bubble present in line and unable to take air out. Line removed from patients port; new saline line connected; line flushed. Port flushed and heparinized with intact kay needle removed per protocol. Patient ambulate off unit with spouse at discharge; AVS per front office staff.

## 2022-07-25 ENCOUNTER — HOSPITAL ENCOUNTER (OUTPATIENT)
Dept: MRI IMAGING | Age: 67
Discharge: HOME | End: 2022-07-27
Payer: MEDICARE

## 2022-07-25 DIAGNOSIS — C34.90 PRIMARY MALIGNANT NEOPLASM OF LUNG WITH METASTASIS TO BRAIN (HCC): ICD-10-CM

## 2022-07-25 DIAGNOSIS — C79.31 BRAIN METASTASES (HCC): ICD-10-CM

## 2022-07-25 DIAGNOSIS — C79.31 PRIMARY MALIGNANT NEOPLASM OF LUNG WITH METASTASIS TO BRAIN (HCC): ICD-10-CM

## 2022-07-25 PROCEDURE — 70553 MRI BRAIN STEM W/O & W/DYE: CPT

## 2022-07-25 PROCEDURE — 6360000004 HC RX CONTRAST MEDICATION: Performed by: RADIOLOGY

## 2022-07-25 PROCEDURE — A9579 GAD-BASE MR CONTRAST NOS,1ML: HCPCS | Performed by: RADIOLOGY

## 2022-07-25 RX ADMIN — GADOTERIDOL 30 ML: 279.3 INJECTION, SOLUTION INTRAVENOUS at 09:55

## 2022-07-28 ENCOUNTER — HOSPITAL ENCOUNTER (OUTPATIENT)
Dept: RADIATION ONCOLOGY | Age: 67
Discharge: HOME | End: 2022-07-28
Payer: MEDICARE

## 2022-07-28 VITALS
RESPIRATION RATE: 16 BRPM | WEIGHT: 163.4 LBS | DIASTOLIC BLOOD PRESSURE: 92 MMHG | OXYGEN SATURATION: 97 % | BODY MASS INDEX: 33 KG/M2 | SYSTOLIC BLOOD PRESSURE: 147 MMHG | HEART RATE: 111 BPM | TEMPERATURE: 97.8 F

## 2022-07-28 DIAGNOSIS — C34.90 NON-SMALL CELL LUNG CANCER METASTATIC TO BRAIN (HCC): Primary | ICD-10-CM

## 2022-07-28 DIAGNOSIS — C79.31 NON-SMALL CELL LUNG CANCER METASTATIC TO BRAIN (HCC): Primary | ICD-10-CM

## 2022-07-28 PROCEDURE — 99214 OFFICE O/P EST MOD 30 MIN: CPT | Performed by: RADIOLOGY

## 2022-07-28 PROCEDURE — 99212 OFFICE O/P EST SF 10 MIN: CPT | Performed by: RADIOLOGY

## 2022-07-28 RX ORDER — LEVETIRACETAM 500 MG/1
500 TABLET ORAL 2 TIMES DAILY
Qty: 60 TABLET | Refills: 2 | Status: SHIPPED | OUTPATIENT
Start: 2022-07-28 | End: 2022-08-31

## 2022-07-28 NOTE — PROGRESS NOTES
Mainor Coello  7/28/2022  11:01 AM      Vitals:    07/28/22 1045   BP: (!) 147/92   Pulse: (!) 111   Resp: 16   Temp: 97.8 °F (36.6 °C)   SpO2: 97%    :     Pain Assessment: None - Denies Pain          Wt Readings from Last 1 Encounters:   07/28/22 163 lb 6.4 oz (74.1 kg)                Current Outpatient Medications:     levETIRAcetam (KEPPRA) 500 MG tablet, Take 1 tablet by mouth 2 times daily, Disp: 60 tablet, Rfl: 0    clonazePAM (KLONOPIN) 0.5 MG tablet, Take 1 tablet by mouth 2 times daily as needed for Anxiety for up to 30 days. , Disp: 60 tablet, Rfl: 0    pantoprazole (PROTONIX) 20 MG tablet, Take 1 tablet by mouth daily, Disp: 30 tablet, Rfl: 3    Multiple Vitamin (MULTIVITAMIN PO), Take by mouth daily, Disp: , Rfl:     folic acid (FOLVITE) 1 MG tablet, Take 1 tablet by mouth daily, Disp: 30 tablet, Rfl: 5    ondansetron (ZOFRAN) 4 MG tablet, Take 1 tablet by mouth 3 times daily as needed for Nausea or Vomiting (Patient not taking: Reported on 6/22/2022), Disp: 30 tablet, Rfl: 0    lovastatin (MEVACOR) 10 MG tablet, Take 1 tablet by mouth nightly, Disp: 30 tablet, Rfl: 11    lisinopril-hydroCHLOROthiazide (PRINZIDE;ZESTORETIC) 10-12.5 MG per tablet, Take 1 tablet by mouth daily, Disp: 30 tablet, Rfl: 11    albuterol sulfate  (90 Base) MCG/ACT inhaler, inhale 2 puffs by mouth four times a day, Disp: 18 g, Rfl: 11               FALLS RISK SCREEN  Instructions:  Assess the patient and enter the appropriate indicators that are present for fall risk identification. Total the numbers entered and assign a fall risk score from Table 2.  Reassess patient at a minimum every 12 weeks or with status change. Assessment   Date  7/28/2022     1. Mental Ability: confusion/cognitively impaired 0     2. Elimination Issues: incontinence, frequency 0       3. Ambulatory: use of assistive devices (walker, cane, off-loading devices),        attached to equipment (IV pole, oxygen) 0     4.   Sensory Limitations: dizziness, vertigo, impaired vision 0     5. Age less than 65        0     6. Age 72 or greater 0     7. Medication: diuretics, strong analgesics, hypnotics, sedatives,        antihypertensive agents 3   8. Falls:  recent history of falls within the last 3 months (not to include slipping or        tripping) 0   TOTAL 3    If score of 4 or greater was education given? No           TABLE 2   Risk Score Risk Level Plan of Care   0-3 Little or  No Risk 1. Provide assistance as indicated for ambulation activities  2. Reorient confused/cognitively impaired patient  3. Chair/bed in low position, stretcher/bed with siderails up except when performing patient care activities  5. Educate patient/family/caregiver on falls prevention  6.  Reassess in 12 weeks or with any noted change in patient condition which places them at a risk for a fall   4-6 Moderate Risk 1. Provide assistance as indicated for ambulation activities  2. Reorient confused/cognitively impaired patient  3. Chair/bed in low position, stretcher/bed with siderails up except when performing patient care activities  4. Educate patient/family/caregiver on falls prevention     7 or   Higher High Risk 1. Place patient in easily observable treatment room  2. Patient attended at all times by family member or staff  3. Provide assistance as indicated for ambulation activities  4. Reorient confused/cognitively impaired patient  5. Chair/bed in low position, stretcher/bed with siderails up except when performing patient care activities  6. Educate patient/family/caregiver on falls prevention         PLAN: Patient is seen today in follow up. She denies headaches, nausea, vision problems for balance problems. She has baseline shortness of breath on exertion with occasional cough in the morning. She continues on Alimta/Carbo infusions. Dr. Axel Aguirre to review recent MRI brain results.           Lluvia Arteaga RN

## 2022-07-31 RX ORDER — CYANOCOBALAMIN 1000 UG/ML
1000 INJECTION INTRAMUSCULAR; SUBCUTANEOUS ONCE
Status: CANCELLED | OUTPATIENT
Start: 2022-08-31 | End: 2022-08-03

## 2022-07-31 RX ORDER — FAMOTIDINE 10 MG/ML
20 INJECTION, SOLUTION INTRAVENOUS
Status: CANCELLED | OUTPATIENT
Start: 2022-08-31

## 2022-07-31 RX ORDER — HEPARIN SODIUM (PORCINE) LOCK FLUSH IV SOLN 100 UNIT/ML 100 UNIT/ML
500 SOLUTION INTRAVENOUS PRN
Status: CANCELLED | OUTPATIENT
Start: 2022-08-03

## 2022-07-31 RX ORDER — SODIUM CHLORIDE 9 MG/ML
INJECTION, SOLUTION INTRAVENOUS CONTINUOUS
Status: CANCELLED | OUTPATIENT
Start: 2022-08-31

## 2022-07-31 RX ORDER — MEPERIDINE HYDROCHLORIDE 50 MG/ML
12.5 INJECTION INTRAMUSCULAR; INTRAVENOUS; SUBCUTANEOUS PRN
Status: CANCELLED | OUTPATIENT
Start: 2022-08-31

## 2022-07-31 RX ORDER — SODIUM CHLORIDE 9 MG/ML
5-40 INJECTION INTRAVENOUS PRN
Status: CANCELLED | OUTPATIENT
Start: 2022-08-31

## 2022-07-31 RX ORDER — EPINEPHRINE 1 MG/ML
0.3 INJECTION, SOLUTION, CONCENTRATE INTRAVENOUS PRN
Status: CANCELLED | OUTPATIENT
Start: 2022-08-31

## 2022-07-31 RX ORDER — SODIUM CHLORIDE 9 MG/ML
20 INJECTION, SOLUTION INTRAVENOUS ONCE
Status: CANCELLED | OUTPATIENT
Start: 2022-08-31 | End: 2022-08-03

## 2022-07-31 RX ORDER — DIPHENHYDRAMINE HYDROCHLORIDE 50 MG/ML
50 INJECTION INTRAMUSCULAR; INTRAVENOUS
Status: CANCELLED | OUTPATIENT
Start: 2022-08-31

## 2022-07-31 RX ORDER — ALBUTEROL SULFATE 90 UG/1
4 AEROSOL, METERED RESPIRATORY (INHALATION) PRN
Status: CANCELLED | OUTPATIENT
Start: 2022-08-31

## 2022-07-31 RX ORDER — ACETAMINOPHEN 325 MG/1
650 TABLET ORAL
Status: CANCELLED | OUTPATIENT
Start: 2022-08-31

## 2022-07-31 RX ORDER — SODIUM CHLORIDE 9 MG/ML
25 INJECTION, SOLUTION INTRAVENOUS PRN
Status: CANCELLED | OUTPATIENT
Start: 2022-08-31

## 2022-07-31 RX ORDER — ONDANSETRON 2 MG/ML
8 INJECTION INTRAMUSCULAR; INTRAVENOUS
Status: CANCELLED | OUTPATIENT
Start: 2022-08-31

## 2022-07-31 RX ORDER — PALONOSETRON 0.05 MG/ML
0.25 INJECTION, SOLUTION INTRAVENOUS ONCE
Status: CANCELLED | OUTPATIENT
Start: 2022-08-31 | End: 2022-08-03

## 2022-07-31 RX ORDER — SODIUM CHLORIDE 0.9 % (FLUSH) 0.9 %
5-40 SYRINGE (ML) INJECTION PRN
Status: CANCELLED | OUTPATIENT
Start: 2022-08-03

## 2022-08-01 NOTE — PROGRESS NOTES
[address]            Radiation Oncology          [address] [facility], [address]        Ale Christine: [phone]        F: [phone]       [employer] [address] [place] [address]       Radiation Oncology   [address] [facility], [address] [facility]       Ale Christine: [phone]       F: [phone]       [email or web address]      Date of Service: 2022     Location:  [address] [place] [address],   [address] [facility]., [facility], [facility]   [phone]        RADIATION ONCOLOGY FOLLOW UP NOTE    Patient ID:   Dorsie Goldberg  : 1955   MRN: [id number]    DIAGNOSIS:  Cancer Staging  Malignant neoplasm of bronchus of upper lobe, left ([facility] [address]  Staging form: Lung, AJCC 8th Edition  - Pathologic stage from 2018: Stage IIIA (pT1b, pN2, cM0) - Signed by Endy Sher MD on 2019  Staging comments: Left upper lobe biopsy 2018 positive for invasive adenocarcinoma from a lung primary. Left upper lobectomy/mediastinal node dissection 2018 revealed 2.9 cm grade 1 adenocarcinoma with positive bronchial/vascular/parenchymal margins, 1/2 level 5 node positive, 5/5 peribronchial nodes positive, one level 9 node negative, 1/2 level 10 node positive, one level 11 node negative, no lymphovascular invasion. Non-small cell cancer of left lung [facility])  Staging form: Lung, AJCC 8th Edition  - Clinical stage from 10/10/2018: Stage IIIA (ycT1c, cN2, cM0) - Signed by Roberto Johnson MD on 10/10/2018    -s/p Left upper lobectomy/mediastinal node dissection 2018   -s/p adj chemo carbo/taxol [id number]  -s/p adj RT 60Gy 3/29/19  -s/p R cerebellar craniotomy 3/29/22  -s/p SRS 6 lesions 16-18Gy 22  -s/p SRT R cerebellar cavity 27Gy 22  -on carbo/alimta/keytruda    INTERVAL HISTORY:   Dorsie Goldberg is a 74 y.o. [place]  female with a history of a left-sided lung cancer for which she recently was found to have recurrent disease after (PRINZIDE;ZESTORETIC) 10-12.5 MG per tablet, Take 1 tablet by mouth daily, Disp: 30 tablet, Rfl: 11    albuterol sulfate  (90 Base) MCG/ACT inhaler, inhale 2 puffs by mouth four times a day, Disp: 18 g, Rfl: 11    ALLERGIES:  Allergies   Allergen Reactions    Codeine Nausea And Vomiting         REVIEW OF SYSTEMS:    A full 14 point review of systems was performed and assessed and found to be negative except as noted above. PHYSICAL EXAMINATION:    CHAPERONE: Family/friend/companieon Present    ECO Asymptomatic    VITAL SIGNS: BP (!) 147/92   Pulse (!) 111   Temp 97.8 °F (36.6 °C) (Temporal)   Resp 16   Wt 163 lb 6.4 oz (74.1 kg)   SpO2 97%   BMI 33.00 kg/m²   GENERAL:  General appearance is that of a well-nourished, well-developed in no apparent distress. HEENT: Normocephalic, atraumatic, EOMI, moist mucosa, no erythema. NECK:  No adenopathy or a palpable thyroid mass, trachea is midline. LYMPHATICS: No cervical, supraclavicular, or axillary adenopathy. HEART:  Normal rate and regular rhythm, normal heart sounds. LUNGS:  Pulmonary effort normal. Normal breath sounds. ABDOMEN:  Soft, nontender, non distended, and no hepatosplenomegaly  EXTREMITIES:  No edema. No calf tenderness. MSK:  No spinal tenderness. Normal ROM. NEUROLOGICAL: Alert and oriented. Strength and sensation intact bilaterally. No focal deficits. PSYCH: Mood normal, behavior normal.  SKIN: No erythema, no desquamation.       LABS:  WBC   Date Value Ref Range Status   2022 3.6 3.5 - 11.3 k/uL Final     Segs Absolute   Date Value Ref Range Status   2022 2.20 1.50 - 8.10 k/uL Final     Hemoglobin   Date Value Ref Range Status   2022 10.4 (L) 11.9 - 15.1 g/dL Final     Platelets   Date Value Ref Range Status   2022 185 138 - 453 k/uL Final     No results found for: , CEA  No results found for: PSA    IMAGIN22 MRI Brain  Impression   Multiple enhancing metastatic foci throughout the brain and the majority of   these are similar in size compared to prior examination. There is a focus in   the right frontal lobe that is mildly smaller compared to prior. Enhancing focus within the right frontal skull likely representing metastatic   disease. Stable resection cavity in the right cerebellar region. 6/13/22 CT C/A/P  Impression   1. More numerous and prominent patchy peripheral opacities in the left lower   lobe, which may represent developing multifocal inflammatory process or   infection. Continued attention to on follow-up is recommended. 2.  Unchanged appearance of 2 cm right adrenal nodule and mildly enlarged   left inguinal lymph node, which were described as metabolically active on   recent PET-CT imaging. 3.  Mild diffuse bladder wall thickening for which underlying cystitis should   be considered. ASSESSMENT AND PLAN:  Kerri Ross is a 74 y.o. female with a Cancer Staging  Malignant neoplasm of bronchus of upper lobe, left ([facility] [address]  Staging form: Lung, AJCC 8th Edition  - Pathologic stage from 8/22/2018: Stage IIIA (pT1b, pN2, cM0) - Signed by Safia Zuluaga MD on 2/12/2019  Staging comments: Left upper lobe biopsy 8/22/2018 positive for invasive adenocarcinoma from a lung primary. Left upper lobectomy/mediastinal node dissection 9/28/2018 revealed 2.9 cm grade 1 adenocarcinoma with positive bronchial/vascular/parenchymal margins, 1/2 level 5 node positive, 5/5 peribronchial nodes positive, one level 9 node negative, 1/2 level 10 node positive, one level 11 node negative, no lymphovascular invasion. Non-small cell cancer of left lung [facility])  Staging form: Lung, AJCC 8th Edition  - Clinical stage from 10/10/2018: Stage IIIA (ycT1c, cN2, cM0) - Signed by Angie Arroyo MD on 10/10/2018  .     Patient comes in today for a follow-up visit presently 3 months after getting her going gamma knife SRS treatment for her intracranial metastases as well as a fractionated course of treatment to her right cerebellar cavity. Overall patient tolerated treatments well and has no significant toxicities from treatment. She went on to have systemic therapy with Keytruda carboplatin and Alimta and is tolerating it well. She had recent MRI that showed improvement in her previously treated lesions. She otherwise is doing well and therefore will be encouraged to continue on her systemic therapy treatments. We will recommend she have surveillance MRI done again in 3 months. Patient is recommended to come back in 3 months for another follow up visit and exam, or can call to come see us sooner if symptoms change. Patient was in agreement with my recommendations. All questions were answered to their satisfaction. Patient was advised to contact us anytime should they have any questions or concerns. Electronically signed by Trilby Canavan, MD on 8/1/2022 at 4:48 PM        Medications Prescribed:   New Prescriptions    LEVETIRACETAM (KEPPRA) 500 MG TABLET    Take 1 tablet by mouth in the morning and 1 tablet before bedtime. Orders:   Orders Placed This Encounter   Procedures    MRI BRAIN W WO CONTRAST       CC:  Patient Care Team:  Nakia Kang MD as PCP - General (Family Medicine)  Nakia Kang MD as PCP - [facility]  Aries Medel RN as PCP - Nurse Kelly Hercules MD as Consulting Physician (Hematology and Oncology)  Debbie Henry MD as Consulting Physician (Thoracic Surgery)  Ulises Tolentino MD as Consulting Physician (Pulmonology)     Total time spent on this case on the day of encounter is 30 minutes.  This time includes combination of one or more of the following - review of necessary tests, review of pertinent medical records from the EMR, performing medically appropriate examination and evaluation, counseling and educating the patient/family/caregiver, ordering necessary medical tests, procedures etc., documenting the clinical information in the electronic medical record, care coordination, referring and communicating with other health care providers and interpretation of results independently. This note is created with the assistance of a speech recognition program.  While intending to generate a document that actually reflects the content of the visit, the document can still have some errors including those of syntax and sound a like substitutions which may escape proof reading. It such instances, actual meaning can be extrapolated by contextual diversion.

## 2022-08-02 ENCOUNTER — HOSPITAL ENCOUNTER (OUTPATIENT)
Facility: MEDICAL CENTER | Age: 67
End: 2022-08-02
Payer: MEDICARE

## 2022-08-03 ENCOUNTER — HOSPITAL ENCOUNTER (OUTPATIENT)
Dept: INFUSION THERAPY | Facility: MEDICAL CENTER | Age: 67
Discharge: HOME | End: 2022-08-03
Payer: MEDICARE

## 2022-08-03 ENCOUNTER — TELEPHONE (OUTPATIENT)
Dept: CASE MANAGEMENT | Age: 67
End: 2022-08-03

## 2022-08-03 ENCOUNTER — TELEPHONE (OUTPATIENT)
Dept: INFUSION THERAPY | Facility: MEDICAL CENTER | Age: 67
End: 2022-08-03

## 2022-08-03 ENCOUNTER — TELEPHONE (OUTPATIENT)
Dept: ONCOLOGY | Age: 67
End: 2022-08-03

## 2022-08-03 ENCOUNTER — OFFICE VISIT (OUTPATIENT)
Dept: ONCOLOGY | Age: 67
End: 2022-08-03
Payer: MEDICARE

## 2022-08-03 VITALS
OXYGEN SATURATION: 95 % | SYSTOLIC BLOOD PRESSURE: 112 MMHG | RESPIRATION RATE: 20 BRPM | DIASTOLIC BLOOD PRESSURE: 75 MMHG | BODY MASS INDEX: 33.22 KG/M2 | HEART RATE: 101 BPM | TEMPERATURE: 98.2 F | WEIGHT: 164.5 LBS

## 2022-08-03 VITALS
BODY MASS INDEX: 33.22 KG/M2 | SYSTOLIC BLOOD PRESSURE: 112 MMHG | DIASTOLIC BLOOD PRESSURE: 75 MMHG | RESPIRATION RATE: 18 BRPM | HEART RATE: 101 BPM | TEMPERATURE: 98.2 F | WEIGHT: 164.5 LBS

## 2022-08-03 DIAGNOSIS — C79.31 PRIMARY MALIGNANT NEOPLASM OF LUNG WITH METASTASIS TO BRAIN (HCC): Primary | ICD-10-CM

## 2022-08-03 DIAGNOSIS — C34.90 PRIMARY MALIGNANT NEOPLASM OF LUNG WITH METASTASIS TO BRAIN (HCC): Primary | ICD-10-CM

## 2022-08-03 DIAGNOSIS — C34.92 NON-SMALL CELL CANCER OF LEFT LUNG (HCC): Primary | ICD-10-CM

## 2022-08-03 LAB
ABSOLUTE EOS #: <0.03 K/UL (ref 0–0.44)
ABSOLUTE IMMATURE GRANULOCYTE: 0 K/UL (ref 0–0.3)
ABSOLUTE LYMPH #: 0.75 K/UL (ref 1.1–3.7)
ABSOLUTE MONO #: 0.44 K/UL (ref 0.1–1.2)
ALBUMIN SERPL-MCNC: 4 G/DL (ref 3.5–5.2)
ALP BLD-CCNC: 78 U/L (ref 35–104)
ALT SERPL-CCNC: 202 U/L (ref 5–33)
ANION GAP SERPL CALCULATED.3IONS-SCNC: 11 MMOL/L (ref 9–17)
AST SERPL-CCNC: 380 U/L
BASOPHILS # BLD: 1 % (ref 0–2)
BASOPHILS ABSOLUTE: <0.03 K/UL (ref 0–0.2)
BILIRUB SERPL-MCNC: 0.28 MG/DL (ref 0.3–1.2)
BUN BLDV-MCNC: 10 MG/DL (ref 8–23)
BUN/CREAT BLD: 24 (ref 9–20)
CALCIUM SERPL-MCNC: 9.3 MG/DL (ref 8.6–10.4)
CHLORIDE BLD-SCNC: 95 MMOL/L (ref 98–107)
CO2: 28 MMOL/L (ref 20–31)
CREAT SERPL-MCNC: 0.41 MG/DL (ref 0.5–0.9)
EOSINOPHILS RELATIVE PERCENT: 0 % (ref 1–4)
GFR AFRICAN AMERICAN: >60 ML/MIN
GFR NON-AFRICAN AMERICAN: >60 ML/MIN
GFR SERPL CREATININE-BSD FRML MDRD: ABNORMAL ML/MIN/{1.73_M2}
GLUCOSE BLD-MCNC: 104 MG/DL (ref 70–99)
HCT VFR BLD CALC: 32.9 % (ref 36.3–47.1)
HEMOGLOBIN: 11 G/DL (ref 11.9–15.1)
IMMATURE GRANULOCYTES: 0 %
LYMPHOCYTES # BLD: 20 % (ref 24–43)
MCH RBC QN AUTO: 32.2 PG (ref 25.2–33.5)
MCHC RBC AUTO-ENTMCNC: 33.4 G/DL (ref 28.4–34.8)
MCV RBC AUTO: 96.2 FL (ref 82.6–102.9)
MONOCYTES # BLD: 12 % (ref 3–12)
NRBC AUTOMATED: 0 PER 100 WBC
PDW BLD-RTO: 14.5 % (ref 11.8–14.4)
PLATELET # BLD: 150 K/UL (ref 138–453)
PMV BLD AUTO: 9.5 FL (ref 8.1–13.5)
POTASSIUM SERPL-SCNC: 3.8 MMOL/L (ref 3.7–5.3)
RBC # BLD: 3.42 M/UL (ref 3.95–5.11)
RBC # BLD: ABNORMAL 10*6/UL
SEG NEUTROPHILS: 67 % (ref 36–65)
SEGMENTED NEUTROPHILS ABSOLUTE COUNT: 2.49 K/UL (ref 1.5–8.1)
SODIUM BLD-SCNC: 134 MMOL/L (ref 135–144)
TOTAL PROTEIN: 6.7 G/DL (ref 6.4–8.3)
TSH SERPL DL<=0.05 MIU/L-ACNC: 1.75 UIU/ML (ref 0.3–5)
WBC # BLD: 3.7 K/UL (ref 3.5–11.3)

## 2022-08-03 PROCEDURE — 6360000002 HC RX W HCPCS: Performed by: INTERNAL MEDICINE

## 2022-08-03 PROCEDURE — G8400 PT W/DXA NO RESULTS DOC: HCPCS | Performed by: INTERNAL MEDICINE

## 2022-08-03 PROCEDURE — G8427 DOCREV CUR MEDS BY ELIG CLIN: HCPCS | Performed by: INTERNAL MEDICINE

## 2022-08-03 PROCEDURE — 1123F ACP DISCUSS/DSCN MKR DOCD: CPT | Performed by: INTERNAL MEDICINE

## 2022-08-03 PROCEDURE — 84443 ASSAY THYROID STIM HORMONE: CPT

## 2022-08-03 PROCEDURE — 1090F PRES/ABSN URINE INCON ASSESS: CPT | Performed by: INTERNAL MEDICINE

## 2022-08-03 PROCEDURE — 2580000003 HC RX 258: Performed by: INTERNAL MEDICINE

## 2022-08-03 PROCEDURE — G8417 CALC BMI ABV UP PARAM F/U: HCPCS | Performed by: INTERNAL MEDICINE

## 2022-08-03 PROCEDURE — 85025 COMPLETE CBC W/AUTO DIFF WBC: CPT

## 2022-08-03 PROCEDURE — 3017F COLORECTAL CA SCREEN DOC REV: CPT | Performed by: INTERNAL MEDICINE

## 2022-08-03 PROCEDURE — 99211 OFF/OP EST MAY X REQ PHY/QHP: CPT | Performed by: INTERNAL MEDICINE

## 2022-08-03 PROCEDURE — 36591 DRAW BLOOD OFF VENOUS DEVICE: CPT

## 2022-08-03 PROCEDURE — 80053 COMPREHEN METABOLIC PANEL: CPT

## 2022-08-03 PROCEDURE — 1036F TOBACCO NON-USER: CPT | Performed by: INTERNAL MEDICINE

## 2022-08-03 PROCEDURE — 99215 OFFICE O/P EST HI 40 MIN: CPT | Performed by: INTERNAL MEDICINE

## 2022-08-03 RX ORDER — HEPARIN SODIUM (PORCINE) LOCK FLUSH IV SOLN 100 UNIT/ML 100 UNIT/ML
500 SOLUTION INTRAVENOUS PRN
Status: DISCONTINUED | OUTPATIENT
Start: 2022-08-03 | End: 2022-08-04 | Stop reason: HOSPADM

## 2022-08-03 RX ORDER — PREDNISONE 20 MG/1
TABLET ORAL
Qty: 10 TABLET | Refills: 0 | Status: SHIPPED | OUTPATIENT
Start: 2022-08-03 | End: 2022-08-04 | Stop reason: SDUPTHER

## 2022-08-03 RX ORDER — SODIUM CHLORIDE 0.9 % (FLUSH) 0.9 %
5-40 SYRINGE (ML) INJECTION PRN
Status: DISCONTINUED | OUTPATIENT
Start: 2022-08-03 | End: 2022-08-04 | Stop reason: HOSPADM

## 2022-08-03 RX ADMIN — SODIUM CHLORIDE, PRESERVATIVE FREE 10 ML: 5 INJECTION INTRAVENOUS at 11:35

## 2022-08-03 RX ADMIN — HEPARIN 500 UNITS: 100 SYRINGE at 11:35

## 2022-08-03 RX ADMIN — SODIUM CHLORIDE, PRESERVATIVE FREE 10 ML: 5 INJECTION INTRAVENOUS at 10:10

## 2022-08-03 NOTE — TELEPHONE ENCOUNTER
Name: Se Montemayor  : 1955  MRN: [id number]    Navigator met with pt. Face to face, pt. Denies needs today, but working with  for any drug assistance. Plan to follow. Tx held today due to  elevated Liver enzymes. Plan to follow.   Electronically signed by Tristan Rodriguez RN on 8/3/2022 at 10:31 AM

## 2022-08-03 NOTE — PROGRESS NOTES
Patient arrive ambulatory per with spouse for cycle 4 day 1 treatment and physician visit. Complains of worsening SOB and loss of appetite. Vitals as charted. Port accessed; specimen sent. Physician met with patient; labs reviewed; holding treatment today and hope to resume next week. Port flushed and heparinized with intact kay needle removed per protocol. Patient ambulate off unit with spouse at discharge.

## 2022-08-03 NOTE — PROGRESS NOTES
p      Patient ID: Perla Bruno, 1955, [id number], 74 y.o. Diagnosis:   Left upper lobe invasive lung adenocarcinoma, Status post lobectomy 9/28/18, Pathologic stage: pT1c, pN2, stage IIIa R1 with positive margin,    Will start chemotherapy followed By RT  Carbo taxol cycle 1 on 11/14/18  Radiation Therapy completed on 3/29/19  Unfortunately on 3/26/2022 she presented with seizure activity and her CT scan MRI showed multiple supra and infratentorial intraparenchymal lesion consistent with metastatic disease in brain  Her CT chest abdomen pelvis on 3/27/2022 showed 2.1 cm right adrenal nodule suspicious for metastasis  On 3/29/2022 she underwent right-sided craniotomy with resection of intraaxial brain tumor and completed SRS/gamma knife to 6 intracranial lesion on 4/25/2022  Next-generation sequencing is negative for PD-L1, negative for EGFR, ALK, RET, ROS1, BRAF, HER2 mutations  HISTORY OF PRESENT ILLNESS:    Oncologic History:  The patient is a 66 y.o.  female who is admitted to the hospital for Left upper lobe mass. Pt has a significant past medical history of Uterine fibroids requiring total abdominal hysterectomy, liver hemangioma, HTN, Hyperlipidemia, DVT Left leg, COPD, and anxiety. Pt was found to have a left upper lung adenocarcinoma and presented to the hospital on 9/28/2018 for a scheduled robotic-assisted left upper lobe lobectomy. Pathology is positive for metastatic adenocarcinoma. There are some lymph nodes positive and some evidence of invasion to surrounding structures seen during surgery. Surgical team is planning on discharging patient this evening from the hospital.  She was discharged from the hospital with plan to follow with oncology as outpatient. Interval history:   Sheeba Abernathy is returning for follow-up Luis to discuss lab results and further recommendations. She reports that her breathing has gotten worse.   She denies any cough but feels that she has more dyspnea on exertion for past 5 days. She denies any fever chills. Her liver enzymes are elevated as well. She could be having possible immunotherapy induced pneumonitis/hepatitis   . She denies any nausea vomiting abdominal pain. During this visit patient's allergy, social, medical, surgical history and medications were reviewed and updated. Current Outpatient Medications   Medication Sig Dispense Refill    levETIRAcetam (KEPPRA) 500 MG tablet Take 1 tablet by mouth in the morning and 1 tablet before bedtime. 60 tablet 2    clonazePAM (KLONOPIN) 0.5 MG tablet Take 1 tablet by mouth 2 times daily as needed for Anxiety for up to 30 days. 60 tablet 0    pantoprazole (PROTONIX) 20 MG tablet Take 1 tablet by mouth daily 30 tablet 3    Multiple Vitamin (MULTIVITAMIN PO) Take by mouth daily      folic acid (FOLVITE) 1 MG tablet Take 1 tablet by mouth daily 30 tablet 5    lovastatin (MEVACOR) 10 MG tablet Take 1 tablet by mouth nightly 30 tablet 11    lisinopril-hydroCHLOROthiazide (PRINZIDE;ZESTORETIC) 10-12.5 MG per tablet Take 1 tablet by mouth daily 30 tablet 11    albuterol sulfate  (90 Base) MCG/ACT inhaler inhale 2 puffs by mouth four times a day 18 g 11    ondansetron (ZOFRAN) 4 MG tablet Take 1 tablet by mouth 3 times daily as needed for Nausea or Vomiting (Patient not taking: No sig reported) 30 tablet 0     No current facility-administered medications for this visit.      Facility-Administered Medications Ordered in Other Visits   Medication Dose Route Frequency Provider Last Rate Last Admin    sodium chloride flush 0.9 % injection 5-40 mL  5-40 mL IntraVENous PRN Ivan Heller MD        heparin flush 100 UNIT/ML injection 500 Units  500 Units IntraCATHeter PRN Ivan Heller MD           Social History     Socioeconomic History    Marital status:      Spouse name: Not on file    Number of children: Not on file    Years of education: Not on file    Highest education level: Not on file Occupational History    Not on file   Tobacco Use    Smoking status: Former     Packs/day: 1.50     Years: 30.00     Pack years: 45.00     Types: Cigarettes     Quit date:      Years since quittin.6    Smokeless tobacco: Never   Vaping Use    Vaping Use: Never used   Substance and Sexual Activity    Alcohol use: Yes     Comment: Occassionally    Drug use: No    Sexual activity: Not on file   Other Topics Concern    Not on file   Social History Narrative    Not on file     Social Determinants of Health     Financial Resource Strain: Low Risk     Difficulty of Paying Living Expenses: Not hard at all   Food Insecurity: No Food Insecurity    Worried About Running Out of Food in the Last Year: Never true    Ran Out of Food in the Last Year: Never true   Transportation Needs: Not on file   Physical Activity: Not on file   Stress: Not on file   Social Connections: Not on file   Intimate Partner Violence: Not on file   Housing Stability: Not on file       Family History   Problem Relation Age of Onset    Cancer Mother         LUNG    Cancer Sister         LUNG        REVIEW OF SYSTEM:     Constitutional: No fever or chills. No night sweats, no weight loss   Eyes: No eye discharge, double vision, or eye pain   HEENT: negative for sore mouth, sore throat, hoarseness and voice change   Respiratory: negative for cough , sputum, dyspnea, wheezing, hemoptysis, chest pain   Cardiovascular: negative for chest pain, dyspnea, palpitations, orthopnea, PND   Gastrointestinal: negative for nausea, vomiting, diarrhea, constipation, abdominal pain, Dysphagia, hematemesis and hematochezia   Genitourinary: negative for frequency, dysuria, nocturia, urinary incontinence, and hematuria   Integument: negative for rash, skin lesions, bruises.    Hematologic/Lymphatic: negative for easy bruising, bleeding, lymphadenopathy, petechiae and swelling/edema   Endocrine: negative for heat or cold intolerance, tremor, weight changes, change in bowel habits and hair loss   Musculoskeletal: negative for myalgias, arthralgias, pain, joint swelling,and bone pain   Neurological: negative for headaches, dizziness, seizures, weakness, numbness       OBJECTIVE:         Vitals:    08/03/22 1043   BP: 112/75   Pulse: (!) 101   Resp: 18   Temp: 98.2 °F (36.8 °C)   PHYSICAL EXAM:   General appearance - well appearing, no in pain or distress   Mental status - alert and cooperative   Eyes - pupils equal and reactive, extraocular eye movements intact   Ears - bilateral TM's and external ear canals normal   Mouth - mucous membranes moist, pharynx normal without lesions   Neck - supple, no significant adenopathy   Lymphatics - no palpable lymphadenopathy, no hepatosplenomegaly   Chest - clear to auscultation, no wheezes, rales or rhonchi, symmetric air entry   Left chest surgical scar healing well  Heart - normal rate, regular rhythm, normal S1, S2, no murmurs, rubs, clicks or gallops   Abdomen - soft, nontender, nondistended, no masses or organomegaly   Neurological - alert, oriented, normal speech, no focal findings or movement disorder noted   Musculoskeletal - no joint tenderness, deformity or swelling   Extremities - peripheral pulses normal, no pedal edema, no clubbing or cyanosis   Skin - normal coloration and turgor, no rashes, no suspicious skin lesions noted ,  LABORATORY DATA:     Lab Results   Component Value Date    WBC 3.7 08/03/2022    HGB 11.0 (L) 08/03/2022    HCT 32.9 (L) 08/03/2022    MCV 96.2 08/03/2022     08/03/2022    LYMPHOPCT 20 (L) 08/03/2022    RBC 3.42 (L) 08/03/2022    MCH 32.2 08/03/2022    MCHC 33.4 08/03/2022    RDW 14.5 (H) 08/03/2022    MONOPCT 12 08/03/2022    BASOPCT 1 08/03/2022    NEUTROABS 2.49 08/03/2022    LYMPHSABS 0.75 (L) 08/03/2022    MONOSABS 0.44 08/03/2022    EOSABS <0.03 08/03/2022    BASOSABS <0.03 08/03/2022       Chemistry        Component Value Date/Time     (L) 08/03/2022 1013    K 3.8 08/03/2022 1013 CL 95 (L) 08/03/2022 1013    CO2 28 08/03/2022 1013    BUN 10 08/03/2022 1013    CREATININE 0.41 (L) 08/03/2022 1013        Component Value Date/Time    CALCIUM 9.3 08/03/2022 1013    ALKPHOS 78 08/03/2022 1013     (H) 08/03/2022 1013     (H) 08/03/2022 1013    BILITOT 0.28 (L) 08/03/2022 1013        PATHOLOGY DATA:   Pathology:  CT Guided Biopsy ([facility]) 8/22/18:   LEFT LUNG, UPPER LOBE, CT GUIDED CORE NEEDLE BIOPSIES:  - INVASIVE ADENOCARCINOMA, CONSISTENT WITH LUNG PRIMARY. Collected: 9/28/2018   -- Diagnosis --   1.  LYMPH NODE, LEVEL 9, BIOPSY:       -  ONE LYMPH NODE, NEGATIVE FOR MALIGNANCY (0/1). 2.  LYMPH NODE, LEVEL 11, DISSECTION:       -  ONE OF 2 LYMPH NODES POSITIVE FOR METASTATIC CARCINOMA (1/2). 3.  LYMPH NODE, LEVEL 5, DISSECTION:       -  ONE OF 2 LYMPH NODES POSITIVE FOR METASTATIC CARCINOMA (1/2). -  CARCINOMA INVADES LARGE PERIPHERAL NERVE BRANCHES. 4.  LUNG, LEFT UPPER LOBE, LOBECTOMY:            -  WELL-DIFFERENTIATED INVASIVE ADENOCARCINOMA, 2.9 CM   GREATEST DIMENSION. -  NEGATIVE FOR VISCERAL PLEURAL INVASION. -  TUMOR INVOLVES SOFT TISSUE OF BRONCHIAL, VASCULAR AND PARENCHYMAL MARGINS. -  5 PERIBRONCHIOLAR LYMPH NODES, POSITIVE FOR METASTATIC   ADENOCARCINOMA (5/5). -  SEPARATE SMALL INTRALOBAR FOCUS ATYPICAL ADENOMATOUS   HYPERPLASIA. -  PATHOLOGIC STAGE:  pT1c, pN2, R1.     5.  LYMPH NODES, LEVEL 10, DISSECTION:       -  ONE OF 2 LYMPH NODES POSITIVE FOR METASTATIC CARCINOMA (1/2). IMAGING DATA:    MRI brain 3/26/2022  Impression   1. Multiple supra and infratentorial intraparenchymal lesions are most   consistent with metastatic disease. There is associated edema with minimal   mass effect, but no midline shift. 2. Intrinsically T1 hyperintense right frontal calvarial lesion is most   consistent with an osseous hemangioma. No definite osseous metastasis   identified.      CT chest abdomen pelvis 3/27/2022  mpression   1. Mild centrilobular emphysema. 2. Redemonstration of left perihilar post treatment scarring with underlying   traction bronchiectasis extending into the upper lower lobes. Stable. 3. No evidence for metastatic disease in the chest.   4. A 2.1 x 1.4 cm right adrenal nodule not present in 2018. CT appearance   does not meet criteria for adenoma. Considered metastatic nodule until   proven otherwise. 5. Redemonstration of hepatic hemangioma and several cysts. ASSESSMENT:    Lewis Cates Is a very pleasant 74 y.o.  female with initial diagnosis of left upper lobe non-small cell lung cancer, adenocarcinoma, status post Robotically assisted BARAK lobectomy with LN dissection and Intercostal nerve block with experal on 9/28/18. She has [id number] disease, stage IIIA, she had R1 disease with positive margins. She completed sequential chemo RT. Unfortunately now she has recurrent disease with brain metastasis and also adrenal metastasis. 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 of breath: Possible developing pneumonitis, therefore plan to hold off immunotherapy and give trial of steroid  During today's visit, the patient and the family had a number of reasonable questions which were answered to their satisfaction. They verbalized understanding of the information provided and they agreed to proceed as outlined above. PLAN:   I reviewed her recent lab work, discussed diagnosis and treatment recommendations   Her elevated liver enzymes most likely secondary to immunotherapy  Therefore we will hold off treatment today and reschedule in 1 week without immunotherapy  I will start her on prednisone for her possible pneumonitis and monitor closely  Return to clinic in 1 week or earlier if needed      Jose Downs, MD  Hematologist/Medical Oncologist    I spent more than 40 minutes examining, evaluating, reviewing data and counseling the patient. Greater than 50% of that time was spent face-to-face with the patient in counseling and coordinating her care. This note is created with the assistance of a speech recognition program.  While intending to generate a document that actually reflects the content of the visit, the document can still have some errors including those of syntax and sound a like substitutions which may escape proof reading. It such instances, actual meaning can be extrapolated by contextual diversion.

## 2022-08-03 NOTE — TELEPHONE ENCOUNTER
Mercy Jacobson MD VISIT & TX  reschedule chemo next week b(without kendra)  RTC next week  MD VISIT 8/10/22 @ 10:30AM TX @ 10AM  AVS PRINTED W/ INSTRUCTIONS AND GIVEN TO PT ON EXIT

## 2022-08-03 NOTE — TELEPHONE ENCOUNTER
Name: Polly Ahuja  : 1955  MRN: [id number]    Navigator received VM from pt. And pt. spoke with [address] [place] [address] and [facility] needing \"confirmation\" of script? Writer reviewing script and written for only ten tabs and will need clarification. Writer updating SA Robert PEREZ.   Electronically signed by Kay Bruce RN on 8/3/2022 at 4:42 PM

## 2022-08-04 RX ORDER — PREDNISONE 20 MG/1
TABLET ORAL
Qty: 100 TABLET | Refills: 0 | Status: ON HOLD | OUTPATIENT
Start: 2022-08-04 | End: 2022-08-17 | Stop reason: HOSPADM

## 2022-08-08 ENCOUNTER — HOSPITAL ENCOUNTER (OUTPATIENT)
Facility: MEDICAL CENTER | Age: 67
End: 2022-08-08
Payer: MEDICARE

## 2022-08-10 ENCOUNTER — APPOINTMENT (OUTPATIENT)
Dept: CT IMAGING | Age: 67
DRG: 280 | End: 2022-08-10
Payer: MEDICARE

## 2022-08-10 ENCOUNTER — HOSPITAL ENCOUNTER (EMERGENCY)
Age: 67
Discharge: ANOTHER HOSPITAL | DRG: 280 | End: 2022-08-11
Attending: EMERGENCY MEDICINE
Payer: MEDICARE

## 2022-08-10 ENCOUNTER — TELEPHONE (OUTPATIENT)
Dept: ONCOLOGY | Age: 67
End: 2022-08-10

## 2022-08-10 ENCOUNTER — APPOINTMENT (OUTPATIENT)
Dept: GENERAL RADIOLOGY | Age: 67
DRG: 280 | End: 2022-08-10
Payer: MEDICARE

## 2022-08-10 ENCOUNTER — HOSPITAL ENCOUNTER (OUTPATIENT)
Dept: INFUSION THERAPY | Facility: MEDICAL CENTER | Age: 67
Discharge: HOME | End: 2022-08-10
Payer: MEDICARE

## 2022-08-10 ENCOUNTER — OFFICE VISIT (OUTPATIENT)
Dept: ONCOLOGY | Age: 67
DRG: 280 | End: 2022-08-10
Payer: MEDICARE

## 2022-08-10 VITALS
WEIGHT: 161.1 LBS | BODY MASS INDEX: 32.54 KG/M2 | SYSTOLIC BLOOD PRESSURE: 136 MMHG | RESPIRATION RATE: 20 BRPM | HEART RATE: 99 BPM | DIASTOLIC BLOOD PRESSURE: 83 MMHG | TEMPERATURE: 97.9 F

## 2022-08-10 DIAGNOSIS — C34.12 MALIGNANT NEOPLASM OF BRONCHUS OF UPPER LOBE, LEFT (HCC): Primary | ICD-10-CM

## 2022-08-10 DIAGNOSIS — I21.4 NSTEMI (NON-ST ELEVATED MYOCARDIAL INFARCTION) (HCC): ICD-10-CM

## 2022-08-10 DIAGNOSIS — C34.90 PRIMARY MALIGNANT NEOPLASM OF LUNG WITH METASTASIS TO BRAIN (HCC): ICD-10-CM

## 2022-08-10 DIAGNOSIS — C79.31 PRIMARY MALIGNANT NEOPLASM OF LUNG WITH METASTASIS TO BRAIN (HCC): ICD-10-CM

## 2022-08-10 DIAGNOSIS — H53.2 DIPLOPIA: ICD-10-CM

## 2022-08-10 DIAGNOSIS — R06.89 DYSPNEA AND RESPIRATORY ABNORMALITIES: Primary | ICD-10-CM

## 2022-08-10 DIAGNOSIS — R06.00 DYSPNEA AND RESPIRATORY ABNORMALITIES: Primary | ICD-10-CM

## 2022-08-10 LAB
ABSOLUTE EOS #: 0 K/UL (ref 0–0.44)
ABSOLUTE IMMATURE GRANULOCYTE: 0.08 K/UL (ref 0–0.3)
ABSOLUTE LYMPH #: 0.58 K/UL (ref 1.1–3.7)
ABSOLUTE MONO #: 0.33 K/UL (ref 0.1–1.2)
ALBUMIN SERPL-MCNC: 4.3 G/DL (ref 3.5–5.2)
ALBUMIN SERPL-MCNC: 4.4 G/DL (ref 3.5–5.2)
ALP BLD-CCNC: 67 U/L (ref 35–104)
ALP BLD-CCNC: 70 U/L (ref 35–104)
ALT SERPL-CCNC: 227 U/L (ref 5–33)
ALT SERPL-CCNC: 228 U/L (ref 5–33)
ANION GAP SERPL CALCULATED.3IONS-SCNC: 10 MMOL/L (ref 9–17)
ANION GAP SERPL CALCULATED.3IONS-SCNC: 9 MMOL/L (ref 9–17)
AST SERPL-CCNC: 207 U/L
AST SERPL-CCNC: 217 U/L
BASOPHILS # BLD: 0 % (ref 0–2)
BASOPHILS ABSOLUTE: 0 K/UL (ref 0–0.2)
BILIRUB SERPL-MCNC: 0.28 MG/DL (ref 0.3–1.2)
BILIRUB SERPL-MCNC: 0.28 MG/DL (ref 0.3–1.2)
BUN BLDV-MCNC: 13 MG/DL (ref 8–23)
BUN BLDV-MCNC: 15 MG/DL (ref 8–23)
BUN/CREAT BLD: 37 (ref 9–20)
BUN/CREAT BLD: ABNORMAL (ref 9–20)
CALCIUM SERPL-MCNC: 9.4 MG/DL (ref 8.6–10.4)
CALCIUM SERPL-MCNC: 9.8 MG/DL (ref 8.6–10.4)
CHLORIDE BLD-SCNC: 95 MMOL/L (ref 98–107)
CHLORIDE BLD-SCNC: 97 MMOL/L (ref 98–107)
CO2: 31 MMOL/L (ref 20–31)
CO2: 31 MMOL/L (ref 20–31)
CREAT SERPL-MCNC: 0.41 MG/DL (ref 0.5–0.9)
CREAT SERPL-MCNC: <0.4 MG/DL (ref 0.5–0.9)
EOSINOPHILS RELATIVE PERCENT: 0 % (ref 1–4)
GFR AFRICAN AMERICAN: >60 ML/MIN
GFR AFRICAN AMERICAN: ABNORMAL ML/MIN
GFR NON-AFRICAN AMERICAN: >60 ML/MIN
GFR NON-AFRICAN AMERICAN: ABNORMAL ML/MIN
GFR SERPL CREATININE-BSD FRML MDRD: ABNORMAL ML/MIN/{1.73_M2}
GFR SERPL CREATININE-BSD FRML MDRD: ABNORMAL ML/MIN/{1.73_M2}
GLUCOSE BLD-MCNC: 128 MG/DL (ref 70–99)
GLUCOSE BLD-MCNC: 165 MG/DL (ref 70–99)
HCO3 VENOUS: 30.6 MMOL/L (ref 22–29)
HCT VFR BLD CALC: 35.5 % (ref 36.3–47.1)
HCT VFR BLD CALC: 37 % (ref 36.3–47.1)
HEMOGLOBIN: 11.5 G/DL (ref 11.9–15.1)
HEMOGLOBIN: 12.3 G/DL (ref 11.9–15.1)
IMMATURE GRANULOCYTES: 1 %
INR BLD: 1
LACTIC ACID: 1.6 MMOL/L (ref 0.5–2.2)
LYMPHOCYTES # BLD: 7 % (ref 24–43)
MAGNESIUM: 1.7 MG/DL (ref 1.6–2.6)
MCH RBC QN AUTO: 32 PG (ref 25.2–33.5)
MCH RBC QN AUTO: 32 PG (ref 25.2–33.5)
MCHC RBC AUTO-ENTMCNC: 32.4 G/DL (ref 28.4–34.8)
MCHC RBC AUTO-ENTMCNC: 33.2 G/DL (ref 28.4–34.8)
MCV RBC AUTO: 96.4 FL (ref 82.6–102.9)
MCV RBC AUTO: 98.9 FL (ref 82.6–102.9)
MONOCYTES # BLD: 4 % (ref 3–12)
NRBC AUTOMATED: 0 PER 100 WBC
NRBC AUTOMATED: 0 PER 100 WBC
O2 SAT, VEN: 95 % (ref 60–85)
PARTIAL THROMBOPLASTIN TIME: 24.8 SEC (ref 23.9–33.8)
PCO2, VEN: 45.9 MM HG (ref 41–51)
PDW BLD-RTO: 14.6 % (ref 11.8–14.4)
PDW BLD-RTO: 14.7 % (ref 11.8–14.4)
PH VENOUS: 7.43 (ref 7.32–7.43)
PLATELET # BLD: 244 K/UL (ref 138–453)
PLATELET # BLD: 257 K/UL (ref 138–453)
PMV BLD AUTO: 9.5 FL (ref 8.1–13.5)
PMV BLD AUTO: 9.6 FL (ref 8.1–13.5)
PO2, VEN: 76.7 MM HG (ref 30–50)
POSITIVE BASE EXCESS, VEN: 6 (ref 0–3)
POTASSIUM SERPL-SCNC: 3.6 MMOL/L (ref 3.7–5.3)
POTASSIUM SERPL-SCNC: 3.9 MMOL/L (ref 3.7–5.3)
PRO-BNP: 148 PG/ML
PROTHROMBIN TIME: 12.7 SEC (ref 11.5–14.2)
RBC # BLD: 3.59 M/UL (ref 3.95–5.11)
RBC # BLD: 3.84 M/UL (ref 3.95–5.11)
SEG NEUTROPHILS: 88 % (ref 36–65)
SEGMENTED NEUTROPHILS ABSOLUTE COUNT: 7.31 K/UL (ref 1.5–8.1)
SODIUM BLD-SCNC: 136 MMOL/L (ref 135–144)
SODIUM BLD-SCNC: 137 MMOL/L (ref 135–144)
TOTAL PROTEIN: 6.9 G/DL (ref 6.4–8.3)
TOTAL PROTEIN: 7 G/DL (ref 6.4–8.3)
TROPONIN, HIGH SENSITIVITY: 660 NG/L (ref 0–14)
TROPONIN, HIGH SENSITIVITY: 664 NG/L (ref 0–14)
TROPONIN, HIGH SENSITIVITY: 669 NG/L (ref 0–14)
TROPONIN, HIGH SENSITIVITY: 708 NG/L (ref 0–14)
WBC # BLD: 8 K/UL (ref 3.5–11.3)
WBC # BLD: 8.3 K/UL (ref 3.5–11.3)

## 2022-08-10 PROCEDURE — 85027 COMPLETE CBC AUTOMATED: CPT

## 2022-08-10 PROCEDURE — 6370000000 HC RX 637 (ALT 250 FOR IP): Performed by: EMERGENCY MEDICINE

## 2022-08-10 PROCEDURE — 71260 CT THORAX DX C+: CPT | Performed by: EMERGENCY MEDICINE

## 2022-08-10 PROCEDURE — G8400 PT W/DXA NO RESULTS DOC: HCPCS | Performed by: INTERNAL MEDICINE

## 2022-08-10 PROCEDURE — 1036F TOBACCO NON-USER: CPT | Performed by: INTERNAL MEDICINE

## 2022-08-10 PROCEDURE — 2580000003 HC RX 258: Performed by: EMERGENCY MEDICINE

## 2022-08-10 PROCEDURE — 80053 COMPREHEN METABOLIC PANEL: CPT

## 2022-08-10 PROCEDURE — 85025 COMPLETE CBC W/AUTO DIFF WBC: CPT

## 2022-08-10 PROCEDURE — 83605 ASSAY OF LACTIC ACID: CPT

## 2022-08-10 PROCEDURE — 99211 OFF/OP EST MAY X REQ PHY/QHP: CPT | Performed by: INTERNAL MEDICINE

## 2022-08-10 PROCEDURE — 82803 BLOOD GASES ANY COMBINATION: CPT

## 2022-08-10 PROCEDURE — 6360000004 HC RX CONTRAST MEDICATION: Performed by: EMERGENCY MEDICINE

## 2022-08-10 PROCEDURE — 70450 CT HEAD/BRAIN W/O DYE: CPT

## 2022-08-10 PROCEDURE — 6360000002 HC RX W HCPCS: Performed by: EMERGENCY MEDICINE

## 2022-08-10 PROCEDURE — 36591 DRAW BLOOD OFF VENOUS DEVICE: CPT

## 2022-08-10 PROCEDURE — 85730 THROMBOPLASTIN TIME PARTIAL: CPT

## 2022-08-10 PROCEDURE — G8427 DOCREV CUR MEDS BY ELIG CLIN: HCPCS | Performed by: INTERNAL MEDICINE

## 2022-08-10 PROCEDURE — 84484 ASSAY OF TROPONIN QUANT: CPT

## 2022-08-10 PROCEDURE — G8417 CALC BMI ABV UP PARAM F/U: HCPCS | Performed by: INTERNAL MEDICINE

## 2022-08-10 PROCEDURE — 3017F COLORECTAL CA SCREEN DOC REV: CPT | Performed by: INTERNAL MEDICINE

## 2022-08-10 PROCEDURE — 85610 PROTHROMBIN TIME: CPT

## 2022-08-10 PROCEDURE — 94660 CPAP INITIATION&MGMT: CPT

## 2022-08-10 PROCEDURE — 94640 AIRWAY INHALATION TREATMENT: CPT

## 2022-08-10 PROCEDURE — 93005 ELECTROCARDIOGRAM TRACING: CPT | Performed by: EMERGENCY MEDICINE

## 2022-08-10 PROCEDURE — 99215 OFFICE O/P EST HI 40 MIN: CPT | Performed by: INTERNAL MEDICINE

## 2022-08-10 PROCEDURE — 71045 X-RAY EXAM CHEST 1 VIEW: CPT

## 2022-08-10 PROCEDURE — 94760 N-INVAS EAR/PLS OXIMETRY 1: CPT

## 2022-08-10 PROCEDURE — 83880 ASSAY OF NATRIURETIC PEPTIDE: CPT

## 2022-08-10 PROCEDURE — 2500000003 HC RX 250 WO HCPCS: Performed by: EMERGENCY MEDICINE

## 2022-08-10 PROCEDURE — 1124F ACP DISCUSS-NO DSCNMKR DOCD: CPT | Performed by: INTERNAL MEDICINE

## 2022-08-10 PROCEDURE — 2700000000 HC OXYGEN THERAPY PER DAY

## 2022-08-10 PROCEDURE — 1090F PRES/ABSN URINE INCON ASSESS: CPT | Performed by: INTERNAL MEDICINE

## 2022-08-10 PROCEDURE — 83735 ASSAY OF MAGNESIUM: CPT

## 2022-08-10 RX ORDER — HEPARIN SODIUM 1000 [USP'U]/ML
4000 INJECTION, SOLUTION INTRAVENOUS; SUBCUTANEOUS ONCE
Status: DISCONTINUED | OUTPATIENT
Start: 2022-08-10 | End: 2022-08-10

## 2022-08-10 RX ORDER — 0.9 % SODIUM CHLORIDE 0.9 %
80 INTRAVENOUS SOLUTION INTRAVENOUS ONCE
Status: COMPLETED | OUTPATIENT
Start: 2022-08-10 | End: 2022-08-10

## 2022-08-10 RX ORDER — IPRATROPIUM BROMIDE AND ALBUTEROL SULFATE 2.5; .5 MG/3ML; MG/3ML
1 SOLUTION RESPIRATORY (INHALATION) ONCE
Status: COMPLETED | OUTPATIENT
Start: 2022-08-10 | End: 2022-08-10

## 2022-08-10 RX ORDER — 0.9 % SODIUM CHLORIDE 0.9 %
500 INTRAVENOUS SOLUTION INTRAVENOUS ONCE
Status: COMPLETED | OUTPATIENT
Start: 2022-08-10 | End: 2022-08-10

## 2022-08-10 RX ORDER — HEPARIN SODIUM 10000 [USP'U]/100ML
5-30 INJECTION, SOLUTION INTRAVENOUS CONTINUOUS
Status: DISCONTINUED | OUTPATIENT
Start: 2022-08-10 | End: 2022-08-11 | Stop reason: HOSPADM

## 2022-08-10 RX ORDER — HEPARIN SODIUM 1000 [USP'U]/ML
2000 INJECTION, SOLUTION INTRAVENOUS; SUBCUTANEOUS PRN
Status: DISCONTINUED | OUTPATIENT
Start: 2022-08-10 | End: 2022-08-11 | Stop reason: HOSPADM

## 2022-08-10 RX ORDER — METHYLPREDNISOLONE SODIUM SUCCINATE 125 MG/2ML
125 INJECTION, POWDER, LYOPHILIZED, FOR SOLUTION INTRAMUSCULAR; INTRAVENOUS ONCE
Status: COMPLETED | OUTPATIENT
Start: 2022-08-10 | End: 2022-08-10

## 2022-08-10 RX ORDER — LEVETIRACETAM 500 MG/1
500 TABLET ORAL ONCE
Status: COMPLETED | OUTPATIENT
Start: 2022-08-10 | End: 2022-08-10

## 2022-08-10 RX ORDER — HEPARIN SODIUM 10000 [USP'U]/100ML
5-30 INJECTION, SOLUTION INTRAVENOUS CONTINUOUS
Status: DISCONTINUED | OUTPATIENT
Start: 2022-08-10 | End: 2022-08-10

## 2022-08-10 RX ORDER — HEPARIN SODIUM 1000 [USP'U]/ML
4000 INJECTION, SOLUTION INTRAVENOUS; SUBCUTANEOUS ONCE
Status: COMPLETED | OUTPATIENT
Start: 2022-08-10 | End: 2022-08-10

## 2022-08-10 RX ORDER — HEPARIN SODIUM 1000 [USP'U]/ML
4000 INJECTION, SOLUTION INTRAVENOUS; SUBCUTANEOUS PRN
Status: DISCONTINUED | OUTPATIENT
Start: 2022-08-10 | End: 2022-08-11 | Stop reason: HOSPADM

## 2022-08-10 RX ORDER — DILTIAZEM HYDROCHLORIDE 5 MG/ML
10 INJECTION INTRAVENOUS ONCE
Status: COMPLETED | OUTPATIENT
Start: 2022-08-10 | End: 2022-08-10

## 2022-08-10 RX ORDER — SODIUM CHLORIDE 0.9 % (FLUSH) 0.9 %
10 SYRINGE (ML) INJECTION PRN
Status: DISCONTINUED | OUTPATIENT
Start: 2022-08-10 | End: 2022-08-11 | Stop reason: HOSPADM

## 2022-08-10 RX ADMIN — LEVETIRACETAM 500 MG: 500 TABLET, FILM COATED ORAL at 20:40

## 2022-08-10 RX ADMIN — SODIUM CHLORIDE, PRESERVATIVE FREE 10 ML: 5 INJECTION INTRAVENOUS at 15:19

## 2022-08-10 RX ADMIN — DILTIAZEM HYDROCHLORIDE 10 MG: 5 INJECTION INTRAVENOUS at 15:46

## 2022-08-10 RX ADMIN — HEPARIN SODIUM 4000 UNITS: 1000 INJECTION INTRAVENOUS; SUBCUTANEOUS at 19:21

## 2022-08-10 RX ADMIN — HEPARIN SODIUM AND DEXTROSE 12 UNITS/KG/HR: 10000; 5 INJECTION INTRAVENOUS at 19:25

## 2022-08-10 RX ADMIN — SODIUM CHLORIDE 500 ML: 9 INJECTION, SOLUTION INTRAVENOUS at 15:45

## 2022-08-10 RX ADMIN — METHYLPREDNISOLONE SODIUM SUCCINATE 125 MG: 125 INJECTION, POWDER, FOR SOLUTION INTRAMUSCULAR; INTRAVENOUS at 18:16

## 2022-08-10 RX ADMIN — IOPAMIDOL 75 ML: 755 INJECTION, SOLUTION INTRAVENOUS at 15:19

## 2022-08-10 RX ADMIN — IPRATROPIUM BROMIDE AND ALBUTEROL SULFATE 1 AMPULE: .5; 3 SOLUTION RESPIRATORY (INHALATION) at 16:49

## 2022-08-10 RX ADMIN — SODIUM CHLORIDE 80 ML: 9 INJECTION, SOLUTION INTRAVENOUS at 15:19

## 2022-08-10 ASSESSMENT — PAIN SCALES - GENERAL: PAINLEVEL_OUTOF10: 2

## 2022-08-10 ASSESSMENT — PAIN - FUNCTIONAL ASSESSMENT: PAIN_FUNCTIONAL_ASSESSMENT: 0-10

## 2022-08-10 NOTE — ED PROVIDER NOTES
EMERGENCY DEPARTMENT ENCOUNTER    Pt Name: Giuliano Álvarez  MRN: [id number]  [place] 1955  Date of evaluation: 8/10/22  CHIEF COMPLAINT       Chief Complaint   Patient presents with    Shortness of Breath     HISTORY OF PRESENT ILLNESS   Patient is a 22-year-old female with nature of invasive lung adenocarcinoma with mets to the brain, s/p suboccipital craniotomy for tumor resection on 3/29/2022, liver hemangioma, hypertension, hyperlipidemia, and COPD who is sent from [facility] after patient complained of shortness of breath and double vision. Symptoms have been present for the past 2 weeks however have worsened over the past 3 days. No reports of fever, cough, chest pain, abdominal pain, changes in urine or stool. REVIEW OF SYSTEMS     Review of Systems   All other systems reviewed and are negative.   PASTMEDICAL HISTORY     Past Medical History:   Diagnosis Date    Anxiety     Benign polyp of large intestine     Cancer (HCC)     LT UPPER LOBE    COPD (chronic obstructive pulmonary disease) (HCC)     Hx of blood clots     DVT LT LEG    Hyperlipidemia     Hypertension     Liver hemangioma     Lung nodule     BARAK  Nodule    Snores     not tested for apnea    Uterine fibroid 09/2010    Wears glasses      SURGICAL HISTORY       Past Surgical History:   Procedure Laterality Date    ABDOMINAL HERNIA REPAIR  2012    BREAST BIOPSY Left 1983    BRONCHOSCOPY  10/01/2018    BRONCHOSCOPY performed by Arianne Aleman MD at [address] N/A 03/29/2022    SUBOCCIPITAL CRANIOTOMY FOR TUMOR  (REGULAR TABLE, Spyra NAVIGATION, [place] HEADHOLDER) performed by Jalyn Rausch DO at [address] [facility] ([facility])  2010    HYSTERECTOMY, TOTAL ABDOMINAL (CERVIX REMOVED)      IR PORT PLACEMENT EQUAL OR GREATER THAN 5 YEARS  04/13/2022    IR PORT PLACEMENT EQUAL OR GREATER THAN 5 YEARS 4/13/2022 Mateo Concepcion MD [facility]    LUNG BIOPSY      LUNG tobacco: Never   Vaping Use    Vaping Use: Never used   Substance Use Topics    Alcohol use: Yes     Comment: Occassionally    Drug use: No     PHYSICAL EXAM     INITIAL VITALS: BP (!) 160/102   Pulse (!) 126   Temp 98.2 °F (36.8 °C) (Oral)   Resp 20   Ht 4' 11\" (1.499 m)   Wt 161 lb (73 kg)   SpO2 96%   BMI 32.52 kg/m²    Physical Exam  HENT:      Head: Normocephalic. Right Ear: External ear normal.      Left Ear: External ear normal.      Nose: Nose normal.   Eyes:      Extraocular Movements:      Right eye: Abnormal extraocular motion present. Conjunctiva/sclera: Conjunctivae normal.      Pupils: Pupils are equal, round, and reactive to light. Comments: Right eye medial palsy. Cardiovascular:      Rate and Rhythm: Regular rhythm. Tachycardia present. Pulmonary:      Effort: Pulmonary effort is normal.      Breath sounds: Wheezing present. Abdominal:      General: Abdomen is flat. Skin:     General: Skin is dry. Neurological:      Mental Status: She is alert. Mental status is at baseline. Psychiatric:         Mood and Affect: Mood normal.         Behavior: Behavior normal.       MEDICAL DECISION MAKING:   The patient is tachycardic, afebrile, ill-appearing. Physical exam notable for wheezes bilaterally, medial palsy right eye. Based on history and exam concerning for change in brain metastasis, PE, CHF, COPD exacerbation. ED plan for basic labs, BMP, troponin, CT head, CTA, chest x-ray, reassess. DIAGNOSTIC RESULTS   EKG:All EKG's are interpreted by the Emergency Department Physician who either signs or Co-signs this chart in the absence of a cardiologist.        RADIOLOGY:All plain film, CT, MRI, and formal ultrasound images (except ED bedside ultrasound) are read by the radiologist, see reports below, unless otherwisenoted in MDM or here.   CT CHEST PULMONARY EMBOLISM W CONTRAST   Final Result   Study is significantly limited by breath hold artifact; no clear CT evidence acute PE. Suspected RUL infiltrate, although assessment limited by artifact, as above. New volume loss right base posteriorly, possibly with some consolidation. Clinical correlation for pneumonia in both instances recommended. Small but slightly larger nodular density abutting posterior diaphragm   adjacent to the liver, possibly metastatic. Liver findings stable/likely   benign. Stable right adrenal mass. Similar post KIERAN lobectomy surgical and likely post radiation changes left   lung, and chronic appearing additional lung findings, as detailed above. Greater left heart dilatation, especially LA. Correlate clinically. No   pericardial effusion. Right IJ chemo port in stable position with additional stable findings, as   above. XR CHEST PORTABLE   Final Result   Chronic findings in the chest without acute airspace disease identified. CT HEAD WO CONTRAST   Final Result   1. Status post right occipital craniotomy with postsurgical changes and a   subcutaneous fluid collection measuring 4 x 1.8 cm which is of CSF density   and may represent subdural leak. This was not clearly seen on the MRI dated   07/25/2022. LABS: All lab results were reviewed by myself, and all abnormals are listed below.   Labs Reviewed   COMPREHENSIVE METABOLIC PANEL - Abnormal; Notable for the following components:       Result Value    Glucose 165 (*)     Creatinine <0.40 (*)     Chloride 97 (*)      (*)      (*)     Total Bilirubin 0.28 (*)     All other components within normal limits   TROPONIN - Abnormal; Notable for the following components:    Troponin, High Sensitivity 708 (*)     All other components within normal limits   TROPONIN - Abnormal; Notable for the following components:    Troponin, High Sensitivity 669 (*)     All other components within normal limits   MAGNESIUM   LACTIC ACID   BRAIN NATRIURETIC PEPTIDE   TROPONIN   TROPONIN   BLOOD GAS, VENOUS APTT   APTT   APTT       EMERGENCY DEPARTMENTCOURSE:   Patient remained stable in the ED. Dyspnea improved with albuterol and BiPAP. Labs:  Potassium 3.9  Creatinine 0.4  Glucose 165  Troponin 699, 708    Chest x-ray negative for acute process  CTA negative for PE, possible right upper lobe infiltrate. CT head shows subcutaneous fluid leak or occipital lobe possibly from subdural leak. Discussed CT findings with neurosurgery, Dr. Gino James, who advises fluid collection is, no intervention needed at this time unless it is causing patient moderate distress. No contraindications to administer heparin for NSTEMI. Discussed elevated troponin with cardiology, Dr. Deepthi Wallace, who recommends serial troponins and echocardiogram.    Discussed case with oncologist, , who does recommend MRI brain on concern for diplopia and likely change in brain mets. Discussed case with [facility], they advised patient would go served at [facility].    [facility].    Discussed case with Kali Reyna, Dr. Fela Leavitt, accepts patient for transfer to MICU. He would like to start heparin, recommend speaking with neurosurgery to ensure no contraindications to start heparin for NSTEMI.       Vitals:    Vitals:    08/10/22 1500 08/10/22 1530 08/10/22 1540 08/10/22 1709   BP: (!) 137/99  (!) 160/102    Pulse: (!) 103 (!) 179 (!) 126    Resp: (!) 34 19 24 20   Temp:       TempSrc:       SpO2: 96% 95% 96%    Weight:       Height:           The patient was given the following medications while in the emergency department:  Orders Placed This Encounter   Medications    0.9 % sodium chloride bolus    iopamidol (ISOVUE-370) 76 % injection 75 mL    sodium chloride flush 0.9 % injection 10 mL    dilTIAZem injection 10 mg    0.9 % sodium chloride bolus    ipratropium-albuterol (DUONEB) nebulizer solution 1 ampule     Order Specific Question: Initiate RT Bronchodilator Protocol     Answer:   No    methylPREDNISolone sodium (SOLU-MEDROL) injection 125 mg    heparin 25,000 units in dextrose 5% 250 mL (premix) infusion    heparin (porcine) injection 4,000 Units     CONSULTS:  IP CONSULT TO NEUROSURGERY  IP CONSULT TO CARDIOLOGY  IP CONSULT TO INTERNAL MEDICINE    FINAL IMPRESSION      1. Dyspnea and respiratory abnormalities    2. Diplopia    3. NSTEMI (non-ST elevated myocardial infarction) [facility])          DISPOSITION/PLAN   DISPOSITION Decision To Transfer 08/10/2022 05:19:31 PM      PATIENT REFERRED TO:  No follow-up provider specified.   DISCHARGE MEDICATIONS:  New Prescriptions    No medications on file     Marcelo Hansen MD  Attending Emergency Physician                    Marco Garg MD  08/10/22 [address], [place]  08/10/22 1800

## 2022-08-10 NOTE — TELEPHONE ENCOUNTER
Genet Ambrose MD VISIT & TX  Send to ER (worsening breathing and double vision   No chemo today  ' Rescedule in 2 weeks  PT WENT TO ER ON EXIT  MD VISIT 8/31/22 @ 10:30AM TX @ 10AM  AVS PRINTED W/ INSTRUCTIONS AND MAILED  TO PT

## 2022-08-10 NOTE — PROGRESS NOTES
Patient arrive ambulatory with spouse for cycle 4 day 1 treatment and physician visit. Patient is having difficulty seeing and also shortness of breath continues to worsen. Patient ambulate to treatment room 15; offered wheelchair but she declined. Upon arrival to chair pulse ox was 85%. Writer obtained oxygen tubing and place on 2L and she recovered to 92% initially and eventually to 95 - 96%. Vitals as charted. Port accessed; specimen sent. Physician met with patient; labs reviewed; patient to go to ED. Writer calls report to ED; patient declines oxygen for transport via wheelchair to ED. Writer and spouse take patient to ED where Latohsa Bae RN takes over care of patient.

## 2022-08-10 NOTE — PROGRESS NOTES
p      Patient ID: Esvin Primus, 1955, [id number], 74 y.o. Diagnosis:   Left upper lobe invasive lung adenocarcinoma, Status post lobectomy 9/28/18, Pathologic stage: pT1c, pN2, stage IIIa R1 with positive margin,    Will start chemotherapy followed By RT  Carbo taxol cycle 1 on 11/14/18  Radiation Therapy completed on 3/29/19  Unfortunately on 3/26/2022 she presented with seizure activity and her CT scan MRI showed multiple supra and infratentorial intraparenchymal lesion consistent with metastatic disease in brain  Her CT chest abdomen pelvis on 3/27/2022 showed 2.1 cm right adrenal nodule suspicious for metastasis  On 3/29/2022 she underwent right-sided craniotomy with resection of intraaxial brain tumor and completed SRS/gamma knife to 6 intracranial lesion on 4/25/2022  Next-generation sequencing is negative for PD-L1, negative for EGFR, ALK, RET, ROS1, BRAF, HER2 mutations  HISTORY OF PRESENT ILLNESS:    Oncologic History:  The patient is a 66 y.o.  female who is admitted to the hospital for Left upper lobe mass. Pt has a significant past medical history of Uterine fibroids requiring total abdominal hysterectomy, liver hemangioma, HTN, Hyperlipidemia, DVT Left leg, COPD, and anxiety. Pt was found to have a left upper lung adenocarcinoma and presented to the hospital on 9/28/2018 for a scheduled robotic-assisted left upper lobe lobectomy. Pathology is positive for metastatic adenocarcinoma. There are some lymph nodes positive and some evidence of invasion to surrounding structures seen during surgery. Surgical team is planning on discharging patient this evening from the hospital.  She was discharged from the hospital with plan to follow with oncology as outpatient. Interval history:   Jovan De Anda is returning for follow-up visit and to discuss lab results and further recommendations. She reports that her breathing has gotten worse. She also c/o double vision for past 3 days.  Also has some worsening headache. I discussed the plan to send her to ER for further evaluation as she will need MR brain and ct chest and possible evaluation for home O2. During this visit patient's allergy, social, medical, surgical history and medications were reviewed and updated. Current Outpatient Medications   Medication Sig Dispense Refill    predniSONE (DELTASONE) 20 MG tablet Take 60 mg for 7 days, then 40 mg for 7 days and then 20 mg daily 100 tablet 0    levETIRAcetam (KEPPRA) 500 MG tablet Take 1 tablet by mouth in the morning and 1 tablet before bedtime. 60 tablet 2    pantoprazole (PROTONIX) 20 MG tablet Take 1 tablet by mouth daily 30 tablet 3    Multiple Vitamin (MULTIVITAMIN PO) Take by mouth daily      folic acid (FOLVITE) 1 MG tablet Take 1 tablet by mouth daily 30 tablet 5    ondansetron (ZOFRAN) 4 MG tablet Take 1 tablet by mouth 3 times daily as needed for Nausea or Vomiting 30 tablet 0    lovastatin (MEVACOR) 10 MG tablet Take 1 tablet by mouth nightly 30 tablet 11    lisinopril-hydroCHLOROthiazide (PRINZIDE;ZESTORETIC) 10-12.5 MG per tablet Take 1 tablet by mouth daily 30 tablet 11    albuterol sulfate  (90 Base) MCG/ACT inhaler inhale 2 puffs by mouth four times a day 18 g 11    clonazePAM (KLONOPIN) 0.5 MG tablet Take 1 tablet by mouth 2 times daily as needed for Anxiety for up to 30 days. (Patient not taking: Reported on 8/10/2022) 60 tablet 0     No current facility-administered medications for this visit.        Social History     Socioeconomic History    Marital status:      Spouse name: Not on file    Number of children: Not on file    Years of education: Not on file    Highest education level: Not on file   Occupational History    Not on file   Tobacco Use    Smoking status: Former     Packs/day: 1.50     Years: 30.00     Pack years: 45.00     Types: Cigarettes     Quit date:      Years since quittin.6    Smokeless tobacco: Never   Vaping Use    Vaping Use: Never used   Substance and Sexual Activity    Alcohol use: Yes     Comment: Occassionally    Drug use: No    Sexual activity: Not on file   Other Topics Concern    Not on file   Social History Narrative    Not on file     Social Determinants of Health     Financial Resource Strain: Low Risk     Difficulty of Paying Living Expenses: Not hard at all   Food Insecurity: No Food Insecurity    Worried About Running Out of Food in the Last Year: Never true    Ran Out of Food in the Last Year: Never true   Transportation Needs: Not on file   Physical Activity: Not on file   Stress: Not on file   Social Connections: Not on file   Intimate Partner Violence: Not on file   Housing Stability: Not on file       Family History   Problem Relation Age of Onset    Cancer Mother         LUNG    Cancer Sister         LUNG        REVIEW OF SYSTEM:     Constitutional: No fever or chills. No night sweats, no weight loss   Eyes: No eye discharge, double vision, or eye pain   HEENT: negative for sore mouth, sore throat, hoarseness and voice change   Respiratory: negative for cough , sputum, dyspnea, wheezing, hemoptysis, chest pain   Cardiovascular: negative for chest pain, dyspnea, palpitations, orthopnea, PND   Gastrointestinal: negative for nausea, vomiting, diarrhea, constipation, abdominal pain, Dysphagia, hematemesis and hematochezia   Genitourinary: negative for frequency, dysuria, nocturia, urinary incontinence, and hematuria   Integument: negative for rash, skin lesions, bruises.    Hematologic/Lymphatic: negative for easy bruising, bleeding, lymphadenopathy, petechiae and swelling/edema   Endocrine: negative for heat or cold intolerance, tremor, weight changes, change in bowel habits and hair loss   Musculoskeletal: negative for myalgias, arthralgias, pain, joint swelling,and bone pain   Neurological: negative for headaches, dizziness, seizures, weakness, numbness       OBJECTIVE:         Vitals:    08/10/22 1036   BP: 136/83 Pulse: 99   Resp: 20   Temp: 97.9 °F (36.6 °C)   PHYSICAL EXAM:   General appearance - well appearing, no in pain or distress   Mental status - alert and cooperative   Eyes - pupils equal and reactive, extraocular eye movements intact   Ears - bilateral TM's and external ear canals normal   Mouth - mucous membranes moist, pharynx normal without lesions   Neck - supple, no significant adenopathy   Lymphatics - no palpable lymphadenopathy, no hepatosplenomegaly   Chest - clear to auscultation, no wheezes, rales or rhonchi, symmetric air entry   Left chest surgical scar healing well  Heart - normal rate, regular rhythm, normal S1, S2, no murmurs, rubs, clicks or gallops   Abdomen - soft, nontender, nondistended, no masses or organomegaly   Neurological - alert, oriented, normal speech, no focal findings or movement disorder noted   Musculoskeletal - no joint tenderness, deformity or swelling   Extremities - peripheral pulses normal, no pedal edema, no clubbing or cyanosis   Skin - normal coloration and turgor, no rashes, no suspicious skin lesions noted ,  LABORATORY DATA:     Lab Results   Component Value Date    WBC 8.3 08/10/2022    HGB 12.3 08/10/2022    HCT 37.0 08/10/2022    MCV 96.4 08/10/2022     08/10/2022    LYMPHOPCT 7 (L) 08/10/2022    RBC 3.84 (L) 08/10/2022    MCH 32.0 08/10/2022    MCHC 33.2 08/10/2022    RDW 14.7 (H) 08/10/2022    MONOPCT 4 08/10/2022    BASOPCT 0 08/10/2022    NEUTROABS 7.31 08/10/2022    LYMPHSABS 0.58 (L) 08/10/2022    MONOSABS 0.33 08/10/2022    EOSABS 0.00 08/10/2022    BASOSABS 0.00 08/10/2022       Chemistry        Component Value Date/Time     08/10/2022 1010    K 3.6 (L) 08/10/2022 1010    CL 95 (L) 08/10/2022 1010    CO2 31 08/10/2022 1010    BUN 15 08/10/2022 1010    CREATININE 0.41 (L) 08/10/2022 1010        Component Value Date/Time    CALCIUM 9.4 08/10/2022 1010    ALKPHOS 67 08/10/2022 1010     (H) 08/10/2022 1010     (H) 08/10/2022 1010 BILITOT 0.28 (L) 08/10/2022 1010        PATHOLOGY DATA:   Pathology:  CT Guided Biopsy ([facility]) 8/22/18:   LEFT LUNG, UPPER LOBE, CT GUIDED CORE NEEDLE BIOPSIES:  - INVASIVE ADENOCARCINOMA, CONSISTENT WITH LUNG PRIMARY. Collected: 9/28/2018   -- Diagnosis --   1.  LYMPH NODE, LEVEL 9, BIOPSY:       -  ONE LYMPH NODE, NEGATIVE FOR MALIGNANCY (0/1). 2.  LYMPH NODE, LEVEL 11, DISSECTION:       -  ONE OF 2 LYMPH NODES POSITIVE FOR METASTATIC CARCINOMA (1/2). 3.  LYMPH NODE, LEVEL 5, DISSECTION:       -  ONE OF 2 LYMPH NODES POSITIVE FOR METASTATIC CARCINOMA (1/2). -  CARCINOMA INVADES LARGE PERIPHERAL NERVE BRANCHES. 4.  LUNG, LEFT UPPER LOBE, LOBECTOMY:            -  WELL-DIFFERENTIATED INVASIVE ADENOCARCINOMA, 2.9 CM   GREATEST DIMENSION. -  NEGATIVE FOR VISCERAL PLEURAL INVASION. -  TUMOR INVOLVES SOFT TISSUE OF BRONCHIAL, VASCULAR AND PARENCHYMAL MARGINS. -  5 PERIBRONCHIOLAR LYMPH NODES, POSITIVE FOR METASTATIC   ADENOCARCINOMA (5/5). -  SEPARATE SMALL INTRALOBAR FOCUS ATYPICAL ADENOMATOUS   HYPERPLASIA. -  PATHOLOGIC STAGE:  pT1c, pN2, R1.     5.  LYMPH NODES, LEVEL 10, DISSECTION:       -  ONE OF 2 LYMPH NODES POSITIVE FOR METASTATIC CARCINOMA (1/2). IMAGING DATA:    MRI brain 3/26/2022  Impression   1. Multiple supra and infratentorial intraparenchymal lesions are most   consistent with metastatic disease. There is associated edema with minimal   mass effect, but no midline shift. 2. Intrinsically T1 hyperintense right frontal calvarial lesion is most   consistent with an osseous hemangioma. No definite osseous metastasis   identified. CT chest abdomen pelvis 3/27/2022  mpression   1. Mild centrilobular emphysema. 2. Redemonstration of left perihilar post treatment scarring with underlying   traction bronchiectasis extending into the upper lower lobes. Stable.    3. No evidence for metastatic disease in the chest.   4. A 2.1 x 1.4 cm right adrenal nodule not present in 2018. CT appearance   does not meet criteria for adenoma. Considered metastatic nodule until   proven otherwise. 5. Redemonstration of hepatic hemangioma and several cysts. ASSESSMENT:    Harish Morris Is a very pleasant 74 y.o.  female with initial diagnosis of left upper lobe non-small cell lung cancer, adenocarcinoma, status post Robotically assisted BARAK lobectomy with LN dissection and Intercostal nerve block with experal on 9/28/18. She has [id number] disease, stage IIIA, she had R1 disease with positive margins. She completed sequential chemo RT. Unfortunately now she has recurrent disease with brain metastasis and also adrenal metastasis. 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 of breath: Possible developing pneumonitis, therefore plan to hold off immunotherapy and give trial of steroid  During today's visit, the patient and the family had a number of reasonable questions which were answered to their satisfaction. They verbalized understanding of the information provided and they agreed to proceed as outlined above. PLAN:   I reviewed her recent lab work, discussed diagnosis and treatment recommendations   WIth her SOB and double vision symptoms will send her to ER for further evaluation with CT hest amd NR brain  Hold off treatment today  Reschedule patrick in one to to two week depending upon her hospital course. Jose Klein Res, MD  Hematologist/Medical Oncologist    I spent more than 40 minutes examining, evaluating, reviewing data and counseling the patient. Greater than 50% of that time was spent face-to-face with the patient in counseling and coordinating her care.       This note is created with the assistance of a speech recognition program.  While intending to generate a document that actually reflects the content of the visit, the document can still have some errors including those of syntax and sound a like substitutions which may escape proof reading. It such instances, actual meaning can be extrapolated by contextual diversion.

## 2022-08-11 ENCOUNTER — APPOINTMENT (OUTPATIENT)
Dept: GENERAL RADIOLOGY | Age: 67
DRG: 280 | End: 2022-08-11
Attending: INTERNAL MEDICINE
Payer: MEDICARE

## 2022-08-11 ENCOUNTER — HOSPITAL ENCOUNTER (INPATIENT)
Age: 67
LOS: 6 days | Discharge: HOME | DRG: 280 | End: 2022-08-17
Attending: INTERNAL MEDICINE | Admitting: INTERNAL MEDICINE
Payer: MEDICARE

## 2022-08-11 VITALS
HEART RATE: 94 BPM | TEMPERATURE: 98.2 F | DIASTOLIC BLOOD PRESSURE: 81 MMHG | OXYGEN SATURATION: 97 % | RESPIRATION RATE: 17 BRPM | WEIGHT: 161 LBS | BODY MASS INDEX: 32.46 KG/M2 | HEIGHT: 59 IN | SYSTOLIC BLOOD PRESSURE: 124 MMHG

## 2022-08-11 DIAGNOSIS — C79.31 BRAIN METASTASES (HCC): ICD-10-CM

## 2022-08-11 DIAGNOSIS — H51.23 INO (INTERNUCLEAR OPHTHALMOPLEGIA), BILATERAL: Primary | ICD-10-CM

## 2022-08-11 PROBLEM — J96.00 ACUTE RESPIRATORY FAILURE (HCC): Status: ACTIVE | Noted: 2022-08-11

## 2022-08-11 PROBLEM — J43.2 CENTRILOBULAR EMPHYSEMA (HCC): Status: ACTIVE | Noted: 2022-01-01

## 2022-08-11 PROBLEM — J96.11 CHRONIC RESPIRATORY FAILURE WITH HYPOXIA (HCC): Status: ACTIVE | Noted: 2022-01-01

## 2022-08-11 PROBLEM — I21.4 NSTEMI (NON-ST ELEVATED MYOCARDIAL INFARCTION) (HCC): Status: ACTIVE | Noted: 2022-08-11

## 2022-08-11 LAB
ALBUMIN SERPL-MCNC: 3.7 G/DL (ref 3.5–5.2)
ALBUMIN/GLOBULIN RATIO: 1.5 (ref 1–2.5)
ALP BLD-CCNC: 59 U/L (ref 35–104)
ALT SERPL-CCNC: 182 U/L (ref 5–33)
ANION GAP SERPL CALCULATED.3IONS-SCNC: 12 MMOL/L (ref 9–17)
AST SERPL-CCNC: 142 U/L
BILIRUB SERPL-MCNC: 0.21 MG/DL (ref 0.3–1.2)
BILIRUBIN DIRECT: 0.1 MG/DL
BILIRUBIN, INDIRECT: 0.11 MG/DL (ref 0–1)
BUN BLDV-MCNC: 16 MG/DL (ref 8–23)
CALCIUM SERPL-MCNC: 9.2 MG/DL (ref 8.6–10.4)
CARBOXYHEMOGLOBIN: 2 % (ref 0–5)
CHLORIDE BLD-SCNC: 100 MMOL/L (ref 98–107)
CO2: 29 MMOL/L (ref 20–31)
CREAT SERPL-MCNC: 0.41 MG/DL (ref 0.5–0.9)
EKG ATRIAL RATE: 105 BPM
EKG ATRIAL RATE: 97 BPM
EKG P AXIS: 101 DEGREES
EKG P AXIS: 73 DEGREES
EKG P-R INTERVAL: 142 MS
EKG P-R INTERVAL: 144 MS
EKG Q-T INTERVAL: 380 MS
EKG Q-T INTERVAL: 382 MS
EKG QRS DURATION: 92 MS
EKG QRS DURATION: 94 MS
EKG QTC CALCULATION (BAZETT): 485 MS
EKG QTC CALCULATION (BAZETT): 502 MS
EKG R AXIS: -129 DEGREES
EKG R AXIS: -51 DEGREES
EKG T AXIS: -139 DEGREES
EKG T AXIS: -92 DEGREES
EKG VENTRICULAR RATE: 105 BPM
EKG VENTRICULAR RATE: 97 BPM
FIO2: ABNORMAL
GFR AFRICAN AMERICAN: >60 ML/MIN
GFR NON-AFRICAN AMERICAN: >60 ML/MIN
GFR SERPL CREATININE-BSD FRML MDRD: ABNORMAL ML/MIN/{1.73_M2}
GLUCOSE BLD-MCNC: 119 MG/DL (ref 70–99)
HAV IGM SER IA-ACNC: NONREACTIVE
HCO3 VENOUS: 29.2 MMOL/L (ref 24–30)
HCT VFR BLD CALC: 33.8 % (ref 36.3–47.1)
HEMOGLOBIN: 11.3 G/DL (ref 11.9–15.1)
HEPATITIS B CORE IGM ANTIBODY: NONREACTIVE
HEPATITIS B SURFACE ANTIGEN: NONREACTIVE
HEPATITIS C ANTIBODY: NONREACTIVE
LIPASE: 19 U/L (ref 13–60)
LV EF: 43 %
LVEF MODALITY: NORMAL
MCH RBC QN AUTO: 32.8 PG (ref 25.2–33.5)
MCHC RBC AUTO-ENTMCNC: 33.4 G/DL (ref 28.4–34.8)
MCV RBC AUTO: 98 FL (ref 82.6–102.9)
MRSA, DNA, NASAL: NEGATIVE
NRBC AUTOMATED: 0 PER 100 WBC
O2 SAT, VEN: 97.7 % (ref 60–85)
PARTIAL THROMBOPLASTIN TIME: 30.6 SEC (ref 20.5–30.5)
PARTIAL THROMBOPLASTIN TIME: 32 SEC (ref 20.5–30.5)
PARTIAL THROMBOPLASTIN TIME: 43.6 SEC (ref 20.5–30.5)
PATIENT TEMP: 37
PCO2, VEN: 47.2 (ref 39–55)
PDW BLD-RTO: 14.7 % (ref 11.8–14.4)
PH VENOUS: 7.41 (ref 7.32–7.42)
PLATELET # BLD: 222 K/UL (ref 138–453)
PMV BLD AUTO: 9.9 FL (ref 8.1–13.5)
PO2, VEN: 96.8 (ref 30–50)
POSITIVE BASE EXCESS, VEN: 4.3 MMOL/L (ref 0–2)
POTASSIUM SERPL-SCNC: 4.2 MMOL/L (ref 3.7–5.3)
RBC # BLD: 3.45 M/UL (ref 3.95–5.11)
SODIUM BLD-SCNC: 141 MMOL/L (ref 135–144)
SPECIMEN DESCRIPTION: NORMAL
TOTAL PROTEIN: 6.2 G/DL (ref 6.4–8.3)
TROPONIN, HIGH SENSITIVITY: 630 NG/L (ref 0–14)
TROPONIN, HIGH SENSITIVITY: 645 NG/L (ref 0–14)
WBC # BLD: 5.5 K/UL (ref 3.5–11.3)

## 2022-08-11 PROCEDURE — 83690 ASSAY OF LIPASE: CPT

## 2022-08-11 PROCEDURE — 2700000000 HC OXYGEN THERAPY PER DAY

## 2022-08-11 PROCEDURE — 93306 TTE W/DOPPLER COMPLETE: CPT

## 2022-08-11 PROCEDURE — 6370000000 HC RX 637 (ALT 250 FOR IP): Performed by: STUDENT IN AN ORGANIZED HEALTH CARE EDUCATION/TRAINING PROGRAM

## 2022-08-11 PROCEDURE — 71045 X-RAY EXAM CHEST 1 VIEW: CPT

## 2022-08-11 PROCEDURE — 6370000000 HC RX 637 (ALT 250 FOR IP): Performed by: INTERNAL MEDICINE

## 2022-08-11 PROCEDURE — 84484 ASSAY OF TROPONIN QUANT: CPT

## 2022-08-11 PROCEDURE — 2000000000 HC ICU R&B

## 2022-08-11 PROCEDURE — 93005 ELECTROCARDIOGRAM TRACING: CPT | Performed by: INTERNAL MEDICINE

## 2022-08-11 PROCEDURE — 94761 N-INVAS EAR/PLS OXIMETRY MLT: CPT

## 2022-08-11 PROCEDURE — 80076 HEPATIC FUNCTION PANEL: CPT

## 2022-08-11 PROCEDURE — 80074 ACUTE HEPATITIS PANEL: CPT

## 2022-08-11 PROCEDURE — 93010 ELECTROCARDIOGRAM REPORT: CPT | Performed by: INTERNAL MEDICINE

## 2022-08-11 PROCEDURE — 99291 CRITICAL CARE FIRST HOUR: CPT | Performed by: INTERNAL MEDICINE

## 2022-08-11 PROCEDURE — 80048 BASIC METABOLIC PNL TOTAL CA: CPT

## 2022-08-11 PROCEDURE — 36415 COLL VENOUS BLD VENIPUNCTURE: CPT

## 2022-08-11 PROCEDURE — 2580000003 HC RX 258: Performed by: STUDENT IN AN ORGANIZED HEALTH CARE EDUCATION/TRAINING PROGRAM

## 2022-08-11 PROCEDURE — 6360000002 HC RX W HCPCS: Performed by: STUDENT IN AN ORGANIZED HEALTH CARE EDUCATION/TRAINING PROGRAM

## 2022-08-11 PROCEDURE — 99223 1ST HOSP IP/OBS HIGH 75: CPT | Performed by: INTERNAL MEDICINE

## 2022-08-11 PROCEDURE — 82805 BLOOD GASES W/O2 SATURATION: CPT

## 2022-08-11 PROCEDURE — 87641 MR-STAPH DNA AMP PROBE: CPT

## 2022-08-11 PROCEDURE — 85730 THROMBOPLASTIN TIME PARTIAL: CPT

## 2022-08-11 PROCEDURE — 87040 BLOOD CULTURE FOR BACTERIA: CPT

## 2022-08-11 PROCEDURE — 94660 CPAP INITIATION&MGMT: CPT

## 2022-08-11 PROCEDURE — 94640 AIRWAY INHALATION TREATMENT: CPT

## 2022-08-11 PROCEDURE — 85027 COMPLETE CBC AUTOMATED: CPT

## 2022-08-11 RX ORDER — HEPARIN SODIUM 1000 [USP'U]/ML
4000 INJECTION, SOLUTION INTRAVENOUS; SUBCUTANEOUS PRN
Status: DISCONTINUED | OUTPATIENT
Start: 2022-08-11 | End: 2022-08-15

## 2022-08-11 RX ORDER — PREDNISONE 20 MG/1
40 TABLET ORAL ONCE
Status: COMPLETED | OUTPATIENT
Start: 2022-08-11 | End: 2022-08-11

## 2022-08-11 RX ORDER — FUROSEMIDE 10 MG/ML
20 INJECTION INTRAMUSCULAR; INTRAVENOUS ONCE
Status: COMPLETED | OUTPATIENT
Start: 2022-08-11 | End: 2022-08-11

## 2022-08-11 RX ORDER — CLONAZEPAM 0.5 MG/1
0.5 TABLET ORAL 2 TIMES DAILY PRN
Status: DISCONTINUED | OUTPATIENT
Start: 2022-08-11 | End: 2022-08-16

## 2022-08-11 RX ORDER — SODIUM CHLORIDE 9 MG/ML
INJECTION, SOLUTION INTRAVENOUS PRN
Status: DISCONTINUED | OUTPATIENT
Start: 2022-08-11 | End: 2022-08-17 | Stop reason: HOSPADM

## 2022-08-11 RX ORDER — PANTOPRAZOLE SODIUM 40 MG/1
40 TABLET, DELAYED RELEASE ORAL
Status: DISCONTINUED | OUTPATIENT
Start: 2022-08-12 | End: 2022-08-17 | Stop reason: HOSPADM

## 2022-08-11 RX ORDER — SODIUM CHLORIDE 0.9 % (FLUSH) 0.9 %
5-40 SYRINGE (ML) INJECTION PRN
Status: DISCONTINUED | OUTPATIENT
Start: 2022-08-11 | End: 2022-08-17 | Stop reason: HOSPADM

## 2022-08-11 RX ORDER — ACETAMINOPHEN 325 MG/1
650 TABLET ORAL EVERY 6 HOURS PRN
Status: DISCONTINUED | OUTPATIENT
Start: 2022-08-11 | End: 2022-08-17 | Stop reason: HOSPADM

## 2022-08-11 RX ORDER — FOLIC ACID 1 MG/1
1 TABLET ORAL DAILY
Status: DISCONTINUED | OUTPATIENT
Start: 2022-08-11 | End: 2022-08-17 | Stop reason: HOSPADM

## 2022-08-11 RX ORDER — ASPIRIN 81 MG/1
81 TABLET, CHEWABLE ORAL DAILY
Status: DISCONTINUED | OUTPATIENT
Start: 2022-08-11 | End: 2022-08-17 | Stop reason: HOSPADM

## 2022-08-11 RX ORDER — IPRATROPIUM BROMIDE AND ALBUTEROL SULFATE 2.5; .5 MG/3ML; MG/3ML
1 SOLUTION RESPIRATORY (INHALATION)
Status: DISCONTINUED | OUTPATIENT
Start: 2022-08-11 | End: 2022-08-17 | Stop reason: HOSPADM

## 2022-08-11 RX ORDER — POLYETHYLENE GLYCOL 3350 17 G/17G
17 POWDER, FOR SOLUTION ORAL DAILY PRN
Status: DISCONTINUED | OUTPATIENT
Start: 2022-08-11 | End: 2022-08-17 | Stop reason: HOSPADM

## 2022-08-11 RX ORDER — SODIUM CHLORIDE 0.9 % (FLUSH) 0.9 %
5-40 SYRINGE (ML) INJECTION EVERY 12 HOURS SCHEDULED
Status: DISCONTINUED | OUTPATIENT
Start: 2022-08-11 | End: 2022-08-17 | Stop reason: HOSPADM

## 2022-08-11 RX ORDER — ACETAMINOPHEN 650 MG/1
650 SUPPOSITORY RECTAL EVERY 6 HOURS PRN
Status: DISCONTINUED | OUTPATIENT
Start: 2022-08-11 | End: 2022-08-17 | Stop reason: HOSPADM

## 2022-08-11 RX ORDER — ONDANSETRON 4 MG/1
4 TABLET, ORALLY DISINTEGRATING ORAL EVERY 8 HOURS PRN
Status: DISCONTINUED | OUTPATIENT
Start: 2022-08-11 | End: 2022-08-17 | Stop reason: HOSPADM

## 2022-08-11 RX ORDER — HEPARIN SODIUM 1000 [USP'U]/ML
2000 INJECTION, SOLUTION INTRAVENOUS; SUBCUTANEOUS PRN
Status: DISCONTINUED | OUTPATIENT
Start: 2022-08-11 | End: 2022-08-15

## 2022-08-11 RX ORDER — HEPARIN SODIUM 1000 [USP'U]/ML
4000 INJECTION, SOLUTION INTRAVENOUS; SUBCUTANEOUS ONCE
Status: DISCONTINUED | OUTPATIENT
Start: 2022-08-11 | End: 2022-08-15

## 2022-08-11 RX ORDER — LEVETIRACETAM 500 MG/1
500 TABLET ORAL 2 TIMES DAILY
Status: DISCONTINUED | OUTPATIENT
Start: 2022-08-11 | End: 2022-08-17 | Stop reason: HOSPADM

## 2022-08-11 RX ORDER — ATORVASTATIN CALCIUM 40 MG/1
40 TABLET, FILM COATED ORAL NIGHTLY
Status: DISCONTINUED | OUTPATIENT
Start: 2022-08-11 | End: 2022-08-11

## 2022-08-11 RX ORDER — ONDANSETRON 2 MG/ML
4 INJECTION INTRAMUSCULAR; INTRAVENOUS EVERY 6 HOURS PRN
Status: DISCONTINUED | OUTPATIENT
Start: 2022-08-11 | End: 2022-08-17 | Stop reason: HOSPADM

## 2022-08-11 RX ORDER — HEPARIN SODIUM AND DEXTROSE 10000; 5 [USP'U]/100ML; G/100ML
5-30 INJECTION INTRAVENOUS CONTINUOUS
Status: DISCONTINUED | OUTPATIENT
Start: 2022-08-11 | End: 2022-08-15

## 2022-08-11 RX ORDER — PREDNISONE 20 MG/1
20 TABLET ORAL DAILY
Status: DISCONTINUED | OUTPATIENT
Start: 2022-08-18 | End: 2022-08-12

## 2022-08-11 RX ORDER — PREDNISONE 20 MG/1
40 TABLET ORAL DAILY
Status: DISCONTINUED | OUTPATIENT
Start: 2022-08-11 | End: 2022-08-11

## 2022-08-11 RX ADMIN — HEPARIN SODIUM 2000 UNITS: 1000 INJECTION INTRAVENOUS; SUBCUTANEOUS at 13:25

## 2022-08-11 RX ADMIN — CLONAZEPAM 0.5 MG: 0.5 TABLET ORAL at 22:32

## 2022-08-11 RX ADMIN — METOPROLOL TARTRATE 6.25 MG: 25 TABLET ORAL at 19:53

## 2022-08-11 RX ADMIN — SODIUM CHLORIDE, PRESERVATIVE FREE 10 ML: 5 INJECTION INTRAVENOUS at 08:12

## 2022-08-11 RX ADMIN — METOPROLOL TARTRATE 6.25 MG: 25 TABLET ORAL at 11:24

## 2022-08-11 RX ADMIN — HEPARIN SODIUM 16 UNITS/KG/HR: 10000 INJECTION, SOLUTION INTRAVENOUS at 22:01

## 2022-08-11 RX ADMIN — IPRATROPIUM BROMIDE AND ALBUTEROL SULFATE 1 AMPULE: .5; 3 SOLUTION RESPIRATORY (INHALATION) at 12:57

## 2022-08-11 RX ADMIN — LEVETIRACETAM 500 MG: 500 TABLET, FILM COATED ORAL at 19:53

## 2022-08-11 RX ADMIN — FUROSEMIDE 20 MG: 10 INJECTION, SOLUTION INTRAMUSCULAR; INTRAVENOUS at 11:24

## 2022-08-11 RX ADMIN — IPRATROPIUM BROMIDE AND ALBUTEROL SULFATE 1 AMPULE: .5; 3 SOLUTION RESPIRATORY (INHALATION) at 20:03

## 2022-08-11 RX ADMIN — FOLIC ACID 1 MG: 1 TABLET ORAL at 18:56

## 2022-08-11 RX ADMIN — SODIUM CHLORIDE, PRESERVATIVE FREE 10 ML: 5 INJECTION INTRAVENOUS at 19:53

## 2022-08-11 RX ADMIN — PREDNISONE 40 MG: 20 TABLET ORAL at 11:24

## 2022-08-11 RX ADMIN — ASPIRIN 81 MG: 81 TABLET, CHEWABLE ORAL at 11:24

## 2022-08-11 RX ADMIN — HEPARIN SODIUM 2000 UNITS: 1000 INJECTION INTRAVENOUS; SUBCUTANEOUS at 21:59

## 2022-08-11 RX ADMIN — LEVETIRACETAM 500 MG: 500 TABLET, FILM COATED ORAL at 13:29

## 2022-08-11 RX ADMIN — PREDNISONE 40 MG: 20 TABLET ORAL at 17:44

## 2022-08-11 RX ADMIN — HEPARIN SODIUM 12 UNITS/KG/HR: 10000 INJECTION, SOLUTION INTRAVENOUS at 03:28

## 2022-08-11 NOTE — CONSULTS
w/cell washg spx (N/A, 10/29/2018); other surgical history (Right, 11/05/2018); Hysterectomy, total abdominal; craniotomy (N/A, 03/29/2022); and IR PORT PLACEMENT > 5 YEARS (04/13/2022). Home Medications:    Prior to Admission medications    Medication Sig Start Date End Date Taking? Authorizing Provider   predniSONE (DELTASONE) 20 MG tablet Take 60 mg for 7 days, then 40 mg for 7 days and then 20 mg daily 8/4/22   Nii Bruno MD   levETIRAcetam (KEPPRA) 500 MG tablet Take 1 tablet by mouth in the morning and 1 tablet before bedtime. 7/28/22 8/27/22  Bev Montanez MD   clonazePAM (KLONOPIN) 0.5 MG tablet Take 1 tablet by mouth 2 times daily as needed for Anxiety for up to 30 days. Patient not taking: Reported on 8/10/2022 6/28/22 8/3/22  Wallace Mcneil MD   pantoprazole (PROTONIX) 20 MG tablet Take 1 tablet by mouth daily 6/7/22   Ty Nieves MD   Multiple Vitamin (MULTIVITAMIN PO) Take by mouth daily    Historical Provider, MD   folic acid (FOLVITE) 1 MG tablet Take 1 tablet by mouth daily 5/11/22   Nii Bruno MD   ondansetron (ZOFRAN) 4 MG tablet Take 1 tablet by mouth 3 times daily as needed for Nausea or Vomiting 5/11/22   Nii Bruno MD   lovastatin (MEVACOR) 10 MG tablet Take 1 tablet by mouth nightly 4/21/22   Wallace Mcneil MD   lisinopril-hydroCHLOROthiazide (PRINZIDE;ZESTORETIC) 10-12.5 MG per tablet Take 1 tablet by mouth daily 4/21/22   Wallace Mcneil MD   albuterol sulfate  (90 Base) MCG/ACT inhaler inhale 2 puffs by mouth four times a day 9/28/21   Nii Bruno MD       sodium chloride flush, 5-40 mL, IntraVENous, 2 times per day    heparin (porcine), 4,000 Units, IntraVENous, Once    ipratropium-albuterol, 1 ampule, Inhalation, Q4H WA      Allergies:  Codeine    Social History:   reports that she quit smoking about 22 years ago. Her smoking use included cigarettes. She has a 45.00 pack-year smoking history.  She has never used smokeless tobacco. She reports current alcohol use. She reports that she does not use drugs. Family History: family history includes Cancer in her mother and sister. No h/o sudden cardiac death. REVIEW OF SYSTEMS:    Constitutional: there has been no unanticipated weight loss. There's been No change in energy level, No change in activity level. Eyes: No visual changes or diplopia. No scleral icterus. ENT: No Headaches, hearing loss or vertigo. No mouth sores or sore throat. Cardiovascular: per HPI  Respiratory: per HPI  Gastrointestinal: No abdominal pain, appetite loss, blood in stools. No change in bowel or bladder habits. Genitourinary: No dysuria, trouble voiding, or hematuria. Musculoskeletal:  No gait disturbance, No weakness or joint complaints. PHYSICAL EXAM:      BP (!) 157/100   Pulse (!) 111   Temp 98 °F (36.7 °C) (Axillary)   Resp 27   Ht 4' 11\" (1.499 m)   Wt 159 lb 13.3 oz (72.5 kg)   SpO2 95%   BMI 32.28 kg/m²    Constitutional and General Appearance: alert, cooperative, no distress and appears stated age  HEENT: PERRL, no cervical lymphadenopathy. No masses palpable. Normal oral mucosa  Respiratory:  Normal excursion and expansion without use of accessory muscles  Resp Auscultation: Good respiratory effort. No for increased work of breathing. On auscultation: clear to auscultation bilaterally  Cardiovascular: The apical impulse is not displaced  Heart tones are crisp and normal. regular S1 and S2.  Jugular venous pulsation Normal  The carotid upstroke is normal in amplitude and contour without delay or bruit  Peripheral pulses are symmetrical and full   Abdomen:   No masses or tenderness  Bowel sounds present  Extremities:   No Cyanosis or Clubbing   Lower extremity edema: No   Skin: Warm and dry  Neurological:  Alert and oriented. Moves all extremities well    EKG:    Date: 08/11/22  Reading: No acute ischemia  NSR with T wave inversions in 1 aVL, 2 3 aVF and V5 and V6.     LAST ECHO:  Date:  Findings Summary:  Normal left ventricle size, wall thickness and function with an estimated EF  > 55%. Grade I mild diastolic dysfunction. Mild mitral regurgitation. Trivial tricuspid regurgitation    LAST Stress Test:   Date of last ST:  Major Findings:    LAST Cardiac Angiography:.  Date:  Findings:      Labs:     CBC:   Recent Labs     08/10/22  1840 08/11/22  0341   WBC 8.0 5.5   HGB 11.5* 11.3*   HCT 35.5* 33.8*    222     BMP:   Recent Labs     08/10/22  1309 08/11/22  0341    141   K 3.9 4.2   CO2 31 29   BUN 13 16   CREATININE <0.40* 0.41*   LABGLOM Can not be calculated >60   GLUCOSE 165* 119*     BNP: No results for input(s): BNP in the last 72 hours. PT/INR:   Recent Labs     08/10/22  1840   PROTIME 12.7   INR 1.0     APTT:  Recent Labs     08/10/22  1840 08/11/22  0341   APTT 24.8 30.6*     CARDIAC ENZYMES:No results for input(s): CKTOTAL, CKMB, CKMBINDEX, TROPONINI in the last 72 hours.   FASTING LIPID PANEL:  Lab Results   Component Value Date/Time    HDL 72 07/13/2022 01:15 PM    TRIG 166 07/13/2022 01:15 PM     LIVER PROFILE:  Recent Labs     08/10/22  1010 08/10/22  1309   * 207*   * 227*   LABALBU 4.3 4.4     Troponins: Invalid input(s): TROPONIN     Other Current Problems  Patient Active Problem List   Diagnosis    Status post lobectomy of lung    Non-small cell cancer of left lung (HCC)    Malignant neoplasm of bronchus of upper lobe, left (HCC)    Essential hypertension    Anxiety    Intraparenchymal hemorrhage of brain ([address]    New onset seizure (HCC)    Mass of occipital region    Hyperglycemia    Hypokalemia    Vasogenic edema (HCC)    Brain metastases (HCC)    Adrenal mass (HCC) - right     Episode of loss of consciousness    Focal epilepsy ([address]    Primary malignant neoplasm of lung with metastasis to brain [facility])    Acute respiratory failure (HCC)    NSTEMI (non-ST elevated myocardial infarction) ([address]           IMPRESSION:  Elevated troponin likely type I NSTEMI, EKG shows lateral and inferior leads T wave inversions but seems to be little worse, will get new EKG, no chest pain, troponins are trending down. Left upper lobe invasive lung adenocarcinoma, Status post lobectomy 9/28/18  Acute hypoxic respiratory failure likely due to pneumonitis as a result of chemoimmunotherapy  Brain mets s/p right-sided craniotomy with resection of intraaxial brain tumor 03/29/2022  Hypertension  Hyperlipidemia  Left leg DVT  Liver hemangioma  Elevated liver enzymes -suspected autoimmune hepatitis    Recommendations:    Continue heparin drip  Will get the clearance from neurosurgery, hematology oncology and critical care for heart cath  Patient is currently on BiPAP, therefore, will be unable to lie flat for heart cath  Will get heart cath once acute issues resolved resolved and cleared by neurosurgery hematology oncology and critical care. Will get the chemo immunotherapy record as it could be related to myocarditis due to chemo immunotherapy. Discussed with patient and Nurse. Electronically signed by Jimena Reddy MD on 8/11/2022 at 8:01 AM    Jimena Reddy MD  Internal Medicine Resident, PGY-3  [facility].  8:01 AM 08/11/22  Attending Physician Statement  I have discussed the care of the patient, including pertinent history and exam findings, with the resident. I have seen and examined the patient and the key elements of all parts of the encounter have been performed by me. I agree with the assessment, plan and orders as documented by the resident.   Quite elevated tropes with slight lower number no chest pain nonspecific EKG changes  Short of breath on Keytruda that can cause myocarditis  Awaiting for the echocardiogram for LVEF wall motion abnormalities  At this time patient is also seen by neuro for double Vision and weakness already cleared for heparin  Eventually plan to do the heart cath once cleared by all  Star Gonzalez MD

## 2022-08-11 NOTE — PROGRESS NOTES
[facility]  Documentation Note                Admission Dx: Acute respiratory failure ([facility] [J96.00]  NSTEMI (non-ST elevated myocardial infarction) ([facility] [I21.4]    Past Medical History:   has a past medical history of Anxiety, Benign polyp of large intestine, Cancer ([facility] COPD (chronic obstructive pulmonary disease) ([facility] Hx of blood clots, Hyperlipidemia, Hypertension, Liver hemangioma, Lung nodule, Snores, Uterine fibroid, and Wears glasses. Previous Testing:     ECHO 9/5/18: EF 55%, grade I DD, mild MR, trivial TR.      Previous office/hospital visit:   None     Tunde Ramirez, [facility]

## 2022-08-11 NOTE — CARE COORDINATION
08/11/22 0852   Service Assessment   Patient Orientation Alert and Oriented   Cognition Alert   History Provided By Patient   Primary Caregiver Self   Support Systems Spouse/Significant Other   Patient's Healthcare Decision Maker is: Patient Declined (Legal Next of Kin Remains as Decision Maker)   PCP Verified by CM Yes   Last Visit to PCP Within last 3 months  (last seen in May, 22)   Prior Functional Level Independent in ADLs/IADLs   Current Functional Level Assistance with the following:;Cooking;Housework   Can patient return to prior living arrangement Yes   Ability to make needs known: Good   Family able to assist with home care needs: Yes   Would you like for me to discuss the discharge plan with any other family members/significant others, and if so, who? Yes  Mary Avery)   Social/Functional History   Lives With Significant other   Type of Home House  ([facility])   Home Layout Two level   [facility] [address] to enter with rails   Entrance Stairs - Number of Steps 2   Bathroom Shower/Tub Tub/Shower unit   Receives Help From Family   ADL Assistance Independent   Homemaking Assistance Needs assistance   Ambulation Assistance Independent   Transfer Assistance Independent   Active  Yes  (Not recently with double vision)   Mode of Transportation Car   Occupation Retired   Discharge Planning   Type of [address] Prior To Admission None   Potential [address] Medications Yes  (Pt informs she is having issues with insurance covering albuterol and combo med lisinopril and HCTZ - she may need some support with this)   Type of [address] None   Patient expects to be discharged to: House   Follow Up Appointment: Best Day/Time    (any day but wednesdays)   One/Two Story Residence Two story   # of Interior Steps 13   History of falls?  [address] Discharge   [employer] Information Provided? No   Mode of Transport at Discharge Self   Confirm Follow Up Transport Family   Condition of Participation: Discharge Planning   The Plan for Transition of Care is related to the following treatment goals: Improved breathing and double vision   The Patient and/or Patient Representative was provided with a Choice of Provider? Patient  (Declined any need for services at this time)   The Patient and/Or Patient Representative agree with the Discharge Plan? Yes   Freedom of Choice list was provided with basic dialogue that supports the patient's individualized plan of care/goals, treatment preferences, and shares the quality data associated with the providers? (Declined by pt, list not provided)   Discussed with pt opportunity for advanced care planning and to name medical decision maker in the event she is not able to, pt declines at this time.  Pt's daughter Alicia Apple is next of kin

## 2022-08-11 NOTE — H&P
Critical Care - History and Physical Examination    Patient's name:  Kevin Abernathy  Medical Record Number: [id number]  Patient's account/billing number: [de-identified]  Patient's YOB: 1955  Age: 74 y.o. Date of Admission: 8/11/2022  1:25 AM  Date of History and Physical Examination: 8/11/2022      Primary Care Physician: Brianna Andrade MD  Attending Physician: Dr. Jane Graham Status: Full Code    Chief complaint: Shortness of breathe, Diplopia        HISTORY OF PRESENT ILLNESS:      History was obtained from the patient. Kevin Abernathy is a 74 y.o. with PMH of   -Lung adenocarcinoma left lung lobectomy four years back. -Brain metastasis status post subacute occipital craniotomy for tumor resection on 3/29/2022 currently in chemotherapy  -Hypertension  -Hyperlipidemia  -COPD not on oxygen at home    Transferred from [facility] which she presented with concern of shortness of breath and double vision. She went for chemo therapy where she noted to have severe shortness of breath and worsening diplopia. She mention these symptoms she started couple of weeks back and has been getting worse. She denied chest pain, cough, fever abdominal pain. For couple of weeks patient was unable to lie flat and worsen her breathing. She did mention of production of whitish sputum . she patient was tachypneic and desatted to while she was moving around in [place] so patient was started on BiPAP significant elevation of troponin to 700 noted there, CTA negative for pulmonary embolism and CT head showed subcutaneous fluid leak or occipital lobe possibly subdural leak. Heparin was restarted after clearing from Dr. Casie Joseph, neurosurgery. Case was discussed with oncology Dr. Federico Yip who recommend MRI brain for concern of diplopia and likely change in brain mets. Further management and work-up patient is transferred to [facility].     On my evaluation, patient was hemodynamically stable well oriented to ROBOTIC ASSISTED LEFT UPPER LOBECTOMY MULTIPLE LYMPH NODE BIOPSY, INTERCOSTAL  NERVE BLOCK ABLATION W/EXPAREL performed by Kezia Grossman MD at [address]:      Allergies   Allergen Reactions    Codeine Nausea And Vomiting         HOME MEDS: :      Prior to Admission medications    Medication Sig Start Date End Date Taking? Authorizing Provider   predniSONE (DELTASONE) 20 MG tablet Take 60 mg for 7 days, then 40 mg for 7 days and then 20 mg daily 8/4/22   Lillian Reese MD   levETIRAcetam (KEPPRA) 500 MG tablet Take 1 tablet by mouth in the morning and 1 tablet before bedtime. 7/28/22 8/27/22  Kandice Allen MD   clonazePAM (KLONOPIN) 0.5 MG tablet Take 1 tablet by mouth 2 times daily as needed for Anxiety for up to 30 days. Patient not taking: Reported on 8/10/2022 6/28/22 8/3/22  India Linares MD   pantoprazole (PROTONIX) 20 MG tablet Take 1 tablet by mouth daily 6/7/22   Bay Liu MD   Multiple Vitamin (MULTIVITAMIN PO) Take by mouth daily    Historical Provider, MD   folic acid (FOLVITE) 1 MG tablet Take 1 tablet by mouth daily 5/11/22   Lillian Reese MD   ondansetron (ZOFRAN) 4 MG tablet Take 1 tablet by mouth 3 times daily as needed for Nausea or Vomiting 5/11/22   Lillian Reese MD   lovastatin (MEVACOR) 10 MG tablet Take 1 tablet by mouth nightly 4/21/22   India Linares MD   lisinopril-hydroCHLOROthiazide (PRINZIDE;ZESTORETIC) 10-12.5 MG per tablet Take 1 tablet by mouth daily 4/21/22   India Linares MD   albuterol sulfate  (90 Base) MCG/ACT inhaler inhale 2 puffs by mouth four times a day 9/28/21   Lillian Reese MD       SOCIAL HISTORY:       TOBACCO:   reports that she quit smoking about 22 years ago. Her smoking use included cigarettes. She has a 45.00 pack-year smoking history. She has never used smokeless tobacco.  ETOH:   reports current alcohol use. DRUGS:  reports no history of drug use.   OCCUPATION:       FAMILY HISTORY:          Problem Relation Age of Onset    Cancer Mother         LUNG    Cancer Sister         LUNG       REVIEW OF SYSTEMS (ROS):     Review of Systems -   General ROS: negative  Psychological ROS: negative  Ophthalmic ROS: negative  ENT ROS: negative  Allergy and Immunology ROS: negative  Hematological and Lymphatic ROS: negative  Endocrine ROS: negative  Breast ROS: negative  Respiratory ROS: Shortness of breath  Cardiovascular ROS: Shortness of breath, cough   gastrointestinal ROS:negative  Genito-Urinary ROS: negative  Musculoskeletal ROS: negative  Neurological ROS: Diplopia   dermatological ROS: negative    OBJECTIVE:     VITAL SIGNS:  BP (!) 157/100   Pulse 95   Temp 98 °F (36.7 °C) (Axillary)   Resp 24   Ht \" (1.499 m)   Wt 159 lb 13.3 oz (72.5 kg)   SpO2 95%   BMI 32.28 kg/m²   Tmax over 24 hours:  Temp (24hrs), Av.9 °F (36.6 °C), Min:97.6 °F (36.4 °C), Max:98.2 °F (36.8 °C)      Patient Vitals for the past 8 hrs:   BP Temp Temp src Pulse Resp SpO2 Height Weight   22 0814 -- -- -- 95 24 95 % -- --   22 0700 (!) 157/100 -- -- (!) 111 27 95 % -- --   22 0621 -- -- -- (!) 102 17 95 % -- --   22 0606 -- -- -- (!) 104 17 95 % -- --   22 0600 (!) 128/95 -- -- (!) 106 27 97 % -- --   22 0500 98/72 -- -- 85 17 95 % -- --   22 0400 98/74 98 °F (36.7 °C) Axillary 93 27 94 % -- --   22 0300 111/72 -- -- 89 18 96 % -- --   22 0220 -- -- -- -- -- -- " (1.499 m) 159 lb 13.3 oz (72.5 kg)   22 0200 -- -- -- (!) 105 20 96 % -- --   22 0144 (!) 139/94 97.6 °F (36.4 °C) Axillary (!) 101 (!) 31 96 % -- --   225 -- -- -- -- (!) 31 -- -- --         Intake/Output Summary (Last 24 hours) at 2022 0854  Last data filed at 2022 0713  Gross per 24 hour   Intake 10.63 ml   Output --   Net 10.63 ml     Date 22 0000 - 22   Shift 6333-7776 4985-7858 8094-3455 24 Hour Total   INTAKE   I.V.(mL/kg) 10.6(0.1)   10.6(0.1)   Shift Total(mL/kg) 10.6(0.1) 10.6(0.1)   OUTPUT   Shift Total(mL/kg)       Weight (kg) 72.5 72.5 72.5 72.5     Wt Readings from Last 3 Encounters:   08/11/22 159 lb 13.3 oz (72.5 kg)   08/10/22 161 lb (73 kg)   08/10/22 161 lb 1.6 oz (73.1 kg)     Body mass index is 32.28 kg/m². PHYSICAL EXAM:  Constitutional: Appears well, no distress,  EENT: neck supple with midline trachea. Neck: Supple, symmetrical, trachea midline, no adenopathy,  no jvd, skin normal  Respiratory: clear to auscultation, no wheezes or rales and unlabored breathing.  No intercostal tenderness  Cardiovascular: regular rate and rhythm, normal S1, S2, no murmur noted  Abdomen: soft, nontender, nondistended, no masses or organomegaly  Extremities:   no pedal edema, no clubbing or cyanosis    MEDICATIONS:  Scheduled Meds:   sodium chloride flush  5-40 mL IntraVENous 2 times per day    heparin (porcine)  4,000 Units IntraVENous Once    ipratropium-albuterol  1 ampule Inhalation Q4H WA     Continuous Infusions:   sodium chloride 5 mL/hr at 08/11/22 0826    heparin (PORCINE) Infusion 12 Units/kg/hr (08/11/22 0713)     PRN Meds:   sodium chloride flush, 5-40 mL, PRN  sodium chloride, , PRN  ondansetron, 4 mg, Q8H PRN   Or  ondansetron, 4 mg, Q6H PRN  polyethylene glycol, 17 g, Daily PRN  acetaminophen, 650 mg, Q6H PRN   Or  acetaminophen, 650 mg, Q6H PRN  heparin (porcine), 4,000 Units, PRN  heparin (porcine), 2,000 Units, PRN          ABGs:   Lab Results   Component Value Date/Time    PHART 7.411 09/28/2018 12:46 PM    ADA4WHC 37.1 09/28/2018 12:46 PM    PO2ART 112.0 09/28/2018 12:46 PM    OYN0YQO 23.1 09/28/2018 12:46 PM    YGB3RJT 26 09/25/2018 12:03 PM    H5ZZARQZ 98.7 09/28/2018 12:46 PM    FIO2 80% 09/28/2018 12:46 PM       DATA:  Complete Blood Count:   Recent Labs     08/10/22  1010 08/10/22  1840 08/11/22  0341   WBC 8.3 8.0 5.5   RBC 3.84* 3.59* 3.45*   HGB 12.3 11.5* 11.3*   HCT 37.0 35.5* 33.8*   MCV 96.4 98.9 98.0   MCH 32.0 32.0 32.8   MCHC 33.2 32.4 33.4   RDW 14.7* 14.6* 14.7*    257 222   MPV 9.5 9.6 9.9        Last 3 Blood Glucose:   Recent Labs     08/10/22  1010 08/10/22  1309 08/11/22  0341   GLUCOSE 128* 165* 119*        PT/INR:    Lab Results   Component Value Date/Time    PROTIME 12.7 08/10/2022 06:40 PM    INR 1.0 08/10/2022 06:40 PM     PTT:    Lab Results   Component Value Date/Time    APTT 30.6 08/11/2022 03:41 AM       Comprehensive Metabolic Profile:   Recent Labs     08/10/22  1010 08/10/22  1309 08/11/22  0341    137 141   K 3.6* 3.9 4.2   CL 95* 97* 100   CO2 31 31 29   BUN 15 13 16   CREATININE 0.41* <0.40* 0.41*   GLUCOSE 128* 165* 119*   CALCIUM 9.4 9.8 9.2   PROT 6.9 7.0  --    LABALBU 4.3 4.4  --    BILITOT 0.28* 0.28*  --    ALKPHOS 67 70  --    * 207*  --    * 227*  --       Magnesium:   Lab Results   Component Value Date/Time    MG 1.7 08/10/2022 01:09 PM    MG 1.7 03/26/2022 06:12 AM     Phosphorus: No results found for: PHOS  Ionized Calcium: No results found for: CAION     Urinalysis:   Lab Results   Component Value Date/Time    NITRU NEGATIVE 09/25/2018 12:24 PM    COLORU YELLOW 09/25/2018 12:24 PM    PHUR 7.5 09/25/2018 12:24 PM    SPECGRAV 1.009 09/25/2018 12:24 PM    LEUKOCYTESUR NEGATIVE 09/25/2018 12:24 PM    UROBILINOGEN Normal 09/25/2018 12:24 PM    BILIRUBINUR NEGATIVE 09/25/2018 12:24 PM    GLUCOSEU NEGATIVE 09/25/2018 12:24 PM    KETUA NEGATIVE 09/25/2018 12:24 PM       HgBA1c:    Lab Results   Component Value Date/Time    LABA1C 5.0 06/28/2018 07:47 AM     TSH:    Lab Results   Component Value Date/Time    TSH 1.75 08/03/2022 10:13 AM       Lactic Acid:   Lab Results   Component Value Date/Time    LACTA 1.6 08/10/2022 01:09 PM      Troponin: No results for input(s): TROPONINI in the last 72 hours.     Electrocardiogram:       Last Echocardiogram findings:   (See actual reports for details)      Radiological imaging  CT HEAD WO CONTRAST    Result Date: 8/10/2022  EXAMINATION: CT OF THE HEAD WITHOUT CONTRAST pneumonia TECHNOLOGIST PROVIDED HISTORY: pneumonia Reason for Exam: Upright port, pneumonia FINDINGS: Right chest port remains in place. Cardiomediastinal silhouette is stable. There is persistent left perihilar opacity which is unchanged and was seen on a previous CT chest dated 04/04/2022. No evidence of pneumothorax. There is minimal blunting at the costophrenic angles which is unchanged. No new airspace consolidation. No significant interval change. Chronic findings at the left perihilar lung. Minimal blunting at the costophrenic angles, likely atelectasis. XR CHEST PORTABLE    Result Date: 8/10/2022  EXAMINATION: ONE XRAY VIEW OF THE CHEST 8/10/2022 12:39 pm COMPARISON: Chest CT 06/13/2022 HISTORY: ORDERING SYSTEM PROVIDED HISTORY: sob TECHNOLOGIST PROVIDED HISTORY: sob Reason for Exam: port ap upright sob FINDINGS: Right internal jugular port in place. The cardiomediastinal silhouette is unchanged in appearance. Postoperative/post treatment changes in the left lung with scar in the mid left lung field again demonstrated. There is no consolidation, pneumothorax, or evidence of edema. No effusion is appreciated. The osseous structures are unchanged in appearance. Chronic findings in the chest without acute airspace disease identified. CT CHEST PULMONARY EMBOLISM W CONTRAST    Result Date: 8/10/2022  EXAMINATION: CTA OF THE CHEST 8/10/2022 3:16 pm TECHNIQUE: CTA of the chest was performed after the administration of intravenous contrast.  Multiplanar reformatted images are provided for review. MIP images are provided for review. Automated exposure control, iterative reconstruction, and/or weight based adjustment of the mA/kV was utilized to reduce the radiation dose to as low as reasonably achievable.  COMPARISON: CT chest, abdomen and pelvis from 06/13/2022 HISTORY: ORDERING SYSTEM PROVIDED HISTORY: sob, hx of lung ca with mets to brain TECHNOLOGIST PROVIDED HISTORY: sob, hx of lung ca with mets to brain Decision Support Exception - unselect if not a suspected or confirmed emergency medical condition->Emergency Medical Condition (MA) Reason for Exam: sob, hx of lung ca with mets to brain History of COPD, and robotic assisted left upper lobectomy 2018. FINDINGS: Pulmonary Arteries: Pulmonary arteries are adequately opacified for evaluation, but breath hold artifact limits assessment more peripheral pulmonary artery branches. No evidence of large or central intraluminal filling defect, definite peripheral PE defects. Main pulmonary artery is unchanged in caliber. Mediastinum: Right IJ chemo port with unchanged tip position near the cavoatrial junction. No evidence of mediastinal lymphadenopathy. The heart and pericardium demonstrate no acute abnormality, although greater dilatation left cardiac chambers noted, especially LA. There is no acute abnormality of the thoracic aorta. Similar dilatation right pulmonary artery. No pericardial effusion. Lungs/pleura: Breath hold, right subclavian vein contrast and overlying chemo port related artifact limits assessment RUL; patchy RUL airspace abnormalities not excluded on this study. Decreased left lung volume re-identified c/w history of upper lobectomy. Increased central left perihilar consolidative airspace abnormalities with bronchiectasis re-identified, with similar distribution and, again, likely related to post radiation change. Interval right basilar volume loss, possibly with minimal consolidation. Lungs are otherwise without additional suspected acute process; underlying emphysematous changes, right anterior apical bleb/bulla, some persistent volume loss or pleural thickening posteriorly left posterior lung and scattered additional atelectasis or scarring appear similar. No pulmonary edema. No sizable pleural effusion or PTX.  Upper Abdomen: Limited images of the upper abdomen show no acute abnormality, but again limited by motion artifact. Probable hepatic cysts/may angioma and some contrast reflux into hepatic veins, unchanged 1.8 x 1.3 cm right adrenal mass and gallstones re-identified. Small lobular soft tissue abnormality abutting posterior diaphragm/upper properitoneal line near the liver (best seen slices [id number], series 3), slightly increased size, now measuring 1.3 x 0.7 cm vs 1.0 x 0.5 cm on axial sections. Soft Tissues/Bones: No acute bone or soft tissue abnormality. No destructive or blastic lesion identified. Study is significantly limited by breath hold artifact; no clear CT evidence acute PE. Suspected RUL infiltrate, although assessment limited by artifact, as above. New volume loss right base posteriorly, possibly with some consolidation. Clinical correlation for pneumonia in both instances recommended. Small but slightly larger nodular density abutting posterior diaphragm adjacent to the liver, possibly metastatic. Liver findings stable/likely benign. Stable right adrenal mass. Similar post KIERAN lobectomy surgical and likely post radiation changes left lung, and chronic appearing additional lung findings, as detailed above. Greater left heart dilatation, especially LA. Correlate clinically. No pericardial effusion. Right IJ chemo port in stable position with additional stable findings, as above. MRI BRAIN W WO CONTRAST    Result Date: 7/25/2022  EXAMINATION: MRI OF THE BRAIN WITHOUT AND WITH CONTRAST  7/25/2022 9:53 am TECHNIQUE: Multiplanar multisequence MRI of the head/brain was performed without and with the administration of intravenous contrast. COMPARISON: MRI brain performed 04/25/2022. MRI brain performed 03/30/2022.  HISTORY: ORDERING SYSTEM PROVIDED HISTORY: Primary malignant neoplasm of lung with metastasis to brain [facility]) TECHNOLOGIST PROVIDED HISTORY: STAT Creatinine as needed:->Yes Reason for Exam: patient states lung cancer with mets to the brain, headaches, post gamma knife FINDINGS: INTRACRANIAL STRUCTURES/VENTRICLES:  The sellar and suprasellar structures, optic chiasm, corpus callosum, pineal gland, tectum, and midline brainstem structures are unremarkable. The craniocervical junction is unremarkable. There is a dural-based metastatic focus adjacent to the right parietal lobe that is stable in size measuring 0.9 x 0.8 cm. There is a peripherally enhancing metastatic focus in the right occipital lobe measuring 0.9 x 0.8 cm. There is an enhancing focus in the right frontal lobe measuring 0.8 x 0.7 cm compared to 1.1 x 0.7 cm. There is an enhancing metastatic focus abutting the dura in the left temporal lobe posteriorly that measures 1.2 x 1.0 cm compared to 1.2 x 1.1 cm. There is an enhancing metastatic focus in the medial left temporal lobe posteriorly measuring 1.4 x 1.5 cm. There is a punctate enhancing focus within the right frontal lobe inferiorly measuring 4 mm. There is mild edema in the left posterior temporal lobe medially. The ventricular structures are symmetric. There is postsurgical change with resection cavity within the right cerebellar region. The internal auditory canals and cerebellopontine angles are unremarkable. There is no abnormal restricted diffusion. ORBITS: The visualized portion of the orbits demonstrate no acute abnormality. SINUSES: The mastoid air cells are normally aerated. There is a mucous retention cyst in the left maxillary sinus. BONES/SOFT TISSUES: There is an enhancing focus in the right frontal skull measuring 1.2 x 0.7 cm. .  The soft tissues demonstrate no acute abnormality. Multiple enhancing metastatic foci throughout the brain and the majority of these are similar in size compared to prior examination. There is a focus in the right frontal lobe that is mildly smaller compared to prior. Enhancing focus within the right frontal skull likely representing metastatic disease. Stable resection cavity in the right cerebellar region.        ASSESSMENT: Principal Problem:    Acute respiratory failure (HCC)  Active Problems:    NSTEMI (non-ST elevated myocardial infarction) ([facility] [address]  Resolved Problems:    * No resolved hospital problems. *    NSTEMI  -We will continue heparin drip for now, trend down tropes  -Cardiology on board, follow with echo    Diplopia probably secondary to brain mets-follow with MRI brain  -Follow heme-onc recommendation    Essential hypertension  -Hold on her home med for now. COPD  -Continue albuterol 4 times a day. Omid Scott MD, MD  ICU resident   [facility]  8/11/2022 8:54 AM    The critical care team assigned to the patient will be following up the patient in the intensive care unit. I have discussed the current plan with the critical care attending. The above mentioned assessment and plan will be reviewed again in detail by the critical care attending at bedside, and can be further changed or modified accordingly by the attending physician. Attending Physician Statement  I have discussed the care of Mike Whatley, including pertinent history and exam findings with the resident. I have reviewed the key elements of all parts of the encounter with the resident. I have seen and examined the patient with the resident. I agree with the assessment and plan and status of the problem list as documented. I seen the patient during around today, I have reviewed the imaging studies lab seen other studies reviewed.   History of mets to the brain with history of lung cancer status post lobectomy status post radiation and chemotherapy apparently 3 to 4 years ago with left upper lobectomy initially then recurrence this year with brain metastasis and she had brain radiation/gamma knife after craniotomy and on immunotherapy and chemotherapy followed by oncology and radiation oncology presented with worsening shortness of breath diplopia according to patient she has been having shortness of breath for last few weeks which was attributed to [facility] and started by oncology on prednisone about a week ago from 60 mg and currently on 40 mg starting today she did not complain of fever hemoptysis does not complain of chest pain troponins are elevated in the range of 707 EKG lateral changes were present. Patient is on heparin neurosurgery was consulted from [facility] and consulted here they were okay with heparin drip neurosurgery was also to be determined the CT scan finding. CTA chest was negative for pulm embolism left upper lobe scarlike area is chronic mild increase vascular marking I did not see evidence of alveolar or interstitial infiltrate currently. Patient remained on BiPAP overnight because of shortness of breath and was taken off the BiPAP for about an hour on O2 she is on home oxygen she does have also orthopnea she does have some sputum production. Acute on chronic hypoxic respiratory failure multifactorial with COPD likely along with Keytruda with interstitial pneumonia on steroid/pulm edema/COPD exacerbation. COPD on home oxygen. Left upper lobe lung cancer status post recurrent. Brain mets with history of craniotomy with abnormal CT finding. Non-ST elevation MI. Her liver enzymes are also elevated so we will hold statins. Prednisone 40 mg and taper every week as recommended by oncology and further recommendation per oncology. Will get oncology evaluation. DuoNeb aerosol. Aspirin and beta-blocker. Continue pending. Cardiology evaluation. Neurosurgery consultation. Will use BiPAP during the daytime intermittently alternating with nasal cannula and at night. Discussed with nursing staff, treatment and plan discussed.   Discussed with respiratory therapist.    Total critical care time caring for this patient with life threatening, unstable organ failure, including direct patient contact, management of life support systems, review of data including imaging and labs, discussions with other team members and physicians at least 55 min so far today, excluding procedures. Please note that this chart was generated using voice recognition Dragon dictation software. Although every effort was made to ensure the accuracy of this automated transcription, some errors in transcription may have occurred.      Odell Vitale MD  8/11/2022 10:00 AM

## 2022-08-11 NOTE — CONSULTS
Today's Date: 8/11/2022  Patient Name: Saran Albrecht  Date of admission: 8/11/2022  1:25 AM  Patient's age: 74 y.o., 1955  Admission Dx: Acute respiratory failure ([facility] [address] [J96.00]  NSTEMI (non-ST elevated myocardial infarction) ([facility] [address] [I21.4]    Reason for Consult: management recommendations  Requesting Physician: Teja Escobar MD    CHIEF COMPLAINT:  Shortness of breathe, Diplopia    History Obtained From:  patient    HISTORY OF PRESENT ILLNESS:      The patient is a 74 y.o.   female who is admitted to the hospital for shortness of breath and double vision, patient has history of lung adenocarcinoma status post lobectomy with evidence of recurrent disease in the brain back in March 2022 status post radiation craniotomy and currently on palliative chemoimmunotherapy recently LFTs up required holding immunotherapy, patient for the last 2 weeks started having double vision and worsening shortness of breath and she was admitted to emergency room CT of the brain did show fluid leak/subdural leak and was evaluated by neurosurgery  On admission to emergency room found to have high troponin and non-ST elevation MI      Oncology history    Diagnosis:   Left upper lobe invasive lung adenocarcinoma, Status post lobectomy 9/28/18, Pathologic stage: pT1c, pN2, stage IIIa R1 with positive margin,    Will start chemotherapy followed By RT  Carbo taxol cycle 1 on 11/14/18  Radiation Therapy completed on 3/29/19  Unfortunately on 3/26/2022 she presented with seizure activity and her CT scan MRI showed multiple supra and infratentorial intraparenchymal lesion consistent with metastatic disease in brain  Her CT chest abdomen pelvis on 3/27/2022 showed 2.1 cm right adrenal nodule suspicious for metastasis  On 3/29/2022 she underwent right-sided craniotomy with resection of intraaxial brain tumor and completed SRS/gamma knife to 6 intracranial lesion on 4/25/2022  Next-generation sequencing is negative for PD-L1, negative for EGFR, ALK, RET, ROS1, BRAF, HER2 mutations  Started on [employer]    Past Medical History:   has a past medical history of Anxiety, Benign polyp of large intestine, Cancer ([facility] [address] COPD (chronic obstructive pulmonary disease) ([facility] [address] Hx of blood clots, Hyperlipidemia, Hypertension, Liver hemangioma, Lung nodule, Snores, Uterine fibroid, and Wears glasses. Past Surgical History:   has a past surgical history that includes Hysterectomy (2010); Abdominal hernia repair (2012); Colonoscopy; Lung biopsy; Breast biopsy (Left, 1983); Lung removal, partial (Left, 09/28/2018); pr rmvl lung other than pneumonectomy 1 lobe lobect (Left, 09/28/2018); bronchoscopy (10/01/2018); pr [facility] incl fluor gdnce dx w/cell washg spx (N/A, 10/29/2018); other surgical history (Right, 11/05/2018); Hysterectomy, total abdominal; craniotomy (N/A, 03/29/2022); and IR PORT PLACEMENT > 5 YEARS (04/13/2022). Medications:    Reviewed in Epic     Allergies:  Codeine    Social History:   reports that she quit smoking about 22 years ago. Her smoking use included cigarettes. She has a 45.00 pack-year smoking history. She has never used smokeless tobacco. She reports current alcohol use. She reports that she does not use drugs. Family History: family history includes Cancer in her mother and sister. REVIEW OF SYSTEMS:    Constitutional: No fever or chills.  No night sweats, no weight loss   Eyes: No eye discharge, double vision, or eye pain   HEENT: negative for sore mouth, sore throat, hoarseness and voice change   Respiratory: Positive for shortness of breath cough no  hemoptysis, chest pain   Cardiovascular: negative for chest pain, positive for dyspnea, no palpitations, positive for orthopnea and PND   Gastrointestinal: negative for nausea, vomiting, diarrhea, constipation, abdominal pain, Dysphagia, hematemesis and hematochezia   Genitourinary: negative for frequency, dysuria, nocturia, urinary incontinence, and hematuria   Integument: negative for rash, skin lesions, bruises.    Hematologic/Lymphatic: negative for easy bruising, bleeding, lymphadenopathy, or petechiae   Endocrine: negative for heat or cold intolerance,weight changes, change in bowel habits and hair loss   Musculoskeletal: negative for myalgias, arthralgias, pain, joint swelling,and bone pain   Neurological: Positive for headache and double vision      PHYSICAL EXAM:      BP 95/63   Pulse 81   Temp 98.3 °F (36.8 °C) (Axillary)   Resp 21   Ht 4' 11\" (1.499 m)   Wt 159 lb 13.3 oz (72.5 kg)   SpO2 98%   BMI 32.28 kg/m²    Temp (24hrs), Av.6 °F (36.4 °C), Min:96.8 °F (36 °C), Max:98.3 °F (36.8 °C)    General appearance -moderate respiratory distress ,look ill  Mental status - alert and cooperative   Eyes - pupils equal and reactive, extraocular eye movements intact   Ears - bilateral TM's and external ear canals normal   Mouth - mucous membranes moist, pharynx normal without lesions   Neck - supple, no significant adenopathy   Lymphatics - no palpable lymphadenopathy, no hepatosplenomegaly   Chest - clear to auscultation, no wheezes, rales or rhonchi, symmetric air entry   Heart - normal rate, regular rhythm, normal S1, S2, no murmurs  Abdomen - soft, nontender, nondistended, no masses or organomegaly   Neurological - alert, oriented, normal speech, no focal findings or movement disorder noted   Musculoskeletal - no joint tenderness, deformity or swelling   Extremities - peripheral pulses normal, no pedal edema, no clubbing or cyanosis   Skin - normal coloration and turgor, no rashes, no suspicious skin lesions noted ,    DATA:    Labs:   CBC:   Recent Labs     08/10/22  1840 22  0341   WBC 8.0 5.5   HGB 11.5* 11.3*   HCT 35.5* 33.8*    222     BMP:   Recent Labs     08/10/22  1309 22  0341    141   K 3.9 4.2   CO2 31 29   BUN 13 16   CREATININE <0.40* 0.41*   LABGLOM Can not be calculated >60   GLUCOSE 165* 119*     PT/INR: Recent Labs     08/10/22  1840   PROTIME 12.7   INR 1.0       IMAGING DATA:  XR CHEST PORTABLE   Preliminary Result   No significant interval change. Chronic findings at the left perihilar lung. Minimal blunting at the costophrenic angles, likely atelectasis. Primary Problem  Acute respiratory failure [facility])    Active Hospital Problems    Diagnosis Date Noted    Acute respiratory failure ([facility] [address] [J96.00] 08/11/2022     Priority: Medium    NSTEMI (non-ST elevated myocardial infarction) ([facility] [address] [I21.4] 08/11/2022     Priority: Medium         IMPRESSION:   Adenocarcinoma of the left lung status post lobectomy  Evidence of cancer recurrence in the brain/brain metastasis  S/p craniotomy on March 29, 2022  Recent complaint of double vision  Worsening shortness of breath  Non-ST elevation acute MI  Immunotherapy induced high LFTs and pneumonitis? On chemoimmunotherapy    RECOMMENDATIONS:  I reviewed the laboratory data, imaging studies, discussed the diagnosis and possible treatment    Patient with a new onset of double vision but looks like related to brain metastasis, the most recent MRI however on July 26 did not show evidence of progression will consult neurology  Will discuss with the neurology if to repeat MRI of the brain  Neurosurgery consulted and no surgical intervention  Cardiology evaluation for non-ST elevation MI, immunotherapy can be associated with cardiac and pulmonary toxicity> hold tapering steroid and restart full dose of steroids at 1 mg/kg  even patient had recurrent lung cancer with brain metastasis however systemic disease looks under control on palliative concurrent chemo immunotherapy> to resume chemotherapy and possible immunotherapy after discharge    Discussed with patient and Nurse. Thank you for asking us to see this patient.                                     [facility]                            This note is created

## 2022-08-11 NOTE — FLOWSHEET NOTE
Attempted consult for AD. Pt was on phone and had a visitor present. She told  that she had not slept and did not have the energy to complete the documents at time of visit.  encouraged her to notify nurse when she is ready to complete documents.     Electronically signed by Umair Gilbert, on 8/11/2022 at 9:54 AM.  Nabil Haider  [phone]

## 2022-08-11 NOTE — PROGRESS NOTES
Comprehensive Nutrition Assessment    Type and Reason for Visit:  Positive Nutrition Screen (wt loss, poor po)    Nutrition Recommendations/Plan:   Continue current diet, Regular w/ DAGOBERTO, 1.2 L FR  Monitor/encourage PO intake     Malnutrition Assessment:  Malnutrition Status:  Insufficient data (08/11/22 1128)    Context:  Chronic Illness     Findings of the 6 clinical characteristics of malnutrition:  Energy Intake:  Unable to assess  Weight Loss:  No significant weight loss     Body Fat Loss:  Unable to assess     Muscle Mass Loss:  Unable to assess    Fluid Accumulation:  No significant fluid accumulation     Strength:  Not Performed    Nutrition Assessment:    Chart review for RNC, wt loss/poor po. 73 yo F adm w/ ARF and NSTEMI. PMH significant for COPD, HTN, HLD, DVT, liver hemangioma, lung lobectomy (~2018), brain mets (resected Mar 2022), recently started on chemotherapy. Pt wt trending down, 3.7% wt loss x 1 month (no significant). Labs/meds reviewed. Nutrition Related Findings:    Labs/meds reviewed Wound Type: None       Current Nutrition Intake & Therapies:    Average Meal Intake: Unable to assess  Average Supplements Intake: Unable to assess  ADULT DIET; Regular; No Added Salt (3-4 gm); 1200 ml    Anthropometric Measures:  Height: 4' 11\" (149.9 cm)  Ideal Body Weight (IBW): 95 lbs (43 kg)    Admission Body Weight: 159 lb 13.3 oz (72.5 kg) (8/11/22)  Current Body Weight: 159 lb 13.3 oz (72.5 kg) (8/11/22), 168.2 % IBW. Current BMI (kg/m2): 32.3  Usual Body Weight: 166 lb (75.3 kg) (7/13/22)  % Weight Change (Calculated): -3.7  Weight Adjustment For: No Adjustment                 BMI Categories: Obese Class 1 (BMI 30.0-34. 9)    Estimated Daily Nutrient Needs:  Energy Requirements Based On: Kcal/kg     Energy (kcal/day): 1800 to 1900 kcal/day  Weight Used for Protein Requirements: Current  Protein (g/day): 70 to 80 g/day  Method Used for Fluid Requirements: ml/Kg  Fluid (ml/day): 2100 to 2200 mL/day (30 mL/kg)    Nutrition Diagnosis:   Inadequate oral intake related to  (poor appetite, curretn medical condition) as evidenced by poor intake prior to admission    Nutrition Interventions:   Food and/or Nutrient Delivery: Continue Current Diet  Nutrition Education/Counseling: No recommendation at this time  Coordination of Nutrition Care: Continue to monitor while inpatient       Goals:  Previous Goal Met:  (Goal set)  Goals: PO intake 75% or greater       Nutrition Monitoring and Evaluation:   Behavioral-Environmental Outcomes: None Identified  Food/Nutrient Intake Outcomes: Food and Nutrient Intake  Physical Signs/Symptoms Outcomes: Biochemical Data, Weight    Discharge Planning:    No discharge needs at this time     Rianna Hammer MS, RD, LD  Contact: [phone]

## 2022-08-11 NOTE — CONSULTS
Department of Neurosurgery                                                             Consult Note      Reason for Consult:  Heparin Drip for NSTEMI   Requesting Physician:    Neurosurgeon:   [] Dr. Shyam Anton   [x] Dr. Jayden Coe  [] Dr. Red Sandy    History Obtained From:  patient, electronic medical record    CHIEF COMPLAINT:         SOB and Diplopia     HISTORY OF PRESENT ILLNESS:       The patient is a 74 y.o. female with past medical history of primary lung adenocarcinoma with metastasis to the brain s/p tumor resection in march 2022, liver hemangioma, HTN, HLD, and COPD that presents from [facility] for complaints of shortness of breath and double vision. Symptoms have been present for past 2 weeks, but worsened in the past 3 days.      PAST MEDICAL HISTORY :       Past Medical History:        Diagnosis Date    Anxiety     Benign polyp of large intestine     Cancer (HCC)     LT UPPER LOBE    COPD (chronic obstructive pulmonary disease) (HCC)     Hx of blood clots     DVT LT LEG    Hyperlipidemia     Hypertension     Liver hemangioma     Lung nodule     BARAK  Nodule    Snores     not tested for apnea    Uterine fibroid 09/2010    Wears glasses        Past Surgical History:        Procedure Laterality Date    ABDOMINAL HERNIA REPAIR  2012    BREAST BIOPSY Left 1983    BRONCHOSCOPY  10/01/2018    BRONCHOSCOPY performed by Richard Del Angel MD at [address] [facility] N/A 03/29/2022    SUBOCCIPITAL CRANIOTOMY FOR TUMOR  (REGULAR TABLE, SANJUANITA NAVIGATION, [place] HEADHOLDER) performed by Elinor Sanchez DO at [address] [facility] ([address] [place] [address])  2010    HYSTERECTOMY, TOTAL ABDOMINAL (CERVIX REMOVED)      IR PORT PLACEMENT EQUAL OR GREATER THAN 5 YEARS  04/13/2022    IR PORT PLACEMENT EQUAL OR GREATER THAN 5 YEARS 4/13/2022 Pralliencsandra Reyes MD [facility] SPECIAL PROCEDURES    LUNG BIOPSY      LUNG REMOVAL, PARTIAL Left 09/28/2018    Robotic assisted left lobectomy, upper with lymph node biopsy    OTHER SURGICAL HISTORY Right 2018    IR PORT PLACEMENT EQUAL OR GREATER THAN 5 YEARS    [place] [address] INCL FLUOR GDNCE DX W/CELL WASHG SPX N/A 10/29/2018    BRONCHOSCOPY performed by Andre Mcdermott MD at [address] [place] [address] LUNG OTHER THAN PNEUMONECTOMY 1 LOBE LOBECT Left 2018    XI ROBOTIC ASSISTED LEFT UPPER LOBECTOMY MULTIPLE LYMPH NODE BIOPSY, INTERCOSTAL  NERVE BLOCK ABLATION W/EXPAREL performed by Monica Fabian MD at [place] [address] History:   Social History     Socioeconomic History    Marital status:      Spouse name: Not on file    Number of children: Not on file    Years of education: Not on file    Highest education level: Not on file   Occupational History    Not on file   Tobacco Use    Smoking status: Former     Packs/day: 1.50     Years: 30.00     Pack years: 45.00     Types: Cigarettes     Quit date:      Years since quittin.6    Smokeless tobacco: Never   Vaping Use    Vaping Use: Never used   Substance and Sexual Activity    Alcohol use: Yes     Comment: Occassionally    Drug use: No    Sexual activity: Not on file   Other Topics Concern    Not on file   Social History Narrative    Not on file     Social Determinants of Health     Financial Resource Strain: Low Risk     Difficulty of Paying Living Expenses: Not hard at all   Food Insecurity: No Food Insecurity    Worried About Running Out of Food in the Last Year: Never true    Ran Out of Food in the Last Year: Never true   Transportation Needs: Not on file   Physical Activity: Not on file   Stress: Not on file   Social Connections: Not on file   Intimate Partner Violence: Not on file   Housing Stability: Not on file       Family History:       Problem Relation Age of Onset    Cancer Mother         LUNG    Cancer Sister         LUNG       Allergies:  Codeine    Home Medications:  Prior to Admission medications    Medication Sig Start Date End Date Taking?  Authorizing Once  heparin (porcine) injection 4,000 Units, 4,000 Units, IntraVENous, PRN  heparin (porcine) injection 2,000 Units, 2,000 Units, IntraVENous, PRN  heparin 25,000 units in dextrose 5 % 250 mL infusion (rate based), 5-30 Units/kg/hr, IntraVENous, Continuous    REVIEW OF SYSTEMS:       CONSTITUTIONAL: negative for fatigue and malaise   EYES: negative for double vision and photophobia    HEENT: negative for tinnitus and sore throat   RESPIRATORY: SOB. On Bipap and nasal cannula. CARDIOVASCULAR: negative for chest pain, palpitations   GASTROINTESTINAL: negative for nausea, vomiting   GENITOURINARY: negative for incontinence   MUSCULOSKELETAL: negative for neck or back pain   NEUROLOGICAL: negative for seizures   PSYCHIATRIC: negative for agitated     Review of systems otherwise negative. PHYSICAL EXAM:       BP 98/74   Pulse 93   Temp 98 °F (36.7 °C) (Axillary)   Resp 27   Ht 4' 11\" (1.499 m)   Wt 159 lb 13.3 oz (72.5 kg)   SpO2 94%   BMI 32.28 kg/m²       CONSTITUTIONAL: no apparent distress, appears stated age   HEAD: normocephalic, atraumatic   ENT: moist mucous membranes, uvula midline   NECK: supple, symmetric, no midline tenderness to palpation   BACK: without midline tenderness, step-offs or deformities   LUNGS: clear to auscultation bilaterally   CARDIOVASCULAR: regular rate and rhythm   ABDOMEN: Soft, non-tender, non-distended with normal active bowel sounds   NEUROLOGIC:  EYE OPENING     Spontaneous - 4 [x]       To voice - 3 []       To pain - 2 []       None - 1 []    VERBAL RESPONSE     Appropriate, oriented - 5 [x]       Dazed or confused - 4 []       Syllables, expletives - 3 []       Grunts - 2 []       None - 1 []    MOTOR RESPONSE     Spontaneous, command - 6 [x]       Localizes pain - 5 []       Withdraws pain - 4 []       Abnormal flexion - 3 []       Abnormal extension - 2 []       None - 1 []            Total GCS: 15    Mental Status:  A/O x 4.                 Cranial Nerves: cranial nerves II-XII are grossly intact    Motor Exam:    Drift:  absent  Tone:  normal    Motor exam is symmetrical 5 out of 5 all extremities bilaterally    Sensory:    Right Upper Extremity:  normal  Left Upper Extremity:  normal  Right Lower Extremity:  normal  Left Lower Extremity:  normal    Deep Tendon Reflexes:    Right Bicep:  2+  Left Bicep:  2+  Right Knee:  2+  Left Knee:  2+    Plantar Response:    Right:  downgoing  Left:  downgoing    Clonus:  absent  Kate's:  absent    Gait:  Deferred. SKIN: no rash       LABS AND IMAGING:     CBC with Differential:    Lab Results   Component Value Date/Time    WBC 8.0 08/10/2022 06:40 PM    RBC 3.59 08/10/2022 06:40 PM    HGB 11.5 08/10/2022 06:40 PM    HCT 35.5 08/10/2022 06:40 PM     08/10/2022 06:40 PM    MCV 98.9 08/10/2022 06:40 PM    MCH 32.0 08/10/2022 06:40 PM    MCHC 32.4 08/10/2022 06:40 PM    RDW 14.6 08/10/2022 06:40 PM    LYMPHOPCT 7 08/10/2022 10:10 AM    MONOPCT 4 08/10/2022 10:10 AM    BASOPCT 0 08/10/2022 10:10 AM    MONOSABS 0.33 08/10/2022 10:10 AM    LYMPHSABS 0.58 08/10/2022 10:10 AM    EOSABS 0.00 08/10/2022 10:10 AM    BASOSABS 0.00 08/10/2022 10:10 AM    DIFFTYPE NOT REPORTED 10/12/2021 08:32 AM     BMP:    Lab Results   Component Value Date/Time     08/10/2022 01:09 PM    K 3.9 08/10/2022 01:09 PM    CL 97 08/10/2022 01:09 PM    CO2 31 08/10/2022 01:09 PM    BUN 13 08/10/2022 01:09 PM    LABALBU 4.4 08/10/2022 01:09 PM    CREATININE <0.40 08/10/2022 01:09 PM    CALCIUM 9.8 08/10/2022 01:09 PM    GFRAA Can not be calculated 08/10/2022 01:09 PM    LABGLOM Can not be calculated 08/10/2022 01:09 PM    GLUCOSE 165 08/10/2022 01:09 PM       Radiology Review:      CT HEAD WO CONTRAST    Result Date: 8/10/2022  1. Status post right occipital craniotomy with postsurgical changes and a subcutaneous fluid collection measuring 4 x 1.8 cm which is of CSF density and may represent subdural leak.   This was not clearly seen on the MRI dated 07/25/2022. XR CHEST PORTABLE    Result Date: 8/10/2022  Chronic findings in the chest without acute airspace disease identified. CT CHEST PULMONARY EMBOLISM W CONTRAST    Result Date: 8/10/2022  Study is significantly limited by breath hold artifact; no clear CT evidence acute PE. Suspected RUL infiltrate, although assessment limited by artifact, as above. New volume loss right base posteriorly, possibly with some consolidation. Clinical correlation for pneumonia in both instances recommended. Small but slightly larger nodular density abutting posterior diaphragm adjacent to the liver, possibly metastatic. Liver findings stable/likely benign. Stable right adrenal mass. Similar post KIERAN lobectomy surgical and likely post radiation changes left lung, and chronic appearing additional lung findings, as detailed above. Greater left heart dilatation, especially LA. Correlate clinically. No pericardial effusion. Right IJ chemo port in stable position with additional stable findings, as above. ASSESSMENT AND PLAN:       Patient Active Problem List   Diagnosis    Status post lobectomy of lung    Non-small cell cancer of left lung (HCC)    Malignant neoplasm of bronchus of upper lobe, left ([address]    Essential hypertension    Anxiety    Intraparenchymal hemorrhage of brain ([address]    New onset seizure (HCC)    Mass of occipital region    Hyperglycemia    Hypokalemia    Vasogenic edema (HCC)    Brain metastases (HCC)    Adrenal mass (HCC) - right     Episode of loss of consciousness    Focal epilepsy ([address]    Primary malignant neoplasm of lung with metastasis to brain [facility])    Acute respiratory failure (HCC)    NSTEMI (non-ST elevated myocardial infarction) [facility])         A/P:  This is a 74 y.o. female with recent history of craniotomy for tumor resection in march 2022 now presenting for NSTEMI, and requires heparin drip for anticogulation.  Neurosurgery consulted for evaluation of the use of heparin drip in the setting of recent craniotomy. CT HEAD WO CONTRAST    Result Date: 8/10/2022  1. Status post right occipital craniotomy with postsurgical changes and a subcutaneous fluid collection measuring 4 x 1.8 cm which is of CSF density and may represent subdural leak. This was not clearly seen on the MRI dated 07/25/2022. XR CHEST PORTABLE    Result Date: 8/10/2022  Chronic findings in the chest without acute airspace disease identified. CT CHEST PULMONARY EMBOLISM W CONTRAST    Result Date: 8/10/2022  Study is significantly limited by breath hold artifact; no clear CT evidence acute PE. Suspected RUL infiltrate, although assessment limited by artifact, as above. New volume loss right base posteriorly, possibly with some consolidation. Clinical correlation for pneumonia in both instances recommended. Small but slightly larger nodular density abutting posterior diaphragm adjacent to the liver, possibly metastatic. Liver findings stable/likely benign. Stable right adrenal mass. Similar post KIERAN lobectomy surgical and likely post radiation changes left lung, and chronic appearing additional lung findings, as detailed above. Greater left heart dilatation, especially LA. Correlate clinically. No pericardial effusion. Right IJ chemo port in stable position with additional stable findings, as above. Plan  Patient care will be discussed with attending, will reevaluated patient along with attending.     - Repeat CT head w/o contrast once patient is on heparin drip.    - No neurosurgical interventions planned for now  - HOB: 30 degrees   - Repeat CT head once patient is on heparin drip. - Neuro checks per protocol   - OK to start heparin drip.   -  However, we recommend careful evaluation of all other risk factors associated with anticoagulation therapy as applied to this patient's medical condition  - We recommend SBP < 140   - Determine the lower limit of SBP clinically based on mentation. Additional recommendations may follow    Please contact neurosurgery with any changes in patients neurologic status. Thank you for your consult.        DO Flor Jones pager [phone]  8/11/2022  4:10 AM

## 2022-08-11 NOTE — PLAN OF CARE
Problem: Discharge Planning  Goal: Discharge to home or other facility with appropriate resources  8/11/2022 1138 by Jacki Holguin RN  Outcome: Progressing  8/11/2022 0416 by Mary Neal RN  Outcome: Progressing     Problem: Safety - Adult  Goal: Free from fall injury  8/11/2022 1138 by Jacki Holguin RN  Outcome: Progressing  Flowsheets (Taken 8/11/2022 0800)  Free From Fall Injury: Instruct family/caregiver on patient safety  8/11/2022 0416 by Mary Neal RN  Outcome: Progressing     Problem: ABCDS Injury Assessment  Goal: Absence of physical injury  8/11/2022 1138 by Jacki Holguin RN  Outcome: Progressing  Flowsheets (Taken 8/11/2022 0800)  Absence of Physical Injury: Implement safety measures based on patient assessment  8/11/2022 0416 by Mary Neal RN  Outcome: Progressing     Problem: Skin/Tissue Integrity  Goal: Absence of new skin breakdown  Description: 1. Monitor for areas of redness and/or skin breakdown  2. Assess vascular access sites hourly  3. Every 4-6 hours minimum:  Change oxygen saturation probe site  4. Every 4-6 hours:  If on nasal continuous positive airway pressure, respiratory therapy assess nares and determine need for appliance change or resting period.   8/11/2022 1138 by Jacki Holguin RN  Outcome: Progressing  8/11/2022 0416 by Mary Neal RN  Outcome: Progressing

## 2022-08-11 NOTE — PROGRESS NOTES
08/11/22 1813   Oxygen Therapy/Pulse Ox   O2 Device Heated high flow cannula   O2 Flow Rate (L/min) 20 L/min   FiO2  30 %       Patient stating she needs extra flow to cannula. HHFNC placed on 20L & 30%.

## 2022-08-12 ENCOUNTER — APPOINTMENT (OUTPATIENT)
Dept: MRI IMAGING | Age: 67
DRG: 280 | End: 2022-08-12
Attending: INTERNAL MEDICINE
Payer: MEDICARE

## 2022-08-12 PROBLEM — H51.21 INTERNUCLEAR OPHTHALMOPLEGIA OF RIGHT EYE: Status: ACTIVE | Noted: 2022-01-01

## 2022-08-12 PROBLEM — G96.198 PSEUDOMENINGOCELE: Status: ACTIVE | Noted: 2022-08-12

## 2022-08-12 PROBLEM — H51.23: Status: ACTIVE | Noted: 2022-01-01

## 2022-08-12 LAB
ABSOLUTE EOS #: <0.03 K/UL (ref 0–0.44)
ABSOLUTE IMMATURE GRANULOCYTE: 0.04 K/UL (ref 0–0.3)
ABSOLUTE LYMPH #: 0.74 K/UL (ref 1.1–3.7)
ABSOLUTE MONO #: 0.58 K/UL (ref 0.1–1.2)
ALBUMIN SERPL-MCNC: 3.5 G/DL (ref 3.5–5.2)
ALBUMIN/GLOBULIN RATIO: 1.5 (ref 1–2.5)
ALP BLD-CCNC: 55 U/L (ref 35–104)
ALT SERPL-CCNC: 163 U/L (ref 5–33)
ANION GAP SERPL CALCULATED.3IONS-SCNC: 9 MMOL/L (ref 9–17)
AST SERPL-CCNC: 94 U/L
BASOPHILS # BLD: 0 % (ref 0–2)
BASOPHILS ABSOLUTE: <0.03 K/UL (ref 0–0.2)
BILIRUB SERPL-MCNC: 0.21 MG/DL (ref 0.3–1.2)
BILIRUBIN DIRECT: 0.08 MG/DL
BILIRUBIN, INDIRECT: 0.13 MG/DL (ref 0–1)
BUN BLDV-MCNC: 23 MG/DL (ref 8–23)
CALCIUM SERPL-MCNC: 8.9 MG/DL (ref 8.6–10.4)
CHLORIDE BLD-SCNC: 95 MMOL/L (ref 98–107)
CO2: 30 MMOL/L (ref 20–31)
CREAT SERPL-MCNC: 0.51 MG/DL (ref 0.5–0.9)
EKG ATRIAL RATE: 114 BPM
EKG ATRIAL RATE: 172 BPM
EKG ATRIAL RATE: 97 BPM
EKG P AXIS: 63 DEGREES
EKG P AXIS: 83 DEGREES
EKG P-R INTERVAL: 144 MS
EKG P-R INTERVAL: 146 MS
EKG Q-T INTERVAL: 234 MS
EKG Q-T INTERVAL: 332 MS
EKG Q-T INTERVAL: 352 MS
EKG QRS DURATION: 106 MS
EKG QRS DURATION: 86 MS
EKG QRS DURATION: 88 MS
EKG QTC CALCULATION (BAZETT): 395 MS
EKG QTC CALCULATION (BAZETT): 447 MS
EKG QTC CALCULATION (BAZETT): 457 MS
EKG R AXIS: -49 DEGREES
EKG R AXIS: -58 DEGREES
EKG R AXIS: -71 DEGREES
EKG T AXIS: -37 DEGREES
EKG T AXIS: 33 DEGREES
EKG T AXIS: 81 DEGREES
EKG VENTRICULAR RATE: 114 BPM
EKG VENTRICULAR RATE: 172 BPM
EKG VENTRICULAR RATE: 97 BPM
EOSINOPHILS RELATIVE PERCENT: 0 % (ref 1–4)
GFR AFRICAN AMERICAN: >60 ML/MIN
GFR NON-AFRICAN AMERICAN: >60 ML/MIN
GFR SERPL CREATININE-BSD FRML MDRD: ABNORMAL ML/MIN/{1.73_M2}
GLUCOSE BLD-MCNC: 127 MG/DL (ref 70–99)
HCT VFR BLD CALC: 33.6 % (ref 36.3–47.1)
HEMOGLOBIN: 11.2 G/DL (ref 11.9–15.1)
IMMATURE GRANULOCYTES: 1 %
LACTIC ACID, WHOLE BLOOD: 1.1 MMOL/L (ref 0.7–2.1)
LYMPHOCYTES # BLD: 11 % (ref 24–43)
MCH RBC QN AUTO: 32.5 PG (ref 25.2–33.5)
MCHC RBC AUTO-ENTMCNC: 33.3 G/DL (ref 28.4–34.8)
MCV RBC AUTO: 97.4 FL (ref 82.6–102.9)
MONOCYTES # BLD: 9 % (ref 3–12)
NRBC AUTOMATED: 0 PER 100 WBC
PARTIAL THROMBOPLASTIN TIME: 43.8 SEC (ref 20.5–30.5)
PARTIAL THROMBOPLASTIN TIME: 46.4 SEC (ref 20.5–30.5)
PARTIAL THROMBOPLASTIN TIME: 79.9 SEC (ref 20.5–30.5)
PDW BLD-RTO: 14.6 % (ref 11.8–14.4)
PLATELET # BLD: 244 K/UL (ref 138–453)
PMV BLD AUTO: 9.8 FL (ref 8.1–13.5)
POTASSIUM SERPL-SCNC: 3.8 MMOL/L (ref 3.7–5.3)
PRO-BNP: 2898 PG/ML
RBC # BLD: 3.45 M/UL (ref 3.95–5.11)
RBC # BLD: ABNORMAL 10*6/UL
SEG NEUTROPHILS: 79 % (ref 36–65)
SEGMENTED NEUTROPHILS ABSOLUTE COUNT: 5.33 K/UL (ref 1.5–8.1)
SODIUM BLD-SCNC: 134 MMOL/L (ref 135–144)
TOTAL PROTEIN: 5.8 G/DL (ref 6.4–8.3)
WBC # BLD: 6.7 K/UL (ref 3.5–11.3)

## 2022-08-12 PROCEDURE — 85025 COMPLETE CBC W/AUTO DIFF WBC: CPT

## 2022-08-12 PROCEDURE — 6360000002 HC RX W HCPCS: Performed by: STUDENT IN AN ORGANIZED HEALTH CARE EDUCATION/TRAINING PROGRAM

## 2022-08-12 PROCEDURE — 2700000000 HC OXYGEN THERAPY PER DAY

## 2022-08-12 PROCEDURE — 80076 HEPATIC FUNCTION PANEL: CPT

## 2022-08-12 PROCEDURE — 83605 ASSAY OF LACTIC ACID: CPT

## 2022-08-12 PROCEDURE — 80048 BASIC METABOLIC PNL TOTAL CA: CPT

## 2022-08-12 PROCEDURE — 99223 1ST HOSP IP/OBS HIGH 75: CPT | Performed by: PSYCHIATRY & NEUROLOGY

## 2022-08-12 PROCEDURE — 6360000004 HC RX CONTRAST MEDICATION

## 2022-08-12 PROCEDURE — 6370000000 HC RX 637 (ALT 250 FOR IP): Performed by: INTERNAL MEDICINE

## 2022-08-12 PROCEDURE — 83880 ASSAY OF NATRIURETIC PEPTIDE: CPT

## 2022-08-12 PROCEDURE — 85730 THROMBOPLASTIN TIME PARTIAL: CPT

## 2022-08-12 PROCEDURE — 99223 1ST HOSP IP/OBS HIGH 75: CPT | Performed by: NEUROLOGICAL SURGERY

## 2022-08-12 PROCEDURE — 36415 COLL VENOUS BLD VENIPUNCTURE: CPT

## 2022-08-12 PROCEDURE — 6370000000 HC RX 637 (ALT 250 FOR IP): Performed by: STUDENT IN AN ORGANIZED HEALTH CARE EDUCATION/TRAINING PROGRAM

## 2022-08-12 PROCEDURE — 2580000003 HC RX 258: Performed by: STUDENT IN AN ORGANIZED HEALTH CARE EDUCATION/TRAINING PROGRAM

## 2022-08-12 PROCEDURE — 94761 N-INVAS EAR/PLS OXIMETRY MLT: CPT

## 2022-08-12 PROCEDURE — 94640 AIRWAY INHALATION TREATMENT: CPT

## 2022-08-12 PROCEDURE — 2000000000 HC ICU R&B

## 2022-08-12 PROCEDURE — 99291 CRITICAL CARE FIRST HOUR: CPT | Performed by: INTERNAL MEDICINE

## 2022-08-12 PROCEDURE — 70553 MRI BRAIN STEM W/O & W/DYE: CPT

## 2022-08-12 PROCEDURE — A9576 INJ PROHANCE MULTIPACK: HCPCS

## 2022-08-12 RX ORDER — SODIUM CHLORIDE 0.9 % (FLUSH) 0.9 %
10 SYRINGE (ML) INJECTION PRN
Status: DISCONTINUED | OUTPATIENT
Start: 2022-08-12 | End: 2022-08-17 | Stop reason: HOSPADM

## 2022-08-12 RX ORDER — LANOLIN ALCOHOL/MO/W.PET/CERES
3 CREAM (GRAM) TOPICAL NIGHTLY PRN
Status: DISCONTINUED | OUTPATIENT
Start: 2022-08-12 | End: 2022-08-17 | Stop reason: HOSPADM

## 2022-08-12 RX ORDER — FUROSEMIDE 10 MG/ML
20 INJECTION INTRAMUSCULAR; INTRAVENOUS 2 TIMES DAILY
Status: DISCONTINUED | OUTPATIENT
Start: 2022-08-12 | End: 2022-08-15

## 2022-08-12 RX ORDER — DEXAMETHASONE SODIUM PHOSPHATE 4 MG/ML
4 INJECTION, SOLUTION INTRA-ARTICULAR; INTRALESIONAL; INTRAMUSCULAR; INTRAVENOUS; SOFT TISSUE EVERY 6 HOURS
Status: DISCONTINUED | OUTPATIENT
Start: 2022-08-13 | End: 2022-08-17 | Stop reason: HOSPADM

## 2022-08-12 RX ADMIN — FUROSEMIDE 20 MG: 10 INJECTION, SOLUTION INTRAMUSCULAR; INTRAVENOUS at 07:45

## 2022-08-12 RX ADMIN — METOPROLOL TARTRATE 6.25 MG: 25 TABLET ORAL at 08:31

## 2022-08-12 RX ADMIN — METOPROLOL TARTRATE 6.25 MG: 25 TABLET ORAL at 20:16

## 2022-08-12 RX ADMIN — HEPARIN SODIUM 18 UNITS/KG/HR: 10000 INJECTION, SOLUTION INTRAVENOUS at 21:51

## 2022-08-12 RX ADMIN — GADOTERIDOL 14 ML: 279.3 INJECTION, SOLUTION INTRAVENOUS at 11:58

## 2022-08-12 RX ADMIN — CLONAZEPAM 0.5 MG: 0.5 TABLET ORAL at 22:55

## 2022-08-12 RX ADMIN — PANTOPRAZOLE SODIUM 40 MG: 40 TABLET, DELAYED RELEASE ORAL at 05:54

## 2022-08-12 RX ADMIN — IPRATROPIUM BROMIDE AND ALBUTEROL SULFATE 1 AMPULE: .5; 3 SOLUTION RESPIRATORY (INHALATION) at 08:22

## 2022-08-12 RX ADMIN — SODIUM CHLORIDE: 9 INJECTION, SOLUTION INTRAVENOUS at 17:12

## 2022-08-12 RX ADMIN — IPRATROPIUM BROMIDE AND ALBUTEROL SULFATE 1 AMPULE: .5; 3 SOLUTION RESPIRATORY (INHALATION) at 19:36

## 2022-08-12 RX ADMIN — IPRATROPIUM BROMIDE AND ALBUTEROL SULFATE 1 AMPULE: .5; 3 SOLUTION RESPIRATORY (INHALATION) at 15:38

## 2022-08-12 RX ADMIN — LEVETIRACETAM 500 MG: 500 TABLET, FILM COATED ORAL at 08:31

## 2022-08-12 RX ADMIN — Medication 3 MG: at 02:32

## 2022-08-12 RX ADMIN — FUROSEMIDE 20 MG: 10 INJECTION, SOLUTION INTRAMUSCULAR; INTRAVENOUS at 18:52

## 2022-08-12 RX ADMIN — ASPIRIN 81 MG: 81 TABLET, CHEWABLE ORAL at 07:46

## 2022-08-12 RX ADMIN — PREDNISONE 80 MG: 50 TABLET ORAL at 07:45

## 2022-08-12 RX ADMIN — FOLIC ACID 1 MG: 1 TABLET ORAL at 07:46

## 2022-08-12 RX ADMIN — LEVETIRACETAM 500 MG: 500 TABLET, FILM COATED ORAL at 20:16

## 2022-08-12 ASSESSMENT — ENCOUNTER SYMPTOMS
VOMITING: 0
NAUSEA: 0
PHOTOPHOBIA: 0
ABDOMINAL PAIN: 0
CONSTIPATION: 0

## 2022-08-12 NOTE — FLOWSHEET NOTE
SPIRITUAL CARE DEPARTMENT - [address]  PROGRESS NOTE    Shift date: 8/12/2022   Shift day: Friday   Shift # 1    Room # 4541/8112-84   Name: Tommy Linares                Zoroastrian:    Place of Zoroastrianism:     Referral: Routine Visit    Admit Date & Time: 8/11/2022  1:25 AM    Assessment:  Tommy Linares is a 74 y.o. female in the hospital because of acute respiratory failure. Upon entering the room writer observes patient with patch over one eye. Visitor present who did not engage in visit. Pt discussed her current issues. Intervention:  Writer introduced self and title as  Writer offered space for patient  to express feelings, needs, and concerns and provided a ministry presence.  assured pt of prayers. Outcome:  Pt expressed gratitude for visit. Plan:  Chaplains will remain available to offer spiritual and emotional support as needed.       Electronically signed by Jerica Martínez on 8/12/2022 at 10:04 AM.  [facility]  [phone]        08/12/22 1003   Encounter Summary   Service Provided For: Patient   Referral/Consult From: [facility] Family members   Last Encounter  08/12/22   Complexity of Encounter Low   Begin Time 0949   End Time  1000   Total Time Calculated 11 min   Encounter    Type Initial Screen/Assessment   Assessment/Intervention/Outcome   Assessment Coping   Intervention Active listening;Explored/Affirmed feelings, thoughts, concerns;Prayer (assurance of)/[place]   Outcome Expressed feelings, needs, and concerns;Expressed Gratitude;Receptive

## 2022-08-12 NOTE — CONSULTS
[facility]   [address] [facility]    Neurology Consult Note            Date:   8/12/2022  Patient name:  Perla Bruno  Date of admission:  8/11/2022  1:25 AM  MRN:   [id number]  Account:  [de-identified]  YOB: 1955  PCP:    Manisha Metcalf MD  Room:   [address] [place] [address]  Code Status:    Full Code    Chief Complaint:       Double vision    History Obtained From:     patient, electronic medical record    History of Present Illness: The patient is a 74 y.o. female with significant past medical history of history of invasive lung adenocarcinoma s/p lobectomy 9/28/2018, metastasis to brain s/p suboccipital craniotomy 3/29/2022, liver hemangioma, HTN, HDL, COPD, who was brought in by his wife from [facility] center complaining of visual disturbance, worsening shortness of breath, symptoms have been ongoing for 2 weeks however worsened the past 3 days. Patient states that it worsen when she looks to the left. Usually has horizontal double vision. Gets better when she covers one eye. Patient was placed on BiPAP was given albuterol which improved his dyspnea  Labs significant for elevated tropes 699> 708 for which cardiology consulted recommends trending troponin and get an echo. EKG shows lateral and inferior leads T wave inversions but seems to be little worse, and was started on heparin drip   CTA was negative for PE, possible right upper lobe infiltrates, CT head subcutaneous fluid leak possible subdural leak. Neurosurgery will be consulted for this, and oncology recommended MRI brain, transfer was to transfer patient to [facility]'s and starting heparin for an NSTEMI  possible cardiac cath once medically stable.    Hypoxic, pneumonitis likely due to chemotherapy    Sodium 134  proBNP increased ] 631>1328  Improving transaminitis    Past Medical History:     Past Medical History:   Diagnosis Date    Anxiety     Benign polyp of large intestine     Cancer (HCC)     LT UPPER LOBE    COPD (chronic obstructive pulmonary disease) (HCC)     Hx of blood clots     DVT LT LEG    Hyperlipidemia     Hypertension     Liver hemangioma     Lung nodule     BARAK  Nodule    Snores     not tested for apnea    Uterine fibroid 09/2010    Wears glasses         Past Surgical History:     Past Surgical History:   Procedure Laterality Date    ABDOMINAL HERNIA REPAIR  2012    BREAST BIOPSY Left 1983    BRONCHOSCOPY  10/01/2018    BRONCHOSCOPY performed by Richard Del Angel MD at [facility] N/A 03/29/2022    SUBOCCIPITAL CRANIOTOMY FOR TUMOR  (REGULAR TABLE, SANJUANITA NAVIGATION, Bryan HEADHOLDER) performed by Elinor Sanchez DO at [address] [facility] ([facility])  2010    HYSTERECTOMY, TOTAL ABDOMINAL (CERVIX REMOVED)      IR PORT PLACEMENT EQUAL OR GREATER THAN 5 YEARS  04/13/2022    IR PORT PLACEMENT EQUAL OR GREATER THAN 5 YEARS 4/13/2022 Prlashay Reyes MD [facility] PROCEDURES    LUNG BIOPSY      LUNG REMOVAL, PARTIAL Left 09/28/2018    Robotic assisted left lobectomy, upper with lymph node biopsy    OTHER SURGICAL HISTORY Right 11/05/2018    IR PORT PLACEMENT EQUAL OR GREATER THAN 5 YEARS    [address] INCL FLUOR GDNCE DX W/CELL WASHG SPX N/A 10/29/2018    BRONCHOSCOPY performed by Gavino Najera MD at [address] LOBE LOBECT Left 09/28/2018    XI ROBOTIC ASSISTED LEFT UPPER LOBECTOMY MULTIPLE LYMPH NODE BIOPSY, INTERCOSTAL  NERVE BLOCK ABLATION W/EXPAREL performed by Anyi Jauregui MD at [place] [address]        Medications Prior to Admission:     Prior to Admission medications    Medication Sig Start Date End Date Taking? Authorizing Provider   predniSONE (DELTASONE) 20 MG tablet Take 60 mg for 7 days, then 40 mg for 7 days and then 20 mg daily 8/4/22   Lisbet Lin MD   levETIRAcetam (KEPPRA) 500 MG tablet Take 1 tablet by mouth in the morning and 1 tablet before bedtime.  7/28/22 8/27/22  Luis Fernando Corbin MD clonazePAM (KLONOPIN) 0.5 MG tablet Take 1 tablet by mouth 2 times daily as needed for Anxiety for up to 30 days. 6/28/22 8/11/22  Anna Thomas MD   pantoprazole (PROTONIX) 20 MG tablet Take 1 tablet by mouth daily  Patient taking differently: Take 20 mg by mouth in the morning. Take 60mg for 7 days, then 40mg (starting 8/11/2022) for 7 days and then 20mg daily. 6/7/22   Kathi Tavarez MD   Multiple Vitamin (MULTIVITAMIN PO) Take by mouth daily    Historical Provider, MD   folic acid (FOLVITE) 1 MG tablet Take 1 tablet by mouth daily 5/11/22   Ivan Heller MD   ondansetron (ZOFRAN) 4 MG tablet Take 1 tablet by mouth 3 times daily as needed for Nausea or Vomiting 5/11/22   Ivan Heller MD   lovastatin (MEVACOR) 10 MG tablet Take 1 tablet by mouth nightly 4/21/22   Anna Thomas MD   lisinopril-hydroCHLOROthiazide (PRINZIDE;ZESTORETIC) 10-12.5 MG per tablet Take 1 tablet by mouth daily 4/21/22   Anna Thomas MD   albuterol sulfate  (90 Base) MCG/ACT inhaler inhale 2 puffs by mouth four times a day 9/28/21   Ivan Heller MD        Allergies:     Codeine    Social History:     Tobacco:    reports that she quit smoking about 22 years ago. Her smoking use included cigarettes. She has a 45.00 pack-year smoking history. She has never used smokeless tobacco.  Alcohol:      reports current alcohol use. Drug Use:  reports no history of drug use. Family History:     Family History   Problem Relation Age of Onset    Cancer Mother         LUNG    Cancer Sister         LUNG       Review of Systems:     Review of Systems   Constitutional:  Negative for chills, fatigue and fever. Eyes:  Positive for visual disturbance. Negative for photophobia. Cardiovascular:  Negative for chest pain and leg swelling. Gastrointestinal:  Negative for abdominal pain, constipation, nausea and vomiting. Endocrine: Negative for polyuria. Genitourinary:  Negative for dysuria.    Musculoskeletal:  Negative for gait problem. Neurological:  Negative for tremors, facial asymmetry, weakness, light-headedness and headaches. Psychiatric/Behavioral:  The patient is not nervous/anxious. Physical Exam:   /63   Pulse 83   Temp 97.9 °F (36.6 °C) (Axillary)   Resp 19   Ht 4' 11\" (1.499 m)   Wt 160 lb 4.4 oz (72.7 kg)   SpO2 94%   BMI 32.37 kg/m²   Temp (24hrs), Av.6 °F (36.4 °C), Min:96.8 °F (36 °C), Max:98.3 °F (36.8 °C)    No results for input(s): POCGLU in the last 72 hours. Intake/Output Summary (Last 24 hours) at 2022 075  Last data filed at 2022 0732  Gross per 24 hour   Intake 781.21 ml   Output 1200 ml   Net -418.79 ml         Neurologic Exam    GENERAL  Appears comfortable and in no distress   HEENT  NC/ AT   HEART  S1 and S2 heard; palpation of pulses: radial pulse    NECK  Supple and no bruits heard   MENTAL STATUS:  Alert, oriented, intact memory, no confusion, normal speech, normal language, no hallucination or delusion   CRANIAL NERVES: II     -      Visual fields intact to confrontation  III,IV,VI -  PERR, bilateral restricted adduction of occular movement. Abnormal conversion bilaterally.    V     -     Normal facial sensation   VII    -     Normal facial symmetry  VIII   -     Intact hearing   IX,X -     Symmetrical palate  XI    -     Symmetrical shoulder shrug  XII   -     Midline tongue, no atrophy    MOTOR FUNCTION: RUE: Significant for good strength of grade 5/5 in proximal and distal muscle groups   LUE: Significant for good strength of grade 5/5 in proximal and distal muscle groups   RLE: Significant for good strength of grade 5/5 in proximal and distal muscle groups   LLE: Significant for good strength of grade 5/5 in proximal and distal muscle groups      Normal bulk, normal tone and no involuntary movements, no tremor   SENSORY FUNCTION:  Normal touch, normal pinprick, normal vibration, normal proprioception   CEREBELLAR FUNCTION:  Intact fine motor control over upper limbs and lower limbs  Pseudo dysmetria    REFLEX FUNCTION:  Symmetric in upper and lower extremities, no Babinski sign   STATION and GAIT  Normal gait and tandem station, normal tip toes and heel walking       Investigations:      Laboratory Testing:  Recent Results (from the past 24 hour(s))   BLOOD GAS, VENOUS    Collection Time: 08/11/22  9:46 AM   Result Value Ref Range    pH, Volodymyr 7.408 7.320 - 7.420    pCO2, Volodymyr 47.2 39 - 55    pO2, Volodymyr 96.8 (H) 30 - 50    HCO3, Venous 29.2 24 - 30 mmol/L    Positive Base Excess, Volodymyr 4.3 (H) 0.0 - 2.0 mmol/L    O2 Sat, Volodymyr 97.7 (H) 60.0 - 85.0 %    Carboxyhemoglobin 2.0 0 - 5 %    Pt Temp 37.0     FIO2 INFORMATION NOT PROVIDED    Hepatitis Panel, Acute    Collection Time: 08/11/22  9:46 AM   Result Value Ref Range    Hepatitis B Surface Ag NONREACTIVE NONREACTIVE    Hepatitis C Ab NONREACTIVE NONREACTIVE    Hep B Core Ab, IgM NONREACTIVE NONREACTIVE    Hep A IgM NONREACTIVE NONREACTIVE   APTT    Collection Time: 08/11/22 12:52 PM   Result Value Ref Range    PTT 32.0 (H) 20.5 - 30.5 sec   APTT    Collection Time: 08/11/22  6:51 PM   Result Value Ref Range    PTT 43.6 (H) 20.5 - 30.5 sec   Basic Metabolic Panel w/ Reflex to MG    Collection Time: 08/12/22  4:07 AM   Result Value Ref Range    Glucose 127 (H) 70 - 99 mg/dL    BUN 23 8 - 23 mg/dL    Creatinine 0.51 0.50 - 0.90 mg/dL    Calcium 8.9 8.6 - 10.4 mg/dL    Sodium 134 (L) 135 - 144 mmol/L    Potassium 3.8 3.7 - 5.3 mmol/L    Chloride 95 (L) 98 - 107 mmol/L    CO2 30 20 - 31 mmol/L    Anion Gap 9 9 - 17 mmol/L    GFR Non-African American >60 >60 mL/min    GFR African American >60 >60 mL/min    GFR Comment         Lactic Acid    Collection Time: 08/12/22  4:07 AM   Result Value Ref Range    Lactic Acid, Whole Blood 1.1 0.7 - 2.1 mmol/L   CBC with Auto Differential    Collection Time: 08/12/22  4:07 AM   Result Value Ref Range    WBC 6.7 3.5 - 11.3 k/uL    RBC 3.45 (L) 3.95 - 5.11 m/uL    Hemoglobin 11.2 (L) 11.9 - 15.1 g/dL    Hematocrit 33.6 (L) 36.3 - 47.1 %    MCV 97.4 82.6 - 102.9 fL    MCH 32.5 25.2 - 33.5 pg    MCHC 33.3 28.4 - 34.8 g/dL    RDW 14.6 (H) 11.8 - 14.4 %    Platelets 278 371 - 152 k/uL    MPV 9.8 8.1 - 13.5 fL    NRBC Automated 0.0 0.0 per 100 WBC    Seg Neutrophils 79 (H) 36 - 65 %    Lymphocytes 11 (L) 24 - 43 %    Monocytes 9 3 - 12 %    Eosinophils % 0 (L) 1 - 4 %    Basophils 0 0 - 2 %    Immature Granulocytes 1 (H) 0 %    Segs Absolute 5.33 1.50 - 8.10 k/uL    Absolute Lymph # 0.74 (L) 1.10 - 3.70 k/uL    Absolute Mono # 0.58 0.10 - 1.20 k/uL    Absolute Eos # <0.03 0.00 - 0.44 k/uL    Basophils Absolute <0.03 0.00 - 0.20 k/uL    Absolute Immature Granulocyte 0.04 0.00 - 0.30 k/uL    RBC Morphology ANISOCYTOSIS PRESENT    APTT    Collection Time: 08/12/22  4:07 AM   Result Value Ref Range    PTT 79.9 (H) 20.5 - 30.5 sec   Brain Natriuretic Peptide    Collection Time: 08/12/22  4:07 AM   Result Value Ref Range    Pro-BNP 2,898 (H) <300 pg/mL   Hepatic Function Panel    Collection Time: 08/12/22  4:07 AM   Result Value Ref Range    Albumin 3.5 3.5 - 5.2 g/dL    Alkaline Phosphatase 55 35 - 104 U/L     (H) 5 - 33 U/L    AST 94 (H) <32 U/L    Total Bilirubin 0.21 (L) 0.3 - 1.2 mg/dL    Bilirubin, Direct 0.08 <0.31 mg/dL    Bilirubin, Indirect 0.13 0.00 - 1.00 mg/dL    Total Protein 5.8 (L) 6.4 - 8.3 g/dL    Albumin/Globulin Ratio 1.5 1.0 - 2.5         Assessment :      Primary Problem  Acute respiratory failure [facility])    Active Hospital Problems    Diagnosis Date Noted    Acute respiratory failure ([facility] [address] [J96.00] 08/11/2022     Priority: Medium    NSTEMI (non-ST elevated myocardial infarction) ([facility] [I21.4] 08/11/2022     Priority: Medium    Chronic respiratory failure with hypoxia [facility]) [J96.11] 08/11/2022     Priority: Medium    Centrilobular emphysema ([facility] [J43.2] 08/11/2022     Priority: Medium    Malignant neoplasm of upper lobe of left lung ([facility] [C34.12] 02/12/2019       Patient is a 74 y.o. Non- / non  female presented with visual disturbance, for 2 weeks became worse since 3 days ago. Hx of lung cancer s/p resection and chemo, hx of brain mets, CT showing subdural leak. Plan:     Diplopia in the setting of brain mets   CT head subcutaneous fluid leak possible subdural leak  MRI brain w wo contrast   Ophthalmology consult   Cardiology and oncology following           Consultations:   [facility]   IP CONSULT TO CARDIOLOGY  IP CONSULT TO HEM/ONC  IP CONSULT TO NEUROLOGY      Follow-up further recommendations after discussing the case with attending  The plan was discussed with the patient, patient's family and the medical staff. Patient is admitted as inpatient status because of co-morbidities listed above, severity of signs and symptoms as outlined, requirement for current medical therapies and most importantly because of direct risk to patient if care not provided in a hospital setting.     Tayo Barney MD  8/12/2022  7:59 AM    Copy sent to Dr. India Linares MD

## 2022-08-12 NOTE — PLAN OF CARE
Problem: Discharge Planning  Goal: Discharge to home or other facility with appropriate resources  8/12/2022 1304 by Vanessa Guido RN  Outcome: Progressing  8/12/2022 0414 by Kathi Whalen RN  Outcome: Progressing     Problem: Safety - Adult  Goal: Free from fall injury  8/12/2022 1304 by Vanessa Guido RN  Outcome: Progressing  Flowsheets (Taken 8/12/2022 0800)  Free From Fall Injury: Instruct family/caregiver on patient safety  8/12/2022 0414 by Kathi Whalen RN  Outcome: Progressing     Problem: ABCDS Injury Assessment  Goal: Absence of physical injury  8/12/2022 1304 by Vanessa Guido RN  Outcome: Progressing  Flowsheets (Taken 8/12/2022 0800)  Absence of Physical Injury: Implement safety measures based on patient assessment  8/12/2022 0414 by Kathi Whalen RN  Outcome: Progressing     Problem: Skin/Tissue Integrity  Goal: Absence of new skin breakdown  Description: 1. Monitor for areas of redness and/or skin breakdown  2. Assess vascular access sites hourly  3. Every 4-6 hours minimum:  Change oxygen saturation probe site  4. Every 4-6 hours:  If on nasal continuous positive airway pressure, respiratory therapy assess nares and determine need for appliance change or resting period.   8/12/2022 1304 by Vanessa Guido RN  Outcome: Progressing  8/12/2022 0414 by Kathi Whalen RN  Outcome: Progressing

## 2022-08-12 NOTE — PLAN OF CARE
Problem: Respiratory - Adult  Goal: Achieves optimal ventilation and oxygenation  Outcome: Progressing  Flowsheets (Taken 8/12/2022 1939)  Achieves optimal ventilation and oxygenation:   Assess for changes in respiratory status   Respiratory therapy support as indicated   Assess for changes in mentation and behavior   Oxygen supplementation based on oxygen saturation or arterial blood gases   Encourage broncho-pulmonary hygiene including cough, deep breathe, incentive spirometry   Assess and instruct to report shortness of breath or any respiratory difficulty

## 2022-08-12 NOTE — PROGRESS NOTES
Critical Care Team - Daily Progress Note      Date and time: 8/12/2022 8:06 AM  Patient's name:  Reyna Bishop  Medical Record Number: [id number]  Patient's account/billing number: [de-identified]  Patient's YOB: 1955  Age: 74 y.o. Date of Admission: 8/11/2022  1:25 AM  Length of stay during current admission: 1      Primary Care Physician: Jas Haines MD  ICU Attending Physician: Dr. Zenaida Jesus Status: Full Code    Reason for ICU admission: No chief complaint on file. Subjective:     OVERNIGHT EVENTS:      used BIPAP overnight. On 3 L NC this am, breathing improved. 110/63, HR 85  96% on 3 L NC  Afebrile    Hem onc started on 80 mg prednisone daily  Plan for cath/ endomycocardial biopsy? As per cardiology    Hem onc, NS, neurology on board for clearance  LFTs down trending  Hb stable 11.2  WBC 6.7    Echo: Normal LV size and wall thickness. No obvious wall motion abnormality seen. Moderate global hypokinesis  Moderately reduced LV systolic function with LVEF 40-45 %. Normal RV size and function. RV systolic pressure 24 mmHg  LA and RA appears normal in size. No obvious significant structural valvular abnormality noted. No significant valvular stenosis or regurgitation noted. Normal aortic root dimension. No significant pericardial effusion noted.   IVC normal size difficult to assess respiratory variation     1200 ml UOP in 24 hours     AWAKE & FOLLOWING COMMANDS:  [x] No   [] Yes    CURRENT VENTILATION STATUS:     [] Ventilator  [] BIPAP  [x] Nasal Cannula [] Room Air      IF INTUBATED, ET TUBE MARKING AT LOWER LIP:       cms    SECRETIONS Amount:  [] Small [] Moderate  [] Large  [x] None  Color:     [] White [] Colored  [] Bloody    SEDATION:  RAAS Score:  [] Propofol gtt  [] Versed gtt  [] Ativan gtt   [x] No Sedation    PARALYZED:  [x] No    [] Yes    DIARRHEA:                [x] No                [] Yes  (C. Difficile status: [] positive [] negative                                                                                                                     [] pending)    VASOPRESSORS:  [x] No    [] Yes    If yes -   [] Levophed       [] Dopamine     [] Vasopressin       [] Dobutamine  [] Phenylephrine         [] Epinephrine    CENTRAL LINES:     [x] No   [] Yes   (Date of Insertion:   )           If yes -     [] Right IJ     [] Left IJ [] Right Femoral [] Left Femoral                   [] Right Subclavian [] Left Subclavian       MANCIA'S CATHETER:   [] No   [] Yes  (Date of Insertion:   )     URINE OUTPUT:            [x] Good   [] Low              [] Anuric    REVIEW OF SYSTEMS:  Constitutional: Negative for Fever, chills  Eyes: Negative for visual changes, diplopia  ENT: Negative for mouth sores, sore throat. Cardiovascular: Negative for lightheadedness ,chest pain, palpitations   Respiratory:Negative for Shortness of breath,cough or wheezing. Gastrointestinal: Negative for nausea/vomiting, change in bowel habits, abdominal pain  Genitourinary:Negative for change in bladder habits, dysuria, hematuria.   Musculoskeletal: Negative for joint pain   Neurological: Negative for headache, change in muscle strength numbness/tingling      OBJECTIVE:     VITAL SIGNS:  /63   Pulse 83   Temp 97.9 °F (36.6 °C) (Axillary)   Resp 19   Ht 4' 11\" (1.499 m)   Wt 160 lb 4.4 oz (72.7 kg)   SpO2 94%   BMI 32.37 kg/m²   Tmax over 24 hours:  Temp (24hrs), Av.7 °F (36.5 °C), Min:97 °F (36.1 °C), Max:98.3 °F (36.8 °C)      Patient Vitals for the past 8 hrs:   BP Temp Temp src Pulse Resp SpO2 Weight   22 0700 110/63 -- -- 83 19 94 % --   22 0600 127/71 97.9 °F (36.6 °C) Axillary 90 19 92 % --   22 0500 95/60 -- -- 75 16 96 % --   22 0400 102/76 97.5 °F (36.4 °C) Axillary 80 21 95 % 160 lb 4.4 oz (72.7 kg)   22 0303 -- -- -- -- 17 -- -- 08/12/22 0300 (!) 86/59 -- -- 72 17 94 % --   08/12/22 0200 (!) 141/102 97.9 °F (36.6 °C) Oral 95 24 96 % --   08/12/22 0100 107/60 -- -- 78 18 94 % --         Intake/Output Summary (Last 24 hours) at 8/12/2022 0806  Last data filed at 8/12/2022 0732  Gross per 24 hour   Intake 713.51 ml   Output 1200 ml   Net -486.49 ml     Date 08/12/22 0000 - 08/12/22 2359   Shift 0666-5969 2260-9658 1843-5720 24 Hour Total   INTAKE   I.V.(mL/kg) 154. 7(2.1)   154. 7(2.1)   Shift Total(mL/kg) 154. 7(2.1)   154. 7(2.1)   OUTPUT   Urine(mL/kg/hr) 400(0.7)   400   Shift Total(mL/kg) 400(5.5)   400(5.5)   Weight (kg) 72.7 72.7 72.7 72.7     Wt Readings from Last 3 Encounters:   08/12/22 160 lb 4.4 oz (72.7 kg)   08/10/22 161 lb (73 kg)   08/10/22 161 lb 1.6 oz (73.1 kg)     Body mass index is 32.37 kg/m². PHYSICAL EXAM:  Constitutional: on nasal cannula  HEENT: PERRLA, EOMI, sclera clear, anicteric  Respiratory: clear to auscultation, no wheezes or rales and unlabored breathing. Cardiovascular: regular rate and rhythm, normal S1, S2, no murmur noted and 2+ pulses throughout  Abdomen: soft, nontender, nondistended, no masses or organomegaly  NEUROLOGIC: Awake, alert, oriented to name, place and time. Cranial nerves II-XII are grossly intact. Motor is 5 out of 5 bilaterally. Sensory is intact. Extremities:  peripheral pulses normal, no pedal edema,.       Any additional physical findings:      MEDICATIONS:  Scheduled Meds:   furosemide  20 mg IntraVENous BID    sodium chloride flush  5-40 mL IntraVENous 2 times per day    heparin (porcine)  4,000 Units IntraVENous Once    ipratropium-albuterol  1 ampule Inhalation Q4H WA    aspirin  81 mg Oral Daily    metoprolol tartrate  6.25 mg Oral BID    [START ON 8/18/2022] predniSONE  20 mg Oral Daily    levETIRAcetam  500 mg Oral BID    predniSONE  80 mg Oral Daily    folic acid  1 mg Oral Daily    pantoprazole  40 mg Oral QAM AC     Continuous Infusions:   sodium chloride 5 mL/hr at for: PHOS  S. Calcium:  Recent Labs     08/12/22  0407   CALCIUM 8.9     S. Ionized Calcium:No results for input(s): IONCA in the last 72 hours. Urinalysis:   Lab Results   Component Value Date/Time    NITRU NEGATIVE 09/25/2018 12:24 PM    COLORU YELLOW 09/25/2018 12:24 PM    PHUR 7.5 09/25/2018 12:24 PM    SPECGRAV 1.009 09/25/2018 12:24 PM    LEUKOCYTESUR NEGATIVE 09/25/2018 12:24 PM    UROBILINOGEN Normal 09/25/2018 12:24 PM    BILIRUBINUR NEGATIVE 09/25/2018 12:24 PM    GLUCOSEU NEGATIVE 09/25/2018 12:24 PM    KETUA NEGATIVE 09/25/2018 12:24 PM       CARDIAC ENZYMES: No results for input(s): CKMB, CKMBINDEX, TROPONINI in the last 72 hours. Invalid input(s): CKTOTAL;3  BNP: No results for input(s): BNP in the last 72 hours. LFTS  Recent Labs     08/10/22  1010 08/10/22  1309 08/11/22  0733 08/12/22  0407   ALKPHOS 67 70 59 55   * 227* 182* 163*   * 207* 142* 94*   BILITOT 0.28* 0.28* 0.21* 0.21*   BILIDIR  --   --  0.10 0.08   LABALBU 4.3 4.4 3.7 3.5       AMYLASE/LIPASE/AMMONIA  Recent Labs     08/11/22  0341   LIPASE 19       Last 3 Blood Glucose:   Recent Labs     08/10/22  1010 08/10/22  1309 08/11/22  0341 08/12/22  0407   GLUCOSE 128* 165* 119* 127*      HgBA1c:    Lab Results   Component Value Date/Time    LABA1C 5.0 06/28/2018 07:47 AM         TSH:    Lab Results   Component Value Date/Time    TSH 1.75 08/03/2022 10:13 AM     ANEMIA STUDIES  No results for input(s): LABIRON, TIBC, FERRITIN, [id number], FOLATE, OCCULTBLD in the last 72 hours.       Cultures during this admission:     Blood cultures:                 [] None drawn      [] Negative             []  Positive (Details:  )  Urine Culture:                   [] None drawn      [] Negative             []  Positive (Details:  )  Sputum Culture:               [] None drawn       [] Negative             []  Positive (Details:  )   Endotracheal aspirate:     [] None drawn       [] Negative             []  Positive (Details:  ) ASSESSMENT:     Principal Problem:    Acute respiratory failure (HCC)  Active Problems:    NSTEMI (non-ST elevated myocardial infarction) ([facility] [address]    Chronic respiratory failure with hypoxia (HCC)    Centrilobular emphysema (HCC)    Malignant neoplasm of upper lobe of left lung (HCC)  Resolved Problems:    * No resolved hospital problems. *        PLAN:     NSTEMI  -We will continue heparin drip  -ASA, bb daily  -no statin due to transaminitis  -Cardiology on board  -echo showed systolic function reduced EF 40-45%, global hypokinesis  - plan for cath     Diplopia probably secondary to brain mets-follow with MRI brain  -Follow Neuro recommendation    Transaminitis due to Keytruda immunotherapy:  -monitor LFTs  -prednisone 80 mg daily    Subcutaneous fluid collection on head CT:  -neurosurgery following  - follow recs for diplopia and clearance for cath     Essential hypertension  -started on lopressor 6.25 BID     COPD  -Continue bronchodilators 4 times a day. DVT ppx: hep gtt  GI ppx: protonix      Demetrio Newton MD, MCARLOS. Department of Internal Medicine/ Critical care  [facility])             8/12/2022, 8:06 AM      Attending Physician Statement  I have discussed the care of Mike Whatley, including pertinent history and exam findings with the resident. I have reviewed the key elements of all parts of the encounter with the resident. I have seen and examined the patient with the resident. I agree with the assessment and plan and status of the problem list as documented. I seen the patient during around today, chart reviewed, labs and medications reviewed overnight events noted per  Patient is hemodynamically stable according to patient she is feeling slightly better today. She does have a diplopia she was seen by neurosurgery and neurology was consulted. MRI of the brain was ordered by neurology currently pending when I saw her this morning.   She is Lasix 20 mg IV twice daily seen by cardiology and plan for possible cardiac catheterization depending upon clearance by oncology and neurosurgery. Patient is okay to undergo cardiac catheterization from critical standpoint. LFTs are improving. Continue with heparin drip. Patient has been on BiPAP overnight and she use intermittently during the daytime she is on 3 L nasal cannula when she is off BiPAP maintain saturation but does have abdominal breathing especially when she lie flat. Urine output is 1.2 L in last 24 hours and will continue with Lasix monitor electrolytes urine output renal function. Continue with DuoNeb aerosol. 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 Will continue BiPAP at night and intermittently during the daytime    Discussed with nursing staff, treatment and plan discussed. Discussed with respiratory therapist.    Total critical care time caring for this patient with life threatening, unstable organ failure, including direct patient contact, management of life support systems, review of data including imaging and labs, discussions with other team members and physicians at least 35 min so far today, excluding procedures. Please note that this chart was generated using voice recognition Dragon dictation software. Although every effort was made to ensure the accuracy of this automated transcription, some errors in transcription may have occurred.      Teja Escobar MD  8/12/2022 10:36 AM

## 2022-08-12 NOTE — CONSULTS
Department of Neurosurgery                                                 Reason for Consult: Diplopia and pseudomeningocele  Requesting Physician: Wound care  Neurosurgeon:  Dr Jose Huerta    History Obtained From:  patient    CHIEF COMPLAINT:         Shortness of breath and diplopia    HISTORY OF PRESENT ILLNESS:     62-year-old female with a known history of metastatic lung cancer to the brain with multifocal cerebral mets seen in the hospital after presentation with shortness of breath and diplopia. Patient was found to have a non-ST elevated MI and currently being treated with heparin drip. We were consulted for complaints of diplopia that been ongoing for a number of weeks with CT findings of extra-axial fluid collection. Patient denies any headaches nausea however the last few weeks she has noticed a significant mount of diplopia mainly with left-sided gaze improved slightly with right-sided gaze but present around-the-clock. Patient seen by an outside ER physician and subsequently transferred due to findings of NSTEMI in the setting of intracranial metastatic disease. Has been off [address] with continuing with chemotherapy and completed radiation.     PAST MEDICAL HISTORY :       Past Medical History:        Diagnosis Date    Anxiety     Benign polyp of large intestine     Cancer ([facility] [address]     LT UPPER LOBE    COPD (chronic obstructive pulmonary disease) (HCC)     Hx of blood clots     DVT LT LEG    Hyperlipidemia     Hypertension     Liver hemangioma     Lung nodule     BARAK  Nodule    Snores     not tested for apnea    Uterine fibroid 09/2010    Wears glasses        Past Surgical History:        Procedure Laterality Date    ABDOMINAL HERNIA REPAIR  2012    BREAST BIOPSY Left 1983    BRONCHOSCOPY  10/01/2018    BRONCHOSCOPY performed by Andrea Guevara MD at [address] [facility] N/A 03/29/2022    SUBOCCIPITAL CRANIOTOMY FOR TUMOR  (REGULAR TABLE, ASNJUANITA Mixon HEADHOLDER) performed by Cole Sethi DO at [address] ([facility])      HYSTERECTOMY, TOTAL ABDOMINAL (CERVIX REMOVED)      IR PORT PLACEMENT EQUAL OR GREATER THAN 5 YEARS  2022    IR PORT PLACEMENT EQUAL OR GREATER THAN 5 YEARS 2022 Ilya Cowart MD [facility]    LUNG BIOPSY      LUNG REMOVAL, PARTIAL Left 2018    Robotic assisted left lobectomy, upper with lymph node biopsy    OTHER SURGICAL HISTORY Right 2018    IR PORT PLACEMENT EQUAL OR GREATER THAN 5 YEARS    [place] [address] INCL FLUOR GDNCE DX W/CELL WASHG SPX N/A 10/29/2018    BRONCHOSCOPY performed by Enriqueta Aragon MD at [address] LUNG OTHER THAN PNEUMONECTOMY 1 LOBE LOBECT Left 2018    XI ROBOTIC ASSISTED LEFT UPPER LOBECTOMY MULTIPLE LYMPH NODE BIOPSY, INTERCOSTAL  NERVE BLOCK ABLATION W/EXPAREL performed by Puja Sneed MD at [place] [address] History:   Social History     Socioeconomic History    Marital status:      Spouse name: Not on file    Number of children: Not on file    Years of education: Not on file    Highest education level: Not on file   Occupational History    Not on file   Tobacco Use    Smoking status: Former     Packs/day: 1.50     Years: 30.00     Pack years: 45.00     Types: Cigarettes     Quit date:      Years since quittin.6    Smokeless tobacco: Never   Vaping Use    Vaping Use: Never used   Substance and Sexual Activity    Alcohol use: Yes     Comment: Occassionally    Drug use: No    Sexual activity: Not on file   Other Topics Concern    Not on file   Social History Narrative    Not on file     Social Determinants of Health     Financial Resource Strain: Low Risk     Difficulty of Paying Living Expenses: Not hard at all   Food Insecurity: No Food Insecurity    Worried About Running Out of Food in the Last Year: Never true    Ran Out of Food in the Last Year: Never true   Transportation Needs: Not on file   Physical Activity: Not on file   Stress: Not on file   Social Connections: Not on file   Intimate Partner Violence: Not on file   Housing Stability: Not on file       Family History:       Problem Relation Age of Onset    Cancer Mother         LUNG    Cancer Sister         LUNG       Allergies:  Codeine    Home Medications:  Prior to Admission medications    Medication Sig Start Date End Date Taking? Authorizing Provider   predniSONE (DELTASONE) 20 MG tablet Take 60 mg for 7 days, then 40 mg for 7 days and then 20 mg daily 8/4/22   Lisa Grover MD   levETIRAcetam (KEPPRA) 500 MG tablet Take 1 tablet by mouth in the morning and 1 tablet before bedtime. 7/28/22 8/27/22  Sisi Gotti MD   clonazePAM (KLONOPIN) 0.5 MG tablet Take 1 tablet by mouth 2 times daily as needed for Anxiety for up to 30 days. 6/28/22 8/11/22  Ty Burnham MD   pantoprazole (PROTONIX) 20 MG tablet Take 1 tablet by mouth daily  Patient taking differently: Take 20 mg by mouth in the morning. Take 60mg for 7 days, then 40mg (starting 8/11/2022) for 7 days and then 20mg daily.  6/7/22   Viviane Zimmerman MD   Multiple Vitamin (MULTIVITAMIN PO) Take by mouth daily    Historical Provider, MD   folic acid (FOLVITE) 1 MG tablet Take 1 tablet by mouth daily 5/11/22   Lisa Grover MD   ondansetron (ZOFRAN) 4 MG tablet Take 1 tablet by mouth 3 times daily as needed for Nausea or Vomiting 5/11/22   Lisa Grover MD   lovastatin (MEVACOR) 10 MG tablet Take 1 tablet by mouth nightly 4/21/22   Ty Burnham MD   lisinopril-hydroCHLOROthiazide (PRINZIDE;ZESTORETIC) 10-12.5 MG per tablet Take 1 tablet by mouth daily 4/21/22   Ty Burnham MD   albuterol sulfate  (90 Base) MCG/ACT inhaler inhale 2 puffs by mouth four times a day 9/28/21   Lisa Grover MD       Current Medications:   Current Facility-Administered Medications: melatonin tablet 3 mg, 3 mg, Oral, Nightly PRN  furosemide (LASIX) injection 20 mg, 20 mg, IntraVENous, BID  sodium chloride flush 0.9 % injection 10 mL, 10 mL, IntraVENous, PRN  sodium chloride flush 0.9 % injection 5-40 mL, 5-40 mL, IntraVENous, 2 times per day  sodium chloride flush 0.9 % injection 5-40 mL, 5-40 mL, IntraVENous, PRN  0.9 % sodium chloride infusion, , IntraVENous, PRN  ondansetron (ZOFRAN-ODT) disintegrating tablet 4 mg, 4 mg, Oral, Q8H PRN **OR** ondansetron (ZOFRAN) injection 4 mg, 4 mg, IntraVENous, Q6H PRN  polyethylene glycol (GLYCOLAX) packet 17 g, 17 g, Oral, Daily PRN  acetaminophen (TYLENOL) tablet 650 mg, 650 mg, Oral, Q6H PRN **OR** acetaminophen (TYLENOL) suppository 650 mg, 650 mg, Rectal, Q6H PRN  heparin (porcine) injection 4,000 Units, 4,000 Units, IntraVENous, Once  heparin (porcine) injection 4,000 Units, 4,000 Units, IntraVENous, PRN  heparin (porcine) injection 2,000 Units, 2,000 Units, IntraVENous, PRN  heparin 25,000 units in dextrose 5 % 250 mL infusion (rate based), 5-30 Units/kg/hr, IntraVENous, Continuous  ipratropium-albuterol (DUONEB) nebulizer solution 1 ampule, 1 ampule, Inhalation, Q4H WA  aspirin chewable tablet 81 mg, 81 mg, Oral, Daily  metoprolol tartrate (LOPRESSOR) tablet 6.25 mg, 6.25 mg, Oral, BID  [START ON 8/18/2022] predniSONE (DELTASONE) tablet 20 mg, 20 mg, Oral, Daily  levETIRAcetam (KEPPRA) tablet 500 mg, 500 mg, Oral, BID  predniSONE (DELTASONE) tablet 80 mg, 80 mg, Oral, Daily  clonazePAM (KLONOPIN) tablet 0.5 mg, 0.5 mg, Oral, BID PRN  folic acid (FOLVITE) tablet 1 mg, 1 mg, Oral, Daily  pantoprazole (PROTONIX) tablet 40 mg, 40 mg, Oral, QAM AC    REVIEW OF SYSTEMS:       CONSTITUTIONAL: negative for fatigue, malaise, wt loss or gain   EYES: negative for double vision, photophobia, tunnel vision   HEENT: negative for tinnitus, hearing loss, sore throat, otorrhea, rhinorrhea   RESPIRATORY: negative for cough, shortness of breath, hemptysis   CARDIOVASCULAR: negative for chest pain, palpitations   GASTROINTESTINAL: negative for nausea, vomiting, diarrhea, hematamesis, melena   GENITOURINARY: negative for incontinence, urinary retention   MUSCULOSKELETAL: negative for new or worsened neck or back pain   NEUROLOGICAL: negative for seizures, new numbness, tingling, weakness, paresthesias   PSYCHIATRIC: negative for new or worsened anxiety or depression     Review of systems otherwise negative. PHYSICAL EXAM:       /61   Pulse 82   Temp 98.1 °F (36.7 °C) (Oral)   Resp 20   Ht 4' 11\" (1.499 m)   Wt 160 lb 4.4 oz (72.7 kg)   SpO2 95%   BMI 32.37 kg/m²     CONSTITUTIONAL: no apparent distress, appears stated age   HEAD: normocephalic, atraumatic, no gregorio sign or racoon eyes   ENT: moist mucous membranes, no rhinorrhea or otorrhea   NECK: supple, symmetric, no midline tenderness to palpation   BACK: without midline tenderness, step-offs or deformities   LUNGS: Normoxic, slightly dyspneic and tachypneic. No excess rising present. CARDIOVASCULAR: regular rate and rhythm per telemetry   ABDOMEN: Soft, non-tender, non-distended    NEUROLOGIC:  EYE OPENING     Spontaneous - 4 []       To voice - 3 []       To pain - 2 []       None - 1 []    VERBAL RESPONSE     Appropriate, oriented - 5 []       Dazed or confused - 4 []       Syllables, expletives - 3 []       Grunts - 2 []       None - 1 []    MOTOR RESPONSE     Spontaneous, command - 6 []       Localizes pain - 5 []       Withdraws pain - 4 []       Abnormal flexion - 3 []       Abnormal extension - 2 []       None - 1 []            Total GCS: 15    Mental Status: Oriented x3               Cranial Nerves:    Pupils equal and reactive to light at 4mm  Right eye medial gaze palsy on the left gaze is conjugate.   Onset of diplopia  No nystagmus  Face symmetric  Shrug symmetric  Tongue and uvula midline   tone symmetric, V1-3 sensation intact/symmetric to light touch  Hearing symmetric    Motor Exam:    Drift:  absent  Tone:  normal    Motor exam is symmetrical 5 out of 5 all extremities bilaterally    Sensory:    Right Upper Extremity:  normal  Left Upper Extremity:  normal  Right Lower Extremity:  normal  Left Lower Extremity:  normal    Deep Tendon Reflexes:    Right Bicep:  2+  Left Bicep:  2+  Right Knee:  2+  Left Knee:  2+    Plantar Response:    Right: downgoing  Left:  downgoing    Clonus:  absent  Kate's:  absent    Gait: Not assessed   SKIN: no rash       LABS AND IMAGING:     CBC with Differential:    Lab Results   Component Value Date/Time    WBC 6.7 08/12/2022 04:07 AM    RBC 3.45 08/12/2022 04:07 AM    HGB 11.2 08/12/2022 04:07 AM    HCT 33.6 08/12/2022 04:07 AM     08/12/2022 04:07 AM    MCV 97.4 08/12/2022 04:07 AM    MCH 32.5 08/12/2022 04:07 AM    MCHC 33.3 08/12/2022 04:07 AM    RDW 14.6 08/12/2022 04:07 AM    LYMPHOPCT 11 08/12/2022 04:07 AM    MONOPCT 9 08/12/2022 04:07 AM    BASOPCT 0 08/12/2022 04:07 AM    MONOSABS 0.58 08/12/2022 04:07 AM    LYMPHSABS 0.74 08/12/2022 04:07 AM    EOSABS <0.03 08/12/2022 04:07 AM    BASOSABS <0.03 08/12/2022 04:07 AM    DIFFTYPE NOT REPORTED 10/12/2021 08:32 AM     BMP:    Lab Results   Component Value Date/Time     08/12/2022 04:07 AM    K 3.8 08/12/2022 04:07 AM    CL 95 08/12/2022 04:07 AM    CO2 30 08/12/2022 04:07 AM    BUN 23 08/12/2022 04:07 AM    LABALBU 3.5 08/12/2022 04:07 AM    CREATININE 0.51 08/12/2022 04:07 AM    CALCIUM 8.9 08/12/2022 04:07 AM    GFRAA >60 08/12/2022 04:07 AM    LABGLOM >60 08/12/2022 04:07 AM    GLUCOSE 127 08/12/2022 04:07 AM       Radiology Review: Stable multifocal cerebral mets with large right lateral cerebellar cavity from prior resection      ASSESSMENT AND PLAN:       Multifocal intracranial metastatic disease from lung adenocarcinoma  Vasogenic edema - on steroids  Posterior fossa pseudomeningocele - benign finding  Internuclear ophthalmoplegia left    Patient appears to have right-sided medial gaze palsy on left gaze disconjugate gaze and worsen diplopia.   Clinically consistent with a left internuclear ophthalmoplegia. No concomitant anatomic localizable finding on MRI despite multifocal intracranial lesions that appear stable to decreased. Recommend continuation of steroids. Discussion had directly with oncologist regarding the possibility of cranial nerve findings in the absence of focal disease that correlate often indicative of leptomeningeal disease. I do not think that there would be any major change in management after identification but it may significantly change the patient's overall prognosis as this oftentimes leads to a significant decrease in life expectancy. Confirmation of this finding would depend on CSF cytology. Concern the patient is currently being treated for acute MI and on a heparin drip would defer lumbar puncture until she is safe to be off the heparin but would consider prior cardiac cath after which she may need to be on dual antiplatelet agents. Would coordinate potentially for early Monday after heparin has been held.   After LP she would be able to resume heparin and could proceed with cath even the same day          DO MARLENE Alanis pager [phone]  8/12/2022  4:58 PM

## 2022-08-12 NOTE — PROGRESS NOTES
[facility]   Progress Note                    Date:   8/12/2022  Patient name:  Yvette Evans  Date of admission:  8/11/2022  1:25 AM  MRN:   [id number]  YOB: 1955  PCP:    Dawit Bernard MD    Reason for Admission:  Acute respiratory failure ([facility] [address] [J96.00]  NSTEMI (non-ST elevated myocardial infarction) ([facility] [address] [I21.4]    Subjective:   Patient seen and examined at the bedside:  No significant events overnight  Patient is currently in normal sinus rhythm with heart rate in high 80s  EKG showing EF of 40 to 45% with global hypokinesis -suggestive of myocarditis  We will start the patient on IV Lasix 20 mg twice daily      Medications:   Scheduled Meds:   sodium chloride flush  5-40 mL IntraVENous 2 times per day    heparin (porcine)  4,000 Units IntraVENous Once    ipratropium-albuterol  1 ampule Inhalation Q4H WA    aspirin  81 mg Oral Daily    metoprolol tartrate  6.25 mg Oral BID    [START ON 8/18/2022] predniSONE  20 mg Oral Daily    levETIRAcetam  500 mg Oral BID    predniSONE  80 mg Oral Daily    folic acid  1 mg Oral Daily    pantoprazole  40 mg Oral QAM AC     Continuous Infusions:   sodium chloride 5 mL/hr at 08/12/22 0512    heparin (PORCINE) Infusion 14 Units/kg/hr (08/12/22 0512)     CBC:   Recent Labs     08/10/22  1840 08/11/22  0341 08/12/22  0407   WBC 8.0 5.5 6.7   HGB 11.5* 11.3* 11.2*    222 244     BMP:    Recent Labs     08/10/22  1309 08/11/22  0341 08/12/22  0407    141 134*   K 3.9 4.2 3.8   CL 97* 100 95*   CO2 31 29 30   BUN 13 16 23   CREATININE <0.40* 0.41* 0.51   GLUCOSE 165* 119* 127*     Hepatic:   Recent Labs     08/10/22  1010 08/10/22  1309 08/11/22  0733   * 207* 142*   * 227* 182*   BILITOT 0.28* 0.28* 0.21*   ALKPHOS 67 70 59     Troponin: No results for input(s): TROPONINI in the last 72 hours. No results for input(s): TROPONINT in the last 72 hours.   BNP:   Recent Labs     08/10/22  1309   PROBNP 148      No results for input(s): BNP in the last 72 hours. Lipids: No results for input(s): CHOL, HDL in the last 72 hours. Invalid input(s): LDLCALCU  INR:   Recent Labs     08/10/22  1840   INR 1.0       Objective:   Vitals: /71   Pulse 90   Temp 97.9 °F (36.6 °C) (Axillary)   Resp 19   Ht 4' 11\" (1.499 m)   Wt 160 lb 4.4 oz (72.7 kg)   SpO2 92%   BMI 32.37 kg/m²    Last Recorded Weight:  [unfilled]    Constitutional and General Appearance:   alert, cooperative, no distress and appears stated age  Respiratory:  No for increased work of breathing. On auscultation: clear to auscultation bilaterally  Cardiovascular: The apical impulse is not displaced  Heart tones are crisp and normal. Regular S1 and S2. No murmurs. Abdomen:   No masses or tenderness  Bowel sounds present  Extremities:   No Cyanosis or Clubbing   Lower extremity edema:    Skin: Warm and dry  Neurological:  Alert and oriented. Moves all extremities well    Diagnostic Studies:     [id number]  Normal sinus rhythm diffuse T wave inversion      ECHO: 2022  Normal LV size and wall thickness. No obvious wall motion abnormality seen. Moderate global hypokinesis  Moderately reduced LV systolic function with LVEF 40-45 %. Normal RV size and function. RV systolic pressure 24 mmHg  LA and RA appears normal in size. No obvious significant structural valvular abnormality noted. No significant valvular stenosis or regurgitation noted. Normal aortic root dimension. No significant pericardial effusion noted.   IVC normal size difficult to assess respiratory variation     Stress Test:   Not obtained  Cardiac Angiography:  Not obtained    Patient Active Problem List:     Status post lobectomy of lung     Non-small cell cancer of left lung (HCC)     Malignant neoplasm of upper lobe of left lung [facility])     Essential hypertension     Anxiety     Intraparenchymal hemorrhage of brain ([facility] [address]     New onset seizure (HCC)     Mass of occipital

## 2022-08-13 ENCOUNTER — APPOINTMENT (OUTPATIENT)
Dept: MRI IMAGING | Age: 67
DRG: 280 | End: 2022-08-13
Attending: INTERNAL MEDICINE
Payer: MEDICARE

## 2022-08-13 LAB
ABSOLUTE EOS #: <0.03 K/UL (ref 0–0.44)
ABSOLUTE IMMATURE GRANULOCYTE: 0.04 K/UL (ref 0–0.3)
ABSOLUTE LYMPH #: 0.9 K/UL (ref 1.1–3.7)
ABSOLUTE MONO #: 0.57 K/UL (ref 0.1–1.2)
ALBUMIN SERPL-MCNC: 3.5 G/DL (ref 3.5–5.2)
ALBUMIN/GLOBULIN RATIO: 1.5 (ref 1–2.5)
ALP BLD-CCNC: 53 U/L (ref 35–104)
ALT SERPL-CCNC: 163 U/L (ref 5–33)
ANION GAP SERPL CALCULATED.3IONS-SCNC: 11 MMOL/L (ref 9–17)
APPEARANCE CSF: CLEAR
AST SERPL-CCNC: 84 U/L
BASOPHILS # BLD: 0 % (ref 0–2)
BASOPHILS ABSOLUTE: <0.03 K/UL (ref 0–0.2)
BILIRUB SERPL-MCNC: 0.23 MG/DL (ref 0.3–1.2)
BILIRUBIN DIRECT: 0.1 MG/DL
BILIRUBIN, INDIRECT: 0.13 MG/DL (ref 0–1)
BUN BLDV-MCNC: 25 MG/DL (ref 8–23)
CALCIUM SERPL-MCNC: 8.7 MG/DL (ref 8.6–10.4)
CHLORIDE BLD-SCNC: 95 MMOL/L (ref 98–107)
CO2: 31 MMOL/L (ref 20–31)
CREAT SERPL-MCNC: 0.4 MG/DL (ref 0.5–0.9)
EOSINOPHILS RELATIVE PERCENT: 0 % (ref 1–4)
GFR AFRICAN AMERICAN: >60 ML/MIN
GFR NON-AFRICAN AMERICAN: >60 ML/MIN
GFR SERPL CREATININE-BSD FRML MDRD: ABNORMAL ML/MIN/{1.73_M2}
GLUCOSE BLD-MCNC: 106 MG/DL (ref 70–99)
GLUCOSE, CSF: 82 MG/DL (ref 40–70)
HCT VFR BLD CALC: 34.8 % (ref 36.3–47.1)
HEMOGLOBIN: 11.5 G/DL (ref 11.9–15.1)
IMMATURE GRANULOCYTES: 1 %
LYMPHOCYTES # BLD: 15 % (ref 24–43)
MAGNESIUM: 1.4 MG/DL (ref 1.6–2.6)
MAGNESIUM: 2.1 MG/DL (ref 1.6–2.6)
MCH RBC QN AUTO: 32 PG (ref 25.2–33.5)
MCHC RBC AUTO-ENTMCNC: 33 G/DL (ref 28.4–34.8)
MCV RBC AUTO: 96.9 FL (ref 82.6–102.9)
MONOCYTES # BLD: 10 % (ref 3–12)
NRBC AUTOMATED: 0 PER 100 WBC
PARTIAL THROMBOPLASTIN TIME: 20.7 SEC (ref 20.5–30.5)
PARTIAL THROMBOPLASTIN TIME: 64.9 SEC (ref 20.5–30.5)
PARTIAL THROMBOPLASTIN TIME: 72.1 SEC (ref 20.5–30.5)
PDW BLD-RTO: 14.6 % (ref 11.8–14.4)
PLATELET # BLD: 259 K/UL (ref 138–453)
PMV BLD AUTO: 9.7 FL (ref 8.1–13.5)
POTASSIUM SERPL-SCNC: 2.9 MMOL/L (ref 3.7–5.3)
POTASSIUM SERPL-SCNC: 3.2 MMOL/L (ref 3.7–5.3)
POTASSIUM SERPL-SCNC: 5.1 MMOL/L (ref 3.7–5.3)
PROTEIN CSF: 64.9 MG/DL (ref 15–45)
RBC # BLD: 3.59 M/UL (ref 3.95–5.11)
RBC # BLD: ABNORMAL 10*6/UL
RBC CSF: 0 /MM3
SEG NEUTROPHILS: 74 % (ref 36–65)
SEGMENTED NEUTROPHILS ABSOLUTE COUNT: 4.31 K/UL (ref 1.5–8.1)
SODIUM BLD-SCNC: 137 MMOL/L (ref 135–144)
TOTAL PROTEIN: 5.8 G/DL (ref 6.4–8.3)
TROPONIN, HIGH SENSITIVITY: 1231 NG/L (ref 0–14)
TUBE NUMBER CSF: 3
VOLUME CSF: NORMAL
WBC # BLD: 5.8 K/UL (ref 3.5–11.3)
WBC CSF: 4 /MM3
XANTHOCHROMIA: NORMAL

## 2022-08-13 PROCEDURE — 84484 ASSAY OF TROPONIN QUANT: CPT

## 2022-08-13 PROCEDURE — 6370000000 HC RX 637 (ALT 250 FOR IP): Performed by: STUDENT IN AN ORGANIZED HEALTH CARE EDUCATION/TRAINING PROGRAM

## 2022-08-13 PROCEDURE — 84132 ASSAY OF SERUM POTASSIUM: CPT

## 2022-08-13 PROCEDURE — 36415 COLL VENOUS BLD VENIPUNCTURE: CPT

## 2022-08-13 PROCEDURE — 88112 CYTOPATH CELL ENHANCE TECH: CPT

## 2022-08-13 PROCEDURE — 87205 SMEAR GRAM STAIN: CPT

## 2022-08-13 PROCEDURE — A9579 GAD-BASE MR CONTRAST NOS,1ML: HCPCS

## 2022-08-13 PROCEDURE — 6360000002 HC RX W HCPCS: Performed by: STUDENT IN AN ORGANIZED HEALTH CARE EDUCATION/TRAINING PROGRAM

## 2022-08-13 PROCEDURE — 2000000000 HC ICU R&B

## 2022-08-13 PROCEDURE — 85025 COMPLETE CBC W/AUTO DIFF WBC: CPT

## 2022-08-13 PROCEDURE — 83735 ASSAY OF MAGNESIUM: CPT

## 2022-08-13 PROCEDURE — 6360000002 HC RX W HCPCS: Performed by: INTERNAL MEDICINE

## 2022-08-13 PROCEDURE — 99291 CRITICAL CARE FIRST HOUR: CPT | Performed by: INTERNAL MEDICINE

## 2022-08-13 PROCEDURE — 87070 CULTURE OTHR SPECIMN AEROBIC: CPT

## 2022-08-13 PROCEDURE — 99233 SBSQ HOSP IP/OBS HIGH 50: CPT | Performed by: PSYCHIATRY & NEUROLOGY

## 2022-08-13 PROCEDURE — 80048 BASIC METABOLIC PNL TOTAL CA: CPT

## 2022-08-13 PROCEDURE — 80076 HEPATIC FUNCTION PANEL: CPT

## 2022-08-13 PROCEDURE — 85730 THROMBOPLASTIN TIME PARTIAL: CPT

## 2022-08-13 PROCEDURE — 82945 GLUCOSE OTHER FLUID: CPT

## 2022-08-13 PROCEDURE — 94640 AIRWAY INHALATION TREATMENT: CPT

## 2022-08-13 PROCEDURE — 70543 MRI ORBT/FAC/NCK W/O &W/DYE: CPT

## 2022-08-13 PROCEDURE — 89050 BODY FLUID CELL COUNT: CPT

## 2022-08-13 PROCEDURE — 84157 ASSAY OF PROTEIN OTHER: CPT

## 2022-08-13 PROCEDURE — 99232 SBSQ HOSP IP/OBS MODERATE 35: CPT | Performed by: INTERNAL MEDICINE

## 2022-08-13 PROCEDURE — 62270 DX LMBR SPI PNXR: CPT | Performed by: PSYCHIATRY & NEUROLOGY

## 2022-08-13 PROCEDURE — 2500000003 HC RX 250 WO HCPCS

## 2022-08-13 PROCEDURE — 94761 N-INVAS EAR/PLS OXIMETRY MLT: CPT

## 2022-08-13 PROCEDURE — 2580000003 HC RX 258: Performed by: STUDENT IN AN ORGANIZED HEALTH CARE EDUCATION/TRAINING PROGRAM

## 2022-08-13 PROCEDURE — 6360000004 HC RX CONTRAST MEDICATION

## 2022-08-13 PROCEDURE — 009U3ZX DRAINAGE OF SPINAL CANAL, PERCUTANEOUS APPROACH, DIAGNOSTIC: ICD-10-PCS | Performed by: PSYCHIATRY & NEUROLOGY

## 2022-08-13 PROCEDURE — 2700000000 HC OXYGEN THERAPY PER DAY

## 2022-08-13 RX ORDER — POTASSIUM CHLORIDE 7.45 MG/ML
40 INJECTION INTRAVENOUS ONCE
Status: COMPLETED | OUTPATIENT
Start: 2022-08-13 | End: 2022-08-13

## 2022-08-13 RX ORDER — MAGNESIUM SULFATE IN WATER 40 MG/ML
2000 INJECTION, SOLUTION INTRAVENOUS ONCE
Status: COMPLETED | OUTPATIENT
Start: 2022-08-13 | End: 2022-08-13

## 2022-08-13 RX ORDER — LIDOCAINE HYDROCHLORIDE 10 MG/ML
INJECTION, SOLUTION INFILTRATION; PERINEURAL
Status: COMPLETED
Start: 2022-08-13 | End: 2022-08-13

## 2022-08-13 RX ORDER — SODIUM CHLORIDE 0.9 % (FLUSH) 0.9 %
10 SYRINGE (ML) INJECTION PRN
Status: DISCONTINUED | OUTPATIENT
Start: 2022-08-13 | End: 2022-08-17 | Stop reason: HOSPADM

## 2022-08-13 RX ADMIN — MAGNESIUM SULFATE HEPTAHYDRATE 2000 MG: 40 INJECTION, SOLUTION INTRAVENOUS at 04:31

## 2022-08-13 RX ADMIN — Medication 3 MG: at 23:21

## 2022-08-13 RX ADMIN — DEXAMETHASONE SODIUM PHOSPHATE 4 MG: 4 INJECTION, SOLUTION INTRAMUSCULAR; INTRAVENOUS at 19:02

## 2022-08-13 RX ADMIN — POTASSIUM BICARBONATE 40 MEQ: 782 TABLET, EFFERVESCENT ORAL at 09:00

## 2022-08-13 RX ADMIN — LIDOCAINE HYDROCHLORIDE: 10 INJECTION, SOLUTION INFILTRATION; PERINEURAL at 16:30

## 2022-08-13 RX ADMIN — GADOTERIDOL 14 ML: 279.3 INJECTION, SOLUTION INTRAVENOUS at 15:07

## 2022-08-13 RX ADMIN — FOLIC ACID 1 MG: 1 TABLET ORAL at 08:13

## 2022-08-13 RX ADMIN — IPRATROPIUM BROMIDE AND ALBUTEROL SULFATE 1 AMPULE: .5; 3 SOLUTION RESPIRATORY (INHALATION) at 11:04

## 2022-08-13 RX ADMIN — LEVETIRACETAM 500 MG: 500 TABLET, FILM COATED ORAL at 20:38

## 2022-08-13 RX ADMIN — IPRATROPIUM BROMIDE AND ALBUTEROL SULFATE 1 AMPULE: .5; 3 SOLUTION RESPIRATORY (INHALATION) at 07:52

## 2022-08-13 RX ADMIN — IPRATROPIUM BROMIDE AND ALBUTEROL SULFATE 1 AMPULE: .5; 3 SOLUTION RESPIRATORY (INHALATION) at 19:47

## 2022-08-13 RX ADMIN — ASPIRIN 81 MG: 81 TABLET, CHEWABLE ORAL at 08:13

## 2022-08-13 RX ADMIN — LEVETIRACETAM 500 MG: 500 TABLET, FILM COATED ORAL at 08:13

## 2022-08-13 RX ADMIN — POTASSIUM CHLORIDE 40 MEQ: 7.46 INJECTION, SOLUTION INTRAVENOUS at 05:00

## 2022-08-13 RX ADMIN — METOPROLOL TARTRATE 6.25 MG: 25 TABLET ORAL at 20:38

## 2022-08-13 RX ADMIN — FUROSEMIDE 20 MG: 10 INJECTION, SOLUTION INTRAMUSCULAR; INTRAVENOUS at 08:28

## 2022-08-13 RX ADMIN — DEXAMETHASONE SODIUM PHOSPHATE 4 MG: 4 INJECTION, SOLUTION INTRAMUSCULAR; INTRAVENOUS at 01:11

## 2022-08-13 RX ADMIN — FUROSEMIDE 20 MG: 10 INJECTION, SOLUTION INTRAMUSCULAR; INTRAVENOUS at 20:39

## 2022-08-13 RX ADMIN — METOPROLOL TARTRATE 6.25 MG: 25 TABLET ORAL at 08:13

## 2022-08-13 RX ADMIN — DEXAMETHASONE SODIUM PHOSPHATE 4 MG: 4 INJECTION, SOLUTION INTRAMUSCULAR; INTRAVENOUS at 12:36

## 2022-08-13 RX ADMIN — DEXAMETHASONE SODIUM PHOSPHATE 4 MG: 4 INJECTION, SOLUTION INTRAMUSCULAR; INTRAVENOUS at 23:22

## 2022-08-13 RX ADMIN — PANTOPRAZOLE SODIUM 40 MG: 40 TABLET, DELAYED RELEASE ORAL at 06:05

## 2022-08-13 RX ADMIN — DEXAMETHASONE SODIUM PHOSPHATE 4 MG: 4 INJECTION, SOLUTION INTRAMUSCULAR; INTRAVENOUS at 06:06

## 2022-08-13 RX ADMIN — SODIUM CHLORIDE, PRESERVATIVE FREE 10 ML: 5 INJECTION INTRAVENOUS at 08:14

## 2022-08-13 ASSESSMENT — ENCOUNTER SYMPTOMS
CONSTIPATION: 0
VOMITING: 0
ABDOMINAL PAIN: 0
NAUSEA: 0
PHOTOPHOBIA: 0

## 2022-08-13 NOTE — PROGRESS NOTES
[facility]   Progress Note                    Date:   8/13/2022  Patient name:  Leticia Spann  Date of admission:  8/11/2022  1:25 AM  MRN:   [id number]  YOB: 1955  PCP:    Chantale Souza MD    Reason for Admission:  Acute respiratory failure ([facility] [address] [J96.00]  NSTEMI (non-ST elevated myocardial infarction) ([facility] [address] [I21.4]    Subjective:   Patient seen and examined at the bedside:  No significant events overnight  Discussed case and plan with patient  Okay to pause heparin drip if NS wants LP     Patient is currently in normal sinus rhythm with heart rate in high 80-90s  EKG showing EF of 40 to 45% with global hypokinesis -suggestive of myocarditis  Respiratory status improving with IV Lasix 20 mg twice daily      Medications:   Scheduled Meds:   potassium bicarb-citric acid  40 mEq Oral Once    furosemide  20 mg IntraVENous BID    dexamethasone  4 mg IntraVENous Q6H    sodium chloride flush  5-40 mL IntraVENous 2 times per day    heparin (porcine)  4,000 Units IntraVENous Once    ipratropium-albuterol  1 ampule Inhalation Q4H WA    aspirin  81 mg Oral Daily    metoprolol tartrate  6.25 mg Oral BID    levETIRAcetam  500 mg Oral BID    folic acid  1 mg Oral Daily    pantoprazole  40 mg Oral QAM AC     Continuous Infusions:   sodium chloride Stopped (08/13/22 0431)    heparin (PORCINE) Infusion 16 Units/kg/hr (08/13/22 0518)     CBC:   Recent Labs     08/11/22  0341 08/12/22  0407 08/13/22  0240   WBC 5.5 6.7 5.8   HGB 11.3* 11.2* 11.5*    244 259     BMP:    Recent Labs     08/11/22  0341 08/12/22  0407 08/13/22  0240 08/13/22  0402    134* 137  --    K 4.2 3.8 2.9* 3.2*    95* 95*  --    CO2 29 30 31  --    BUN 16 23 25*  --    CREATININE 0.41* 0.51 0.40*  --    GLUCOSE 119* 127* 106*  --      Hepatic:   Recent Labs     08/11/22  0733 08/12/22  0407 08/13/22  0240   * 94* 84*   * 163* 163*   BILITOT 0.21* 0.21* 0.23*   ALKPHOS 59 55 53     Troponin: Malignant neoplasm of upper lobe of left lung (HCC)     Essential hypertension     Anxiety     Intraparenchymal hemorrhage of brain (HCC)     New onset seizure (HCC)     Mass of occipital region     Hyperglycemia     Hypokalemia     Vasogenic edema (HCC)     Brain metastases (HCC)     Adrenal mass (HCC) - right      Episode of loss of consciousness     Focal epilepsy ([facility] [address]     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 Acute onset systolic heart failure with EF 40-45% on echo from 08/11/2022 with global hypokinesis  Left upper lobe invasive lung adenocarcinoma, Status post lobectomy 9/28/18  Acute hypoxic respiratory failure likely due to pneumonitis as a result of chemoimmunotherapy  Brain mets s/p right-sided craniotomy with resection of intraaxial brain tumor 03/29/2022 on PDL-1 agent at baseline  Hypertension  Hyperlipidemia  Left leg DVT  Liver hemangioma  Elevated liver enzymes -suspected autoimmune hepatitis    Plan of Treatment/Recommendations:     Continue heparin drip (okay to pause for LP)  Will get heart cath once acute issues resolved resolved and cleared by neurosurgery hematology oncology and critical care.  Neurosurgery planning LP  Replace Mg>2 /K>4   Start the patient on IV Lasix 20 mg daily  Will repeat proBNP today  Will discuss with attending regarding the endomyocardial biopsy  Further recommendations to follow    Case and plan discussed with attending    Kash Winchester DO  Resident internal medicine, PGY [facility] hospitals.  7:50 AM 08/13/22'      Attending Physician Statement  I

## 2022-08-13 NOTE — PROGRESS NOTES
Critical Care Team - Daily Progress Note      Date and time: 8/13/2022 8:41 AM  Patient's name:  Elizabeth Martin  Medical Record Number: [id number]  Patient's account/billing number: [de-identified]  Patient's YOB: 1955  Age: 74 y.o. Date of Admission: 8/11/2022  1:25 AM  Length of stay during current admission: 2      Primary Care Physician: Milind Laughlin MD  ICU Attending Physician: Dr. Francisco Gregorio Status: Full Code    Reason for ICU admission: No chief complaint on file. Subjective:     OVERNIGHT EVENTS:      used BIPAP overnight. On 3 L NC this am, breathing improved. 122/70, HR 90  96% on 3 L NC  Afebrile    Lbs: K 2.9>>3.2, given 80 meq total, will recheck  Mag 1.4, replaced 2 g   Bun 25, creat 0.4  Troponin 1231  >>163  AST 94>>84    Hb 11.2>>11.5  Plat 259    UOP 1700 ml in 2 4hours, on lasix 20 mg IV BID    Hem onc: switched to 4 mg decadron q6 hours. Repeat MRI brain neg for expanding or metastasis of lesions. Possible leptomeningeal involvement which indicates very poor prognosis. Needs orbital MRI and LP with CSF cytology. prognosis can be changed given CNS disease  Cardiology: Given the elevated troponins, will plan for cardiac cath next week once able to lay flat. Continue IV diuresis   Neurology: bilateral internuclear ophthalmoplegia as well as difficulty with abduction in R > L eye, ptosis. MRI brain does not show obvious lesion in the brainstem. Concern for possible leptomeningeal carcinomatosis. Opthal consult. NS: LP prior to cardiac cath after which she may need to be on dual antiplatelet agents. Would coordinate potentially for early Monday after heparin has been held. After LP she would be able to resume heparin and could proceed with cath even the same day. Opthal: MRI orbit, face, neck      Echo: Moderate global hypokinesis  Moderately reduced LV systolic function with LVEF 40-45 %.      1700 ml UOP in 24 hours     AWAKE & FOLLOWING COMMANDS:  [x] No   [] Yes    CURRENT VENTILATION STATUS:     [] Ventilator  [] BIPAP  [x] Nasal Cannula [] Room Air      IF INTUBATED, ET TUBE MARKING AT LOWER LIP:       cms    SECRETIONS Amount:  [] Small [] Moderate  [] Large  [x] None  Color:     [] White [] Colored  [] Bloody    SEDATION:  RAAS Score:  [] Propofol gtt  [] Versed gtt  [] Ativan gtt   [x] No Sedation    PARALYZED:  [x] No    [] Yes    DIARRHEA:                [x] No                [] Yes  (C. Difficile status: [] positive                                                                                                                       [] negative                                                                                                                     [] pending)    VASOPRESSORS:  [x] No    [] Yes    If yes -   [] Levophed       [] Dopamine     [] Vasopressin       [] Dobutamine  [] Phenylephrine         [] Epinephrine    CENTRAL LINES:     [x] No   [] Yes   (Date of Insertion:   )           If yes -     [] Right IJ     [] Left IJ [] Right Femoral [] Left Femoral                   [] Right Subclavian [] Left Subclavian       MANCIA'S CATHETER:   [] No   [] Yes  (Date of Insertion:   )     URINE OUTPUT:            [x] Good   [] Low              [] Anuric    REVIEW OF SYSTEMS:  Constitutional: Negative for Fever, chills  Eyes: Negative for visual changes, diplopia  ENT: Negative for mouth sores, sore throat. Cardiovascular: Negative for lightheadedness ,chest pain, palpitations   Respiratory:Negative for Shortness of breath,cough or wheezing. Gastrointestinal: Negative for nausea/vomiting, change in bowel habits, abdominal pain  Genitourinary:Negative for change in bladder habits, dysuria, hematuria.   Musculoskeletal: Negative for joint pain   Neurological: Negative for headache, change in muscle strength numbness/tingling      OBJECTIVE:     VITAL SIGNS:  /70   Pulse 90   Temp 98.2 °F (36.8 °C) (Oral)   Resp 23   Ht 4' 11\" (1.499 m)   Wt 158 lb 1.1 oz (71.7 kg)   SpO2 95%   BMI 31.93 kg/m²   Tmax over 24 hours:  Temp (24hrs), Av.9 °F (36.6 °C), Min:97.5 °F (36.4 °C), Max:98.3 °F (36.8 °C)      Patient Vitals for the past 8 hrs:   BP Temp Temp src Pulse Resp SpO2 Weight   22 0752 -- -- -- 90 23 95 % --   22 0700 122/70 -- -- 82 23 95 % --   22 0600 126/75 98.2 °F (36.8 °C) Oral 84 23 95 % --   22 0500 127/75 -- -- 90 21 95 % 158 lb 1.1 oz (71.7 kg)   22 0400 108/75 98.2 °F (36.8 °C) Oral 81 22 95 % --   22 0300 (!) 90/59 -- -- 74 17 95 % --   22 0258 -- -- -- -- 18 -- --   22 0200 90/63 97.7 °F (36.5 °C) Axillary 81 19 95 % --   22 0100 (!) 93/57 -- -- 75 20 97 % --           Intake/Output Summary (Last 24 hours) at 2022 0841  Last data filed at 2022 0518  Gross per 24 hour   Intake 390.99 ml   Output 1700 ml   Net -1309.01 ml       Date 22 0000 - 22   Shift 1104-8055 1665-9849 9999-6859 24 Hour Total   INTAKE   I.V.(mL/kg) 175.4(2.4)   175.4(2.4)   IV Piggyback(mL/kg) 48(0.7)   48(0.7)   Shift Total(mL/kg) 223. 4(3.1)   223. 4(3.1)   OUTPUT   Urine(mL/kg/hr) 150(0.3)   150   Shift Total(mL/kg) 150(2.1)   150(2.1)   Weight (kg) 71.7 71.7 71.7 71.7       Wt Readings from Last 3 Encounters:   22 158 lb 1.1 oz (71.7 kg)   22 160 lb (72.6 kg)   08/10/22 161 lb (73 kg)     Body mass index is 31.93 kg/m². PHYSICAL EXAM:  Constitutional: on nasal cannula  HEENT: PERRLA, EOMI, sclera clear, anicteric  Respiratory: clear to auscultation, no wheezes or rales and unlabored breathing. Cardiovascular: regular rate and rhythm, normal S1, S2, no murmur noted and 2+ pulses throughout  Abdomen: soft, nontender, nondistended, no masses or organomegaly  NEUROLOGIC: Awake, alert, oriented to name, place and time. Cranial nerves II-XII are grossly intact. Motor is 5 out of 5 bilaterally. Sensory is intact. Extremities:  peripheral pulses normal, no pedal edema,.       Any additional physical findings:      MEDICATIONS:  Scheduled Meds:   potassium bicarb-citric acid  40 mEq Oral Once    furosemide  20 mg IntraVENous BID    dexamethasone  4 mg IntraVENous Q6H    sodium chloride flush  5-40 mL IntraVENous 2 times per day    heparin (porcine)  4,000 Units IntraVENous Once    ipratropium-albuterol  1 ampule Inhalation Q4H WA    aspirin  81 mg Oral Daily    metoprolol tartrate  6.25 mg Oral BID    levETIRAcetam  500 mg Oral BID    folic acid  1 mg Oral Daily    pantoprazole  40 mg Oral QAM AC     Continuous Infusions:   sodium chloride Stopped (08/13/22 0431)    heparin (PORCINE) Infusion 16 Units/kg/hr (08/13/22 0518)     PRN Meds:   melatonin, 3 mg, Nightly PRN  sodium chloride flush, 10 mL, PRN  sodium chloride flush, 5-40 mL, PRN  sodium chloride, , PRN  ondansetron, 4 mg, Q8H PRN   Or  ondansetron, 4 mg, Q6H PRN  polyethylene glycol, 17 g, Daily PRN  acetaminophen, 650 mg, Q6H PRN   Or  acetaminophen, 650 mg, Q6H PRN  heparin (porcine), 4,000 Units, PRN  heparin (porcine), 2,000 Units, PRN  clonazePAM, 0.5 mg, BID PRN      SUPPORT DEVICES: [] Ventilator [] BIPAP  [] Nasal Cannula [] Room Air    VENT SETTINGS (Comprehensive) (if applicable):      Additional Respiratory Assessments  Heart Rate: 90  Resp: 23  SpO2: 95 %    ABGs:     Lab Results   Component Value Date/Time    PHART 7.411 09/28/2018 12:46 PM    SRT4RTV 37.1 09/28/2018 12:46 PM    PO2ART 112.0 09/28/2018 12:46 PM    HGL7BPT 23.1 09/28/2018 12:46 PM    KKO2YPP 26 09/25/2018 12:03 PM    P7BWQBXQ 98.7 09/28/2018 12:46 PM    FIO2 INFORMATION NOT PROVIDED 08/11/2022 09:46 AM     Lactic Acid:   Lab Results   Component Value Date/Time    LACTA 1.6 08/10/2022 01:09 PM         DATA:  Complete Blood Count:   Recent Labs     08/11/22  0341 08/12/22  0407 08/13/22  0240   WBC 5.5 6.7 5.8   HGB 11.3* 11.2* 11.5*   MCV 98.0 97.4 96.9    244 259   RBC 3.45* 3.45* 3.59*   HCT 33.8* 33.6* 34.8*   MCH 32.8 32.5 32.0   MCHC 33.4 33.3 33.0   RDW 14.7* 14.6* 14.6*   MPV 9.9 9.8 9.7          PT/INR:    Lab Results   Component Value Date/Time    PROTIME 12.7 08/10/2022 06:40 PM    INR 1.0 08/10/2022 06:40 PM     PTT:    Lab Results   Component Value Date/Time    APTT 72.1 08/13/2022 12:49 AM       Basal Metabolic Profile:   Recent Labs     08/11/22  0341 08/12/22  0407 08/13/22  0240 08/13/22 0402    134* 137  --    K 4.2 3.8 2.9* 3.2*   BUN 16 23 25*  --    CREATININE 0.41* 0.51 0.40*  --     95* 95*  --    CO2 29 30 31  --         Magnesium:   Lab Results   Component Value Date/Time    MG 1.4 08/13/2022 02:40 AM    MG 1.7 08/10/2022 01:09 PM    MG 1.7 03/26/2022 06:12 AM     Phosphorus: No results found for: PHOS  S. Calcium:  Recent Labs     08/13/22 0240   CALCIUM 8.7       S. Ionized Calcium:No results for input(s): IONCA in the last 72 hours. Urinalysis:   Lab Results   Component Value Date/Time    NITRU NEGATIVE 09/25/2018 12:24 PM    COLORU YELLOW 09/25/2018 12:24 PM    PHUR 7.5 09/25/2018 12:24 PM    SPECGRAV 1.009 09/25/2018 12:24 PM    LEUKOCYTESUR NEGATIVE 09/25/2018 12:24 PM    UROBILINOGEN Normal 09/25/2018 12:24 PM    BILIRUBINUR NEGATIVE 09/25/2018 12:24 PM    GLUCOSEU NEGATIVE 09/25/2018 12:24 PM    KETUA NEGATIVE 09/25/2018 12:24 PM       CARDIAC ENZYMES: No results for input(s): CKMB, CKMBINDEX, TROPONINI in the last 72 hours. Invalid input(s): CKTOTAL;3  BNP: No results for input(s): BNP in the last 72 hours.     LFTS  Recent Labs     08/10/22  1010 08/10/22  1309 08/11/22  0733 08/12/22  0407 08/13/22  0240   ALKPHOS 67 70 59 55 53   * 227* 182* 163* 163*   * 207* 142* 94* 84*   BILITOT 0.28* 0.28* 0.21* 0.21* 0.23*   BILIDIR  --   --  0.10 0.08 0.10   LABALBU 4.3 4.4 3.7 3.5 3.5         AMYLASE/LIPASE/AMMONIA  Recent Labs     08/11/22  0341   LIPASE 19         Last 3 Blood Glucose:   Recent Labs     08/10/22  1010 08/10/22  1309 08/11/22  0341 08/12/22  0407 08/13/22  0240   GLUCOSE 128* 165* 119* 127* 106*        HgBA1c:    Lab Results   Component Value Date/Time    LABA1C 5.0 06/28/2018 07:47 AM         TSH:    Lab Results   Component Value Date/Time    TSH 1.75 08/03/2022 10:13 AM     ANEMIA STUDIES  No results for input(s): LABIRON, TIBC, FERRITIN, CWHBRLAV62, FOLATE, OCCULTBLD in the last 72 hours. Cultures during this admission:     Blood cultures:                 [] None drawn      [] Negative             []  Positive (Details:  )  Urine Culture:                   [] None drawn      [] Negative             []  Positive (Details:  )  Sputum Culture:               [] None drawn       [] Negative             []  Positive (Details:  )   Endotracheal aspirate:     [] None drawn       [] Negative             []  Positive (Details:  )        ASSESSMENT:     Principal Problem:    Acute respiratory failure ([address]  Active Problems:    NSTEMI (non-ST elevated myocardial infarction) ([address]    Chronic respiratory failure with hypoxia (HCC)    Centrilobular emphysema (HCC)    KRISTIN (internuclear ophthalmoplegia), bilateral    Pseudomeningocele    Malignant neoplasm of upper lobe of left lung ([address]  Resolved Problems:    * No resolved hospital problems. *        PLAN:     NSTEMI  -Continue heparin drip  -ASA, bb daily  -no statin due to transaminitis  -Cardiology on board  -echo showed systolic function reduced EF 40-45%, global hypokinesis  - plan for cath when able to lie flat     Diplopia probably secondary to brain mets- MRI brain neg for any new lesion  -Follow Neuro recommendation. LP prior to cardiac cath and possible DAPT    Transaminitis due to Leopold Friedman immunotherapy:  -monitor LFTs  -Decadron 4 mg q6 daily    Subcutaneous fluid collection on head CT:  -neurosurgery following  -recommend LP to check for leptomeningeal disease prior to cath    Essential hypertension  -started on lopressor 6.25 BID     COPD  -Continue bronchodilators 4 times a day.     DVT ppx: hep gtt  GI ppx: protonix      Pamela Yuen RITESH RODGERS. Department of Internal Medicine/ Critical care  [facility], [place] ([place]             8/13/2022, 8:41 AM      Attending Physician Statement  I have discussed the care of Mike Whatley, including pertinent history and exam findings with the resident. I have reviewed the key elements of all parts of the encounter with the resident. I have seen and examined the patient with the resident. I agree with the assessment and plan and status of the problem list as documented. I seen the patient during around today, chart reviewed, labs and medications reviewed overnight events noted. Echo results seen and labs. I have reviewed the neurosurgery note hematology oncology note was seen and cardiology note seen. Patient is currently on heparin drip neurology probably plan to do spinal tap as discussion with oncology and neurology for possible leptomeningeal involvement with carcinoma MRI showed multiple areas of foci likely metastasis foci some of them were present before also. Patient continued to have diplopia seen by ophthalmology. MRI of the orbit by ordered by pulmonology  She did use BiPAP overnight 14/6/40 percent currently she is on 3 L nasal cannula according to patient she is feeling better she is able to lie flat for MRI yesterday with nonrebreather. Her potassium and magnesium is low being replaced and will recheck. Echocardiogram shows ejection fraction of 40 to 45% troponin increased to 1230 statin is on hold she is on aspirin and heparin followed by cardiology cardiology plan to do cardiac cath Monday likely. Prednisone was changed to Decadron by oncology likely secondary to MRI finding. Discussed with nursing staff, treatment and plan discussed.     Total critical care time caring for this patient with life threatening, unstable organ failure, including direct patient contact, management of life support systems, review of data including imaging and labs, discussions with other team members and physicians at least 27  Min so far today, excluding procedures. Please note that this chart was generated using voice recognition Dragon dictation software. Although every effort was made to ensure the accuracy of this automated transcription, some errors in transcription may have occurred.      Maryuri Live MD  8/13/2022 9:08 AM

## 2022-08-13 NOTE — PROCEDURES
Guillermo Dupree is a 74 y.o. female patient. No diagnosis found. Past Medical History:   Diagnosis Date    Anxiety     Benign polyp of large intestine     Cancer (HCC)     LT UPPER LOBE    COPD (chronic obstructive pulmonary disease) (HCC)     Hx of blood clots     DVT LT LEG    Hyperlipidemia     Hypertension     Liver hemangioma     Lung nodule     BARAK  Nodule    Snores     not tested for apnea    Uterine fibroid 09/2010    Wears glasses      Blood pressure 115/76, pulse 88, temperature 98.1 °F (36.7 °C), temperature source Oral, resp. rate 27, height 4' 11\" (1.499 m), weight 158 lb 1.1 oz (71.7 kg), SpO2 94 %. Lumbar Puncture    Date/Time: 8/13/2022 4:55 PM  Performed by: Juliane Davis MD  Authorized by: Juliane Davis MD   Consent: Verbal consent obtained. Written consent not obtained. Risks and benefits: risks, benefits and alternatives were discussed  Consent given by: patient  Patient understanding: patient states understanding of the procedure being performed  Patient consent: the patient's understanding of the procedure matches consent given  Procedure consent: procedure consent matches procedure scheduled  Relevant documents: relevant documents present and verified  Test results: test results available and properly labeled  Site marked: the operative site was marked  Imaging studies: imaging studies not available  Patient identity confirmed: arm band  Indications: Evaluation of malignancy.   Anesthesia: local infiltration    Anesthesia:  Local Anesthetic: lidocaine 1% without epinephrine  Anesthetic total: 10 mL    Sedation:  Patient sedated: no    Lumbar space: L4-L5 interspace  Patient's position: sitting  Needle gauge: 20  Needle type: spinal needle - Quincke tip  Number of attempts: 1  Fluid appearance: clear  Tubes of fluid: 4  Total volume: 9 ml  Post-procedure: site cleaned  Patient tolerance: patient tolerated the procedure well with no immediate complications        Juliane Davis MD  8/13/2022

## 2022-08-13 NOTE — PLAN OF CARE
Problem: Discharge Planning  Goal: Discharge to home or other facility with appropriate resources  Outcome: Progressing     Problem: Safety - Adult  Goal: Free from fall injury  Outcome: Progressing     Problem: ABCDS Injury Assessment  Goal: Absence of physical injury  Outcome: Progressing     Problem: Skin/Tissue Integrity  Goal: Absence of new skin breakdown  Description: 1. Monitor for areas of redness and/or skin breakdown  2. Assess vascular access sites hourly  3. Every 4-6 hours minimum:  Change oxygen saturation probe site  4. Every 4-6 hours:  If on nasal continuous positive airway pressure, respiratory therapy assess nares and determine need for appliance change or resting period.   Outcome: Progressing     Problem: Respiratory - Adult  Goal: Achieves optimal ventilation and oxygenation  8/13/2022 0622 by Arianne Barth RN  Outcome: Progressing  8/12/2022 1939 by Amalia Duran RCP  Outcome: Progressing  Flowsheets (Taken 8/12/2022 1939)  Achieves optimal ventilation and oxygenation:   Assess for changes in respiratory status   Respiratory therapy support as indicated   Assess for changes in mentation and behavior   Oxygen supplementation based on oxygen saturation or arterial blood gases   Encourage broncho-pulmonary hygiene including cough, deep breathe, incentive spirometry   Assess and instruct to report shortness of breath or any respiratory difficulty

## 2022-08-13 NOTE — PROGRESS NOTES
[facility] Neurology   [address] [facility]    Progress Note             Date:   8/13/2022  Patient name:  Harjinder Cardoso  Date of admission:  8/11/2022  1:25 AM  MRN:   [id number]  Account:  [de-identified]  YOB: 1955  PCP:    Dayan Kruger MD  Room:   [address] [place] [address]  Code Status:    Full Code    Chief Complaint:     Diplopia     Interval hx: The patient was seen and examined at bedside. Is vitally stable, alert and oriented x 3. No acute events overnight. Vital signs are stable   MRI brain w wo contrast Multiple enhancing metastatic foci throughout the brain in the majority of these are similar in size compared to prior examination  Possible cath on Monday       Brief History of Present Illness: The patient is a 74 y.o. female with significant past medical history of history of invasive lung adenocarcinoma s/p lobectomy 9/28/2018, metastasis to brain s/p suboccipital craniotomy 3/29/2022, liver hemangioma, HTN, HDL, COPD, who was brought in by his wife from [facility] complaining of visual disturbance, worsening shortness of breath, symptoms have been ongoing for 2 weeks however worsened the past 3 days. Patient states that it worsen when she looks to the left. Usually has horizontal double vision. Gets better when she covers one eye. Patient was placed on BiPAP was given albuterol which improved his dyspnea  Labs significant for elevated tropes 699> 708 for which cardiology consulted recommends trending troponin and get an echo. EKG shows lateral and inferior leads T wave inversions but seems to be little worse, and was started on heparin drip   CTA was negative for PE, possible right upper lobe infiltrates, CT head subcutaneous fluid leak possible subdural leak.   Neurosurgery will be consulted for this, and oncology recommended MRI brain, transfer was to transfer patient to [facility] V's and starting heparin for an NSTEMI  possible cardiac cath once medically stable. Hypoxic, pneumonitis likely due to chemotherapy  Sodium 134  proBNP increased ] 554>9125  Improving transaminitis      Past Medical History:     Past Medical History:   Diagnosis Date    Anxiety     Benign polyp of large intestine     Cancer (HCC)     LT UPPER LOBE    COPD (chronic obstructive pulmonary disease) (HCC)     Hx of blood clots     DVT LT LEG    Hyperlipidemia     Hypertension     Liver hemangioma     Lung nodule     BARAK  Nodule    Snores     not tested for apnea    Uterine fibroid 09/2010    Wears glasses         Past Surgical History:     Past Surgical History:   Procedure Laterality Date    ABDOMINAL HERNIA REPAIR  2012    BREAST BIOPSY Left 1983    BRONCHOSCOPY  10/01/2018    BRONCHOSCOPY performed by Alfred Quick MD at [address] [facility] N/A 03/29/2022    SUBOCCIPITAL CRANIOTOMY FOR TUMOR  (REGULAR TABLE, [employer] NAVIGATION, [place] HEADHOLDER) performed by [facility]  at [address] [place] [address] ([address] [place] [address])  2010    HYSTERECTOMY, TOTAL ABDOMINAL (CERVIX REMOVED)      IR PORT PLACEMENT EQUAL OR GREATER THAN 5 YEARS  04/13/2022    IR PORT PLACEMENT EQUAL OR GREATER THAN 5 YEARS 4/13/2022 Jalyn Orourke MD [facility] SPECIAL PROCEDURES    LUNG BIOPSY      LUNG REMOVAL, PARTIAL Left 09/28/2018    Robotic assisted left lobectomy, upper with lymph node biopsy    OTHER SURGICAL HISTORY Right 11/05/2018    IR PORT PLACEMENT EQUAL OR GREATER THAN 5 YEARS    [facility] [address] INCL FLUOR GDNCE DX W/CELL WASHG SPX N/A 10/29/2018    BRONCHOSCOPY performed by Houston Renteria MD at [address] LOBE LOBECT Left 09/28/2018    XI ROBOTIC ASSISTED LEFT UPPER LOBECTOMY MULTIPLE LYMPH NODE BIOPSY, INTERCOSTAL  NERVE BLOCK ABLATION W/EXPAREL performed by Jimmie Dudley MD at [place] [address]        Medications Prior to Admission:     Prior to Admission medications    Medication Sig Start Date End Date Taking?  Authorizing Negative for photophobia. Cardiovascular:  Negative for chest pain and leg swelling. Gastrointestinal:  Negative for abdominal pain, constipation, nausea and vomiting. Endocrine: Negative for polyuria. Genitourinary:  Negative for dysuria. Musculoskeletal:  Negative for gait problem. Neurological:  Negative for tremors, facial asymmetry, weakness, light-headedness and headaches. Psychiatric/Behavioral:  The patient is not nervous/anxious. Physical Exam:   /80   Pulse (!) 104   Temp 98.1 °F (36.7 °C) (Oral)   Resp 25   Ht 4' 11\" (1.499 m)   Wt 158 lb 1.1 oz (71.7 kg)   SpO2 94%   BMI 31.93 kg/m²   Temp (24hrs), Av °F (36.7 °C), Min:97.5 °F (36.4 °C), Max:98.3 °F (36.8 °C)    No results for input(s): POCGLU in the last 72 hours. Intake/Output Summary (Last 24 hours) at 2022 1121  Last data filed at 2022 0940  Gross per 24 hour   Intake 390.99 ml   Output 1750 ml   Net -1359.01 ml         Neurologic Exam     GENERAL  Appears comfortable and in no distress   HEENT  NC/ AT   HEART  S1 and S2 heard; palpation of pulses: radial pulse   NECK  Supple and no bruits heard   MENTAL STATUS:  Alert, oriented, intact memory, no confusion, normal speech, normal language, no hallucination or delusion   CRANIAL NERVES: II     -      Visual fields intact to confrontation  III,IV,VI -  PERR, bilateral restricted adduction of occular movement. Abnormal conversion bilaterally.    V     -     Normal facial sensation  VII    -     Normal facial symmetry  VIII   -     Intact hearing  IX,X -     Symmetrical palate  XI    -     Symmetrical shoulder shrug  XII   -     Midline tongue, no atrophy   MOTOR FUNCTION: RUE: Significant for good strength of grade 5/5 in proximal and distal muscle groups  LUE: Significant for good strength of grade 5/5 in proximal and distal muscle groups  RLE: Significant for good strength of grade 5/5 in proximal and distal muscle groups  LLE: Significant for good strength of grade 5/5 in proximal and distal muscle groups      Normal bulk, normal tone and no involuntary movements, no tremor   SENSORY FUNCTION:  Normal touch, normal pinprick, normal vibration, normal proprioception   CEREBELLAR FUNCTION:  Intact fine motor control over upper limbs and lower limbs  Pseudo dysmetria    REFLEX FUNCTION:  Symmetric in upper and lower extremities, no Babinski sign   STATION and GAIT  Normal gait and tandem station, normal tip toes and heel walking       Investigations:      Laboratory Testing:  Recent Results (from the past 24 hour(s))   APTT    Collection Time: 08/12/22  6:26 PM   Result Value Ref Range    PTT 46.4 (H) 20.5 - 30.5 sec   APTT    Collection Time: 08/13/22 12:49 AM   Result Value Ref Range    PTT 72.1 (H) 20.5 - 30.5 sec   Basic Metabolic Panel w/ Reflex to MG    Collection Time: 08/13/22  2:40 AM   Result Value Ref Range    Glucose 106 (H) 70 - 99 mg/dL    BUN 25 (H) 8 - 23 mg/dL    Creatinine 0.40 (L) 0.50 - 0.90 mg/dL    Calcium 8.7 8.6 - 10.4 mg/dL    Sodium 137 135 - 144 mmol/L    Potassium 2.9 (LL) 3.7 - 5.3 mmol/L    Chloride 95 (L) 98 - 107 mmol/L    CO2 31 20 - 31 mmol/L    Anion Gap 11 9 - 17 mmol/L    GFR Non-African American >60 >60 mL/min    GFR African American >60 >60 mL/min    GFR Comment         CBC with Auto Differential    Collection Time: 08/13/22  2:40 AM   Result Value Ref Range    WBC 5.8 3.5 - 11.3 k/uL    RBC 3.59 (L) 3.95 - 5.11 m/uL    Hemoglobin 11.5 (L) 11.9 - 15.1 g/dL    Hematocrit 34.8 (L) 36.3 - 47.1 %    MCV 96.9 82.6 - 102.9 fL    MCH 32.0 25.2 - 33.5 pg    MCHC 33.0 28.4 - 34.8 g/dL    RDW 14.6 (H) 11.8 - 14.4 %    Platelets 642 593 - 908 k/uL    MPV 9.7 8.1 - 13.5 fL    NRBC Automated 0.0 0.0 per 100 WBC    Seg Neutrophils 74 (H) 36 - 65 %    Lymphocytes 15 (L) 24 - 43 %    Monocytes 10 3 - 12 %    Eosinophils % 0 (L) 1 - 4 %    Basophils 0 0 - 2 %    Immature Granulocytes 1 (H) 0 %    Segs Absolute 4.31 1.50 - 8.10 k/uL Absolute Lymph # 0.90 (L) 1.10 - 3.70 k/uL    Absolute Mono # 0.57 0.10 - 1.20 k/uL    Absolute Eos # <0.03 0.00 - 0.44 k/uL    Basophils Absolute <0.03 0.00 - 0.20 k/uL    Absolute Immature Granulocyte 0.04 0.00 - 0.30 k/uL    RBC Morphology ANISOCYTOSIS PRESENT    Hepatic Function Panel    Collection Time: 08/13/22  2:40 AM   Result Value Ref Range    Albumin 3.5 3.5 - 5.2 g/dL    Alkaline Phosphatase 53 35 - 104 U/L     (H) 5 - 33 U/L    AST 84 (H) <32 U/L    Total Bilirubin 0.23 (L) 0.3 - 1.2 mg/dL    Bilirubin, Direct 0.10 <0.31 mg/dL    Bilirubin, Indirect 0.13 0.00 - 1.00 mg/dL    Total Protein 5.8 (L) 6.4 - 8.3 g/dL    Albumin/Globulin Ratio 1.5 1.0 - 2.5   Magnesium    Collection Time: 08/13/22  2:40 AM   Result Value Ref Range    Magnesium 1.4 (L) 1.6 - 2.6 mg/dL   Potassium    Collection Time: 08/13/22  4:02 AM   Result Value Ref Range    Potassium 3.2 (L) 3.7 - 5.3 mmol/L   Troponin    Collection Time: 08/13/22  4:02 AM   Result Value Ref Range    Troponin, High Sensitivity 1,231 (HH) 0 - 14 ng/L   APTT    Collection Time: 08/13/22  9:44 AM   Result Value Ref Range    PTT 64.9 (H) 20.5 - 30.5 sec     Recent Labs     08/13/22  0240   WBC 5.8   RBC 3.59*   HGB 11.5*   HCT 34.8*   MCV 96.9   MCH 32.0   MCHC 33.0   RDW 14.6*      MPV 9.7     Recent Labs     08/13/22  0240 08/13/22  0402     --    K 2.9* 3.2*   CL 95*  --    CO2 31  --    BUN 25*  --    CREATININE 0.40*  --    GLUCOSE 106*  --    CALCIUM 8.7  --    PROT 5.8*  --    LABALBU 3.5  --    BILITOT 0.23*  --    ALKPHOS 53  --    AST 84*  --    *  --      Hemoglobin A1C   Date Value Ref Range Status   06/28/2018 5.0 4.0 - 6.0 % Final       Assessment :      Primary Problem  Acute respiratory failure [facility])    Active Hospital Problems    Diagnosis Date Noted    KRISTIN (internuclear ophthalmoplegia), 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] 08/12/2022     Priority: Medium    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] 08/12/2022     Priority: Medium    Acute respiratory failure ([facility] [J96.00] 08/11/2022     Priority: Medium    NSTEMI (non-ST elevated myocardial infarction) ([facility] [I21.4] 08/11/2022     Priority: Medium    Chronic respiratory failure with hypoxia [facility]) [J96.11] 08/11/2022     Priority: Medium    Centrilobular emphysema ([facility] [J43.2] 08/11/2022     Priority: Medium    Malignant neoplasm of upper lobe of left lung [facility]) [C34.12] 02/12/2019       Patient is a 74 y.o. Non- / non  female presented with visual disturbance, for 2 weeks became worse since 3 days ago. Hx of lung cancer s/p resection and chemo, hx of brain mets, CT showing subdural leak    Plan:     Diplopia in the setting of brain mets  -Subdural leak on CT head - Posterior fossa pseudomeningocele   -MRI brain w wo contrast Multiple enhancing metastatic foci throughout the brain in the majority of these are similar in size compared to prior examination  -MRI orbit w wo contrast at noon   - On steroids for vasogenic edema  -LP after holding heparin for 4 hours - this could be delayed if patient is requiring an urgent intervention.   -Will resume heparin shortly after LP   -Ophthalmology, neurosurgery Following  -Cardiology  planning for cath likely on Monday. Follow-up further recommendations after discussing the case with attending  The plan was discussed with the patient, patient's family and the medical staff. Consultations:   IP CONSULT TO NEUROSURGERY  IP CONSULT TO SPIRITUAL SERVICES  IP CONSULT TO CARDIOLOGY  IP CONSULT TO HEM/ONC  IP CONSULT TO NEUROLOGY  IP CONSULT TO OPHTHALMOLOGY    Patient is admitted as inpatient status because of co-morbidities listed above, severity of signs and symptoms as outlined, requirement for current medical therapies and most importantly because of direct risk to patient if care not provided in a hospital setting.     Ronak Reynolds MD  8/13/2022  11:21 AM    Copy sent to Dr. Hellen Vazquez MD

## 2022-08-13 NOTE — PLAN OF CARE
Problem: Discharge Planning  Goal: Discharge to home or other facility with appropriate resources  8/13/2022 1855 by Juan C Ventura RN  Outcome: Progressing  8/13/2022 0622 by Og Brooks RN  Outcome: Progressing     Problem: Safety - Adult  Goal: Free from fall injury  8/13/2022 1855 by Juan C Ventura RN  Outcome: Progressing  Flowsheets (Taken 8/13/2022 0800)  Free From Fall Injury: Instruct family/caregiver on patient safety  8/13/2022 0622 by Og Brooks RN  Outcome: Progressing     Problem: ABCDS Injury Assessment  Goal: Absence of physical injury  8/13/2022 1855 by Juan C Ventura RN  Outcome: Progressing  Flowsheets (Taken 8/13/2022 0800)  Absence of Physical Injury: Implement safety measures based on patient assessment  8/13/2022 0622 by Og Brooks RN  Outcome: Progressing     Problem: Skin/Tissue Integrity  Goal: Absence of new skin breakdown  8/13/2022 1855 by Juan C Ventura RN  Outcome: Progressing  8/13/2022 0622 by Og Brooks RN  Outcome: Progressing     Problem: Respiratory - Adult  Goal: Achieves optimal ventilation and oxygenation  8/13/2022 1855 by Juan C Ventura RN  Outcome: Progressing  8/13/2022 0622 by Og Brooks RN  Outcome: Progressing

## 2022-08-13 NOTE — CONSULTS
Today's Date: 8/12/2022  Patient Name: Tommy Linares  Date of admission: 8/11/2022  1:25 AM  Patient's age: 74 y.o., 1955  Admission Dx: Acute respiratory failure ([facility] [J96.00]  NSTEMI (non-ST elevated myocardial infarction) ([facility] [I21.4]    Reason for Consult: management recommendations  Requesting Physician: Shary Mcburney, MD    CHIEF COMPLAINT:  Shortness of breathe, Diplopia    History Obtained From:  patient    Interim history  Patient feeling better  Repeated MRI was done and there was no change from previous 1  Still have diplopia    HISTORY OF PRESENT ILLNESS:      The patient is a 74 y.o.   female who is admitted to the hospital for shortness of breath and double vision, patient has history of lung adenocarcinoma status post lobectomy with evidence of recurrent disease in the brain back in March 2022 status post radiation craniotomy and currently on palliative chemoimmunotherapy recently LFTs up required holding immunotherapy, patient for the last 2 weeks started having double vision and worsening shortness of breath and she was admitted to emergency room CT of the brain did show fluid leak/subdural leak and was evaluated by neurosurgery  On admission to emergency room found to have high troponin and non-ST elevation MI      Oncology history    Diagnosis:   Left upper lobe invasive lung adenocarcinoma, Status post lobectomy 9/28/18, Pathologic stage: pT1c, pN2, stage IIIa R1 with positive margin,    Will start chemotherapy followed By RT  Carbo taxol cycle 1 on 11/14/18  Radiation Therapy completed on 3/29/19  Unfortunately on 3/26/2022 she presented with seizure activity and her CT scan MRI showed multiple supra and infratentorial intraparenchymal lesion consistent with metastatic disease in brain  Her CT chest abdomen pelvis on 3/27/2022 showed 2.1 cm right adrenal nodule suspicious for metastasis  On 3/29/2022 she underwent right-sided craniotomy with resection of intraaxial brain tumor and completed SRS/gamma knife to 6 intracranial lesion on 4/25/2022  Next-generation sequencing is negative for PD-L1, negative for EGFR, ALK, RET, ROS1, BRAF, HER2 mutations  Started on [employer]    Past Medical History:   has a past medical history of Anxiety, Benign polyp of large intestine, Cancer ([facility] COPD (chronic obstructive pulmonary disease) ([facility] Hx of blood clots, Hyperlipidemia, Hypertension, Liver hemangioma, Lung nodule, Snores, Uterine fibroid, and Wears glasses. Past Surgical History:   has a past surgical history that includes Hysterectomy (2010); Abdominal hernia repair (2012); Colonoscopy; Lung biopsy; Breast biopsy (Left, 1983); Lung removal, partial (Left, 09/28/2018); pr rmvl lung other than pneumonectomy 1 lobe lobect (Left, 09/28/2018); bronchoscopy (10/01/2018); pr [facility] incl fluor gdnce dx w/cell washg spx (N/A, 10/29/2018); other surgical history (Right, 11/05/2018); Hysterectomy, total abdominal; craniotomy (N/A, 03/29/2022); and IR PORT PLACEMENT > 5 YEARS (04/13/2022). Medications:    Reviewed in Epic     Allergies:  Codeine    Social History:   reports that she quit smoking about 22 years ago. Her smoking use included cigarettes. She has a 45.00 pack-year smoking history. She has never used smokeless tobacco. She reports current alcohol use. She reports that she does not use drugs. Family History: family history includes Cancer in her mother and sister. REVIEW OF SYSTEMS:    Constitutional: No fever or chills.  No night sweats, no weight loss   Eyes: No eye discharge, double vision, or eye pain   HEENT: negative for sore mouth, sore throat, hoarseness and voice change   Respiratory: Positive for shortness of breath cough no  hemoptysis, chest pain   Cardiovascular: negative for chest pain, positive for dyspnea, no palpitations, positive for orthopnea and PND   Gastrointestinal: negative for nausea, vomiting, diarrhea, constipation, abdominal pain, 4.2 3.8   CO2 29 30   BUN 16 23   CREATININE 0.41* 0.51   LABGLOM >60 >60   GLUCOSE 119* 127*       PT/INR:   Recent Labs     08/10/22  1840   PROTIME 12.7   INR 1.0         IMAGING DATA:  MRI BRAIN W WO CONTRAST   Final Result   Multiple enhancing metastatic foci throughout the brain in the majority of   these are similar in size compared to prior examination. There is an   enhancing focus abutting the dura in the left temporal lobe posteriorly that   is slightly smaller compared to prior. There is an enhancing focus in the   medial left temporal lobe posteriorly that is smaller compared to prior. A   punctate focus in the inferior right frontal lobe is less pronounced. XR CHEST PORTABLE   Final Result   No significant interval change. Chronic findings at the left perihilar lung. Minimal blunting at the costophrenic angles, likely atelectasis. 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] 08/12/2022     Priority: Medium    Acute respiratory failure ([address] [J96.00] 08/11/2022     Priority: Medium    NSTEMI (non-ST elevated myocardial infarction) ([address] [I21.4] 08/11/2022     Priority: Medium    Chronic respiratory failure with hypoxia ([address] [J96.11] 08/11/2022     Priority: Medium    Centrilobular emphysema ([address] [J43.2] 08/11/2022     Priority: Medium    Malignant neoplasm of upper lobe of left lung ([address] [C34.12] 02/12/2019         IMPRESSION:   Adenocarcinoma of the left lung status post lobectomy  Evidence of cancer recurrence in the brain/brain metastasis  S/p craniotomy on March 29, 2022  Recent complaint of double vision  Worsening shortness of breath  Non-ST elevation acute MI  Immunotherapy induced high LFTs and pneumonitis?   On chemoimmunotherapy    RECOMMENDATIONS:  I reviewed the laboratory data, imaging studies, discussed the diagnosis and possible treatment    Patient with new onset of double vision but looks like related to brain metastasis however the repeated MRI of the brain did not show difference from before neither diplopia can be explained;discussed with the neurosurgeon about possible cranial nerve involvement which indicate leptomeningeal disease which consider very poor prognosis at the top of her guarded prognosis> need CSF cytology and orbital MRI was done with ophthalmology consult  Cardiology evaluation for non-ST elevation MI, immunotherapy can be associated with cardiac and pulmonary toxicity> will change to steroid to dexamethasone 6 mg IV every 6 hours  Cardiology considering interventional  even patient had recurrent lung cancer with brain metastasis however systemic disease looks under control on palliative concurrent chemo immunotherapy> however prognosis can be changed given CNS disease otherwise to resume chemotherapy and possible immunotherapy after discharge    Discussed with patient and Nurse. Thank you for asking us to see this patient. [facility]                            This note is created with the assistance of a speech recognition program.  While intending to generate a document that actually reflects the content of the visit, the document can still have some errors including those of syntax and sound a like substitutions which may escape proof reading. It such instances, actual meaning can be extrapolated by contextual diversion.      Hematologist/Medical Oncologist

## 2022-08-13 NOTE — PROGRESS NOTES
Today's Date: 8/13/2022  Patient Name: Mukesh Zapata  Date of admission: 8/11/2022  1:25 AM  Patient's age: 74 y.o., 1955  Admission Dx: Acute respiratory failure ([facility] [address] [J96.00]  NSTEMI (non-ST elevated myocardial infarction) ([facility] [address] [I21.4]    Reason for Consult: management recommendations  Requesting Physician: Sherrill Holder MD    CHIEF COMPLAINT:  Shortness of breathe, Diplopia    History Obtained From:  patient    Interim history  Patient feeling better  Patient underwent lumbar puncture today and MRI. CSF studies are still pending but the glucose was fairly low and protein is high and these are very concerning features. Continues to complain of double vision. Back pain is under control. Status post lumbar puncture with concerning features. MRI of the orbit was also done and we will follow-up on the results. HISTORY OF PRESENT ILLNESS:      The patient is a 74 y.o.   female who is admitted to the hospital for shortness of breath and double vision, patient has history of lung adenocarcinoma status post lobectomy with evidence of recurrent disease in the brain back in March 2022 status post radiation craniotomy and currently on palliative chemoimmunotherapy recently LFTs up required holding immunotherapy, patient for the last 2 weeks started having double vision and worsening shortness of breath and she was admitted to emergency room CT of the brain did show fluid leak/subdural leak and was evaluated by neurosurgery  On admission to emergency room found to have high troponin and non-ST elevation MI      Oncology history    Diagnosis:   Left upper lobe invasive lung adenocarcinoma, Status post lobectomy 9/28/18, Pathologic stage: pT1c, pN2, stage IIIa R1 with positive margin,    Will start chemotherapy followed By RT  Carbo taxol cycle 1 on 11/14/18  Radiation Therapy completed on 3/29/19  Unfortunately on 3/26/2022 she presented with seizure activity and her CT scan MRI showed multiple supra and infratentorial intraparenchymal lesion consistent with metastatic disease in brain  Her CT chest abdomen pelvis on 3/27/2022 showed 2.1 cm right adrenal nodule suspicious for metastasis  On 3/29/2022 she underwent right-sided craniotomy with resection of intraaxial brain tumor and completed SRS/gamma knife to 6 intracranial lesion on 4/25/2022  Next-generation sequencing is negative for PD-L1, negative for EGFR, ALK, RET, ROS1, BRAF, HER2 mutations  [facility]    Past Medical History:   has a past medical history of Anxiety, Benign polyp of large intestine, Cancer ([facility] [address] COPD (chronic obstructive pulmonary disease) ([facility] [address] Hx of blood clots, Hyperlipidemia, Hypertension, Liver hemangioma, Lung nodule, Snores, Uterine fibroid, and Wears glasses. Past Surgical History:   has a past surgical history that includes Hysterectomy (2010); Abdominal hernia repair (2012); Colonoscopy; Lung biopsy; Breast biopsy (Left, 1983); Lung removal, partial (Left, 09/28/2018); pr rmvl lung other than pneumonectomy 1 lobe lobect (Left, 09/28/2018); bronchoscopy (10/01/2018); pr [facility] incl fluor gdnce dx w/cell washg spx (N/A, 10/29/2018); other surgical history (Right, 11/05/2018); Hysterectomy, total abdominal; craniotomy (N/A, 03/29/2022); and IR PORT PLACEMENT > 5 YEARS (04/13/2022). Medications:    Reviewed in Epic     Allergies:  Codeine    Social History:   reports that she quit smoking about 22 years ago. Her smoking use included cigarettes. She has a 45.00 pack-year smoking history. She has never used smokeless tobacco. She reports current alcohol use. She reports that she does not use drugs. Family History: family history includes Cancer in her mother and sister. REVIEW OF SYSTEMS:    Constitutional: No fever or chills.  No night sweats, no weight loss   Eyes: No eye discharge, double vision, or eye pain   HEENT: negative for sore mouth, sore throat, hoarseness and voice change Respiratory: Positive for shortness of breath cough no  hemoptysis, chest pain   Cardiovascular: negative for chest pain, positive for dyspnea, no palpitations, positive for orthopnea and PND   Gastrointestinal: negative for nausea, vomiting, diarrhea, constipation, abdominal pain, Dysphagia, hematemesis and hematochezia   Genitourinary: negative for frequency, dysuria, nocturia, urinary incontinence, and hematuria   Integument: negative for rash, skin lesions, bruises.    Hematologic/Lymphatic: negative for easy bruising, bleeding, lymphadenopathy, or petechiae   Endocrine: negative for heat or cold intolerance,weight changes, change in bowel habits and hair loss   Musculoskeletal: negative for myalgias, arthralgias, pain, joint swelling,and bone pain   Neurological: Positive for headache and double vision      PHYSICAL EXAM:      /76   Pulse 88   Temp 98.1 °F (36.7 °C) (Oral)   Resp 27   Ht 4' 11\" (1.499 m)   Wt 158 lb 1.1 oz (71.7 kg)   SpO2 94%   BMI 31.93 kg/m²    Temp (24hrs), Av.9 °F (36.6 °C), Min:97.5 °F (36.4 °C), Max:98.2 °F (36.8 °C)    General appearance -moderate respiratory distress ,look ill  Mental status - alert and cooperative   Eyes - pupils equal and reactive, extraocular eye movements intact   Ears - bilateral TM's and external ear canals normal   Mouth - mucous membranes moist, pharynx normal without lesions   Neck - supple, no significant adenopathy   Lymphatics - no palpable lymphadenopathy, no hepatosplenomegaly   Chest - clear to auscultation, no wheezes, rales or rhonchi, symmetric air entry   Heart - normal rate, regular rhythm, normal S1, S2, no murmurs  Abdomen - soft, nontender, nondistended, no masses or organomegaly   Neurological - alert, oriented, normal speech, no focal findings or movement disorder noted   Musculoskeletal - no joint tenderness, deformity or swelling   Extremities - peripheral pulses normal, no pedal edema, no clubbing or cyanosis   Skin - normal coloration and turgor, no rashes, no suspicious skin lesions noted ,    DATA:    Labs:   CBC:   Recent Labs     08/12/22  0407 08/13/22  0240   WBC 6.7 5.8   HGB 11.2* 11.5*   HCT 33.6* 34.8*    259     BMP:   Recent Labs     08/12/22  0407 08/13/22  0240 08/13/22  0402 08/13/22  1247   * 137  --   --    K 3.8 2.9* 3.2* 5.1   CO2 30 31  --   --    BUN 23 25*  --   --    CREATININE 0.51 0.40*  --   --    LABGLOM >60 >60  --   --    GLUCOSE 127* 106*  --   --      PT/INR:   No results for input(s): PROTIME, INR in the last 72 hours. IMAGING DATA:  MRI ORBITS FACE NECK W WO CONTRAST   Final Result   No mass or abnormal enhancement in the orbits. MRI BRAIN W WO CONTRAST   Final Result   Multiple enhancing metastatic foci throughout the brain in the majority of   these are similar in size compared to prior examination. There is an   enhancing focus abutting the dura in the left temporal lobe posteriorly that   is slightly smaller compared to prior. There is an enhancing focus in the   medial left temporal lobe posteriorly that is smaller compared to prior. A   punctate focus in the inferior right frontal lobe is less pronounced. XR CHEST PORTABLE   Final Result   No significant interval change. Chronic findings at the left perihilar lung. Minimal blunting at the costophrenic angles, likely atelectasis.              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] 08/12/2022     Priority: Medium    Acute respiratory failure ([address] [J96.00] 08/11/2022     Priority: Medium    NSTEMI (non-ST elevated myocardial infarction) ([address] [I21.4] 08/11/2022     Priority: Medium    Chronic respiratory failure with hypoxia ([address] [J96.11] 08/11/2022     Priority: Medium    Centrilobular emphysema ([address] [J43.2] 08/11/2022 Priority: Medium    Malignant neoplasm of upper lobe of left lung [facility]) [C34.12] 02/12/2019         IMPRESSION:   Adenocarcinoma of the left lung status post lobectomy  Evidence of cancer recurrence in the brain/brain metastasis  S/p craniotomy on March 29, 2022  Recent complaint of double vision  Worsening shortness of breath  Non-ST elevation acute MI  Immunotherapy induced high LFTs and pneumonitis? On chemoimmunotherapy    RECOMMENDATIONS:  I reviewed the laboratory data, imaging studies, discussed the diagnosis and possible treatment    Patient with new onset of double vision but looks like related to brain metastasis,  Status post lumbar puncture with concerning features. MRI of the orbit was also done and we will follow-up on the results. Await cytology   Continue steroids for now. Further decisions about treatment will depend on the results of the lumbar puncture. Leptomeningeal cancer usually carries poor prognosis with very limited options    Discussed with patient and Nurse. Thank you for asking us to see this patient. This note is created with the assistance of a speech recognition program.  While intending to generate a document that actually reflects the content of the visit, the document can still have some errors including those of syntax and sound a like substitutions which may escape proof reading. It such instances, actual meaning can be extrapolated by contextual diversion.      Hematologist/Medical Oncologist

## 2022-08-13 NOTE — CONSULTS
[facility] ([place], [place]) [address] [address]                                  CONSULTATION    PATIENT NAME: Micheline Espinal                :        1955  MED REC NO:   [id number]                             ROOM:       8305  ACCOUNT NO:   [de-identified]                           ADMIT DATE: 2022  PROVIDER:     Familia Awan    CONSULT DATE:  2022    OPHTHALMOLOGY CONSULTATION    The patient was seen at the bedside at [facility] in  [place]. The patient was seen for double vision. She was admitted for  difficulty breathing and had noted some double vision. Evaluation by  Neurology suggested that there was ocular motility disturbance. She had  not suffered any previous episodes of similar problem and attributed it  to starting a new immunotherapy drug for her metastatic lung cancer. She has had lung cancer that had spread to her brain as well as other  locations and had had some treatments for and surgery for the brain  problem; however, there are still some areas of intracranial involvement. She denies eye pain or vision changes, other than the double vision. She is resting comfortably in bed   with the patch over one eye to eliminate her double vision symptoms. She states she alternates the patch from side to side to maintain strong  vision in each eye. She has no other ocular complaints. EYE EXAMINATION:  Visual acuity without correction with near  card of J1+ in each eye. Pupils are round without afferent pupillary  defect. Extraocular muscles on the right eye show limited abduction and  adduction in the right eye. The left eye appears full. Minimal ptosis  is noted on the right eye more than the left eye. Ocular adnexa is  otherwise within normal limits. Confrontation to visual fields are  full. Intraocular pressure was soft by tactile sensation.   Anterior  segment was white and quiet bilaterally. Fundus evaluation was  performed with 20-diopter lens without dilation and showed clear lens  and vitreous cavities. Optic nerve shows normal cupping of 0.4 in each  eye with good color and no edema. The macular area appears flat. IMPRESSION:  Double vision with limitation of abduction and adduction to  the right eye. The left eye appears normal.  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 MRI examination of the  orbit and orbital apex is recommended. Review of the MRI of the brain will  be performed as well.         Caroline Roman    D: 08/12/2022 22:51:53       T: 08/13/2022 1:06:28     [id number]  Job#: [id number]     Doc#: [id number]    CC:

## 2022-08-14 PROBLEM — H51.0 CONJUGATE GAZE PALSY: Status: ACTIVE | Noted: 2022-01-01

## 2022-08-14 LAB
ABSOLUTE EOS #: 0 K/UL (ref 0–0.44)
ABSOLUTE IMMATURE GRANULOCYTE: 0.08 K/UL (ref 0–0.3)
ABSOLUTE LYMPH #: 0.55 K/UL (ref 1.1–3.7)
ABSOLUTE MONO #: 0.39 K/UL (ref 0.1–1.2)
ALBUMIN SERPL-MCNC: 4.3 G/DL (ref 3.5–5.2)
ALBUMIN/GLOBULIN RATIO: 1.9 (ref 1–2.5)
ALP BLD-CCNC: 61 U/L (ref 35–104)
ALT SERPL-CCNC: 169 U/L (ref 5–33)
ANION GAP SERPL CALCULATED.3IONS-SCNC: 9 MMOL/L (ref 9–17)
AST SERPL-CCNC: 77 U/L
BASOPHILS # BLD: 0 % (ref 0–2)
BASOPHILS ABSOLUTE: 0 K/UL (ref 0–0.2)
BILIRUB SERPL-MCNC: 0.38 MG/DL (ref 0.3–1.2)
BILIRUBIN DIRECT: 0.14 MG/DL
BILIRUBIN, INDIRECT: 0.24 MG/DL (ref 0–1)
BUN BLDV-MCNC: 25 MG/DL (ref 8–23)
CALCIUM SERPL-MCNC: 9.7 MG/DL (ref 8.6–10.4)
CHLORIDE BLD-SCNC: 95 MMOL/L (ref 98–107)
CO2: 34 MMOL/L (ref 20–31)
CREAT SERPL-MCNC: 0.43 MG/DL (ref 0.5–0.9)
EOSINOPHILS RELATIVE PERCENT: 0 % (ref 1–4)
GFR AFRICAN AMERICAN: >60 ML/MIN
GFR NON-AFRICAN AMERICAN: >60 ML/MIN
GFR SERPL CREATININE-BSD FRML MDRD: ABNORMAL ML/MIN/{1.73_M2}
GLUCOSE BLD-MCNC: 130 MG/DL (ref 70–99)
HCT VFR BLD CALC: 38.7 % (ref 36.3–47.1)
HEMOGLOBIN: 13.1 G/DL (ref 11.9–15.1)
IMMATURE GRANULOCYTES: 1 %
LYMPHOCYTES # BLD: 7 % (ref 24–43)
MAGNESIUM: 1.9 MG/DL (ref 1.6–2.6)
MCH RBC QN AUTO: 32.8 PG (ref 25.2–33.5)
MCHC RBC AUTO-ENTMCNC: 33.9 G/DL (ref 28.4–34.8)
MCV RBC AUTO: 97 FL (ref 82.6–102.9)
MONOCYTES # BLD: 5 % (ref 3–12)
MORPHOLOGY: ABNORMAL
NRBC AUTOMATED: 0 PER 100 WBC
PARTIAL THROMBOPLASTIN TIME: 47.2 SEC (ref 20.5–30.5)
PARTIAL THROMBOPLASTIN TIME: 49.1 SEC (ref 20.5–30.5)
PARTIAL THROMBOPLASTIN TIME: 74.3 SEC (ref 20.5–30.5)
PDW BLD-RTO: 14.5 % (ref 11.8–14.4)
PLATELET # BLD: 323 K/UL (ref 138–453)
PMV BLD AUTO: 9.6 FL (ref 8.1–13.5)
POTASSIUM SERPL-SCNC: 4.4 MMOL/L (ref 3.7–5.3)
RBC # BLD: 3.99 M/UL (ref 3.95–5.11)
SEG NEUTROPHILS: 87 % (ref 36–65)
SEGMENTED NEUTROPHILS ABSOLUTE COUNT: 6.78 K/UL (ref 1.5–8.1)
SODIUM BLD-SCNC: 138 MMOL/L (ref 135–144)
TOTAL PROTEIN: 6.6 G/DL (ref 6.4–8.3)
WBC # BLD: 7.8 K/UL (ref 3.5–11.3)

## 2022-08-14 PROCEDURE — 6370000000 HC RX 637 (ALT 250 FOR IP): Performed by: STUDENT IN AN ORGANIZED HEALTH CARE EDUCATION/TRAINING PROGRAM

## 2022-08-14 PROCEDURE — 99232 SBSQ HOSP IP/OBS MODERATE 35: CPT | Performed by: INTERNAL MEDICINE

## 2022-08-14 PROCEDURE — 2700000000 HC OXYGEN THERAPY PER DAY

## 2022-08-14 PROCEDURE — 2580000003 HC RX 258: Performed by: STUDENT IN AN ORGANIZED HEALTH CARE EDUCATION/TRAINING PROGRAM

## 2022-08-14 PROCEDURE — 99291 CRITICAL CARE FIRST HOUR: CPT | Performed by: INTERNAL MEDICINE

## 2022-08-14 PROCEDURE — 94761 N-INVAS EAR/PLS OXIMETRY MLT: CPT

## 2022-08-14 PROCEDURE — 99232 SBSQ HOSP IP/OBS MODERATE 35: CPT | Performed by: PSYCHIATRY & NEUROLOGY

## 2022-08-14 PROCEDURE — 6360000002 HC RX W HCPCS: Performed by: INTERNAL MEDICINE

## 2022-08-14 PROCEDURE — 94660 CPAP INITIATION&MGMT: CPT

## 2022-08-14 PROCEDURE — 85730 THROMBOPLASTIN TIME PARTIAL: CPT

## 2022-08-14 PROCEDURE — 6360000002 HC RX W HCPCS: Performed by: STUDENT IN AN ORGANIZED HEALTH CARE EDUCATION/TRAINING PROGRAM

## 2022-08-14 PROCEDURE — 80048 BASIC METABOLIC PNL TOTAL CA: CPT

## 2022-08-14 PROCEDURE — 85025 COMPLETE CBC W/AUTO DIFF WBC: CPT

## 2022-08-14 PROCEDURE — 83735 ASSAY OF MAGNESIUM: CPT

## 2022-08-14 PROCEDURE — 2000000000 HC ICU R&B

## 2022-08-14 PROCEDURE — 94640 AIRWAY INHALATION TREATMENT: CPT

## 2022-08-14 PROCEDURE — 36415 COLL VENOUS BLD VENIPUNCTURE: CPT

## 2022-08-14 PROCEDURE — 80076 HEPATIC FUNCTION PANEL: CPT

## 2022-08-14 RX ADMIN — METOPROLOL TARTRATE 6.25 MG: 25 TABLET ORAL at 08:21

## 2022-08-14 RX ADMIN — IPRATROPIUM BROMIDE AND ALBUTEROL SULFATE 1 AMPULE: .5; 3 SOLUTION RESPIRATORY (INHALATION) at 07:20

## 2022-08-14 RX ADMIN — METOPROLOL TARTRATE 6.25 MG: 25 TABLET ORAL at 20:38

## 2022-08-14 RX ADMIN — IPRATROPIUM BROMIDE AND ALBUTEROL SULFATE 1 AMPULE: .5; 3 SOLUTION RESPIRATORY (INHALATION) at 15:47

## 2022-08-14 RX ADMIN — FUROSEMIDE 20 MG: 10 INJECTION, SOLUTION INTRAMUSCULAR; INTRAVENOUS at 20:37

## 2022-08-14 RX ADMIN — FUROSEMIDE 20 MG: 10 INJECTION, SOLUTION INTRAMUSCULAR; INTRAVENOUS at 08:21

## 2022-08-14 RX ADMIN — DEXAMETHASONE SODIUM PHOSPHATE 4 MG: 4 INJECTION, SOLUTION INTRAMUSCULAR; INTRAVENOUS at 13:43

## 2022-08-14 RX ADMIN — IPRATROPIUM BROMIDE AND ALBUTEROL SULFATE 1 AMPULE: .5; 3 SOLUTION RESPIRATORY (INHALATION) at 20:45

## 2022-08-14 RX ADMIN — HEPARIN SODIUM 2000 UNITS: 1000 INJECTION INTRAVENOUS; SUBCUTANEOUS at 10:32

## 2022-08-14 RX ADMIN — DEXAMETHASONE SODIUM PHOSPHATE 4 MG: 4 INJECTION, SOLUTION INTRAMUSCULAR; INTRAVENOUS at 20:38

## 2022-08-14 RX ADMIN — HEPARIN SODIUM 16 UNITS/KG/HR: 10000 INJECTION, SOLUTION INTRAVENOUS at 06:38

## 2022-08-14 RX ADMIN — LEVETIRACETAM 500 MG: 500 TABLET, FILM COATED ORAL at 08:21

## 2022-08-14 RX ADMIN — ASPIRIN 81 MG: 81 TABLET, CHEWABLE ORAL at 08:21

## 2022-08-14 RX ADMIN — LEVETIRACETAM 500 MG: 500 TABLET, FILM COATED ORAL at 20:38

## 2022-08-14 RX ADMIN — IPRATROPIUM BROMIDE AND ALBUTEROL SULFATE 1 AMPULE: .5; 3 SOLUTION RESPIRATORY (INHALATION) at 12:15

## 2022-08-14 RX ADMIN — SODIUM CHLORIDE, PRESERVATIVE FREE 10 ML: 5 INJECTION INTRAVENOUS at 08:24

## 2022-08-14 RX ADMIN — FOLIC ACID 1 MG: 1 TABLET ORAL at 08:21

## 2022-08-14 RX ADMIN — Medication 3 MG: at 23:02

## 2022-08-14 RX ADMIN — DEXAMETHASONE SODIUM PHOSPHATE 4 MG: 4 INJECTION, SOLUTION INTRAMUSCULAR; INTRAVENOUS at 08:23

## 2022-08-14 RX ADMIN — PANTOPRAZOLE SODIUM 40 MG: 40 TABLET, DELAYED RELEASE ORAL at 08:23

## 2022-08-14 ASSESSMENT — ENCOUNTER SYMPTOMS
CONSTIPATION: 0
PHOTOPHOBIA: 0
VOMITING: 0
ABDOMINAL PAIN: 0
NAUSEA: 0

## 2022-08-14 ASSESSMENT — PAIN SCALES - GENERAL: PAINLEVEL_OUTOF10: 2

## 2022-08-14 NOTE — PROGRESS NOTES
Today's Date: 8/14/2022  Patient Name: Zaheer Doll  Date of admission: 8/11/2022  1:25 AM  Patient's age: 74 y.o., 1955  Admission Dx: Acute respiratory failure ([facility] [address] [J96.00]  NSTEMI (non-ST elevated myocardial infarction) ([facility] [address] [I21.4]    Reason for Consult: management recommendations  Requesting Physician: Abdulaziz Garcia MD    CHIEF COMPLAINT:  Shortness of breathe, Diplopia    History Obtained From:  patient    Interim history  Patient feeling better, double vision is improving. Patient underwent lumbar puncture today and MRI. CSF studies are still pending but the glucose was fairly low and protein is high and these are very concerning features. MRI orbit is negative   HISTORY OF PRESENT ILLNESS:      The patient is a 74 y.o.   female who is admitted to the hospital for shortness of breath and double vision, patient has history of lung adenocarcinoma status post lobectomy with evidence of recurrent disease in the brain back in March 2022 status post radiation craniotomy and currently on palliative chemoimmunotherapy recently LFTs up required holding immunotherapy, patient for the last 2 weeks started having double vision and worsening shortness of breath and she was admitted to emergency room CT of the brain did show fluid leak/subdural leak and was evaluated by neurosurgery  On admission to emergency room found to have high troponin and non-ST elevation MI      Oncology history    Diagnosis:   Left upper lobe invasive lung adenocarcinoma, Status post lobectomy 9/28/18, Pathologic stage: pT1c, pN2, stage IIIa R1 with positive margin,    Will start chemotherapy followed By RT  Carbo taxol cycle 1 on 11/14/18  Radiation Therapy completed on 3/29/19  Unfortunately on 3/26/2022 she presented with seizure activity and her CT scan MRI showed multiple supra and infratentorial intraparenchymal lesion consistent with metastatic disease in brain  Her CT chest abdomen pelvis on 3/27/2022 showed 2.1 cm right adrenal nodule suspicious for metastasis  On 3/29/2022 she underwent right-sided craniotomy with resection of intraaxial brain tumor and completed SRS/gamma knife to 6 intracranial lesion on 4/25/2022  Next-generation sequencing is negative for PD-L1, negative for EGFR, ALK, RET, ROS1, BRAF, HER2 mutations  Started on [employer]    Past Medical History:   has a past medical history of Anxiety, Benign polyp of large intestine, Cancer ([facility] [address] COPD (chronic obstructive pulmonary disease) ([facility] [address] Hx of blood clots, Hyperlipidemia, Hypertension, Liver hemangioma, Lung nodule, Snores, Uterine fibroid, and Wears glasses. Past Surgical History:   has a past surgical history that includes Hysterectomy (2010); Abdominal hernia repair (2012); Colonoscopy; Lung biopsy; Breast biopsy (Left, 1983); Lung removal, partial (Left, 09/28/2018); pr rmvl lung other than pneumonectomy 1 lobe lobect (Left, 09/28/2018); bronchoscopy (10/01/2018); pr [facility] incl fluor gdnce dx w/cell washg spx (N/A, 10/29/2018); other surgical history (Right, 11/05/2018); Hysterectomy, total abdominal; craniotomy (N/A, 03/29/2022); and IR PORT PLACEMENT > 5 YEARS (04/13/2022). Medications:    Reviewed in Epic     Allergies:  Codeine    Social History:   reports that she quit smoking about 22 years ago. Her smoking use included cigarettes. She has a 45.00 pack-year smoking history. She has never used smokeless tobacco. She reports current alcohol use. She reports that she does not use drugs. Family History: family history includes Cancer in her mother and sister. REVIEW OF SYSTEMS:    Constitutional: No fever or chills.  No night sweats, no weight loss   Eyes: No eye discharge, double vision, or eye pain   HEENT: negative for sore mouth, sore throat, hoarseness and voice change   Respiratory: Positive for shortness of breath cough no  hemoptysis, chest pain   Cardiovascular: negative for chest pain, positive for dyspnea, no palpitations, positive for orthopnea and PND   Gastrointestinal: negative for nausea, vomiting, diarrhea, constipation, abdominal pain, Dysphagia, hematemesis and hematochezia   Genitourinary: negative for frequency, dysuria, nocturia, urinary incontinence, and hematuria   Integument: negative for rash, skin lesions, bruises.    Hematologic/Lymphatic: negative for easy bruising, bleeding, lymphadenopathy, or petechiae   Endocrine: negative for heat or cold intolerance,weight changes, change in bowel habits and hair loss   Musculoskeletal: negative for myalgias, arthralgias, pain, joint swelling,and bone pain   Neurological: Positive for headache and double vision      PHYSICAL EXAM:      /70   Pulse 89   Temp 98.4 °F (36.9 °C) (Oral)   Resp 25   Ht 4' 11\" (1.499 m)   Wt 155 lb 13.8 oz (70.7 kg)   SpO2 95%   BMI 31.48 kg/m²    Temp (24hrs), Av.3 °F (36.8 °C), Min:97.9 °F (36.6 °C), Max:98.7 °F (37.1 °C)    General appearance -moderate respiratory distress ,look ill  Mental status - alert and cooperative   Eyes - pupils equal and reactive, extraocular eye movements intact   Ears - bilateral TM's and external ear canals normal   Mouth - mucous membranes moist, pharynx normal without lesions   Neck - supple, no significant adenopathy   Lymphatics - no palpable lymphadenopathy, no hepatosplenomegaly   Chest - clear to auscultation, no wheezes, rales or rhonchi, symmetric air entry   Heart - normal rate, regular rhythm, normal S1, S2, no murmurs  Abdomen - soft, nontender, nondistended, no masses or organomegaly   Neurological - alert, oriented, normal speech, no focal findings or movement disorder noted   Musculoskeletal - no joint tenderness, deformity or swelling   Extremities - peripheral pulses normal, no pedal edema, no clubbing or cyanosis   Skin - normal coloration and turgor, no rashes, no suspicious skin lesions noted ,    DATA:    Labs:   CBC:   Recent Labs     22  0240 22  3750 WBC 5.8 7.8   HGB 11.5* 13.1   HCT 34.8* 38.7    323     BMP:   Recent Labs     08/13/22  0240 08/13/22  0402 08/13/22  1247 08/14/22  0318     --   --  138   K 2.9*   < > 5.1 4.4   CO2 31  --   --  34*   BUN 25*  --   --  25*   CREATININE 0.40*  --   --  0.43*   LABGLOM >60  --   --  >60   GLUCOSE 106*  --   --  130*    < > = values in this interval not displayed. PT/INR:   No results for input(s): PROTIME, INR in the last 72 hours. IMAGING DATA:  MRI ORBITS FACE NECK W WO CONTRAST   Final Result   No mass or abnormal enhancement in the orbits. MRI BRAIN W WO CONTRAST   Final Result   Multiple enhancing metastatic foci throughout the brain in the majority of   these are similar in size compared to prior examination. There is an   enhancing focus abutting the dura in the left temporal lobe posteriorly that   is slightly smaller compared to prior. There is an enhancing focus in the   medial left temporal lobe posteriorly that is smaller compared to prior. A   punctate focus in the inferior right frontal lobe is less pronounced. XR CHEST PORTABLE   Final Result   No significant interval change. Chronic findings at the left perihilar lung. Minimal blunting at the costophrenic angles, likely atelectasis.              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] 08/12/2022     Priority: Medium    Acute respiratory failure ([facility] [address] [J96.00] 08/11/2022     Priority: Medium    NSTEMI (non-ST elevated myocardial infarction) ([facility] [address] [I21.4] 08/11/2022     Priority: Medium    Chronic respiratory failure with hypoxia [facility]) [J96.11] 08/11/2022     Priority: Medium    Centrilobular emphysema ([facility] [address] [J43.2] 08/11/2022     Priority: Medium    Malignant neoplasm of upper lobe of left lung [facility]) [C34.12] 02/12/2019         IMPRESSION:   Adenocarcinoma of the left lung status post lobectomy  Evidence of cancer recurrence in the brain/brain metastasis  S/p craniotomy on March 29, 2022  Recent complaint of double vision  Worsening shortness of breath  Non-ST elevation acute MI  Immunotherapy induced high LFTs and pneumonitis? On chemoimmunotherapy    RECOMMENDATIONS:  I reviewed the laboratory data, imaging studies, discussed the diagnosis and possible treatment    Patient with new onset of double vision . Concerning CSF studies, MRI is negative. Await cytology, continue steroids for now . Cardiac workup underway , on heparin , no bleeding     Further decisions about treatment will depend on the results of the lumbar puncture. Leptomeningeal cancer usually carries poor prognosis with very limited options. But if negative. Then plan to continue immunotherapy as outpatient     Discussed with patient and Nurse. Thank you for asking us to see this patient. This note is created with the assistance of a speech recognition program.  While intending to generate a document that actually reflects the content of the visit, the document can still have some errors including those of syntax and sound a like substitutions which may escape proof reading. It such instances, actual meaning can be extrapolated by contextual diversion.      Hematologist/Medical Oncologist

## 2022-08-14 NOTE — PROGRESS NOTES
[facility]rology   [address] [facility]    Progress Note             Date:   8/14/2022  Patient name:  Dorsie Goldberg  Date of admission:  8/11/2022  1:25 AM  MRN:   [id number]  Account:  [de-identified]  YOB: 1955  PCP:    Clary Knowles MD  Room:   [address] [place] [address]  Code Status:    Full Code    Chief Complaint:     Diplopia     Interval hx: The patient was seen and examined at bedside. Is vitally stable, alert and oriented x 3. No acute events overnight. Patient anxious about results of CSF. Clear fluid, cell count 4, no neutrophils, glucose 82, protein 64, gram stain negative so far. Improved diplopia. Decreased ADduction right eye. On decadron 4mg Q6, Keppra 500mg bid klonopin 0.5mg prn      Brief History of Present Illness: The patient is a 74 y.o. female with significant past medical history of history of invasive lung adenocarcinoma s/p lobectomy 9/28/2018, metastasis to brain s/p suboccipital craniotomy 3/29/2022, liver hemangioma, HTN, HDL, COPD, who was brought in from [facility] complaining of visual disturbance, worsening shortness of breath, symptoms have been ongoing for 2 weeks however worsened the past 3 days. Patient states that it worsen when she looks to the left. Usually has horizontal double vision. Gets better when she covers one eye. Patient was placed on BiPAP was given albuterol which improved his dyspnea  Labs significant for elevated tropes 699> 708 for which cardiology consulted recommends trending troponin and get an echo. EKG shows lateral and inferior leads T wave inversions but seems to be little worse, and was started on heparin drip   CTA was negative for PE, possible right upper lobe infiltrates, CT head subcutaneous fluid leak possible subdural leak.   Neurosurgery will be consulted for this, and oncology recommended MRI brain, transfer was to transfer patient to [facility] V's and starting heparin for an NSTEMI  possible cardiac cath once medically stable. Hypoxic, pneumonitis likely due to chemotherapy        Past Medical History:     Past Medical History:   Diagnosis Date    Anxiety     Benign polyp of large intestine     Cancer (HCC)     LT UPPER LOBE    COPD (chronic obstructive pulmonary disease) (HCC)     Hx of blood clots     DVT LT LEG    Hyperlipidemia     Hypertension     Liver hemangioma     Lung nodule     BARAK  Nodule    Snores     not tested for apnea    Uterine fibroid 09/2010    Wears glasses         Past Surgical History:     Past Surgical History:   Procedure Laterality Date    ABDOMINAL HERNIA REPAIR  2012    BREAST BIOPSY Left 1983    BRONCHOSCOPY  10/01/2018    BRONCHOSCOPY performed by Massimo Valdez MD at [address] [facility] N/A 03/29/2022    SUBOCCIPITAL CRANIOTOMY FOR TUMOR  (REGULAR TABLE, [employer] NAVIGATION, [place] HEADHOLDER) performed by Evelyn Liu DO at [address] [facility] ([facility])  2010    HYSTERECTOMY, TOTAL ABDOMINAL (CERVIX REMOVED)      IR PORT PLACEMENT EQUAL OR GREATER THAN 5 YEARS  04/13/2022    IR PORT PLACEMENT EQUAL OR GREATER THAN 5 YEARS 4/13/2022 Gosia Crespo MD [facility] SPECIAL PROCEDURES    LUNG BIOPSY      LUNG REMOVAL, PARTIAL Left 09/28/2018    Robotic assisted left lobectomy, upper with lymph node biopsy    OTHER SURGICAL HISTORY Right 11/05/2018    IR PORT PLACEMENT EQUAL OR GREATER THAN 5 YEARS    [place] [address] INCL FLUOR GDNCE DX W/CELL WASHG SPX N/A 10/29/2018    BRONCHOSCOPY performed by Efra Vaughan MD at [address] LOBE LOBECT Left 09/28/2018    XI ROBOTIC ASSISTED LEFT UPPER LOBECTOMY MULTIPLE LYMPH NODE BIOPSY, INTERCOSTAL  NERVE BLOCK ABLATION W/EXPAREL performed by Linnea Murrell MD at [place] [address]        Medications Prior to Admission:     Prior to Admission medications    Medication Sig Start Date End Date Taking?  Authorizing Provider   predniSONE (DELTASONE) 20 MG tablet Take 60 mg for 7 days, then 40 mg for 7 days and then 20 mg daily 8/4/22   Michelle Carreon MD   levETIRAcetam (KEPPRA) 500 MG tablet Take 1 tablet by mouth in the morning and 1 tablet before bedtime. 7/28/22 8/27/22  Archana Mcclelland MD   clonazePAM (KLONOPIN) 0.5 MG tablet Take 1 tablet by mouth 2 times daily as needed for Anxiety for up to 30 days. 6/28/22 8/11/22  Milind Laughlin MD   pantoprazole (PROTONIX) 20 MG tablet Take 1 tablet by mouth daily  Patient taking differently: Take 20 mg by mouth in the morning. Take 60mg for 7 days, then 40mg (starting 8/11/2022) for 7 days and then 20mg daily. 6/7/22   Harshad Booth MD   Multiple Vitamin (MULTIVITAMIN PO) Take by mouth daily    Historical Provider, MD   folic acid (FOLVITE) 1 MG tablet Take 1 tablet by mouth daily 5/11/22   Michelle Carreon MD   ondansetron (ZOFRAN) 4 MG tablet Take 1 tablet by mouth 3 times daily as needed for Nausea or Vomiting 5/11/22   Michelle Carreon MD   lovastatin (MEVACOR) 10 MG tablet Take 1 tablet by mouth nightly 4/21/22   Milind Laughlin MD   lisinopril-hydroCHLOROthiazide (PRINZIDE;ZESTORETIC) 10-12.5 MG per tablet Take 1 tablet by mouth daily 4/21/22   Milind Laughlin MD   albuterol sulfate  (90 Base) MCG/ACT inhaler inhale 2 puffs by mouth four times a day 9/28/21   Michelle Carreon MD        Allergies:     Codeine    Social History:     Tobacco:    reports that she quit smoking about 22 years ago. Her smoking use included cigarettes. She has a 45.00 pack-year smoking history. She has never used smokeless tobacco.  Alcohol:      reports current alcohol use. Drug Use:  reports no history of drug use. Family History:     Family History   Problem Relation Age of Onset    Cancer Mother         LUNG    Cancer Sister         LUNG       Review of Systems:     Review of Systems   Constitutional:  Negative for chills, fatigue and fever. Eyes:  Positive for visual disturbance. Negative for photophobia.    Cardiovascular: Negative for chest pain and leg swelling. Gastrointestinal:  Negative for abdominal pain, constipation, nausea and vomiting. Endocrine: Negative for polyuria. Genitourinary:  Negative for dysuria. Musculoskeletal:  Negative for gait problem. Neurological:  Negative for tremors, facial asymmetry, weakness, light-headedness and headaches. Psychiatric/Behavioral:  The patient is not nervous/anxious. Physical Exam:   /65   Pulse 79   Temp 98 °F (36.7 °C) (Oral)   Resp 18   Ht 4' 11\" (1.499 m)   Wt 155 lb 13.8 oz (70.7 kg)   SpO2 98%   BMI 31.48 kg/m²   Temp (24hrs), Av.1 °F (36.7 °C), Min:97.9 °F (36.6 °C), Max:98.7 °F (37.1 °C)    No results for input(s): POCGLU in the last 72 hours.     Intake/Output Summary (Last 24 hours) at 2022 0949  Last data filed at 2022 9354  Gross per 24 hour   Intake 1068.35 ml   Output 700 ml   Net 368.35 ml           Neurologic Exam     GENERAL  Appears comfortable and in no distress   HEENT  NC/ AT   HEART  S1 and S2 heard; palpation of pulses: radial pulse   NECK  Supple and no bruits heard   MENTAL STATUS:  Alert, oriented, intact memory, no confusion, normal speech, normal language, no hallucination or delusion   CRANIAL NERVES: II     -      Visual fields intact to confrontation  III,IV,VI -  PERR, worse ADduction right eye   V     -     Normal facial sensation  VII    -     Normal facial symmetry  VIII   -     Intact hearing  IX,X -     Symmetrical palate  XI    -     Symmetrical shoulder shrug  XII   -     Midline tongue, no atrophy   MOTOR FUNCTION: RUE: Significant for good strength of grade 5/5 in proximal and distal muscle groups  LUE: Significant for good strength of grade 5/5 in proximal and distal muscle groups  RLE: Significant for good strength of grade 5/5 in proximal and distal muscle groups  LLE: Significant for good strength of grade 5/5 in proximal and distal muscle groups      Normal bulk, normal tone and no involuntary movements, no tremor   SENSORY FUNCTION:  Normal touch, normal pinprick, normal vibration, normal proprioception   CEREBELLAR FUNCTION:  Intact fine motor control over upper limbs and lower limbs      REFLEX FUNCTION:  Symmetric in upper and lower extremities, no Babinski sign   STATION and GAIT  Normal gait and tandem station, normal tip toes and heel walking       Investigations:      Laboratory Testing:  Recent Results (from the past 24 hour(s))   Magnesium    Collection Time: 08/13/22 12:47 PM   Result Value Ref Range    Magnesium 2.1 1.6 - 2.6 mg/dL   Potassium    Collection Time: 08/13/22 12:47 PM   Result Value Ref Range    Potassium 5.1 3.7 - 5.3 mmol/L   Protein, CSF    Collection Time: 08/13/22  5:00 PM   Result Value Ref Range    Protein, CSF 64.9 (H) 15.0 - 45.0 mg/dL   Glucose, CSF    Collection Time: 08/13/22  5:00 PM   Result Value Ref Range    Glucose, CSF 82 (H) 40 - 70 mg/dL   Cell Count with Differential, CSF    Collection Time: 08/13/22  5:00 PM   Result Value Ref Range    Volume, CSF 1CC     Appearance, CSF CLEAR     Xanthochromia ABSENT     WBC, CSF 4 <5 /mm3    RBC, CSF 0 0 /mm3    Tube Number, CSF 3    Culture, CSF    Collection Time: 08/13/22  5:00 PM    Specimen: CSF   Result Value Ref Range    Specimen Description . CSF     Direct Exam NO NEUTROPHILS SEEN     Direct Exam NO ORGANISMS SEEN     Direct Exam       Gram stain made from cytocentrifuged specimen. Organisms and cells will be concentrated.     Culture PENDING    APTT    Collection Time: 08/13/22  6:54 PM   Result Value Ref Range    PTT 20.7 20.5 - 30.5 sec   Basic Metabolic Panel w/ Reflex to MG    Collection Time: 08/14/22  3:18 AM   Result Value Ref Range    Glucose 130 (H) 70 - 99 mg/dL    BUN 25 (H) 8 - 23 mg/dL    Creatinine 0.43 (L) 0.50 - 0.90 mg/dL    Calcium 9.7 8.6 - 10.4 mg/dL    Sodium 138 135 - 144 mmol/L    Potassium 4.4 3.7 - 5.3 mmol/L    Chloride 95 (L) 98 - 107 mmol/L    CO2 34 (H) 20 - 31 mmol/L    Anion Gap 9 9 - 17 mmol/L    GFR Non-African American >60 >60 mL/min    GFR African American >60 >60 mL/min    GFR Comment         CBC with Auto Differential    Collection Time: 08/14/22  3:18 AM   Result Value Ref Range    WBC 7.8 3.5 - 11.3 k/uL    RBC 3.99 3.95 - 5.11 m/uL    Hemoglobin 13.1 11.9 - 15.1 g/dL    Hematocrit 38.7 36.3 - 47.1 %    MCV 97.0 82.6 - 102.9 fL    MCH 32.8 25.2 - 33.5 pg    MCHC 33.9 28.4 - 34.8 g/dL    RDW 14.5 (H) 11.8 - 14.4 %    Platelets 213 561 - 609 k/uL    MPV 9.6 8.1 - 13.5 fL    NRBC Automated 0.0 0.0 per 100 WBC    Immature Granulocytes 1 (H) 0 %    Seg Neutrophils 87 (H) 36 - 65 %    Lymphocytes 7 (L) 24 - 43 %    Monocytes 5 3 - 12 %    Eosinophils % 0 (L) 1 - 4 %    Basophils 0 0 - 2 %    Absolute Immature Granulocyte 0.08 0.00 - 0.30 k/uL    Segs Absolute 6.78 1.50 - 8.10 k/uL    Absolute Lymph # 0.55 (L) 1.10 - 3.70 k/uL    Absolute Mono # 0.39 0.10 - 1.20 k/uL    Absolute Eos # 0.00 0.00 - 0.44 k/uL    Basophils Absolute 0.00 0.00 - 0.20 k/uL    Morphology ANISOCYTOSIS PRESENT    Hepatic Function Panel    Collection Time: 08/14/22  3:18 AM   Result Value Ref Range    Albumin 4.3 3.5 - 5.2 g/dL    Alkaline Phosphatase 61 35 - 104 U/L     (H) 5 - 33 U/L    AST 77 (H) <32 U/L    Total Bilirubin 0.38 0.3 - 1.2 mg/dL    Bilirubin, Direct 0.14 <0.31 mg/dL    Bilirubin, Indirect 0.24 0.00 - 1.00 mg/dL    Total Protein 6.6 6.4 - 8.3 g/dL    Albumin/Globulin Ratio 1.9 1.0 - 2.5   APTT    Collection Time: 08/14/22  3:18 AM   Result Value Ref Range    PTT 49.1 (H) 20.5 - 30.5 sec   Magnesium    Collection Time: 08/14/22  3:18 AM   Result Value Ref Range    Magnesium 1.9 1.6 - 2.6 mg/dL     Recent Labs     08/14/22 0318   WBC 7.8   RBC 3.99   HGB 13.1   HCT 38.7   MCV 97.0   MCH 32.8   MCHC 33.9   RDW 14.5*      MPV 9.6       Recent Labs     08/14/22 0318      K 4.4   CL 95*   CO2 34*   BUN 25*   CREATININE 0.43*   GLUCOSE 130*   CALCIUM 9.7   PROT 6.6   LABALBU 4.3   BILITOT 0. 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] 08/12/2022     Priority: Medium    Acute respiratory failure ([facility] [J96.00] 08/11/2022     Priority: Medium    NSTEMI (non-ST elevated myocardial infarction) ([facility] [I21.4] 08/11/2022     Priority: Medium    Chronic respiratory failure with hypoxia [facility]) [J96.11] 08/11/2022     Priority: Medium    Centrilobular emphysema ([facility] [J43.2] 08/11/2022     Priority: Medium    Malignant neoplasm of upper lobe of left lung [facility]) [C34.12] 02/12/2019       Patient is a 74 y.o. Non- / non  female presented with visual disturbance, for 2 weeks became worse since 3 days ago. Hx of lung cancer s/p resection and chemo, hx of brain mets, CT showing subdural leak    Plan:     Diplopia in the setting of brain mets  -Subdural leak on CT head - Posterior fossa pseudomeningocele   -MRI brain w wo contrast Multiple enhancing metastatic foci throughout the brain in the majority of these are similar in size compared to prior examination  -MRI orbit w wo contrast at noon   -On steroids for vasogenic edema  -LP 8/13/2022:  Clear fluid, cell count 4, no neutrophils, glucose 82, protein 64, gram stain negative so far  -Will resume heparin shortly after LP   -Ophthalmology, neurosurgery Following  -Cardiology  planning for cath likely on Monday. Follow-up further recommendations after discussing the case with attending  The plan was discussed with the patient, patient's family and the medical staff.    Consultations:   IP CONSULT TO NEUROSURGERY  IP CONSULT TO SPIRITUAL SERVICES  IP CONSULT TO CARDIOLOGY  IP CONSULT TO HEM/ONC  IP CONSULT TO NEUROLOGY  IP CONSULT TO OPHTHALMOLOGY    Patient is admitted as inpatient status because of co-morbidities listed above, severity of signs and symptoms as outlined, requirement for current medical therapies and most importantly because of direct risk to patient if care not provided in a hospital setting.     Clotilde Jane MD  8/14/2022  9:49 AM    Copy sent to Dr. Wallace Mcneil MD

## 2022-08-14 NOTE — PLAN OF CARE
Problem: Safety - Adult  Goal: Free from fall injury  8/14/2022 0010 by Elsy Gaines RN  Outcome: Progressing  8/13/2022 1855 by Olga Lafleur RN  Outcome: Progressing  Flowsheets (Taken 8/13/2022 0800)  Free From Fall Injury: Instruct family/caregiver on patient safety     Problem: Skin/Tissue Integrity  Goal: Absence of new skin breakdown  Description: 1. Monitor for areas of redness and/or skin breakdown  2. Assess vascular access sites hourly  3. Every 4-6 hours minimum:  Change oxygen saturation probe site  4. Every 4-6 hours:  If on nasal continuous positive airway pressure, respiratory therapy assess nares and determine need for appliance change or resting period.   8/14/2022 0010 by Elsy Gaines RN  Outcome: Progressing  8/13/2022 1855 by Olga Lafleur RN  Outcome: Progressing     Problem: Respiratory - Adult  Goal: Achieves optimal ventilation and oxygenation  8/14/2022 0010 by Elsy Gaines RN  Outcome: Progressing  8/13/2022 1949 by Boy Duran RCP  Outcome: Progressing  Flowsheets (Taken 8/13/2022 1949)  Achieves optimal ventilation and oxygenation:   Assess for changes in respiratory status   Respiratory therapy support as indicated   Assess for changes in mentation and behavior   Oxygen supplementation based on oxygen saturation or arterial blood gases   Encourage broncho-pulmonary hygiene including cough, deep breathe, incentive spirometry   Assess and instruct to report shortness of breath or any respiratory difficulty  8/13/2022 1855 by Olga Lafleur RN  Outcome: Progressing

## 2022-08-14 NOTE — PROGRESS NOTES
[facility]   Progress Note                    Date:   8/14/2022  Patient name:  Cm Cortes  Date of admission:  8/11/2022  1:25 AM  MRN:   [id number]  YOB: 1955  PCP:    Coy Wright MD    Reason for Admission:  Acute respiratory failure ([facility] [J96.00]  NSTEMI (non-ST elevated myocardial infarction) ([facility] [I21.4]    Subjective:   Patient seen and examined at the bedside:  No acute events overnight  Patient comfortable patient yesterday  Patient is currently on oxygen through the nasal cannula  Using BiPAP overnight and during naps  Patient is currently in normal sinus rhythm and heart rate is in 76  Vitals are otherwise    Medications:   Scheduled Meds:   furosemide  20 mg IntraVENous BID    dexamethasone  4 mg IntraVENous Q6H    sodium chloride flush  5-40 mL IntraVENous 2 times per day    heparin (porcine)  4,000 Units IntraVENous Once    ipratropium-albuterol  1 ampule Inhalation Q4H WA    aspirin  81 mg Oral Daily    metoprolol tartrate  6.25 mg Oral BID    levETIRAcetam  500 mg Oral BID    folic acid  1 mg Oral Daily    pantoprazole  40 mg Oral QAM AC     Continuous Infusions:   sodium chloride Stopped (08/13/22 1836)    heparin (PORCINE) Infusion 16 Units/kg/hr (08/13/22 2034)     CBC:   Recent Labs     08/12/22 0407 08/13/22 0240 08/14/22 0318   WBC 6.7 5.8 7.8   HGB 11.2* 11.5* 13.1    259 323       BMP:    Recent Labs     08/12/22  0407 08/13/22  0240 08/13/22  0402 08/13/22  1247 08/14/22 0318   * 137  --   --  138   K 3.8 2.9* 3.2* 5.1 4.4   CL 95* 95*  --   --  95*   CO2 30 31  --   --  34*   BUN 23 25*  --   --  25*   CREATININE 0.51 0.40*  --   --  0.43*   GLUCOSE 127* 106*  --   --  130*       Hepatic:   Recent Labs     08/12/22  0407 08/13/22  0240 08/14/22 0318   AST 94* 84* 77*   * 163* 169*   BILITOT 0.21* 0.23* 0.38   ALKPHOS 55 53 61       Troponin: No results for input(s): TROPONINI in the last 72 hours.     No results for input(s): TROPONINT in the last 72 hours. BNP:   Recent Labs     22  0407   PROBNP 2,898*        No results for input(s): BNP in the last 72 hours. Lipids: No results for input(s): CHOL, HDL in the last 72 hours. Invalid input(s): LDLCALCU  INR:   No results for input(s): INR in the last 72 hours. Objective:   Vitals: /69   Pulse 81   Temp 98 °F (36.7 °C) (Oral)   Resp 26   Ht 4' 11\" (1.499 m)   Wt 155 lb 13.8 oz (70.7 kg)   SpO2 96%   BMI 31.48 kg/m²    Last Recorded Weight:  [unfilled]    Constitutional and General Appearance:   alert, cooperative, no distress and appears stated age  Respiratory:  No for increased work of breathing. On auscultation: clear to auscultation bilaterally  Cardiovascular: The apical impulse is not displaced  Heart tones are crisp and normal. Regular S1 and S2. No murmurs. Abdomen:   No masses or tenderness  Bowel sounds present  Extremities:   No Cyanosis or Clubbing   Lower extremity edema:    Skin: Warm and dry  Neurological:  Alert and oriented. Moves all extremities well    Diagnostic Studies:     [id number]  Normal sinus rhythm diffuse T wave inversion      ECHO: 2022  Normal LV size and wall thickness. No obvious wall motion abnormality seen. Moderate global hypokinesis  Moderately reduced LV systolic function with LVEF 40-45 %. Normal RV size and function. RV systolic pressure 24 mmHg  LA and RA appears normal in size. No obvious significant structural valvular abnormality noted. No significant valvular stenosis or regurgitation noted. Normal aortic root dimension. No significant pericardial effusion noted.   IVC normal size difficult to assess respiratory variation     Stress Test:   Not obtained  Cardiac Angiography:  Not obtained    Patient Active Problem List:     Status post lobectomy of lung     Non-small cell cancer of left lung (HCC)     Malignant neoplasm of upper lobe of left lung (HCC)     Essential hypertension Anxiety     Intraparenchymal hemorrhage of brain (HCC)     New onset seizure (HCC)     Mass of occipital region     Hyperglycemia     Hypokalemia     Vasogenic edema (HCC)     Brain metastases (HCC)     Adrenal mass (HCC) - right      Episode of loss of consciousness     Focal epilepsy ([facility]     Primary malignant neoplasm of lung with metastasis to brain [facility])     Acute respiratory failure (HCC)     NSTEMI (non-ST elevated myocardial infarction) ([facility]     Chronic respiratory failure with hypoxia (HCC)     Centrilobular emphysema (HCC)      Assessment / Acute Cardiac Problems:   Elevated troponin suspect type I NSTEMI, EKG shows lateral and inferior leads T wave inversions ,no chest pain, troponins are trending down. Acute onset systolic heart failure with EF 40-45% on echo from 08/11/2022 with global hypokinesis  Left upper lobe invasive lung adenocarcinoma, Status post lobectomy 9/28/18  Acute hypoxic respiratory failure likely due to pneumonitis as a result of chemoimmunotherapy  Brain mets s/p right-sided craniotomy with resection of intraaxial brain tumor 03/29/2022 on PDL-1 agent at baseline  Hypertension  Hyperlipidemia  Left leg DVT  Liver hemangioma  Elevated liver enzymes -suspected autoimmune hepatitis    Plan of Treatment/Recommendations:   Continue heparin drip   Continue aspirin 81 mg daily, metoprolol 6.25 mg twice daily  Patient is not on statins due to elevated LFTs  Replace Mg>2 /K>4   Continue Lasix 20 mg twice daily  Will repeat proBNP today  Further recommendations to follow. Rashmi Leon MD  Resident internal medicine, PGY [facility]. 6:25 AM 08/14/22'  Attending Physician Statement  I have discussed the care of Isamar Parrish, including pertinent history and exam findings,  with the cardiology fellow/resident. I have seen and examined the patient and the key elements of all parts of the encounter have been performed by me.  I have completed at least one if not all key elements of the E/M (history, physical exam, and MDM). I agree with the assessment, plan and orders as documented by the resident with additional recommendations as below:     Feeling better. No chest pain. Diuresing well. Post LP yesterday, now back on heparin drip. Will proceed with [facility] tomorrow. I discussed in detail the risks, benefits, and alternatives to the procedure including but not limited to risk of bleeding/hematoma requiring surgical intervention, contrast induced allergy and/or nephropathy, arrythmia, CVA, MI or death. The patient verbalized understanding and wishes to proceed.      Christine Velasco MD, [facility]

## 2022-08-14 NOTE — PROGRESS NOTES
Critical Care Team - Daily Progress Note      Date and time: 8/14/2022 9:57 AM  Patient's name:  Tommy Linares  Medical Record Number: [id number]  Patient's account/billing number: [de-identified]  Patient's YOB: 1955  Age: 74 y.o. Date of Admission: 8/11/2022  1:25 AM  Length of stay during current admission: 3      Primary Care Physician: Javier Krishnamurthy MD  ICU Attending Physician: Dr. Maikel Palacios Status: Full Code    Reason for ICU admission: No chief complaint on file. Subjective:     OVERNIGHT EVENTS:      used BIPAP overnight. On 3 L NC this am, breathing improved. 132/65, HR 77  96% on 3 L NC  Afebrile    Lbs: K 4.4, mag 1.9  Bun 25, creat 0.44  Troponin 1231  >>163>>169  AST 94>>84>>77    Hb 11.2>>11.5>>13.1  Plat 323    UOP 1400 ml in 2 4hours, on lasix 20 mg IV BID  MRI orbit showed no abnormality  CSF cx neg, wbc 4, glu 82, protein 64.9    Hem onc: continue 4 mg decadron q6 hours. await CSF cytology. prognosis can be changed given CNS disease. Cardiology: cardiac cath monday am  Neurology: LP done,await results. Orbital MRI done. NS: LP prior to cardiac cath after which she may need to be on dual antiplatelet agents. Would coordinate potentially for early Monday after heparin has been held. After LP she would be able to resume heparin and could proceed with cath even the same day. Opthal: MRI orbit, face, neck      Echo: Moderate global hypokinesis  Moderately reduced LV systolic function with LVEF 40-45 %.        AWAKE & FOLLOWING COMMANDS:  [x] No   [] Yes    CURRENT VENTILATION STATUS:     [] Ventilator  [] BIPAP  [x] Nasal Cannula [] Room Air      IF INTUBATED, ET TUBE MARKING AT LOWER LIP:       cms    SECRETIONS Amount:  [] Small [] Moderate  [] Large  [x] None  Color:     [] White [] Colored  [] Bloody    SEDATION:  RAAS Score:  [] Propofol gtt  [] Versed gtt  [] Ativan gtt   [x] No Sedation    PARALYZED:  [x] No    [] Yes    DIARRHEA:                [x] No [] Yes  (C. Difficile status: [] positive                                                                                                                       [] negative                                                                                                                     [] pending)    VASOPRESSORS:  [x] No    [] Yes    If yes -   [] Levophed       [] Dopamine     [] Vasopressin       [] Dobutamine  [] Phenylephrine         [] Epinephrine    CENTRAL LINES:     [x] No   [] Yes   (Date of Insertion:   )           If yes -     [] Right IJ     [] Left IJ [] Right Femoral [] Left Femoral                   [] Right Subclavian [] Left Subclavian       MANCIA'S CATHETER:   [] No   [] Yes  (Date of Insertion:   )     URINE OUTPUT:            [x] Good   [] Low              [] Anuric    REVIEW OF SYSTEMS:  Constitutional: Negative for Fever, chills  Eyes: Negative for visual changes, diplopia  ENT: Negative for mouth sores, sore throat. Cardiovascular: Negative for lightheadedness ,chest pain, palpitations   Respiratory:Negative for Shortness of breath,cough or wheezing. Gastrointestinal: Negative for nausea/vomiting, change in bowel habits, abdominal pain  Genitourinary:Negative for change in bladder habits, dysuria, hematuria.   Musculoskeletal: Negative for joint pain   Neurological: Negative for headache, change in muscle strength numbness/tingling      OBJECTIVE:     VITAL SIGNS:  /65   Pulse 79   Temp 98 °F (36.7 °C) (Oral)   Resp 18   Ht 4' 11\" (1.499 m)   Wt 155 lb 13.8 oz (70.7 kg)   SpO2 98%   BMI 31.48 kg/m²   Tmax over 24 hours:  Temp (24hrs), Av.1 °F (36.7 °C), Min:97.9 °F (36.6 °C), Max:98.7 °F (37.1 °C)      Patient Vitals for the past 8 hrs:   BP Temp Temp src Pulse Resp SpO2 Weight   22 0720 -- -- -- 79 18 98 % --   22 0600 132/65 -- -- 77 27 95 % --   22 0500 118/69 -- -- 81 26 96 % --   22 0400 134/69 98 °F (36.7 °C) Oral 77 16 95 % 155 lb 13.8 oz (70.7 kg)   08/14/22 0302 -- -- -- 75 23 94 % --   08/14/22 0300 119/72 -- -- 76 23 95 % --   08/14/22 0200 124/83 97.9 °F (36.6 °C) Axillary 73 27 94 % --           Intake/Output Summary (Last 24 hours) at 8/14/2022 0957  Last data filed at 8/14/2022 7119  Gross per 24 hour   Intake 1068.35 ml   Output 700 ml   Net 368.35 ml       Date 08/14/22 0000 - 08/14/22 2359   Shift 1481-3460 1197-2682 5605-3713 24 Hour Total   INTAKE   P.O.(mL/kg/hr) 360(0.6)   360   I. V.(mL/kg) 115.9(1.6)   115.9(1.6)   Shift Total(mL/kg) 475.9(6.7)   475.9(6.7)   OUTPUT   Urine(mL/kg/hr) 400(0.7)   400   Shift Total(mL/kg) 400(5.7)   400(5.7)   Weight (kg) 70.7 70.7 70.7 70.7       Wt Readings from Last 3 Encounters:   08/14/22 155 lb 13.8 oz (70.7 kg)   08/12/22 160 lb (72.6 kg)   08/10/22 161 lb (73 kg)     Body mass index is 31.48 kg/m². PHYSICAL EXAM:  Constitutional: on nasal cannula  HEENT: PERRLA, EOMI, sclera clear, anicteric  Respiratory: clear to auscultation, no wheezes or rales and unlabored breathing. Cardiovascular: regular rate and rhythm, normal S1, S2, no murmur noted and 2+ pulses throughout  Abdomen: soft, nontender, nondistended, no masses or organomegaly  NEUROLOGIC: Awake, alert, oriented to name, place and time. Cranial nerves II-XII are grossly intact. Motor is 5 out of 5 bilaterally. Sensory is intact. Extremities:  peripheral pulses normal, no pedal edema,.       Any additional physical findings:      MEDICATIONS:  Scheduled Meds:   furosemide  20 mg IntraVENous BID    dexamethasone  4 mg IntraVENous Q6H    sodium chloride flush  5-40 mL IntraVENous 2 times per day    heparin (porcine)  4,000 Units IntraVENous Once    ipratropium-albuterol  1 ampule Inhalation Q4H WA    aspirin  81 mg Oral Daily    metoprolol tartrate  6.25 mg Oral BID    levETIRAcetam  500 mg Oral BID    folic acid  1 mg Oral Daily    pantoprazole  40 mg Oral QAM AC     Continuous Infusions:   sodium chloride Stopped (08/13/22 Magnesium:   Lab Results   Component Value Date/Time    MG 1.9 08/14/2022 03:18 AM    MG 2.1 08/13/2022 12:47 PM    MG 1.4 08/13/2022 02:40 AM     Phosphorus: No results found for: PHOS  S. Calcium:  Recent Labs     08/14/22 0318   CALCIUM 9.7       S. Ionized Calcium:No results for input(s): IONCA in the last 72 hours. Urinalysis:   Lab Results   Component Value Date/Time    NITRU NEGATIVE 09/25/2018 12:24 PM    COLORU YELLOW 09/25/2018 12:24 PM    PHUR 7.5 09/25/2018 12:24 PM    SPECGRAV 1.009 09/25/2018 12:24 PM    LEUKOCYTESUR NEGATIVE 09/25/2018 12:24 PM    UROBILINOGEN Normal 09/25/2018 12:24 PM    BILIRUBINUR NEGATIVE 09/25/2018 12:24 PM    GLUCOSEU NEGATIVE 09/25/2018 12:24 PM    KETUA NEGATIVE 09/25/2018 12:24 PM       CARDIAC ENZYMES: No results for input(s): CKMB, CKMBINDEX, TROPONINI in the last 72 hours. Invalid input(s): CKTOTAL;3  BNP: No results for input(s): BNP in the last 72 hours. LFTS  Recent Labs     08/12/22  0407 08/13/22  0240 08/14/22 0318   ALKPHOS 55 53 61   * 163* 169*   AST 94* 84* 77*   BILITOT 0.21* 0.23* 0.38   BILIDIR 0.08 0.10 0.14   LABALBU 3.5 3.5 4.3         AMYLASE/LIPASE/AMMONIA  No results for input(s): AMYLASE, LIPASE, AMMONIA in the last 72 hours. Last 3 Blood Glucose:   Recent Labs     08/12/22  0407 08/13/22  0240 08/14/22 0318   GLUCOSE 127* 106* 130*        HgBA1c:    Lab Results   Component Value Date/Time    LABA1C 5.0 06/28/2018 07:47 AM         TSH:    Lab Results   Component Value Date/Time    TSH 1.75 08/03/2022 10:13 AM     ANEMIA STUDIES  No results for input(s): LABIRON, TIBC, FERRITIN, [id number], FOLATE, OCCULTBLD in the last 72 hours.       Cultures during this admission:     Blood cultures:                 [] None drawn      [] Negative             []  Positive (Details:  )  Urine Culture:                   [] None drawn      [] Negative             []  Positive (Details:  )  Sputum Culture:               [] None drawn [] Negative             []  Positive (Details:  )   Endotracheal aspirate:     [] None drawn       [] Negative             []  Positive (Details:  )        ASSESSMENT:     Principal Problem:    Acute respiratory failure (HCC)  Active Problems:    NSTEMI (non-ST elevated myocardial infarction) ([address]    Chronic respiratory failure with hypoxia (HCC)    Centrilobular emphysema (HCC)    KRISTIN (internuclear ophthalmoplegia), bilateral    Pseudomeningocele    Malignant neoplasm of upper lobe of left lung (HCC)  Resolved Problems:    * No resolved hospital problems. *        PLAN:     NSTEMI  -Continue heparin drip  -ASA, bb daily  -no statin due to transaminitis  -Cardiology on board  -echo showed systolic function reduced EF 40-45%, global hypokinesis  - plan for cath Monday am     Diplopia probably secondary to brain mets- MRI brain neg for any new lesion  -Follow Neuro recommendation. LP prior to cardiac cath and possible DAPT  -MRI orbit negative for acute abnormality    Transaminitis due to [facility] immunotherapy:  -monitor LFTs  -Decadron 4 mg q6 daily    Subcutaneous fluid collection on head CT:  -neurosurgery following  -LP ton 8/13/22 to check for leptomeningeal disease prior to cath  -Awaiting results for LP    Essential hypertension  -started on lopressor 6.25 BID     COPD  -Continue bronchodilators 4 times a day. DVT ppx: hep gtt  GI ppx: protonix      Jesica Levine MD, M.D. Department of Internal Medicine/ Critical care  [address] [place] [address])             8/14/2022, 9:57 AM      Attending Physician Statement  I have discussed the care of Mike Popvd, including pertinent history and exam findings with the resident. I have reviewed the key elements of all parts of the encounter with the resident. I have seen and examined the patient with the resident. I agree with the assessment and plan and status of the problem list as documented.     I seen the patient during around today, chart reviewed labs seen other events noted. Patient was seen by neurology spinal tap was done no infective etiology protein is mildly elevated WBC count is 4 only in spinal tap. Cytology from CSF is pending. Patient is back on heparin drip cardiac catheterization is planned by cardiology tomorrow. She is on Lasix 20 mg twice daily urine output is 1400 mL in last 24 hours. Feeling gradually better she is on 3 L nasal cannula does have some time intermittent tachypnea especially when she lie flat but able to lie flat for 2 hours yesterday after spinal tap so should be able to tolerate cardiac cath. Follow-up spinal fluid cytology. Continue diuretic and monitor and replace electrolytes. Will continue with BiPAP at night and she is tolerating it well will use BiPAP as needed during the daytime. Discussed with nursing staff, treatment and plan discussed. Discussed with respiratory therapist.    Total critical care time caring for this patient with life threatening, unstable organ failure, including direct patient contact, management of life support systems, review of data including imaging and labs, discussions with other team members and physicians at least 27  Min so far today, excluding procedures. Please note that this chart was generated using voice recognition Dragon dictation software. Although every effort was made to ensure the accuracy of this automated transcription, some errors in transcription may have occurred.      Gia Awad MD  8/14/2022 11:19 AM

## 2022-08-15 ENCOUNTER — APPOINTMENT (OUTPATIENT)
Dept: CT IMAGING | Age: 67
DRG: 280 | End: 2022-08-15
Attending: INTERNAL MEDICINE
Payer: MEDICARE

## 2022-08-15 ENCOUNTER — APPOINTMENT (OUTPATIENT)
Dept: CARDIAC CATH/INVASIVE PROCEDURES | Age: 67
DRG: 280 | End: 2022-08-15
Attending: INTERNAL MEDICINE
Payer: MEDICARE

## 2022-08-15 LAB
ABO/RH: NORMAL
ABSOLUTE EOS #: 0 K/UL (ref 0–0.44)
ABSOLUTE IMMATURE GRANULOCYTE: 0.07 K/UL (ref 0–0.3)
ABSOLUTE LYMPH #: 0.43 K/UL (ref 1.1–3.7)
ABSOLUTE MONO #: 0.36 K/UL (ref 0.1–1.2)
ALBUMIN SERPL-MCNC: 3.8 G/DL (ref 3.5–5.2)
ALBUMIN/GLOBULIN RATIO: 1.7 (ref 1–2.5)
ALLEN TEST: ABNORMAL
ALP BLD-CCNC: 57 U/L (ref 35–104)
ALT SERPL-CCNC: 153 U/L (ref 5–33)
ANION GAP SERPL CALCULATED.3IONS-SCNC: 10 MMOL/L (ref 9–17)
ANION GAP SERPL CALCULATED.3IONS-SCNC: 12 MMOL/L (ref 9–17)
ANION GAP: 10 MMOL/L (ref 7–16)
ANTIBODY SCREEN: NEGATIVE
ARM BAND NUMBER: NORMAL
AST SERPL-CCNC: 65 U/L
BASOPHILS # BLD: 0 % (ref 0–2)
BASOPHILS ABSOLUTE: 0 K/UL (ref 0–0.2)
BILIRUB SERPL-MCNC: 0.36 MG/DL (ref 0.3–1.2)
BILIRUBIN DIRECT: 0.14 MG/DL
BILIRUBIN, INDIRECT: 0.22 MG/DL (ref 0–1)
BUN BLDV-MCNC: 26 MG/DL (ref 8–23)
BUN BLDV-MCNC: 29 MG/DL (ref 8–23)
CALCIUM SERPL-MCNC: 8.7 MG/DL (ref 8.6–10.4)
CALCIUM SERPL-MCNC: 9.1 MG/DL (ref 8.6–10.4)
CASE NUMBER:: NORMAL
CHLORIDE BLD-SCNC: 89 MMOL/L (ref 98–107)
CHLORIDE BLD-SCNC: 91 MMOL/L (ref 98–107)
CO2: 32 MMOL/L (ref 20–31)
CO2: 33 MMOL/L (ref 20–31)
CREAT SERPL-MCNC: 0.45 MG/DL (ref 0.5–0.9)
CREAT SERPL-MCNC: 0.63 MG/DL (ref 0.5–0.9)
EOSINOPHILS RELATIVE PERCENT: 0 % (ref 1–4)
EXPIRATION DATE: NORMAL
GFR AFRICAN AMERICAN: >60 ML/MIN
GFR AFRICAN AMERICAN: >60 ML/MIN
GFR NON-AFRICAN AMERICAN: 51 ML/MIN
GFR NON-AFRICAN AMERICAN: >60 ML/MIN
GFR NON-AFRICAN AMERICAN: >60 ML/MIN
GFR SERPL CREATININE-BSD FRML MDRD: >60 ML/MIN
GFR SERPL CREATININE-BSD FRML MDRD: ABNORMAL ML/MIN/{1.73_M2}
GLUCOSE BLD-MCNC: 130 MG/DL (ref 70–99)
GLUCOSE BLD-MCNC: 163 MG/DL (ref 74–100)
GLUCOSE BLD-MCNC: 171 MG/DL (ref 70–99)
HCT VFR BLD CALC: 36.2 % (ref 36.3–47.1)
HCT VFR BLD CALC: 36.8 % (ref 36.3–47.1)
HEMOGLOBIN: 11.7 G/DL (ref 11.9–15.1)
HEMOGLOBIN: 11.9 G/DL (ref 11.9–15.1)
IMMATURE GRANULOCYTES: 1 %
LACTIC ACID, WHOLE BLOOD: 3 MMOL/L (ref 0.7–2.1)
LYMPHOCYTES # BLD: 6 % (ref 24–43)
MAGNESIUM: 1.5 MG/DL (ref 1.6–2.6)
MCH RBC QN AUTO: 31.8 PG (ref 25.2–33.5)
MCHC RBC AUTO-ENTMCNC: 32.9 G/DL (ref 28.4–34.8)
MCV RBC AUTO: 96.8 FL (ref 82.6–102.9)
MONOCYTES # BLD: 5 % (ref 3–12)
MORPHOLOGY: NORMAL
NRBC AUTOMATED: 0 PER 100 WBC
O2 DEVICE/FLOW/%: ABNORMAL
PARTIAL THROMBOPLASTIN TIME: 54.2 SEC (ref 20.5–30.5)
PARTIAL THROMBOPLASTIN TIME: 73.4 SEC (ref 20.5–30.5)
PDW BLD-RTO: 14.3 % (ref 11.8–14.4)
PLATELET # BLD: 283 K/UL (ref 138–453)
PMV BLD AUTO: 9.5 FL (ref 8.1–13.5)
POC BUN: 26 MG/DL (ref 8–26)
POC CHLORIDE: 96 MMOL/L (ref 98–107)
POC CREATININE: 1.07 MG/DL (ref 0.51–1.19)
POC HCO3: 31 MMOL/L (ref 21–28)
POC HEMATOCRIT: 38 % (ref 36–46)
POC HEMOGLOBIN: 12.9 G/DL (ref 12–16)
POC IONIZED CALCIUM: 1.07 MMOL/L (ref 1.15–1.33)
POC LACTIC ACID: 2.36 MMOL/L (ref 0.56–1.39)
POC O2 SATURATION: 96 % (ref 94–98)
POC PCO2: 42.3 MM HG (ref 35–48)
POC PH: 7.47 (ref 7.35–7.45)
POC PO2: 74.1 MM HG (ref 83–108)
POC POTASSIUM: 3.3 MMOL/L (ref 3.5–4.5)
POC SODIUM: 136 MMOL/L (ref 138–146)
POC TCO2: 31 MMOL/L (ref 22–30)
POSITIVE BASE EXCESS, ART: 7 (ref 0–3)
POTASSIUM SERPL-SCNC: 3.4 MMOL/L (ref 3.7–5.3)
POTASSIUM SERPL-SCNC: 4.1 MMOL/L (ref 3.7–5.3)
RBC # BLD: 3.74 M/UL (ref 3.95–5.11)
SAMPLE SITE: ABNORMAL
SEG NEUTROPHILS: 88 % (ref 36–65)
SEGMENTED NEUTROPHILS ABSOLUTE COUNT: 6.34 K/UL (ref 1.5–8.1)
SODIUM BLD-SCNC: 133 MMOL/L (ref 135–144)
SODIUM BLD-SCNC: 134 MMOL/L (ref 135–144)
SPECIMEN DESCRIPTION: NORMAL
SURGICAL PATHOLOGY REPORT: NORMAL
TOTAL PROTEIN: 6.1 G/DL (ref 6.4–8.3)
WBC # BLD: 7.2 K/UL (ref 3.5–11.3)

## 2022-08-15 PROCEDURE — 6370000000 HC RX 637 (ALT 250 FOR IP): Performed by: STUDENT IN AN ORGANIZED HEALTH CARE EDUCATION/TRAINING PROGRAM

## 2022-08-15 PROCEDURE — 6360000002 HC RX W HCPCS

## 2022-08-15 PROCEDURE — C1894 INTRO/SHEATH, NON-LASER: HCPCS

## 2022-08-15 PROCEDURE — 83605 ASSAY OF LACTIC ACID: CPT

## 2022-08-15 PROCEDURE — 80076 HEPATIC FUNCTION PANEL: CPT

## 2022-08-15 PROCEDURE — 93458 L HRT ARTERY/VENTRICLE ANGIO: CPT

## 2022-08-15 PROCEDURE — 86900 BLOOD TYPING SEROLOGIC ABO: CPT

## 2022-08-15 PROCEDURE — 2709999900 HC NON-CHARGEABLE SUPPLY

## 2022-08-15 PROCEDURE — 86901 BLOOD TYPING SEROLOGIC RH(D): CPT

## 2022-08-15 PROCEDURE — 82947 ASSAY GLUCOSE BLOOD QUANT: CPT

## 2022-08-15 PROCEDURE — 84520 ASSAY OF UREA NITROGEN: CPT

## 2022-08-15 PROCEDURE — 85730 THROMBOPLASTIN TIME PARTIAL: CPT

## 2022-08-15 PROCEDURE — 2580000003 HC RX 258: Performed by: STUDENT IN AN ORGANIZED HEALTH CARE EDUCATION/TRAINING PROGRAM

## 2022-08-15 PROCEDURE — 36620 INSERTION CATHETER ARTERY: CPT

## 2022-08-15 PROCEDURE — 85018 HEMOGLOBIN: CPT

## 2022-08-15 PROCEDURE — 6360000002 HC RX W HCPCS: Performed by: STUDENT IN AN ORGANIZED HEALTH CARE EDUCATION/TRAINING PROGRAM

## 2022-08-15 PROCEDURE — 2700000000 HC OXYGEN THERAPY PER DAY

## 2022-08-15 PROCEDURE — B2111ZZ FLUOROSCOPY OF MULTIPLE CORONARY ARTERIES USING LOW OSMOLAR CONTRAST: ICD-10-PCS | Performed by: INTERNAL MEDICINE

## 2022-08-15 PROCEDURE — C9113 INJ PANTOPRAZOLE SODIUM, VIA: HCPCS | Performed by: STUDENT IN AN ORGANIZED HEALTH CARE EDUCATION/TRAINING PROGRAM

## 2022-08-15 PROCEDURE — C1760 CLOSURE DEV, VASC: HCPCS

## 2022-08-15 PROCEDURE — 82330 ASSAY OF CALCIUM: CPT

## 2022-08-15 PROCEDURE — 85025 COMPLETE CBC W/AUTO DIFF WBC: CPT

## 2022-08-15 PROCEDURE — 6360000004 HC RX CONTRAST MEDICATION

## 2022-08-15 PROCEDURE — 94660 CPAP INITIATION&MGMT: CPT

## 2022-08-15 PROCEDURE — 2000000000 HC ICU R&B

## 2022-08-15 PROCEDURE — 93005 ELECTROCARDIOGRAM TRACING: CPT | Performed by: STUDENT IN AN ORGANIZED HEALTH CARE EDUCATION/TRAINING PROGRAM

## 2022-08-15 PROCEDURE — 6360000002 HC RX W HCPCS: Performed by: INTERNAL MEDICINE

## 2022-08-15 PROCEDURE — 99291 CRITICAL CARE FIRST HOUR: CPT | Performed by: INTERNAL MEDICINE

## 2022-08-15 PROCEDURE — 36415 COLL VENOUS BLD VENIPUNCTURE: CPT

## 2022-08-15 PROCEDURE — 86850 RBC ANTIBODY SCREEN: CPT

## 2022-08-15 PROCEDURE — 94640 AIRWAY INHALATION TREATMENT: CPT

## 2022-08-15 PROCEDURE — 6370000000 HC RX 637 (ALT 250 FOR IP)

## 2022-08-15 PROCEDURE — A4216 STERILE WATER/SALINE, 10 ML: HCPCS | Performed by: STUDENT IN AN ORGANIZED HEALTH CARE EDUCATION/TRAINING PROGRAM

## 2022-08-15 PROCEDURE — 80048 BASIC METABOLIC PNL TOTAL CA: CPT

## 2022-08-15 PROCEDURE — 82803 BLOOD GASES ANY COMBINATION: CPT

## 2022-08-15 PROCEDURE — 80051 ELECTROLYTE PANEL: CPT

## 2022-08-15 PROCEDURE — 99232 SBSQ HOSP IP/OBS MODERATE 35: CPT | Performed by: INTERNAL MEDICINE

## 2022-08-15 PROCEDURE — 2500000003 HC RX 250 WO HCPCS

## 2022-08-15 PROCEDURE — 85014 HEMATOCRIT: CPT

## 2022-08-15 PROCEDURE — C1892 INTRO/SHEATH,FIXED,PEEL-AWAY: HCPCS

## 2022-08-15 PROCEDURE — 74176 CT ABD & PELVIS W/O CONTRAST: CPT

## 2022-08-15 PROCEDURE — 83735 ASSAY OF MAGNESIUM: CPT

## 2022-08-15 PROCEDURE — B2151ZZ FLUOROSCOPY OF LEFT HEART USING LOW OSMOLAR CONTRAST: ICD-10-PCS | Performed by: INTERNAL MEDICINE

## 2022-08-15 PROCEDURE — 94761 N-INVAS EAR/PLS OXIMETRY MLT: CPT

## 2022-08-15 PROCEDURE — 82565 ASSAY OF CREATININE: CPT

## 2022-08-15 PROCEDURE — 4A023N8 MEASUREMENT OF CARDIAC SAMPLING AND PRESSURE, BILATERAL, PERCUTANEOUS APPROACH: ICD-10-PCS | Performed by: INTERNAL MEDICINE

## 2022-08-15 RX ORDER — ACETYLCYSTEINE 200 MG/ML
600 SOLUTION ORAL; RESPIRATORY (INHALATION) 2 TIMES DAILY
Status: COMPLETED | OUTPATIENT
Start: 2022-08-15 | End: 2022-08-16

## 2022-08-15 RX ORDER — ACETAMINOPHEN 325 MG/1
650 TABLET ORAL EVERY 4 HOURS PRN
Status: DISCONTINUED | OUTPATIENT
Start: 2022-08-15 | End: 2022-08-17 | Stop reason: HOSPADM

## 2022-08-15 RX ORDER — CLONAZEPAM 0.5 MG/1
0.25 TABLET ORAL ONCE
Status: COMPLETED | OUTPATIENT
Start: 2022-08-15 | End: 2022-08-15

## 2022-08-15 RX ORDER — 0.9 % SODIUM CHLORIDE 0.9 %
500 INTRAVENOUS SOLUTION INTRAVENOUS ONCE
Status: COMPLETED | OUTPATIENT
Start: 2022-08-15 | End: 2022-08-15

## 2022-08-15 RX ORDER — SODIUM CHLORIDE 9 MG/ML
INJECTION, SOLUTION INTRAVENOUS PRN
Status: DISCONTINUED | OUTPATIENT
Start: 2022-08-15 | End: 2022-08-15

## 2022-08-15 RX ORDER — SODIUM CHLORIDE 0.9 % (FLUSH) 0.9 %
5-40 SYRINGE (ML) INJECTION EVERY 12 HOURS SCHEDULED
Status: DISCONTINUED | OUTPATIENT
Start: 2022-08-15 | End: 2022-08-17 | Stop reason: HOSPADM

## 2022-08-15 RX ORDER — FUROSEMIDE 10 MG/ML
20 INJECTION INTRAMUSCULAR; INTRAVENOUS DAILY
Status: DISCONTINUED | OUTPATIENT
Start: 2022-08-15 | End: 2022-08-15

## 2022-08-15 RX ORDER — LISINOPRIL 5 MG/1
5 TABLET ORAL DAILY
Status: DISCONTINUED | OUTPATIENT
Start: 2022-08-15 | End: 2022-08-17 | Stop reason: HOSPADM

## 2022-08-15 RX ORDER — SODIUM CHLORIDE 0.9 % (FLUSH) 0.9 %
5-40 SYRINGE (ML) INJECTION PRN
Status: DISCONTINUED | OUTPATIENT
Start: 2022-08-15 | End: 2022-08-17 | Stop reason: HOSPADM

## 2022-08-15 RX ORDER — SODIUM CHLORIDE 9 MG/ML
INJECTION, SOLUTION INTRAVENOUS PRN
Status: DISCONTINUED | OUTPATIENT
Start: 2022-08-15 | End: 2022-08-17 | Stop reason: HOSPADM

## 2022-08-15 RX ORDER — NOREPINEPHRINE BIT/0.9 % NACL 16MG/250ML
INFUSION BOTTLE (ML) INTRAVENOUS
Status: DISCONTINUED
Start: 2022-08-15 | End: 2022-08-15

## 2022-08-15 RX ORDER — LEVETIRACETAM 5 MG/ML
500 INJECTION INTRAVASCULAR ONCE
Status: COMPLETED | OUTPATIENT
Start: 2022-08-15 | End: 2022-08-15

## 2022-08-15 RX ORDER — FUROSEMIDE 10 MG/ML
20 INJECTION INTRAMUSCULAR; INTRAVENOUS ONCE
Status: DISCONTINUED | OUTPATIENT
Start: 2022-08-15 | End: 2022-08-15

## 2022-08-15 RX ADMIN — ASPIRIN 81 MG: 81 TABLET, CHEWABLE ORAL at 15:27

## 2022-08-15 RX ADMIN — DEXAMETHASONE SODIUM PHOSPHATE 4 MG: 4 INJECTION, SOLUTION INTRAMUSCULAR; INTRAVENOUS at 21:01

## 2022-08-15 RX ADMIN — LISINOPRIL 5 MG: 5 TABLET ORAL at 15:25

## 2022-08-15 RX ADMIN — LEVETIRACETAM 500 MG: 5 INJECTION INTRAVENOUS at 08:53

## 2022-08-15 RX ADMIN — IPRATROPIUM BROMIDE AND ALBUTEROL SULFATE 1 AMPULE: .5; 3 SOLUTION RESPIRATORY (INHALATION) at 21:52

## 2022-08-15 RX ADMIN — Medication 3 MG: at 22:35

## 2022-08-15 RX ADMIN — SODIUM CHLORIDE, PRESERVATIVE FREE 10 ML: 5 INJECTION INTRAVENOUS at 21:01

## 2022-08-15 RX ADMIN — FUROSEMIDE 20 MG: 10 INJECTION, SOLUTION INTRAMUSCULAR; INTRAVENOUS at 08:36

## 2022-08-15 RX ADMIN — LEVETIRACETAM 500 MG: 500 TABLET, FILM COATED ORAL at 21:02

## 2022-08-15 RX ADMIN — DEXAMETHASONE SODIUM PHOSPHATE 4 MG: 4 INJECTION, SOLUTION INTRAMUSCULAR; INTRAVENOUS at 00:05

## 2022-08-15 RX ADMIN — FOLIC ACID 1 MG: 1 TABLET ORAL at 15:27

## 2022-08-15 RX ADMIN — IPRATROPIUM BROMIDE AND ALBUTEROL SULFATE 1 AMPULE: .5; 3 SOLUTION RESPIRATORY (INHALATION) at 15:17

## 2022-08-15 RX ADMIN — DEXAMETHASONE SODIUM PHOSPHATE 4 MG: 4 INJECTION, SOLUTION INTRAMUSCULAR; INTRAVENOUS at 13:57

## 2022-08-15 RX ADMIN — HEPARIN SODIUM 14 UNITS/KG/HR: 10000 INJECTION, SOLUTION INTRAVENOUS at 01:46

## 2022-08-15 RX ADMIN — IPRATROPIUM BROMIDE AND ALBUTEROL SULFATE 1 AMPULE: .5; 3 SOLUTION RESPIRATORY (INHALATION) at 08:06

## 2022-08-15 RX ADMIN — Medication 600 MG: at 20:57

## 2022-08-15 RX ADMIN — Medication 3 MG: at 03:07

## 2022-08-15 RX ADMIN — Medication 600 MG: at 08:23

## 2022-08-15 RX ADMIN — POTASSIUM BICARBONATE 60 MEQ: 782 TABLET, EFFERVESCENT ORAL at 20:59

## 2022-08-15 RX ADMIN — DEXAMETHASONE SODIUM PHOSPHATE 4 MG: 4 INJECTION, SOLUTION INTRAMUSCULAR; INTRAVENOUS at 06:30

## 2022-08-15 RX ADMIN — SODIUM CHLORIDE 40 MG: 9 INJECTION INTRAMUSCULAR; INTRAVENOUS; SUBCUTANEOUS at 11:02

## 2022-08-15 RX ADMIN — SODIUM CHLORIDE, PRESERVATIVE FREE 10 ML: 5 INJECTION INTRAVENOUS at 08:38

## 2022-08-15 RX ADMIN — SODIUM CHLORIDE 500 ML: 0.9 INJECTION, SOLUTION INTRAVENOUS at 23:00

## 2022-08-15 RX ADMIN — CLONAZEPAM 0.25 MG: 0.5 TABLET ORAL at 15:25

## 2022-08-15 RX ADMIN — SODIUM CHLORIDE 500 ML: 0.9 INJECTION, SOLUTION INTRAVENOUS at 17:50

## 2022-08-15 ASSESSMENT — ENCOUNTER SYMPTOMS
TACHYPNEA: 1
NAUSEA: 0
WHEEZING: 0
SHORTNESS OF BREATH: 0
ABDOMINAL DISTENTION: 0
ABDOMINAL PAIN: 0
CHEST TIGHTNESS: 0
COUGH: 0
VOMITING: 0
DIARRHEA: 0

## 2022-08-15 NOTE — PROGRESS NOTES
[facility] Neurology   [facility]    Progress Note             Date:   8/15/2022  Patient name:  Becky Euceda  Date of admission:  8/11/2022  1:25 AM  MRN:   [id number]  Account:  [de-identified]  YOB: 1955  PCP:    Anna Thomas MD  Room:   [facility]  Code Status:    Full Code    Chief Complaint:     No chief complaint on file. Interval hx: The patient was seen and examined at bedside. Is vitally stable, alert and oriented x3 No acute events overnight. The patient stated that she feels like her diplopia has periods of improvement. Awaiting CSF cytology. Plan for cardiac cath 10-11 AM today. Brief History of Present Illness: The patient is a 74 y.o. female with significant past medical history of history of invasive lung adenocarcinoma s/p lobectomy 9/28/2018, metastasis to brain s/p suboccipital craniotomy 3/29/2022, liver hemangioma, HTN, HDL, COPD, who was brought in from [facility] complaining of visual disturbance, worsening shortness of breath, symptoms have been ongoing for 2 weeks however worsened the past 3 days. Patient states that it worsen when she looks to the left. Usually has horizontal double vision. Gets better when she covers one eye. Patient was placed on BiPAP was given albuterol which improved his dyspnea  Labs significant for elevated tropes 699> 708 for which cardiology consulted recommends trending troponin and get an echo. EKG shows lateral and inferior leads T wave inversions but seems to be little worse, and was started on heparin drip   CTA was negative for PE, possible right upper lobe infiltrates, CT head subcutaneous fluid leak possible subdural leak. Neurosurgery will be consulted for this, and oncology recommended MRI brain, transfer was to transfer patient to [facility] and starting heparin for an NSTEMI  possible cardiac cath once medically stable.    Hypoxic, pneumonitis likely due to chemotherapy           Past Medical History:     Past Medical History:   Diagnosis Date    Anxiety     Benign polyp of large intestine     Cancer (HCC)     LT UPPER LOBE    COPD (chronic obstructive pulmonary disease) (HCC)     Hx of blood clots     DVT LT LEG    Hyperlipidemia     Hypertension     Liver hemangioma     Lung nodule     BARAK  Nodule    Snores     not tested for apnea    Uterine fibroid 09/2010    Wears glasses         Past Surgical History:     Past Surgical History:   Procedure Laterality Date    ABDOMINAL HERNIA REPAIR  2012    BREAST BIOPSY Left 1983    BRONCHOSCOPY  10/01/2018    BRONCHOSCOPY performed by Brennan Koyanagi, MD at [address] [facility] N/A 03/29/2022    SUBOCCIPITAL CRANIOTOMY FOR TUMOR  (REGULAR TABLE, [place] NAVIGATION, [place] HEADHOLDER) performed by Katie Rapp DO at [facility] ([address] [facility])  2010    HYSTERECTOMY, TOTAL ABDOMINAL (CERVIX REMOVED)      IR PORT PLACEMENT EQUAL OR GREATER THAN 5 YEARS  04/13/2022    IR PORT PLACEMENT EQUAL OR GREATER THAN 5 YEARS 4/13/2022 Eddie Faulkner MD [facility] PROCEDURES    LUNG BIOPSY      LUNG REMOVAL, PARTIAL Left 09/28/2018    Robotic assisted left lobectomy, upper with lymph node biopsy    OTHER SURGICAL HISTORY Right 11/05/2018    IR PORT PLACEMENT EQUAL OR GREATER THAN 5 YEARS    [address] INCL FLUOR GDNCE DX W/CELL WASHG SPX N/A 10/29/2018    BRONCHOSCOPY performed by Nico Najera MD at [address] LOBE LOBECT Left 09/28/2018    XI ROBOTIC ASSISTED LEFT UPPER LOBECTOMY MULTIPLE LYMPH NODE BIOPSY, INTERCOSTAL  NERVE BLOCK ABLATION W/EXPAREL performed by Craig Hermosillo MD at [place] [address]        Medications Prior to Admission:     Prior to Admission medications    Medication Sig Start Date End Date Taking?  Authorizing Provider   predniSONE (DELTASONE) 20 MG tablet Take 60 mg for 7 days, then 40 mg for 7 days and then 20 mg daily 8/4/22 Luh Mcdermott MD   levETIRAcetam (KEPPRA) 500 MG tablet Take 1 tablet by mouth in the morning and 1 tablet before bedtime. 7/28/22 8/27/22  Ezequiel Hidalgo MD   clonazePAM (KLONOPIN) 0.5 MG tablet Take 1 tablet by mouth 2 times daily as needed for Anxiety for up to 30 days. 6/28/22 8/11/22  Jagdish Nelson MD   pantoprazole (PROTONIX) 20 MG tablet Take 1 tablet by mouth daily  Patient taking differently: Take 20 mg by mouth in the morning. Take 60mg for 7 days, then 40mg (starting 8/11/2022) for 7 days and then 20mg daily. 6/7/22   Waylon Sasmon MD   Multiple Vitamin (MULTIVITAMIN PO) Take by mouth daily    Historical Provider, MD   folic acid (FOLVITE) 1 MG tablet Take 1 tablet by mouth daily 5/11/22   Luh Mcdermott MD   ondansetron (ZOFRAN) 4 MG tablet Take 1 tablet by mouth 3 times daily as needed for Nausea or Vomiting 5/11/22   Luh Mcdermott MD   lovastatin (MEVACOR) 10 MG tablet Take 1 tablet by mouth nightly 4/21/22   Jagdish Nelson MD   lisinopril-hydroCHLOROthiazide (PRINZIDE;ZESTORETIC) 10-12.5 MG per tablet Take 1 tablet by mouth daily 4/21/22   Jagidsh Nelson MD   albuterol sulfate  (90 Base) MCG/ACT inhaler inhale 2 puffs by mouth four times a day 9/28/21   Luh Mcdermott MD        Allergies:     Codeine    Social History:     Tobacco:    reports that she quit smoking about 22 years ago. Her smoking use included cigarettes. She has a 45.00 pack-year smoking history. She has never used smokeless tobacco.  Alcohol:      reports current alcohol use. Drug Use:  reports no history of drug use. Family History:     Family History   Problem Relation Age of Onset    Cancer Mother         LUNG    Cancer Sister         LUNG       Review of Systems:     Review of Systems   Constitutional:  Negative for activity change, appetite change, chills and fever. HENT:  Negative for congestion.          Binocular double vision    Respiratory:  Negative for cough, chest tightness, shortness of breath and wheezing. Cardiovascular:  Negative for chest pain and leg swelling. Gastrointestinal:  Negative for abdominal distention, abdominal pain, diarrhea, nausea and vomiting. Genitourinary:  Negative for dysuria and flank pain. Skin:  Negative for rash. Neurological:  Negative for dizziness, weakness, numbness and headaches. Psychiatric/Behavioral:  Negative for agitation, behavioral problems, confusion and sleep disturbance. Physical Exam:   /87   Pulse 87   Temp 97.9 °F (36.6 °C)   Resp 19   Ht 4' 11\" (1.499 m)   Wt 151 lb 3.2 oz (68.6 kg)   SpO2 92%   BMI 30.54 kg/m²   Temp (24hrs), Av °F (36.7 °C), Min:97.5 °F (36.4 °C), Max:98.4 °F (36.9 °C)    No results for input(s): POCGLU in the last 72 hours.     Intake/Output Summary (Last 24 hours) at 8/15/2022 1343  Last data filed at 8/15/2022 1314  Gross per 24 hour   Intake 927.59 ml   Output 2050 ml   Net -1122.41 ml         Neurologic Exam     GENERAL  Appears comfortable and in no distress   HEENT  NC/ AT   HEART  S1 and S2 heard; palpation of pulses: radial pulse    NECK  Supple and no bruits heard   MENTAL STATUS:  Alert, oriented, intact memory, no confusion, normal speech, normal language, no hallucination or delusion   CRANIAL NERVES: II     -      Visual fields intact to confrontation  III,IV,VI -  PERR, EOMs full, no ptosis  V     -     Normal facial sensation   VII    -     Normal facial symmetry  VIII   -     Intact hearing   IX,X -     Symmetrical palate  XI    -     Symmetrical shoulder shrug  XII   -     Midline tongue, no atrophy    MOTOR FUNCTION: RUE: Significant for good strength of grade 5/5 in proximal and distal muscle groups   LUE: Significant for good strength of grade 5/5 in proximal and distal muscle groups   RLE: Significant for good strength of grade 5/5 in proximal and distal muscle groups   LLE: Significant for good strength of grade 5/5 in proximal and distal muscle groups      Normal bulk, normal tone and no involuntary movements, no tremor   SENSORY FUNCTION:  Normal touch, normal pinprick, normal vibration, normal proprioception   CEREBELLAR FUNCTION:  Intact fine motor control over upper limbs and lower limbs   REFLEX FUNCTION:  Symmetric in upper and lower extremities, no Babinski sign   STATION and GAIT  Normal gait and tandem station, normal tip toes and heel walking       Investigations:      Laboratory Testing:  Recent Results (from the past 24 hour(s))   APTT    Collection Time: 08/14/22  4:38 PM   Result Value Ref Range    PTT 74.3 (H) 20.5 - 30.5 sec   APTT    Collection Time: 08/15/22 12:39 AM   Result Value Ref Range    PTT 73.4 (H) 20.5 - 30.5 sec   Basic Metabolic Panel w/ Reflex to MG    Collection Time: 08/15/22  3:01 AM   Result Value Ref Range    Glucose 130 (H) 70 - 99 mg/dL    BUN 26 (H) 8 - 23 mg/dL    Creatinine 0.45 (L) 0.50 - 0.90 mg/dL    Calcium 9.1 8.6 - 10.4 mg/dL    Sodium 133 (L) 135 - 144 mmol/L    Potassium 4.1 3.7 - 5.3 mmol/L    Chloride 89 (L) 98 - 107 mmol/L    CO2 32 (H) 20 - 31 mmol/L    Anion Gap 12 9 - 17 mmol/L    GFR Non-African American >60 >60 mL/min    GFR African American >60 >60 mL/min    GFR Comment         CBC with Auto Differential    Collection Time: 08/15/22  3:01 AM   Result Value Ref Range    WBC 7.2 3.5 - 11.3 k/uL    RBC 3.74 (L) 3.95 - 5.11 m/uL    Hemoglobin 11.9 11.9 - 15.1 g/dL    Hematocrit 36.2 (L) 36.3 - 47.1 %    MCV 96.8 82.6 - 102.9 fL    MCH 31.8 25.2 - 33.5 pg    MCHC 32.9 28.4 - 34.8 g/dL    RDW 14.3 11.8 - 14.4 %    Platelets 833 512 - 855 k/uL    MPV 9.5 8.1 - 13.5 fL    NRBC Automated 0.0 0.0 per 100 WBC    Immature Granulocytes 1 (H) 0 %    Seg Neutrophils 88 (H) 36 - 65 %    Lymphocytes 6 (L) 24 - 43 %    Monocytes 5 3 - 12 %    Eosinophils % 0 (L) 1 - 4 %    Basophils 0 0 - 2 %    Absolute Immature Granulocyte 0.07 0.00 - 0.30 k/uL    Segs Absolute 6.34 1.50 - 8.10 k/uL    Absolute Lymph # 0.43 (L) 1.10 - 3.70 k/uL    Absolute Mono # 0.36 0.10 - 1.20 k/uL    Absolute Eos # 0.00 0.00 - 0.44 k/uL    Basophils Absolute 0.00 0.00 - 0.20 k/uL    Morphology Normal    Hepatic Function Panel    Collection Time: 08/15/22  3:01 AM   Result Value Ref Range    Albumin 3.8 3.5 - 5.2 g/dL    Alkaline Phosphatase 57 35 - 104 U/L     (H) 5 - 33 U/L    AST 65 (H) <32 U/L    Total Bilirubin 0.36 0.3 - 1.2 mg/dL    Bilirubin, Direct 0.14 <0.31 mg/dL    Bilirubin, Indirect 0.22 0.00 - 1.00 mg/dL    Total Protein 6.1 (L) 6.4 - 8.3 g/dL    Albumin/Globulin Ratio 1.7 1.0 - 2.5   EKG 12 Lead    Collection Time: 08/15/22  7:35 AM   Result Value Ref Range    Ventricular Rate 80 BPM    Atrial Rate 80 BPM    P-R Interval 138 ms    QRS Duration 86 ms    Q-T Interval 400 ms    QTc Calculation (Bazett) 461 ms    P Axis 54 degrees    R Axis -42 degrees    T Axis -129 degrees   APTT    Collection Time: 08/15/22  8:29 AM   Result Value Ref Range    PTT 54.2 (H) 20.5 - 30.5 sec     Recent Labs     08/15/22  0301   WBC 7.2   RBC 3.74*   HGB 11.9   HCT 36.2*   MCV 96.8   MCH 31.8   MCHC 32.9   RDW 14.3      MPV 9.5     Recent Labs     08/15/22  0301   *   K 4.1   CL 89*   CO2 32*   BUN 26*   CREATININE 0.45*   GLUCOSE 130*   CALCIUM 9.1   PROT 6.1*   LABALBU 3.8   BILITOT 0.36   ALKPHOS 57   AST 65*   *     Hemoglobin A1C   Date Value Ref Range Status   06/28/2018 5.0 4.0 - 6.0 % Final       Assessment :      Primary Problem  Acute respiratory failure [facility])    Active Hospital Problems    Diagnosis Date Noted    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     Priority: Medium    KRISTIN (internuclear ophthalmoplegia), 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] 08/12/2022     Priority: Medium    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 Non- / non  female presented with visual disturbance, for 2 weeks became worse since 3 days ago. Hx of lung cancer s/p resection and chemo, hx of brain mets, CT showing subdural leak    Plan:   Diplopia in the setting of brain mets  - Subdural leak on CT head - Posterior fossa pseudomeningocele   - MRI brain w wo contrast Multiple enhancing metastatic foci throughout the brain in the majority of these are similar in size compared to prior examination  - MRI orbit w wo contrast at noon  - On steroids for vasogenic edema  - LP 8/13/2022:  Clear fluid, cell count 4, no neutrophils, glucose 82, protein 64, gram stain negative so far  - Ophthalmology, neurosurgery Following  - Pending CSF cytology report   - Neurology to sign off at this time, thank you for the consult         Follow-up further recommendations after discussing the case with attending  The plan was discussed with the patient, patient's family and the medical staff. Consultations:   IP CONSULT TO NEUROSURGERY  IP CONSULT TO SPIRITUAL SERVICES  IP CONSULT TO CARDIOLOGY  IP CONSULT TO HEM/ONC  IP CONSULT TO NEUROLOGY  IP CONSULT TO OPHTHALMOLOGY    Patient is admitted as inpatient status because of co-morbidities listed above, severity of signs and symptoms as outlined, requirement for current medical therapies and most importantly because of direct risk to patient if care not provided in a hospital setting.     Lew Perry MD  8/15/2022  1:43 PM    Copy sent to Dr. yT Burnham MD

## 2022-08-15 NOTE — PROGRESS NOTES
[facility]   Progress Note                    Date:   8/15/2022  Patient name:  Harjinder Cardoso  Date of admission:  8/11/2022  1:25 AM  MRN:   [id number]  YOB: 1955  PCP:    Dayan Kruger MD    Reason for Admission:  Acute respiratory failure ([facility] [J96.00]  NSTEMI (non-ST elevated myocardial infarction) ([facility] [I21.4]    Subjective:   Patient seen and examined at the bedside:  No significant events overnight, patient is going for heart cath today. We will decrease the dose of Lasix to 20 mg daily. CSF cytology is in process  Patient is currently on 3 L of oxygen  Sodium is 133, troponins peaked at 1231  Medications:   Scheduled Meds:   furosemide  20 mg IntraVENous BID    dexamethasone  4 mg IntraVENous Q6H    sodium chloride flush  5-40 mL IntraVENous 2 times per day    heparin (porcine)  4,000 Units IntraVENous Once    ipratropium-albuterol  1 ampule Inhalation Q4H WA    aspirin  81 mg Oral Daily    metoprolol tartrate  6.25 mg Oral BID    levETIRAcetam  500 mg Oral BID    folic acid  1 mg Oral Daily    pantoprazole  40 mg Oral QAM AC     Continuous Infusions:   sodium chloride Stopped (08/13/22 1836)    heparin (PORCINE) Infusion 14 Units/kg/hr (08/15/22 0146)     CBC:   Recent Labs     08/13/22  0240 08/14/22  0318 08/15/22  0301   WBC 5.8 7.8 7.2   HGB 11.5* 13.1 11.9    323 283       BMP:    Recent Labs     08/13/22  0240 08/13/22  0402 08/13/22  1247 08/14/22  0318 08/15/22  0301     --   --  138 133*   K 2.9*   < > 5.1 4.4 4.1   CL 95*  --   --  95* 89*   CO2 31  --   --  34* 32*   BUN 25*  --   --  25* 26*   CREATININE 0.40*  --   --  0.43* 0.45*   GLUCOSE 106*  --   --  130* 130*    < > = values in this interval not displayed.        Hepatic:   Recent Labs     08/13/22  0240 08/14/22 0318 08/15/22  0301   AST 84* 77* 65*   * 169* 153*   BILITOT 0.23* 0.38 0.36   ALKPHOS 53 61 57       Troponin: No results for input(s): TROPONINI in the last 72 hours. No results for input(s): TROPONINT in the last 72 hours. BNP:   No results for input(s): PROBNP in the last 72 hours. No results for input(s): BNP in the last 72 hours. Lipids: No results for input(s): CHOL, HDL in the last 72 hours. Invalid input(s): LDLCALCU  INR:   No results for input(s): INR in the last 72 hours. Objective:   Vitals: /71   Pulse 84   Temp 97.9 °F (36.6 °C)   Resp 22   Ht 4' 11\" (1.499 m)   Wt 151 lb 3.2 oz (68.6 kg)   SpO2 95%   BMI 30.54 kg/m²    Last Recorded Weight:  [unfilled]    Constitutional and General Appearance:   alert, cooperative, no distress and appears stated age  Respiratory:  No for increased work of breathing. On auscultation: clear to auscultation bilaterally  Cardiovascular: The apical impulse is not displaced  Heart tones are crisp and normal. Regular S1 and S2. No murmurs. Abdomen:   No masses or tenderness  Bowel sounds present  Extremities:   No Cyanosis or Clubbing   Lower extremity edema:    Skin: Warm and dry  Neurological:  Alert and oriented. Moves all extremities well    Diagnostic Studies:     [id number]  Normal sinus rhythm diffuse T wave inversion      ECHO: 2022  Normal LV size and wall thickness. No obvious wall motion abnormality seen. Moderate global hypokinesis  Moderately reduced LV systolic function with LVEF 40-45 %. Normal RV size and function. RV systolic pressure 24 mmHg  LA and RA appears normal in size. No obvious significant structural valvular abnormality noted. No significant valvular stenosis or regurgitation noted. Normal aortic root dimension. No significant pericardial effusion noted.   IVC normal size difficult to assess respiratory variation     Stress Test:   Not obtained  Cardiac Angiography:  Not obtained    Patient Active Problem List:     Status post lobectomy of lung     Non-small cell cancer of left lung (HCC)     Malignant neoplasm of upper lobe of left lung ([place] [address] Essential hypertension     Anxiety     Intraparenchymal hemorrhage of brain (HCC)     New onset seizure (HCC)     Mass of occipital region     Hyperglycemia     Hypokalemia     Vasogenic edema (HCC)     Brain metastases (HCC)     Adrenal mass (HCC) - right      Episode of loss of consciousness     Focal epilepsy ([facility]     Primary malignant neoplasm of lung with metastasis to brain [facility])     Acute respiratory failure (HCC)     NSTEMI (non-ST elevated myocardial infarction) ([facility]     Chronic respiratory failure with hypoxia (HCC)     Centrilobular emphysema (HCC)  Assessment / Acute Cardiac Problems:   Elevated troponin suspect type I NSTEMI, EKG shows lateral and inferior leads T wave inversions ,no chest pain, troponins are trending down. Acute onset systolic heart failure with EF 40-45% on echo from 08/11/2022 with global hypokinesis  Left upper lobe invasive lung adenocarcinoma, Status post lobectomy 9/28/18  Acute hypoxic respiratory failure likely due to pneumonitis as a result of chemoimmunotherapy  Brain mets s/p right-sided craniotomy with resection of intraaxial brain tumor 03/29/2022 on PDL-1 agent at baseline  Hypertension  Hyperlipidemia  Left leg DVT  Liver hemangioma  Elevated liver enzymes -suspected autoimmune hepatitis  Plan of Treatment/Recommendations:   Continue heparin drip   Continue aspirin 81 mg daily, metoprolol 6.25 mg twice daily  Patient is not on statins due to elevated LFT which are improving  Replace Mg>2 /K>4   Will decrease the lasix to 20 mg daily post cath   Will repeat proBNP today  Further recommendations to follow. Sally Wills MD  Resident internal medicine, [facility]. 6:37 AM 08/15/22'      Attending Physician Statement  I have discussed the case of Kerri Ross including pertinent history and exam findings with the resident.  I have seen and examined the patient and the key elements of the encounter have been performed by me. I agree with the assessment, plan and orders as documented by the resident With changes made to the note.      Electronically signed by Donna Keith MD on 8/15/2022 at 2:56 PM.    [facility]      [phone]

## 2022-08-15 NOTE — PROGRESS NOTES
Critical care team - Resident sign-out to medicine service      Date and time: 8/15/2022 4:16 PM  Patient's name:  Tommy Linares  Medical Record Number: [id number]  Patient's account/billing number: [de-identified]  Patient's YOB: 1955  Age: 74 y.o. Date of Admission: 8/11/2022  1:25 AM  Length of stay during current admission: 4    Primary Care Physician: Javier Krishnamurthy MD    Code Status: Full Code    Mode of physician to physician communication:        [x] Via telephone   [] In person     Date and time of sign-out: 8/15/2022 4:16 PM    Accepting Internal Medicine resident: Dr. Yani Mccormack    Accepting Medicine team: IM Team 1      Accepting team's attending: Dr. Massimo Dobbs     Patient's current ICU Bed:  [phone]    Patient's assigned bed on floor:  402        [] Med-Surg Monitored [x] Step-down       [] Psychiatry ICU       [] Psych floor     Reason for ICU admission:     Dyspnea requiring possible intubation    ICU course summary:     a 74 y.o. with PMH of  -Lung adenocarcinoma left lung lobectomy four years back. -Brain metastasis status post subacute occipital craniotomy for tumor resection on 3/29/2022 currently in chemotherapy  -Hypertension  -Hyperlipidemia  -COPD not on oxygen at home     Transferred from [address] [place] [address] where she presented with concern of shortness of breath and double vision. She went for chemo therapy where she noted to have severe shortness of breath and worsening diplopia. She mention these symptoms she started couple of weeks back in which her Keytruda immunotherapy was stopped and she was started on steroid treatment but for next couple of weeks patient was unable to lie flat and had worsening dyspnea.   Patient was tachypneic and desatted while she was moving around in [facility] so patient was started on BiPAP; significant elevation of troponin to 700 noted there, CTA negative for pulmonary embolism and CT head showed subcutaneous fluid leak or occipital lobe possibly subdural leak. Heparin was started for NSTEMI after clearing from Dr. George Aceves, neurosurgery. Transferred to [facility] for further evaluation and management. Causes of dyspnea and hypoxia were considered to be possible COPD exacerbation, pneumonitis secondary to [facility] immunotherapy, pulmonary edema secondary to acute CHF exacerbation. Patient already has low lung volume reserves due to prior lobectomy. Since time of admission in ICU, patient has been hemodynamically stable, not requiring any pressors. She has been saturating well on BiPAP overnight and 3 L nasal cannula during the day. Due to concern for elevated troponins and nonspecific ST segment changes on EKG, cardiology was consulted and patient underwent cardiac cath on 8/15/2022; she was found to have mild nonobstructive CAD and mildly reduced LV systolic function with apical hypokinesia, possible stress cardiomyopathy. Neurosurgery and neurology have been following the patient for concern of possible subdural leak following craniotomy as well as current symptoms of diplopia. During this admission, MRI brain was obtained which did not show any significant metastatic lesion enlarging lesions or new lesions in the brainstem. Ophthalmology was consulted and MRI orbits was obtained which was negative for any acute abnormalities which would explain the diplopia. LP was done by neurology due to concern of leptomeningeal metastasis as cause of the diplopia. Cytology results still pending. Hematology oncology consulted for metastatic lung cancer; patient had been on Keytruda immunotherapy which was held 2 weeks ago due to concern of transaminitis, and now possibly pneumonitis as well as myocarditis.   Patient had previously been on a steroid taper, on day of admission to [facility] MICU was supposed to start on oral prednisone 20 mg daily but steroid dosing has been increased by hematology oncology to 1 mg/kg; currently patient is on Decadron 4 mg every 6 hours for treatment of immunotherapy toxicity. Lopressor dosing increased to 12.5 mg twice daily starting tomorrow, IV Lasix reduced to 20 mg IV once daily starting tomorrow. Procedures during patient's ICU stay:     LP    Current Vitals:     /87   Pulse 87   Temp 97.9 °F (36.6 °C)   Resp 19   Ht 4' 11\" (1.499 m)   Wt 151 lb 3.2 oz (68.6 kg)   SpO2 92%   BMI 30.54 kg/m²       Cultures:     Blood cultures:                 [] None drawn      [] Negative             []  Positive (Details:  )  Urine Culture:                   [] None drawn      [] Negative             []  Positive (Details:  )  Sputum Culture:               [] None drawn       [] Negative             []  Positive (Details:  )   Endotracheal aspirate:     [] None drawn       [] Negative             []  Positive (Details:  )       Consults:     1.   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    Pseudomeningocele 08/12/2022    Acute respiratory failure ([address] 08/11/2022    NSTEMI (non-ST elevated myocardial infarction) ([address] 08/11/2022    Chronic respiratory failure with hypoxia ([address] 08/11/2022    Centrilobular emphysema ([address] 08/11/2022    Primary malignant neoplasm of lung with metastasis to brain ([address] 04/12/2022    Focal epilepsy ([address]     Mass of occipital region 03/26/2022    Hyperglycemia 03/26/2022    Hypokalemia 03/26/2022    Vasogenic edema ([address] 03/26/2022    Brain metastases ([address] 03/26/2022    Adrenal mass ([address] - right  03/26/2022    Episode of loss of consciousness     Intraparenchymal hemorrhage of brain ([address] 03/25/2022    New onset seizure ([address] 03/25/2022    Essential hypertension 06/23/2020    Anxiety 06/23/2020    Malignant neoplasm of upper lobe of left lung ([address] 02/12/2019    Non-small cell cancer of left lung [facility]     Status post lobectomy of lung 09/28/2018       Additional assessment:    Acute hypoxic respiratory failure secondary to pneumonitis/pulmonary edema/COPD exacerbation  Nonischemic cardiomyopathy 2/2 myocarditis/stress cardiomyopathy  Diplopia-highly suspicious of leptomeningeal metastatic disease. Immunotherapy induced transaminitis    Recommended Follow-up:     Cardiology: Cardiac cath showed mild nonobstructive CAD, EF 45%, apical hypokinesia. Possible stress-induced cardiomyopathy. Will require repeat echo outpatient. Continue aspirin, Lopressor 12.5 mg twice daily. Start statin therapy once transaminitis resolves. Started on ACE inhibitor therapy by cardiology-5 mg lisinopril daily. IV diuresis reduced to IV Lasix 20 mg once daily. Monitor for contrast-induced nephropathy. Oncology: Follow-up on results of CSF cytology. Follow-up with oncology recommendations for steroid taper. Currently on Decadron 4 mg every 6 hours. If cytology positive for malignancy, poor prognosis with limited treatment options. Neurology: Follow-up on results of CSF cytology. Pulmonary: Continue bronchodilators daily. Start Symbicort at time of discharge. Above mentioned assessment and plan was discussed by me with the admitting medicine resident. The medicine team assigned to the patient by medicine admitting resident will be following up the patient from now onwards on the floor.        Mel Salcedo MD  Internal medicine, PGY-2  [address] [facility] [address]   [unfilled] 1:23 PM

## 2022-08-15 NOTE — PROGRESS NOTES
Patient gradualy becoming more tachycardic w/ HR now 115-120 - Dr. Dyan Bhatia at bedside - cath site unremarkable - Pt.  W/ no complaints of pain or feeling different then prior - H&H ordered - will continue to monitor

## 2022-08-15 NOTE — PROGRESS NOTES
Patient arrived [facility], consent signed, all questions answered. Pt ready for procedure. Bed in low position, call light to reach with side rails up 2 of 2. Groins  clipped. Angelica PEREZ present.

## 2022-08-15 NOTE — OP NOTE
[facility]    CARDIAC CATHETERIZATION    Date:   8/15/2022  Patient name:  Isamar Parrish  Date of admission:  8/11/2022  1:25 AM  MRN:   [id number]  YOB: 1955    Operators:  Primary:   Salvatore Downey MD (Attending Physician)    Assistant/CV fellow:  Serg Walker MD      Procedure performed:      [x] Left Heart Catheterization. [] Graft Angiography. [x] Left Ventriculography. [] Right Heart Catheterization. [] Coronary Angiography. [] Aortic Valve Studies. [] PCI:      [] Other:       Pre Procedure Conscious Sedation Data:  ASA Class:    [] I [] II [x] III [] IV    Mallampati Class:  [x] I [] II [] III [] IV      Indication:  ACS       Procedure:  Access:  [x] Femoral  [] Radial  artery       [x] Right  [] Left    Procedure: After informed consent was obtained with explanation of the risks and benefits, patient was brought to the cath lab. The access area was prepped and draped in sterile fashion. 1% lidocaine was used for local block. The artery was cannulated with 6  Fr sheath with brisk arterial blood return. The side port was frequently flushed and aspirated with normal saline. Cardiac Arteries and Lesion Findings       LMCA: Normal 0% stenosis. LAD: Mild irregularities 10-20%. LCx: Mild irregularities 10-20%. RCA: Mild irregularities 10-20%. Coronary Tree        Dominance: Right       LV Analysis   LV function assessed as:Abnormal.   Ejection Fraction   +----------------------------------------------------------------------+---+   ! Method                                                                ! EF%! +----------------------------------------------------------------------+---+   ! LV gram                                                               !45 !   +----------------------------------------------------------------------+---+         Procedure Summary        Mild CAD.     Mildly reduced left ventricular systolic function with apical hypokinesia. Possible stress cardiomyopathy. Recommendations        Medical therapy and risk factor modifications. Estimated Blood Loss: 10  mL        ____________________________________________________________________    Electronically signed on 8/15/2022 at 12:25 PM by:    Ok Paige MD  Fellow, [address] [place] [address]. Guerrero Farias MD  [facility].

## 2022-08-15 NOTE — PLAN OF CARE
Problem: Discharge Planning  Goal: Discharge to home or other facility with appropriate resources  Outcome: Progressing  Flowsheets (Taken 8/14/2022 2000)  Discharge to home or other facility with appropriate resources:   Identify barriers to discharge with patient and caregiver   Arrange for needed discharge resources and transportation as appropriate     Problem: Safety - Adult  Goal: Free from fall injury  Outcome: Progressing  Flowsheets (Taken 8/14/2022 2229)  Free From Fall Injury:   Instruct family/caregiver on patient safety   Based on caregiver fall risk screen, instruct family/caregiver to ask for assistance with transferring infant if caregiver noted to have fall risk factors     Problem: ABCDS Injury Assessment  Goal: Absence of physical injury  Outcome: Progressing  Flowsheets (Taken 8/14/2022 2229)  Absence of Physical Injury: Implement safety measures based on patient assessment     Problem: Skin/Tissue Integrity  Goal: Absence of new skin breakdown  Description: 1. Monitor for areas of redness and/or skin breakdown  2. Assess vascular access sites hourly  3. Every 4-6 hours minimum:  Change oxygen saturation probe site  4. Every 4-6 hours:  If on nasal continuous positive airway pressure, respiratory therapy assess nares and determine need for appliance change or resting period.   Outcome: Progressing     Problem: Respiratory - Adult  Goal: Achieves optimal ventilation and oxygenation  Outcome: Progressing  Flowsheets (Taken 8/14/2022 2000)  Achieves optimal ventilation and oxygenation:   Assess for changes in respiratory status   Assess for changes in mentation and behavior   Position to facilitate oxygenation and minimize respiratory effort     Problem: Pain  Goal: Verbalizes/displays adequate comfort level or baseline comfort level  Outcome: Progressing  Flowsheets (Taken 8/14/2022 2045)  Verbalizes/displays adequate comfort level or baseline comfort level:   Encourage patient to monitor pain and request assistance   Assess pain using appropriate pain scale   Administer analgesics based on type and severity of pain and evaluate response

## 2022-08-15 NOTE — PROGRESS NOTES
Critical Care Team - Daily Progress Note      Date and time: 8/15/2022 7:24 AM  Patient's name:  Baljinder Keller  Medical Record Number: [id number]  Patient's account/billing number: [de-identified]  Patient's YOB: 1955  Age: 74 y.o. Date of Admission: 8/11/2022  1:25 AM  Length of stay during current admission: 4      Primary Care Physician: Jagdish Nelson MD  ICU Attending Physician: Dr. Nelson Gale Status: Full Code    Reason for ICU admission: No chief complaint on file. Subjective:     OVERNIGHT EVENTS:      used BIPAP overnight. On 3 L NC this am, breathing stable  123/71, HR 84  95% on 3 L NC  Afebrile    Lbs: K 4.1  Bun 26, creat 0.45  Troponin 1231  >>153  AST 77>>65  Developing contraction alkalosis bicarb 32, Na 133- cut down lasix to 20 mg daily IV    Hb 13.1>>11.9  Plat 283    UOP 2050 ml in 2 hours, on lasix 20 mg IV daily now  MRI orbit showed no abnormality  CSF cx neg, wbc 4, glu 82, protein 64.9  Cytology still pending    mucomyst 600 mg BID x 4 doses to prevent JULIANA    Hem onc: continue 4 mg decadron q6 hours. await CSF cytology. prognosis can be changed given CNS disease. Cardiology: cardiac cath today, decrease lasix  Neurology: LP done,await results. Orbital MRI negative  Opthal: MRI orbit, face, neck      Echo: Moderate global hypokinesis  Moderately reduced LV systolic function with LVEF 40-45 %.        AWAKE & FOLLOWING COMMANDS:  [x] No   [] Yes    CURRENT VENTILATION STATUS:     [] Ventilator  [] BIPAP  [x] Nasal Cannula [] Room Air      IF INTUBATED, ET TUBE MARKING AT LOWER LIP:       cms    SECRETIONS Amount:  [] Small [] Moderate  [] Large  [x] None  Color:     [] White [] Colored  [] Bloody    SEDATION:  RAAS Score:  [] Propofol gtt  [] Versed gtt  [] Ativan gtt   [x] No Sedation    PARALYZED:  [x] No    [] Yes    DIARRHEA:                [x] No                [] Yes  (C. Difficile status: [] positive [] negative                                                                                                                     [] pending)    VASOPRESSORS:  [x] No    [] Yes    If yes -   [] Levophed       [] Dopamine     [] Vasopressin       [] Dobutamine  [] Phenylephrine         [] Epinephrine    CENTRAL LINES:     [x] No   [] Yes   (Date of Insertion:   )           If yes -     [] Right IJ     [] Left IJ [] Right Femoral [] Left Femoral                   [] Right Subclavian [] Left Subclavian       MANCIA'S CATHETER:   [] No   [] Yes  (Date of Insertion:   )     URINE OUTPUT:            [x] Good   [] Low              [] Anuric    REVIEW OF SYSTEMS:  Constitutional: Negative for Fever, chills  Eyes: Negative for visual changes, diplopia  ENT: Negative for mouth sores, sore throat. Cardiovascular: Negative for lightheadedness ,chest pain, palpitations   Respiratory:Negative for Shortness of breath,cough or wheezing. Gastrointestinal: Negative for nausea/vomiting, change in bowel habits, abdominal pain  Genitourinary:Negative for change in bladder habits, dysuria, hematuria.   Musculoskeletal: Negative for joint pain   Neurological: Negative for headache, change in muscle strength numbness/tingling      OBJECTIVE:     VITAL SIGNS:  /71   Pulse 84   Temp 97.9 °F (36.6 °C)   Resp 22   Ht 4' 11\" (1.499 m)   Wt 151 lb 3.2 oz (68.6 kg)   SpO2 95%   BMI 30.54 kg/m²   Tmax over 24 hours:  Temp (24hrs), Av.1 °F (36.7 °C), Min:97.5 °F (36.4 °C), Max:98.6 °F (37 °C)      Patient Vitals for the past 8 hrs:   BP Temp Pulse Resp SpO2 Weight   08/15/22 0600 123/71 -- 84 22 95 % 151 lb 3.2 oz (68.6 kg)   08/15/22 0500 116/75 -- 74 16 97 % --   08/15/22 0400 123/71 97.9 °F (36.6 °C) 83 20 96 % --   08/15/22 0300 122/74 -- 86 23 95 % --   08/15/22 0200 93/67 -- 79 19 94 % --   08/15/22 0100 94/63 -- 80 19 94 % --   08/15/22 0000 101/61 97.5 °F (36.4 °C) 79 22 94 % --   08/14/22 2341 -- -- -- 18 -- --   08/14/22 2340 -- -- -- 22 -- --           Intake/Output Summary (Last 24 hours) at 8/15/2022 0724  Last data filed at 8/15/2022 0630  Gross per 24 hour   Intake 655.11 ml   Output 2050 ml   Net -1394.89 ml       Date 08/15/22 0000 - 08/15/22 2359   Shift 9304-1052 3306-1475 3256-7090 24 Hour Total   INTAKE   Shift Total(mL/kg)       OUTPUT   Urine(mL/kg/hr) 400   400   Shift Total(mL/kg) 400(5.8)   400(5.8)   Weight (kg) 68.6 68.6 68.6 68.6       Wt Readings from Last 3 Encounters:   08/15/22 151 lb 3.2 oz (68.6 kg)   08/12/22 160 lb (72.6 kg)   08/10/22 161 lb (73 kg)     Body mass index is 30.54 kg/m². PHYSICAL EXAM:  Constitutional: on nasal cannula  HEENT: PERRLA, EOMI, sclera clear, anicteric  Respiratory: clear to auscultation, no wheezes or rales and unlabored breathing. Cardiovascular: regular rate and rhythm, normal S1, S2, no murmur noted and 2+ pulses throughout  Abdomen: soft, nontender, nondistended, no masses or organomegaly  NEUROLOGIC: Awake, alert, oriented to name, place and time. Cranial nerves II-XII are grossly intact. Motor is 5 out of 5 bilaterally. Sensory is intact. Extremities:  peripheral pulses normal, no pedal edema,.       Any additional physical findings:      MEDICATIONS:  Scheduled Meds:   furosemide  20 mg IntraVENous BID    dexamethasone  4 mg IntraVENous Q6H    sodium chloride flush  5-40 mL IntraVENous 2 times per day    heparin (porcine)  4,000 Units IntraVENous Once    ipratropium-albuterol  1 ampule Inhalation Q4H WA    aspirin  81 mg Oral Daily    metoprolol tartrate  6.25 mg Oral BID    levETIRAcetam  500 mg Oral BID    folic acid  1 mg Oral Daily    pantoprazole  40 mg Oral QAM AC     Continuous Infusions:   sodium chloride Stopped (08/13/22 1836)    heparin (PORCINE) Infusion 14 Units/kg/hr (08/15/22 0146)     PRN Meds:   sodium chloride flush, 10 mL, PRN  melatonin, 3 mg, Nightly PRN  sodium chloride flush, 10 mL, MG 2.1 08/13/2022 12:47 PM    MG 1.4 08/13/2022 02:40 AM     Phosphorus: No results found for: PHOS  S. Calcium:  Recent Labs     08/15/22  0301   CALCIUM 9.1       S. Ionized Calcium:No results for input(s): IONCA in the last 72 hours. Urinalysis:   Lab Results   Component Value Date/Time    NITRU NEGATIVE 09/25/2018 12:24 PM    COLORU YELLOW 09/25/2018 12:24 PM    PHUR 7.5 09/25/2018 12:24 PM    SPECGRAV 1.009 09/25/2018 12:24 PM    LEUKOCYTESUR NEGATIVE 09/25/2018 12:24 PM    UROBILINOGEN Normal 09/25/2018 12:24 PM    BILIRUBINUR NEGATIVE 09/25/2018 12:24 PM    GLUCOSEU NEGATIVE 09/25/2018 12:24 PM    KETUA NEGATIVE 09/25/2018 12:24 PM       CARDIAC ENZYMES: No results for input(s): CKMB, CKMBINDEX, TROPONINI in the last 72 hours. Invalid input(s): CKTOTAL;3  BNP: No results for input(s): BNP in the last 72 hours. LFTS  Recent Labs     08/13/22  0240 08/14/22  0318 08/15/22  0301   ALKPHOS 53 61 57   * 169* 153*   AST 84* 77* 65*   BILITOT 0.23* 0.38 0.36   BILIDIR 0.10 0.14 0.14   LABALBU 3.5 4.3 3.8         AMYLASE/LIPASE/AMMONIA  No results for input(s): AMYLASE, LIPASE, AMMONIA in the last 72 hours. Last 3 Blood Glucose:   Recent Labs     08/13/22  0240 08/14/22 0318 08/15/22  0301   GLUCOSE 106* 130* 130*        HgBA1c:    Lab Results   Component Value Date/Time    LABA1C 5.0 06/28/2018 07:47 AM         TSH:    Lab Results   Component Value Date/Time    TSH 1.75 08/03/2022 10:13 AM     ANEMIA STUDIES  No results for input(s): LABIRON, TIBC, FERRITIN, UCOZSKTD89, FOLATE, OCCULTBLD in the last 72 hours.       Cultures during this admission:     Blood cultures:                 [] None drawn      [] Negative             []  Positive (Details:  )  Urine Culture:                   [] None drawn      [] Negative             []  Positive (Details:  )  Sputum Culture:               [] None drawn       [] Negative             []  Positive (Details:  )   Endotracheal aspirate:     [] None drawn       [] Negative             []  Positive (Details:  )        ASSESSMENT:     Principal Problem:    Acute respiratory failure (HCC)  Active Problems:    NSTEMI (non-ST elevated myocardial infarction) (HCC)    Chronic respiratory failure with hypoxia (HCC)    Centrilobular emphysema (HCC)    KRISTIN (internuclear ophthalmoplegia), bilateral    Pseudomeningocele    Conjugate gaze palsy    Malignant neoplasm of upper lobe of left lung (HCC)  Resolved Problems:    * No resolved hospital problems. *        PLAN:     NSTEMI  -Continue heparin drip  -ASA, bb daily  -no statin due to transaminitis  -Cardiology on board  -echo showed systolic function reduced EF 40-45%, global hypokinesis  - plan for cath Monday am  -mucomyst 600 mg BID x 4 doses to prevent JULIANA     Diplopia probably secondary to brain mets- MRI brain neg for any new lesion  -Follow Neuro recommendation. LP prior to cardiac cath and possible DAPT  -MRI orbit negative for acute abnormality    Transaminitis due to [facility] immunotherapy:  -monitor LFTs  -Decadron 4 mg q6 daily    Subcutaneous fluid collection on head CT:  -neurosurgery following  -LP ton 8/13/22 to check for leptomeningeal disease prior to cath  -Awaiting results for LP    Essential hypertension  -increased lopressor dose to 12.5 mg BID     COPD  -Continue bronchodilators 4 times a day. DVT ppx: hep gtt  GI ppx: protonix      Eliud Thomas MD, M.D. Department of Internal Medicine/ Critical care  [address] [place] [address] ([place]             8/15/2022, 7:24 AM      Attending Physician Statement  I have discussed the care of [address], including pertinent history and exam findings with the resident. I have reviewed the key elements of all parts of the encounter with the resident. I have seen and examined the patient with the resident. I agree with the assessment and plan and status of the problem list as documented.     I seen the patient during around today, chart reviewed, labs and medications reviewed overnight events noted. Labs reviewed. Overnight she did use BiPAP tolerated well 14/6/40 percent. She is using 3 L nasal cannula during the daytime did not require BiPAP during the daytime. She is on Lasix 20 mg twice daily and urine output is 2.2 L in last 24 hours. Creatinine 0.45 bicarbonate 32 and potassium is 4.1 this morning. CSF cytology is pending otherwise negative for cultures so far. She is scheduled for cardiac cath by cardiology today. She is on Decadron per oncology we will continue steroid management per oncology. After cardiac catheterization we will transfer the patient to stepdown unit depend upon the cardiac cath results. Will follow patient has pulmonary    Discussed with nursing staff, treatment and plan discussed. Discussed with respiratory therapist.    Total critical care time caring for this patient with life threatening, unstable organ failure, including direct patient contact, management of life support systems, review of data including imaging and labs, discussions with other team members and physicians at least 27  Min so far today, excluding procedures. Please note that this chart was generated using voice recognition Dragon dictation software. Although every effort was made to ensure the accuracy of this automated transcription, some errors in transcription may have occurred.      Twan Moore MD  8/15/2022 10:01 AM

## 2022-08-15 NOTE — PROCEDURES
PROCEDURE NOTE - ARTERIAL LINE PLACEMENT    PATIENT NAME: Harjinder Cardoso  MEDICAL RECORD NO. [id number]  DATE: 8/15/2022  ATTENDING PHYSICIAN:  Beth Ball      PREOPERATIVE DIAGNOSIS:  Need for blood pressure monitoring  POSTOPERATIVE DIAGNOSIS:  Same  PROCEDURE PERFORMED: Left Radial Arterial Line Insertion  PERFORMING PHYSICIAN:  Elena Mart DO  ESTIMATED BLOOD LOSS:  Less than 25 ml  COMPLICATIONS:  None immediately appreciated. DISCUSSION:  Harjinder Cardoso is a 74 y.o. female who requires invasive pressure monitoring. The history and physical examination were reviewed and confirmed. CONSENT: The patient provided verbal consent for this procedure. PROCEDURE:  A timeout was initiated by the bedside nurse and was confirmed by those present. The patient was placed in a supine position. The skin overlying the Left Radial was prepped with chlorhexadine. Through this region, the introducer needle through catheter was inserted into  until pulsatile bright blood was seen in collection tubing. Guidewire was advanced with no resistance. Catheter was advanced into the artery, wire was pulled with brisk bleeding noted. Pressure monitoring setup was connected to the catheter, it aspirated and flushed easily. The catheter was secured to the wrist with 3-0 silk. No immediate complication was evident. All sponge, instrument and needle counts were correct at the completion of the procedure.       Elena Mart DO  7:18 PM, 8/15/22

## 2022-08-15 NOTE — SIGNIFICANT EVENT
Patient noted to become tachycardic with increase in heart rate from 80s-90s to 120s while at rest.  Assessed at bedside, asymptomatic. She did recently have cardiac catheterization performed earlier this afternoon at approximately 12:30 PM.  Approximately 1 hour later patient was noted to become acutely hypotensive with systolic blood pressures in the 70s and maps in the 40s. We did reassess the blood pressure several times however remained hypotensive with maps in the 40s. Given recent cardiac catheterization there is immediate concern for retroperitoneal hematoma given tachycardia and hypotension. Stat H&H as well as ABG and BMP were ordered. On assessment patient is fatigued in appearance but alert and oriented person, place, time. Saturating 92% on nasal cannula. Given concern for hypotension and need for stat labs left arterial line placed see procedure note for details, patient verbally consented to procedure. Cardiology was made aware of hypotension. Plan for stat CT abdomen/pelvis to evaluate for retroperitoneal hematoma.     Electronically signed by Swapna Edwards DO on 8/15/2022 at 6:44 PM

## 2022-08-15 NOTE — PLAN OF CARE
Problem: Respiratory - Adult  Goal: Achieves optimal ventilation and oxygenation  Outcome: Progressing    BRONCHOSPASM/BRONCHOCONSTRICTION  [x]         IMPROVE AERATION/BREATH SOUNDS  [x]   ADMINISTER BRONCHODILATOR THERAPY AS APPROPRIATE  [x]   ASSESS BREATH SOUNDS  []   IMPLEMENT AEROSOL/MDI PROTOCOL  [x]   PATIENT EDUCATION AS NEEDED     PROVIDE ADEQUATE OXYGENATION WITH ACCEPTABLE SP02/ABG'S  [x]  IDENTIFY APPROPRIATE OXYGEN THERAPY  [x]   MONITOR SP02/ABG'S AS NEEDED   [x]   PATIENT EDUCATION AS NEEDED

## 2022-08-15 NOTE — PROGRESS NOTES
Patient BP dropping into 40E systolic - Dr. Rachael Hendricks at bedside to place arterial line - labs ordered - cardiology perfect served - IV bolus running - awaiting H&H and type and screen

## 2022-08-16 ENCOUNTER — TELEPHONE (OUTPATIENT)
Dept: CASE MANAGEMENT | Age: 67
End: 2022-08-16

## 2022-08-16 LAB
ABSOLUTE EOS #: 0 K/UL (ref 0–0.44)
ABSOLUTE IMMATURE GRANULOCYTE: 0.09 K/UL (ref 0–0.3)
ABSOLUTE LYMPH #: 0.43 K/UL (ref 1.1–3.7)
ABSOLUTE MONO #: 0.6 K/UL (ref 0.1–1.2)
ALBUMIN SERPL-MCNC: 3.8 G/DL (ref 3.5–5.2)
ALBUMIN/GLOBULIN RATIO: 1.9 (ref 1–2.5)
ALP BLD-CCNC: 55 U/L (ref 35–104)
ALT SERPL-CCNC: 154 U/L (ref 5–33)
ANION GAP SERPL CALCULATED.3IONS-SCNC: 8 MMOL/L (ref 9–17)
AST SERPL-CCNC: 62 U/L
BASOPHILS # BLD: 0 % (ref 0–2)
BASOPHILS ABSOLUTE: 0 K/UL (ref 0–0.2)
BILIRUB SERPL-MCNC: 0.47 MG/DL (ref 0.3–1.2)
BILIRUBIN DIRECT: 0.15 MG/DL
BILIRUBIN, INDIRECT: 0.32 MG/DL (ref 0–1)
BUN BLDV-MCNC: 31 MG/DL (ref 8–23)
CALCIUM SERPL-MCNC: 8.9 MG/DL (ref 8.6–10.4)
CHLORIDE BLD-SCNC: 94 MMOL/L (ref 98–107)
CO2: 33 MMOL/L (ref 20–31)
CREAT SERPL-MCNC: 0.41 MG/DL (ref 0.5–0.9)
CULTURE: NORMAL
DIRECT EXAM: NORMAL
EKG ATRIAL RATE: 80 BPM
EKG P AXIS: 54 DEGREES
EKG P-R INTERVAL: 138 MS
EKG Q-T INTERVAL: 400 MS
EKG QRS DURATION: 86 MS
EKG QTC CALCULATION (BAZETT): 461 MS
EKG R AXIS: -42 DEGREES
EKG T AXIS: -129 DEGREES
EKG VENTRICULAR RATE: 80 BPM
EOSINOPHILS RELATIVE PERCENT: 0 % (ref 1–4)
GFR AFRICAN AMERICAN: >60 ML/MIN
GFR NON-AFRICAN AMERICAN: >60 ML/MIN
GFR SERPL CREATININE-BSD FRML MDRD: ABNORMAL ML/MIN/{1.73_M2}
GLUCOSE BLD-MCNC: 117 MG/DL (ref 70–99)
HCT VFR BLD CALC: 34.3 % (ref 36.3–47.1)
HEMOGLOBIN: 11.6 G/DL (ref 11.9–15.1)
IMMATURE GRANULOCYTES: 1 %
LACTIC ACID, SEPSIS WHOLE BLOOD: 1.3 MMOL/L (ref 0.5–1.9)
LYMPHOCYTES # BLD: 5 % (ref 24–43)
Lab: NORMAL
Lab: NORMAL
MAGNESIUM: 1.6 MG/DL (ref 1.6–2.6)
MCH RBC QN AUTO: 33.2 PG (ref 25.2–33.5)
MCHC RBC AUTO-ENTMCNC: 33.8 G/DL (ref 28.4–34.8)
MCV RBC AUTO: 98.3 FL (ref 82.6–102.9)
MONOCYTES # BLD: 7 % (ref 3–12)
MORPHOLOGY: NORMAL
NRBC AUTOMATED: 0 PER 100 WBC
PDW BLD-RTO: 14.4 % (ref 11.8–14.4)
PLATELET # BLD: 281 K/UL (ref 138–453)
PMV BLD AUTO: 8.8 FL (ref 8.1–13.5)
POTASSIUM SERPL-SCNC: 4.4 MMOL/L (ref 3.7–5.3)
RBC # BLD: 3.49 M/UL (ref 3.95–5.11)
SEG NEUTROPHILS: 87 % (ref 36–65)
SEGMENTED NEUTROPHILS ABSOLUTE COUNT: 7.38 K/UL (ref 1.5–8.1)
SODIUM BLD-SCNC: 135 MMOL/L (ref 135–144)
SPECIMEN DESCRIPTION: NORMAL
TOTAL PROTEIN: 5.8 G/DL (ref 6.4–8.3)
WBC # BLD: 8.5 K/UL (ref 3.5–11.3)

## 2022-08-16 PROCEDURE — 6360000002 HC RX W HCPCS: Performed by: STUDENT IN AN ORGANIZED HEALTH CARE EDUCATION/TRAINING PROGRAM

## 2022-08-16 PROCEDURE — 6360000002 HC RX W HCPCS

## 2022-08-16 PROCEDURE — 6370000000 HC RX 637 (ALT 250 FOR IP): Performed by: STUDENT IN AN ORGANIZED HEALTH CARE EDUCATION/TRAINING PROGRAM

## 2022-08-16 PROCEDURE — 80076 HEPATIC FUNCTION PANEL: CPT

## 2022-08-16 PROCEDURE — 94761 N-INVAS EAR/PLS OXIMETRY MLT: CPT

## 2022-08-16 PROCEDURE — 94640 AIRWAY INHALATION TREATMENT: CPT

## 2022-08-16 PROCEDURE — 83735 ASSAY OF MAGNESIUM: CPT

## 2022-08-16 PROCEDURE — 99231 SBSQ HOSP IP/OBS SF/LOW 25: CPT | Performed by: INTERNAL MEDICINE

## 2022-08-16 PROCEDURE — 83605 ASSAY OF LACTIC ACID: CPT

## 2022-08-16 PROCEDURE — 2580000003 HC RX 258: Performed by: STUDENT IN AN ORGANIZED HEALTH CARE EDUCATION/TRAINING PROGRAM

## 2022-08-16 PROCEDURE — 2060000000 HC ICU INTERMEDIATE R&B

## 2022-08-16 PROCEDURE — 99291 CRITICAL CARE FIRST HOUR: CPT | Performed by: INTERNAL MEDICINE

## 2022-08-16 PROCEDURE — 94660 CPAP INITIATION&MGMT: CPT

## 2022-08-16 PROCEDURE — 85025 COMPLETE CBC W/AUTO DIFF WBC: CPT

## 2022-08-16 PROCEDURE — 80048 BASIC METABOLIC PNL TOTAL CA: CPT

## 2022-08-16 RX ORDER — CLONAZEPAM 0.5 MG/1
0.25 TABLET ORAL 2 TIMES DAILY PRN
Status: DISCONTINUED | OUTPATIENT
Start: 2022-08-16 | End: 2022-08-17 | Stop reason: HOSPADM

## 2022-08-16 RX ORDER — ENOXAPARIN SODIUM 100 MG/ML
40 INJECTION SUBCUTANEOUS DAILY
Status: DISCONTINUED | OUTPATIENT
Start: 2022-08-16 | End: 2022-08-17 | Stop reason: HOSPADM

## 2022-08-16 RX ORDER — MAGNESIUM SULFATE IN WATER 40 MG/ML
2000 INJECTION, SOLUTION INTRAVENOUS ONCE
Status: COMPLETED | OUTPATIENT
Start: 2022-08-16 | End: 2022-08-16

## 2022-08-16 RX ADMIN — DEXAMETHASONE SODIUM PHOSPHATE 4 MG: 4 INJECTION, SOLUTION INTRAMUSCULAR; INTRAVENOUS at 21:31

## 2022-08-16 RX ADMIN — ASPIRIN 81 MG: 81 TABLET, CHEWABLE ORAL at 08:24

## 2022-08-16 RX ADMIN — DEXAMETHASONE SODIUM PHOSPHATE 4 MG: 4 INJECTION, SOLUTION INTRAMUSCULAR; INTRAVENOUS at 08:24

## 2022-08-16 RX ADMIN — DEXAMETHASONE SODIUM PHOSPHATE 4 MG: 4 INJECTION, SOLUTION INTRAMUSCULAR; INTRAVENOUS at 02:50

## 2022-08-16 RX ADMIN — MAGNESIUM SULFATE HEPTAHYDRATE 2000 MG: 40 INJECTION, SOLUTION INTRAVENOUS at 08:38

## 2022-08-16 RX ADMIN — DEXAMETHASONE SODIUM PHOSPHATE 4 MG: 4 INJECTION, SOLUTION INTRAMUSCULAR; INTRAVENOUS at 15:51

## 2022-08-16 RX ADMIN — IPRATROPIUM BROMIDE AND ALBUTEROL SULFATE 1 AMPULE: .5; 3 SOLUTION RESPIRATORY (INHALATION) at 20:43

## 2022-08-16 RX ADMIN — ENOXAPARIN SODIUM 40 MG: 100 INJECTION SUBCUTANEOUS at 12:58

## 2022-08-16 RX ADMIN — METOPROLOL TARTRATE 12.5 MG: 25 TABLET ORAL at 21:32

## 2022-08-16 RX ADMIN — Medication 600 MG: at 08:24

## 2022-08-16 RX ADMIN — Medication 600 MG: at 21:31

## 2022-08-16 RX ADMIN — PANTOPRAZOLE SODIUM 40 MG: 40 TABLET, DELAYED RELEASE ORAL at 08:24

## 2022-08-16 RX ADMIN — SODIUM CHLORIDE, PRESERVATIVE FREE 10 ML: 5 INJECTION INTRAVENOUS at 21:32

## 2022-08-16 RX ADMIN — IPRATROPIUM BROMIDE AND ALBUTEROL SULFATE 1 AMPULE: .5; 3 SOLUTION RESPIRATORY (INHALATION) at 10:13

## 2022-08-16 RX ADMIN — LEVETIRACETAM 500 MG: 500 TABLET, FILM COATED ORAL at 08:24

## 2022-08-16 RX ADMIN — LEVETIRACETAM 500 MG: 500 TABLET, FILM COATED ORAL at 21:31

## 2022-08-16 RX ADMIN — FOLIC ACID 1 MG: 1 TABLET ORAL at 08:24

## 2022-08-16 RX ADMIN — METOPROLOL TARTRATE 12.5 MG: 25 TABLET ORAL at 08:24

## 2022-08-16 RX ADMIN — SODIUM CHLORIDE, PRESERVATIVE FREE 10 ML: 5 INJECTION INTRAVENOUS at 08:24

## 2022-08-16 RX ADMIN — Medication 3 MG: at 21:31

## 2022-08-16 RX ADMIN — CLONAZEPAM 0.25 MG: 0.5 TABLET ORAL at 02:21

## 2022-08-16 RX ADMIN — LISINOPRIL 5 MG: 5 TABLET ORAL at 08:24

## 2022-08-16 ASSESSMENT — PAIN SCALES - GENERAL: PAINLEVEL_OUTOF10: 2

## 2022-08-16 NOTE — PROGRESS NOTES
Today's Date: 8/16/2022  Patient Name: Giuliano Álvarez  Date of admission: 8/11/2022  1:25 AM  Patient's age: 74 y.o., 1955  Admission Dx: Acute respiratory failure ([facility] [J96.00]  NSTEMI (non-ST elevated myocardial infarction) ([facility] [address] [I21.4]    Reason for Consult: management recommendations  Requesting Physician: Janice Gunderson MD    CHIEF COMPLAINT:  Shortness of breathe, Diplopia    History Obtained From:  patient    Interim history  Patient feeling better, double vision is improving. Feeling better   Plans for likely discharge tomorrow     HISTORY OF PRESENT ILLNESS:      The patient is a 74 y.o.   female who is admitted to the hospital for shortness of breath and double vision, patient has history of lung adenocarcinoma status post lobectomy with evidence of recurrent disease in the brain back in March 2022 status post radiation craniotomy and currently on palliative chemoimmunotherapy recently LFTs up required holding immunotherapy, patient for the last 2 weeks started having double vision and worsening shortness of breath and she was admitted to emergency room CT of the brain did show fluid leak/subdural leak and was evaluated by neurosurgery  On admission to emergency room found to have high troponin and non-ST elevation MI      Oncology history    Diagnosis:   Left upper lobe invasive lung adenocarcinoma, Status post lobectomy 9/28/18, Pathologic stage: pT1c, pN2, stage IIIa R1 with positive margin,    Will start chemotherapy followed By RT  Carbo taxol cycle 1 on 11/14/18  Radiation Therapy completed on 3/29/19  Unfortunately on 3/26/2022 she presented with seizure activity and her CT scan MRI showed multiple supra and infratentorial intraparenchymal lesion consistent with metastatic disease in brain  Her CT chest abdomen pelvis on 3/27/2022 showed 2.1 cm right adrenal nodule suspicious for metastasis  On 3/29/2022 she underwent right-sided craniotomy with resection of intraaxial brain tumor and completed SRS/gamma knife to 6 intracranial lesion on 4/25/2022  Next-generation sequencing is negative for PD-L1, negative for EGFR, ALK, RET, ROS1, BRAF, HER2 mutations  Started on Agitar    Past Medical History:   has a past medical history of Anxiety, Benign polyp of large intestine, Cancer ([facility] [address] COPD (chronic obstructive pulmonary disease) ([facility] [address] Hx of blood clots, Hyperlipidemia, Hypertension, Liver hemangioma, Lung nodule, Snores, Uterine fibroid, and Wears glasses. Past Surgical History:   has a past surgical history that includes Hysterectomy (2010); Abdominal hernia repair (2012); Colonoscopy; Lung biopsy; Breast biopsy (Left, 1983); Lung removal, partial (Left, 09/28/2018); pr rmvl lung other than pneumonectomy 1 lobe lobect (Left, 09/28/2018); bronchoscopy (10/01/2018); pr [facility] incl fluor gdnce dx w/cell washg spx (N/A, 10/29/2018); other surgical history (Right, 11/05/2018); Hysterectomy, total abdominal; craniotomy (N/A, 03/29/2022); and IR PORT PLACEMENT > 5 YEARS (04/13/2022). Medications:    Reviewed in Epic     Allergies:  Codeine    Social History:   reports that she quit smoking about 22 years ago. Her smoking use included cigarettes. She has a 45.00 pack-year smoking history. She has never used smokeless tobacco. She reports current alcohol use. She reports that she does not use drugs. Family History: family history includes Cancer in her mother and sister. REVIEW OF SYSTEMS:    Constitutional: No fever or chills.  No night sweats, no weight loss   Eyes: No eye discharge, double vision, or eye pain   HEENT: negative for sore mouth, sore throat, hoarseness and voice change   Respiratory: Positive for shortness of breath cough no  hemoptysis, chest pain   Cardiovascular: negative for chest pain, positive for dyspnea, no palpitations, positive for orthopnea and PND   Gastrointestinal: negative for nausea, vomiting, diarrhea, constipation, abdominal pain, Dysphagia, hematemesis and hematochezia   Genitourinary: negative for frequency, dysuria, nocturia, urinary incontinence, and hematuria   Integument: negative for rash, skin lesions, bruises.    Hematologic/Lymphatic: negative for easy bruising, bleeding, lymphadenopathy, or petechiae   Endocrine: negative for heat or cold intolerance,weight changes, change in bowel habits and hair loss   Musculoskeletal: negative for myalgias, arthralgias, pain, joint swelling,and bone pain   Neurological: Positive for headache and double vision      PHYSICAL EXAM:      /76   Pulse 92   Temp 98.2 °F (36.8 °C) (Oral)   Resp 24   Ht 4' 11\" (1.499 m)   Wt 151 lb 3.2 oz (68.6 kg)   SpO2 91%   BMI 30.54 kg/m²    Temp (24hrs), Av.1 °F (36.7 °C), Min:97.9 °F (36.6 °C), Max:98.8 °F (37.1 °C)    General appearance -moderate respiratory distress ,look ill  Mental status - alert and cooperative   Eyes - pupils equal and reactive, extraocular eye movements intact   Ears - bilateral TM's and external ear canals normal   Mouth - mucous membranes moist, pharynx normal without lesions   Neck - supple, no significant adenopathy   Lymphatics - no palpable lymphadenopathy, no hepatosplenomegaly   Chest - clear to auscultation, no wheezes, rales or rhonchi, symmetric air entry   Heart - normal rate, regular rhythm, normal S1, S2, no murmurs  Abdomen - soft, nontender, nondistended, no masses or organomegaly   Neurological - alert, oriented, normal speech, no focal findings or movement disorder noted   Musculoskeletal - no joint tenderness, deformity or swelling   Extremities - peripheral pulses normal, no pedal edema, no clubbing or cyanosis   Skin - normal coloration and turgor, no rashes, no suspicious skin lesions noted ,    DATA:    Labs:   CBC:   Recent Labs     08/15/22  0301 08/15/22  1823 08/16/22  0301   WBC 7.2  --  8.5   HGB 11.9 11.7* 11.6*   HCT 36.2* 36.8 34.3*     --  281     BMP:   Recent Labs     08/15/22  1823 08/15/22  1833 08/16/22  0301   *  --  135   K 3.4*  --  4.4   CO2 33*  --  33*   BUN 29*  --  31*   CREATININE 0.63 1.07 0.41*   LABGLOM >60 51* >60   GLUCOSE 171*  --  117*     PT/INR:   No results for input(s): PROTIME, INR in the last 72 hours. IMAGING DATA:  CT ABDOMEN PELVIS WO CONTRAST Additional Contrast? None   Preliminary Result   1. Subcutaneous soft tissue stranding in the right anterior thigh and groin   which is hyperdense in attenuation likely reflecting underlying   hemorrhage/blood products without discernible focal hematoma. There is mild   extension into the right anterior pelvis along the right external iliac   vessels. No retroperitoneal hematoma is otherwise identified. 2. No other acute abdominal or pelvic abnormality. 3. Right basilar consolidative opacity with trace bilateral pleural effusions. MRI ORBITS FACE NECK W WO CONTRAST   Final Result   No mass or abnormal enhancement in the orbits. MRI BRAIN W WO CONTRAST   Final Result   Multiple enhancing metastatic foci throughout the brain in the majority of   these are similar in size compared to prior examination. There is an   enhancing focus abutting the dura in the left temporal lobe posteriorly that   is slightly smaller compared to prior. There is an enhancing focus in the   medial left temporal lobe posteriorly that is smaller compared to prior. A   punctate focus in the inferior right frontal lobe is less pronounced. XR CHEST PORTABLE   Final Result   No significant interval change. Chronic findings at the left perihilar lung. Minimal blunting at the costophrenic angles, likely atelectasis.              Primary Problem  Acute respiratory failure 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) [J96.11] 08/11/2022     Priority: Medium    Centrilobular emphysema ([facility] [J43.2] 08/11/2022     Priority: Medium    Malignant neoplasm of upper lobe of left lung [facility]) [C34.12] 02/12/2019         IMPRESSION:   Adenocarcinoma of the left lung status post lobectomy  Evidence of cancer recurrence in the brain/brain metastasis  S/p craniotomy on March 29, 2022  Recent complaint of double vision  Worsening shortness of breath  Non-ST elevation acute MI  Immunotherapy induced high LFTs and pneumonitis? On chemoimmunotherapy    RECOMMENDATIONS:  I reviewed the laboratory data, imaging studies, discussed the diagnosis and possible treatment    Patient with new onset of double vision . CSF studies show negative cytology , MRI is negative. Cardiac workup underway , on heparin , no bleeding     Pt is aware of the cytology results. Considering other options for anticancer treatment  Ok to discharge will finalize discussion as outpatient     Discussed with patient and Nurse. Thank you for asking us to see this patient. This note is created with the assistance of a speech recognition program.  While intending to generate a document that actually reflects the content of the visit, the document can still have some errors including those of syntax and sound a like substitutions which may escape proof reading. It such instances, actual meaning can be extrapolated by contextual diversion.      Hematologist/Medical Oncologist

## 2022-08-16 NOTE — PLAN OF CARE
Problem: Discharge Planning  Goal: Discharge to home or other facility with appropriate resources  Outcome: Progressing     Problem: Safety - Adult  Goal: Free from fall injury  Outcome: Progressing     Problem: ABCDS Injury Assessment  Goal: Absence of physical injury  Outcome: Progressing     Problem: Skin/Tissue Integrity  Goal: Absence of new skin breakdown  Description: 1. Monitor for areas of redness and/or skin breakdown  2. Assess vascular access sites hourly  3. Every 4-6 hours minimum:  Change oxygen saturation probe site  4. Every 4-6 hours:  If on nasal continuous positive airway pressure, respiratory therapy assess nares and determine need for appliance change or resting period.   Outcome: Progressing     Problem: Respiratory - Adult  Goal: Achieves optimal ventilation and oxygenation  Outcome: Progressing     Problem: Pain  Goal: Verbalizes/displays adequate comfort level or baseline comfort level  Outcome: Progressing

## 2022-08-16 NOTE — PROGRESS NOTES
[facility]   Progress Note                    Date:   8/16/2022  Patient name:  Leticia Spann  Date of admission:  8/11/2022  1:25 AM  MRN:   [id number]  YOB: 1955  PCP:    Chantale Souza MD    Reason for Admission:  Acute respiratory failure ([facility] [address] [J96.00]  NSTEMI (non-ST elevated myocardial infarction) ([facility] [address] [I21.4]    Subjective:   Patient seen and examined at the bedside:  No significant events overnight  Patient got the heart cath yesterday with nonobstructive coronary artery disease and apical hypokinesia suggestive of stress cardiomyopathy  No active complaints  Medications:   Scheduled Meds:   acetylcysteine  600 mg Oral BID    metoprolol tartrate  12.5 mg Oral BID    sodium chloride flush  5-40 mL IntraVENous 2 times per day    lisinopril  5 mg Oral Daily    potassium bicarb-citric acid  60 mEq Oral Daily    dexamethasone  4 mg IntraVENous Q6H    sodium chloride flush  5-40 mL IntraVENous 2 times per day    ipratropium-albuterol  1 ampule Inhalation Q4H WA    aspirin  81 mg Oral Daily    levETIRAcetam  500 mg Oral BID    folic acid  1 mg Oral Daily    pantoprazole  40 mg Oral QAM AC     Continuous Infusions:   sodium chloride      sodium chloride Stopped (08/13/22 1836)     CBC:   Recent Labs     08/14/22  0318 08/15/22  0301 08/15/22  1823 08/16/22  0301   WBC 7.8 7.2  --  8.5   HGB 13.1 11.9 11.7* 11.6*    283  --  281       BMP:    Recent Labs     08/15/22  0301 08/15/22  1823 08/15/22  1833 08/16/22  0301   * 134*  --  135   K 4.1 3.4*  --  4.4   CL 89* 91*  --  94*   CO2 32* 33*  --  33*   BUN 26* 29*  --  31*   CREATININE 0.45* 0.63 1.07 0.41*   GLUCOSE 130* 171*  --  117*       Hepatic:   Recent Labs     08/14/22  0318 08/15/22  0301 08/16/22  0301   AST 77* 65* 62*   * 153* 154*   BILITOT 0.38 0.36 0.47   ALKPHOS 61 57 55           Objective:   Vitals: /74   Pulse 95   Temp 98.1 °F (36.7 °C) (Oral)   Resp 22   Ht 4' 11\" (1.499 m) Wt 151 lb 3.2 oz (68.6 kg)   SpO2 93%   BMI 30.54 kg/m²    Last Recorded Weight:  [unfilled]    Constitutional and General Appearance:   alert, cooperative, no distress and appears stated age  Respiratory:  No for increased work of breathing. On auscultation: clear to auscultation bilaterally  Cardiovascular: The apical impulse is not displaced  Heart tones are crisp and normal. Regular S1 and S2. No murmurs. Abdomen:   No masses or tenderness  Bowel sounds present  Extremities:   No Cyanosis or Clubbing   Lower extremity edema:    Skin: Warm and dry  Neurological:  Alert and oriented. Moves all extremities well    Diagnostic Studies:     [id number]  Normal sinus rhythm diffuse T wave inversion      ECHO: 2022  Normal LV size and wall thickness. No obvious wall motion abnormality seen. Moderate global hypokinesis  Moderately reduced LV systolic function with LVEF 40-45 %. Normal RV size and function. RV systolic pressure 24 mmHg  LA and RA appears normal in size. No obvious significant structural valvular abnormality noted. No significant valvular stenosis or regurgitation noted. Normal aortic root dimension. No significant pericardial effusion noted. IVC normal size difficult to assess respiratory variation     Stress Test:   Not obtained  Cardiac Angiography:  Not obtained    Angiography 08/15/2022   Cardiac Arteries and Lesion Findings       LMCA: Normal 0% stenosis. LAD: Mild irregularities 10-20%. LCx: Mild irregularities 10-20%. RCA: Mild irregularities 10-20%. Coronary Tree        Dominance: Right       LV Analysis   LV function assessed as:Abnormal.   Ejection Fraction   +----------------------------------------------------------------------+---+   ! Method                                                                ! EF%! +----------------------------------------------------------------------+---+   ! LV gram !39 !   +----------------------------------------------------------------------+---+        Patient Active Problem List:     Status post lobectomy of lung     Non-small cell cancer of left lung (HCC)     Malignant neoplasm of upper lobe of left lung (HCC)     Essential hypertension     Anxiety     Intraparenchymal hemorrhage of brain ([facility] [address]     New onset seizure (HCC)     Mass of occipital region     Hyperglycemia     Hypokalemia     Vasogenic edema (HCC)     Brain metastases (HCC)     Adrenal mass (HCC) - right      Episode of loss of consciousness     Focal epilepsy ([facility] [address]     Primary malignant neoplasm of lung with metastasis to brain [facility])     Acute respiratory failure (HCC)     NSTEMI (non-ST elevated myocardial infarction) ([facility] [address]     Chronic respiratory failure with hypoxia (HCC)     Centrilobular emphysema (HCC)  Assessment / Acute Cardiac Problems:   Initial consult for elevated troponin suspect type I NSTEMI, EKG shows lateral and inferior leads T wave inversions ,no chest pain, troponins are trending down.   Nonobstructive coronary artery disease with apical hypokinesia possible stress cardiomyopathy on cardiac cath from 08/15/2022  Acute onset systolic heart failure with EF 40-45% on echo from 08/11/2022 with global hypokinesis  Left upper lobe invasive lung adenocarcinoma, Status post lobectomy 9/28/18  Acute hypoxic respiratory failure likely due to pneumonitis as a result of chemoimmunotherapy  Brain mets s/p right-sided craniotomy with resection of intraaxial brain tumor 03/29/2022 on PDL-1 agent at baseline  Hypertension  Hyperlipidemia  Left leg DVT  Liver hemangioma  Elevated liver enzymes -suspected autoimmune hepatitis  Plan of Treatment/Recommendations:   Continue aspirin 81 mg daily, lisinopril 5 mg daily metoprolol 12.5 mg twice daily  Patient is not on statins due to elevated LFT which are improving  Replace Mg>2 /K>4   Cardiology will sign off today      Sally Wills MD  Resident internal medicine, PGY [address] [facility],  [facility].  6:21 AM 08/16/22'      Attending Physician Statement  I have discussed the case of Ashley Shaw including pertinent history and exam findings with the resident. I have seen and examined the patient and the key elements of the encounter have been performed by me. I agree with the assessment, plan and orders as documented by the resident With changes made to the note.      Electronically signed by Brian Luu MD on 8/16/2022 at 12:23 PM.    [facility]      [phone]

## 2022-08-16 NOTE — PLAN OF CARE
Problem: Discharge Planning  Goal: Discharge to home or other facility with appropriate resources  Outcome: Progressing     Problem: Safety - Adult  Goal: Free from fall injury  Outcome: Progressing     Problem: ABCDS Injury Assessment  Goal: Absence of physical injury  Outcome: Progressing     Problem: Skin/Tissue Integrity  Goal: Absence of new skin breakdown  Outcome: Progressing     Problem: Respiratory - Adult  Goal: Achieves optimal ventilation and oxygenation  8/15/2022 2000 by Jane Mccoy RN  Outcome: Progressing  8/15/2022 1712 by Matt Ernandez RCP  Outcome: Progressing     Problem: Pain  Goal: Verbalizes/displays adequate comfort level or baseline comfort level  Outcome: Progressing

## 2022-08-16 NOTE — PROGRESS NOTES
tumor and completed SRS/gamma knife to 6 intracranial lesion on 4/25/2022  Next-generation sequencing is negative for PD-L1, negative for EGFR, ALK, RET, ROS1, BRAF, HER2 mutations  Started on [place]    Past Medical History:   has a past medical history of Anxiety, Benign polyp of large intestine, Cancer ([facility] [address] COPD (chronic obstructive pulmonary disease) ([facility] [address] Hx of blood clots, Hyperlipidemia, Hypertension, Liver hemangioma, Lung nodule, Snores, Uterine fibroid, and Wears glasses. Past Surgical History:   has a past surgical history that includes Hysterectomy (2010); Abdominal hernia repair (2012); Colonoscopy; Lung biopsy; Breast biopsy (Left, 1983); Lung removal, partial (Left, 09/28/2018); pr rmvl lung other than pneumonectomy 1 lobe lobect (Left, 09/28/2018); bronchoscopy (10/01/2018); pr [facility] incl fluor gdnce dx w/cell washg spx (N/A, 10/29/2018); other surgical history (Right, 11/05/2018); Hysterectomy, total abdominal; craniotomy (N/A, 03/29/2022); and IR PORT PLACEMENT > 5 YEARS (04/13/2022). Medications:    Reviewed in Epic     Allergies:  Codeine    Social History:   reports that she quit smoking about 22 years ago. Her smoking use included cigarettes. She has a 45.00 pack-year smoking history. She has never used smokeless tobacco. She reports current alcohol use. She reports that she does not use drugs. Family History: family history includes Cancer in her mother and sister. REVIEW OF SYSTEMS:    Constitutional: No fever or chills.  No night sweats, no weight loss   Eyes: No eye discharge, double vision, or eye pain   HEENT: negative for sore mouth, sore throat, hoarseness and voice change   Respiratory: Positive for shortness of breath cough no  hemoptysis, chest pain   Cardiovascular: negative for chest pain, positive for dyspnea, no palpitations, positive for orthopnea and PND   Gastrointestinal: negative for nausea, vomiting, diarrhea, constipation, abdominal pain, Dysphagia, hematemesis and hematochezia   Genitourinary: negative for frequency, dysuria, nocturia, urinary incontinence, and hematuria   Integument: negative for rash, skin lesions, bruises.    Hematologic/Lymphatic: negative for easy bruising, bleeding, lymphadenopathy, or petechiae   Endocrine: negative for heat or cold intolerance,weight changes, change in bowel habits and hair loss   Musculoskeletal: negative for myalgias, arthralgias, pain, joint swelling,and bone pain   Neurological: Positive for headache and double vision      PHYSICAL EXAM:      BP (!) 90/49   Pulse (!) 113   Temp 98.1 °F (36.7 °C) (Oral)   Resp 23   Ht 4' 11\" (1.499 m)   Wt 151 lb 3.2 oz (68.6 kg)   SpO2 92%   BMI 30.54 kg/m²    Temp (24hrs), Av.1 °F (36.7 °C), Min:97.5 °F (36.4 °C), Max:98.6 °F (37 °C)    General appearance -moderate respiratory distress ,look ill  Mental status - alert and cooperative   Eyes - pupils equal and reactive, extraocular eye movements intact   Ears - bilateral TM's and external ear canals normal   Mouth - mucous membranes moist, pharynx normal without lesions   Neck - supple, no significant adenopathy   Lymphatics - no palpable lymphadenopathy, no hepatosplenomegaly   Chest - clear to auscultation, no wheezes, rales or rhonchi, symmetric air entry   Heart - normal rate, regular rhythm, normal S1, S2, no murmurs  Abdomen - soft, nontender, nondistended, no masses or organomegaly   Neurological - alert, oriented, normal speech, no focal findings or movement disorder noted   Musculoskeletal - no joint tenderness, deformity or swelling   Extremities - peripheral pulses normal, no pedal edema, no clubbing or cyanosis   Skin - normal coloration and turgor, no rashes, no suspicious skin lesions noted ,    DATA:    Labs:   CBC:   Recent Labs     22  0318 08/15/22  0301 08/15/22  1823   WBC 7.8 7.2  --    HGB 13.1 11.9 11.7*   HCT 38.7 36.2* 36.8    283  --      BMP:   Recent Labs 08/15/22  0301 08/15/22  1823 08/15/22  1833   * 134*  --    K 4.1 3.4*  --    CO2 32* 33*  --    BUN 26* 29*  --    CREATININE 0.45* 0.63 1.07   LABGLOM >60 >60 51*   GLUCOSE 130* 171*  --      PT/INR:   No results for input(s): PROTIME, INR in the last 72 hours. IMAGING DATA:  MRI ORBITS FACE NECK W WO CONTRAST   Final Result   No mass or abnormal enhancement in the orbits. MRI BRAIN W WO CONTRAST   Final Result   Multiple enhancing metastatic foci throughout the brain in the majority of   these are similar in size compared to prior examination. There is an   enhancing focus abutting the dura in the left temporal lobe posteriorly that   is slightly smaller compared to prior. There is an enhancing focus in the   medial left temporal lobe posteriorly that is smaller compared to prior. A   punctate focus in the inferior right frontal lobe is less pronounced. XR CHEST PORTABLE   Final Result   No significant interval change. Chronic findings at the left perihilar lung. Minimal blunting at the costophrenic angles, likely atelectasis.          CT ABDOMEN PELVIS WO CONTRAST Additional Contrast? None    (Results Pending)       Primary Problem  Acute respiratory failure 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) [J96.11] 08/11/2022     Priority: Medium    Centrilobular emphysema ([address] [J43.2] 08/11/2022     Priority: Medium    Malignant neoplasm of upper lobe of left lung ([address] [C34.12] 02/12/2019         IMPRESSION:   Adenocarcinoma of the left lung status post lobectomy  Evidence of cancer recurrence in the brain/brain metastasis  S/p craniotomy on March 29, 2022  Recent complaint of double vision  Worsening shortness of breath  Non-ST elevation acute MI  Immunotherapy induced high LFTs and pneumonitis? On chemoimmunotherapy    RECOMMENDATIONS:  I reviewed the laboratory data, imaging studies, discussed the diagnosis and possible treatment    Patient with new onset of double vision . CSF studies show negative cytology , MRI is negative. Cardiac workup underway , on heparin , no bleeding     Pt is aware of the cytology results. She is not interested in going back to immunotherapy. Will consider alternative options as outpatient (XRT/ TKI,,etc)     Discussed with patient and Nurse. Thank you for asking us to see this patient. This note is created with the assistance of a speech recognition program.  While intending to generate a document that actually reflects the content of the visit, the document can still have some errors including those of syntax and sound a like substitutions which may escape proof reading. It such instances, actual meaning can be extrapolated by contextual diversion.      Hematologist/Medical Oncologist

## 2022-08-16 NOTE — PROGRESS NOTES
Critical Care Team - Daily Progress Note      Date and time: 8/16/2022 8:13 AM  Patient's name:  Guillermo Dupree  Medical Record Number: [id number]  Patient's account/billing number: [de-identified]  Patient's YOB: 1955  Age: 74 y.o. Date of Admission: 8/11/2022  1:25 AM  Length of stay during current admission: 5      Primary Care Physician: Radha Barnes MD  ICU Attending Physician: Dr. Jasmyne Her Status: Full Code    Reason for ICU admission: No chief complaint on file. Subjective:     OVERNIGHT EVENTS:      Hypotensive overnight. Today is off pressors. Hemodynamically stable afebrile. CT revealed no hematoma. Does show stable 2.6 cm right hepatic dome lesion and scattered hepatic cysts. Cytology negative for malignancy. Patient is awake and oriented obeying commands possible transfer today. Hemoglobin stable 11.   We will discuss starting DVT prophylaxis           AWAKE & FOLLOWING COMMANDS:  [] No   [x] Yes    CURRENT VENTILATION STATUS:     [] Ventilator  [] BIPAP  [x] Nasal Cannula [] Room Air      IF INTUBATED, ET TUBE MARKING AT LOWER LIP:       cms    SECRETIONS Amount:  [] Small [] Moderate  [] Large  [x] None  Color:     [] White [] Colored  [] Bloody    SEDATION:  RAAS Score:  [] Propofol gtt  [] Versed gtt  [] Ativan gtt   [x] No Sedation    PARALYZED:  [x] No    [] Yes    DIARRHEA:                [x] No                [] Yes  (C. Difficile status: [] positive                                                                                                                       [] negative                                                                                                                     [] pending)    VASOPRESSORS:  [x] No    [] Yes    If yes -   [] Levophed       [] Dopamine     [] Vasopressin       [] Dobutamine  [] Phenylephrine         [] Epinephrine    CENTRAL LINES:     [x] No   [] Yes   (Date of Insertion:   )           If yes -     [] Right IJ     [] Left IJ [] Right Femoral [] Left Femoral                   [] Right Subclavian [] Left Subclavian       MANCIA'S CATHETER:   [] No   [] Yes  (Date of Insertion:   )     URINE OUTPUT:            [x] Good   [] Low              [] Anuric    REVIEW OF SYSTEMS:  Constitutional: Negative for Fever, chills  Eyes: Negative for visual changes, diplopia  ENT: Negative for mouth sores, sore throat. Cardiovascular: Negative for lightheadedness ,chest pain, palpitations   Respiratory:Negative for Shortness of breath,cough or wheezing. Gastrointestinal: Negative for nausea/vomiting, change in bowel habits, abdominal pain  Genitourinary:Negative for change in bladder habits, dysuria, hematuria. Musculoskeletal: Negative for joint pain   Neurological: Negative for headache, change in muscle strength numbness/tingling      OBJECTIVE:     VITAL SIGNS:  /60   Pulse 95   Temp 97.9 °F (36.6 °C) (Oral)   Resp 19   Ht 4' 11\" (1.499 m)   Wt 151 lb 3.2 oz (68.6 kg)   SpO2 94%   BMI 30.54 kg/m²   Tmax over 24 hours:  Temp (24hrs), Av.2 °F (36.8 °C), Min:97.9 °F (36.6 °C), Max:98.8 °F (37.1 °C)      Patient Vitals for the past 8 hrs:   BP Temp Temp src Pulse Resp SpO2   22 0800 118/60 -- Oral 95 19 94 %   22 0711 -- 97.9 °F (36.6 °C) Oral -- -- --   22 0700 119/75 -- -- 91 15 94 %   22 0600 (!) 135/55 -- -- 92 23 94 %   22 0500 131/74 -- -- 95 22 93 %   22 0400 128/70 98.1 °F (36.7 °C) Oral 88 20 95 %   22 0300 131/65 -- -- 89 21 94 %   22 0200 115/72 98.3 °F (36.8 °C) Oral 85 20 92 %   22 0100 (!) 106/59 -- -- 87 17 93 %         Intake/Output Summary (Last 24 hours) at 2022 0813  Last data filed at 2022 0600  Gross per 24 hour   Intake 2194.4 ml   Output 900 ml   Net 1294.4 ml     Date 22 0000 - 22 2359   Shift 4529-1082 2570-6785 6162-2955 24 Hour Total   INTAKE   P.O.(mL/kg/hr) 480(0.9)   480   I. V.(mL/kg) [id number])   [id number])   Shift Total(mL/kg) 1921.9(28)   1921.9(28)   OUTPUT   Urine(mL/kg/hr) 800(1.5)   800   Shift Total(mL/kg) 800(11.7)   800(11.7)   Weight (kg) 68.6 68.6 68.6 68.6     Wt Readings from Last 3 Encounters:   08/15/22 151 lb 3.2 oz (68.6 kg)   08/12/22 160 lb (72.6 kg)   08/10/22 161 lb (73 kg)     Body mass index is 30.54 kg/m². PHYSICAL EXAM:  Constitutional: on nasal cannula  HEENT: PERRLA, EOMI, sclera clear, anicteric  Respiratory: clear to auscultation, no wheezes or rales and unlabored breathing. Cardiovascular: regular rate and rhythm, normal S1, S2, no murmur noted and 2+ pulses throughout  Abdomen: soft, nontender, nondistended, no masses or organomegaly  NEUROLOGIC: Awake, alert, oriented to name, place and time. Cranial nerves II-XII are grossly intact. Motor is 5 out of 5 bilaterally. Sensory is intact. Extremities:  peripheral pulses normal, no pedal edema,.       Any additional physical findings:      MEDICATIONS:  Scheduled Meds:   magnesium sulfate  2,000 mg IntraVENous Once    acetylcysteine  600 mg Oral BID    metoprolol tartrate  12.5 mg Oral BID    sodium chloride flush  5-40 mL IntraVENous 2 times per day    lisinopril  5 mg Oral Daily    dexamethasone  4 mg IntraVENous Q6H    sodium chloride flush  5-40 mL IntraVENous 2 times per day    ipratropium-albuterol  1 ampule Inhalation Q4H WA    aspirin  81 mg Oral Daily    levETIRAcetam  500 mg Oral BID    folic acid  1 mg Oral Daily    pantoprazole  40 mg Oral QAM AC     Continuous Infusions:   sodium chloride      sodium chloride Stopped (08/13/22 1836)     PRN Meds:   clonazePAM, 0.25 mg, BID PRN  sodium chloride flush, 5-40 mL, PRN  sodium chloride, , PRN  acetaminophen, 650 mg, Q4H PRN  sodium chloride flush, 10 mL, PRN  melatonin, 3 mg, Nightly PRN  sodium chloride flush, 10 mL, PRN  sodium chloride flush, 5-40 mL, PRN  sodium chloride, , PRN  ondansetron, 4 mg, Q8H PRN   Or  ondansetron, 4 mg, Q6H PRN  polyethylene glycol, 17 g, Daily PRN  acetaminophen, 650 mg, Q6H PRN   Or  acetaminophen, 650 mg, Q6H PRN      SUPPORT DEVICES: [] Ventilator [] BIPAP  [x] Nasal Cannula [] Room Air    VENT SETTINGS (Comprehensive) (if applicable):  Vent Information  Ventilator ID: [id number]  Additional Respiratory Assessments  Heart Rate: 95  Resp: 19  SpO2: 94 %    ABGs:     Lab Results   Component Value Date/Time    PHART 7.411 09/28/2018 12:46 PM    HTD9CNR 37.1 09/28/2018 12:46 PM    PO2ART 112.0 09/28/2018 12:46 PM    CDD8WZB 23.1 09/28/2018 12:46 PM    MGL3JFG 26 09/25/2018 12:03 PM    B8ULHSLS 98.7 09/28/2018 12:46 PM    FIO2 INFORMATION NOT PROVIDED 08/11/2022 09:46 AM     Lactic Acid:   Lab Results   Component Value Date/Time    LACTA 1.6 08/10/2022 01:09 PM         DATA:  Complete Blood Count:   Recent Labs     08/14/22  0318 08/15/22  0301 08/15/22  1823 08/16/22  0301   WBC 7.8 7.2  --  8.5   HGB 13.1 11.9 11.7* 11.6*   MCV 97.0 96.8  --  98.3    283  --  281   RBC 3.99 3.74*  --  3.49*   HCT 38.7 36.2* 36.8 34.3*   MCH 32.8 31.8  --  33.2   MCHC 33.9 32.9  --  33.8   RDW 14.5* 14.3  --  14.4   MPV 9.6 9.5  --  8.8        PT/INR:    Lab Results   Component Value Date/Time    PROTIME 12.7 08/10/2022 06:40 PM    INR 1.0 08/10/2022 06:40 PM     PTT:    Lab Results   Component Value Date/Time    APTT 54.2 08/15/2022 08:29 AM       Basal Metabolic Profile:   Recent Labs     08/15/22  0301 08/15/22  1823 08/15/22  1833 08/16/22  0301   * 134*  --  135   K 4.1 3.4*  --  4.4   BUN 26* 29*  --  31*   CREATININE 0.45* 0.63 1.07 0.41*   CL 89* 91*  --  94*   CO2 32* 33*  --  33*      Magnesium:   Lab Results   Component Value Date/Time    MG 1.6 08/16/2022 03:01 AM    MG 1.5 08/15/2022 06:23 PM    MG 1.9 08/14/2022 03:18 AM     Phosphorus: No results found for: PHOS  S. Calcium:  Recent Labs     08/16/22  0301   CALCIUM 8.9     S. Ionized Calcium:No results for input(s): IONCA in the last 72 hours.       Urinalysis:   Lab Results   Component Value Date/Time    NITRU NEGATIVE 09/25/2018 12:24 PM    COLORU YELLOW 09/25/2018 12:24 PM    PHUR 7.5 09/25/2018 12:24 PM    SPECGRAV 1.009 09/25/2018 12:24 PM    LEUKOCYTESUR NEGATIVE 09/25/2018 12:24 PM    UROBILINOGEN Normal 09/25/2018 12:24 PM    BILIRUBINUR NEGATIVE 09/25/2018 12:24 PM    GLUCOSEU NEGATIVE 09/25/2018 12:24 PM    KETUA NEGATIVE 09/25/2018 12:24 PM       CARDIAC ENZYMES: No results for input(s): CKMB, CKMBINDEX, TROPONINI in the last 72 hours. Invalid input(s): CKTOTAL;3  BNP: No results for input(s): BNP in the last 72 hours. LFTS  Recent Labs     08/14/22  0318 08/15/22  0301 08/16/22  0301   ALKPHOS 61 57 55   * 153* 154*   AST 77* 65* 62*   BILITOT 0.38 0.36 0.47   BILIDIR 0.14 0.14 0.15   LABALBU 4.3 3.8 3.8       AMYLASE/LIPASE/AMMONIA  No results for input(s): AMYLASE, LIPASE, AMMONIA in the last 72 hours. Last 3 Blood Glucose:   Recent Labs     08/14/22  0318 08/15/22  0301 08/15/22  1823 08/16/22  0301   GLUCOSE 130* 130* 171* 117*      HgBA1c:    Lab Results   Component Value Date/Time    LABA1C 5.0 06/28/2018 07:47 AM         TSH:    Lab Results   Component Value Date/Time    TSH 1.75 08/03/2022 10:13 AM     ANEMIA STUDIES  No results for input(s): LABIRON, TIBC, FERRITIN, VANAQABD82, FOLATE, OCCULTBLD in the last 72 hours.       Cultures during this admission:     Blood cultures:                 [] None drawn      [] Negative             []  Positive (Details:  )  Urine Culture:                   [] None drawn      [] Negative             []  Positive (Details:  )  Sputum Culture:               [] None drawn       [] Negative             []  Positive (Details:  )   Endotracheal aspirate:     [] None drawn       [] Negative             []  Positive (Details:  )        ASSESSMENT:     Principal Problem:    Acute respiratory failure ([facility] [address]  Active Problems:    NSTEMI (non-ST elevated myocardial infarction) ([facility] [address]    Chronic respiratory failure with hypoxia ([address] Centrilobular emphysema (HCC)    KRISTIN (internuclear ophthalmoplegia), bilateral    Pseudomeningocele    Conjugate gaze palsy    Malignant neoplasm of upper lobe of left lung (HCC)  Resolved Problems:    * No resolved hospital problems. *        PLAN:     NSTEMI  -ASA, bb daily  -no statin due to transaminitis  -Cardiology on board  -echo showed systolic function reduced EF 40-45%, global hypokinesis  -Cardiac cath revealed mild CAD. -mucomyst 600 mg BID x 4 doses to prevent JULIANA     Diplopia probably secondary to brain mets- MRI brain neg for any new lesion  -Follow Neuro recommendation. LP prior to cardiac cath and possible DAPT  -MRI orbit negative for acute abnormality  -Follow-up cytology from LP  Transaminitis due to [facility] immunotherapy:  -monitor LFTs  -Decadron 4 mg q6 daily    Subcutaneous fluid collection on head CT:  -neurosurgery following  -LP ton 8/13/22 to check for leptomeningeal disease prior to cath  -Awaiting results for LP    Essential hypertension  -increased lopressor dose to 12.5 mg BID     COPD  -Continue bronchodilators 4 times a day. DVT ppx: hep gtt  GI ppx: protonix      Geronimo Velasquez MD, M.D. Department of Internal Medicine/ Critical care  [facility][1])             8/16/2022, 8:13 AM    Attending Physician Statement  I have discussed the care of Mike Whatley, including pertinent history and exam findings with the resident. I have reviewed the key elements of all parts of the encounter with the resident. I have seen and examined the patient with the resident. I agree with the assessment and plan and status of the problem list as documented. I seen the patient during around today, chart reviewed, labs and medications reviewed overnight events noted.   Patient had hypotension overnight after she came back from cardiac catheterization as CT scan of the abdomen was done without retroperitoneal hematoma hemoglobin has been stable without blood drop she had received fluid bolus and responded to fluid boluses received 500 mL and then 500 mL again with blood pressure stabilized and since then no hypotension was reported no bradycardia patient is on metoprolol and tolerating it Lasix is on hold. She use 2 to 3 L nasal cannula and she did not use BiPAP overnight according to patient she was feeling better. She does have ecchymosis present in her right groin from cardiac catheterization did not show significant coronary artery disease. Her hemoglobin has been stable will start Lovenox for DVT prophylaxis. Heparin drip was stopped yesterday post cath. Will advised to continue BiPAP at night and as needed during the daytime. Continue with nasal cannula O2. Decadron per oncology and management of Decadron during hospitalization and after discharge per oncology. We will transfer her to stepdown with medicine team and will follow as pulmonary    Discussed with nursing staff, treatment and plan discussed. Discussed with respiratory therapist.    Total critical care time caring for this patient with life threatening, unstable organ failure, including direct patient contact, management of life support systems, review of data including imaging and labs, discussions with other team members and physicians at least 27  Min so far today, excluding procedures. Please note that this chart was generated using voice recognition Dragon dictation software. Although every effort was made to ensure the accuracy of this automated transcription, some errors in transcription may have occurred.      Nathaly Alvarez MD  8/16/2022 10:57 AM

## 2022-08-16 NOTE — TELEPHONE ENCOUNTER
Name: Isamar Parrish  : 1955  MRN: [id number]    Navigator reviewing chart and pt. Remains inpt. Plan to follow.    Electronically signed by Kylie Dickerson RN on 2022 at 12:55 PM

## 2022-08-16 NOTE — PLAN OF CARE
Problem: Discharge Planning  Goal: Discharge to home or other facility with appropriate resources  8/15/2022 2216 by Patricia Zimmerman RN  Outcome: Not Progressing  8/15/2022 2000 by Asa Betancourt RN  Outcome: Progressing     Problem: Safety - Adult  Goal: Free from fall injury  8/15/2022 2216 by Patricia Zimmerman RN  Outcome: Progressing  8/15/2022 2000 by Asa Betancourt RN  Outcome: Progressing     Problem: Skin/Tissue Integrity  Goal: Absence of new skin breakdown  Description: 1. Monitor for areas of redness and/or skin breakdown  2. Assess vascular access sites hourly  3. Every 4-6 hours minimum:  Change oxygen saturation probe site  4. Every 4-6 hours:  If on nasal continuous positive airway pressure, respiratory therapy assess nares and determine need for appliance change or resting period.   8/15/2022 2216 by Patricia Zimmerman RN  Outcome: Progressing  8/15/2022 2000 by Asa Betancourt RN  Outcome: Progressing     Problem: Discharge Planning  Goal: Discharge to home or other facility with appropriate resources  8/15/2022 2216 by Patricia Zimmerman RN  Outcome: Not Progressing  8/15/2022 2000 by Asa Betancourt RN  Outcome: Progressing

## 2022-08-16 NOTE — PROGRESS NOTES
Nutrition Assessment     Type and Reason for Visit: Reassess    Nutrition Recommendations/Plan:   Continue current diet   Continue to monitor during inpatient stay     Malnutrition Assessment:  Malnutrition Status: Insufficient data    Nutrition Assessment:  Patient reports improvements to appetite and intake. Feels that a lot of her weight loss is r/t water. last BM 8/13. Labs/meds reviewed    Estimated Daily Nutrient Needs:  Energy (kcal):  1800 to 1900 kcal/day       Protein (g):  70 to 80 g/day Weight Used for Protein Requirements: Current        Fluid (ml/day):  2100 to 2200 mL/day (30 mL/kg) Method Used for Fluid Requirements: ml/Kg    Nutrition Related Findings:   Labs/meds reviewed Wound Type: None    Current Nutrition Therapies:    ADULT DIET;  Regular    Anthropometric Measures:  Height: 4' 11\" (149.9 cm)  Current Body Wt: 151 lb (68.5 kg)   BMI: 30.5    Nutrition Diagnosis:   Inadequate oral intake related to  (poor appetite, curretn medical condition) as evidenced by poor intake prior to admission    Nutrition Interventions:   Food and/or Nutrient Delivery: Continue Current Diet  Nutrition Education/Counseling: No recommendation at this time  Coordination of Nutrition Care: Continue to monitor while inpatient  Plan of Care discussed with: patient    Goals:  Previous Goal Met:  (Goal set)  Goals: PO intake 75% or greater       Nutrition Monitoring and Evaluation:   Behavioral-Environmental Outcomes: None Identified  Food/Nutrient Intake Outcomes: Food and Nutrient Intake  Physical Signs/Symptoms Outcomes: Biochemical Data, Weight    Discharge Planning:    Continue current diet     Opal Johnson RD  Contact: [phone]

## 2022-08-17 VITALS
SYSTOLIC BLOOD PRESSURE: 102 MMHG | HEART RATE: 75 BPM | OXYGEN SATURATION: 96 % | HEIGHT: 59 IN | BODY MASS INDEX: 31.02 KG/M2 | DIASTOLIC BLOOD PRESSURE: 63 MMHG | RESPIRATION RATE: 18 BRPM | WEIGHT: 153.88 LBS | TEMPERATURE: 98.2 F

## 2022-08-17 LAB
ABSOLUTE EOS #: 0 K/UL (ref 0–0.44)
ABSOLUTE IMMATURE GRANULOCYTE: 0.09 K/UL (ref 0–0.3)
ABSOLUTE LYMPH #: 0.55 K/UL (ref 1.1–3.7)
ABSOLUTE MONO #: 0.83 K/UL (ref 0.1–1.2)
ALBUMIN SERPL-MCNC: 3.8 G/DL (ref 3.5–5.2)
ALBUMIN/GLOBULIN RATIO: 1.8 (ref 1–2.5)
ALP BLD-CCNC: 55 U/L (ref 35–104)
ALT SERPL-CCNC: 141 U/L (ref 5–33)
ANION GAP SERPL CALCULATED.3IONS-SCNC: 7 MMOL/L (ref 9–17)
AST SERPL-CCNC: 56 U/L
BASOPHILS # BLD: 0 % (ref 0–2)
BASOPHILS ABSOLUTE: 0 K/UL (ref 0–0.2)
BILIRUB SERPL-MCNC: 0.51 MG/DL (ref 0.3–1.2)
BILIRUBIN DIRECT: 0.17 MG/DL
BILIRUBIN, INDIRECT: 0.34 MG/DL (ref 0–1)
BUN BLDV-MCNC: 25 MG/DL (ref 8–23)
CALCIUM SERPL-MCNC: 9 MG/DL (ref 8.6–10.4)
CHLORIDE BLD-SCNC: 94 MMOL/L (ref 98–107)
CO2: 29 MMOL/L (ref 20–31)
CREAT SERPL-MCNC: 0.29 MG/DL (ref 0.5–0.9)
EOSINOPHILS RELATIVE PERCENT: 0 % (ref 1–4)
GFR AFRICAN AMERICAN: >60 ML/MIN
GFR NON-AFRICAN AMERICAN: >60 ML/MIN
GFR SERPL CREATININE-BSD FRML MDRD: ABNORMAL ML/MIN/{1.73_M2}
GLUCOSE BLD-MCNC: 109 MG/DL (ref 70–99)
HCT VFR BLD CALC: 34.3 % (ref 36.3–47.1)
HEMOGLOBIN: 11.9 G/DL (ref 11.9–15.1)
IMMATURE GRANULOCYTES: 1 %
LYMPHOCYTES # BLD: 6 % (ref 24–43)
MCH RBC QN AUTO: 33.5 PG (ref 25.2–33.5)
MCHC RBC AUTO-ENTMCNC: 34.7 G/DL (ref 28.4–34.8)
MCV RBC AUTO: 96.6 FL (ref 82.6–102.9)
MONOCYTES # BLD: 9 % (ref 3–12)
MORPHOLOGY: NORMAL
NRBC AUTOMATED: 0 PER 100 WBC
PDW BLD-RTO: 14.4 % (ref 11.8–14.4)
PLATELET # BLD: 274 K/UL (ref 138–453)
PMV BLD AUTO: 9.3 FL (ref 8.1–13.5)
POTASSIUM SERPL-SCNC: 4.3 MMOL/L (ref 3.7–5.3)
RBC # BLD: 3.55 M/UL (ref 3.95–5.11)
SEG NEUTROPHILS: 84 % (ref 36–65)
SEGMENTED NEUTROPHILS ABSOLUTE COUNT: 7.73 K/UL (ref 1.5–8.1)
SODIUM BLD-SCNC: 130 MMOL/L (ref 135–144)
TOTAL PROTEIN: 5.9 G/DL (ref 6.4–8.3)
WBC # BLD: 9.2 K/UL (ref 3.5–11.3)

## 2022-08-17 PROCEDURE — 6370000000 HC RX 637 (ALT 250 FOR IP): Performed by: STUDENT IN AN ORGANIZED HEALTH CARE EDUCATION/TRAINING PROGRAM

## 2022-08-17 PROCEDURE — 2700000000 HC OXYGEN THERAPY PER DAY

## 2022-08-17 PROCEDURE — 99239 HOSP IP/OBS DSCHRG MGMT >30: CPT | Performed by: INTERNAL MEDICINE

## 2022-08-17 PROCEDURE — 99232 SBSQ HOSP IP/OBS MODERATE 35: CPT | Performed by: INTERNAL MEDICINE

## 2022-08-17 PROCEDURE — 97166 OT EVAL MOD COMPLEX 45 MIN: CPT

## 2022-08-17 PROCEDURE — 80076 HEPATIC FUNCTION PANEL: CPT

## 2022-08-17 PROCEDURE — 94640 AIRWAY INHALATION TREATMENT: CPT

## 2022-08-17 PROCEDURE — 97162 PT EVAL MOD COMPLEX 30 MIN: CPT

## 2022-08-17 PROCEDURE — 97535 SELF CARE MNGMENT TRAINING: CPT

## 2022-08-17 PROCEDURE — 6360000002 HC RX W HCPCS

## 2022-08-17 PROCEDURE — 94761 N-INVAS EAR/PLS OXIMETRY MLT: CPT

## 2022-08-17 PROCEDURE — 2580000003 HC RX 258: Performed by: STUDENT IN AN ORGANIZED HEALTH CARE EDUCATION/TRAINING PROGRAM

## 2022-08-17 PROCEDURE — 97116 GAIT TRAINING THERAPY: CPT

## 2022-08-17 PROCEDURE — 36415 COLL VENOUS BLD VENIPUNCTURE: CPT

## 2022-08-17 PROCEDURE — 80048 BASIC METABOLIC PNL TOTAL CA: CPT

## 2022-08-17 PROCEDURE — 85025 COMPLETE CBC W/AUTO DIFF WBC: CPT

## 2022-08-17 PROCEDURE — 6360000002 HC RX W HCPCS: Performed by: STUDENT IN AN ORGANIZED HEALTH CARE EDUCATION/TRAINING PROGRAM

## 2022-08-17 RX ORDER — LISINOPRIL 5 MG/1
5 TABLET ORAL DAILY
Qty: 30 TABLET | Refills: 3 | Status: SHIPPED | OUTPATIENT
Start: 2022-08-18

## 2022-08-17 RX ORDER — PANTOPRAZOLE SODIUM 40 MG/1
40 TABLET, DELAYED RELEASE ORAL
Qty: 30 TABLET | Refills: 3 | Status: SHIPPED | OUTPATIENT
Start: 2022-08-18

## 2022-08-17 RX ORDER — IPRATROPIUM BROMIDE AND ALBUTEROL SULFATE 2.5; .5 MG/3ML; MG/3ML
3 SOLUTION RESPIRATORY (INHALATION)
Qty: 360 ML | Refills: 0 | Status: SHIPPED | OUTPATIENT
Start: 2022-08-17

## 2022-08-17 RX ORDER — DEXAMETHASONE 4 MG/1
4 TABLET ORAL 2 TIMES DAILY WITH MEALS
Qty: 60 TABLET | Refills: 0 | Status: SHIPPED | OUTPATIENT
Start: 2022-08-25 | End: 2022-08-31 | Stop reason: SDUPTHER

## 2022-08-17 RX ORDER — DEXAMETHASONE 4 MG/1
4 TABLET ORAL EVERY 6 HOURS
Qty: 28 TABLET | Refills: 0 | Status: SHIPPED | OUTPATIENT
Start: 2022-08-17 | End: 2022-08-24

## 2022-08-17 RX ORDER — ASPIRIN 81 MG/1
81 TABLET, CHEWABLE ORAL DAILY
Qty: 30 TABLET | Refills: 3 | Status: SHIPPED | OUTPATIENT
Start: 2022-08-18

## 2022-08-17 RX ADMIN — LISINOPRIL 5 MG: 5 TABLET ORAL at 08:42

## 2022-08-17 RX ADMIN — IPRATROPIUM BROMIDE AND ALBUTEROL SULFATE 1 AMPULE: .5; 3 SOLUTION RESPIRATORY (INHALATION) at 12:42

## 2022-08-17 RX ADMIN — FOLIC ACID 1 MG: 1 TABLET ORAL at 08:42

## 2022-08-17 RX ADMIN — ASPIRIN 81 MG: 81 TABLET, CHEWABLE ORAL at 08:42

## 2022-08-17 RX ADMIN — ENOXAPARIN SODIUM 40 MG: 100 INJECTION SUBCUTANEOUS at 08:42

## 2022-08-17 RX ADMIN — IPRATROPIUM BROMIDE AND ALBUTEROL SULFATE 1 AMPULE: .5; 3 SOLUTION RESPIRATORY (INHALATION) at 09:01

## 2022-08-17 RX ADMIN — PANTOPRAZOLE SODIUM 40 MG: 40 TABLET, DELAYED RELEASE ORAL at 08:42

## 2022-08-17 RX ADMIN — LEVETIRACETAM 500 MG: 500 TABLET, FILM COATED ORAL at 08:42

## 2022-08-17 RX ADMIN — DEXAMETHASONE SODIUM PHOSPHATE 4 MG: 4 INJECTION, SOLUTION INTRAMUSCULAR; INTRAVENOUS at 08:42

## 2022-08-17 RX ADMIN — IPRATROPIUM BROMIDE AND ALBUTEROL SULFATE 1 AMPULE: .5; 3 SOLUTION RESPIRATORY (INHALATION) at 16:00

## 2022-08-17 RX ADMIN — DEXAMETHASONE SODIUM PHOSPHATE 4 MG: 4 INJECTION, SOLUTION INTRAMUSCULAR; INTRAVENOUS at 15:51

## 2022-08-17 RX ADMIN — METOPROLOL TARTRATE 12.5 MG: 25 TABLET ORAL at 08:42

## 2022-08-17 RX ADMIN — DEXAMETHASONE SODIUM PHOSPHATE 4 MG: 4 INJECTION, SOLUTION INTRAMUSCULAR; INTRAVENOUS at 02:58

## 2022-08-17 RX ADMIN — SODIUM CHLORIDE, PRESERVATIVE FREE 10 ML: 5 INJECTION INTRAVENOUS at 08:42

## 2022-08-17 ASSESSMENT — ENCOUNTER SYMPTOMS
ABDOMINAL DISTENTION: 0
SHORTNESS OF BREATH: 1
CHEST TIGHTNESS: 0
RHINORRHEA: 0
ABDOMINAL PAIN: 0

## 2022-08-17 NOTE — PROGRESS NOTES
Patient port de-accessed. Discharge instructions given to patient. All medications thoroughly explained. All questions/concerns answered.

## 2022-08-17 NOTE — PROGRESS NOTES
[address] [place] [address]     Department of Internal Medicine - Staff Internal Medicine Service   ICU PATIENT TRANSFER NOTE        Patient:  Fern Campbell  YOB: 1955  MRN: [id number]     Acct: [de-identified]     Admit date: 8/11/2022    Code Status:-  Full code     Reason for ICU Admission:-       SUPPORT DEVICES: [] Ventilator [] BIPAP  [] Nasal Cannula [] Room Air    Consultations:- [] Cardiology [] Nephrology  [x] Hemo onco  [] GI                               [] ID [] ENT  [] Rheum [] Endo   []Physiotherapy                                 Others:-  Neurosurgery     NUTRITION:  [] NPO [] Tube Feeding (Specify: ) [] TPN  [] PO    Central Lines:- [x] No   [] Yes           If yes - Days/Date of Insertion. Pt seen,examined and Chart reviewed. ICU COURSE:    The patient is a 74 y.o. female with past medical history significant for Lung adenocarcinoma left lung lobectomy four years ago, brain metastasis status post subacute occipital craniotomy for tumor resection on 3/29/2022 currently in chemotherapy, hypertension,HLD,and COPD, patient uses BiPAP at night, normally on 1 L O2 per nursing staff. 8/11/2022 with ARF and NSTEMI Patient was transferred from [address] [place] [address] where she presented with concern of shortness of breath and double vision. She went for chemo therapy where she noted to have severe shortness of breath and worsening diplopia. She mention these symptoms she started couple of weeks back in which her Keytruda immunotherapy was stopped and she was started on steroid treatment but for next couple of weeks patient was unable to lie flat and had worsening dyspnea. Patient was tachypneic and desatted while she was moving around in [facility] so patient was started on BiPAP; significant elevation of troponin to 700 noted there, CTA negative for pulmonary embolism and CT head showed subcutaneous fluid leak or occipital lobe possibly subdural leak.   Heparin was started for NSTEMI after clearing from Dr. Shaq Vang, neurosurgery. Transferred to [facility] for further evaluation and management. Causes of dyspnea and hypoxia were considered to be possible COPD exacerbation, pneumonitis secondary to [facility] immunotherapy, pulmonary edema secondary to acute CHF exacerbation. Patient already has low lung volume reserves due to prior lobectomy. Since admission into the ICU, patient has been hemodynamically stable, not requiring any pressors. She has been saturating well on BiPAP overnight and 3 L nasal cannula during the day. Due to concern for elevated troponins and nonspecific ST segment changes on EKG, cardiology was consulted and patient underwent cardiac cath on 8/15/2022; she was found to have mild nonobstructive CAD and mildly reduced LV systolic function with apical hypokinesia, possible stress cardiomyopathy. Neurosurgery and neurology have been following the patient for concern of possible subdural leak following craniotomy as well as current symptoms of diplopia. During this admission, MRI brain was obtained which did not show any significant metastatic lesion enlarging lesions or new lesions in the brainstem. Ophthalmology was consulted and MRI orbits was obtained which was negative for any acute abnormalities which would explain the diplopia. LP was done by neurology due to concern of leptomeningeal metastasis as cause of the diplopia. Hematology oncology consulted for metastatic lung cancer; patient had been on Keytruda immunotherapy which was held 2 weeks ago due to concern of transaminitis, and now possibly pneumonitis as well as myocarditis. Patient had previously been on a steroid taper, on day of admission to [facility] MICU was supposed to start on oral prednisone 20 mg daily but steroid dosing has been increased by hematology oncology to 1 mg/kg; currently patient is on Decadron 4 mg every 6 hours for treatment of immunotherapy toxicity.        Physical Exam:   Vitals: BP (!) 147/82   Pulse 89   Temp 98.2 °F (36.8 °C) (Oral)   Resp 18   Ht 4' 11\" (1.499 m)   Wt 151 lb 3.2 oz (68.6 kg)   SpO2 93%   BMI 30.54 kg/m²   24 hour intake/output:  Intake/Output Summary (Last 24 hours) at 8/16/2022 2217  Last data filed at 8/16/2022 1123  Gross per 24 hour   Intake 1971.92 ml   Output 1025 ml   Net 946.92 ml     Last 3 weights: Wt Readings from Last 3 Encounters:   08/15/22 151 lb 3.2 oz (68.6 kg)   08/12/22 160 lb (72.6 kg)   08/10/22 161 lb (73 kg)     Constitutional: Patient sleeping, on BiPAP 2 L O2  Eyes: lashes look regular, no eye drainage noted, left eye patch  Respiratory: Decreased breath sounds on the right, good air movement on the left  Cardiovascular: Normotensive, normal rate normal rhythm  GI: No abdominal distention  Musculoskeletal: No peripheral edema noted    Unable to obtain ROS due to patient sleeping and on BiPAP.       Medications:Current Inpatient  Scheduled Meds:   enoxaparin  40 mg SubCUTAneous Daily    metoprolol tartrate  12.5 mg Oral BID    sodium chloride flush  5-40 mL IntraVENous 2 times per day    lisinopril  5 mg Oral Daily    dexamethasone  4 mg IntraVENous Q6H    sodium chloride flush  5-40 mL IntraVENous 2 times per day    ipratropium-albuterol  1 ampule Inhalation Q4H WA    aspirin  81 mg Oral Daily    levETIRAcetam  500 mg Oral BID    folic acid  1 mg Oral Daily    pantoprazole  40 mg Oral QAM AC     Continuous Infusions:   sodium chloride      sodium chloride Stopped (08/13/22 1836)     PRN Meds:clonazePAM, sodium chloride flush, sodium chloride, acetaminophen, sodium chloride flush, melatonin, sodium chloride flush, sodium chloride flush, sodium chloride, ondansetron **OR** ondansetron, polyethylene glycol, acetaminophen **OR** acetaminophen    Objective:    CBC:   Recent Labs     08/14/22  0318 08/15/22  0301 08/15/22  1823 08/16/22  0301   WBC 7.8 7.2  --  8.5   HGB 13.1 11.9 11.7* 11.6*    283  --  281     BMP: Recent Labs     08/15/22  0301 08/15/22  1823 08/15/22  1833 08/16/22  0301   * 134*  --  135   K 4.1 3.4*  --  4.4   CL 89* 91*  --  94*   CO2 32* 33*  --  33*   BUN 26* 29*  --  31*   CREATININE 0.45* 0.63 1.07 0.41*   GLUCOSE 130* 171*  --  117*     Calcium:  Recent Labs     08/16/22  0301   CALCIUM 8.9     Ionized Calcium:No results for input(s): IONCA in the last 72 hours. Magnesium:  Recent Labs     08/16/22  0301   MG 1.6     Phosphorus:No results for input(s): PHOS in the last 72 hours. BNP:No results for input(s): BNP in the last 72 hours. Glucose:  Recent Labs     08/15/22  1833   POCGLU 163*     HgbA1C: No results for input(s): LABA1C in the last 72 hours. INR: No results for input(s): INR in the last 72 hours. Hepatic:   Recent Labs     08/16/22  0301   ALKPHOS 55   *   AST 62*   PROT 5.8*   BILITOT 0.47   BILIDIR 0.15   LABALBU 3.8     Amylase and Lipase:No results for input(s): LACTA, AMYLASE in the last 72 hours. Lactic Acid: No results for input(s): LACTA in the last 72 hours. CARDIAC ENZYMES:No results for input(s): CKTOTAL, CKMB, CKMBINDEX, TROPONINI in the last 72 hours. BNP: No results for input(s): BNP in the last 72 hours. Lipids: No results for input(s): CHOL, TRIG, HDL, LDL, LDLCALC in the last 72 hours. ABGs: No results found for: PH, PCO2, PO2, HCO3, O2SAT  Thyroid:   Lab Results   Component Value Date/Time    TSH 1.75 08/03/2022 10:13 AM        Urinalysis: No results for input(s): BACTERIA, BLOODU, CLARITYU, COLORU, PHUR, PROTEINU, RBCUA, SPECGRAV, BILIRUBINUR, NITRU, WBCUA, LEUKOCYTESUR, GLUCOSEU in the last 72 hours.         Assessment:  Principal Problem:    Acute respiratory failure (HCC)  Active Problems:    NSTEMI (non-ST elevated myocardial infarction) ([facility]    Chronic respiratory failure with hypoxia (HCC)    Centrilobular emphysema (HCC)    KRISTIN (internuclear ophthalmoplegia), bilateral    Pseudomeningocele    Conjugate gaze palsy    Malignant neoplasm of

## 2022-08-17 NOTE — PROGRESS NOTES
Physical Therapy  Facility/Department: [facility]  Physical Therapy Initial Assessment    Name: Zaheer Doll  : 1955  MRN: [id number]  Date of Service: 2022  Copied from chart:  The patient is a 74 y.o. female with past medical history of primary lung adenocarcinoma with metastasis to the brain s/p tumor resection in 2022, liver hemangioma, HTN, HLD, and COPD that presents from [facility] center for complaints of shortness of breath and double vision. Symptoms have been present for past 2 weeks, but worsened in the past 3 days. Discharge Recommendations:  Patient would benefit from continued therapy after discharge   PT Equipment Recommendations  Equipment Needed: Yes  Mobility Devices: Lacretia Sheen: Rolling      Patient Diagnosis(es): The primary encounter diagnosis was KRISTIN (internuclear ophthalmoplegia), bilateral. A diagnosis of Brain metastases ([address] was also pertinent to this visit. Past Medical History:  has a past medical history of Anxiety, Benign polyp of large intestine, Cancer ([address] COPD (chronic obstructive pulmonary disease) ([address] Hx of blood clots, Hyperlipidemia, Hypertension, Liver hemangioma, Lung nodule, Snores, Uterine fibroid, and Wears glasses. Past Surgical History:  has a past surgical history that includes Hysterectomy (); Abdominal hernia repair (); Colonoscopy; Lung biopsy; Breast biopsy (Left, ); Lung removal, partial (Left, 2018); pr rmvl lung other than pneumonectomy 1 lobe lobect (Left, 2018); bronchoscopy (10/01/2018); pr [facility] incl fluor gdnce dx w/cell washg spx (N/A, 10/29/2018); other surgical history (Right, 2018); Hysterectomy, total abdominal; craniotomy (N/A, 2022); and IR PORT PLACEMENT > 5 YEARS (2022). Assessment   Body Structures, Functions, Activity Limitations Requiring Skilled Therapeutic Intervention: Decreased functional mobility ; Decreased strength;Decreased endurance;Decreased to enter without rails  Entrance Stairs - Number of Steps: 2  Bathroom Shower/Tub: Tub/Shower unit  Bathroom Toilet: Standard  Bathroom Equipment: Shower chair  Bathroom Accessibility: Accessible  Home Equipment: Crutches (pt reports no prior use of AD)  Has the patient had two or more falls in the past year or any fall with injury in the past year?: No  Receives Help From: Family (Significant other is able to assist at all times including provide physical assistance as needed)  ADL Assistance: Independent  Homemaking Assistance: Needs assistance  Homemaking Responsibilities: Yes  Ambulation Assistance: Independent  Transfer Assistance: Independent  Active : No  Patient's  Info: significant other drives  Mode of Transportation: Car  Occupation: Retired  Type of Occupation: Previously worked at [employer] [address]: Playing games on the computer/[employer] [address]: Impaired  Vision Exceptions: Wears glasses at all times (mostly for distance; pt with eye patch in place to L eye secondary to new onset double vision)  Hearing  Hearing: Within functional limits    Cognition   Cognition  Overall Cognitive Status: WFL     Objective                 AROM RLE (degrees)  RLE AROM: WFL  AROM LLE (degrees)  LLE AROM : WFL  AROM RUE (degrees)  RUE AROM : WFL  AROM LUE (degrees)  LUE AROM : WFL  Strength RLE  Strength RLE: WFL  Comment: Grossly 4/5  Strength LLE  Strength LLE: WFL  Comment: Grossly 4/5  Strength RUE  Comment: Co-eval with OT, see OT note for UE detail. Strength LUE  Comment: Co-eval with OT, see OT note for UE detail. Bed mobility  Supine to Sit: Stand by assistance  Sit to Supine:  (pt retired up to a chair at the conclusion of today's session.)  Scooting: Stand by assistance  Bed Mobility Comments: HOB elevated ~30 degrees without use of handrails.   Transfers  Sit to Stand: Contact guard assistance  Stand to sit: Contact guard assistance  Ambulation  Surface: level tile  Device: No Device  Assistance: Minimal assistance  Quality of Gait: mild instability, 2x R sided balance losses requiring min-A to correct. Gait Deviations: Slow Megan;Staggers;Decreased step length  Distance: 55 feet  More Ambulation?: Yes  Ambulation 2  Surface - 2: level tile  Device 2: Rolling Walker  Assistance 2: Contact guard assistance  Quality of Gait 2: fair stability, decreased gait speed, no LOB. Gait Deviations: Slow Megan; Increased RENEE  Distance: 50 feet, standing rest break, 50 feet. Stairs/Curb  Stairs?: No     Balance  Posture: Fair  Sitting - Static: Good  Sitting - Dynamic: Good;-  Standing - Static: Fair  Standing - Dynamic: Fair;-  Comments: standing balance assessed while using no device, dynamic standing balance improves to fair+ with use of RW. AM-PAC Score  AM-PAC Inpatient Mobility Raw Score : 18 (08/17/22 1212)  AM-PAC Inpatient T-Scale Score : 43.63 (08/17/22 1212)  Mobility Inpatient CMS 0-100% Score: 46.58 (08/17/22 1212)  Mobility Inpatient CMS G-Code Modifier : CK (08/17/22 1212)          Goals  Short Term Goals  Time Frame for Short term goals: 14 visits  Short term goal 1: Pt will ambulate 300 feet with least restrictive device and supervision to increase functional independence. Short term goal 2: Pt will perform sit<>stand transfer with supervision. Short term goal 3: Pt will demonstrate good- standing balance to decrease fall risk. Short term goal 4: Pt will tolerate a 35 minute therapy session to promote increased endurance. Short term goal 5: Pt will negotiate 2 stairs with no handrails and SBA to allow the pt to enter prior living arrangements. Additional Goals?: No       Education  Patient Education  Education Given To: Patient  Education Provided: Role of Therapy;Transfer Training;Equipment;Plan of Care; Fall Prevention Strategies  Education Method: Verbal  Barriers to Learning: None  Education Outcome: Verbalized understanding      Therapy Time   Individual Concurrent Group Co-treatment   Time In 1118         Time Out 1149         Minutes 31         Timed Code Treatment Minutes: R Rian [facility], PT

## 2022-08-17 NOTE — PROGRESS NOTES
[facility]  Internal Medicine Teaching Residency Program  Inpatient Daily Progress Note  ______________________________________________________________________________    Patient: Paola Andre  YOB: 1955   AXE:[id number]    Acct: [de-identified]     Room: [address] [place] [address]  Admit date: 8/11/2022  Today's date: 08/17/22  Number of days in the hospital: 6    SUBJECTIVE   Admitting Diagnosis: Acute respiratory failure ([address]  CC: Shortness of breath and double vision.    - Pt examined at bedside. Chart & results reviewed. Patient states that she is having less double vision with eye patch alternating eyes. Patient also states she is having less shortness of breath, home O2 evaluation to be done. Plan for today:  - Home O2 evaluation  - Pulmonology to see the patient  - Heme-onc follow-up outpatient  - Neurology to see the patient  - Ophthalmology saw patient while in ICU. Review of Systems   Constitutional:         Patient came in with a complaint of shortness of breath, still having mild shortness of breath. Home O2 eval to be completed today prior to discharge. HENT:  Negative for congestion and rhinorrhea. Respiratory:  Positive for shortness of breath. Negative for chest tightness. Cardiovascular:  Negative for chest pain. Gastrointestinal:  Negative for abdominal distention and abdominal pain. BRIEF HISTORY     The patient is a 74 y.o. female with past medical history significant for Lung adenocarcinoma left lung lobectomy four years ago, brain metastasis status post subacute occipital craniotomy for tumor resection on 3/29/2022 currently in chemotherapy, hypertension,HLD,and COPD, patient uses BiPAP at night, normally on 1 L O2 per nursing staff. 8/11/2022 with ARF and NSTEMI Patient was transferred from [address] [place] [address] where she presented with concern of shortness of breath and double vision.   She went for chemo therapy where she noted to have severe shortness of breath and worsening diplopia. She mention these symptoms she started couple of weeks back in which her Keytruda immunotherapy was stopped and she was started on steroid treatment but for next couple of weeks patient was unable to lie flat and had worsening dyspnea. Patient was tachypneic and desatted while she was moving around in [facility] so patient was started on BiPAP; significant elevation of troponin to 700 noted there, CTA negative for pulmonary embolism and CT head showed subcutaneous fluid leak or occipital lobe possibly subdural leak. Heparin was started for NSTEMI after clearing from Dr. Felice Mark, neurosurgery. Transferred to [facility] for further evaluation and management. Causes of dyspnea and hypoxia were considered to be possible COPD exacerbation, pneumonitis secondary to [address] immunotherapy, pulmonary edema secondary to acute CHF exacerbation. Patient already has low lung volume reserves due to prior lobectomy. Patient was admitted to the ICU, patient was been hemodynamically stable, not requiring any pressors. She has been saturating well on BiPAP overnight and 3 L nasal cannula during the day. Due to concern for elevated troponins and nonspecific ST segment changes on EKG, cardiology was consulted and patient underwent cardiac cath on 8/15/2022; she was found to have mild nonobstructive CAD and mildly reduced LV systolic function with apical hypokinesia, possible stress cardiomyopathy. Neurosurgery and neurology have been following the patient for concern of possible subdural leak following craniotomy as well as current symptoms of diplopia. During this admission, MRI brain was obtained which did not show any significant metastatic lesion enlarging lesions or new lesions in the brainstem. Ophthalmology was consulted and MRI orbits was obtained which was negative for any acute abnormalities which would explain the diplopia.   LP was done by neurology due to concern of leptomeningeal metastasis as cause of the diplopia. Hematology oncology consulted for metastatic lung cancer; patient had been on Keytruda immunotherapy which was held 2 weeks ago due to concern of transaminitis, and now possibly pneumonitis as well as myocarditis. Patient had previously been on a steroid taper, on day of admission to [facility] was supposed to start on oral prednisone 20 mg daily but steroid dosing has been increased by hematology oncology to 1 mg/kg; currently patient is on Decadron 4 mg every 6 hours for treatment of immunotherapy toxicity. Patient is to follow-up outpatient with heme-onc for further evaluation of anticancer medications. OBJECTIVE     Vital Signs:  /87   Pulse 79   Temp 98.4 °F (36.9 °C) (Oral)   Resp 22   Ht 4' 11\" (1.499 m)   Wt 153 lb 14.1 oz (69.8 kg)   SpO2 98%   BMI 31.08 kg/m²     Temp (24hrs), Av.2 °F (36.8 °C), Min:97.9 °F (36.6 °C), Max:98.4 °F (36.9 °C)    In: 470   Out: 1075 [Urine:1075]    Physical Exam:  Physical Exam  Constitutional:       Comments: Patient appears at baseline, no acute distress, alert and oriented x3   HENT:      Head:      Comments: Prior craniotomy 3-     Nose: Nose normal.      Mouth/Throat:      Mouth: Mucous membranes are moist.      Pharynx: Oropharynx is clear. Eyes:      Extraocular Movements: Extraocular movements intact. Comments: Patient states double vision has improved with alternating eye patch. Cardiovascular:      Rate and Rhythm: Normal rate and regular rhythm. Pulses: Normal pulses. Heart sounds: Normal heart sounds. Pulmonary:      Comments: Pulmonary breath sounds decreased on the right, good air exchange on the left. History of left lung lobectomy 4 years ago. Abdominal:      General: Bowel sounds are normal.      Palpations: Abdomen is soft. Musculoskeletal:      Cervical back: Normal range of motion and neck supple.    Skin: General: Skin is warm and dry. Psychiatric:         Mood and Affect: Mood normal.         Medications:  Scheduled Medications:    enoxaparin  40 mg SubCUTAneous Daily    metoprolol tartrate  12.5 mg Oral BID    sodium chloride flush  5-40 mL IntraVENous 2 times per day    lisinopril  5 mg Oral Daily    dexamethasone  4 mg IntraVENous Q6H    sodium chloride flush  5-40 mL IntraVENous 2 times per day    ipratropium-albuterol  1 ampule Inhalation Q4H WA    aspirin  81 mg Oral Daily    levETIRAcetam  500 mg Oral BID    folic acid  1 mg Oral Daily    pantoprazole  40 mg Oral QAM AC     Continuous Infusions:    sodium chloride      sodium chloride Stopped (08/13/22 1836)     PRN MedicationsclonazePAM, 0.25 mg, BID PRN  sodium chloride flush, 5-40 mL, PRN  sodium chloride, , PRN  acetaminophen, 650 mg, Q4H PRN  sodium chloride flush, 10 mL, PRN  melatonin, 3 mg, Nightly PRN  sodium chloride flush, 10 mL, PRN  sodium chloride flush, 5-40 mL, PRN  sodium chloride, , PRN  ondansetron, 4 mg, Q8H PRN   Or  ondansetron, 4 mg, Q6H PRN  polyethylene glycol, 17 g, Daily PRN  acetaminophen, 650 mg, Q6H PRN   Or  acetaminophen, 650 mg, Q6H PRN        Diagnostic Labs:  CBC:   Recent Labs     08/15/22  0301 08/15/22  1823 08/16/22  0301 08/17/22  0559   WBC 7.2  --  8.5 9.2   RBC 3.74*  --  3.49* 3.55*   HGB 11.9 11.7* 11.6* 11.9   HCT 36.2* 36.8 34.3* 34.3*   MCV 96.8  --  98.3 96.6   RDW 14.3  --  14.4 14.4     --  281 274     BMP:   Recent Labs     08/15/22  1823 08/15/22  1833 08/16/22  0301 08/17/22  0559   *  --  135 130*   K 3.4*  --  4.4 4.3   CL 91*  --  94* 94*   CO2 33*  --  33* 29   BUN 29*  --  31* 25*   CREATININE 0.63 1.07 0.41* 0.29*     BNP: No results for input(s): BNP in the last 72 hours. PT/INR: No results for input(s): PROTIME, INR in the last 72 hours.   APTT:   Recent Labs     08/14/22  1638 08/15/22  0039 08/15/22  0829   APTT 74.3* 73.4* 54.2*     CARDIAC ENZYMES: No results for input(s): CKMB, Gonzalo Prom in the last 72 hours. Invalid input(s): CKTOTAL;3  FASTING LIPID PANEL:  Lab Results   Component Value Date    CHOL 196 07/13/2022    HDL 72 07/13/2022    TRIG 166 (H) 07/13/2022     LIVER PROFILE:   Recent Labs     08/15/22  0301 08/16/22  0301 08/17/22  0559   AST 65* 62* 56*   * 154* 141*   BILIDIR 0.14 0.15 0.17   BILITOT 0.36 0.47 0.51   ALKPHOS 57 55 55      MICROBIOLOGY:   Lab Results   Component Value Date/Time    CULTURE NO GROWTH 3 DAYS 08/13/2022 05:00 PM       Imaging:    CT ABDOMEN PELVIS WO CONTRAST Additional Contrast? None    Result Date: 8/15/2022  1. Subcutaneous soft tissue stranding in the right anterior thigh and groin which is hyperdense in attenuation likely reflecting underlying hemorrhage/blood products without discernible focal hematoma. There is mild extension into the right anterior pelvis along the right external iliac vessels. No retroperitoneal hematoma is otherwise identified. 2. No other acute abdominal or pelvic abnormality. 3. Right basilar consolidative opacity with trace bilateral pleural effusions. CT HEAD WO CONTRAST    Result Date: 8/10/2022  1. Status post right occipital craniotomy with postsurgical changes and a subcutaneous fluid collection measuring 4 x 1.8 cm which is of CSF density and may represent subdural leak. This was not clearly seen on the MRI dated 07/25/2022. XR CHEST PORTABLE    Result Date: 8/11/2022  No significant interval change. Chronic findings at the left perihilar lung. Minimal blunting at the costophrenic angles, likely atelectasis. XR CHEST PORTABLE    Result Date: 8/10/2022  Chronic findings in the chest without acute airspace disease identified. CT CHEST PULMONARY EMBOLISM W CONTRAST    Result Date: 8/10/2022  Study is significantly limited by breath hold artifact; no clear CT evidence acute PE. Suspected RUL infiltrate, although assessment limited by artifact, as above.  New volume loss right base posteriorly, possibly with some consolidation. Clinical correlation for pneumonia in both instances recommended. Small but slightly larger nodular density abutting posterior diaphragm adjacent to the liver, possibly metastatic. Liver findings stable/likely benign. Stable right adrenal mass. Similar post KIERAN lobectomy surgical and likely post radiation changes left lung, and chronic appearing additional lung findings, as detailed above. Greater left heart dilatation, especially LA. Correlate clinically. No pericardial effusion. Right IJ chemo port in stable position with additional stable findings, as above. MRI ORBITS FACE NECK W WO CONTRAST    Result Date: 8/13/2022  No mass or abnormal enhancement in the orbits. MRI BRAIN W WO CONTRAST    Result Date: 8/12/2022  Multiple enhancing metastatic foci throughout the brain in the majority of these are similar in size compared to prior examination. There is an enhancing focus abutting the dura in the left temporal lobe posteriorly that is slightly smaller compared to prior. There is an enhancing focus in the medial left temporal lobe posteriorly that is smaller compared to prior. A punctate focus in the inferior right frontal lobe is less pronounced. ASSESSMENT & PLAN     Assessment and Plan:    Principal Problem:    Acute respiratory failure (HCC)  Active Problems:    NSTEMI (non-ST elevated myocardial infarction) (HCC)    Chronic respiratory failure with hypoxia (HCC)    Centrilobular emphysema (HCC)    KRISTIN (internuclear ophthalmoplegia), bilateral    Pseudomeningocele    Conjugate gaze palsy    Malignant neoplasm of upper lobe of left lung ([address]    Essential hypertension  Resolved Problems:    * No resolved hospital problems.  *      Plan:     Acute respiratory failure:  - Patient placed on BiPAP tonight, 2 L of O2  - Patient is saturating well on BiPAP  - Continue DuoNeb  - Continue to monitor  - Home with O2, 3 L nasal cannula per O2 evaluation    NSTEMI:  - Cardiology recs appreciated  - Lisinopril 5 mg daily  - Lopressor 12.5 mg twice daily    Intranuclear ophthalmoplegia/double vision:  - CSF studies show negative cytology  - MRI is negative  -- Ophthalmology saw the patient  - Hematology oncology on board considering other options for anticancer treatment  - CSF culture: Negative     Adenocarcinoma of the left lung status post lobectomy:  - Patient is being followed by hematology oncology  - Patient is to follow-up with them outpatient  - Concern for brain metastasis  - Okay to discharge per heme-onc they will finalize cancer treatment  outpatient. Diet: Regular  DVT ppx : Lovenox  GI ppx: Protonix    PT/OT/SW: Consulted   Discharge Planning:  consulted, home O2 eval was completed. Patient states that she wants to leave today if possible. Waiting on clearance from other teams. Lorie Reis M.D. Internal Medicine Resident PGY-1  [address]. 7:30 AM 8/17/2022     Please note that part of this chart was generated using voice recognition dictation software. Although every effort was made to ensure the accuracy of this automated transcription, some errors in transcription may have occurred. Attending Physician Statement  I have discussed the case, including pertinent history and exam findings with the resident and the team.  I have seen and examined the patient and the key elements of the encounter have been performed by me. I agree with the assessment, plan and orders as documented by the resident. Review of Systems:   In addition to the pertinent positives and negatives as stated within HPI and the review of systems as documented in their notes, all other systems were reviewed when able to and are reported negative. Patient was transferred out of ICU. Currently feels better. Denies chest pain or shortness of breath. Patient is adamant about going home today.   Get home O2 evaluation. Check with other services before discharge. Discharge when okay with other services. Discharge when okay with other services.     Discharge time: 32 minutes      Cammie Santillan MD  Attending Physician, Internal Medicine Service    Internal Medicine Residency Program  8/17/2022, 1:09 PM

## 2022-08-17 NOTE — PROGRESS NOTES
Occupational Therapy  [facility]  Occupational Therapy Initial Assessment    Name: Polly Ahuja  : 1955  MRN: [id number]  Date of Service: 2022    Discharge Recommendations:  Patient would benefit from continued therapy after discharge        Patient Diagnosis(es): The primary encounter diagnosis was KRISTIN (internuclear ophthalmoplegia), bilateral. A diagnosis of Brain metastases ([facility] was also pertinent to this visit. Past Medical History:  has a past medical history of Anxiety, Benign polyp of large intestine, Cancer ([address] COPD (chronic obstructive pulmonary disease) ([facility] Hx of blood clots, Hyperlipidemia, Hypertension, Liver hemangioma, Lung nodule, Snores, Uterine fibroid, and Wears glasses. Past Surgical History:  has a past surgical history that includes Hysterectomy (); Abdominal hernia repair (); Colonoscopy; Lung biopsy; Breast biopsy (Left, ); Lung removal, partial (Left, 2018); pr rmvl lung other than pneumonectomy 1 lobe lobect (Left, 2018); bronchoscopy (10/01/2018); pr [facility] incl fluor gdnce dx w/cell washg spx (N/A, 10/29/2018); other surgical history (Right, 2018); Hysterectomy, total abdominal; craniotomy (N/A, 2022); and IR PORT PLACEMENT > 5 YEARS (2022). Assessment   Performance deficits / Impairments: Decreased functional mobility ; Decreased ADL status; Decreased endurance;Decreased balance;Decreased high-level IADLs  Assessment: Pt currently limited in performing ADL tasks and functional transfers/functional mobility due to above noted deficits, most significantly decreased balance and decreased activity tolerance. Pt to benefit from continued therapy services while hospitalized and at discharge to maximize pt's safety and independence in performing functional tasks. Pt expected to be safe to return home at discharge with supportive significant other who is able to assist pt at all times.    Prognosis: Good  Decision Making: Medium Complexity  REQUIRES OT FOLLOW-UP: Yes  Activity Tolerance  Activity Tolerance: Patient limited by fatigue (SOB/increased work of breathing following minimal exertion)      Safety Devices  Type of Devices: Gait belt;Left in chair;Call light within reach; Patient at risk for falls  Restraints  Restraints Initially in Place: No    Plan   Plan  Times per Week: 3-5x/wk  Current Treatment Recommendations: Functional mobility training, Endurance training, Balance training, Patient/Caregiver education & training, Self-Care / ADL, Home management training, Equipment evaluation, education, & procurement, Safety education & training     Restrictions  Restrictions/Precautions  Restrictions/Precautions: Fall Risk  Required Braces or Orthoses?: No  Position Activity Restriction  Other position/activity restrictions: Up with assistance. 3L O2 in place via nasal canula. Subjective   General  Patient assessed for rehabilitation services?: Yes  Family / Caregiver Present: Yes (significant other present throughout)  General Comment  Comments: RN ok'd for therapy this AM. Pt agreeable to participate in session and pleasant/cooperative throughout. Pt denies pain.        Social/Functional History  Social/Functional History  Lives With: Significant other  Type of Home: House ([facility])  Home Layout: Two level, 1/2 bath on main level, Bed/Bath upstairs  Home Access: Stairs to enter without rails  Entrance Stairs - Number of Steps: 2  Bathroom Shower/Tub: Tub/Shower unit  Bathroom Toilet: Standard  Bathroom Equipment: Shower chair  Bathroom Accessibility: Accessible  Home Equipment: Crutches (pt reports no prior use of AD)  Has the patient had two or more falls in the past year or any fall with injury in the past year?: No  Receives Help From: Family (Significant other is able to assist at all times including provide physical assistance as needed)  ADL Assistance: Alec: Needs assistance  Homemaking Responsibilities: Yes  Ambulation Assistance: Independent  Transfer Assistance: Independent  Active : No  Patient's  Info: significant other drives  Mode of Transportation: Car  Occupation: Retired  Type of Occupation: Previously worked at [employer]: Playing games on the computer/iPAD       Objective   Vision  Vision: Impaired  Vision Exceptions: Wears glasses at all times (mostly for distance; pt with eye patch in place to L eye secondary to new onset double vision)  Hearing  Hearing: Within functional limits    Balance  Sitting: Stand by assistance (seated unsupported at EOB ~8 minutes)  Standing: Contact guard assistance    Functional Mobility   Overall Level of Assistance: Contact-guard assistance (Pt performed functional mobility within hospital room/hallway, pt initially required min A without use of AD as pt mildly unsteady with 2x lateral LOBs therefore pt provided RW for support and progressed to [address]. Pt required 2x short standing rest breaks)  Interventions: Verbal cues (to incorporate rest breaks and pursed lip breathing techniques)  Assistive Device: Walker, rolling    AROM: Within functional limits (BUE)  Strength:  Within functional limits (BUE)  Coordination: Within functional limits (BUE)    ADL  Feeding: Independent  Grooming: Increased time to complete;Contact guard assistance  UE Bathing: Increased time to complete;Contact guard assistance  LE Bathing: Increased time to complete;Contact guard assistance  UE Dressing: Increased time to complete;Contact guard assistance  LE Dressing: Increased time to complete;Contact guard assistance (seated EOB to don socks utilizing figure four technique)  Toileting: Increased time to complete;Contact guard assistance  Comments: Pt limited throughout ADL performance due to decreased dynamic sitting/standing balance and decreased activity tolerance as pt fatigues quickly with noted SOB/increased work of breathing following minimal exertion. Bed mobility  Supine to Sit: Stand by assistance (HOB raised ~30* and use of bed rails)  Sit to Supine:  (pt retired up to chair at end of session)  Scooting: Stand by assistance    Transfers  Sit to stand: Contact guard assistance  Stand to sit: Contact guard assistance  Transfer Comments: Increased time/effort to perform    Cognition  Overall Cognitive Status: WFL  Orientation  Overall Orientation Status: Within Functional Limits          Education Given To: Patient  Education Provided: Role of Therapy;Plan of Care;Transfer Training;Equipment; Energy Conservation; Fall Prevention Strategies (Activity Promotion, Safety with Transfers/RW Mngt, Safety Awareness/Fall Prevention)  Education Method: Demonstration;Verbal  Education Outcome: Verbalized understanding;Continued education needed       AM-PAC Score   AM-PAC Inpatient Daily Activity Raw Score: 19 (08/17/22 1523)  AM-PAC Inpatient ADL T-Scale Score : 40.22 (08/17/22 1523)  ADL Inpatient CMS 0-100% Score: 42.8 (08/17/22 1523)  ADL Inpatient CMS G-Code Modifier : CK (08/17/22 1523)    Goals  Short Term Goals  Time Frame for Short term goals: Pt will, by discharge:  Short Term Goal 1: Perform ADL tasks with mod IND using AE/DME PRN  Short Term Goal 2: Perform functional transfers/functional mobility with mod IND using least restrictive AD  Short Term Goal 3:  Independently demo ability to incorporate energy conservation/pursed lip breathing techniques as needed during engagement in all functional tasks  Short Term Goal 4: Demo 10+ minutes standing tolerance with use of least restrictive AD for increased participation in ADL/IADL tasks       Therapy Time   Individual Concurrent Group Co-treatment   Time In 1116         Time Out 1150         Minutes 34         Timed Code Treatment Minutes: [address], OTR/L

## 2022-08-17 NOTE — PLAN OF CARE
Problem: Discharge Planning  Goal: Discharge to home or other facility with appropriate resources  8/16/2022 2056 by Myriam Villalta RN  Outcome: Progressing  Flowsheets (Taken 8/16/2022 2056)  Discharge to home or other facility with appropriate resources: Identify barriers to discharge with patient and caregiver  8/16/2022 1539 by Bailey Johnson RN  Outcome: Progressing     Problem: Safety - Adult  Goal: Free from fall injury  8/16/2022 2056 by Myriam Villalta RN  Outcome: Progressing  Flowsheets (Taken 8/14/2022 2229 by Noemí Bowen RN)  Free From Fall Injury:   Instruct family/caregiver on patient safety   Based on caregiver fall risk screen, instruct family/caregiver to ask for assistance with transferring infant if caregiver noted to have fall risk factors  8/16/2022 1539 by Bailey Johnson RN  Outcome: Progressing     Problem: ABCDS Injury Assessment  Goal: Absence of physical injury  8/16/2022 2056 by Myriam Villalta RN  Outcome: Progressing  8/16/2022 1539 by Bailey Johnson RN  Outcome: Progressing     Problem: Skin/Tissue Integrity  Goal: Absence of new skin breakdown  Description: 1. Monitor for areas of redness and/or skin breakdown  2. Assess vascular access sites hourly  3. Every 4-6 hours minimum:  Change oxygen saturation probe site  4. Every 4-6 hours:  If on nasal continuous positive airway pressure, respiratory therapy assess nares and determine need for appliance change or resting period.   8/16/2022 2056 by Myriam Villalta RN  Outcome: Progressing  Note: Reviewed repositioning self in bed   8/16/2022 1539 by Bailey Johnson RN  Outcome: Progressing     Problem: Respiratory - Adult  Goal: Achieves optimal ventilation and oxygenation  8/16/2022 2056 by Myriam Villalta RN  Outcome: Progressing  8/16/2022 1539 by Bailey Johnson RN  Outcome: Progressing     Problem: Pain  Goal: Verbalizes/displays adequate comfort level or baseline comfort level  8/16/2022 2056 by Myriam Villalta RN  Outcome: Progressing  8/16/2022 1539 by Miriam Garcia RN  Outcome: Progressing

## 2022-08-17 NOTE — PROGRESS NOTES
CLINICAL PHARMACY NOTE: MEDS TO BEDS    Total # of Prescriptions Filled: 4   The following medications were delivered to the patient:  Pantoprazole  Dexamethasone  Lisinopril  metoprolol    Additional Documentation:  Delivered to patient, RN, and one guest at 5:30pm. Paid $50 cash for $16.36 copay, gave guest exact change. Transferring remainder of dexamethasone scrip tot [facility] on [facility] since we didn't have enough in stock.  HR

## 2022-08-17 NOTE — PLAN OF CARE
Liv Mobley was evaluated today and a DME order was entered for a nebulizer compressor in order to administer Ipratropium due to the diagnosis of COPD. The need for this equipment and treatment was discussed with the patient and she understands and is in agreement. Patient was evaluated today for the diagnosis of COPD. I entered a DME order for home oxygen because the diagnosis and testing requires the patient to have supplemental oxygen. Condition will improve or be benefited by oxygen use. The patient is not able to perform good mobility in a home setting and therefore does require the use of a portable oxygen system. The need for this equipment was discussed with the patient and she understands and is in agreement. Ilir Edmondson MD  Internal Medicine Resident, PGY-3  [address] Khari Lomax  9/69/6171,3:98 PM

## 2022-08-17 NOTE — DISCHARGE INSTR - DIET

## 2022-08-17 NOTE — CARE COORDINATION
Transitional planning    1100 late entry   Writer to room to discuss d/c plan, plan is home, will have transportation. 1330 Received order for DME, writer to room , has no preference on O2 supplier, wanted closest place, [facility] chosen. Faxed clinical paperwork and orders for O2 and nebulizer. 1400 Received call from Damien Curiel with [facility], fax received, ok to send patient home with tank and instructed to call office when they arrive at home. Writer to room , provided tank and instruction on use, spouse verbalized understanding, provided business card and instructed to call number when they get home for delivery. Primary RN updated.

## 2022-08-17 NOTE — PLAN OF CARE
Problem: Respiratory - Adult  Goal: Achieves optimal ventilation and oxygenation  8/16/2022 2241 by Kelvin Rodriguez RCP  Outcome: Progressing  8/16/2022 2056 by Deidra Shipley RN  Outcome: Progressing  8/16/2022 1539 by Mary Fields RN  Outcome: Progressing

## 2022-08-17 NOTE — DISCHARGE INSTRUCTIONS
Continue to take decadron 4 mg every 6 hours for 7 days  Then start taking decadron 4 mg twice daily until followed by oncologist  Protonix dose increased to 40 mg daily  Start taking lisinopril 5 mg daily  Start taking lopressor 12.5 mg twice daily  Stop taking lisinopril hydrochlorothiazide  Start taking aspirin  If symptoms persist or worsen would recommend coming back to the ER

## 2022-08-17 NOTE — THERAPY EVALUATION
Home Oxygen Evaluation    Home Oxygen Evaluation completed. Patient is on 3 liters per minute via NC. Resting SpO2 = 93%  Resting SpO2 on room air =88%    SpO2 on room air with exercise = 87%  SpO2 on oxygen as above with exercise = 92%    Nocturnal Oximetry with patient on room air is recommended is SpO2 is between 89% and 95% (requires additional order).     Hollie Esparza RCP  10:21 AM

## 2022-08-17 NOTE — PROGRESS NOTES
kg)           Intake/Output Summary (Last 24 hours) at 8/17/2022 1108  Last data filed at 8/17/2022 0940  Gross per 24 hour   Intake 590 ml   Output 850 ml   Net -260 ml       Date 08/17/22 0000 - 08/17/22 2359   Shift 5053-9208 8120-5983 5634-7245 24 Hour Total   INTAKE   P.O.(mL/kg/hr) 420(0.8) 120  540   Shift Total(mL/kg) 420(6) 120(1.7)  540(7.7)   OUTPUT   Urine(mL/kg/hr) 450(0.8)   450   Shift Total(mL/kg) 450(6.4)   450(6.4)   Weight (kg) 69.8 69.8 69.8 69.8       Wt Readings from Last 3 Encounters:   08/17/22 153 lb 14.1 oz (69.8 kg)   08/12/22 160 lb (72.6 kg)   08/10/22 161 lb (73 kg)     Body mass index is 31.08 kg/m². PHYSICAL EXAM:  Constitutional: on nasal cannula  HEENT: PERRLA, EOMI, sclera clear, anicteric  Respiratory: clear to auscultation, no wheezes or rales and unlabored breathing. Cardiovascular: regular rate and rhythm, normal S1, S2, no murmur noted and 2+ pulses throughout  Abdomen: soft, nontender, nondistended, no masses or organomegaly  NEUROLOGIC: Awake, alert, oriented to name, place and time. Cranial nerves II-XII are grossly intact. Motor is 5 out of 5 bilaterally. Sensory is intact. Extremities:  peripheral pulses normal, no pedal edema,. No DVT no pedal edema  Lymph node no enlarged lymph nodes anywhere. Eye examination unremarkable she is complaining of diplopia. No motor or sensory deficit.   Skin examination on      MEDICATIONS:  Scheduled Meds:   enoxaparin  40 mg SubCUTAneous Daily    metoprolol tartrate  12.5 mg Oral BID    sodium chloride flush  5-40 mL IntraVENous 2 times per day    lisinopril  5 mg Oral Daily    dexamethasone  4 mg IntraVENous Q6H    sodium chloride flush  5-40 mL IntraVENous 2 times per day    ipratropium-albuterol  1 ampule Inhalation Q4H WA    aspirin  81 mg Oral Daily    levETIRAcetam  500 mg Oral BID    folic acid  1 mg Oral Daily    pantoprazole  40 mg Oral QAM AC     Continuous Infusions:   sodium chloride      sodium chloride Stopped (08/13/22 1836)     PRN Me      ABGs:     Lab Results   Component Value Date/Time    PHART 7.411 09/28/2018 12:46 PM    PBR6YHY 37.1 09/28/2018 12:46 PM    PO2ART 112.0 09/28/2018 12:46 PM    DGF2IJI 23.1 09/28/2018 12:46 PM    JSJ5SDB 26 09/25/2018 12:03 PM    Q3LVUIWW 98.7 09/28/2018 12:46 PM    FIO2 INFORMATION NOT PROVIDED 08/11/2022 09:46 AM     Lactic Acid:   Lab Results   Component Value Date/Time    LACTA 1.6 08/10/2022 01:09 PM         DATA:  Complete Blood Count:   Recent Labs     08/15/22  0301 08/15/22  1823 08/16/22  0301 08/17/22  0559   WBC 7.2  --  8.5 9.2   HGB 11.9 11.7* 11.6* 11.9   MCV 96.8  --  98.3 96.6     --  281 274   RBC 3.74*  --  3.49* 3.55*   HCT 36.2* 36.8 34.3* 34.3*   MCH 31.8  --  33.2 33.5   MCHC 32.9  --  33.8 34.7   RDW 14.3  --  14.4 14.4   MPV 9.5  --  8.8 9.3          PT/INR:    Lab Results   Component Value Date/Time    PROTIME 12.7 08/10/2022 06:40 PM    INR 1.0 08/10/2022 06:40 PM     PTT:    Lab Results   Component Value Date/Time    APTT 54.2 08/15/2022 08:29 AM       Basal Metabolic Profile:   Recent Labs     08/15/22  1823 08/15/22  1833 08/16/22  0301 08/17/22  0559   *  --  135 130*   K 3.4*  --  4.4 4.3   BUN 29*  --  31* 25*   CREATININE 0.63 1.07 0.41* 0.29*   CL 91*  --  94* 94*   CO2 33*  --  33* 29        Magnesium:   Lab Results   Component Value Date/Time    MG 1.6 08/16/2022 03:01 AM    MG 1.5 08/15/2022 06:23 PM    MG 1.9 08/14/2022 03:18 AM     Phosphorus: No results found for: PHOS  S. Calcium:  Recent Labs     08/17/22  0559   CALCIUM 9.0       S. Ionized Calcium:No results for input(s): IONCA in the last 72 hours.       Urinalysis:   Lab Results   Component Value Date/Time    NITRU NEGATIVE 09/25/2018 12:24 PM    COLORU YELLOW 09/25/2018 12:24 PM    PHUR 7.5 09/25/2018 12:24 PM    SPECGRAV 1.009 09/25/2018 12:24 PM    LEUKOCYTESUR NEGATIVE 09/25/2018 12:24 PM    UROBILINOGEN Normal 09/25/2018 12:24 PM    BILIRUBINUR NEGATIVE 09/25/2018 12:24 PM    GLUCOSEU NEGATIVE 09/25/2018 12:24 PM    KETUA NEGATIVE 09/25/2018 12:24 PM       CARDIAC ENZYMES: No results for input(s): CKMB, CKMBINDEX, TROPONINI in the last 72 hours. Invalid input(s): CKTOTAL;3  BNP: No results for input(s): BNP in the last 72 hours. LFTS  Recent Labs     08/15/22  0301 08/16/22  0301 08/17/22  0559   ALKPHOS 57 55 55   * 154* 141*   AST 65* 62* 56*   BILITOT 0.36 0.47 0.51   BILIDIR 0.14 0.15 0.17   LABALBU 3.8 3.8 3.8         AMYLASE/LIPASE/AMMONIA  No results for input(s): AMYLASE, LIPASE, AMMONIA in the last 72 hours. Last 3 Blood Glucose:   Recent Labs     08/15/22  0301 08/15/22  1823 08/16/22  0301 08/17/22  0559   GLUCOSE 130* 171* 117* 109*        HgBA1c:    Lab Results   Component Value Date/Time    LABA1C 5.0 06/28/2018 07:47 AM         TSH:    Lab Results   Component Value Date/Time    TSH 1.75 08/03/2022 10:13 AM     ANEMIA STUDIES  No results for input(s): LABIRON, TIBC, FERRITIN, ZCNMZTVH90, FOLATE, OCCULTBLD in the last 72 hours. Cultures during this admission:     Blood cultures:                 [] None drawn      [] Negative             []  Positive (Details:  )  Urine Culture:                   [] None drawn      [] Negative             []  Positive (Details:  )  Sputum Culture:               [] None drawn       [] Negative             []  Positive (Details:  )   Endotracheal aspirate:     [] None drawn       [] Negative             []  Positive (Details:  )        ASSESSMENT:     Principal Problem:    Acute respiratory failure ([address]  Active Problems:    NSTEMI (non-ST elevated myocardial infarction) ([address]    Chronic respiratory failure with hypoxia (HCC)    Centrilobular emphysema (HCC)    KRISTIN (internuclear ophthalmoplegia), bilateral    Pseudomeningocele    Conjugate gaze palsy    Malignant neoplasm of upper lobe of left lung ([address]    Essential hypertension  Resolved Problems:    * No resolved hospital problems.  *        PLAN: COPD  -Continue bronchodilators 4 times a day. Labs reviewed and they are stable  Continue treatment for hypertension    Patient is being followed by oncology for chemotherapy all possible surgical intervention to remove the intracranial metastatic disease. Cardiovascular status is stable. DVT ppx: hep gtt  GI ppx: protonix      Mabel Jimenez MD, MCARLOS.              Department of Internal Medicine/ Critical care  [facility] ([place]             8/17/2022, 11:08 AM

## 2022-08-17 NOTE — PROGRESS NOTES
1821 - Dr. Gerhard Larson notified patient needs an O2 eval ordered. 9224 - Patient only has an inhaler at home and uses accessory muscles when SOB. After DuoNeb patient does better. CM stated they would need an order to get one for home prior to discharge. Recommended nebulizer treatment order for home to Dr. Rosaura Gan.

## 2022-08-17 NOTE — PROGRESS NOTES
Today's Date: 8/17/2022  Patient Name: Carmen Oropeza  Date of admission: 8/11/2022  1:25 AM  Patient's age: 74 y.o., 1955  Admission Dx: Acute respiratory failure ([facility] [J96.00]  NSTEMI (non-ST elevated myocardial infarction) ([facility] [I21.4]    Reason for Consult: management recommendations  Requesting Physician: Swapna Packer MD    CHIEF COMPLAINT:  Shortness of breathe, Diplopia    History Obtained From:  patient    Interim history  No acute evnt overnight  hEmodnamically stable  wit bp ranging from 13/87 , afebrile on2 l nasal cannula   Used bipap overnight  Follow outpatien with oncology   Serum electroltes relatively unremarkable  Cbc unremarkable   Oxygen evaluation toda and then possible discharge   HISTORY OF PRESENT ILLNESS:      The patient is a 74 y.o.   female who is admitted to the hospital for shortness of breath and double vision, patient has history of lung adenocarcinoma status post lobectomy with evidence of recurrent disease in the brain back in March 2022 status post radiation craniotomy and currently on palliative chemoimmunotherapy recently LFTs up required holding immunotherapy, patient for the last 2 weeks started having double vision and worsening shortness of breath and she was admitted to emergency room CT of the brain did show fluid leak/subdural leak and was evaluated by neurosurgery  On admission to emergency room found to have high troponin and non-ST elevation MI      Oncology history    Diagnosis:   Left upper lobe invasive lung adenocarcinoma, Status post lobectomy 9/28/18, Pathologic stage: pT1c, pN2, stage IIIa R1 with positive margin,    Will start chemotherapy followed By RT  Carbo taxol cycle 1 on 11/14/18  Radiation Therapy completed on 3/29/19  Unfortunately on 3/26/2022 she presented with seizure activity and her CT scan MRI showed multiple supra and infratentorial intraparenchymal lesion consistent with metastatic disease in brain  Her CT chest abdomen pain, positive for dyspnea, no palpitations, positive for orthopnea and PND   Gastrointestinal: negative for nausea, vomiting, diarrhea, constipation, abdominal pain, Dysphagia, hematemesis and hematochezia   Genitourinary: negative for frequency, dysuria, nocturia, urinary incontinence, and hematuria   Integument: negative for rash, skin lesions, bruises.    Hematologic/Lymphatic: negative for easy bruising, bleeding, lymphadenopathy, or petechiae   Endocrine: negative for heat or cold intolerance,weight changes, change in bowel habits and hair loss   Musculoskeletal: negative for myalgias, arthralgias, pain, joint swelling,and bone pain   Neurological: Positive for headache and double vision      PHYSICAL EXAM:      BP (!) 160/85   Pulse 78   Temp 98.5 °F (36.9 °C) (Oral)   Resp 17   Ht 4' 11\" (1.499 m)   Wt 153 lb 14.1 oz (69.8 kg)   SpO2 94%   BMI 31.08 kg/m²    Temp (24hrs), Av.3 °F (36.8 °C), Min:98 °F (36.7 °C), Max:98.5 °F (36.9 °C)    General appearance -moderate respiratory distress ,look ill  Mental status - alert and cooperative   Eyes - pupils equal and reactive, extraocular eye movements intact   Ears - bilateral TM's and external ear canals normal   Mouth - mucous membranes moist, pharynx normal without lesions   Neck - supple, no significant adenopathy   Lymphatics - no palpable lymphadenopathy, no hepatosplenomegaly   Chest - clear to auscultation, no wheezes, rales or rhonchi, symmetric air entry   Heart - normal rate, regular rhythm, normal S1, S2, no murmurs  Abdomen - soft, nontender, nondistended, no masses or organomegaly   Neurological - alert, oriented, normal speech, no focal findings or movement disorder noted   Musculoskeletal - no joint tenderness, deformity or swelling   Extremities - peripheral pulses normal, no pedal edema, no clubbing or cyanosis   Skin - normal coloration and turgor, no rashes, no suspicious skin lesions noted ,    DATA:    Labs:   CBC:   Recent Labs 08/16/22  0301 08/17/22  0559   WBC 8.5 9.2   HGB 11.6* 11.9   HCT 34.3* 34.3*    274       BMP:   Recent Labs     08/16/22  0301 08/17/22  0559    130*   K 4.4 4.3   CO2 33* 29   BUN 31* 25*   CREATININE 0.41* 0.29*   LABGLOM >60 >60   GLUCOSE 117* 109*       PT/INR:   No results for input(s): PROTIME, INR in the last 72 hours. IMAGING DATA:  CT ABDOMEN PELVIS WO CONTRAST Additional Contrast? None   Preliminary Result   1. Subcutaneous soft tissue stranding in the right anterior thigh and groin   which is hyperdense in attenuation likely reflecting underlying   hemorrhage/blood products without discernible focal hematoma. There is mild   extension into the right anterior pelvis along the right external iliac   vessels. No retroperitoneal hematoma is otherwise identified. 2. No other acute abdominal or pelvic abnormality. 3. Right basilar consolidative opacity with trace bilateral pleural effusions. MRI ORBITS FACE NECK W WO CONTRAST   Final Result   No mass or abnormal enhancement in the orbits. MRI BRAIN W WO CONTRAST   Final Result   Multiple enhancing metastatic foci throughout the brain in the majority of   these are similar in size compared to prior examination. There is an   enhancing focus abutting the dura in the left temporal lobe posteriorly that   is slightly smaller compared to prior. There is an enhancing focus in the   medial left temporal lobe posteriorly that is smaller compared to prior. A   punctate focus in the inferior right frontal lobe is less pronounced. XR CHEST PORTABLE   Final Result   No significant interval change. Chronic findings at the left perihilar lung. Minimal blunting at the costophrenic angles, likely atelectasis.              Primary Problem  Acute respiratory failure [facility])    Active Hospital Problems    Diagnosis Date Noted    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     Priority: Medium    KRISTIN (internuclear ophthalmoplegia), 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] 08/12/2022     Priority: Medium    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] 08/12/2022     Priority: Medium    Acute respiratory failure ([address] [J96.00] 08/11/2022     Priority: Medium    NSTEMI (non-ST elevated myocardial infarction) ([address] [I21.4] 08/11/2022     Priority: Medium    Chronic respiratory failure with hypoxia ([address] [J96.11] 08/11/2022     Priority: Medium    Centrilobular emphysema ([address] [J43.2] 08/11/2022     Priority: Medium    Essential hypertension [I10] 06/23/2020    Malignant neoplasm of upper lobe of left lung [facility]) [C34.12] 02/12/2019         IMPRESSION:   Adenocarcinoma of the left lung status post lobectomy  Evidence of cancer recurrence in the brain/brain metastasis  S/p craniotomy on March 29, 2022  Recent complaint of double vision  Worsening shortness of breath  Non-ST elevation acute MI  Immunotherapy induced high LFTs and pneumonitis? On chemoimmunotherapy    RECOMMENDATIONS:  I reviewed the laboratory data, imaging studies, discussed the diagnosis and possible treatment    Patient with new onset of double vision . CSF studies show negative cytology , MRI is negative. Cardiac workup underway , on heparin , no bleeding     Pt is aware of the cytology results. Considering other options for anticancer treatment  Ok to discharge will finalize discussion as outpatient     Discussed with patient and Nurse.       Reina Lawrence MD  Internal Medicine Resident, PGY3   R [facility] 21 8/17/2022,10:14 AM

## 2022-08-18 ENCOUNTER — TELEPHONE (OUTPATIENT)
Dept: CASE MANAGEMENT | Age: 67
End: 2022-08-18

## 2022-08-18 NOTE — TELEPHONE ENCOUNTER
Name: Ashley Shaw  : 1955  MRN: [id number]    Navigator received call from pt. And upon discharge yesterday did not receive solution for nebulizer when picking up all her meds from [facility]. Writer reaching out to [employer] and solution requiring PA. Pharmacist running script again and cannot bill part D. Writer confirming with pt. She does have part D(elixer) and script will need to be transferred to [facility] aid who can then bill Part D. Pharmacy working on transfer of script-pt. Aware.    Electronically signed by Park Casarez RN on 2022 at 11:41 AM
Detail Level: Zone
Detail Level: Detailed

## 2022-08-19 ENCOUNTER — TELEPHONE (OUTPATIENT)
Dept: CASE MANAGEMENT | Age: 67
End: 2022-08-19

## 2022-08-19 ENCOUNTER — TELEPHONE (OUTPATIENT)
Dept: FAMILY MEDICINE CLINIC | Age: 67
End: 2022-08-19

## 2022-08-19 DIAGNOSIS — J43.2 CENTRILOBULAR EMPHYSEMA (HCC): Primary | ICD-10-CM

## 2022-08-19 DIAGNOSIS — J96.11 CHRONIC RESPIRATORY FAILURE WITH HYPOXIA (HCC): ICD-10-CM

## 2022-08-19 RX ORDER — IPRATROPIUM BROMIDE AND ALBUTEROL SULFATE 2.5; .5 MG/3ML; MG/3ML
1 SOLUTION RESPIRATORY (INHALATION) EVERY 4 HOURS
Qty: 360 ML | Refills: 0 | Status: SHIPPED | OUTPATIENT
Start: 2022-08-19 | End: 2022-08-31

## 2022-08-19 NOTE — TELEPHONE ENCOUNTER
Name: Kerri Ross  : 1955  MRN: [id number]    Navigator received call from pt. And script for nebulizer has yet to be transferred. Pt. Using rescue inhaler more frequently than ordered. Pt. Does not want to go back to ED. Writer calling [facility] and they have not received script for Nebulizer solution -Pharmacy sending PS message to ordering provider. Writer reaching out to Dr. Teresita Torres and informing  MO of situation and okay to call verbal order to pharmacy for nebulizer solution, but pt. Needing to F/U with Pulmonology post admission. Writer spoke with [facility] and verbal order given for nebulizer solution written previously upon discharge. Writer then reaching out to pt. And informed her to check with her pharmacy. Pt. Informed writer would be making pulmonary F/U appt. For her and important that she keep appt. For any further adjustments in medication needed. Triage updated.      Electronically signed by Radha Yuen RN on 2022 at 9:05 AM

## 2022-08-19 NOTE — PROGRESS NOTES
Perfect serve the DuoNeb order was not prescribed patient therapy see in the aortic coordinate for insurance purposes.   DuoNeb prescription sent to patient's pharmacy at [address] [place] [address] on [employer]

## 2022-08-19 NOTE — TELEPHONE ENCOUNTER
Name: Guillermo Dupree  : 1955  MRN: [id number]    Navigator reaching out to [facility] pulmonary office to check on possible assistance with nebulizer solution, but has not been seen in office therefore unable to assist.Writer will reach out to care coordinator to check on assistance, however writer understands pt. Already discharged -writer looking for suggestions. Writer spoke with [facility]- and she spoke with [facility] pharmacy and key code for prior auth never sent to care coordinator. Alverto Torres working on submitting authorization for nebulizer solution. Writer updating Triage at [facility] that  working on approval.   Writer asking [facility] to cancer previous verbal order and that [employer] working on script. Spouse calling navigator and feels pt. Is over reacting oxygen saturation while on O2 97% and pt. Getting anxious due to steroids. Writer encouraging anxiety medication as prescribed. 2:21 Pt. Informed [facility] filling solution , but in the meantime to to take her anxiety medication and use her inhalers if needed. Emotional support given .    Electronically signed by Deep Rodriguez RN on 2022 at 12:17 PM

## 2022-08-19 NOTE — DISCHARGE SUMMARY
[address]     Department of Internal Medicine - Staff Internal Medicine Teaching Service    INPATIENT DISCHARGE SUMMARY      Patient Identification:  Juan Ramon King is a 74 y.o. female. :  1955  MRN: [id number]     Acct: [de-identified]   PCP: Belgica Barbour MD  Admit Date:  2022  Discharge date and time: 2022  5:59 PM   Attending Provider: No att. providers found                                     [address] Problem Lists:  Principal Problem:    Acute respiratory failure ([address]  Active Problems:    NSTEMI (non-ST elevated myocardial infarction) ([address]    Chronic respiratory failure with hypoxia (HCC)    Centrilobular emphysema ([address]    KRISTIN (internuclear ophthalmoplegia), bilateral    Pseudomeningocele    Conjugate gaze palsy    Malignant neoplasm of upper lobe of left lung ([address]    Essential hypertension  Resolved Problems:    * No resolved hospital problems. *      HOSPITAL STAY     Brief Inpatient course: The patient is a 74 y.o. female with past medical history significant for Lung adenocarcinoma left lung lobectomy four years ago, brain metastasis status post subacute occipital craniotomy for tumor resection on 3/29/2022 currently in chemotherapy, hypertension,HLD,and COPD, patient uses BiPAP at night, normally on 1 L O2 per nursing staff. 2022 with ARF and NSTEMI Patient was transferred from [address] [place] [address] where she presented with concern of shortness of breath and double vision. She went for chemo therapy where she noted to have severe shortness of breath and worsening diplopia. She mention these symptoms she started couple of weeks back in which her Keytruda immunotherapy was stopped and she was started on steroid treatment but for next couple of weeks patient was unable to lie flat and had worsening dyspnea.   Patient was tachypneic and desatted while she was moving around in [facility] so patient was started on BiPAP; oral prednisone 20 mg daily but steroid dosing has been increased by hematology oncology to 1 mg/kg; currently patient is on Decadron 4 mg every 6 hours for treatment of immunotherapy toxicity. Patient is to follow-up outpatient with heme-onc for further evaluation of anticancer medications    Procedures/ Significant Interventions:    8/15/2022: Patient underwent cardiac catheterization. She was found to have mild nonobstructive CAD and mildly reduced LV systolic function with apical hypokinesia, possible stress cardiomyopathy.  -Please see full report. Consults:     Consults:     Final Specialist Recommendations/Findings:   IP CONSULT TO NEUROSURGERY  IP CONSULT TO SPIRITUAL SERVICES  IP CONSULT TO CARDIOLOGY  IP CONSULT TO HEM/ONC  IP CONSULT TO NEUROLOGY  IP CONSULT TO OPHTHALMOLOGY  IP CONSULT TO CASE MANAGEMENT      Any Hospital Acquired Infections: none    Discharge Functional Status:  stable    DISCHARGE PLAN     Disposition: home    Patient Instructions:   Discharge Medication List as of 8/17/2022  4:01 PM        START taking these medications    Details   aspirin 81 MG chewable tablet Take 1 tablet by mouth daily, Disp-30 tablet, R-3Normal      ipratropium-albuterol (DUONEB) 0.5-2.5 (3) MG/3ML SOLN nebulizer solution Inhale 3 mLs into the lungs every 4 hours (while awake), Disp-360 mL, R-0Normal      !! dexamethasone (DECADRON) 4 MG tablet Take 1 tablet by mouth in the morning and 1 tablet at noon and 1 tablet in the evening and 1 tablet before bedtime. Do all this for 7 days. , Disp-28 tablet, R-0Normal      !! dexamethasone (DECADRON) 4 MG tablet Take 1 tablet by mouth 2 times daily (with meals), Disp-60 tablet, R-0Normal      metoprolol tartrate (LOPRESSOR) 25 MG tablet Take 0.5 tablets by mouth 2 times daily, Disp-60 tablet, R-3Normal      lisinopril (PRINIVIL;ZESTRIL) 5 MG tablet Take 1 tablet by mouth daily, Disp-30 tablet, R-3Normal       !! - Potential duplicate medications found.  Please discuss with provider. CONTINUE these medications which have CHANGED    Details   pantoprazole (PROTONIX) 40 MG tablet Take 1 tablet by mouth every morning (before breakfast), Disp-30 tablet, R-3Normal           CONTINUE these medications which have NOT CHANGED    Details   levETIRAcetam (KEPPRA) 500 MG tablet Take 1 tablet by mouth in the morning and 1 tablet before bedtime. , Disp-60 tablet, R-2Normal      clonazePAM (KLONOPIN) 0.5 MG tablet Take 1 tablet by mouth 2 times daily as needed for Anxiety for up to 30 days. , Disp-60 tablet, R-0Normal      Multiple Vitamin (MULTIVITAMIN PO) Take by mouth dailyHistorical Med      folic acid (FOLVITE) 1 MG tablet Take 1 tablet by mouth daily, Disp-30 tablet, R-5Normal      ondansetron (ZOFRAN) 4 MG tablet Take 1 tablet by mouth 3 times daily as needed for Nausea or Vomiting, Disp-30 tablet, R-0Normal      lovastatin (MEVACOR) 10 MG tablet Take 1 tablet by mouth nightly, Disp-30 tablet, R-11Normal      albuterol sulfate  (90 Base) MCG/ACT inhaler inhale 2 puffs by mouth four times a day, Disp-18 g, R-11Normal           STOP taking these medications       predniSONE (DELTASONE) 20 MG tablet Comments:   Reason for Stopping:         lisinopril-hydroCHLOROthiazide (PRINZIDE;ZESTORETIC) 10-12.5 MG per tablet Comments:   Reason for Stopping:               Activity: activity as tolerated    Diet: regular diet    Follow-up:    Alecia Estrada MD  [address] [place] [address], [address] [facility]  [phone]    Schedule an appointment as soon as possible for a visit  Hospital follow up, admitted for respiratory follow up    Alecia Estrada MD  [address] [place] [address], [phone] [facility] [id number]  Melitonjuan antonio, [address] [place], [place] [address]io [place], MD  [address] [place] [address]  [phone]    Schedule an appointment as soon as possible for a visit  Hospital follow up      Patient Instructions:     Continue to take decadron 4 mg every 6 hours for 7 days  Then start taking decadron 4 mg twice daily until followed by oncologist  Protonix dose increased to 40 mg daily  Start taking lisinopril 5 mg daily  Start taking lopressor 12.5 mg twice daily  Stop taking lisinopril hydrochlorothiazide  Start taking aspirin  If symptoms persist or worsen would recommend coming back to the ER      Note that over 30 minutes was spent in preparing discharge papers, discussing discharge with patient, medication review, etc.      Theodore Bull MD  Internal Medicine Resident, PGY-1  [facility];  [place], [place]  8/19/2022, 1:42 PM

## 2022-08-22 ENCOUNTER — TELEPHONE (OUTPATIENT)
Dept: CASE MANAGEMENT | Age: 67
End: 2022-08-22

## 2022-08-22 NOTE — TELEPHONE ENCOUNTER
Name: Elizabeth Blocker  : 1955  MRN: [id number]  Navigator making F/U call to pt. Checking on status of nebulizer solution. Pt. Did receive solution for nebulizer , per pt. Script transferred to [facility] -pt. Has additional insurance for gap coverage, but had pt go through [facility]? Writer infomring pt. Of PCPappt. Later today however pt.  Not up to going and would like navigator to move appt. - 1pm  Electronically signed by Johnney Seip, RN on 2022 at 3:45 PM

## 2022-08-26 ENCOUNTER — TELEPHONE (OUTPATIENT)
Dept: FAMILY MEDICINE CLINIC | Age: 67
End: 2022-08-26

## 2022-08-26 NOTE — TELEPHONE ENCOUNTER
----- Message from Gisselle Wade sent at 8/18/2022 11:08 AM EDT -----  Subject: Appointment Request    Reason for Call: Established Patient Appointment needed: Routine Hospital   Follow Up    QUESTIONS    Reason for appointment request? No appointments available during search     Additional Information for Provider? Pt was in hospital. Please call back.   ---------------------------------------------------------------------------  --------------  Marlon Thapa [facility]  [phone]; OK to leave message on voicemail  ---------------------------------------------------------------------------  --------------  SCRIPT ANSWERS  COVID Screen: Frances Sebastian

## 2022-08-30 ENCOUNTER — HOSPITAL ENCOUNTER (OUTPATIENT)
Facility: MEDICAL CENTER | Age: 67
End: 2022-08-30
Payer: MEDICARE

## 2022-08-31 ENCOUNTER — TELEPHONE (OUTPATIENT)
Dept: ONCOLOGY | Age: 67
End: 2022-08-31

## 2022-08-31 ENCOUNTER — HOSPITAL ENCOUNTER (OUTPATIENT)
Dept: INFUSION THERAPY | Facility: MEDICAL CENTER | Age: 67
Discharge: HOME | End: 2022-08-31
Payer: MEDICARE

## 2022-08-31 ENCOUNTER — OFFICE VISIT (OUTPATIENT)
Dept: ONCOLOGY | Age: 67
End: 2022-08-31
Payer: MEDICARE

## 2022-08-31 VITALS
DIASTOLIC BLOOD PRESSURE: 84 MMHG | TEMPERATURE: 96.7 F | HEART RATE: 89 BPM | WEIGHT: 151.7 LBS | SYSTOLIC BLOOD PRESSURE: 152 MMHG | OXYGEN SATURATION: 99 % | BODY MASS INDEX: 30.64 KG/M2

## 2022-08-31 DIAGNOSIS — I50.22 CHRONIC SYSTOLIC (CONGESTIVE) HEART FAILURE (HCC): ICD-10-CM

## 2022-08-31 DIAGNOSIS — C34.12 MALIGNANT NEOPLASM OF BRONCHUS OF UPPER LOBE, LEFT (HCC): Primary | ICD-10-CM

## 2022-08-31 DIAGNOSIS — C34.90 PRIMARY MALIGNANT NEOPLASM OF LUNG WITH METASTASIS TO BRAIN (HCC): ICD-10-CM

## 2022-08-31 DIAGNOSIS — C34.92 NON-SMALL CELL CANCER OF LEFT LUNG (HCC): Primary | ICD-10-CM

## 2022-08-31 DIAGNOSIS — C79.31 PRIMARY MALIGNANT NEOPLASM OF LUNG WITH METASTASIS TO BRAIN (HCC): ICD-10-CM

## 2022-08-31 LAB
ABSOLUTE EOS #: 0 K/UL (ref 0–0.4)
ABSOLUTE IMMATURE GRANULOCYTE: 0.15 K/UL (ref 0–0.3)
ABSOLUTE LYMPH #: 0.6 K/UL (ref 1–4.8)
ABSOLUTE MONO #: 0.75 K/UL (ref 0.2–0.8)
ALBUMIN SERPL-MCNC: 3.7 G/DL (ref 3.5–5.2)
ALP BLD-CCNC: 70 U/L (ref 35–104)
ALT SERPL-CCNC: 87 U/L (ref 5–33)
ANION GAP SERPL CALCULATED.3IONS-SCNC: 3 MMOL/L (ref 9–17)
AST SERPL-CCNC: 44 U/L
BASOPHILS # BLD: 0 %
BASOPHILS ABSOLUTE: 0 K/UL (ref 0–0.2)
BILIRUB SERPL-MCNC: 0.33 MG/DL (ref 0.3–1.2)
BUN BLDV-MCNC: 20 MG/DL (ref 8–23)
BUN/CREAT BLD: ABNORMAL (ref 9–20)
CALCIUM SERPL-MCNC: 9 MG/DL (ref 8.6–10.4)
CHLORIDE BLD-SCNC: 97 MMOL/L (ref 98–107)
CHOLESTEROL/HDL RATIO: 2.2
CHOLESTEROL: 170 MG/DL
CO2: 38 MMOL/L (ref 20–31)
CREAT SERPL-MCNC: <0.4 MG/DL (ref 0.5–0.9)
EOSINOPHILS RELATIVE PERCENT: 0 % (ref 1–4)
GFR AFRICAN AMERICAN: ABNORMAL ML/MIN
GFR NON-AFRICAN AMERICAN: ABNORMAL ML/MIN
GFR SERPL CREATININE-BSD FRML MDRD: ABNORMAL ML/MIN/{1.73_M2}
GLUCOSE BLD-MCNC: 116 MG/DL (ref 70–99)
HCT VFR BLD CALC: 36.6 % (ref 36.3–47.1)
HDLC SERPL-MCNC: 77 MG/DL
HEMOGLOBIN: 11.8 G/DL (ref 11.9–15.1)
IMMATURE GRANULOCYTES: 1 %
LDL CHOLESTEROL: 66 MG/DL (ref 0–130)
LYMPHOCYTES # BLD: 4 % (ref 24–44)
MCH RBC QN AUTO: 31.6 PG (ref 25.2–33.5)
MCHC RBC AUTO-ENTMCNC: 32.2 G/DL (ref 28.4–34.8)
MCV RBC AUTO: 98.1 FL (ref 82.6–102.9)
MONOCYTES # BLD: 5 % (ref 1–7)
NRBC AUTOMATED: 0 PER 100 WBC
PDW BLD-RTO: 13.6 % (ref 11.8–14.4)
PLATELET # BLD: 299 K/UL (ref 138–453)
PMV BLD AUTO: 9.2 FL (ref 8.1–13.5)
POTASSIUM SERPL-SCNC: 4.5 MMOL/L (ref 3.7–5.3)
RBC # BLD: 3.73 M/UL (ref 3.95–5.11)
SEG NEUTROPHILS: 90 % (ref 36–66)
SEGMENTED NEUTROPHILS ABSOLUTE COUNT: 13.5 K/UL (ref 1.8–7.7)
SODIUM BLD-SCNC: 138 MMOL/L (ref 135–144)
TOTAL PROTEIN: 6 G/DL (ref 6.4–8.3)
TRIGL SERPL-MCNC: 133 MG/DL
TSH SERPL DL<=0.05 MIU/L-ACNC: 0.88 UIU/ML (ref 0.3–5)
WBC # BLD: 15 K/UL (ref 3.5–11.3)

## 2022-08-31 PROCEDURE — G8417 CALC BMI ABV UP PARAM F/U: HCPCS | Performed by: INTERNAL MEDICINE

## 2022-08-31 PROCEDURE — 99215 OFFICE O/P EST HI 40 MIN: CPT | Performed by: INTERNAL MEDICINE

## 2022-08-31 PROCEDURE — 85025 COMPLETE CBC W/AUTO DIFF WBC: CPT

## 2022-08-31 PROCEDURE — G8400 PT W/DXA NO RESULTS DOC: HCPCS | Performed by: INTERNAL MEDICINE

## 2022-08-31 PROCEDURE — 2580000003 HC RX 258: Performed by: INTERNAL MEDICINE

## 2022-08-31 PROCEDURE — 99211 OFF/OP EST MAY X REQ PHY/QHP: CPT | Performed by: INTERNAL MEDICINE

## 2022-08-31 PROCEDURE — 96409 CHEMO IV PUSH SNGL DRUG: CPT

## 2022-08-31 PROCEDURE — 96375 TX/PRO/DX INJ NEW DRUG ADDON: CPT

## 2022-08-31 PROCEDURE — 1124F ACP DISCUSS-NO DSCNMKR DOCD: CPT | Performed by: INTERNAL MEDICINE

## 2022-08-31 PROCEDURE — 6360000002 HC RX W HCPCS: Performed by: INTERNAL MEDICINE

## 2022-08-31 PROCEDURE — 36591 DRAW BLOOD OFF VENOUS DEVICE: CPT

## 2022-08-31 PROCEDURE — 80053 COMPREHEN METABOLIC PANEL: CPT

## 2022-08-31 PROCEDURE — 1090F PRES/ABSN URINE INCON ASSESS: CPT | Performed by: INTERNAL MEDICINE

## 2022-08-31 PROCEDURE — 84443 ASSAY THYROID STIM HORMONE: CPT

## 2022-08-31 PROCEDURE — 3017F COLORECTAL CA SCREEN DOC REV: CPT | Performed by: INTERNAL MEDICINE

## 2022-08-31 PROCEDURE — 96372 THER/PROPH/DIAG INJ SC/IM: CPT

## 2022-08-31 PROCEDURE — 80061 LIPID PANEL: CPT

## 2022-08-31 PROCEDURE — 1111F DSCHRG MED/CURRENT MED MERGE: CPT | Performed by: INTERNAL MEDICINE

## 2022-08-31 PROCEDURE — G8427 DOCREV CUR MEDS BY ELIG CLIN: HCPCS | Performed by: INTERNAL MEDICINE

## 2022-08-31 PROCEDURE — 1036F TOBACCO NON-USER: CPT | Performed by: INTERNAL MEDICINE

## 2022-08-31 RX ORDER — DEXAMETHASONE 4 MG/1
4 TABLET ORAL 2 TIMES DAILY WITH MEALS
Qty: 60 TABLET | Refills: 0 | Status: SHIPPED | OUTPATIENT
Start: 2022-08-31 | End: 2022-09-30

## 2022-08-31 RX ORDER — DEXAMETHASONE SODIUM PHOSPHATE 10 MG/ML
10 INJECTION INTRAMUSCULAR; INTRAVENOUS ONCE
Status: COMPLETED | OUTPATIENT
Start: 2022-08-31 | End: 2022-08-31

## 2022-08-31 RX ORDER — SODIUM CHLORIDE 0.9 % (FLUSH) 0.9 %
5-40 SYRINGE (ML) INJECTION PRN
OUTPATIENT
Start: 2022-08-31

## 2022-08-31 RX ORDER — HEPARIN SODIUM (PORCINE) LOCK FLUSH IV SOLN 100 UNIT/ML 100 UNIT/ML
500 SOLUTION INTRAVENOUS PRN
OUTPATIENT
Start: 2022-08-31

## 2022-08-31 RX ORDER — SODIUM CHLORIDE 9 MG/ML
20 INJECTION, SOLUTION INTRAVENOUS ONCE
Status: COMPLETED | OUTPATIENT
Start: 2022-08-31 | End: 2022-08-31

## 2022-08-31 RX ORDER — CYANOCOBALAMIN 1000 UG/ML
1000 INJECTION INTRAMUSCULAR; SUBCUTANEOUS ONCE
Status: COMPLETED | OUTPATIENT
Start: 2022-08-31 | End: 2022-08-31

## 2022-08-31 RX ORDER — SODIUM CHLORIDE 9 MG/ML
25 INJECTION, SOLUTION INTRAVENOUS PRN
OUTPATIENT
Start: 2022-08-31

## 2022-08-31 RX ORDER — PALONOSETRON 0.05 MG/ML
0.25 INJECTION, SOLUTION INTRAVENOUS ONCE
Status: COMPLETED | OUTPATIENT
Start: 2022-08-31 | End: 2022-08-31

## 2022-08-31 RX ORDER — HEPARIN SODIUM (PORCINE) LOCK FLUSH IV SOLN 100 UNIT/ML 100 UNIT/ML
500 SOLUTION INTRAVENOUS PRN
Status: DISCONTINUED | OUTPATIENT
Start: 2022-08-31 | End: 2022-09-01 | Stop reason: HOSPADM

## 2022-08-31 RX ORDER — SODIUM CHLORIDE 0.9 % (FLUSH) 0.9 %
5-40 SYRINGE (ML) INJECTION PRN
Status: DISCONTINUED | OUTPATIENT
Start: 2022-08-31 | End: 2022-09-01 | Stop reason: HOSPADM

## 2022-08-31 RX ADMIN — CYANOCOBALAMIN 1000 MCG: 1000 INJECTION, SOLUTION INTRAMUSCULAR at 12:13

## 2022-08-31 RX ADMIN — PEMETREXED DISODIUM 800 MG: 500 INJECTION, POWDER, LYOPHILIZED, FOR SOLUTION INTRAVENOUS at 12:25

## 2022-08-31 RX ADMIN — SODIUM CHLORIDE 20 ML/HR: 9 INJECTION, SOLUTION INTRAVENOUS at 10:26

## 2022-08-31 RX ADMIN — SODIUM CHLORIDE, PRESERVATIVE FREE 10 ML: 5 INJECTION INTRAVENOUS at 13:04

## 2022-08-31 RX ADMIN — DEXAMETHASONE SODIUM PHOSPHATE 10 MG: 10 INJECTION INTRAMUSCULAR; INTRAVENOUS at 11:45

## 2022-08-31 RX ADMIN — PALONOSETRON 0.25 MG: 0.05 INJECTION, SOLUTION INTRAVENOUS at 11:45

## 2022-08-31 RX ADMIN — HEPARIN 500 UNITS: 100 SYRINGE at 13:04

## 2022-08-31 NOTE — TELEPHONE ENCOUNTER
Chela Crisostomo MD VISIT & TX  Single agent alimta today  RTC in 3 weeks with PET  PET/CT IS ON 9/14/22 @ 8AM IN [facility] ARRIVAL @ 7:30AM  MD VISIT 9/21/22 @ 11:30AM TX @ 11AM  SCRIPTS SENT TO PT PHARMACY  AVS PRINTED W/ INSTRUCTIONS AND GIVEN TO PT ON EXIT

## 2022-08-31 NOTE — PROGRESS NOTES
Patient arrive ambulatory with spouse for cycle 5 day 1 treatment and physician visit. Patient states she was recently in the ICU and has been experiencing shortness of breath and double vision. Patient states she no longer wants to receive [place] Denies other complaint or concern. Vitals as charted. Port accessed; specimen sent. Labs reviewed; MD in room, ok to proceed with alimta only today. Patient premedicated. Vitamin B12 injection given (See MAR). Patient states she has been continuing to take folic acid at home. Alimta infused with no sign of adverse reaction; line flushed. Port flushed and heparinized with intact kay needle removed per protocol.   Patient ambulate off unit with spouse at discharge; AVS per front office staff

## 2022-08-31 NOTE — TELEPHONE ENCOUNTER
STAFF HEARD PT'S  SAY \"I WANT TO KILL DR DOSHI\" IN THE ELEVATOR. WRITER APPROACHED AND SPOKE WITH DR Fernando Stewart AND SENT MESSAGE TO MANAGER AND SPOKE WITH .  STATES THAT HE DID SAY THAT, BUT \"I AM NOT GOING TO DO ANYTHING\", \"WE ARE WASTING TIME, SHE NEEDS OXYGEN DUE TO TANK WILL RUN OUT SOON\". PT MOVES SLOWLY AND STATES CAN WALK TO TREATMENT AREA, DENIES NEED FOR WHEELCHAIR., ASSISTED TO TREATMENT AREA WITH WRITER AND . PT STATES FEELS PROBLEMS HAVE BEEN CAUSED BY KEYTRUDA, AND EXPRESSES FEAR OF TREATMENT.  SITTING WITH WIFE AND NO COMPLAINTS OR FURTHER CONCERNS VOICED AT THIS TIME. NOTIFIED [facility], PT AND  JUST SEEM DISCOURAGED AND FEARFUL R/T WIFE'S CONDITION. ALEKSANDRA PEREZ TO ROOM AND PT AND  COOPERATIVE.

## 2022-08-31 NOTE — PROGRESS NOTES
p      Patient ID: Isamar Parrish, 1955, [id number], 74 y.o. Diagnosis:   Left upper lobe invasive lung adenocarcinoma, Status post lobectomy 9/28/18, Pathologic stage: pT1c, pN2, stage IIIa R1 with positive margin,    Will start chemotherapy followed By RT  Carbo taxol cycle 1 on 11/14/18  Radiation Therapy completed on 3/29/19  Unfortunately on 3/26/2022 she presented with seizure activity and her CT scan MRI showed multiple supra and infratentorial intraparenchymal lesion consistent with metastatic disease in brain  Her CT chest abdomen pelvis on 3/27/2022 showed 2.1 cm right adrenal nodule suspicious for metastasis  On 3/29/2022 she underwent right-sided craniotomy with resection of intraaxial brain tumor and completed SRS/gamma knife to 6 intracranial lesion on 4/25/2022  Next-generation sequencing is negative for PD-L1, negative for EGFR, ALK, RET, ROS1, BRAF, HER2 mutations  5/11/22 started on systemic chemotherapy with Carboplatin+alimta +pembrolizumab  AST and LAT significantly elevated 8/3 suggesting 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  female who is admitted to the hospital for Left upper lobe mass. Pt has a significant past medical history of Uterine fibroids requiring total abdominal hysterectomy, liver hemangioma, HTN, Hyperlipidemia, DVT Left leg, COPD, and anxiety. Pt was found to have a left upper lung adenocarcinoma and presented to the hospital on 9/28/2018 for a scheduled robotic-assisted left upper lobe lobectomy. Pathology is positive for metastatic adenocarcinoma. There are some lymph nodes positive and some evidence of invasion to surrounding structures seen during surgery. Surgical team is planning on discharging patient this evening from the hospital.  She was discharged from the hospital with plan to follow with oncology as outpatient.     Interval history:   Tiny Riser is returning for follow up visit and to discuss the lab results, imaging studies and further recommendations. She was admitted to hospital with double vision recently. Her MR brain showed stable brain lesions. She was seen by ophthalmologist. She had LP which was negative for malignancy. She does have some TORO and SOB. She is using oxygen,      During this visit patient's allergy, social, medical, surgical history and medications were reviewed and updated. Current Outpatient Medications   Medication Sig Dispense Refill    aspirin 81 MG chewable tablet Take 1 tablet by mouth daily 30 tablet 3    ipratropium-albuterol (DUONEB) 0.5-2.5 (3) MG/3ML SOLN nebulizer solution Inhale 3 mLs into the lungs every 4 hours (while awake) 360 mL 0    pantoprazole (PROTONIX) 40 MG tablet Take 1 tablet by mouth every morning (before breakfast) 30 tablet 3    dexamethasone (DECADRON) 4 MG tablet Take 1 tablet by mouth 2 times daily (with meals) 60 tablet 0    metoprolol tartrate (LOPRESSOR) 25 MG tablet Take 0.5 tablets by mouth 2 times daily 60 tablet 3    lisinopril (PRINIVIL;ZESTRIL) 5 MG tablet Take 1 tablet by mouth daily 30 tablet 3    levETIRAcetam (KEPPRA) 500 MG tablet Take 1 tablet by mouth in the morning and 1 tablet before bedtime. 60 tablet 2    clonazePAM (KLONOPIN) 0.5 MG tablet Take 1 tablet by mouth 2 times daily as needed for Anxiety for up to 30 days. 60 tablet 0    Multiple Vitamin (MULTIVITAMIN PO) Take by mouth daily      folic acid (FOLVITE) 1 MG tablet Take 1 tablet by mouth daily 30 tablet 5    ondansetron (ZOFRAN) 4 MG tablet Take 1 tablet by mouth 3 times daily as needed for Nausea or Vomiting 30 tablet 0    lovastatin (MEVACOR) 10 MG tablet Take 1 tablet by mouth nightly 30 tablet 11    albuterol sulfate  (90 Base) MCG/ACT inhaler inhale 2 puffs by mouth four times a day 18 g 11     No current facility-administered medications for this visit.        Social History     Socioeconomic History Marital status:      Spouse name: Not on file    Number of children: Not on file    Years of education: Not on file    Highest education level: Not on file   Occupational History    Not on file   Tobacco Use    Smoking status: Former     Packs/day: 1.50     Years: 30.00     Pack years: 45.00     Types: Cigarettes     Quit date:      Years since quittin.6    Smokeless tobacco: Never   Vaping Use    Vaping Use: Never used   Substance and Sexual Activity    Alcohol use: Yes     Comment: Occassionally    Drug use: No    Sexual activity: Not on file   Other Topics Concern    Not on file   Social History Narrative    Not on file     Social Determinants of Health     Financial Resource Strain: Low Risk     Difficulty of Paying Living Expenses: Not hard at all   Food Insecurity: No Food Insecurity    Worried About Running Out of Food in the Last Year: Never true    Ran Out of Food in the Last Year: Never true   Transportation Needs: Not on file   Physical Activity: Not on file   Stress: Not on file   Social Connections: Not on file   Intimate Partner Violence: Not on file   Housing Stability: Not on file       Family History   Problem Relation Age of Onset    Cancer Mother         LUNG    Cancer Sister         LUNG        REVIEW OF SYSTEM:     Constitutional: No fever or chills. No night sweats, no weight loss   Eyes: No eye discharge, double vision, or eye pain   HEENT: negative for sore mouth, sore throat, hoarseness and voice change   Respiratory: negative for cough , sputum, dyspnea, wheezing, hemoptysis, chest pain   Cardiovascular: negative for chest pain, dyspnea, palpitations, orthopnea, PND   Gastrointestinal: negative for nausea, vomiting, diarrhea, constipation, abdominal pain, Dysphagia, hematemesis and hematochezia   Genitourinary: negative for frequency, dysuria, nocturia, urinary incontinence, and hematuria   Integument: negative for rash, skin lesions, bruises.    Hematologic/Lymphatic: negative for easy bruising, bleeding, lymphadenopathy, petechiae and swelling/edema   Endocrine: negative for heat or cold intolerance, tremor, weight changes, change in bowel habits and hair loss   Musculoskeletal: negative for myalgias, arthralgias, pain, joint swelling,and bone pain   Neurological: negative for headaches, dizziness, seizures, weakness, numbness       OBJECTIVE:         There were no vitals filed for this visit.   PHYSICAL EXAM:   General appearance - well appearing, no in pain or distress   Mental status - alert and cooperative   Eyes - pupils equal and reactive, extraocular eye movements intact   Ears - bilateral TM's and external ear canals normal   Mouth - mucous membranes moist, pharynx normal without lesions   Neck - supple, no significant adenopathy   Lymphatics - no palpable lymphadenopathy, no hepatosplenomegaly   Chest - clear to auscultation, no wheezes, rales or rhonchi, symmetric air entry   Left chest surgical scar healing well  Heart - normal rate, regular rhythm, normal S1, S2, no murmurs, rubs, clicks or gallops   Abdomen - soft, nontender, nondistended, no masses or organomegaly   Neurological - alert, oriented, normal speech, no focal findings or movement disorder noted   Musculoskeletal - no joint tenderness, deformity or swelling   Extremities - peripheral pulses normal, no pedal edema, no clubbing or cyanosis   Skin - normal coloration and turgor, no rashes, no suspicious skin lesions noted ,  LABORATORY DATA:     Lab Results   Component Value Date    WBC 15.0 (H) 08/31/2022    HGB 11.8 (L) 08/31/2022    HCT 36.6 08/31/2022    MCV 98.1 08/31/2022     08/31/2022    LYMPHOPCT 4 (L) 08/31/2022    RBC 3.73 (L) 08/31/2022    MCH 31.6 08/31/2022    MCHC 32.2 08/31/2022    RDW 13.6 08/31/2022    MONOPCT 5 08/31/2022    BASOPCT 0 08/31/2022    NEUTROABS 13.50 (H) 08/31/2022    LYMPHSABS 0.60 (L) 08/31/2022    MONOSABS 0.75 08/31/2022    EOSABS 0.00 08/31/2022    BASOSABS 0.00 08/31/2022       Chemistry        Component Value Date/Time    NA PENDING 08/31/2022 1024    K PENDING 08/31/2022 1024    CL PENDING 08/31/2022 1024    CO2 PENDING 08/31/2022 1024    BUN PENDING 08/31/2022 1024    CREATININE PENDING 08/31/2022 1024        Component Value Date/Time    CALCIUM PENDING 08/31/2022 1024    ALKPHOS PENDING 08/31/2022 1024    AST PENDING 08/31/2022 1024    ALT PENDING 08/31/2022 1024    BILITOT PENDING 08/31/2022 1024        PATHOLOGY DATA:   Pathology:  CT Guided Biopsy [facility]) 8/22/18:   LEFT LUNG, UPPER LOBE, CT GUIDED CORE NEEDLE BIOPSIES:  - INVASIVE ADENOCARCINOMA, CONSISTENT WITH LUNG PRIMARY. Collected: 9/28/2018   -- Diagnosis --   1.  LYMPH NODE, LEVEL 9, BIOPSY:       -  ONE LYMPH NODE, NEGATIVE FOR MALIGNANCY (0/1). 2.  LYMPH NODE, LEVEL 11, DISSECTION:       -  ONE OF 2 LYMPH NODES POSITIVE FOR METASTATIC CARCINOMA (1/2). 3.  LYMPH NODE, LEVEL 5, DISSECTION:       -  ONE OF 2 LYMPH NODES POSITIVE FOR METASTATIC CARCINOMA (1/2). -  CARCINOMA INVADES LARGE PERIPHERAL NERVE BRANCHES. 4.  LUNG, LEFT UPPER LOBE, LOBECTOMY:            -  WELL-DIFFERENTIATED INVASIVE ADENOCARCINOMA, 2.9 CM   GREATEST DIMENSION. -  NEGATIVE FOR VISCERAL PLEURAL INVASION. -  TUMOR INVOLVES SOFT TISSUE OF BRONCHIAL, VASCULAR AND PARENCHYMAL MARGINS. -  5 PERIBRONCHIOLAR LYMPH NODES, POSITIVE FOR METASTATIC   ADENOCARCINOMA (5/5). -  SEPARATE SMALL INTRALOBAR FOCUS ATYPICAL ADENOMATOUS   HYPERPLASIA. -  PATHOLOGIC STAGE:  pT1c, pN2, R1.     5.  LYMPH NODES, LEVEL 10, DISSECTION:       -  ONE OF 2 LYMPH NODES POSITIVE FOR METASTATIC CARCINOMA (1/2). IMAGING DATA:    MRI brain 3/26/2022  Impression   1. Multiple supra and infratentorial intraparenchymal lesions are most   consistent with metastatic disease. There is associated edema with minimal   mass effect, but no midline shift.    2. Intrinsically T1 hyperintense right frontal calvarial lesion is most   consistent with an osseous hemangioma. No definite osseous metastasis   identified. CT chest abdomen pelvis 3/27/2022  mpression   1. Mild centrilobular emphysema. 2. Redemonstration of left perihilar post treatment scarring with underlying   traction bronchiectasis extending into the upper lower lobes. Stable. 3. No evidence for metastatic disease in the chest.   4. A 2.1 x 1.4 cm right adrenal nodule not present in 2018. CT appearance   does not meet criteria for adenoma. Considered metastatic nodule until   proven otherwise. 5. Redemonstration of hepatic hemangioma and several cysts. ASSESSMENT:    Sigifredo Ruiz Is a very pleasant 74 y.o.  female with initial diagnosis of left upper lobe non-small cell lung cancer, adenocarcinoma, status post Robotically assisted BARAK lobectomy with LN dissection and Intercostal nerve block with experal on 9/28/18. She has V6nD1B2 disease, stage IIIA, she had R1 disease with positive margins. She completed sequential chemo RT. Unfortunately now she has recurrent disease with brain metastasis and also adrenal metastasis. I reviewed the NCCN guidelines and goals of care and now on systemic treatment. 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 They verbalized understanding of the information provided and they agreed to proceed as outlined above.    PLAN:   I Reviewed the lab data, imaging studies and discussed the diagnosis, prognosis and treatment options  I discussed that further immunotherapy is not possible given possible hepatitis and pneumonitis  I will therefore continue only single agent alimta  Her over all prognosis is poor  I discussed option of clinical trial at tertiary care and also another opinion there however patient's /partner refused it  I will get PET scan now to assess any progression or active disease  RTC after PET in 3 weeks      Jose Torres MD  Hematologist/Medical Oncologist    I spent more than 40 minutes examining, evaluating, reviewing data and counseling the patient. Greater than 50% of that time was spent face-to-face with the patient in counseling and coordinating her care. This note is created with the assistance of a speech recognition program.  While intending to generate a document that actually reflects the content of the visit, the document can still have some errors including those of syntax and sound a like substitutions which may escape proof reading. It such instances, actual meaning can be extrapolated by contextual diversion.

## 2022-09-13 ENCOUNTER — TELEPHONE (OUTPATIENT)
Dept: INFUSION THERAPY | Facility: MEDICAL CENTER | Age: 67
End: 2022-09-13

## 2022-09-13 ENCOUNTER — TELEPHONE (OUTPATIENT)
Dept: CASE MANAGEMENT | Age: 67
End: 2022-09-13

## 2022-09-13 NOTE — TELEPHONE ENCOUNTER
Writer spoke with Nasir Humphreys to request return label for free unused Tommas Patches. Nasir Humphreys took information and stated a return label will be sent via email to writer's email address. If it's not received within 30- 45 days, writer should contact Isaiah at [phone]. Day transferred call to Jocelyn Wyman at the [facility] ([phone]) to answer questions pertaining to discontinuance of Tommas Patches. Writer informed both Nasir Humphreys and Jocelyn Wyman that she's not a clinically trained employee. Jocelyn Wyman requested the last date Tommas Patches  was administered along with current allergies, recent diagnostic testing information (names and dates of service), recent hospitalization dates, concurrent chemo treatment information and Physician's name. Case # [id number]    Questions were answered to the best of the writer's ability. Cecilio Plasencia.

## 2022-09-14 ENCOUNTER — TELEPHONE (OUTPATIENT)
Dept: SPIRITUAL SERVICES | Age: 67
End: 2022-09-14

## 2022-09-14 NOTE — TELEPHONE ENCOUNTER
Writer spoke with Patient's Spouse, Annette Cintron, on the phone, after receiving a referral from Arnold Pascal, Nurse Navigator. Writer offered support and assurance of her prayers for Pt and Spouse. Spouse shared that he was doing \"okay\" and that Pt was \"a little bit under the weather. \" He told writer that her prayers \"were important,\" and \"appreciated. \"    Hero Pierce will continue to be available to provide support as needed. Yessir will collaborate with Arnold Pascal in supporting Pt and Spouse.     Electronically signed by Yoana Dailey, Oncology Outpatient [facility], [facility] [facility] Radiation Oncology  9/14/2022  4:47 PM

## 2022-09-16 ENCOUNTER — TELEPHONE (OUTPATIENT)
Dept: CASE MANAGEMENT | Age: 67
End: 2022-09-16

## 2022-09-16 NOTE — TELEPHONE ENCOUNTER
Name: Liv Mobley  : 1955  MRN: [id number]    Oncology Navigation Follow-Up Note    Navigator spoke with Ahmet Espinozale- spouse and attempted to wean pt. From vent today but unsuccessful. Pt. Alert and oriented and aware of navigator calling. Plan to follow.    Electronically signed by Yefri Gandhi RN on 2022 at 3:21 PM

## 2022-09-21 ENCOUNTER — TELEPHONE (OUTPATIENT)
Dept: CASE MANAGEMENT | Age: 67
End: 2022-09-21

## 2022-09-21 NOTE — TELEPHONE ENCOUNTER
Name: Dorsie Goldberg  : 1955  MRN: [id number]    Oncology Navigation Follow-Up Note    Navigator received call from pts. Spouse John and updating navigator. 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 Vinny Parker asking about pt. Prognosis, however regardless pt. Is alert and oriented and can make the decision of stent placement if she desires. Writer if understanding pt. And spouse are wanting stent because if unable to get pt. Off vent this would not be good quality of life even with poor prognosis of cancer diagnosis. Writer updating Dr. Emilia Fernández and agrees pt. Can determine for herself if wanting stent. Although Dr. Emilia Fernández states, \"poor prognosis\" unsure at this point if pt. Will be able to even tolerate further treatment? Writer letting Vinny Parker spouse know pulmonologist can call navigator and writer can give him Dr. Sukh Dumont direct number if questions. Vinny Parker mentioning he thought the pulmonologist is Dr. Shah Leak does have navigators contact information if needed).       Electronically signed by Suzanne Myers RN on 2022 at 9:31 AM

## 2022-09-26 ENCOUNTER — HOSPITAL ENCOUNTER (OUTPATIENT)
Dept: MEDSURG UNIT | Age: 67
Discharge: HOME | End: 2022-09-26
Attending: INTERNAL MEDICINE | Admitting: INTERNAL MEDICINE

## 2022-09-26 ENCOUNTER — TELEPHONE (OUTPATIENT)
Dept: CASE MANAGEMENT | Age: 67
End: 2022-09-26

## 2022-10-03 LAB — PRO-BNP: 121 PG/ML

## 2022-10-03 PROCEDURE — 83880 ASSAY OF NATRIURETIC PEPTIDE: CPT

## 2022-10-09 LAB
ANION GAP SERPL CALCULATED.3IONS-SCNC: 7 MMOL/L (ref 9–17)
BUN BLDV-MCNC: 22 MG/DL (ref 8–23)
BUN/CREAT BLD: ABNORMAL (ref 9–20)
CALCIUM SERPL-MCNC: 9.6 MG/DL (ref 8.6–10.4)
CHLORIDE BLD-SCNC: 91 MMOL/L (ref 98–107)
CO2: 35 MMOL/L (ref 20–31)
CREAT SERPL-MCNC: <0.4 MG/DL (ref 0.5–0.9)
GFR SERPL CREATININE-BSD FRML MDRD: ABNORMAL ML/MIN/1.73M2
GLUCOSE BLD-MCNC: 105 MG/DL (ref 70–99)
HCT VFR BLD CALC: 31 % (ref 36.3–47.1)
HEMOGLOBIN: 9.8 G/DL (ref 11.9–15.1)
INR BLD: 0.9
MCH RBC QN AUTO: 31.6 PG (ref 25.2–33.5)
MCHC RBC AUTO-ENTMCNC: 31.6 G/DL (ref 28.4–34.8)
MCV RBC AUTO: 100 FL (ref 82.6–102.9)
NRBC AUTOMATED: 0.3 PER 100 WBC
PARTIAL THROMBOPLASTIN TIME: 26.3 SEC (ref 23.9–33.8)
PDW BLD-RTO: 15.9 % (ref 11.8–14.4)
PLATELET # BLD: 272 K/UL (ref 138–453)
PMV BLD AUTO: 10.1 FL (ref 8.1–13.5)
POTASSIUM SERPL-SCNC: 4.5 MMOL/L (ref 3.7–5.3)
PROTHROMBIN TIME: 12.6 SEC (ref 11.5–14.2)
RBC # BLD: 3.1 M/UL (ref 3.95–5.11)
SODIUM BLD-SCNC: 133 MMOL/L (ref 135–144)
WBC # BLD: 6 K/UL (ref 3.5–11.3)

## 2022-10-09 PROCEDURE — 85610 PROTHROMBIN TIME: CPT

## 2022-10-09 PROCEDURE — 85730 THROMBOPLASTIN TIME PARTIAL: CPT

## 2022-10-09 PROCEDURE — 85027 COMPLETE CBC AUTOMATED: CPT

## 2022-10-09 PROCEDURE — 80048 BASIC METABOLIC PNL TOTAL CA: CPT

## 2022-10-13 PROCEDURE — 87186 SC STD MICRODIL/AGAR DIL: CPT

## 2022-10-13 PROCEDURE — 87077 CULTURE AEROBIC IDENTIFY: CPT

## 2022-10-13 PROCEDURE — 87205 SMEAR GRAM STAIN: CPT

## 2022-10-13 PROCEDURE — 87070 CULTURE OTHR SPECIMN AEROBIC: CPT

## 2022-10-15 LAB
ABSOLUTE EOS #: <0.03 K/UL (ref 0–0.44)
ABSOLUTE IMMATURE GRANULOCYTE: 0.11 K/UL (ref 0–0.3)
ABSOLUTE LYMPH #: 1.15 K/UL (ref 1.1–3.7)
ABSOLUTE MONO #: 0.55 K/UL (ref 0.1–1.2)
BASOPHILS # BLD: 0 % (ref 0–2)
BASOPHILS ABSOLUTE: <0.03 K/UL (ref 0–0.2)
EOSINOPHILS RELATIVE PERCENT: 0 % (ref 1–4)
HCT VFR BLD CALC: 29.3 % (ref 36.3–47.1)
HEMOGLOBIN: 9.6 G/DL (ref 11.9–15.1)
IMMATURE GRANULOCYTES: 2 %
LYMPHOCYTES # BLD: 15 % (ref 24–43)
MCH RBC QN AUTO: 32 PG (ref 25.2–33.5)
MCHC RBC AUTO-ENTMCNC: 32.8 G/DL (ref 28.4–34.8)
MCV RBC AUTO: 97.7 FL (ref 82.6–102.9)
MONOCYTES # BLD: 7 % (ref 3–12)
NRBC AUTOMATED: 0.4 PER 100 WBC
PDW BLD-RTO: 16.3 % (ref 11.8–14.4)
PLATELET # BLD: 253 K/UL (ref 138–453)
PMV BLD AUTO: 9.6 FL (ref 8.1–13.5)
PROCALCITONIN: 0.05 NG/ML
RBC # BLD: 3 M/UL (ref 3.95–5.11)
RBC # BLD: ABNORMAL 10*6/UL
SEG NEUTROPHILS: 76 % (ref 36–65)
SEGMENTED NEUTROPHILS ABSOLUTE COUNT: 5.7 K/UL (ref 1.5–8.1)
WBC # BLD: 7.5 K/UL (ref 3.5–11.3)

## 2022-10-15 PROCEDURE — 84145 PROCALCITONIN (PCT): CPT

## 2022-10-15 PROCEDURE — 85025 COMPLETE CBC W/AUTO DIFF WBC: CPT

## 2022-10-16 LAB
CULTURE: ABNORMAL
CULTURE: ABNORMAL
DIRECT EXAM: ABNORMAL
SPECIMEN DESCRIPTION: ABNORMAL

## 2022-10-18 LAB
ANION GAP SERPL CALCULATED.3IONS-SCNC: 6 MMOL/L (ref 9–17)
BUN BLDV-MCNC: 17 MG/DL (ref 8–23)
BUN/CREAT BLD: ABNORMAL (ref 9–20)
CALCIUM SERPL-MCNC: 9.8 MG/DL (ref 8.6–10.4)
CHLORIDE BLD-SCNC: 94 MMOL/L (ref 98–107)
CO2: 38 MMOL/L (ref 20–31)
CREAT SERPL-MCNC: <0.4 MG/DL (ref 0.5–0.9)
GFR SERPL CREATININE-BSD FRML MDRD: ABNORMAL ML/MIN/1.73M2
GLUCOSE BLD-MCNC: 80 MG/DL (ref 70–99)
POTASSIUM SERPL-SCNC: 3.8 MMOL/L (ref 3.7–5.3)
SODIUM BLD-SCNC: 138 MMOL/L (ref 135–144)

## 2022-10-18 PROCEDURE — 80048 BASIC METABOLIC PNL TOTAL CA: CPT

## 2022-10-20 LAB
ANION GAP SERPL CALCULATED.3IONS-SCNC: 11 MMOL/L (ref 9–17)
BUN BLDV-MCNC: 18 MG/DL (ref 8–23)
BUN/CREAT BLD: ABNORMAL (ref 9–20)
CALCIUM SERPL-MCNC: 9 MG/DL (ref 8.6–10.4)
CHLORIDE BLD-SCNC: 92 MMOL/L (ref 98–107)
CO2: 33 MMOL/L (ref 20–31)
CREAT SERPL-MCNC: <0.4 MG/DL (ref 0.5–0.9)
GFR SERPL CREATININE-BSD FRML MDRD: ABNORMAL ML/MIN/1.73M2
GLUCOSE BLD-MCNC: 142 MG/DL (ref 70–99)
POTASSIUM SERPL-SCNC: 4.5 MMOL/L (ref 3.7–5.3)
SODIUM BLD-SCNC: 136 MMOL/L (ref 135–144)

## 2022-10-20 PROCEDURE — 80048 BASIC METABOLIC PNL TOTAL CA: CPT

## 2022-10-26 LAB
HCT VFR BLD CALC: 30.6 % (ref 36.3–47.1)
HEMOGLOBIN: 9.6 G/DL (ref 11.9–15.1)
MCH RBC QN AUTO: 32.1 PG (ref 25.2–33.5)
MCHC RBC AUTO-ENTMCNC: 31.4 G/DL (ref 28.4–34.8)
MCV RBC AUTO: 102.3 FL (ref 82.6–102.9)
NRBC AUTOMATED: 0.7 PER 100 WBC
PDW BLD-RTO: 15.9 % (ref 11.8–14.4)
PLATELET # BLD: 234 K/UL (ref 138–453)
PMV BLD AUTO: 10.1 FL (ref 8.1–13.5)
RBC # BLD: 2.99 M/UL (ref 3.95–5.11)
WBC # BLD: 8.1 K/UL (ref 3.5–11.3)

## 2022-10-26 PROCEDURE — 85027 COMPLETE CBC AUTOMATED: CPT

## 2022-10-27 LAB — PRO-BNP: 131 PG/ML

## 2022-10-27 PROCEDURE — 83880 ASSAY OF NATRIURETIC PEPTIDE: CPT

## 2023-03-12 NOTE — ED NOTES
Writer to pt's room to draw blood and pt states she had blood drawn when they accessed her port this morning. Pt states she would like to not have them redrawn if possible. Spoke with Dr Emma Ozuna and he said to cancel the CBC and CMP. RN notified.      [facility]  08/10/22 7085
no
09-Jun-2022 20:10

## 2024-01-31 NOTE — TELEPHONE ENCOUNTER
DR Sam Farley DEFERS TO NEURLOGY FOR THE KEPPRA REFILL     CALLED PT, SHE DOES NOT SEE NEUROLOGY, SCRIPT WRITTEN AT DISCHARGE BY RESIDENTS, FAMILY MD IS NOT FAMILIAR WITH HER CASE   HAS ONLY 2 PILLS LEFT     LEFT MESSAGE FOR DR Sam Farley TO CALL ME
Patient states that her protonix is $ 500 out of pocket each month. She is wondering if there is any other option which will not be as expensive.
Per Dr. Yessi Grayson, Prilosec 40mg may be prescribed in place of Protonix. Writer contacted patient, she states that she was misinformed by her insurance company and her out of pocket cost is actually only $13 and so she will continue with Protonix. Patient asking whether or not she will continue with Keppra. Keppra 500mg BID was prescribed for her by Dr. Milena Nunez during 03/22 hospitalization. She has pills left at home but is out of refills, asking if Dr. Yessi Grayson will prescribe for her. Note to MD zamarripa in triage.
No

## (undated) DEVICE — SPONGE,NEURO,0.5"X3",XR,STRL,LF,10/PK: Brand: MEDLINE

## (undated) DEVICE — TOTAL TRAY, 16FR 10ML SIL FOLEY, URN: Brand: MEDLINE

## (undated) DEVICE — DISPOSABLE BIPOLAR FORCEPS 8" (20.3CM) COHEN BAYONET, INSULATED, IRRIGATING, 1MM TIP: Brand: KIRWAN

## (undated) DEVICE — SEAL

## (undated) DEVICE — GOWN,SIRUS,NONRNF,SETINSLV,XL,20/CS: Brand: MEDLINE

## (undated) DEVICE — SINGLE USE BIOPSY VALVE MAJ-210: Brand: SINGLE USE BIOPSY VALVE (STERILE)

## (undated) DEVICE — CANNULA NSL AD TBNG L7FT PVC STR NONFLARED PRNG O2 DEL W STD

## (undated) DEVICE — SKIN AFFIX SURG ADHESIVE 72/CS 0.55ML: Brand: MEDLINE

## (undated) DEVICE — 2.3MM TAPERED ROUTER

## (undated) DEVICE — TRAP SPEC RETRV CLR PLAS POLYP IN LN SUCT QUIK CTCH

## (undated) DEVICE — SPONGE LAP W18XL18IN WHT COT 4 PLY FLD STRUNG RADPQ DISP ST

## (undated) DEVICE — CATHETER THORACENTESIS STR 28 FRX23 IN 6 EYELET TAPR TIP LF

## (undated) DEVICE — STERILE POLYISOPRENE POWDER-FREE SURGICAL GLOVES WITH EMOLLIENT COATING: Brand: PROTEXIS

## (undated) DEVICE — SUTURE VCRL SZ 2-0 L18IN ABSRB VLT L26MM SH 1/2 CIR J775D

## (undated) DEVICE — SINGLE USE SUCTION VALVE MAJ-209: Brand: SINGLE USE SUCTION VALVE (STERILE)

## (undated) DEVICE — Z INACTIVE USE 2735373 APPLICATOR FBR LAIN COT WOOD TIP ECONOMICAL

## (undated) DEVICE — ARM DRAPE

## (undated) DEVICE — JELLY,LUBE,STERILE,FLIP TOP,TUBE,2-OZ: Brand: MEDLINE

## (undated) DEVICE — Device

## (undated) DEVICE — VESSEL SEALER EXTEND: Brand: ENDOWRIST

## (undated) DEVICE — INSUFFLATION TUBING SET WITH FILTER, FUNNEL CONNECTOR AND LUER LOCK: Brand: JOSNOE MEDICAL INC

## (undated) DEVICE — SOLUTION IV IRRIG POUR BRL 0.9% SODIUM CHL 2F7124

## (undated) DEVICE — GOWN,AURORA,NONRNF,XL,30/CS: Brand: MEDLINE

## (undated) DEVICE — APPLICATOR MEDICATED 10.5 CC SOLUTION HI LT ORNG CHLORAPREP

## (undated) DEVICE — STAPLER SHEATH: Brand: ENDOWRIST

## (undated) DEVICE — DRAPE,REIN 53X77,STERILE: Brand: MEDLINE

## (undated) DEVICE — BLADE ES ELASTOMERIC COAT INSUL DURABLE BEND UPTO 90DEG

## (undated) DEVICE — GAUZE,SPONGE,FLUFF,6"X6.75",STRL,5/TRAY: Brand: MEDLINE

## (undated) DEVICE — BLADE, TONGUE, 6", STERILE: Brand: MEDLINE

## (undated) DEVICE — REDUCER: Brand: ENDOWRIST

## (undated) DEVICE — 3M™ IOBAN™ 2 ANTIMICROBIAL INCISE DRAPE 6650EZ: Brand: IOBAN™ 2

## (undated) DEVICE — GOWN,POLY REINFORCED,LG: Brand: MEDLINE

## (undated) DEVICE — RELOAD STPL 3.5MM L60MM 0DEG UNIV TISS PUR TI 6 ROW LIN

## (undated) DEVICE — LARGE BLUNT, DUAL PACK: Brand: LINA SKIN HOOK™

## (undated) DEVICE — ELECTRO LUBE IS A SINGLE PATIENT USE DEVICE THAT IS INTENDED TO BE USED ON ELECTROSURGICAL ELECTRODES TO REDUCE STICKING.: Brand: KEY SURGICAL ELECTRO LUBE

## (undated) DEVICE — SOLUTION SCRB 4OZ 4% CHG H2O AIDED FOR PREOPERATIVE SKIN

## (undated) DEVICE — GLOVE ORANGE PI 7 1/2   MSG9075

## (undated) DEVICE — SOLUTION ANTIFOG VIS SYS CLEARIFY LAPSCP

## (undated) DEVICE — AGENT HEMSTAT W2XL14IN OXIDIZED REGENERATED CELOS ABSRB FOR

## (undated) DEVICE — SVMMC CRANI PK

## (undated) DEVICE — DISPOSABLE BIPOLAR FORCEPS 8" (20.3CM) COHEN BAYONET, INSULATED, IRRIGATING, 1.5MM TIP: Brand: KIRWAN

## (undated) DEVICE — PROVE COVER: Brand: UNBRANDED

## (undated) DEVICE — AGENT HEMSTAT W2XL4IN OXIDIZED REGENERATED CELOS ABSRB

## (undated) DEVICE — GLOVE SURG SZ 6 THK91MIL LTX FREE SYN POLYISOPRENE ANTI

## (undated) DEVICE — Z DISCONTINUED BY MEDLINE USE 2711682 TRAY SKIN PREP DRY W/ PREM GLV

## (undated) DEVICE — GARMENT,MEDLINE,DVT,INT,CALF,MED, GEN2: Brand: MEDLINE

## (undated) DEVICE — RELOAD STPL 45MM THCK TISS GRN W/ GRIPPING SURF TECHNOLOGY

## (undated) DEVICE — LOOP VES W25MM THK1MM MAXI RED SIL FLD REPELLENT 100 PER

## (undated) DEVICE — TIP COVER ACCESSORY

## (undated) DEVICE — BLADELESS OBTURATOR: Brand: WECK VISTA

## (undated) DEVICE — DRAIN,WOUND,ROUND,24FR,5/16",FULL-FLUTED: Brand: MEDLINE

## (undated) DEVICE — AGENT HEMOSTATIC SURGIFLOW MATRIX KIT W/THROMBIN

## (undated) DEVICE — GLOVE EXAM SM L95IN FNGR THK35MIL PALM THK24MIL OFF WHT

## (undated) DEVICE — CRANIOTOMY DRAPE, STERILE: Brand: MEDLINE

## (undated) DEVICE — BLADE CLP TAPR HD WET DRY CAPABILITY GTT IN CHARGING USE

## (undated) DEVICE — GLOVE ORANGE PI 7   MSG9070

## (undated) DEVICE — PACK PROCEDURE SURG SVMMC THORACOTOMY

## (undated) DEVICE — RELOAD STPL L45MM VASCULAR/MEDIUM TISS TAN CRV TIP ARTC

## (undated) DEVICE — GLOVE SURG SZ 65 THK91MIL LTX FREE SYN POLYISOPRENE

## (undated) DEVICE — GLOVE ORANGE PI 8   MSG9080

## (undated) DEVICE — WOUND RETRACTOR AND PROTECTOR: Brand: ALEXIS O WOUND PROTECTOR-RETRACTOR

## (undated) DEVICE — TOWEL,OR,DSP,ST,NATURAL,DLX,4/PK,20PK/CS: Brand: MEDLINE

## (undated) DEVICE — COVER LT HNDL BLU PLAS

## (undated) DEVICE — ST CHARLES BRONCHOSCOPY PACK: Brand: MEDLINE INDUSTRIES, INC.

## (undated) DEVICE — CONNECTOR TBNG WHT PLAS SUCT STR 5IN1 LTWT W/ M CONN

## (undated) DEVICE — RELOAD STPL SZ 2 H2.5MM TAN CRV TIP VASCULAR/MEDIUM

## (undated) DEVICE — SPONGE,NEURO,0.5"X0.5",XR,STRL,10/PK: Brand: MEDLINE

## (undated) DEVICE — DISPOSABLE IRRIGATION BIPOLAR CORD, M1000 TYPE: Brand: KIRWAN

## (undated) DEVICE — DRESSING BORDERED ADH GZ UNIV GEN USE 8INX4IN AND 6INX2IN

## (undated) DEVICE — HYPODERMIC SAFETY NEEDLE: Brand: MAGELLAN

## (undated) DEVICE — CRANIALMASK TRACKER: Brand: CRANIALMASK TRACKER

## (undated) DEVICE — CULTURETTE AERO/ANEROBIC RED/BLUE BUNDLE

## (undated) DEVICE — GOWN,AURORA,NONREINFORCED,LARGE: Brand: MEDLINE

## (undated) DEVICE — INTENDED FOR TISSUE SEPARATION, AND OTHER PROCEDURES THAT REQUIRE A SHARP SURGICAL BLADE TO PUNCTURE OR CUT.: Brand: BARD-PARKER ® CARBON RIB-BACK BLADES

## (undated) DEVICE — INSTRUMENT BATTERY

## (undated) DEVICE — BOOT SUT STD RED IN BLU SMOOTH RADPQ

## (undated) DEVICE — BITEBLOCK 54FR W/ DENT RIM BLOX

## (undated) DEVICE — CLAMP SCALP STR EDGE HVY

## (undated) DEVICE — DRESSING TRNSPAR W5XL4.5IN FLM SHT SEMIPERMEABLE WIND

## (undated) DEVICE — SOCK SPEC SHT 4.5 IN UNIV CANSTR COMPATIBILITY

## (undated) DEVICE — SCISSOR SURG METZ CRV TIP

## (undated) DEVICE — PAD,NON-ADHERENT,3X8,STERILE,LF,1/PK: Brand: MEDLINE

## (undated) DEVICE — TROCAR ENDOSCP L100MM DIA12MM BLDELSS OBT RADLUC STBL SL

## (undated) DEVICE — 3.0MM PRECISION NEURO (MATCH HEAD)

## (undated) DEVICE — BATTERY PACK 2 FOR QUIKDRIVE: Brand: UNIVERSAL NEURO 2, QUIKDRIVE

## (undated) DEVICE — PEN: MARKING STD 100/CS: Brand: MEDICAL ACTION INDUSTRIES

## (undated) DEVICE — MARKER,SKIN,WI/RULER AND LABELS: Brand: MEDLINE

## (undated) DEVICE — ELECTRODE PT RET AD L9FT HI MOIST COND ADH HYDRGEL CORDED

## (undated) DEVICE — CANNULA SEAL

## (undated) DEVICE — BAG SPEC LAP 9X7.5 IN 12 MM 1500 CC MEM WIRE CANN NYL

## (undated) DEVICE — SUTURE D SPEC VCRL 2 0 D8876

## (undated) DEVICE — SUTURE VCRL SZ 3-0 L18IN ABSRB UD L26MM SH 1/2 CIR J864D

## (undated) DEVICE — CONTAINER,SPECIMEN,4OZ,OR STRL: Brand: MEDLINE

## (undated) DEVICE — ZYPHR DISPOSABLE CRANIAL PERFORATOR, LARGE 14/11MM: Brand: ZYPHR